# Patient Record
Sex: MALE | Race: OTHER | HISPANIC OR LATINO | Employment: OTHER | URBAN - METROPOLITAN AREA
[De-identification: names, ages, dates, MRNs, and addresses within clinical notes are randomized per-mention and may not be internally consistent; named-entity substitution may affect disease eponyms.]

---

## 2022-05-10 ENCOUNTER — TELEPHONE (OUTPATIENT)
Dept: PSYCHIATRY | Facility: CLINIC | Age: 67
End: 2022-05-10

## 2022-06-14 ENCOUNTER — OFFICE VISIT (OUTPATIENT)
Dept: FAMILY MEDICINE CLINIC | Facility: CLINIC | Age: 67
End: 2022-06-14
Payer: COMMERCIAL

## 2022-06-14 VITALS
OXYGEN SATURATION: 99 % | HEIGHT: 65 IN | TEMPERATURE: 97.2 F | DIASTOLIC BLOOD PRESSURE: 60 MMHG | WEIGHT: 153.8 LBS | HEART RATE: 67 BPM | SYSTOLIC BLOOD PRESSURE: 90 MMHG | BODY MASS INDEX: 25.62 KG/M2

## 2022-06-14 DIAGNOSIS — E11.65 UNCONTROLLED TYPE 2 DIABETES MELLITUS WITH HYPERGLYCEMIA (HCC): ICD-10-CM

## 2022-06-14 DIAGNOSIS — R53.81 PHYSICAL DECONDITIONING: ICD-10-CM

## 2022-06-14 DIAGNOSIS — Z76.89 ENCOUNTER TO ESTABLISH CARE: Primary | ICD-10-CM

## 2022-06-14 DIAGNOSIS — G47.00 INSOMNIA, UNSPECIFIED TYPE: ICD-10-CM

## 2022-06-14 DIAGNOSIS — L89.152 SACRAL DECUBITUS ULCER, STAGE II (HCC): ICD-10-CM

## 2022-06-14 DIAGNOSIS — F31.62 BIPOLAR AFFECTIVE DISORDER, MIXED, MODERATE (HCC): ICD-10-CM

## 2022-06-14 DIAGNOSIS — D64.9 ANEMIA, UNSPECIFIED TYPE: ICD-10-CM

## 2022-06-14 DIAGNOSIS — N18.32 STAGE 3B CHRONIC KIDNEY DISEASE (HCC): ICD-10-CM

## 2022-06-14 DIAGNOSIS — B17.10 ACUTE HEPATITIS C VIRUS INFECTION WITHOUT HEPATIC COMA: ICD-10-CM

## 2022-06-14 DIAGNOSIS — Z71.89 COORDINATION OF COMPLEX CARE: ICD-10-CM

## 2022-06-14 DIAGNOSIS — F41.9 ANXIETY: ICD-10-CM

## 2022-06-14 DIAGNOSIS — I50.9 CONGESTIVE HEART FAILURE, UNSPECIFIED HF CHRONICITY, UNSPECIFIED HEART FAILURE TYPE (HCC): ICD-10-CM

## 2022-06-14 DIAGNOSIS — E11.42 DIABETIC POLYNEUROPATHY ASSOCIATED WITH TYPE 2 DIABETES MELLITUS (HCC): ICD-10-CM

## 2022-06-14 DIAGNOSIS — E78.2 MIXED HYPERLIPIDEMIA: ICD-10-CM

## 2022-06-14 PROBLEM — J18.9 HCAP (HEALTHCARE-ASSOCIATED PNEUMONIA): Status: ACTIVE | Noted: 2022-04-23

## 2022-06-14 PROBLEM — N17.9 ACUTE KIDNEY INJURY (HCC): Status: ACTIVE | Noted: 2022-04-23

## 2022-06-14 PROBLEM — R94.31 ABNORMAL EKG: Status: ACTIVE | Noted: 2022-04-23

## 2022-06-14 PROCEDURE — 3008F BODY MASS INDEX DOCD: CPT | Performed by: PHYSICIAN ASSISTANT

## 2022-06-14 PROCEDURE — 3725F SCREEN DEPRESSION PERFORMED: CPT | Performed by: PHYSICIAN ASSISTANT

## 2022-06-14 PROCEDURE — 99205 OFFICE O/P NEW HI 60 MIN: CPT | Performed by: PHYSICIAN ASSISTANT

## 2022-06-14 RX ORDER — LISINOPRIL 2.5 MG/1
2.5 TABLET ORAL DAILY
COMMUNITY
Start: 2022-06-08 | End: 2022-06-21

## 2022-06-14 RX ORDER — TORSEMIDE 20 MG/1
10 TABLET ORAL DAILY
Status: ON HOLD
Start: 2022-06-14 | End: 2022-07-19 | Stop reason: SDUPTHER

## 2022-06-14 RX ORDER — ATORVASTATIN CALCIUM 40 MG/1
40 TABLET, FILM COATED ORAL
COMMUNITY
Start: 2022-04-29

## 2022-06-14 RX ORDER — CHOLECALCIFEROL (VITAMIN D3) 125 MCG
5 CAPSULE ORAL
COMMUNITY
Start: 2022-05-13

## 2022-06-14 RX ORDER — LORAZEPAM 0.5 MG/1
0.5 TABLET ORAL
COMMUNITY
Start: 2022-05-13 | End: 2022-06-14 | Stop reason: SDUPTHER

## 2022-06-14 RX ORDER — PSEUDOEPHED/ACETAMINOPH/DIPHEN 30MG-500MG
500 TABLET ORAL EVERY 6 HOURS PRN
COMMUNITY
Start: 2022-04-29

## 2022-06-14 RX ORDER — GABAPENTIN 300 MG/1
300 CAPSULE ORAL 3 TIMES DAILY
Qty: 30 CAPSULE | Refills: 0 | Status: SHIPPED | OUTPATIENT
Start: 2022-06-14 | End: 2022-06-30 | Stop reason: SDUPTHER

## 2022-06-14 RX ORDER — FLUOXETINE HYDROCHLORIDE 20 MG/1
20 CAPSULE ORAL DAILY
Qty: 90 CAPSULE | Refills: 1 | Status: SHIPPED | OUTPATIENT
Start: 2022-06-14

## 2022-06-14 RX ORDER — HYDRALAZINE HYDROCHLORIDE 10 MG/1
25 TABLET, FILM COATED ORAL 3 TIMES DAILY
COMMUNITY
Start: 2022-06-10 | End: 2022-07-20

## 2022-06-14 RX ORDER — DAPAGLIFLOZIN 5 MG/1
5 TABLET, FILM COATED ORAL DAILY
COMMUNITY
Start: 2022-06-10

## 2022-06-14 RX ORDER — ASPIRIN 81 MG/1
81 TABLET, COATED ORAL DAILY
COMMUNITY
Start: 2022-04-29

## 2022-06-14 RX ORDER — FLUOXETINE HYDROCHLORIDE 20 MG/1
20 CAPSULE ORAL DAILY
COMMUNITY
Start: 2022-04-29 | End: 2022-06-14 | Stop reason: SDUPTHER

## 2022-06-14 RX ORDER — LORAZEPAM 0.5 MG/1
0.5 TABLET ORAL
Qty: 30 TABLET | Refills: 0 | Status: SHIPPED | OUTPATIENT
Start: 2022-06-14 | End: 2022-07-14

## 2022-06-14 RX ORDER — TORSEMIDE 20 MG/1
20 TABLET ORAL DAILY
COMMUNITY
Start: 2022-06-10 | End: 2022-06-14 | Stop reason: SDUPTHER

## 2022-06-14 RX ORDER — METOPROLOL SUCCINATE 50 MG/1
50 TABLET, EXTENDED RELEASE ORAL DAILY
COMMUNITY
Start: 2022-04-29 | End: 2022-07-20

## 2022-06-14 RX ORDER — LANCING DEVICE/LANCETS
1 KIT MISCELLANEOUS DAILY
COMMUNITY
Start: 2022-04-30

## 2022-06-14 NOTE — PROGRESS NOTES
New Patient    Jax Ortiz 61 y o  male   Date:  6/14/2022      Assessment and Plan:    Galileo Cavazos was seen today for establish care  Diagnoses and all orders for this visit:    Encounter to establish care    Congestive heart failure, unspecified HF chronicity, unspecified heart failure type ([facility] [address] )  Comments:  meds reconciled per cardiology; on BB, ACEI, hydralazine, diuretic; EF 30%, given lifevest, needs LHC and cardiac rehab; continue f/u with cardiology  Orders:  -     torsemide (DEMADEX) 20 mg tablet; Take 0 5 tablets (10 mg total) by mouth in the morning Take 1/2 tablet daily  -     Ambulatory Referral to Complex Care Management Program; Future    Uncontrolled type 2 diabetes mellitus with hyperglycemia (HCC)  Comments:  following visit Dixon Harder was increased to 10 mg, taking two 5s; awaiting endocrinology appt; metformin was reduced due to kidney function  Orders:  -     metFORMIN (GLUCOPHAGE) 500 mg tablet; Take 1 tablet (500 mg total) by mouth 2 (two) times a day with meals  -     Ambulatory Referral to Ophthalmology; Future  -     Glucometer test strips  -     Ambulatory Referral to Complex Care Management Program; Future    Sacral decubitus ulcer, stage II (HCC)  Comments:  healed    Bipolar affective disorder, mixed, moderate (HCC)  Comments:  on chronic prozac x 4 years, ativan for sleep, melatonin was not effective   Orders:  -     FLUoxetine (PROzac) 20 mg capsule; Take 1 capsule (20 mg total) by mouth daily    Anxiety  Comments:  on chronic ativan at night; pt is aware this is a controlled substance, due to time constraint discussed signed agreement together at 1 mo f u, PDMP reviewed  Orders:  -     FLUoxetine (PROzac) 20 mg capsule; Take 1 capsule (20 mg total) by mouth daily  -     LORazepam (ATIVAN) 0 5 mg tablet; Take 1 tablet (0 5 mg total) by mouth daily at bedtime    Insomnia, unspecified type  Comments:  see above, melatonin not helpful  Orders:  -     LORazepam (ATIVAN) 0 5 mg tablet;  Take 1 tablet (0 5 mg total) by mouth daily at bedtime    Diabetic polyneuropathy associated with type 2 diabetes mellitus ([facility] [address] )  Comments:  will trial gabapentin, titrate prn  Orders:  -     gabapentin (Neurontin) 300 mg capsule; Take 1 capsule (300 mg total) by mouth 3 (three) times a day  -     Ambulatory Referral to Complex Care Management Program; Future    Anemia, unspecified type  -     CBC and differential; Future    Mixed hyperlipidemia  Comments:  on statin    Stage 3b chronic kidney disease (HCC)  Comments:  metformin reduced during hospitalization, appreciate nephrology follow up; has bmp to repeat now from cardiology  Orders:  -     Ambulatory Referral to Complex Care Management Program; Future    Acute hepatitis C virus infection without hepatic coma  Comments:  has [place] follow up scheduled  Orders:  -     Ambulatory Referral to Complex Care Management Program; Future    Physical deconditioning  Comments:  no home PT/OT services at this time, would benefit from cardiac rehab as order; family needs help arranging care at home  Orders:  -     Ambulatory Referral to Complex Care Management Program; Future    Coordination of complex care  -     Ambulatory Referral to Complex Care Management Program; Future      BMI Counseling: Body mass index is 25 59 kg/m²  The BMI is above normal  Nutrition recommendations include limiting drinks that contain sugar and moderation in carbohydrate intake  Rationale for BMI follow-up plan is due to patient being overweight or obese  HPI:  Chief Complaint   Patient presents with    Establish Care     HPI   Patient is a 62 yo male who presents to establish care with brother today  He has complex PMH including DM 2, HTN, HLD, new NICM EF 30%, combind CHF, anemia, depression, anxiety, bipolar disorder, h/o incarceration and polysubstance abuse (ETOH, tobaccu, cocaine, remote IVDA), hep C infection, LAZARO with CKD and multiple recent hospitalizations     Patient was living in [place] since 2015  He was hospitalized for 33 days prior to coming to [address] [facility] and treated for DKA, NSTEMI, AMS and LAZARO requiring hemodialysis  He was noted to have become deconditioned  His brother than brought patient to [address] [facility] to be treated and he was subsequently admitted to [facility]/23-4/28 after presenting with dizziness, weakness and SOB  He was traeted for LLL PNA and effusion, noted to have mild anemia, LAZARO  He underwent echo showing global hypokinesis and EF 30%  Cardiology was following and he completed lexiscan which was neg for ischemia  He was placed on BB, ARB, SGLT2 and discharged on lifevest  His metformin was reduced due to renal function  He was found to have hep C infection and recommended outpatient [place] follow up  Patient was hospitalized again 5/2-5/13 for acute CHF exacerbation  He required throacentesis for pleural effusion and aggressive diuresis but after worsening kidney function his diuretics and ARB were held and function returned to baseline  Per note family initially wanted patient discharge to a nursing home but PT recommended home with home care, hospital bed, wheelchair  He was started on hydralazine, torsemide and ARB d/c'd  It was recommended for patient to complete LHC with cardiology in outpatient setting and possible consideration of ICD if no EF improvement  He did complete an xray of R hip showing mod OA  Patient required ativan and melatonin for anxiety and sleep  Since hospitalization, patient has followed with cardiology through [facility]  His metoprolol was increased and cardiac rehab ordered  His torsemide was decreased to 10 mg and was restarted on ACEI  He was given referral for endocrinology for diabetes and given blood work to repeat  It does not appear that patient has had home therapy services  Today, patient presents with brother [facility]  They have many questions about his heart and its function  They explain that he has no energy, wants to sleep all day   He does not have strong appetite  He is experiencing pain and tingling in hands and feet  He is not sleeping well  Melatonin did not work  He is out of ativan, used to be on this for 4 years to help him sleep  They explain that there is currently no home health services  Patient is never home alone  Brother gives him all his medications  He explains that he has poor vision, needs an eye doctor  Patient lives in [facility] [place] but had difficulty findings providers with his insurance per family  He has been on prozac for 4 years  At times of visit, request more test strips but unsure of brand, will call bacl  He checks Bg at home and typically range in low 200s  He has repeat bmp to complete cardiology this week  Patient has all follow up scheduled for [place] for hep C, kidney specialist, continued cardiology visits and endocrinology  ROS: Review of Systems   Constitutional: Positive for appetite change and fatigue  Negative for unexpected weight change  Eyes: Positive for visual disturbance  Respiratory: Positive for shortness of breath  Cardiovascular: Negative for chest pain and leg swelling (none currently)  Gastrointestinal: Negative for vomiting  Genitourinary: Negative for difficulty urinating  Musculoskeletal: Positive for arthralgias and gait problem (in wheelchair)  Skin: Negative for wound (previous decubits wound healed)  Neurological: Positive for weakness and numbness (neuropathy)  Psychiatric/Behavioral: Positive for dysphoric mood and sleep disturbance         Past Medical History:   Diagnosis Date    Anxiety     Arthritis     Coronary artery disease     Dementia ([facility] [address] [facility]     Depression     Diabetes mellitus ([facility] [address] [facility]     Infectious viral hepatitis     Memory loss     Visual impairment      Patient Active Problem List   Diagnosis    Acute hepatitis C virus infection    Abnormal EKG    Bipolar affective disorder, mixed, moderate (HCC)    Congestive heart failure (CHF) ([facility] [address] [facility]    Essential hypertension    Mixed hyperlipidemia    Normocytic anemia    Stage 3b chronic kidney disease (HCC)    Type II or unspecified type diabetes mellitus without mention of complication, uncontrolled    Uncontrolled type 2 diabetes mellitus with hyperglycemia ([facility] [address] )       History reviewed  No pertinent surgical history  Social History     Socioeconomic History    Marital status: Single     Spouse name: None    Number of children: None    Years of education: None    Highest education level: None   Occupational History    None   Tobacco Use    Smoking status: Former Smoker    Smokeless tobacco: Never Used   Vaping Use    Vaping Use: Never used   Substance and Sexual Activity    Alcohol use: Yes     Comment: socially    Drug use: Never    Sexual activity: None   Other Topics Concern    None   Social History Narrative    None     Social Determinants of Health     Financial Resource Strain: Not on file   Food Insecurity: Not on file   Transportation Needs: Not on file   Physical Activity: Not on file   Stress: Not on file   Social Connections: Not on file   Intimate Partner Violence: Not on file   Housing Stability: Not on file       History reviewed  No pertinent family history      Not on File      Current Outpatient Medications:     Acetaminophen Extra Strength 500 MG tablet, Take 500 mg by mouth every 6 (six) hours as needed, Disp: , Rfl:     Aspirin Low Dose 81 MG EC tablet, Take 81 mg by mouth daily, Disp: , Rfl:     atorvastatin (LIPITOR) 40 mg tablet, Take 40 mg by mouth daily at bedtime, Disp: , Rfl:     Farxiga 5 MG TABS, Take 5 mg by mouth in the morning, Disp: , Rfl:     FLUoxetine (PROzac) 20 mg capsule, Take 1 capsule (20 mg total) by mouth daily, Disp: 90 capsule, Rfl: 1    gabapentin (Neurontin) 300 mg capsule, Take 1 capsule (300 mg total) by mouth 3 (three) times a day, Disp: 30 capsule, Rfl: 0    hydrALAZINE (APRESOLINE) 10 mg tablet, Take 10 mg by mouth every 8 (eight) hours 1 tablet every 8 hours, Disp: , Rfl:     Lancet Devices (OneTouch Delica Plus Lancing) MISC, Use 1 Dose in the morning Use as directed, Disp: , Rfl:     lisinopril (ZESTRIL) 2 5 mg tablet, Take 2 5 mg by mouth daily, Disp: , Rfl:     LORazepam (ATIVAN) 0 5 mg tablet, Take 1 tablet (0 5 mg total) by mouth daily at bedtime, Disp: 30 tablet, Rfl: 0    Melatonin 5 MG TABS, Take 5 mg by mouth daily at bedtime, Disp: , Rfl:     metFORMIN (GLUCOPHAGE) 500 mg tablet, Take 1 tablet (500 mg total) by mouth 2 (two) times a day with meals, Disp: 180 tablet, Rfl: 0    metoprolol succinate (TOPROL-XL) 50 mg 24 hr tablet, Take 50 mg by mouth in the morning, Disp: , Rfl:     torsemide (DEMADEX) 20 mg tablet, Take 0 5 tablets (10 mg total) by mouth in the morning Take 1/2 tablet daily, Disp: , Rfl:       Physical Exam:  BP 90/60 (BP Location: Left arm, Patient Position: Sitting, Cuff Size: Standard)   Pulse 67   Temp (!) 97 2 °F (36 2 °C) (Tympanic)   Ht 5' 5" (1 651 m)   Wt 69 8 kg (153 lb 12 8 oz)   SpO2 99%   BMI 25 59 kg/m²     Physical Exam  Constitutional:       General: He is not in acute distress  Appearance: He is not diaphoretic  HENT:      Head: Normocephalic and atraumatic  Right Ear: External ear normal       Left Ear: External ear normal    Cardiovascular:      Rate and Rhythm: Normal rate and regular rhythm  Heart sounds: No murmur heard  Pulmonary:      Effort: Pulmonary effort is normal  No respiratory distress  Breath sounds: No wheezing, rhonchi or rales  Musculoskeletal:      Right lower leg: No edema  Left lower leg: No edema  Skin:     General: Skin is warm and dry  Comments: Sacral wound healed   Neurological:      General: No focal deficit present  Mental Status: He is alert and oriented to person, place, and time  Gait: Abnormal gait: in wheelchair     Psychiatric:         Mood and Affect: Mood normal          Behavior: Behavior normal

## 2022-06-15 ENCOUNTER — TELEPHONE (OUTPATIENT)
Dept: FAMILY MEDICINE CLINIC | Facility: CLINIC | Age: 67
End: 2022-06-15

## 2022-06-15 PROBLEM — J18.9 HCAP (HEALTHCARE-ASSOCIATED PNEUMONIA): Status: RESOLVED | Noted: 2022-04-23 | Resolved: 2022-06-15

## 2022-06-15 PROBLEM — L89.152 SACRAL DECUBITUS ULCER, STAGE II (HCC): Status: RESOLVED | Noted: 2022-05-13 | Resolved: 2022-06-15

## 2022-06-15 PROBLEM — N17.9 ACUTE KIDNEY INJURY (HCC): Status: RESOLVED | Noted: 2022-04-23 | Resolved: 2022-06-15

## 2022-06-15 NOTE — TELEPHONE ENCOUNTER
This was the dose that he reports to have been taking for 4 years and what he was discharged on from the hospital from for the lorazepam but absolutely can cut in half and take half tablet instead  Please have patient take two farxiga 5 mg tablets daily = 10 mg  The test strips were sent yesterday  Continue to monitor weakness  However, patient is quite deconditions from recent hospitalization and medical conditions   Our goal is really to provide him with resources to help start PT

## 2022-06-15 NOTE — TELEPHONE ENCOUNTER
Patients brother, Namita Bonilla, contacted office back  Informed Juan of providers response  Namita Bonilla understood and had no further questions  States he will contact office back if needed

## 2022-06-15 NOTE — TELEPHONE ENCOUNTER
Alisa Larsen called for his brother Damon Jane  He had a couple of questions that he wanted to address  He stated that he gave his brother the Lorazepam 0 5mg tablet last night around 8-8:30 and he was woken up this morning at 7:30 and wants to know if the dosage is too much where he could cut it in half or should he give it earlier in the night, because when he woke up this morning he was still sleepy  He also expressed concern that his Blood sugar this was morning was in the 300's  Also, he relayed that he knows his brother has heart problems but his oxygen levels are typically good but had some weakness by the time he got home last night around 6PM  Wanted to confirm that his brother does use the one touch test strips as well  Please advise

## 2022-06-16 ENCOUNTER — HOSPITAL ENCOUNTER (INPATIENT)
Facility: HOSPITAL | Age: 67
LOS: 5 days | Discharge: HOME HEALTH | DRG: 683 | End: 2022-06-21
Attending: EMERGENCY MEDICINE | Admitting: INTERNAL MEDICINE
Payer: COMMERCIAL

## 2022-06-16 ENCOUNTER — APPOINTMENT (EMERGENCY)
Dept: RADIOLOGY | Facility: HOSPITAL | Age: 67
DRG: 683 | End: 2022-06-16
Payer: COMMERCIAL

## 2022-06-16 DIAGNOSIS — N17.9 ACUTE RENAL FAILURE (ARF) (HCC): ICD-10-CM

## 2022-06-16 DIAGNOSIS — N17.9 AKI (ACUTE KIDNEY INJURY) (HCC): ICD-10-CM

## 2022-06-16 DIAGNOSIS — R26.2 AMBULATORY DYSFUNCTION: ICD-10-CM

## 2022-06-16 DIAGNOSIS — F19.10 POLYSUBSTANCE ABUSE (HCC): ICD-10-CM

## 2022-06-16 DIAGNOSIS — E11.65 UNCONTROLLED TYPE 2 DIABETES MELLITUS WITH HYPERGLYCEMIA (HCC): ICD-10-CM

## 2022-06-16 DIAGNOSIS — R53.1 GENERALIZED WEAKNESS: Primary | ICD-10-CM

## 2022-06-16 DIAGNOSIS — R94.31 T WAVE INVERSION IN EKG: ICD-10-CM

## 2022-06-16 DIAGNOSIS — R77.8 ELEVATED TROPONIN: ICD-10-CM

## 2022-06-16 DIAGNOSIS — R53.1 WEAKNESS: ICD-10-CM

## 2022-06-16 DIAGNOSIS — E87.5 HYPERKALEMIA: ICD-10-CM

## 2022-06-16 DIAGNOSIS — N18.32 STAGE 3B CHRONIC KIDNEY DISEASE (HCC): ICD-10-CM

## 2022-06-16 PROBLEM — I42.9 CARDIOMYOPATHY (HCC): Status: ACTIVE | Noted: 2022-06-16

## 2022-06-16 LAB
2HR DELTA HS TROPONIN: -3 NG/L
4HR DELTA HS TROPONIN: -1 NG/L
ALBUMIN SERPL BCP-MCNC: 2.2 G/DL (ref 3.5–5)
ALBUMIN SERPL BCP-MCNC: 2.6 G/DL (ref 3.5–5)
ALP SERPL-CCNC: 132 U/L (ref 46–116)
ALP SERPL-CCNC: 148 U/L (ref 46–116)
ALT SERPL W P-5'-P-CCNC: 66 U/L (ref 12–78)
ALT SERPL W P-5'-P-CCNC: 78 U/L (ref 12–78)
AMMONIA PLAS-SCNC: 37 UMOL/L (ref 11–35)
ANION GAP SERPL CALCULATED.3IONS-SCNC: 3 MMOL/L (ref 4–13)
ANION GAP SERPL CALCULATED.3IONS-SCNC: 6 MMOL/L (ref 4–13)
APTT PPP: 26 SECONDS (ref 23–37)
APTT PPP: 26 SECONDS (ref 23–37)
AST SERPL W P-5'-P-CCNC: 43 U/L (ref 5–45)
AST SERPL W P-5'-P-CCNC: 47 U/L (ref 5–45)
ATRIAL RATE: 82 BPM
BACTERIA UR QL AUTO: ABNORMAL /HPF
BASOPHILS # BLD AUTO: 0.07 THOUSANDS/ΜL (ref 0–0.1)
BASOPHILS NFR BLD AUTO: 1 % (ref 0–1)
BETA-HYDROXYBUTYRATE: 0 MMOL/L
BILIRUB SERPL-MCNC: 0.17 MG/DL (ref 0.2–1)
BILIRUB SERPL-MCNC: 0.24 MG/DL (ref 0.2–1)
BILIRUB UR QL STRIP: NEGATIVE
BUN SERPL-MCNC: 50 MG/DL (ref 5–25)
BUN SERPL-MCNC: 52 MG/DL (ref 5–25)
CALCIUM ALBUM COR SERPL-MCNC: 10.4 MG/DL (ref 8.3–10.1)
CALCIUM ALBUM COR SERPL-MCNC: 9.7 MG/DL (ref 8.3–10.1)
CALCIUM SERPL-MCNC: 8.6 MG/DL (ref 8.3–10.1)
CALCIUM SERPL-MCNC: 9 MG/DL (ref 8.3–10.1)
CARDIAC TROPONIN I PNL SERPL HS: 13 NG/L
CARDIAC TROPONIN I PNL SERPL HS: 15 NG/L
CARDIAC TROPONIN I PNL SERPL HS: 16 NG/L
CHLORIDE SERPL-SCNC: 113 MMOL/L (ref 100–108)
CHLORIDE SERPL-SCNC: 113 MMOL/L (ref 100–108)
CLARITY UR: CLEAR
CO2 SERPL-SCNC: 20 MMOL/L (ref 21–32)
CO2 SERPL-SCNC: 23 MMOL/L (ref 21–32)
COLOR UR: ABNORMAL
CREAT SERPL-MCNC: 2.3 MG/DL (ref 0.6–1.3)
CREAT SERPL-MCNC: 2.31 MG/DL (ref 0.6–1.3)
EOSINOPHIL # BLD AUTO: 0.49 THOUSAND/ΜL (ref 0–0.61)
EOSINOPHIL NFR BLD AUTO: 6 % (ref 0–6)
ERYTHROCYTE [DISTWIDTH] IN BLOOD BY AUTOMATED COUNT: 14.9 % (ref 11.6–15.1)
EST. AVERAGE GLUCOSE BLD GHB EST-MCNC: 171 MG/DL
FIBRINOGEN PPP-MCNC: 354 MG/DL (ref 227–495)
FLUAV RNA RESP QL NAA+PROBE: NEGATIVE
FLUBV RNA RESP QL NAA+PROBE: NEGATIVE
GFR SERPL CREATININE-BSD FRML MDRD: 28 ML/MIN/1.73SQ M
GFR SERPL CREATININE-BSD FRML MDRD: 28 ML/MIN/1.73SQ M
GLUCOSE SERPL-MCNC: 202 MG/DL (ref 65–140)
GLUCOSE SERPL-MCNC: 205 MG/DL (ref 65–140)
GLUCOSE SERPL-MCNC: 209 MG/DL (ref 65–140)
GLUCOSE UR STRIP-MCNC: ABNORMAL MG/DL
HBA1C MFR BLD: 7.6 %
HCT VFR BLD AUTO: 34.8 % (ref 36.5–49.3)
HGB BLD-MCNC: 11.1 G/DL (ref 12–17)
HGB UR QL STRIP.AUTO: NEGATIVE
HYALINE CASTS #/AREA URNS LPF: ABNORMAL /LPF
IMM GRANULOCYTES # BLD AUTO: 0.05 THOUSAND/UL (ref 0–0.2)
IMM GRANULOCYTES NFR BLD AUTO: 1 % (ref 0–2)
INR PPP: 1.01 (ref 0.84–1.19)
KETONES UR STRIP-MCNC: NEGATIVE MG/DL
LEUKOCYTE ESTERASE UR QL STRIP: ABNORMAL
LYMPHOCYTES # BLD AUTO: 3.8 THOUSANDS/ΜL (ref 0.6–4.47)
LYMPHOCYTES NFR BLD AUTO: 47 % (ref 14–44)
MAGNESIUM SERPL-MCNC: 2.1 MG/DL (ref 1.6–2.6)
MCH RBC QN AUTO: 29.4 PG (ref 26.8–34.3)
MCHC RBC AUTO-ENTMCNC: 31.9 G/DL (ref 31.4–37.4)
MCV RBC AUTO: 92 FL (ref 82–98)
MONOCYTES # BLD AUTO: 0.65 THOUSAND/ΜL (ref 0.17–1.22)
MONOCYTES NFR BLD AUTO: 8 % (ref 4–12)
NEUTROPHILS # BLD AUTO: 2.99 THOUSANDS/ΜL (ref 1.85–7.62)
NEUTS SEG NFR BLD AUTO: 37 % (ref 43–75)
NITRITE UR QL STRIP: NEGATIVE
NON-SQ EPI CELLS URNS QL MICRO: ABNORMAL /HPF
NRBC BLD AUTO-RTO: 0 /100 WBCS
NT-PROBNP SERPL-MCNC: 7931 PG/ML
P AXIS: 49 DEGREES
PH UR STRIP.AUTO: 5 [PH]
PLATELET # BLD AUTO: 149 THOUSANDS/UL (ref 149–390)
PLATELET # BLD AUTO: 202 THOUSANDS/UL (ref 149–390)
PMV BLD AUTO: 10.6 FL (ref 8.9–12.7)
PMV BLD AUTO: 11.2 FL (ref 8.9–12.7)
POTASSIUM SERPL-SCNC: 5.4 MMOL/L (ref 3.5–5.3)
POTASSIUM SERPL-SCNC: 5.6 MMOL/L (ref 3.5–5.3)
PR INTERVAL: 158 MS
PROT SERPL-MCNC: 6.9 G/DL (ref 6.4–8.2)
PROT SERPL-MCNC: 7.9 G/DL (ref 6.4–8.2)
PROT UR STRIP-MCNC: ABNORMAL MG/DL
PROTHROMBIN TIME: 12.9 SECONDS (ref 11.6–14.5)
PROTHROMBIN TIME: 12.9 SECONDS (ref 11.6–14.5)
QRS AXIS: 46 DEGREES
QRSD INTERVAL: 84 MS
QT INTERVAL: 400 MS
QTC INTERVAL: 467 MS
RBC # BLD AUTO: 3.77 MILLION/UL (ref 3.88–5.62)
RBC #/AREA URNS AUTO: ABNORMAL /HPF
RSV RNA RESP QL NAA+PROBE: NEGATIVE
SARS-COV-2 RNA RESP QL NAA+PROBE: NEGATIVE
SODIUM SERPL-SCNC: 139 MMOL/L (ref 136–145)
SODIUM SERPL-SCNC: 139 MMOL/L (ref 136–145)
SP GR UR STRIP.AUTO: 1.01 (ref 1–1.03)
T WAVE AXIS: 252 DEGREES
THROMBIN TIME: 17.4 SECONDS (ref 14.7–18.4)
THROMBIN TIME: 17.4 SECONDS (ref 14.7–18.4)
TSH SERPL DL<=0.05 MIU/L-ACNC: 1.08 UIU/ML (ref 0.45–4.5)
UROBILINOGEN UR STRIP-ACNC: <2 MG/DL
VENTRICULAR RATE: 82 BPM
VIT B12 SERPL-MCNC: 2546 PG/ML (ref 100–900)
WBC # BLD AUTO: 8.05 THOUSAND/UL (ref 4.31–10.16)
WBC #/AREA URNS AUTO: ABNORMAL /HPF

## 2022-06-16 PROCEDURE — 3051F HG A1C>EQUAL 7.0%<8.0%: CPT | Performed by: PHYSICIAN ASSISTANT

## 2022-06-16 PROCEDURE — 85384 FIBRINOGEN ACTIVITY: CPT | Performed by: INTERNAL MEDICINE

## 2022-06-16 PROCEDURE — 0241U HB NFCT DS VIR RESP RNA 4 TRGT: CPT | Performed by: EMERGENCY MEDICINE

## 2022-06-16 PROCEDURE — 83735 ASSAY OF MAGNESIUM: CPT | Performed by: INTERNAL MEDICINE

## 2022-06-16 PROCEDURE — 85025 COMPLETE CBC W/AUTO DIFF WBC: CPT | Performed by: EMERGENCY MEDICINE

## 2022-06-16 PROCEDURE — 85049 AUTOMATED PLATELET COUNT: CPT | Performed by: INTERNAL MEDICINE

## 2022-06-16 PROCEDURE — 99285 EMERGENCY DEPT VISIT HI MDM: CPT | Performed by: EMERGENCY MEDICINE

## 2022-06-16 PROCEDURE — 83036 HEMOGLOBIN GLYCOSYLATED A1C: CPT | Performed by: INTERNAL MEDICINE

## 2022-06-16 PROCEDURE — 71045 X-RAY EXAM CHEST 1 VIEW: CPT

## 2022-06-16 PROCEDURE — 85610 PROTHROMBIN TIME: CPT | Performed by: INTERNAL MEDICINE

## 2022-06-16 PROCEDURE — 83918 ORGANIC ACIDS TOTAL QUANT: CPT | Performed by: INTERNAL MEDICINE

## 2022-06-16 PROCEDURE — 80053 COMPREHEN METABOLIC PANEL: CPT | Performed by: INTERNAL MEDICINE

## 2022-06-16 PROCEDURE — 81001 URINALYSIS AUTO W/SCOPE: CPT | Performed by: EMERGENCY MEDICINE

## 2022-06-16 PROCEDURE — 82140 ASSAY OF AMMONIA: CPT | Performed by: INTERNAL MEDICINE

## 2022-06-16 PROCEDURE — G1004 CDSM NDSC: HCPCS

## 2022-06-16 PROCEDURE — 84443 ASSAY THYROID STIM HORMONE: CPT | Performed by: INTERNAL MEDICINE

## 2022-06-16 PROCEDURE — 3066F NEPHROPATHY DOC TX: CPT | Performed by: PHYSICIAN ASSISTANT

## 2022-06-16 PROCEDURE — 82948 REAGENT STRIP/BLOOD GLUCOSE: CPT

## 2022-06-16 PROCEDURE — 84484 ASSAY OF TROPONIN QUANT: CPT | Performed by: EMERGENCY MEDICINE

## 2022-06-16 PROCEDURE — 85611 PROTHROMBIN TEST: CPT | Performed by: INTERNAL MEDICINE

## 2022-06-16 PROCEDURE — 36415 COLL VENOUS BLD VENIPUNCTURE: CPT | Performed by: EMERGENCY MEDICINE

## 2022-06-16 PROCEDURE — 93005 ELECTROCARDIOGRAM TRACING: CPT

## 2022-06-16 PROCEDURE — 83880 ASSAY OF NATRIURETIC PEPTIDE: CPT | Performed by: EMERGENCY MEDICINE

## 2022-06-16 PROCEDURE — 99285 EMERGENCY DEPT VISIT HI MDM: CPT

## 2022-06-16 PROCEDURE — 80053 COMPREHEN METABOLIC PANEL: CPT | Performed by: EMERGENCY MEDICINE

## 2022-06-16 PROCEDURE — 85670 THROMBIN TIME PLASMA: CPT | Performed by: INTERNAL MEDICINE

## 2022-06-16 PROCEDURE — 99223 1ST HOSP IP/OBS HIGH 75: CPT | Performed by: INTERNAL MEDICINE

## 2022-06-16 PROCEDURE — 70450 CT HEAD/BRAIN W/O DYE: CPT

## 2022-06-16 PROCEDURE — 93010 ELECTROCARDIOGRAM REPORT: CPT | Performed by: INTERNAL MEDICINE

## 2022-06-16 PROCEDURE — 97163 PT EVAL HIGH COMPLEX 45 MIN: CPT

## 2022-06-16 PROCEDURE — 85730 THROMBOPLASTIN TIME PARTIAL: CPT | Performed by: INTERNAL MEDICINE

## 2022-06-16 PROCEDURE — 85732 THROMBOPLASTIN TIME PARTIAL: CPT | Performed by: INTERNAL MEDICINE

## 2022-06-16 PROCEDURE — 82607 VITAMIN B-12: CPT | Performed by: INTERNAL MEDICINE

## 2022-06-16 PROCEDURE — 82010 KETONE BODYS QUAN: CPT | Performed by: INTERNAL MEDICINE

## 2022-06-16 RX ORDER — INSULIN LISPRO 100 [IU]/ML
1-5 INJECTION, SOLUTION INTRAVENOUS; SUBCUTANEOUS
Status: DISCONTINUED | OUTPATIENT
Start: 2022-06-16 | End: 2022-06-21 | Stop reason: HOSPADM

## 2022-06-16 RX ORDER — LANOLIN ALCOHOL/MO/W.PET/CERES
100 CREAM (GRAM) TOPICAL DAILY
Status: DISCONTINUED | OUTPATIENT
Start: 2022-06-17 | End: 2022-06-21 | Stop reason: HOSPADM

## 2022-06-16 RX ORDER — FLUOXETINE HYDROCHLORIDE 20 MG/1
20 CAPSULE ORAL DAILY
Status: DISCONTINUED | OUTPATIENT
Start: 2022-06-17 | End: 2022-06-21 | Stop reason: HOSPADM

## 2022-06-16 RX ORDER — ONDANSETRON 2 MG/ML
4 INJECTION INTRAMUSCULAR; INTRAVENOUS EVERY 6 HOURS PRN
Status: DISCONTINUED | OUTPATIENT
Start: 2022-06-16 | End: 2022-06-21 | Stop reason: HOSPADM

## 2022-06-16 RX ORDER — ATORVASTATIN CALCIUM 40 MG/1
40 TABLET, FILM COATED ORAL
Status: DISCONTINUED | OUTPATIENT
Start: 2022-06-16 | End: 2022-06-21 | Stop reason: HOSPADM

## 2022-06-16 RX ORDER — HEPARIN SODIUM 5000 [USP'U]/ML
5000 INJECTION, SOLUTION INTRAVENOUS; SUBCUTANEOUS EVERY 8 HOURS SCHEDULED
Status: DISCONTINUED | OUTPATIENT
Start: 2022-06-16 | End: 2022-06-21 | Stop reason: HOSPADM

## 2022-06-16 RX ORDER — HYDRALAZINE HYDROCHLORIDE 10 MG/1
10 TABLET, FILM COATED ORAL EVERY 8 HOURS
Status: DISCONTINUED | OUTPATIENT
Start: 2022-06-16 | End: 2022-06-17

## 2022-06-16 RX ORDER — FOLIC ACID 1 MG/1
1 TABLET ORAL DAILY
Status: DISCONTINUED | OUTPATIENT
Start: 2022-06-17 | End: 2022-06-21 | Stop reason: HOSPADM

## 2022-06-16 RX ORDER — LANOLIN ALCOHOL/MO/W.PET/CERES
4.5 CREAM (GRAM) TOPICAL
Status: DISCONTINUED | OUTPATIENT
Start: 2022-06-16 | End: 2022-06-21 | Stop reason: HOSPADM

## 2022-06-16 RX ORDER — INSULIN GLARGINE 100 [IU]/ML
10 INJECTION, SOLUTION SUBCUTANEOUS
Status: DISCONTINUED | OUTPATIENT
Start: 2022-06-16 | End: 2022-06-21 | Stop reason: HOSPADM

## 2022-06-16 RX ORDER — METOPROLOL SUCCINATE 50 MG/1
50 TABLET, EXTENDED RELEASE ORAL DAILY
Status: DISCONTINUED | OUTPATIENT
Start: 2022-06-16 | End: 2022-06-21 | Stop reason: HOSPADM

## 2022-06-16 RX ORDER — ASPIRIN 81 MG/1
81 TABLET ORAL DAILY
Status: DISCONTINUED | OUTPATIENT
Start: 2022-06-17 | End: 2022-06-21 | Stop reason: HOSPADM

## 2022-06-16 RX ORDER — SODIUM CHLORIDE 9 MG/ML
50 INJECTION, SOLUTION INTRAVENOUS CONTINUOUS
Status: DISCONTINUED | OUTPATIENT
Start: 2022-06-16 | End: 2022-06-18

## 2022-06-16 RX ORDER — LORAZEPAM 0.5 MG/1
0.5 TABLET ORAL
Status: DISCONTINUED | OUTPATIENT
Start: 2022-06-16 | End: 2022-06-21 | Stop reason: HOSPADM

## 2022-06-16 RX ORDER — ACETAMINOPHEN 325 MG/1
650 TABLET ORAL EVERY 6 HOURS PRN
Status: DISCONTINUED | OUTPATIENT
Start: 2022-06-16 | End: 2022-06-21 | Stop reason: HOSPADM

## 2022-06-16 RX ADMIN — INSULIN LISPRO 1 UNITS: 100 INJECTION, SOLUTION INTRAVENOUS; SUBCUTANEOUS at 20:52

## 2022-06-16 RX ADMIN — INSULIN GLARGINE 10 UNITS: 100 INJECTION, SOLUTION SUBCUTANEOUS at 21:20

## 2022-06-16 RX ADMIN — HEPARIN SODIUM 5000 UNITS: 5000 INJECTION INTRAVENOUS; SUBCUTANEOUS at 21:19

## 2022-06-16 RX ADMIN — METOPROLOL SUCCINATE 50 MG: 50 TABLET, EXTENDED RELEASE ORAL at 20:48

## 2022-06-16 RX ADMIN — SODIUM CHLORIDE 50 ML/HR: 0.9 INJECTION, SOLUTION INTRAVENOUS at 18:25

## 2022-06-16 RX ADMIN — ATORVASTATIN CALCIUM 40 MG: 40 TABLET, FILM COATED ORAL at 21:19

## 2022-06-16 RX ADMIN — Medication 4.5 MG: at 21:19

## 2022-06-16 RX ADMIN — LORAZEPAM 0.5 MG: 0.5 TABLET ORAL at 21:19

## 2022-06-16 RX ADMIN — HYDRALAZINE HYDROCHLORIDE 10 MG: 10 TABLET, FILM COATED ORAL at 20:48

## 2022-06-16 NOTE — H&P
[facility]  H&P- Lobito Beatty 1955, 61 y o  male MRN: [id number]  Unit/Bed#: ED 22 Encounter: [id number]  Primary Care Provider: No primary care provider on file  Date and time admitted to hospital: 6/16/2022  1:56 PM    * Weakness  Assessment & Plan  Appears multifactorial   Potential etiologies include acute renal failure/polypharmacy, rule out metabolic process rule out intrinsic liver disease in the setting of hepatitis-C/remote polysubstance abuse versus B12 deficiency in the setting of chronic metformin use (patient reports previously being on metformin for years which was restarted recently)  versus early DKA / euglycemic DKA in the setting of farxiga therapy  Patient with reported prior admission for DKA in [facility]   Fortunately patient is nontoxic , sitting up in bed in the ED eating dinner  Will hold Demadex and lisinopril for renal failure and start gentle hydration  Bladder scan    Patient on chronic metformin  Rule out B12 deficiency secondary to chronic metformin use  Patient presented with bilateral lower extremity weakness with unsteady gait  The patient has a positive Romberg sign on clinical exam/presentation  PT/OT recommending rehab  Monitor blood sugar  Follow up on orthostatics  Neurochecks  Ammonia  b-hydroxybuterate    Hyperkalemia  Assessment & Plan  Appears to be slightly hemolyzed on labs  Will repeat labs    Polysubstance abuse [facility])  Assessment & Plan  Patient has reported history of bipolar disorder with prior history of incarceration and polysubstance abuse to alcohol tobacco cocaine and a remote history of IV drug use  He also has a reported history of hepatitis-C for which he is scheduled to be followed  with Infectious Disease as an outpatient with [facility]    He has multiple recent hospitalizations as well      CIWA    Cardiomyopathy [facility])  Assessment & Plan  Patient clinically dry  Hold medications as above  Patient with reduced ejection fraction on last echo    Uncontrolled type 2 diabetes mellitus with hyperglycemia [facility])  Assessment & Plan  Lab Results   Component Value Date    HGBA1C 7 6 (H) 04/23/2022       No results for input(s): POCGLU in the last 72 hours  Blood Sugar Average: Last 72 hrs:   sliding scale insulin  Will hold oral hypoglycemics for now  Stage 3b chronic kidney disease [facility])  Assessment & Plan  Lab Results   Component Value Date    EGFR 28 06/16/2022    CREATININE 2 31 (H) 06/16/2022   Baseline creatinine between 1 71 9  Slightly above baseline  Mixed hyperlipidemia  Assessment & Plan  Lipitor    Congestive heart failure (CHF) (HCC)  Assessment & Plan  Wt Readings from Last 3 Encounters:   06/16/22 71 7 kg (158 lb)   06/14/22 69 8 kg (153 lb 12 8 oz)       Patient is followed as an outpatient  Patient recently was seen back in May  Currently on:  Demadex 10 mg daily, lisinopril 2 5 daily he is on a 2 g sodium restriction      Bipolar affective disorder, mixed, moderate (HCC)  Assessment & Plan  Continue with outpatient meds  Patient currently on Ativan and Prozac    Acute renal failure (ARF) ([facility] [address] )  Assessment & Plan  Acute on chronic renal failure  Baseline creatinine between 1 7-1 9  Will hold diuretic and ACE-inhibitor  Avoid renal toxic agents  Bladder scan  Monitor ins and outs  Continue with supportive care    VTE Prophylaxis: Heparin  / sequential compression device   Code Status:  Full code  POLST: There is no POLST form on file for this patient (pre-hospital)  D    Anticipated Length of Stay:  Patient will be admitted on an Inpatient basis with an anticipated length of stay of  greater than 2 midnights  Justification for Hospital Stay:  Needs further evaluation of weakness and hydration    Total Time for Visit, including Counseling / Coordination of Care: 45 minutes    Greater than 50% of this total time spent on direct patient counseling and coordination of care     Chief Complaint:   Weakness    History of Present Illness:    Renan Delgado is a 61 y o  male who presents with a past medical history congestive heart failure with reduced ejection fraction of 30%, hypertension hyperlipidemia history of bipolar disorder chronic diabetes and CKD who presents the hospital with symptoms of generalized weakness  Patient reportedly has not been feeling well for the past 5 days  He presents with feeling generalized weakness and reports feeling tired and sleepy  Patient also noted having reported increased frequency of falls  Patient also articulates concerns overall feeling off balance when he walks which resolves when he is resting  He does not have any sensation of dizziness or lightheadedness with these episodes  He reports that he does ambulate with a walker but does not always use it  Family reports that he has more forgetful and confused since he was last hospitalized at [facility] between May 2nd and May 13th  Patient previously was living in [facility] since 2015  He was hospitalized for 33 days prior to coming to the [place] and previously was treated for DKA, non-STEMI, mental status change and acute renal failure that previously required hemodialysis  Subsequently, his brother brought him to [address] [facility] [facility] to be treated and he was admitted to [facility] 4/23-4/28 after presenting with dizziness, weakness and SOB  He was traeted for LLL PNA and effusion, noted to have mild anemia, LAZARO  He underwent echo showing global hypokinesis and EF 30%  Cardiology was following and he completed lexiscan which was neg for ischemia  He was placed on BB, ARB, SGLT2 and discharged on lifevest  His metformin was reduced due to renal function  He was found to have hep C infection and recommended outpatient ID follow up  Unfortunately, he was rehospitalized between May 2nd and 13th  for acute exacerbation of congestive heart failure    At that time he required a thoracentesis for pleural effusion and was aggressively diuresed with resulting worsening renal function  He subsequently had his diuretics held with subsequent improvement of his creatinine  He ultimately was treated with hydralazine and torsemide and his Arb was discontinued  He was instructed to follow-up with Cardiology for eventual ICD and left-sided heart catheterization  In follow-up he was placed on torsemide 10 mg daily and restarted on ACE-inhibitor as an outpatient with Cardiology at [facility]   He was seen by his PCP with his brother Carolann Coffman with complaints of generalized weakness excessive daytime sleepiness and decreased appetite  He also notes pain and tingling in the hands and feet  Review of Systems:    Review of Systems   Constitutional: Positive for fatigue  Negative for chills, diaphoresis, fever and unexpected weight change  Respiratory: Negative for apnea, cough, chest tightness and shortness of breath  Gastrointestinal: Negative for abdominal distention  Musculoskeletal: Negative for back pain  Neurological: Positive for dizziness  Negative for facial asymmetry, numbness and headaches  Psychiatric/Behavioral: Negative for agitation  All other systems reviewed and are negative  Past Medical and Surgical History:     Past Medical History:   Diagnosis Date    Anxiety     Arthritis     Coronary artery disease     Dementia ([facility] [address] )     Depression     Diabetes mellitus ([facility] [address] )     Infectious viral hepatitis     Memory loss     Visual impairment        History reviewed  No pertinent surgical history  Meds/Allergies:    Prior to Admission medications    Medication Sig Start Date End Date Taking?  Authorizing Provider   Acetaminophen Extra Strength 500 MG tablet Take 500 mg by mouth every 6 (six) hours as needed 4/29/22   Historical Provider, MD   Aspirin Low Dose 81 MG EC tablet Take 81 mg by mouth daily 4/29/22   Historical Provider, MD   atorvastatin (LIPITOR) 40 mg tablet Take 40 mg by mouth daily at bedtime 4/29/22   Historical Provider, MD   Farxiga 5 MG TABS Take 5 mg by mouth in the morning 6/10/22   Historical Provider, MD   FLUoxetine (PROzac) 20 mg capsule Take 1 capsule (20 mg total) by mouth daily 6/14/22   Adina Figueredo PA-C   gabapentin (Neurontin) 300 mg capsule Take 1 capsule (300 mg total) by mouth 3 (three) times a day 6/14/22   Adina Figueredo PA-C   hydrALAZINE (APRESOLINE) 10 mg tablet Take 10 mg by mouth every 8 (eight) hours 1 tablet every 8 hours 6/10/22   Historical Provider, MD   Lancet Devices (OneTouch Delica Plus [place]) MISC Use 1 Dose in the morning Use as directed 4/30/22   Historical Provider, MD   lisinopril (ZESTRIL) 2 5 mg tablet Take 2 5 mg by mouth daily 6/8/22   Historical Provider, MD   LORazepam (ATIVAN) 0 5 mg tablet Take 1 tablet (0 5 mg total) by mouth daily at bedtime 6/14/22   Adina Figueredo PA-C   Melatonin 5 MG TABS Take 5 mg by mouth daily at bedtime 5/13/22   Historical Provider, MD   metFORMIN (GLUCOPHAGE) 500 mg tablet Take 1 tablet (500 mg total) by mouth 2 (two) times a day with meals 6/14/22   Adina Figueredo PA-C   metoprolol succinate (TOPROL-XL) 50 mg 24 hr tablet Take 50 mg by mouth in the morning 4/29/22   Historical Provider, MD   torsemide (DEMADEX) 20 mg tablet Take 0 5 tablets (10 mg total) by mouth in the morning Take 1/2 tablet daily 6/14/22   Adnia Figueredo PA-C     I have reviewed home medications with patient personally  Allergies: No Known Allergies    Social History:     Marital Status: Single     Patient Pre-hospital Living Situation:  Home  Patient Pre-hospital Level of Mobility:  Ambulatory at baseline    Patient Pre-hospital Diet Restrictions:  Denies  Substance Use History:   Social History     Substance and Sexual Activity   Alcohol Use Yes    Comment: socially     Social History     Tobacco Use   Smoking Status Former Smoker   Smokeless Tobacco Never Used     Social History     Substance and Sexual Activity   Drug Use Never       Family History:    History reviewed  No pertinent family history  Physical Exam:     Vitals:   Blood Pressure: 117/69 (06/16/22 1630)  Pulse: 72 (06/16/22 1630)  Temperature: 97 5 °F (36 4 °C) (06/16/22 1400)  Temp Source: Oral (06/16/22 1400)  Respirations: 19 (06/16/22 1630)  Weight - Scale: 71 7 kg (158 lb) (06/16/22 1400)  SpO2: 95 % (06/16/22 1630)    Physical Exam  Neurological:      Mental Status: He is oriented to person, place, and time  Mental status is at baseline  Cranial Nerves: No cranial nerve deficit  Sensory: Sensory deficit present  Motor: No weakness  Comments: Positive Romberg's  Cranial nerves intact  Patient able to stand from seated position  However when he closes his eyes he has significant unsteadiness  Decreased sensation bilaterally lower extremities     Psychiatric:         Mood and Affect: Mood normal          Behavior: Behavior normal              Additional Data:     Lab Results: I have personally reviewed pertinent reports  Results from last 7 days   Lab Units 06/16/22  1428   WBC Thousand/uL 8 05   HEMOGLOBIN g/dL 11 1*   HEMATOCRIT % 34 8*   PLATELETS Thousands/uL 202   NEUTROS PCT % 37*   LYMPHS PCT % 47*   MONOS PCT % 8   EOS PCT % 6     Results from last 7 days   Lab Units 06/16/22  1428   SODIUM mmol/L 139   POTASSIUM mmol/L 5 4*   CHLORIDE mmol/L 113*   CO2 mmol/L 20*   BUN mg/dL 50*   CREATININE mg/dL 2 31*   ANION GAP mmol/L 6   CALCIUM mg/dL 9 0   ALBUMIN g/dL 2 2*   TOTAL BILIRUBIN mg/dL 0 24   ALK PHOS U/L 148*   ALT U/L 78   AST U/L 47*   GLUCOSE RANDOM mg/dL 209*                       Imaging: I have personally reviewed pertinent reports  CT head without contrast   Final Result by Melissa Ruelas MD (06/16 9859)      No acute intracranial abnormality  Chronic microangiopathy                    Workstation performed: [id number]         XR chest 1 view portable   Final Result by Erin Burciaga MD (06/16 7821)      No acute cardiopulmonary disease  Workstation performed: [id number]                 ** Please Note: This note has been constructed using a voice recognition system   **

## 2022-06-16 NOTE — PLAN OF CARE
Problem: PHYSICAL THERAPY ADULT  Goal: Performs mobility at highest level of function for planned discharge setting  See evaluation for individualized goals  Description: Treatment/Interventions: Functional transfer training, LE strengthening/ROM, Elevations, Therapeutic exercise, Endurance training, Patient/family training, Equipment eval/education, Bed mobility, Gait training, Compensatory technique education, Spoke to nursing (balance training)  Equipment Recommended:  (pt reports that he already owns RW)       See flowsheet documentation for full assessment, interventions and recommendations  Note: Prognosis: Good  Problem List: Decreased strength, Decreased endurance, Impaired balance, Decreased mobility, Decreased coordination  Assessment: Pt is 61 y o  male seen for a PT evaluation s/p admit to [facility] on 6/16/2022  Pt presenting w/ primary complaint of weakness + feeling off balance  Pt reported this has been ongoing for the past several months but when he was unable to walk out to the car due to SOB he decided to come to the ED  Please see above for other active problem list / PMH  PT now consulted to assess functional mobility and needs for safe d/c planning  Prior to admission, pt was I w/ ADL's, started using RW + SPC for functional mobility due to increasing falls, lives w/ his sister in a house w/ ISABEL  Currently pt requires S for bed skills; S for functional transfers; CGA for ambulation w/ RW; MinAx1 for ambulation  Pt presents w/ overall mobility deficits 2* to: decreased LE strength (LLE > RLE); decreased endurance; impaired balance; gait deviations; fatigue  Pt currently at risk for falls  Pt will continue to benefit from skilled PT interventions to address stated impairments; to maximize functional potential; for ongoing pt/ family training; and DME needs  PT is currently recommending rehab    Barriers to Discharge: Inaccessible home environment        PT Discharge Recommendation: Post acute rehabilitation services          See flowsheet documentation for full assessment

## 2022-06-16 NOTE — ASSESSMENT & PLAN NOTE
Patient clinically dry  Hold medications as above    Patient with reduced ejection fraction on last echo

## 2022-06-16 NOTE — ASSESSMENT & PLAN NOTE
Wt Readings from Last 3 Encounters:   06/16/22 71 7 kg (158 lb)   06/14/22 69 8 kg (153 lb 12 8 oz)       Patient is followed as an outpatient  Patient recently was seen back in May    Currently on:  Demadex 10 mg daily, lisinopril 2 5 daily he is on a 2 g sodium restriction

## 2022-06-16 NOTE — PHYSICAL THERAPY NOTE
Physical Therapy Evaluation    Patient's Name: Jose Juan Wilson    Admitting Diagnosis  Weakness [R53 1]    Problem List  Patient Active Problem List   Diagnosis    Acute hepatitis C virus infection    Abnormal EKG    Bipolar affective disorder, mixed, moderate (HCC)    Congestive heart failure (CHF) (HCC)    Essential hypertension    Mixed hyperlipidemia    Normocytic anemia    Stage 3b chronic kidney disease ([facility] )    Type II or unspecified type diabetes mellitus without mention of complication, uncontrolled    Uncontrolled type 2 diabetes mellitus with hyperglycemia ([place] [address] )       Past Medical History  Past Medical History:   Diagnosis Date    Anxiety     Arthritis     Coronary artery disease     Dementia ([place] [address] )     Depression     Diabetes mellitus ([place] [address] )     Infectious viral hepatitis     Memory loss     Visual impairment        Past Surgical History  History reviewed  No pertinent surgical history  06/16/22 1601   PT Last Visit   PT Visit Date 06/16/22   Note Type   Note type Evaluation   Pain Assessment   Pain Assessment Tool 0-10   Pain Score No Pain   Restrictions/Precautions   Weight Bearing Precautions Per Order No   Other Precautions Telemetry; Fall Risk   Home Living   Type of [address] [place] [address] One level;Stairs to enter with rails  (12-13 ISABEL w/ HR)   Bathroom Shower/Tub Walk-in shower   Bathroom Toilet Standard   Bathroom Equipment Grab bars in shower; Shower chair   Home Equipment Walker;Cane   Prior Function   Level of Ernul Independent with ADLs and functional mobility   Lives With   (sister)   Receives Help From Family   ADL Assistance Independent   IADLs Needs assistance   Falls in the last 6 months   (4-5 falls)   Comments Pt reports he has started using RW + SPC in the past few months due to feeling off balance + having increased falls     General   Family/Caregiver Present No   Cognition   Arousal/Participation Alert   Orientation Level Oriented X4   Following Commands Follows one step commands without difficulty   Subjective   Subjective Pt agreeable to mobilize, states, "My legs just give out "   RLE Assessment   RLE Assessment   (hip flexors 4-/5, quads, hamstrings, + anterior tibialis 4/5)   LLE Assessment   LLE Assessment   (hip flexors 4-/5, L quad 3-/5, hamstrings 4/5, anterior tibialis 4/5)   Coordination   Movements are Fluid and Coordinated 0   Sensation WFL  (tested BLE light touch)   Heel to [place] Impaired  (impaired LLE, in tact RLE)   Bed Mobility   Supine to Sit 5  Supervision   Sit to Supine 5  Supervision   Transfers   Sit to Stand 5  Supervision   Stand to Sit 5  Supervision   Additional Comments RW   Ambulation/Elevation   Gait pattern Ataxia  (L lateral lean, decreased L heel strike)   Gait Assistance   (cga)   Additional items Assist x 1; Tactile cues; Verbal cues   Assistive Device Rolling walker   Distance 60'x2   Stair Management Assistance 4  Minimal assist   Additional items Assist x 1;Verbal cues; Tactile cues   Stair Management Technique Two rails; Nonreciprocal   Number of Stairs 5   Ambulation/Elevation Additional Comments Upon descending 2nd step pt's LLE buckled + pt assisted to sitting position on stairs  Pt sat for ~1 min to rest, assisted pt back up to standing w/ ModAx1 + pt finished descending stairs w/ MinAx1  Pt denied pain afterwards  RN + ED resident [facility] made aware  Balance   Static Sitting Fair +   Dynamic Sitting Fair   Static Standing Fair -   Dynamic Standing Poor +   Ambulatory Poor +  (RW)   Endurance Deficit   Endurance Deficit Yes   Endurance Deficit Description weakness, fatigue, denied SOB w/ ambulation, SpO2 96% RA upon return to room   Activity Tolerance   Activity Tolerance Patient limited by fatigue   Medical Staff Made Aware RN, ED resident [id number] yes - updated at end of session   Assessment   Prognosis Good   Problem List Decreased strength;Decreased endurance; Impaired balance;Decreased mobility; Decreased coordination   Assessment Pt is 61 y o  male seen for a PT evaluation s/p admit [facility] on 6/16/2022  Pt presenting w/ primary complaint of weakness + feeling off balance  Pt reported this has been ongoing for the past several months but when he was unable to walk out to the car due to SOB he decided to come to the ED  Please see above for other active problem list / PMH  PT now consulted to assess functional mobility and needs for safe d/c planning  Prior to admission, pt was I w/ ADL's, started using RW + SPC for functional mobility due to increasing falls, lives w/ his sister in a house w/ ISABEL  Currently pt requires S for bed skills; S for functional transfers; CGA for ambulation w/ RW; MinAx1 for ambulation  Pt presents w/ overall mobility deficits 2* to: decreased LE strength (LLE > RLE); decreased endurance; impaired balance; gait deviations; fatigue  Pt currently at risk for falls  Pt will continue to benefit from skilled PT interventions to address stated impairments; to maximize functional potential; for ongoing pt/ family training; and DME needs  PT is currently recommending rehab  Barriers to Discharge Inaccessible home environment   Goals   Patient Goals to have something to eat   STG Expiration Date 06/30/22   Short Term Goal #1 In 14 days pt will complete: 1) Bed mobility skills with I to facilitate safe return to previous living environment  2) Functional transfers with Judy to facilitate safe return to previous living environment  3) Ambulation with ' w/ Judy without LOB for safe ambulation in home/community environment  4) Improve balance scores by 1 grade to decrease fall risk  5) Improve LE strength grades by 1 to increase independence w/ all functional mobility, transfers and gait  6) PT for ongoing pt and family education; DME needs and D/C planning to promote highest level of function in least restrictive environment   7) Stair training up/ down 13 steps with 1 rail and Judy for safe access to previous living environment and to increase community access  PT Treatment Day 0   Plan   Treatment/Interventions Functional transfer training;LE strengthening/ROM; Elevations; Therapeutic exercise; Endurance training;Patient/family training;Equipment eval/education; Bed mobility;Gait training; Compensatory technique education;Spoke to nursing  (balance training)   PT Frequency 3-5x/wk   Recommendation   PT Discharge Recommendation Post acute rehabilitation services   Equipment Recommended   (pt reports that he already owns RW)   Michael 8 in Bed Without Bedrails 4   Lying on Back to Sitting on Edge of Flat Bed 3   Moving Bed to Chair 3   Standing Up From Chair 3   Walk in Room 3   Climb 3-5 Stairs 3   Basic Mobility Inpatient Raw Score 19   Basic Mobility Standardized Score 42 48   Highest Level Of Mobility   JH-HLM Goal 6: Walk 10 steps or more   JH-HLM Achieved 7: Walk 25 feet or more   End of Consult   Patient Position at End of Consult Supine; All needs within reach  (all lines in tact)     Madonna Swenson, PT, DPT

## 2022-06-16 NOTE — ED ATTENDING ATTESTATION
6/16/2022  I, Juan Lamb MD, saw and evaluated the patient  I have discussed the patient with the resident/non-physician practitioner and agree with the resident's/non-physician practitioner's findings, Plan of Care, and MDM as documented in the resident's/non-physician practitioner's note, except where noted  All available labs and Radiology studies were reviewed  I was present for key portions of any procedure(s) performed by the resident/non-physician practitioner and I was immediately available to provide assistance  At this point I agree with the current assessment done in the Emergency Department  I have conducted an independent evaluation of this patient a history and physical is as follows:    ED Course         Critical Care Time  Procedures    62 yo male with hx of chf, ef 30%, htn, hld, dm, ckd, here for generalized weakness for last five days, with frequent falls  No head trauma, no loc  Pt saw pcp but did not mention these symptoms  No dizziness, no lightheadedness  Feels off balance while walking  No cp, no sob, no abdominal pain, no n/v/d  No weight gain  Pt compliant with meds  Vss, afebrile, lungs cta, rrr, abdomen soft nontender, no focal neuro deficits, no pedal edema  Cardiac workup, ct head, urine, covid

## 2022-06-16 NOTE — ASSESSMENT & PLAN NOTE
Patient has reported history of bipolar disorder with prior history of incarceration and polysubstance abuse to alcohol tobacco cocaine and a remote history of IV drug use  He also has a reported history of hepatitis-C for which he is scheduled to be followed  with Infectious Disease as an outpatient with [facility]    He has multiple recent hospitalizations as well      CIWA

## 2022-06-16 NOTE — ASSESSMENT & PLAN NOTE
Lab Results   Component Value Date    HGBA1C 7 6 (H) 04/23/2022       No results for input(s): POCGLU in the last 72 hours  Blood Sugar Average: Last 72 hrs:   sliding scale insulin  Will hold oral hypoglycemics for now

## 2022-06-16 NOTE — ASSESSMENT & PLAN NOTE
Acute on chronic renal failure  Baseline creatinine between 1 7-1 9    Will hold diuretic and ACE-inhibitor  Avoid renal toxic agents  Bladder scan  Monitor ins and outs  Continue with supportive care

## 2022-06-16 NOTE — ASSESSMENT & PLAN NOTE
Lab Results   Component Value Date    EGFR 28 06/16/2022    CREATININE 2 31 (H) 06/16/2022   Baseline creatinine between 1 71 9  Slightly above baseline

## 2022-06-16 NOTE — ED PROVIDER NOTES
History  Chief Complaint   Patient presents with    Weakness - Generalized     Pt reports gen weakness, "twitching," and frequent falls for the past 5 days  Pt denies any hs or blood thinners     63-year-old male with a past medical history of CHF with an EF of 30%, hypertension, hyperlipidemia, bipolar disorder, diabetes, and CKD presents with generalized weakness  Patient and his brother reports that this has been for the past 5 days  Patient feels generally weak  He also feels tired and has been sleeping a lot  Patient states that he has episodes where his muscles will twitch for couple of seconds and then this resolves  Patient also reports frequent falls over the past 5 days  He states that every time he has fallen onto his butt  He never hit his head or lost consciousness  Patient reports no injuries from these falls  No back or pelvic pain  Patient states that he feels off balance when he walks, but he is not feel this way at rest   Does not feel dizzy or lightheaded  Patient normally ambulates with a walker, but he does not always use it  Patient saw the PA at his primary care physician's office 2 days ago  Son states that they did not mention any of these symptoms  Her son also notes that patient has been a little more forgetful and confused since he was last in the hospital           Prior to Admission Medications   Prescriptions Last Dose Informant Patient Reported? Taking?    Acetaminophen Extra Strength 500 MG tablet   Yes No   Sig: Take 500 mg by mouth every 6 (six) hours as needed   Aspirin Low Dose 81 MG EC tablet   Yes No   Sig: Take 81 mg by mouth daily   FLUoxetine (PROzac) 20 mg capsule   No No   Sig: Take 1 capsule (20 mg total) by mouth daily   Farxiga 5 MG TABS   Yes No   Sig: Take 5 mg by mouth in the morning   LORazepam (ATIVAN) 0 5 mg tablet   No No   Sig: Take 1 tablet (0 5 mg total) by mouth daily at bedtime   Lancet Devices (OneTouch Delica Plus Lancing) MISC   Yes No Sig: Use 1 Dose in the morning Use as directed   Melatonin 5 MG TABS   Yes No   Sig: Take 5 mg by mouth daily at bedtime   atorvastatin (LIPITOR) 40 mg tablet   Yes No   Sig: Take 40 mg by mouth daily at bedtime   gabapentin (Neurontin) 300 mg capsule   No No   Sig: Take 1 capsule (300 mg total) by mouth 3 (three) times a day   hydrALAZINE (APRESOLINE) 10 mg tablet   Yes No   Sig: Take 10 mg by mouth every 8 (eight) hours 1 tablet every 8 hours   lisinopril (ZESTRIL) 2 5 mg tablet   Yes No   Sig: Take 2 5 mg by mouth daily   metFORMIN (GLUCOPHAGE) 500 mg tablet   No No   Sig: Take 1 tablet (500 mg total) by mouth 2 (two) times a day with meals   metoprolol succinate (TOPROL-XL) 50 mg 24 hr tablet   Yes No   Sig: Take 50 mg by mouth in the morning   torsemide (DEMADEX) 20 mg tablet   No No   Sig: Take 0 5 tablets (10 mg total) by mouth in the morning Take 1/2 tablet daily      Facility-Administered Medications: None       Past Medical History:   Diagnosis Date    Anxiety     Arthritis     Coronary artery disease     [facility]     Depression     Diabetes mellitus ([facility] )     Infectious viral hepatitis     Memory loss     Visual impairment        History reviewed  No pertinent surgical history  History reviewed  No pertinent family history  I have reviewed and agree with the history as documented  E-Cigarette/Vaping    E-Cigarette Use Never User      E-Cigarette/Vaping Substances     Social History     Tobacco Use    Smoking status: Former Smoker    Smokeless tobacco: Never Used   Vaping Use    Vaping Use: Never used   Substance Use Topics    Alcohol use: Yes     Comment: socially    Drug use: Never        Review of Systems   Constitutional: Negative for appetite change, chills, fatigue and fever  HENT: Negative  Eyes: Negative  Respiratory: Negative for cough, chest tightness and shortness of breath  Cardiovascular: Negative for chest pain and palpitations     Gastrointestinal: Negative for abdominal pain, diarrhea, nausea and vomiting  Endocrine: Negative  Genitourinary: Negative for difficulty urinating and hematuria  Musculoskeletal: Positive for gait problem  Negative for arthralgias and myalgias  Skin: Negative for pallor and rash  Allergic/Immunologic: Negative  Neurological: Positive for weakness  Negative for dizziness, light-headedness and headaches  Hematological: Negative  Physical Exam  ED Triage Vitals [06/16/22 1400]   Temperature Pulse Respirations Blood Pressure SpO2   97 5 °F (36 4 °C) 75 20 99/59 99 %      Temp Source Heart Rate Source Patient Position - Orthostatic VS BP Location FiO2 (%)   Oral Monitor Lying Left arm --      Pain Score       No Pain             Orthostatic Vital Signs  Vitals:    06/16/22 1400 06/16/22 1430 06/16/22 1600 06/16/22 1630   BP: 99/59 138/67 124/73 117/69   Pulse: 75 83 73 72   Patient Position - Orthostatic VS: Lying Lying Sitting Sitting       Physical Exam  Vitals and nursing note reviewed  Constitutional:       Appearance: Normal appearance  HENT:      Head: Normocephalic and atraumatic  Eyes:      Conjunctiva/sclera: Conjunctivae normal    Cardiovascular:      Rate and Rhythm: Normal rate and regular rhythm  Heart sounds: No murmur heard  Pulmonary:      Effort: Pulmonary effort is normal  No respiratory distress  Breath sounds: Normal breath sounds  No wheezing, rhonchi or rales  Abdominal:      General: Abdomen is flat  There is no distension  Palpations: Abdomen is soft  Tenderness: There is no abdominal tenderness  There is no guarding or rebound  Musculoskeletal:         General: Normal range of motion  Cervical back: Normal range of motion and neck supple  Right lower leg: No edema  Left lower leg: No edema  Skin:     General: Skin is warm and dry  Neurological:      General: No focal deficit present        Mental Status: He is alert and oriented to person, place, and time  Cranial Nerves: No cranial nerve deficit  Motor: No weakness  Comments: Slow, unsteady gait         ED Medications  Medications - No data to display    Diagnostic Studies  Results Reviewed     Procedure Component Value Units Date/Time    HS Troponin I 2hr [[id number]] Collected: 06/16/22 1641    Lab Status: In process Specimen: Blood from Arm, Right Updated: 06/16/22 1644    HS Troponin I 4hr [[id number]]     Lab Status: No result Specimen: Blood     COVID/FLU/RSV - 2 hour TAT [[id number]]  (Normal) Collected: 06/16/22 1428    Lab Status: Final result Specimen: Nares from Nose Updated: 06/16/22 1538     SARS-CoV-2 Negative     INFLUENZA A PCR Negative     INFLUENZA B PCR Negative     RSV PCR Negative    Narrative:      FOR PEDIATRIC PATIENTS - copy/paste COVID Guidelines URL to browser: [email or web address]    SARS-CoV-2 assay is a Nucleic Acid Amplification assay intended for the  qualitative detection of nucleic acid from SARS-CoV-2 in nasopharyngeal  swabs  Results are for the presumptive identification of SARS-CoV-2 RNA  Positive results are indicative of infection with SARS-CoV-2, the virus  causing COVID-19, but do not rule out bacterial infection or co-infection  with other viruses  Laboratories within the [place] and its  territories are required to report all positive results to the appropriate  public health authorities  Negative results do not preclude SARS-CoV-2  infection and should not be used as the sole basis for treatment or other  patient management decisions  Negative results must be combined with  clinical observations, patient history, and epidemiological information  This test has not been FDA cleared or approved  This test has been authorized by FDA under an Emergency Use Authorization  (EUA)   This test is only authorized for the duration of time the  declaration that circumstances exist justifying the authorization of the  emergency use of an in vitro diagnostic tests for detection of SARS-CoV-2  virus and/or diagnosis of COVID-19 infection under section 564(b)(1) of  the Act, 21 U  S C  457BNO-5(N)(3), unless the authorization is terminated  or revoked sooner  The test has been validated but independent review by FDA  and CLIA is pending  Test performed using [employer] GeneXpert: This RT-PCR assay targets N2,  a region unique to SARS-CoV-2  A conserved region in the E-gene was chosen  for pan-Sarbecovirus detection which includes SARS-CoV-2      HS Troponin 0hr (reflex protocol) [[id number]]  (Normal) Collected: 06/16/22 1428    Lab Status: Final result Specimen: Blood from Arm, Right Updated: 06/16/22 1523     hs TnI 0hr 16 ng/L     NT-BNP PRO [[id number]]  (Abnormal) Collected: 06/16/22 1428    Lab Status: Final result Specimen: Blood from Arm, Right Updated: 06/16/22 1504     NT-proBNP 7,931 pg/mL     Comprehensive metabolic panel [[id number]]  (Abnormal) Collected: 06/16/22 1428    Lab Status: Final result Specimen: Blood from Arm, Right Updated: 06/16/22 1503     Sodium 139 mmol/L      Potassium 5 4 mmol/L      Chloride 113 mmol/L      CO2 20 mmol/L      ANION GAP 6 mmol/L      BUN 50 mg/dL      Creatinine 2 31 mg/dL      Glucose 209 mg/dL      Calcium 9 0 mg/dL      Corrected Calcium 10 4 mg/dL      AST 47 U/L      ALT 78 U/L      Alkaline Phosphatase 148 U/L      Total Protein 7 9 g/dL      Albumin 2 2 g/dL      Total Bilirubin 0 24 mg/dL      eGFR 28 ml/min/1 73sq m     Narrative:      [place] guidelines for Chronic Kidney Disease (CKD):     Stage 1 with normal or high GFR (GFR > 90 mL/min/1 73 square meters)    Stage 2 Mild CKD (GFR = 60-89 mL/min/1 73 square meters)    Stage 3A Moderate CKD (GFR = 45-59 mL/min/1 73 square meters)    Stage 3B Moderate CKD (GFR = 30-44 mL/min/1 73 square meters)    Stage 4 Severe CKD (GFR = 15-29 mL/min/1 73 square meters)    Stage 5 End Stage CKD (GFR <15 mL/min/1 73 square meters)  Note: GFR calculation is accurate only with a steady state creatinine    CBC and differential [[id number]]  (Abnormal) Collected: 06/16/22 1428    Lab Status: Final result Specimen: Blood from Arm, Right Updated: 06/16/22 1503     WBC 8 05 Thousand/uL      RBC 3 77 Million/uL      Hemoglobin 11 1 g/dL      Hematocrit 34 8 %      MCV 92 fL      MCH 29 4 pg      MCHC 31 9 g/dL      RDW 14 9 %      MPV 11 2 fL      Platelets 041 Thousands/uL      nRBC 0 /100 WBCs      Neutrophils Relative 37 %      Immat GRANS % 1 %      Lymphocytes Relative 47 %      Monocytes Relative 8 %      Eosinophils Relative 6 %      Basophils Relative 1 %      Neutrophils Absolute 2 99 Thousands/µL      Immature Grans Absolute 0 05 Thousand/uL      Lymphocytes Absolute 3 80 Thousands/µL      Monocytes Absolute 0 65 Thousand/µL      Eosinophils Absolute 0 49 Thousand/µL      Basophils Absolute 0 07 Thousands/µL     UA w Reflex to Microscopic w Reflex to Culture [[id number]]     Lab Status: No result Specimen: Urine                  CT head without contrast   Final Result by Sandro Rojas MD (06/16 1503)      No acute intracranial abnormality  Chronic microangiopathy  Workstation performed: [id number]         XR chest 1 view portable   Final Result by Nisha Arango MD (06/16 1507)      No acute cardiopulmonary disease  Workstation performed: [id number]               Procedures  Procedures      ED Course  ED Course as of 06/16/22 1710   Thu Jun 16, 2022   1434 ECG 12 lead  The heart rate is 82, which is normal  The rhythm is regular  The axis is normal  The P waves are normal and the RI interval is normal  The QRS height is normal and width is normal  The ST segments are not elevated or depressed  The T waves are inverted laterally  The QT segment is normal  This ecg shows sinus rhythm with T wave changes        1509 BUN(!): 50  Prior 30   1509 Creatinine(!): 2 31  Prior 1 58   1509 eGFR: 28  Prior 49   1605 PT recommends rehab   1611 Updated brother who is now home                                       MDM  Number of Diagnoses or Management Options  Ambulatory dysfunction: new and requires workup  Elevated troponin: new and requires workup  Generalized weakness: new and requires workup  T wave inversion in EKG: new and requires workup  Diagnosis management comments: 74-year-old male presents with generalized weakness and feeling off balance on his feet  Will do a cardiac workup  Will do a CT of the head because patient is feeling off balance  Will reach out to physical therapy to evaluate patient  Amount and/or Complexity of Data Reviewed  Clinical lab tests: ordered and reviewed  Tests in the radiology section of CPT®: reviewed and ordered  Review and summarize past medical records: yes  Discuss the patient with other providers: yes    Risk of Complications, Morbidity, and/or Mortality  Presenting problems: moderate  Diagnostic procedures: moderate  Management options: moderate    Patient Progress  Patient progress: stable    Patient has T-wave changes on EKG with elevated troponin  Physical therapy recommended rehab  Patient was admitted to [facility] for further workup and management      Disposition  Final diagnoses:   Generalized weakness   Ambulatory dysfunction   Elevated troponin   T wave inversion in EKG     Time reflects when diagnosis was documented in both MDM as applicable and the Disposition within this note     Time User Action Codes Description Comment    6/16/2022  4:26 PM Lori Dunbar Dvorište Add [R53 1] Generalized weakness     6/16/2022  4:26 PM Lori Dunbarte Add [R26 2] Ambulatory dysfunction     6/16/2022  4:26 PM HaGui raderrold Lick [R77 8] Elevated troponin     6/16/2022  4:26 PM HaGui raderrold Lick [R94 31] T wave inversion in EKG       ED Disposition     ED Disposition   Admit    Condition   Stable    Date/Time   Thu Jun 16, 2022  4:26 PM    Comment Case was discussed with Dr Petros Hernandez and the patient's admission status was agreed to be Admission Status: inpatient status to the service of Dr Petros Hernandez   Follow-up Information    None         Patient's Medications   Discharge Prescriptions    No medications on file     No discharge procedures on file  PDMP Review       Value Time User    PDMP Reviewed  Yes 6/15/2022  9:43 PM Vanessa Hatfield PA-C           ED Provider  Attending physically available and evaluated THE [facility]  I managed the patient along with the ED Attending      Electronically Signed by         Clive Childress DO  06/16/22 0485

## 2022-06-16 NOTE — ASSESSMENT & PLAN NOTE
Appears multifactorial   Potential etiologies include acute renal failure/polypharmacy, rule out metabolic process rule out intrinsic liver disease in the setting of hepatitis-C/remote polysubstance abuse versus B12 deficiency in the setting of chronic metformin use (patient reports previously being on metformin for years which was restarted recently)  versus early DKA / euglycemic DKA in the setting of farxiga therapy  Patient with reported prior admission for DKA in [facility]   Fortunately patient is nontoxic , sitting up in bed in the ED eating dinner  Will hold Demadex and lisinopril for renal failure and start gentle hydration  Bladder scan    Patient on chronic metformin  Rule out B12 deficiency secondary to chronic metformin use  Patient presented with bilateral lower extremity weakness with unsteady gait    The patient has a positive Romberg sign on clinical exam/presentation  PT/OT recommending rehab  Monitor blood sugar  Follow up on orthostatics  Neurochecks  Ammonia  b-hydroxybuterate Pt post thora pt tolerated well site and dressing look good x ray post pt states no SOB or pain pt resting comfortably . X ray clear              Pt post thora pt tolerated well site and dressing look good x ray post pt states no SOB or pain pt resting comfortably

## 2022-06-17 LAB
ALBUMIN SERPL BCP-MCNC: 2.5 G/DL (ref 3.5–5)
ALBUMIN SERPL BCP-MCNC: 2.5 G/DL (ref 3.5–5)
ALP SERPL-CCNC: 113 U/L (ref 46–116)
ALP SERPL-CCNC: 120 U/L (ref 46–116)
ALT SERPL W P-5'-P-CCNC: 55 U/L (ref 12–78)
ALT SERPL W P-5'-P-CCNC: 60 U/L (ref 12–78)
AMPHETAMINES SERPL QL SCN: NEGATIVE
ANION GAP SERPL CALCULATED.3IONS-SCNC: 2 MMOL/L (ref 4–13)
ANION GAP SERPL CALCULATED.3IONS-SCNC: 4 MMOL/L (ref 4–13)
AST SERPL W P-5'-P-CCNC: 28 U/L (ref 5–45)
AST SERPL W P-5'-P-CCNC: 32 U/L (ref 5–45)
BARBITURATES UR QL: NEGATIVE
BASOPHILS # BLD AUTO: 0.04 THOUSANDS/ΜL (ref 0–0.1)
BASOPHILS NFR BLD AUTO: 1 % (ref 0–1)
BENZODIAZ UR QL: NEGATIVE
BILIRUB SERPL-MCNC: 0.2 MG/DL (ref 0.2–1)
BILIRUB SERPL-MCNC: 0.2 MG/DL (ref 0.2–1)
BUN SERPL-MCNC: 46 MG/DL (ref 5–25)
BUN SERPL-MCNC: 49 MG/DL (ref 5–25)
CALCIUM ALBUM COR SERPL-MCNC: 9.7 MG/DL (ref 8.3–10.1)
CALCIUM ALBUM COR SERPL-MCNC: 9.9 MG/DL (ref 8.3–10.1)
CALCIUM SERPL-MCNC: 8.5 MG/DL (ref 8.3–10.1)
CALCIUM SERPL-MCNC: 8.7 MG/DL (ref 8.3–10.1)
CHLORIDE SERPL-SCNC: 114 MMOL/L (ref 100–108)
CHLORIDE SERPL-SCNC: 115 MMOL/L (ref 100–108)
CO2 SERPL-SCNC: 22 MMOL/L (ref 21–32)
CO2 SERPL-SCNC: 22 MMOL/L (ref 21–32)
COCAINE UR QL: NEGATIVE
CREAT SERPL-MCNC: 2.12 MG/DL (ref 0.6–1.3)
CREAT SERPL-MCNC: 2.16 MG/DL (ref 0.6–1.3)
EOSINOPHIL # BLD AUTO: 0.48 THOUSAND/ΜL (ref 0–0.61)
EOSINOPHIL NFR BLD AUTO: 7 % (ref 0–6)
ERYTHROCYTE [DISTWIDTH] IN BLOOD BY AUTOMATED COUNT: 14.6 % (ref 11.6–15.1)
FERRITIN SERPL-MCNC: 169 NG/ML (ref 8–388)
GFR SERPL CREATININE-BSD FRML MDRD: 31 ML/MIN/1.73SQ M
GFR SERPL CREATININE-BSD FRML MDRD: 31 ML/MIN/1.73SQ M
GLUCOSE SERPL-MCNC: 121 MG/DL (ref 65–140)
GLUCOSE SERPL-MCNC: 123 MG/DL (ref 65–140)
GLUCOSE SERPL-MCNC: 179 MG/DL (ref 65–140)
GLUCOSE SERPL-MCNC: 193 MG/DL (ref 65–140)
GLUCOSE SERPL-MCNC: 246 MG/DL (ref 65–140)
GLUCOSE SERPL-MCNC: 252 MG/DL (ref 65–140)
HCT VFR BLD AUTO: 30.5 % (ref 36.5–49.3)
HGB BLD-MCNC: 9.9 G/DL (ref 12–17)
IMM GRANULOCYTES # BLD AUTO: 0.05 THOUSAND/UL (ref 0–0.2)
IMM GRANULOCYTES NFR BLD AUTO: 1 % (ref 0–2)
IRON SATN MFR SERPL: 48 % (ref 20–50)
IRON SERPL-MCNC: 101 UG/DL (ref 65–175)
LYMPHOCYTES # BLD AUTO: 3.1 THOUSANDS/ΜL (ref 0.6–4.47)
LYMPHOCYTES NFR BLD AUTO: 47 % (ref 14–44)
MAGNESIUM SERPL-MCNC: 2 MG/DL (ref 1.6–2.6)
MCH RBC QN AUTO: 30 PG (ref 26.8–34.3)
MCHC RBC AUTO-ENTMCNC: 32.5 G/DL (ref 31.4–37.4)
MCV RBC AUTO: 92 FL (ref 82–98)
METHADONE UR QL: NEGATIVE
MONOCYTES # BLD AUTO: 0.56 THOUSAND/ΜL (ref 0.17–1.22)
MONOCYTES NFR BLD AUTO: 9 % (ref 4–12)
NEUTROPHILS # BLD AUTO: 2.29 THOUSANDS/ΜL (ref 1.85–7.62)
NEUTS SEG NFR BLD AUTO: 35 % (ref 43–75)
NRBC BLD AUTO-RTO: 0 /100 WBCS
OPIATES UR QL SCN: NEGATIVE
OXYCODONE+OXYMORPHONE UR QL SCN: NEGATIVE
PCP UR QL: NEGATIVE
PLATELET # BLD AUTO: 169 THOUSANDS/UL (ref 149–390)
PMV BLD AUTO: 11 FL (ref 8.9–12.7)
POTASSIUM SERPL-SCNC: 4.8 MMOL/L (ref 3.5–5.3)
POTASSIUM SERPL-SCNC: 5.3 MMOL/L (ref 3.5–5.3)
PROT SERPL-MCNC: 6.4 G/DL (ref 6.4–8.2)
PROT SERPL-MCNC: 6.6 G/DL (ref 6.4–8.2)
RBC # BLD AUTO: 3.3 MILLION/UL (ref 3.88–5.62)
SODIUM SERPL-SCNC: 138 MMOL/L (ref 136–145)
SODIUM SERPL-SCNC: 141 MMOL/L (ref 136–145)
THC UR QL: NEGATIVE
TIBC SERPL-MCNC: 211 UG/DL (ref 250–450)
WBC # BLD AUTO: 6.52 THOUSAND/UL (ref 4.31–10.16)

## 2022-06-17 PROCEDURE — 99223 1ST HOSP IP/OBS HIGH 75: CPT | Performed by: INTERNAL MEDICINE

## 2022-06-17 PROCEDURE — 99232 SBSQ HOSP IP/OBS MODERATE 35: CPT | Performed by: NURSE PRACTITIONER

## 2022-06-17 PROCEDURE — 82948 REAGENT STRIP/BLOOD GLUCOSE: CPT

## 2022-06-17 PROCEDURE — 83540 ASSAY OF IRON: CPT | Performed by: NURSE PRACTITIONER

## 2022-06-17 PROCEDURE — 83735 ASSAY OF MAGNESIUM: CPT | Performed by: INTERNAL MEDICINE

## 2022-06-17 PROCEDURE — 80053 COMPREHEN METABOLIC PANEL: CPT | Performed by: INTERNAL MEDICINE

## 2022-06-17 PROCEDURE — 82728 ASSAY OF FERRITIN: CPT | Performed by: NURSE PRACTITIONER

## 2022-06-17 PROCEDURE — 85025 COMPLETE CBC W/AUTO DIFF WBC: CPT | Performed by: INTERNAL MEDICINE

## 2022-06-17 PROCEDURE — 80307 DRUG TEST PRSMV CHEM ANLYZR: CPT | Performed by: NURSE PRACTITIONER

## 2022-06-17 PROCEDURE — 83550 IRON BINDING TEST: CPT | Performed by: NURSE PRACTITIONER

## 2022-06-17 RX ORDER — HYDRALAZINE HYDROCHLORIDE 10 MG/1
10 TABLET, FILM COATED ORAL EVERY 8 HOURS
Status: DISCONTINUED | OUTPATIENT
Start: 2022-06-17 | End: 2022-06-21 | Stop reason: HOSPADM

## 2022-06-17 RX ADMIN — FOLIC ACID 1 MG: 1 TABLET ORAL at 09:52

## 2022-06-17 RX ADMIN — LORAZEPAM 0.5 MG: 0.5 TABLET ORAL at 21:33

## 2022-06-17 RX ADMIN — SODIUM CHLORIDE 50 ML/HR: 0.9 INJECTION, SOLUTION INTRAVENOUS at 15:35

## 2022-06-17 RX ADMIN — ATORVASTATIN CALCIUM 40 MG: 40 TABLET, FILM COATED ORAL at 21:33

## 2022-06-17 RX ADMIN — HEPARIN SODIUM 5000 UNITS: 5000 INJECTION INTRAVENOUS; SUBCUTANEOUS at 21:33

## 2022-06-17 RX ADMIN — FLUOXETINE 20 MG: 20 CAPSULE ORAL at 09:54

## 2022-06-17 RX ADMIN — INSULIN LISPRO 1 UNITS: 100 INJECTION, SOLUTION INTRAVENOUS; SUBCUTANEOUS at 18:08

## 2022-06-17 RX ADMIN — HYDRALAZINE HYDROCHLORIDE 10 MG: 10 TABLET, FILM COATED ORAL at 18:06

## 2022-06-17 RX ADMIN — THIAMINE HCL TAB 100 MG 100 MG: 100 TAB at 09:52

## 2022-06-17 RX ADMIN — ASPIRIN 81 MG: 81 TABLET, COATED ORAL at 09:52

## 2022-06-17 RX ADMIN — Medication 1 TABLET: at 09:52

## 2022-06-17 RX ADMIN — Medication 4.5 MG: at 21:33

## 2022-06-17 RX ADMIN — HEPARIN SODIUM 5000 UNITS: 5000 INJECTION INTRAVENOUS; SUBCUTANEOUS at 05:05

## 2022-06-17 RX ADMIN — HYDRALAZINE HYDROCHLORIDE 10 MG: 10 TABLET, FILM COATED ORAL at 02:16

## 2022-06-17 RX ADMIN — INSULIN LISPRO 2 UNITS: 100 INJECTION, SOLUTION INTRAVENOUS; SUBCUTANEOUS at 13:33

## 2022-06-17 RX ADMIN — HEPARIN SODIUM 5000 UNITS: 5000 INJECTION INTRAVENOUS; SUBCUTANEOUS at 13:35

## 2022-06-17 RX ADMIN — METOPROLOL SUCCINATE 50 MG: 50 TABLET, EXTENDED RELEASE ORAL at 09:52

## 2022-06-17 RX ADMIN — HYDRALAZINE HYDROCHLORIDE 10 MG: 10 TABLET, FILM COATED ORAL at 09:54

## 2022-06-17 RX ADMIN — INSULIN GLARGINE 10 UNITS: 100 INJECTION, SOLUTION SUBCUTANEOUS at 21:33

## 2022-06-17 NOTE — ASSESSMENT & PLAN NOTE
Lab Results   Component Value Date    CREATININE 2 12 (H) 06/17/2022    CREATININE 2 16 (H) 06/17/2022    CREATININE 2 30 (H) 06/16/2022   ? Acute renal failure on chronic renal failure POA  Patient had episode of severe LAZARO during hospitalization couple months ago in [facility] when he required dialysis at that time  No steady baseline available from last year  · Most recent baseline creatinine between 1 7-1 9    · Avoid renal toxic agents: continue with holding lisinopril and holding diuretics   · C/w NSS at 50ml/hr   · Nephrology consult appreciated   · Bladder scan- urine retention protocol

## 2022-06-17 NOTE — ASSESSMENT & PLAN NOTE
Wt Readings from Last 3 Encounters:   06/17/22 (!) 160 kg (353 lb 9 9 oz)   06/14/22 69 8 kg (153 lb 12 8 oz)     · Patient is followed as an outpatient  Patient recently was seen back in May  · Currently on:  Demadex 10 mg daily, and lisinopril 2 5 daily which is on hold given LAZARO   · c/w 2 g sodium restriction  · ECHO 4/22 from [facility]- Severely reduced LV systolic function with estimated LVEF 30%   Grade II diastolic dysfunction

## 2022-06-17 NOTE — ASSESSMENT & PLAN NOTE
Lab Results   Component Value Date    CREATININE 2 12 (H) 06/17/2022    CREATININE 2 16 (H) 06/17/2022    CREATININE 2 30 (H) 06/16/2022   · LAZARO POA  · Patient had episode of severe LAZARO during hospitalization couple months ago in [facility] when he required dialysis at that time  No steady baseline available from last year  · Most recent baseline creatinine between 1 7-1 9    · Avoid renal toxic agents: continue with holding lisinopril and holding diuretics   · Monitor off IVF now per Nephrology  · Nephrology consult appreciated   · Bladder scan- urine retention protocol

## 2022-06-17 NOTE — ASSESSMENT & PLAN NOTE
· Patient clinically dry  · Hold medications as above  · ECHO 4/22- Severely reduced LV systolic function with estimated LVEF 30%   Grade II diastolic dysfunction  · Patient was discharged home from [facility] after hospitalization in April with a lifevest- recommend continued tele monitoring while hospitalized

## 2022-06-17 NOTE — ASSESSMENT & PLAN NOTE
Appears multifactorial   Potential etiologies include acute renal failure/polypharmacy/deconditioning  · CT negative for acute abnormality   · Covid/flu/rsv negative   · Check UDS   · C/w IVFs- nephro consult appreciated  · Hold lisinopril and demadex  · B12 2,546   · PT/OT recommending rehab  · Monitor blood sugar  · Monitor Neurochecks  · Ammonia level mildly elevated at 37

## 2022-06-17 NOTE — ASSESSMENT & PLAN NOTE
· Patient has reported history of bipolar disorder with prior history of incarceration and polysubstance abuse to alcohol tobacco, cocaine and a remote history of IV drug use  He also has a reported history of hepatitis-C for which he is scheduled to be followed  with Infectious Disease as an outpatient with [facility]    · He has multiple recent hospitalizations as well    · CIWA protocol ordered   · Check UDS

## 2022-06-17 NOTE — ASSESSMENT & PLAN NOTE
· Patient is followed as an outpatient  Patient recently was seen back in May  · Demadex 10 mg daily, and lisinopril 2 5 daily on hold given LAZARO   · ECHO 4/22 from [facility]- Severely reduced LV systolic function with estimated LVEF 30%   Grade II diastolic dysfunction

## 2022-06-17 NOTE — PLAN OF CARE
Problem: MOBILITY - ADULT  Goal: Maintain or return to baseline ADL function  Description: INTERVENTIONS:  -  Assess patient's ability to carry out ADLs; assess patient's baseline for ADL function and identify physical deficits which impact ability to perform ADLs (bathing, care of mouth/teeth, toileting, grooming, dressing, etc )  - Assess/evaluate cause of self-care deficits   - Assess range of motion  - Assess patient's mobility; develop plan if impaired  - Assess patient's need for assistive devices and provide as appropriate  - Encourage maximum independence but intervene and supervise when necessary  - Involve family in performance of ADLs  - Assess for home care needs following discharge   - Consider OT consult to assist with ADL evaluation and planning for discharge  - Provide patient education as appropriate  Outcome: Progressing  Goal: Maintains/Returns to pre admission functional level  Description: INTERVENTIONS:  - Perform BMAT or MOVE assessment daily    - Set and communicate daily mobility goal to care team and patient/family/caregiver  - Collaborate with rehabilitation services on mobility goals if consulted  - Perform Range of Motion  times a day  - Reposition patient every hours    - Dangle patient  times a day  - Stand patient  times a day  - Ambulate patient times a day  - Out of bed to chair  times a day   - Out of bed for meals times a day  - Out of bed for toileting  - Record patient progress and toleration of activity level   Outcome: Progressing

## 2022-06-17 NOTE — UTILIZATION REVIEW
Initial Clinical Review    Admission: Date/Time/Statement:   Admission Orders (From admission, onward)     Ordered        06/16/22 1626  Inpatient Admission  Once                      Orders Placed This Encounter   Procedures    Inpatient Admission     Standing Status:   Standing     Number of Occurrences:   1     Order Specific Question:   Level of Care     Answer:   Med Surg [16]     Order Specific Question:   Estimated length of stay     Answer:   More than 2 Midnights     Order Specific Question:   Certification     Answer:   I certify that inpatient services are medically necessary for this patient for a duration of greater than two midnights  See H&P and MD Progress Notes for additional information about the patient's course of treatment  ED Arrival Information     Expected   -    Arrival   6/16/2022 13:44    Acuity   Urgent            Means of arrival   Walk-In    Escorted by   Family Member    Service   Hospitalist    Admission type   Urgent            Arrival complaint   Weakness           Chief Complaint   Patient presents with    Weakness - Generalized     Pt reports gen weakness, "twitching," and frequent falls for the past 5 days  Pt denies any hs or blood thinners       Initial Presentation: 61 y o  male , presented to the ED @ [facility] from home via walk in  Admitted as Inpatient due to Weakness  Date: 06/16/2022  Reports generalized weakness for the past 5 days  Tired, sleeping a lot  Appears Multifactorial   Patient presented with bilateral lower extremity weakness with unsteady gait  The patient has a positive Romberg sign on clinical exam/presentation  PT/OT  Monitor blood sugar  Orthostatics  Neuro Checks  ARF  Baseline Cr 1 7-1 9  Hold diuretic and ACE-I  Avoid renal toxic agents  Monitor I&O  VTE Prophylaxis: Heparin  / sequential compression device     Day 2: 06/17/2022   Continue IV flds  Hold lisinopril and demadex  PT/OT  Monitor blood sugars    Monitor Neurochecks  Consult Nephro       06/17/2022  Consult Nephrology:  Acute kidney injury (POA):  Etiology: Suspect prerenal in the setting of diuretic use, loss of auto regulatory system with addition of lisinopril 6/3/2022, possible osmotic diuresis with elevated blood sugar, plus or minus ATN,  plus or minus urinary obstruction  Assessment:  After review medical records through [facility] and [facility] everywhere baseline creatinine appears to be 1 5-1 9 with recent admissions  · Has proteinuria with last U PCR 1 81 in April 2022 at [facility]  · Admission creatinine 2 31 on 06/16/2022  · Current creatinine 2 16 with IV fluids  · Acid base and lytes stable   · Clinically, patient is not uremic and there is no acute indication for renal replacement therapy  Workup:  Urinalysis with micro reports:  Plus one protein 3-5 hyaline cast  ·  Previous CT scan chest abdomen pelvis at [facility] did not reveal hydronephrosis or calcified stones     Plan:  Strict I/Os, daily weights  · Avoid nephrotoxins, NSAIDs, IV contrast if possible  · Avoid hypotension and perturbations of blood pressure to prevent decreased renal perfusion  · Continue IV fluid normal saline at this time 50 cc/hr  · Trend BMP daily  · Will check bladder scan  · Adjust meds to appropriate GFR  · Optimize hemodynamic status to avoid delay in renal recovery  Chronic kidney disease IIIA/B:   Etiology:  Suspect secondary to longstanding diabetes, hypertension nephrosclerosis, decreased EF  Assessment and Plan:  After review of medical records for [facility] everywhere most recent baseline creatinine 1 5-1 9  · At this time is not following Outpatient Nephrology due to insurance issues  · Recommend follow-up on discharge and obtain repeat U PCR in steady state   Blood pressure/Hypertension:  Assessment and Plan:  Current blood pressure acceptable  · Home medications:  Hydralazine 10 mg q 8 hours, lisinopril 2 5 mg daily, Toprol XL 50 mg daily, torsemide 10 mg b i d   · Current medications:  Hydralazine 10 mg t i d , Toprol XL 50 mg daily  · Torsemide and lisinopril on hold  · Will place hold parameters for SBP less than 130 on hydralazine  · Maximize hemodynamics to maintain MAP >65  · Avoid hypotension or fluctuations in blood pressure  · Will continue to trend   Anemia: Likely of CKD  Assessment and Plan:  Current hemoglobin 9 9 after hydration  · Will check iron stores-will add on to this a m  Lab  · Per primary team  · Transfuse for Hgb <7 0   Electrolytes/acid-base  Assessment and Plan:  Hyperkalemia:  With hemolysis-repeat potassium 4 8 this a m  Within normal limits  · Will continue monitor and treat as needed   Uncontrolled diabetes:  Assessment and plan: On metformin and Farxiga as outpatient      · With recurrent LAZARO consider not restarting metformin   Congestive heart failure/cardiomyopathy  Assessment and plan:  Last echocardiogram at [facility] and revealed EF of 30%  · Previously discharged with LifeVest  · Status post recent bilateral thoracentesis while admitted at [facility]  · Patient follows with Cardiology as outpatient recently started on lisinopril 5 mg daily on 06/03/2022-currently off the setting of acute kidney injury  · Patient examining fairly euvolemic   Hepatitis-C:  Assessment and plan:  Previous plan to follow-up with Infectious Disease at [facility]    ED Triage Vitals [06/16/22 1400]   Temperature Pulse Respirations Blood Pressure SpO2   97 5 °F (36 4 °C) 75 20 99/59 99 %      Temp Source Heart Rate Source Patient Position - Orthostatic VS BP Location FiO2 (%)   Oral Monitor Lying Left arm --      Pain Score       No Pain          Wt Readings from Last 1 Encounters:   06/17/22 (!) 160 kg (353 lb 9 9 oz)     Additional Vital Signs:   Date/Time Temp Pulse Resp BP MAP (mmHg) SpO2 O2 Device Patient Position - Orthostatic VS   06/17/22 09:52:42 -- 81 -- 121/70 87 77 % Abnormal  -- --   06/17/22 0952 -- 80 -- 121/70 -- -- -- --   06/17/22 07:11:05 98 2 °F (36 8 °C) 61 18 124/72 89 100 % None (Room air) Lying   22 05:01:42 98 4 °F (36 9 °C) 70 -- 118/70 86 97 % -- --   22 02:13:51 98 9 °F (37 2 °C) 71 -- 120/69 86 98 % -- --   22 20:01:29 97 4 °F (36 3 °C) Abnormal  74 -- 120/69 86 98 % -- --   22 1839 -- 73 -- 130/68 -- -- -- --   22 1826 -- 74 16 127/64 89 99 % None (Room air) --   22 1630 -- 72 19 117/69 89 95 % None (Room air) Sitting   22 1600 -- 73 20 124/73 94 100 % None (Room air) Sitting   22 1430 -- 83 20 138/67 93 99 % None (Room air) Lying     Date and Time Eye Opening Best Verbal Response Best Motor Response Eugene Coma Scale Score   22 0425 4 5 6 15   22 0025 4 5 6 15   22 2025 4 5 6 15   22 1534 4 5 6 15     2022 @ 1428  EC  NSR    Pertinent Labs/Diagnostic Test Results:   CT head without contrast   Final Result by Lincoln Gutierrez MD ( 1503)   No acute intracranial abnormality  Chronic microangiopathy  XR chest 1 view portable   Final Result by Erin Burciaga MD ( 1507)   No acute cardiopulmonary disease          Results from last 7 days   Lab Units 22  1428   SARS-COV-2  Negative     Results from last 7 days   Lab Units 22  0518 22  1832 22  1428   WBC Thousand/uL 6 52  --  8 05   HEMOGLOBIN g/dL 9 9*  --  11 1*   HEMATOCRIT % 30 5*  --  34 8*   PLATELETS Thousands/uL 169 149 202   NEUTROS ABS Thousands/µL 2 29  --  2 99     Results from last 7 days   Lab Units 22  1001 22  0518 22  1832 22  1428   SODIUM mmol/L 138 141 139 139   POTASSIUM mmol/L 5 3 4 8 5 6* 5 4*   CHLORIDE mmol/L 114* 115* 113* 113*   CO2 mmol/L 22 22 23 20*   ANION GAP mmol/L 2* 4 3* 6   BUN mg/dL 46* 49* 52* 50*   CREATININE mg/dL 2 12* 2 16* 2 30* 2 31*   EGFR ml/min/1 73sq m 31 31 28 28   CALCIUM mg/dL 8 5 8 7 8 6 9 0   MAGNESIUM mg/dL  --  2 0 2 1  --      Results from last 7 days   Lab Units 22  1001 22  0518 22  1832 22  1428 AST U/L 28 32 43 47*   ALT U/L 55 60 66 78   ALK PHOS U/L 113 120* 132* 148*   TOTAL PROTEIN g/dL 6 4 6 6 6 9 7 9   ALBUMIN g/dL 2 5* 2 5* 2 6* 2 2*   TOTAL BILIRUBIN mg/dL 0 20 0 20 0 17* 0 24   AMMONIA umol/L  --   --  37*  --      Results from last 7 days   Lab Units 06/17/22  1127 06/17/22  0517 06/16/22  2046   POC GLUCOSE mg/dl 252* 121 205*     Results from last 7 days   Lab Units 06/17/22  1001 06/17/22  0518 06/16/22  1832 06/16/22  1428   GLUCOSE RANDOM mg/dL 246* 123 202* 209*     Results from last 7 days   Lab Units 06/16/22  1832   HEMOGLOBIN A1C % 7 6*   EAG mg/dl 171     BETA-HYDROXYBUTYRATE   Date Value Ref Range Status   06/16/2022 0 0 <0 6 mmol/L Final      Results from last 7 days   Lab Units 06/16/22  1835 06/16/22  1641 06/16/22  1428   HS TNI 0HR ng/L  --   --  16   HS TNI 2HR ng/L  --  13  --    HSTNI D2 ng/L  --  -3  --    HS TNI 4HR ng/L 15  --   --    HSTNI D4 ng/L -1  --   --      Results from last 7 days   Lab Units 06/16/22  1832   PROTIME seconds 12 9  12 9   INR  1 01   PTT seconds 26  26     Results from last 7 days   Lab Units 06/16/22  1428   TSH 3RD GENERATON uIU/mL 1 080     Results from last 7 days   Lab Units 06/16/22  1428   NT-PRO BNP pg/mL 7,931*     Results from last 7 days   Lab Units 06/16/22  1825   CLARITY UA  Clear   COLOR UA  Light Yellow   SPEC GRAV UA  1 011   PH UA  5 0   GLUCOSE UA mg/dl >=1000 (1%)*   KETONES UA mg/dl Negative   BLOOD UA  Negative   PROTEIN UA mg/dl 30 (1+)*   NITRITE UA  Negative   BILIRUBIN UA  Negative   UROBILINOGEN UA (BE) mg/dl <2 0   LEUKOCYTES UA  Elevated glucose may cause decreased leukocyte values   See urine microscopic for [facility] result/*   WBC UA /hpf None Seen   RBC UA /hpf None Seen   BACTERIA UA /hpf None Seen   EPITHELIAL CELLS WET PREP /hpf None Seen     Results from last 7 days   Lab Units 06/16/22  1428   INFLUENZA A PCR  Negative   INFLUENZA B PCR  Negative   RSV PCR  Negative     ED Treatment:   Medication Administration from 06/16/2022 1344 to 06/16/2022 1942       Date/Time Order Dose Route Action     06/16/2022 1825 sodium chloride 0 9 % infusion 50 mL/hr Intravenous New Bag        Past Medical History:   Diagnosis Date    Anxiety     Arthritis     Coronary artery disease     Dementia ([facility] )     Depression     Diabetes mellitus ([place] [address] )     Infectious viral hepatitis     Memory loss     Visual impairment      Present on Admission:   Uncontrolled type 2 diabetes mellitus with hyperglycemia ([facility])   Bipolar affective disorder, mixed, moderate (HCC)   Congestive heart failure (CHF) ([facility])   Mixed hyperlipidemia   Normocytic anemia      Admitting Diagnosis: Hyperkalemia [E87 5]  Weakness [R53 1]  Elevated troponin [R77 8]  Acute renal failure (ARF) ([facility]) [N17 9]  Generalized weakness [R53 1]  T wave inversion in EKG [R94 31]  Ambulatory dysfunction [R26 2]  Stage 3b chronic kidney disease ([place] [address] ) [N18 32]  Age/Sex: 61 y o  male  Admission Orders:  Regular diet  Ambulate TID  CIWA  CONTINUOUS PULSE OX  DAILY WEIGHT  I&O  neuro check q4h  Orthostatic BP  Consult PT/OT  Kevin SCDs  LifeVest    Scheduled Medications:  aspirin, 81 mg, Oral, Daily  atorvastatin, 40 mg, Oral, HS  FLUoxetine, 20 mg, Oral, Daily  folic acid, 1 mg, Oral, Daily  heparin (porcine), 5,000 Units, Subcutaneous, Q8H MAAME  hydrALAZINE, 10 mg, Oral, Q8H  insulin glargine, 10 Units, Subcutaneous, HS  insulin lispro, 1-5 Units, Subcutaneous, TID AC  LORazepam, 0 5 mg, Oral, HS  melatonin, 4 5 mg, Oral, HS  metoprolol succinate, 50 mg, Oral, Daily  multivitamin-minerals, 1 tablet, Oral, Daily  thiamine, 100 mg, Oral, Daily      Continuous IV Infusions:  sodium chloride, 50 mL/hr, Intravenous, Continuous      PRN Meds:  acetaminophen, 650 mg, Oral, Q6H PRN  ondansetron, 4 mg, Intravenous, Q6H PRN        IP CONSULT TO NEPHROLOGY    Network Utilization Review Department  ATTENTION: Please call with any questions or concerns to [phone] and carefully listen to the prompts so that you are directed to the right person  All voicemails are confidential   Tio Burciaga all requests for admission clinical reviews, approved or denied determinations and any other requests to dedicated fax number below belonging to the campus where the patient is receiving treatment   List of dedicated fax numbers for the Facilities:  [address] DENIALS (Administrative/Medical Necessity) [phone]   1000  [facility] (Maternity/NICU/Pediatrics) [phone]   [address]  [address] [employer] [address] [id number] [address] [place] [facility] [phone]

## 2022-06-17 NOTE — ASSESSMENT & PLAN NOTE
Lab Results   Component Value Date    HGBA1C 7 6 (H) 06/16/2022       Recent Labs     06/16/22  2046 06/17/22  0517 06/17/22  1127   POCGLU 205* 121 252*       Blood Sugar Average: Last 72 hrs:  (P) 341 [id number]   · C/w lantus 10 units at HS and sliding scale insulin    · hold oral hypoglycemics- farxiga and metformin   · CCD   · Monitor accuchecks   · Hypoglycemia protocol

## 2022-06-17 NOTE — PROGRESS NOTES
[address] [facility]  Progress Note - THE [facility] 1955, 61 y o  male MRN: [id number]  Unit/Bed#: -Foster Encounter: [id number]  Primary Care Provider: No primary care provider on file  Date and time admitted to hospital: 6/16/2022  1:56 PM    * Weakness  Assessment & Plan  Appears multifactorial   Potential etiologies include acute renal failure/polypharmacy/deconditioning  · CT negative for acute abnormality   · + anemia- check iron panel   · Covid/flu/rsv negative   · Check UDS   · C/w IVFs- nephro consult appreciated  · Hold lisinopril and demadex  · B12 2,546   · PT/OT recommending rehab  · Monitor blood sugar  · Monitor Neurochecks  · Ammonia level mildly elevated at 37    Acute renal failure (ARF) [facility])  Assessment & Plan  Lab Results   Component Value Date    CREATININE 2 12 (H) 06/17/2022    CREATININE 2 16 (H) 06/17/2022    CREATININE 2 30 (H) 06/16/2022   ? Acute renal failure on chronic renal failure POA  Patient had episode of severe LAZARO during hospitalization couple months ago in [facility] when he required dialysis at that time  No steady baseline available from last year  · Most recent baseline creatinine between 1 7-1 9  · Avoid renal toxic agents: continue with holding lisinopril and holding diuretics   · C/w NSS at 50ml/hr   · Nephrology consult appreciated   · Bladder scan- urine retention protocol     Cardiomyopathy [facility])  Assessment & Plan  · Patient clinically dry  · Hold medications as above  · ECHO 4/22- Severely reduced LV systolic function with estimated LVEF 30%  Grade II diastolic dysfunction  · Pt was discharged home from [facility] after hospitalization in April with a lifevest- recommend continued tele monitoring while hospitalized     Congestive heart failure (CHF) ([facility] [address] )  Assessment & Plan  · Patient is followed as an outpatient  Patient recently was seen back in May    · Currently on:  Demadex 10 mg daily, and lisinopril 2 5 daily which is on hold given LAZARO   · c/w 2 g sodium restriction  · ECHO 4/22 from [facility]- Severely reduced LV systolic function with estimated LVEF 30%  Grade II diastolic dysfunction  · Requested recheck weight as initial weight was likely erroneous       Hyperkalemia  Assessment & Plan  · Mild in the setting of lisinopril and LAZARO   · Add low potassium diet   · C/w holding lisinopril     Polysubstance abuse ([address] )  Assessment & Plan  · Patient has reported history of bipolar disorder with prior history of incarceration and polysubstance abuse to alcohol tobacco, cocaine and a remote history of IV drug use  He also has a reported history of hepatitis-C for which he is scheduled to be followed  with Infectious Disease as an outpatient with [facility]    · He has multiple recent hospitalizations as well  · CIWA protocol ordered   · Check UDS   · C/w thiamine and MVI     Uncontrolled type 2 diabetes mellitus with hyperglycemia [facility])  Assessment & Plan  Lab Results   Component Value Date    HGBA1C 7 6 (H) 06/16/2022       Recent Labs     06/16/22  2046 06/17/22  0517 06/17/22  1127   POCGLU 205* 121 252*       Blood Sugar Average: Last 72 hrs:  (P) 906 [id number]   · a1c 7 6  · C/w lantus 10 units at HS and sliding scale insulin  · hold oral hypoglycemics- farxiga and metformin- consider d/c metformin and farxiga at discharge given underlying renal disease  Would  Better be served discharging with insulin if patient is agreeable   · CCD   · Monitor accuchecks   · Hypoglycemia protocol      Normocytic anemia  Assessment & Plan  Lab Results   Component Value Date    HGB 9 9 (L) 06/17/2022    HGB 11 1 (L) 06/16/2022   · check iron panel     Mixed hyperlipidemia  Assessment & Plan  C/w Lipitor    Bipolar affective disorder, mixed, moderate (HCC)  Assessment & Plan  · Continue with outpatient meds      · Patient currently on Ativan and Prozac        VTE Pharmacologic Prophylaxis: VTE Score: 4 Moderate Risk (Score 3-4) - Pharmacological DVT Prophylaxis Ordered: heparin  Patient Centered Rounds: I performed bedside rounds with nursing staff today  Discussions with Specialists or Other Care Team Provider: d/w RN     Education and Discussions with Family / Patient: Updated  (brother) via phone  Time Spent for Care: 30 minutes  More than 50% of total time spent on counseling and coordination of care as described above  Current Length of Stay: 1 day(s)  Current Patient Status: Inpatient   Certification Statement: The patient will continue to require additional inpatient hospital stay due to continue with ivfs   Discharge Plan: Anticipate discharge in 24-48 hrs to rehab facility  Code Status: Level 1 - Full Code    Subjective:   pt lying in bed  Reports he is feeling much better and stronger today  Denies any pain  No events per RN     Objective:     Vitals:   Temp (24hrs), Av 1 °F (36 7 °C), Min:97 4 °F (36 3 °C), Max:98 9 °F (37 2 °C)    Temp:  [97 4 °F (36 3 °C)-98 9 °F (37 2 °C)] 98 2 °F (36 8 °C)  HR:  [61-83] 81  Resp:  [16-20] 18  BP: ()/(59-73) 121/70  SpO2:  [77 %-100 %] 77 %  Body mass index is 58 85 kg/m²  Input and Output Summary (last 24 hours): Intake/Output Summary (Last 24 hours) at 2022 1239  Last data filed at 2022 0900  Gross per 24 hour   Intake 300 ml   Output 350 ml   Net -50 ml       Physical Exam:   Physical Exam  Constitutional:       General: He is not in acute distress  Appearance: He is not ill-appearing  HENT:      Head: Normocephalic and atraumatic  Eyes:      Conjunctiva/sclera: Conjunctivae normal       Pupils: Pupils are equal, round, and reactive to light  Cardiovascular:      Rate and Rhythm: Normal rate and regular rhythm  Pulses: Normal pulses  Heart sounds: Normal heart sounds  No murmur heard  Pulmonary:      Effort: No respiratory distress  Breath sounds: Normal breath sounds  No wheezing or rales     Abdominal: General: Bowel sounds are normal  There is no distension  Palpations: Abdomen is soft  Tenderness: There is no abdominal tenderness  Musculoskeletal:         General: No swelling or tenderness  Skin:     General: Skin is warm and dry  Findings: No erythema or rash  Neurological:      General: No focal deficit present  Mental Status: He is alert  Mental status is at baseline     Psychiatric:         Attention and Perception: Attention normal          Mood and Affect: Mood normal           Additional Data:     Labs:  Results from last 7 days   Lab Units 06/17/22  0518   WBC Thousand/uL 6 52   HEMOGLOBIN g/dL 9 9*   HEMATOCRIT % 30 5*   PLATELETS Thousands/uL 169   NEUTROS PCT % 35*   LYMPHS PCT % 47*   MONOS PCT % 9   EOS PCT % 7*     Results from last 7 days   Lab Units 06/17/22  1001   SODIUM mmol/L 138   POTASSIUM mmol/L 5 3   CHLORIDE mmol/L 114*   CO2 mmol/L 22   BUN mg/dL 46*   CREATININE mg/dL 2 12*   ANION GAP mmol/L 2*   CALCIUM mg/dL 8 5   ALBUMIN g/dL 2 5*   TOTAL BILIRUBIN mg/dL 0 20   ALK PHOS U/L 113   ALT U/L 55   AST U/L 28   GLUCOSE RANDOM mg/dL 246*     Results from last 7 days   Lab Units 06/16/22  1832   INR  1 01     Results from last 7 days   Lab Units 06/17/22  1127 06/17/22  0517 06/16/22  2046   POC GLUCOSE mg/dl 252* 121 205*     Results from last 7 days   Lab Units 06/16/22  1832   HEMOGLOBIN A1C % 7 6*           Lines/Drains:  Invasive Devices  Report    Peripheral Intravenous Line  Duration           Peripheral IV 06/16/22 Right Wrist <1 day                      Imaging: Reviewed radiology reports from this admission including: chest xray and CT head    Recent Cultures (last 7 days):         Last 24 Hours Medication List:   Current Facility-Administered Medications   Medication Dose Route Frequency Provider Last Rate    acetaminophen  650 mg Oral Q6H PRN Hetul Cannon, DO      aspirin  81 mg Oral Daily Hetul Cannon, DO      atorvastatin  40 mg Oral HS Camille Angulo DO      FLUoxetine  20 mg Oral Daily Hetul Cannon, DO      folic acid  1 mg Oral Daily Hetul Cannon, DO      heparin (porcine)  5,000 Units Subcutaneous Q8H [facility] Hetul Cannon, DO      hydrALAZINE  10 mg Oral Q8H EMMA Rosas      insulin glargine  10 Units Subcutaneous HS Hetul Cannon, DO      insulin lispro  1-5 Units Subcutaneous TID AC Hetul Cannon, DO      LORazepam  0 5 mg Oral HS Hetul Cannon, DO      melatonin  4 5 mg Oral HS Hetul Cannon, DO      metoprolol succinate  50 mg Oral Daily Hetul Cannon, DO      multivitamin-minerals  1 tablet Oral Daily Hetul Cannon, DO      ondansetron  4 mg Intravenous Q6H PRN Hetul Cannon, DO      sodium chloride  50 mL/hr Intravenous Continuous Hetul Cannon, DO 50 mL/hr (06/16/22 1825)    thiamine  100 mg Oral Daily Hetul Cannon, DO          Today, Patient Was Seen By: EMMA Chau    **Please Note: This note may have been constructed using a voice recognition system  **

## 2022-06-17 NOTE — ASSESSMENT & PLAN NOTE
· Patient clinically dry  · Hold medications as above  · ECHO 4/22- Severely reduced LV systolic function with estimated LVEF 30%   Grade II diastolic dysfunction  · Pt was discharged home from [facility] after hospitalization in April with a lifevest- recommend continued tele monitoring while hospitalized

## 2022-06-17 NOTE — CASE MANAGEMENT
Case Management Assessment & Discharge Planning Note    Patient name Katja Valle  Location /-01 MRN [id number]  : 1955 Date 2022       Current Admission Date: 2022  Current Admission Diagnosis:Weakness   Patient Active Problem List    Diagnosis Date Noted    Weakness 2022    Cardiomyopathy ([facility] ) 2022    Polysubstance abuse ([facility] ) 2022    Hyperkalemia 2022    Uncontrolled type 2 diabetes mellitus with hyperglycemia ([facility] ) 2022    Mixed hyperlipidemia 2022    Stage 3b chronic kidney disease ([facility] ) 2022    Congestive heart failure (CHF) ([facility] ) 2022    Acute hepatitis C virus infection 2022    Abnormal EKG 2022    Acute renal failure (ARF) ([facility] ) 2022    Normocytic anemia 2022    Essential hypertension 2015    Type II or unspecified type diabetes mellitus without mention of complication, uncontrolled 2012    Bipolar affective disorder, mixed, moderate ([facility] ) 2011      LOS (days): 1  Geometric Mean LOS (GMLOS) (days): 4 30  Days to GMLOS:3 4     OBJECTIVE:    Risk of Unplanned Readmission Score: 21 25         Current admission status: Inpatient       Preferred Pharmacy:   [facility] , [address] [address]  Phone: [phone] Fax: [phone]    Primary Care Provider: No primary care provider on file      Primary Insurance: [employer] [facility] REP  Secondary Insurance:     ASSESSMENT:  [address], [address] Representative - Brother   Primary Phone: [phone] (Mobile)               Advance Directives  Does patient have a [address]?: No  Was patient offered paperwork?: Yes  Does patient currently have a Health Care decision maker?: Yes, please see Health Care Proxy section  Does patient have Advance Directives?: No  Was patient offered paperwork?: Yes  Primary Contact: Flory Basurto Angie Pepe [phone]         Readmission Root Cause  30 Day Readmission: No    Patient Information  Admitted from[de-identified] Home  Mental Status: Alert  During Assessment patient was accompanied by: Not accompanied during assessment  Assessment information provided by[de-identified] Patient  Primary Caregiver: Self  Support Systems: Family members  Home entry access options   Select all that apply : Stairs  Number of steps to enter home : 3  Do the steps have railings?: Yes  Type of Current Residence: 2 story home  Upon entering residence, is there a bedroom on the main floor (no further steps)?: Yes  Upon entering residence, is there a bathroom on the main floor (no further steps)?: Yes  In the last 12 months, was there a time when you were not able to pay the mortgage or rent on time?: No  In the last 12 months, how many places have you lived?: 1  In the last 12 months, was there a time when you did not have a steady place to sleep or slept in a shelter (including now)?: No  Homeless/housing insecurity resource given?: N/A  Living Arrangements: Other (Comment) (Lives with brother)  Is patient a ?: No    Activities of Daily Living Prior to Admission  Functional Status: Assistance  Completes ADLs independently?: No  Level of ADL dependence: Assistance  Ambulates independently?: Yes  Does patient use assisted devices?: Yes  Assisted Devices (DME) used: Straight Alvia Medico  Does patient currently own DME?: Yes  What DME does the patient currently own?: Straight Cane, Walker, Shower Chair, Bedside Commode  Does patient have a history of Outpatient Therapy (PT/OT)?: No  Does the patient have a history of Short-Term Rehab?: No  Does patient have a history of HHC?: No  Does patient currently have [address]?: No         Patient Information Continued  Income Source: Pension/FPC  Does patient have prescription coverage?: Yes (Patient uses [facility] on [address])  Within the past 12 months, you worried that your food would run out before you got the money to buy more : Never true  Within the past 12 months, the food you bought just didn't last and you didn't have money to get more : Never true  Food insecurity resource given?: N/A  Does patient receive dialysis treatments?: No  Does patient have a history of substance abuse?: Yes  Historical substance use preference: Cocaine  Is patient currently in treatment for substance abuse?: N/A - sober (5-6 years ago)  Does patient have a history of Mental Health Diagnosis?: Yes (Bipolar)  Is patient receiving treatment for mental health?: Yes  Has patient received inpatient treatment related to mental health in the last 2 years?: No         Means of Transportation  Means of Transport to Appts[de-identified] Drives Self  In the past 12 months, has lack of transportation kept you from medical appointments or from getting medications?: No  In the past 12 months, has lack of transportation kept you from meetings, work, or from getting things needed for daily living?: No  Was application for public transport provided?: N/A        DISCHARGE DETAILS:    Discharge planning discussed with[de-identified] Patient  Freedom of Choice: Yes     CM contacted family/caregiver?: No- see comments (Patient declined, stated his brother was just at the hospital and spoke to the physicians already)  Were Treatment Team discharge recommendations reviewed with patient/caregiver?: Yes  Did patient/caregiver verbalize understanding of patient care needs?: Yes  Were patient/caregiver advised of the risks associated with not following Treatment Team discharge recommendations?: Yes    Contacts  Patient Contacts: Darcy Monae  Relationship to Patient[de-identified] Family  Contact Method: Phone  Phone Number: [phone]  Reason/Outcome: Emergency [address]         Is the patient interested in [facility] at discharge?: No    DME Referral Provided  Referral made for DME?: No         Would you like to participate in our [address] service program? : Yes                      Patient lives in a [facility] with his brother, [address]  Patient has bedroom and bathroom access on main floor  Patient's brother assists patient with ADLS  Patient ambulates with cane and walker, also owns BSC, shower chair  Patient has no history of STR, outpatient therapy, or C  Patient reports history of cocaine use about 5-6 years  Patient reports PMH of bipolar which he is being treated for, denies any hospitalizations or ICM  Patient is retired from [employer]  Patient is vaccinated for covid-19 with booster, does not remember what vaccine he received  Patient is open to blanket STR referral as he does not have any preferences  Patient stated he doesn't believe he can tolerate acute rehab  Bryant referrals placed in [facility] for STR  CM reviewed d/c planning process including the following: identifying help at home, patient preference for d/c planning needs, Discharge Lounge, [facility] Meds to Bed program, availability of treatment team to discuss questions or concerns patient and/or family may have regarding understanding medications and recognizing signs and symptoms once discharged  CM also encouraged patient to follow up with all recommended appointments after discharge  Patient advised of importance for patient and family to participate in managing patients medical well being

## 2022-06-17 NOTE — ASSESSMENT & PLAN NOTE
· Patient has reported history of bipolar disorder with prior history of incarceration and polysubstance abuse to alcohol tobacco, cocaine and a remote history of IV drug use  He also has a reported history of hepatitis-C for which he is scheduled to be followed  with Infectious Disease as an outpatient with [facility]    · He has multiple recent hospitalizations as well    · CIWA protocol ordered   · UDS negative  · C/w thiamine and MVI

## 2022-06-17 NOTE — CONSULTS
Consultation - Nephrology   Mikey Jones 61 y o  male MRN: [id number]  Unit/Bed#: -01 Encounter: [id number]    ASSESSMENT and PLAN:  Acute kidney injury (POA):  Etiology: Suspect prerenal in the setting of diuretic use, loss of auto regulatory system with addition of lisinopril 6/3/2022, possible osmotic diuresis with elevated blood sugar, plus or minus ATN,  plus or minus urinary obstruction  Assessment:   After review medical records through [facility] everywhere baseline creatinine appears to be 1 5-1 9 with recent admissions   Has proteinuria with last U PCR 1 81 in April 2022 at [facility] Admission creatinine 2 31 on 06/16/2022   Current creatinine 2 16 with IV fluids   Acid base and lytes stable    Clinically, patient is not uremic and there is no acute indication for renal replacement therapy  Workup:   Urinalysis with micro reports:  Plus one protein 3-5 hyaline cast    Previous CT scan chest abdomen pelvis at [facility] did not reveal hydronephrosis or calcified stones     Plan:   Strict I/Os, daily weights   Avoid nephrotoxins, NSAIDs, IV contrast if possible   Avoid hypotension and perturbations of blood pressure to prevent decreased renal perfusion   Continue IV fluid normal saline at this time 50 cc/hr   Trend BMP daily   Will check bladder scan   Adjust meds to appropriate GFR   Optimize hemodynamic status to avoid delay in renal recovery   Recommend renal follow-up    Chronic kidney disease IIIA/B:    Etiology:  Suspect secondary to longstanding diabetes, hypertension nephrosclerosis, decreased EF  Assessment and Plan:   After review of medical records for [facility] everywhere most recent baseline creatinine 1 5-1 9   At this time is not following Outpatient Nephrology due to insurance issues   Recommend follow-up on discharge and obtain repeat U PCR in steady state    Blood pressure/Hypertension:  Assessment and Plan:   Current blood pressure acceptable   Home medications: Hydralazine 10 mg q 8 hours, lisinopril 2 5 mg daily, Toprol XL 50 mg daily, torsemide 10 mg b i d    Current medications:  Hydralazine 10 mg t i d , Toprol XL 50 mg daily   Torsemide and lisinopril on hold   Will place hold parameters for SBP less than 130 on hydralazine   Maximize hemodynamics to maintain MAP >65   Avoid hypotension or fluctuations in blood pressure   Will continue to trend    Anemia: Likely of CKD  Assessment and Plan:   Current hemoglobin 9 9 after hydration   Will check iron stores-will add on to this a m  Lab   Per primary team   Transfuse for Hgb <7 0    Electrolytes/acid-base  Assessment and Plan:  Hyperkalemia:  With hemolysis-repeat potassium 4 8 this a m   Within normal limits   Will continue monitor and treat as needed    Uncontrolled diabetes:  Assessment and plan:  · On metformin and Farxiga as outpatient  · Per primary team  · With recurrent LAZARO consider not restarting metformin    Congestive heart failure/cardiomyopathy  Assessment and plan:  · Last echocardiogram at [facility] and revealed EF of 30%  · Previously discharged with LifeVest  · Status post recent bilateral thoracentesis while admitted at [facility]  · Patient follows with Cardiology as outpatient recently started on lisinopril 5 mg daily on 06/03/2022-currently off the setting of acute kidney injury  · Patient examining fairly euvolemic    Hepatitis-C:  Assessment and plan  · Previous plan to follow-up with Infectious Disease at [facility]  · Per primary team      HISTORY OF PRESENT ILLNESS:  Requesting Physician: Cj Howard DO  Reason for Consult:  Acute kidney injury    Irena Bill is a 61 y o  male with past medical history of diabetes, CAD, CHF with decreased EF 30% hyperlipidemia, hypertension, bipolar disorder, Chronic Kidney Disease, prior AK I, remote history of polysubstance abuse, hepatitis-C who presented to the hospital with generalized weakness    Workup revealed elevated creatinine and a renal consultation is requested today for assistance in the management of acute kidney injury  Of note the patient has had multiple recent hospitalizations at [facility] with last one 05/02/2022-5/13/2022 for congestive heart failure found to have EF of 30% was discharged with LifeVest   During his hospitalization he was seen by a nephrology however unable to follow-up as outpatient due to insurance issues  Patient did see Cardiology as outpatient on 06/03/2022 and lisinopril 5 mg was started  PAST MEDICAL HISTORY:  Past Medical History:   Diagnosis Date    Anxiety     Arthritis     Coronary artery disease     D[facility]     Depression     [facility]     Infectious viral hepatitis     Memory loss     Visual impairment        PAST SURGICAL HISTORY:  History reviewed  No pertinent surgical history  ALLERGIES:  No Known Allergies    SOCIAL HISTORY:  Social History     Substance and Sexual Activity   Alcohol Use Yes    Comment: socially     Social History     Substance and Sexual Activity   Drug Use Never     Social History     Tobacco Use   Smoking Status Former Smoker   Smokeless Tobacco Never Used       FAMILY HISTORY:  History reviewed  No pertinent family history      MEDICATIONS:    Current Facility-Administered Medications:     acetaminophen (TYLENOL) tablet 650 mg, 650 mg, Oral, Q6H PRN, Hetul Cannon, DO    aspirin (ECOTRIN LOW STRENGTH) EC tablet 81 mg, 81 mg, Oral, Daily, Hetul Cannon, DO, 81 mg at 06/17/22 0952    atorvastatin (LIPITOR) tablet 40 mg, 40 mg, Oral, HS, Hetul Cannon, DO, 40 mg at 06/16/22 2119    FLUoxetine (PROzac) capsule 20 mg, 20 mg, Oral, Daily, Hetul Cannon, DO, 20 mg at 51/41/00 6300    folic acid (FOLVITE) tablet 1 mg, 1 mg, Oral, Daily, Hetul Cannon, DO, 1 mg at 06/17/22 0952    heparin (porcine) subcutaneous injection 5,000 Units, 5,000 Units, Subcutaneous, Q8H [address], 5,000 Units at 06/17/22 0505 **AND** Platelet count, , , Once, Hetul Cannon, DO    hydrALAZINE (APRESOLINE) tablet 10 mg, 10 mg, Oral, Q8H, Hetul Cannon, DO, 10 mg at 06/17/22 0954    insulin glargine (LANTUS) subcutaneous injection 10 Units 0 1 mL, 10 Units, Subcutaneous, HS, Hetul Cannon, DO, 10 Units at 06/16/22 2120    insulin lispro (HumaLOG) 100 units/mL subcutaneous injection 1-5 Units, 1-5 Units, Subcutaneous, TID AC, 1 Units at 06/16/22 2052 **AND** Fingerstick Glucose (POCT), , , TID AC, Hetul Cannon, DO    LORazepam (ATIVAN) tablet 0 5 mg, 0 5 mg, Oral, HS, Hetul Cannon, DO, 0 5 mg at 06/16/22 2119    melatonin tablet 4 5 mg, 4 5 mg, Oral, HS, Hetul Cannon, DO, 4 5 mg at 06/16/22 2119    metoprolol succinate (TOPROL-XL) 24 hr tablet 50 mg, 50 mg, Oral, Daily, Hetul Cannon, DO, 50 mg at 06/17/22 0952    multivitamin-minerals (CENTRUM) tablet 1 tablet, 1 tablet, Oral, Daily, Hetul Cannon, DO, 1 tablet at 06/17/22 0952    ondansetron (ZOFRAN) injection 4 mg, 4 mg, Intravenous, Q6H PRN, Hetul Cannon, DO    sodium chloride 0 9 % infusion, 50 mL/hr, Intravenous, Continuous, Hetul Cannon, DO, Last Rate: 50 mL/hr at 06/16/22 1825, 50 mL/hr at 06/16/22 1825    thiamine tablet 100 mg, 100 mg, Oral, Daily, Hetul Cannon, DO, 100 mg at 06/17/22 6732    REVIEW OF SYSTEMS:  Patient seen and examined at bedside  Patient reports feeling much better since admission  Review of Systems   Constitutional: Negative  HENT: Negative  Eyes: Negative  Respiratory: Negative  Cardiovascular: Negative  Gastrointestinal: Negative  Endocrine: Negative  Genitourinary: Negative  Musculoskeletal: Negative  Skin: Negative  Allergic/Immunologic: Negative  Neurological: Negative  Hematological: Negative  Psychiatric/Behavioral: Negative              PHYSICAL EXAM:  Current Weight: Weight - Scale: (!) 160 kg (353 lb 9 9 oz)  First Weight: Weight - Scale: 71 7 kg (158 lb)  Vitals:    06/17/22 0600 06/17/22 0711 06/17/22 0952 06/17/22 0952   BP:  124/72 121/70 121/70   BP Location:  Left arm     Pulse:  61 80 81 Resp:  18     Temp:  98 2 °F (36 8 °C)     TempSrc:  Oral     SpO2:  100%  (!) 77%   Weight: (!) 160 kg (353 lb 9 9 oz)          Intake/Output Summary (Last 24 hours) at 6/17/2022 1013  Last data filed at 6/17/2022 0900  Gross per 24 hour   Intake 300 ml   Output 350 ml   Net -50 ml       Physical Exam  Vitals and nursing note reviewed  Constitutional:       Appearance: Normal appearance  Comments: No acute distress, resting in bed   HENT:      Head: Normocephalic and atraumatic  Nose: Nose normal       Mouth/Throat:      Mouth: Mucous membranes are moist    Eyes:      Extraocular Movements: Extraocular movements intact  Conjunctiva/sclera: Conjunctivae normal    Cardiovascular:      Rate and Rhythm: Normal rate and regular rhythm  Pulses: Normal pulses  Heart sounds: Normal heart sounds  Pulmonary:      Effort: Pulmonary effort is normal       Breath sounds: Normal breath sounds  Abdominal:      General: Bowel sounds are normal       Palpations: Abdomen is soft  Genitourinary:     Comments: Voiding clear yellow urine  Musculoskeletal:         General: Normal range of motion  Cervical back: Normal range of motion and neck supple  Skin:     General: Skin is warm and dry  Neurological:      General: No focal deficit present  Mental Status: He is alert and oriented to person, place, and time  Psychiatric:         Mood and Affect: Mood normal          Behavior: Behavior normal          Thought Content:  Thought content normal          Judgment: Judgment normal            Lab Results:   Results from last 7 days   Lab Units 06/17/22  0518 06/16/22  1832 06/16/22  1428   WBC Thousand/uL 6 52  --  8 05   HEMOGLOBIN g/dL 9 9*  --  11 1*   HEMATOCRIT % 30 5*  --  34 8*   PLATELETS Thousands/uL 169 149 202   SODIUM mmol/L 141 139 139   POTASSIUM mmol/L 4 8 5 6* 5 4*   CHLORIDE mmol/L 115* 113* 113*   CO2 mmol/L 22 23 20*   BUN mg/dL 49* 52* 50*   CREATININE mg/dL 2 16* 2 30* 2 31*   CALCIUM mg/dL 8 7 8 6 9 0   MAGNESIUM mg/dL 2 0 2 1  --    ALK PHOS U/L 120* 132* 148*   ALT U/L 60 66 78   AST U/L 32 43 47*

## 2022-06-17 NOTE — ASSESSMENT & PLAN NOTE
· Mild in the setting of lisinopril and LAZARO   · Add low potassium diet   · C/w holding lisinopril

## 2022-06-17 NOTE — ASSESSMENT & PLAN NOTE
· Appears multifactorial   Potential etiologies include acute renal failure/polypharmacy/deconditioning  · CT head negative for acute abnormality   · Covid/flu/rsv negative   · UDS negative  · Hold lisinopril and demadex  · PT/OT recommending rehab  · Monitor blood sugar  · Monitor Neurochecks  · Ammonia level mildly elevated at 37

## 2022-06-18 LAB
ANION GAP SERPL CALCULATED.3IONS-SCNC: 4 MMOL/L (ref 4–13)
BUN SERPL-MCNC: 43 MG/DL (ref 5–25)
CALCIUM SERPL-MCNC: 8.6 MG/DL (ref 8.3–10.1)
CHLORIDE SERPL-SCNC: 118 MMOL/L (ref 100–108)
CO2 SERPL-SCNC: 21 MMOL/L (ref 21–32)
CREAT SERPL-MCNC: 1.84 MG/DL (ref 0.6–1.3)
ERYTHROCYTE [DISTWIDTH] IN BLOOD BY AUTOMATED COUNT: 14.7 % (ref 11.6–15.1)
FOLATE SERPL-MCNC: 8.9 NG/ML (ref 3.1–17.5)
GFR SERPL CREATININE-BSD FRML MDRD: 37 ML/MIN/1.73SQ M
GLUCOSE SERPL-MCNC: 127 MG/DL (ref 65–140)
GLUCOSE SERPL-MCNC: 221 MG/DL (ref 65–140)
GLUCOSE SERPL-MCNC: 233 MG/DL (ref 65–140)
GLUCOSE SERPL-MCNC: 76 MG/DL (ref 65–140)
GLUCOSE SERPL-MCNC: 82 MG/DL (ref 65–140)
GLUCOSE SERPL-MCNC: 99 MG/DL (ref 65–140)
HCT VFR BLD AUTO: 32.7 % (ref 36.5–49.3)
HGB BLD-MCNC: 10.7 G/DL (ref 12–17)
MCH RBC QN AUTO: 30.1 PG (ref 26.8–34.3)
MCHC RBC AUTO-ENTMCNC: 32.7 G/DL (ref 31.4–37.4)
MCV RBC AUTO: 92 FL (ref 82–98)
PLATELET # BLD AUTO: 195 THOUSANDS/UL (ref 149–390)
PMV BLD AUTO: 10.8 FL (ref 8.9–12.7)
POTASSIUM SERPL-SCNC: 4.9 MMOL/L (ref 3.5–5.3)
RBC # BLD AUTO: 3.55 MILLION/UL (ref 3.88–5.62)
SODIUM SERPL-SCNC: 143 MMOL/L (ref 136–145)
WBC # BLD AUTO: 6.75 THOUSAND/UL (ref 4.31–10.16)

## 2022-06-18 PROCEDURE — 99232 SBSQ HOSP IP/OBS MODERATE 35: CPT | Performed by: INTERNAL MEDICINE

## 2022-06-18 PROCEDURE — 82746 ASSAY OF FOLIC ACID SERUM: CPT | Performed by: NURSE PRACTITIONER

## 2022-06-18 PROCEDURE — 99232 SBSQ HOSP IP/OBS MODERATE 35: CPT | Performed by: PHYSICIAN ASSISTANT

## 2022-06-18 PROCEDURE — 82948 REAGENT STRIP/BLOOD GLUCOSE: CPT

## 2022-06-18 PROCEDURE — 85027 COMPLETE CBC AUTOMATED: CPT | Performed by: INTERNAL MEDICINE

## 2022-06-18 PROCEDURE — 80048 BASIC METABOLIC PNL TOTAL CA: CPT | Performed by: INTERNAL MEDICINE

## 2022-06-18 RX ADMIN — INSULIN GLARGINE 10 UNITS: 100 INJECTION, SOLUTION SUBCUTANEOUS at 22:28

## 2022-06-18 RX ADMIN — LORAZEPAM 0.5 MG: 0.5 TABLET ORAL at 22:09

## 2022-06-18 RX ADMIN — HEPARIN SODIUM 5000 UNITS: 5000 INJECTION INTRAVENOUS; SUBCUTANEOUS at 13:37

## 2022-06-18 RX ADMIN — Medication 4.5 MG: at 22:17

## 2022-06-18 RX ADMIN — HYDRALAZINE HYDROCHLORIDE 10 MG: 10 TABLET, FILM COATED ORAL at 17:48

## 2022-06-18 RX ADMIN — ASPIRIN 81 MG: 81 TABLET, COATED ORAL at 08:29

## 2022-06-18 RX ADMIN — METOPROLOL SUCCINATE 50 MG: 50 TABLET, EXTENDED RELEASE ORAL at 08:30

## 2022-06-18 RX ADMIN — FOLIC ACID 1 MG: 1 TABLET ORAL at 08:30

## 2022-06-18 RX ADMIN — FLUOXETINE 20 MG: 20 CAPSULE ORAL at 08:30

## 2022-06-18 RX ADMIN — THIAMINE HCL TAB 100 MG 100 MG: 100 TAB at 08:30

## 2022-06-18 RX ADMIN — HEPARIN SODIUM 5000 UNITS: 5000 INJECTION INTRAVENOUS; SUBCUTANEOUS at 22:17

## 2022-06-18 RX ADMIN — Medication 1 TABLET: at 08:29

## 2022-06-18 RX ADMIN — HEPARIN SODIUM 5000 UNITS: 5000 INJECTION INTRAVENOUS; SUBCUTANEOUS at 05:12

## 2022-06-18 RX ADMIN — INSULIN LISPRO 2 UNITS: 100 INJECTION, SOLUTION INTRAVENOUS; SUBCUTANEOUS at 12:34

## 2022-06-18 RX ADMIN — ATORVASTATIN CALCIUM 40 MG: 40 TABLET, FILM COATED ORAL at 22:28

## 2022-06-18 NOTE — PROGRESS NOTES
[facility]  Progress Note - Levora Hand 1955, 61 y o  male MRN: [id number]  Unit/Bed#: -Foster Encounter: [id number]  Primary Care Provider: No primary care provider on file  Date and time admitted to hospital: 6/16/2022  1:56 PM    * Weakness  Assessment & Plan  · Appears multifactorial   Potential etiologies include acute renal failure/polypharmacy/deconditioning  · CT head negative for acute abnormality   · Covid/flu/rsv negative   · UDS negative  · Hold lisinopril and demadex  · PT/OT recommending rehab  · Monitor blood sugar  · Monitor Neurochecks  · Ammonia level mildly elevated at 37    LAZARO (acute kidney injury) [facility])  Assessment & Plan  Lab Results   Component Value Date    CREATININE 2 12 (H) 06/17/2022    CREATININE 2 16 (H) 06/17/2022    CREATININE 2 30 (H) 06/16/2022   · LAZARO POA  · Patient had episode of severe LAZARO during hospitalization couple months ago in [facility] when he required dialysis at that time  No steady baseline available from last year  · Most recent baseline creatinine between 1 7-1 9  · Avoid renal toxic agents: continue with holding lisinopril and holding diuretics   · Monitor off IVF now per Nephrology  · Nephrology consult appreciated   · Bladder scan- urine retention protocol     Uncontrolled type 2 diabetes mellitus with hyperglycemia [facility])  Assessment & Plan  Lab Results   Component Value Date    HGBA1C 7 6 (H) 06/16/2022       Recent Labs     06/16/22  2046 06/17/22  0517 06/17/22  1127   POCGLU 205* 121 252*       Blood Sugar Average: Last 72 hrs:  (P) 971 [id number]     · C/w lantus 10 units at HS and sliding scale insulin  · hold oral hypoglycemics- farxiga and metformin- consider d/c metformin and farxiga at discharge given underlying renal disease     · Monitor accuchecks   · Hypoglycemia protocol      Polysubstance abuse [facility])  Assessment & Plan  · Patient has reported history of bipolar disorder with prior history of incarceration and polysubstance abuse to alcohol tobacco, cocaine and a remote history of IV drug use  He also has a reported history of hepatitis-C for which he is scheduled to be followed  with Infectious Disease as an outpatient with [facility]    · He has multiple recent hospitalizations as well  · CIWA protocol ordered   · UDS negative  · C/w thiamine and MVI     Congestive heart failure (CHF) ([address] )  Assessment & Plan  · Patient is followed as an outpatient  Patient recently was seen back in May  · Demadex 10 mg daily, and lisinopril 2 5 daily on hold given LAZARO   · ECHO 4/22 from [facility]- Severely reduced LV systolic function with estimated LVEF 30%  Grade II diastolic dysfunction      Cardiomyopathy ([address] )  Assessment & Plan  · Patient clinically dry  · Hold medications as above  · ECHO 4/22- Severely reduced LV systolic function with estimated LVEF 30%  Grade II diastolic dysfunction  · Patient was discharged home from [facility] after hospitalization in April with a lifevest- recommend continued tele monitoring while hospitalized     Hyperkalemia  Assessment & Plan  · Now resolved  · C/w holding lisinopril     Mixed hyperlipidemia  Assessment & Plan  · C/w Lipitor    Normocytic anemia  Assessment & Plan  Lab Results   Component Value Date    HGB 9 9 (L) 06/17/2022    HGB 11 1 (L) 06/16/2022   ·     Bipolar affective disorder, mixed, moderate (HCC)  Assessment & Plan  · Continue with outpatient meds  · Patient currently on Ativan and Prozac        VTE Pharmacologic Prophylaxis: VTE Score: 4 Moderate Risk (Score 3-4) - Pharmacological DVT Prophylaxis Ordered: heparin  Patient Centered Rounds: I performed bedside rounds with nursing staff today  Heparin   Discussions with Specialists or Other Care Team Provider:     Education and Discussions with Family / Patient: Attempted to update  (brother) via phone  Unable to contact  Juan     Time Spent for Care: 20 minutes   More than 50% of total time spent on counseling and coordination of care as described above  Current Length of Stay: 2 day(s)  Current Patient Status: Inpatient   Certification Statement: The patient will continue to require additional inpatient hospital stay due to LAZARO, rehab  Discharge Plan: anticipate early next week to rehab pending nephro clearance    Code Status: Level 1 - Full Code    Subjective:   Mr Nura Rubi denies complaint  He denies CP, SOB, abdominal pain    Objective:     Vitals:   Temp (24hrs), Av 8 °F (36 6 °C), Min:97 3 °F (36 3 °C), Max:98 5 °F (36 9 °C)    Temp:  [97 3 °F (36 3 °C)-98 5 °F (36 9 °C)] 97 3 °F (36 3 °C)  HR:  [63-75] 64  Resp:  [18] 18  BP: (104-158)/(58-91) 131/73  SpO2:  [85 %-100 %] 100 %  Body mass index is 26 88 kg/m²  Input and Output Summary (last 24 hours): Intake/Output Summary (Last 24 hours) at 2022 0958  Last data filed at 2022 0900  Gross per 24 hour   Intake 1115 83 ml   Output 750 ml   Net 365 83 ml       Physical Exam:   Physical Exam  Vitals and nursing note reviewed  Constitutional:       Comments: Patient seen sitting in bed comfortably resting, NAD   Cardiovascular:      Rate and Rhythm: Normal rate and regular rhythm  Pulmonary:      Effort: Pulmonary effort is normal       Breath sounds: Normal breath sounds  Abdominal:      General: Bowel sounds are normal       Palpations: Abdomen is soft  Tenderness: There is no abdominal tenderness  Musculoskeletal:      Right lower leg: No edema  Left lower leg: No edema  Skin:     General: Skin is warm  Neurological:      Mental Status: He is alert and oriented to person, place, and time     Psychiatric:         Mood and Affect: Mood normal          Behavior: Behavior normal        Additional Data:     Labs:  Results from last 7 days   Lab Units 22  0318 22  0518   WBC Thousand/uL 6 75 6 52   HEMOGLOBIN g/dL 10 7* 9 9*   HEMATOCRIT % 32 7* 30 5*   PLATELETS Thousands/uL 195 169   NEUTROS PCT %  --  35*   LYMPHS PCT %  --  47*   MONOS PCT %  --  9   EOS PCT %  --  7*     Results from last 7 days   Lab Units 06/18/22  0318 06/17/22  1001   SODIUM mmol/L 143 138   POTASSIUM mmol/L 4 9 5 3   CHLORIDE mmol/L 118* 114*   CO2 mmol/L 21 22   BUN mg/dL 43* 46*   CREATININE mg/dL 1 84* 2 12*   ANION GAP mmol/L 4 2*   CALCIUM mg/dL 8 6 8 5   ALBUMIN g/dL  --  2 5*   TOTAL BILIRUBIN mg/dL  --  0 20   ALK PHOS U/L  --  113   ALT U/L  --  55   AST U/L  --  28   GLUCOSE RANDOM mg/dL 82 246*     Results from last 7 days   Lab Units 06/16/22  1832   INR  1 01     Results from last 7 days   Lab Units 06/18/22  0813 06/18/22  0655 06/17/22  2055 06/17/22  1613 06/17/22  1127 06/17/22  0517 06/16/22  2046   POC GLUCOSE mg/dl 99 76 179* 193* 252* 121 205*     Results from last 7 days   Lab Units 06/16/22  1832   HEMOGLOBIN A1C % 7 6*           Lines/Drains:  Invasive Devices  Report    Peripheral Intravenous Line  Duration           Peripheral IV 06/16/22 Right Wrist 1 day                  Telemetry:  Telemetry Orders (From admission, onward)             LifeVest Patient: Continuous Telemetry Monitoring during hospitalization (non-expiring)  Continuous LifeVest Telemetry Monitoring        References:    LifeVest Policy                 Telemetry Reviewed: cannot find tele for 573 on monitor  Indication for Continued Telemetry Use: Lifevest (remains on tele entire hospital stay) - confirmed with RN that patient was not on tele   Discussed with RN that patient has lifevest and tele order and requested patient be placed on tele             Imaging: Reviewed radiology reports from this admission including: CT head    Recent Cultures (last 7 days):         Last 24 Hours Medication List:   Current Facility-Administered Medications   Medication Dose Route Frequency Provider Last Rate    acetaminophen  650 mg Oral Q6H PRN Hetul Cannon, DO      aspirin  81 mg Oral Daily Hetul Cannon, DO      atorvastatin  40 mg Oral HS Hetul Cannon, DO      FLUoxetine  20 mg Oral Daily Hetul Cannon, DO      folic acid  1 mg Oral Daily Hetul Cannon, DO      heparin (porcine)  5,000 Units Subcutaneous Q8H [address] Hetul Cannon, DO      hydrALAZINE  10 mg Oral Q8H MuluEMMA Lujan      insulin glargine  10 Units Subcutaneous HS Hetul Cannon, DO      insulin lispro  1-5 Units Subcutaneous TID AC Hetul Cannon, DO      LORazepam  0 5 mg Oral HS Hetul Cannon, DO      melatonin  4 5 mg Oral HS Hetul Cannon, DO      metoprolol succinate  50 mg Oral Daily Hetul Cannon, DO      multivitamin-minerals  1 tablet Oral Daily Hetul Cannon, DO      ondansetron  4 mg Intravenous Q6H PRN Hetul Cannon, DO      thiamine  100 mg Oral Daily Hetul Cannon, DO          Today, Patient Was Seen By: Miguel Conway PA-C    **Please Note: This note may have been constructed using a voice recognition system  **

## 2022-06-18 NOTE — PROGRESS NOTES
NEPHROLOGY PROGRESS NOTE   Raul Love 61 y o  male MRN: [id number]  Unit/Bed#: -01 Encounter: [id number]  Reason for Consult: LAZARO    ASSESSMENT AND PLAN:  Patient is 32-year-old male with significant medical issues of bipolar disorder (prior use of lithium many years ago,? Duration), diabetes for many years, hypertension, hyperlipidemia, hepatitis-C, history of polysubstance abuse, systolic CHF, EF 13%, presented with generalized weakness and not feeling well  We are consulted for LAZARO/CKD management        LAZARO POA,?  CKD, patient had episode of severe LAZARO during hospitalization couple months ago in [facility] when he required briefly dialysis  No steady baseline available from last year  Patient was not following with any physicians on regular basis   -during recent hospitalization at [facility] in May 2022, creatinine plateaued around 1 7 to 1 8  He was recently started on lisinopril prior to this admission    -creatinine 2 3 on admission suspect secondary to use of diuretics, component of prerenal, lisinopril, low normal BP reading   -continue to hold lisinopril, hold diuretics for time being   -creatinine slowly improving to 1 8 with gentle IV fluid  Discontinue IV fluid   -monitor off IV fluid and off diuretics today    -renal ultrasound in April 2022 shows normal size kidneys, no hydronephrosis  -BMP in a m    -bladder scan nonsignificant  -UA showed 1+ protein, no RBCs, several hyaline cast      Proteinuria with UPC ratio 1 8 g in April 2022 in the setting of LAZARO   -May suspect in the setting of underlying hypertension, diabetes     -patient was recently started on lisinopril as outpatient, holding for now given LAZARO   -hemoglobin A1c 7 6       Mild hyperkalemia, suspect in the setting of use of lisinopril, LAZARO, dietary component   -overall improving, serum potassium 4 9  -recommended to follow strict low-potassium diet    -holding lisinopril   -continue to closely monitor       Hypertension, blood pressure well controlled, avoid hypotension, goal SBP 130s  Currently holding torsemide, lisinopril for time being    -currently on hydralazine, metoprolol       Systolic CHF   -clinically seems compensated, currently remains on room air    -seems fairly euvolemic currently  Hold torsemide for time being    -recent echo in April 2022 shows EF 30%  -daily weight, accurate intake and output       Hep C infection, will need further follow-up with GI/id       Discussed above plan in detail with primary team      SUBJECTIVE:  Patient seen and examined at bedside   No chest pain, shortness of breath, nausea, vomiting, abdominal pain    OBJECTIVE:  Current Weight: Weight - Scale: 73 3 kg (161 lb 8 oz)  Vitals:    06/18/22 0309   BP: 104/58   Pulse: 63   Resp:    Temp: (!) 97 3 °F (36 3 °C)   SpO2: 98%       Intake/Output Summary (Last 24 hours) at 6/18/2022 0717  Last data filed at 6/18/2022 0101  Gross per 24 hour   Intake 1295 83 ml   Output 750 ml   Net 545 83 ml     Wt Readings from Last 3 Encounters:   06/18/22 73 3 kg (161 lb 8 oz)   06/14/22 69 8 kg (153 lb 12 8 oz)     Temp Readings from Last 3 Encounters:   06/18/22 (!) 97 3 °F (36 3 °C)   06/14/22 (!) 97 2 °F (36 2 °C) (Tympanic)     BP Readings from Last 3 Encounters:   06/18/22 104/58   06/14/22 90/60     Pulse Readings from Last 3 Encounters:   06/18/22 63   06/14/22 67        Physical Examination:  General:  Lying in bed, no acute distress   Eyes:  Mild conjunctival pallor present  ENT:  External examination of ears and nose unremarkable  Neck:  No obvious lymphadenopathy appreciated  Respiratory:  Bilateral air entry present  CVS:  S1, S2 present  GI:  Soft, nondistended  CNS:  Active alert oriented x3  Skin:  No new rash  Musculoskeletal:  No obvious new gross deformity noted    Medications:    Current Facility-Administered Medications:     acetaminophen (TYLENOL) tablet 650 mg, 650 mg, Oral, Q6H PRN, Hetul Cannon, DO    aspirin (ECOTRIN LOW STRENGTH) EC tablet 81 mg, 81 mg, Oral, Daily, Hetul Cannon, DO, 81 mg at 06/17/22 0952    atorvastatin (LIPITOR) tablet 40 mg, 40 mg, Oral, HS, Hetul Cannon, DO, 40 mg at 06/17/22 2133    FLUoxetine (PROzac) capsule 20 mg, 20 mg, Oral, Daily, Hetul Cannon, DO, 20 mg at 51/44/21 4803    folic acid (FOLVITE) tablet 1 mg, 1 mg, Oral, Daily, Hetul Cannon, DO, 1 mg at 06/17/22 2215    heparin (porcine) subcutaneous injection 5,000 Units, 5,000 Units, Subcutaneous, Q8H [address], 5,000 Units at 06/18/22 0512 **AND** [CANCELED] Platelet count, , , Once, Hetul Cannon, DO    hydrALAZINE (APRESOLINE) tablet 10 mg, 10 mg, Oral, Q8H, Marcella Paz, KYLEIGHNP, 10 mg at 06/17/22 1806    insulin glargine (LANTUS) subcutaneous injection 10 Units 0 1 mL, 10 Units, Subcutaneous, HS, Hetul Cannon, DO, 10 Units at 06/17/22 2133    insulin lispro (HumaLOG) 100 units/mL subcutaneous injection 1-5 Units, 1-5 Units, Subcutaneous, TID AC, 1 Units at 06/17/22 1808 **AND** Fingerstick Glucose (POCT), , , TID AC, Hetul Cannon, DO    LORazepam (ATIVAN) tablet 0 5 mg, 0 5 mg, Oral, HS, Hetul Cannon, DO, 0 5 mg at 06/17/22 2133    melatonin tablet 4 5 mg, 4 5 mg, Oral, HS, Hetul Cannon, DO, 4 5 mg at 06/17/22 2133    metoprolol succinate (TOPROL-XL) 24 hr tablet 50 mg, 50 mg, Oral, Daily, Hetul Cannon, DO, 50 mg at 06/17/22 0952    multivitamin-minerals (CENTRUM) tablet 1 tablet, 1 tablet, Oral, Daily, Hetul Cannon, DO, 1 tablet at 06/17/22 0952    ondansetron (ZOFRAN) injection 4 mg, 4 mg, Intravenous, Q6H PRN, Hetul Cannon, DO    sodium chloride 0 9 % infusion, 50 mL/hr, Intravenous, Continuous, Hetul Cannon, DO, Last Rate: 50 mL/hr at 06/17/22 1535, 50 mL/hr at 06/17/22 1535    thiamine tablet 100 mg, 100 mg, Oral, Daily, Hetul Cannon, DO, 100 mg at 06/17/22 4093    Laboratory Results:  Results from last 7 days   Lab Units 06/18/22  0318 06/17/22  1001 06/17/22  0518 06/16/22  1832 06/16/22  1428   WBC Thousand/uL 6 75  --  6 52  --  8 05   HEMOGLOBIN g/dL 10 7* --  9 9*  --  11 1*   HEMATOCRIT % 32 7*  --  30 5*  --  34 8*   PLATELETS Thousands/uL 195  --  169 149 202   SODIUM mmol/L 143 138 141 139 139   POTASSIUM mmol/L 4 9 5 3 4 8 5 6* 5 4*   CHLORIDE mmol/L 118* 114* 115* 113* 113*   CO2 mmol/L 21 22 22 23 20*   BUN mg/dL 43* 46* 49* 52* 50*   CREATININE mg/dL 1 84* 2 12* 2 16* 2 30* 2 31*   CALCIUM mg/dL 8 6 8 5 8 7 8 6 9 0   MAGNESIUM mg/dL  --   --  2 0 2 1  --        CT head without contrast   Final Result by Juwan Munson MD (06/16 5673)      No acute intracranial abnormality  Chronic microangiopathy  Workstation performed: [id number]         XR chest 1 view portable   Final Result by Yen Hillman MD (06/16 9426)      No acute cardiopulmonary disease  Workstation performed: [id number]             Portions of the record may have been created with voice recognition software  Occasional wrong word or "sound a like" substitutions may have occurred due to the inherent limitations of voice recognition software  Read the chart carefully and recognize, using context, where substitutions have occurred

## 2022-06-19 PROBLEM — E87.5 HYPERKALEMIA: Status: RESOLVED | Noted: 2022-06-16 | Resolved: 2022-06-19

## 2022-06-19 LAB
ANION GAP SERPL CALCULATED.3IONS-SCNC: 4 MMOL/L (ref 4–13)
BUN SERPL-MCNC: 36 MG/DL (ref 5–25)
CALCIUM SERPL-MCNC: 8.2 MG/DL (ref 8.3–10.1)
CHLORIDE SERPL-SCNC: 115 MMOL/L (ref 100–108)
CO2 SERPL-SCNC: 23 MMOL/L (ref 21–32)
CREAT SERPL-MCNC: 1.73 MG/DL (ref 0.6–1.3)
GFR SERPL CREATININE-BSD FRML MDRD: 40 ML/MIN/1.73SQ M
GLUCOSE SERPL-MCNC: 124 MG/DL (ref 65–140)
GLUCOSE SERPL-MCNC: 139 MG/DL (ref 65–140)
GLUCOSE SERPL-MCNC: 189 MG/DL (ref 65–140)
GLUCOSE SERPL-MCNC: 252 MG/DL (ref 65–140)
GLUCOSE SERPL-MCNC: 278 MG/DL (ref 65–140)
METHYLMALONATE SERPL-SCNC: 333 NMOL/L (ref 0–378)
POTASSIUM SERPL-SCNC: 4.7 MMOL/L (ref 3.5–5.3)
SODIUM SERPL-SCNC: 142 MMOL/L (ref 136–145)

## 2022-06-19 PROCEDURE — 99232 SBSQ HOSP IP/OBS MODERATE 35: CPT | Performed by: INTERNAL MEDICINE

## 2022-06-19 PROCEDURE — 80048 BASIC METABOLIC PNL TOTAL CA: CPT | Performed by: INTERNAL MEDICINE

## 2022-06-19 PROCEDURE — 99232 SBSQ HOSP IP/OBS MODERATE 35: CPT | Performed by: PHYSICIAN ASSISTANT

## 2022-06-19 PROCEDURE — 82948 REAGENT STRIP/BLOOD GLUCOSE: CPT

## 2022-06-19 RX ADMIN — ASPIRIN 81 MG: 81 TABLET, COATED ORAL at 08:54

## 2022-06-19 RX ADMIN — HYDRALAZINE HYDROCHLORIDE 10 MG: 10 TABLET, FILM COATED ORAL at 12:04

## 2022-06-19 RX ADMIN — THIAMINE HCL TAB 100 MG 100 MG: 100 TAB at 08:54

## 2022-06-19 RX ADMIN — INSULIN LISPRO 2 UNITS: 100 INJECTION, SOLUTION INTRAVENOUS; SUBCUTANEOUS at 17:44

## 2022-06-19 RX ADMIN — Medication 4.5 MG: at 22:23

## 2022-06-19 RX ADMIN — Medication 1 TABLET: at 08:53

## 2022-06-19 RX ADMIN — HEPARIN SODIUM 5000 UNITS: 5000 INJECTION INTRAVENOUS; SUBCUTANEOUS at 05:24

## 2022-06-19 RX ADMIN — HEPARIN SODIUM 5000 UNITS: 5000 INJECTION INTRAVENOUS; SUBCUTANEOUS at 22:23

## 2022-06-19 RX ADMIN — HEPARIN SODIUM 5000 UNITS: 5000 INJECTION INTRAVENOUS; SUBCUTANEOUS at 14:30

## 2022-06-19 RX ADMIN — INSULIN LISPRO 1 UNITS: 100 INJECTION, SOLUTION INTRAVENOUS; SUBCUTANEOUS at 12:04

## 2022-06-19 RX ADMIN — ATORVASTATIN CALCIUM 40 MG: 40 TABLET, FILM COATED ORAL at 22:23

## 2022-06-19 RX ADMIN — INSULIN GLARGINE 10 UNITS: 100 INJECTION, SOLUTION SUBCUTANEOUS at 22:23

## 2022-06-19 RX ADMIN — FOLIC ACID 1 MG: 1 TABLET ORAL at 08:53

## 2022-06-19 RX ADMIN — LORAZEPAM 0.5 MG: 0.5 TABLET ORAL at 22:23

## 2022-06-19 RX ADMIN — HYDRALAZINE HYDROCHLORIDE 10 MG: 10 TABLET, FILM COATED ORAL at 17:45

## 2022-06-19 RX ADMIN — FLUOXETINE 20 MG: 20 CAPSULE ORAL at 08:54

## 2022-06-19 RX ADMIN — METOPROLOL SUCCINATE 50 MG: 50 TABLET, EXTENDED RELEASE ORAL at 08:53

## 2022-06-19 NOTE — PROGRESS NOTES
NEPHROLOGY PROGRESS NOTE   Geovanny Baum 61 y o  male MRN: [id number]  Unit/Bed#: -01 Encounter: [id number]  Reason for Consult: LAZARO    ASSESSMENT AND PLAN:  Patient is 53-year-old male with significant medical issues of bipolar disorder (prior use of lithium many years ago,?  Duration), diabetes for many years, hypertension, hyperlipidemia, hepatitis-C, history of polysubstance abuse, systolic CHF, EF 84%, presented with generalized weakness and not feeling well   We are consulted for LAZARO/CKD management        LAZARO POA,?  CKD, patient had episode of severe LAZARO during hospitalization couple months ago in [facility] when he required briefly dialysis   No steady baseline available from last year  Anabel Lopes was not following with any physicians on regular basis   -during recent hospitalization at [facility] in May 2022, creatinine plateaued around 1 7 to 1 8  Cherelle Jonas was recently started on lisinopril prior to this admission    -creatinine 2 3 on admission suspect secondary to use of diuretics, component of prerenal, lisinopril, low normal BP reading   -continue to hold lisinopril, hold diuretics for time being   -creatinine slowly improving to 1 7 after gentle IV fluid    Now remains off fluids    -renal ultrasound in April 2022 shows normal size kidneys, no hydronephrosis    -BMP in a m    -bladder scan nonsignificant  -UA showed 1+ protein, no RBCs, several hyaline cast      Proteinuria with UPC ratio 1 8 g in April 2022 in the setting of LAZARO   -May suspect in the setting of underlying hypertension, diabetes    -patient was recently started on lisinopril as outpatient, holding for now given LAZARO   -hemoglobin A1c 7 6       Mild hyperkalemia, resolved       Hypertension, blood pressure well controlled, avoid hypotension, goal SBP 130s   Currently holding torsemide, lisinopril for time being    -currently on hydralazine, metoprolol       Systolic CHF   -clinically seems compensated, currently remains on room air    -seems fairly euvolemic currently   Hold torsemide for time being    -recent echo in April 2022 shows EF 30%     -daily weight, accurate intake and output       Hep C infection, will need further follow-up with GI/id       Discussed above plan in detail with primary team      SUBJECTIVE:  Patient seen and examined at bedside    Denies chest pain, shortness of breath, nausea or vomiting    OBJECTIVE:  Current Weight: Weight - Scale: 73 3 kg (161 lb 11 2 oz)  Vitals:    06/19/22 0600   BP:    Pulse: 72   Resp:    Temp:    SpO2:        Intake/Output Summary (Last 24 hours) at 6/19/2022 0658  Last data filed at 6/19/2022 0001  Gross per 24 hour   Intake 1000 ml   Output 1050 ml   Net -50 ml     Wt Readings from Last 3 Encounters:   06/19/22 73 3 kg (161 lb 11 2 oz)   06/14/22 69 8 kg (153 lb 12 8 oz)     Temp Readings from Last 3 Encounters:   06/19/22 98 4 °F (36 9 °C)   06/14/22 (!) 97 2 °F (36 2 °C) (Tympanic)     BP Readings from Last 3 Encounters:   06/19/22 104/63   06/14/22 90/60     Pulse Readings from Last 3 Encounters:   06/19/22 72   06/14/22 67        Physical Examination:  General:  Lying in bed, no acute distress   Eyes:  Mild conjunctival pallor present  ENT:  External examination of ears and nose unremarkable  Neck:  No obvious lymphadenopathy appreciated  Respiratory:  Bilateral air entry present  CVS:  S1, S2 present  GI:  Soft, nondistended  CNS:  Active alert oriented x3  Skin:  No new rash  Musculoskeletal:  No obvious new gross deformity noted    Medications:    Current Facility-Administered Medications:     acetaminophen (TYLENOL) tablet 650 mg, 650 mg, Oral, Q6H PRN, Hetul Cannon, DO    aspirin (ECOTRIN LOW STRENGTH) EC tablet 81 mg, 81 mg, Oral, Daily, Hetul Cannon, DO, 81 mg at 06/18/22 0829    atorvastatin (LIPITOR) tablet 40 mg, 40 mg, Oral, HS, Hetul Cannon, DO, 40 mg at 06/18/22 2228    FLUoxetine (PROzac) capsule 20 mg, 20 mg, Oral, Daily, Hetul Cannon, DO, 20 mg at 12/94/63 2710    folic acid (FOLVITE) tablet 1 mg, 1 mg, Oral, Daily, Hetul Cannon, DO, 1 mg at 06/18/22 0830    heparin (porcine) subcutaneous injection 5,000 Units, 5,000 Units, Subcutaneous, Q8H [facility], 5,000 Units at 06/19/22 0524 **AND** [CANCELED] Platelet count, , , Once, Hetul Cannon, DO    hydrALAZINE (APRESOLINE) tablet 10 mg, 10 mg, Oral, Q8H, Jeevan Land, CRNP, 10 mg at 06/18/22 1748    insulin glargine (LANTUS) subcutaneous injection 10 Units 0 1 mL, 10 Units, Subcutaneous, HS, Hetul Cannon, DO, 10 Units at 06/18/22 2228    insulin lispro (HumaLOG) 100 units/mL subcutaneous injection 1-5 Units, 1-5 Units, Subcutaneous, TID AC, 2 Units at 06/18/22 1234 **AND** Fingerstick Glucose (POCT), , , TID AC, Hetul Cannon, DO    LORazepam (ATIVAN) tablet 0 5 mg, 0 5 mg, Oral, HS, Hetul Cannon, DO, 0 5 mg at 06/18/22 2209    melatonin tablet 4 5 mg, 4 5 mg, Oral, HS, Hetul Cannon, DO, 4 5 mg at 06/18/22 2217    metoprolol succinate (TOPROL-XL) 24 hr tablet 50 mg, 50 mg, Oral, Daily, Hetul Cannon, DO, 50 mg at 06/18/22 0830    multivitamin-minerals (CENTRUM) tablet 1 tablet, 1 tablet, Oral, Daily, Hetul Cannon, DO, 1 tablet at 06/18/22 0829    ondansetron (ZOFRAN) injection 4 mg, 4 mg, Intravenous, Q6H PRN, Hetul Cannon, DO    thiamine tablet 100 mg, 100 mg, Oral, Daily, Hetul Cannon, DO, 100 mg at 06/18/22 0830    Laboratory Results:  Results from last 7 days   Lab Units 06/19/22  0431 06/18/22  0318 06/17/22  1001 06/17/22  0518 06/16/22  1832 06/16/22  1428   WBC Thousand/uL  --  6 75  --  6 52  --  8 05   HEMOGLOBIN g/dL  --  10 7*  --  9 9*  --  11 1*   HEMATOCRIT %  --  32 7*  --  30 5*  --  34 8*   PLATELETS Thousands/uL  --  195  --  169 149 202   SODIUM mmol/L 142 143 138 141 139 139   POTASSIUM mmol/L 4 7 4 9 5 3 4 8 5 6* 5 4*   CHLORIDE mmol/L 115* 118* 114* 115* 113* 113*   CO2 mmol/L 23 21 22 22 23 20*   BUN mg/dL 36* 43* 46* 49* 52* 50*   CREATININE mg/dL 1 73* 1 84* 2 12* 2 16* 2 30* 2 31*   CALCIUM mg/dL 8 2* 8 6 8 5 8 7 8 6 9 0 MAGNESIUM mg/dL  --   --   --  2 0 2 1  --        CT head without contrast   Final Result by Elba Walls MD (06/16 7351)      No acute intracranial abnormality  Chronic microangiopathy  Workstation performed: [id number]         XR chest 1 view portable   Final Result by Sidney Myers MD (06/16 5881)      No acute cardiopulmonary disease  Workstation performed: [id number]             Portions of the record may have been created with voice recognition software  Occasional wrong word or "sound a like" substitutions may have occurred due to the inherent limitations of voice recognition software  Read the chart carefully and recognize, using context, where substitutions have occurred

## 2022-06-19 NOTE — ASSESSMENT & PLAN NOTE
Lab Results   Component Value Date    HGBA1C 7 6 (H) 06/16/2022       Recent Labs     06/18/22  1138 06/18/22  1721 06/18/22 2038 06/19/22  0555   POCGLU 233* 127 221* 124       Blood Sugar Average: Last 72 hrs:  (P) 161 [id number]     · C/w lantus 10 units at HS and sliding scale insulin  · Hold oral hypoglycemics- farxiga and metformin- consider d/c metformin and farxiga at discharge given underlying renal disease     · Monitor accuchecks   · Hypoglycemia protocol

## 2022-06-19 NOTE — ASSESSMENT & PLAN NOTE
· Patient clinically dry  · Hold medications as above  · ECHO 4/22- Severely reduced LV systolic function with estimated LVEF 30%   Grade II diastolic dysfunction  · Patient was discharged home from [facility] after hospitalization in April with a lifevest- recommend continued tele monitoring while hospitalized

## 2022-06-19 NOTE — ASSESSMENT & PLAN NOTE
Lab Results   Component Value Date    HGB 10 7 (L) 06/18/2022    HGB 9 9 (L) 06/17/2022    HGB 11 1 (L) 06/16/2022   · hgb stable  · monitor

## 2022-06-19 NOTE — ASSESSMENT & PLAN NOTE
· Patient has reported history of bipolar disorder with prior history of incarceration and polysubstance abuse to alcohol tobacco, cocaine and a remote history of IV drug use  He also has a reported history of hepatitis-C for which he is scheduled to be followed  with Infectious Disease as an outpatient with [facility]    · He has multiple recent hospitalizations as well    · CIWA protocol ordered   · UDS negative  · C/w thiamine and MVI

## 2022-06-19 NOTE — PROGRESS NOTES
[facility]  Progress Note - Maria G Ivy 1955, 61 y o  male MRN: [id number]  Unit/Bed#: -Foster Encounter: [id number]  Primary Care Provider: No primary care provider on file  Date and time admitted to hospital: 6/16/2022  1:56 PM    * Weakness  Assessment & Plan  · Appears multifactorial   Potential etiologies include acute renal failure/polypharmacy/deconditioning  · CT head negative for acute abnormality   · Covid/flu/rsv negative   · UDS negative  · Hold lisinopril and demadex  · PT/OT recommending rehab  · Monitor blood sugar  · Monitor Neurochecks  · Ammonia level mildly elevated at 37    LAZARO (acute kidney injury) [facility])  Assessment & Plan  Lab Results   Component Value Date    CREATININE 1 73 (H) 06/19/2022    CREATININE 1 84 (H) 06/18/2022    CREATININE 2 12 (H) 06/17/2022   · LAZARO POA  · Patient had episode of severe LAZARO during hospitalization couple months ago in [facility] when he required dialysis at that time  No steady baseline available from last year  · Most recent baseline creatinine between 1 7-1 9  · Avoid renal toxic agents: continue with holding lisinopril and holding diuretics   · Monitor off IVF now per Nephrology  · Nephrology consult appreciated   · Bladder scan- urine retention protocol     Uncontrolled type 2 diabetes mellitus with hyperglycemia [facility])  Assessment & Plan  Lab Results   Component Value Date    HGBA1C 7 6 (H) 06/16/2022       Recent Labs     06/18/22  1138 06/18/22  1721 06/18/22  2038 06/19/22  0555   POCGLU 233* 127 221* 124       Blood Sugar Average: Last 72 hrs:  (P) 996 [id number]     · C/w lantus 10 units at HS and sliding scale insulin  · Hold oral hypoglycemics- farxiga and metformin- consider d/c metformin and farxiga at discharge given underlying renal disease     · Monitor accuchecks   · Hypoglycemia protocol      Polysubstance abuse [facility])  Assessment & Plan  · Patient has reported history of bipolar disorder with prior history of incarceration and polysubstance abuse to alcohol tobacco, cocaine and a remote history of IV drug use  He also has a reported history of hepatitis-C for which he is scheduled to be followed  with Infectious Disease as an outpatient with [facility]    · He has multiple recent hospitalizations as well  · CIWA protocol ordered   · UDS negative  · C/w thiamine and MVI     Congestive heart failure (CHF) ([address] )  Assessment & Plan  · Patient is followed as an outpatient  Patient recently was seen back in May  · Demadex 10 mg daily, and lisinopril 2 5 daily on hold given LAZARO   · ECHO 4/22 from [facility]- Severely reduced LV systolic function with estimated LVEF 30%  Grade II diastolic dysfunction      Cardiomyopathy ([address] )  Assessment & Plan  · Patient clinically dry  · Hold medications as above  · ECHO 4/22- Severely reduced LV systolic function with estimated LVEF 30%  Grade II diastolic dysfunction  · Patient was discharged home from [facility] after hospitalization in April with a lifevest- recommend continued tele monitoring while hospitalized     Hyperkalemia-resolved as of 6/19/2022  Assessment & Plan  · Now resolved  · C/w holding lisinopril     Mixed hyperlipidemia  Assessment & Plan  · C/w Lipitor    Normocytic anemia  Assessment & Plan  Lab Results   Component Value Date    HGB 10 7 (L) 06/18/2022    HGB 9 9 (L) 06/17/2022    HGB 11 1 (L) 06/16/2022   · hgb stable  · monitor    Bipolar affective disorder, mixed, moderate (HCC)  Assessment & Plan  · Continue with outpatient meds  · Patient currently on Ativan and Prozac        VTE Pharmacologic Prophylaxis: VTE Score: 4 Moderate Risk (Score 3-4) - Pharmacological DVT Prophylaxis Ordered: heparin  Patient Centered Rounds: I performed bedside rounds with nursing staff today   d/w JANE Aguirre   Discussions with Specialists or Other Care Team Provider:     Education and Discussions with Family / Patient: Patient declined call to   Time Spent for Care: 20 minutes  More than 50% of total time spent on counseling and coordination of care as described above  Current Length of Stay: 3 day(s)  Current Patient Status: Inpatient   Certification Statement: The patient will continue to require additional inpatient hospital stay due to pending rehab  Discharge Plan: Anticipate discharge tomorrow to rehab facility  Code Status: Level 1 - Full Code    Subjective:   Mr Angie Pepe denies CP, SOB, abdominal pain  He denies complaint  He is agreeable to rehab    Objective:     Vitals:   Temp (24hrs), Av °F (36 7 °C), Min:97 7 °F (36 5 °C), Max:98 4 °F (36 9 °C)    Temp:  [97 7 °F (36 5 °C)-98 4 °F (36 9 °C)] 97 7 °F (36 5 °C)  HR:  [65-78] 65  Resp:  [15-18] 18  BP: (104-151)/(63-93) 127/77  SpO2:  [98 %-99 %] 99 %  Body mass index is 26 91 kg/m²  Input and Output Summary (last 24 hours): Intake/Output Summary (Last 24 hours) at 2022 1044  Last data filed at 2022 0001  Gross per 24 hour   Intake 880 ml   Output 1050 ml   Net -170 ml       Physical Exam:   Physical Exam  Vitals and nursing note reviewed  Constitutional:       General: He is not in acute distress  Comments: Patient seen sitting in bed comfortably resting   Cardiovascular:      Rate and Rhythm: Normal rate and regular rhythm  Pulmonary:      Effort: Pulmonary effort is normal  No respiratory distress  Breath sounds: Normal breath sounds  Abdominal:      General: Bowel sounds are normal       Palpations: Abdomen is soft  Tenderness: There is no abdominal tenderness  Musculoskeletal:      Right lower leg: No edema  Left lower leg: No edema  Skin:     General: Skin is warm  Neurological:      Mental Status: He is alert and oriented to person, place, and time     Psychiatric:         Mood and Affect: Mood normal          Behavior: Behavior normal          Additional Data:     Labs:  Results from last 7 days   Lab Units 06/18/22  0318 06/17/22  0518   WBC Thousand/uL 6 75 6 52   HEMOGLOBIN g/dL 10 7* 9 9*   HEMATOCRIT % 32 7* 30 5*   PLATELETS Thousands/uL 195 169   NEUTROS PCT %  --  35*   LYMPHS PCT %  --  47*   MONOS PCT %  --  9   EOS PCT %  --  7*     Results from last 7 days   Lab Units 06/19/22  0431 06/18/22  0318 06/17/22  1001   SODIUM mmol/L 142   < > 138   POTASSIUM mmol/L 4 7   < > 5 3   CHLORIDE mmol/L 115*   < > 114*   CO2 mmol/L 23   < > 22   BUN mg/dL 36*   < > 46*   CREATININE mg/dL 1 73*   < > 2 12*   ANION GAP mmol/L 4   < > 2*   CALCIUM mg/dL 8 2*   < > 8 5   ALBUMIN g/dL  --   --  2 5*   TOTAL BILIRUBIN mg/dL  --   --  0 20   ALK PHOS U/L  --   --  113   ALT U/L  --   --  55   AST U/L  --   --  28   GLUCOSE RANDOM mg/dL 139   < > 246*    < > = values in this interval not displayed  Results from last 7 days   Lab Units 06/16/22  1832   INR  1 01     Results from last 7 days   Lab Units 06/19/22  0555 06/18/22  2038 06/18/22  1721 06/18/22  1138 06/18/22  0813 06/18/22  0655 06/17/22  2055 06/17/22  1613 06/17/22  1127 06/17/22  0517 06/16/22  2046   POC GLUCOSE mg/dl 124 221* 127 233* 99 76 179* 193* 252* 121 205*     Results from last 7 days   Lab Units 06/16/22  1832   HEMOGLOBIN A1C % 7 6*           Lines/Drains:  Invasive Devices  Report    Peripheral Intravenous Line  Duration           Peripheral IV 06/16/22 Right Wrist 2 days                  Telemetry:  Telemetry Orders (From admission, onward)             LifeVest Patient: Continuous Telemetry Monitoring during hospitalization (non-expiring)  Continuous LifeVest Telemetry Monitoring        References:    LifeVest Policy                 Telemetry Reviewed: currently NSR  Indication for Continued Telemetry Use: Lifevest (remains on tele entire hospital stay)             Imaging: No pertinent imaging reviewed      Recent Cultures (last 7 days):         Last 24 Hours Medication List:   Current Facility-Administered Medications   Medication Dose Route Frequency Provider Last Rate    acetaminophen  650 mg Oral Q6H PRN Hetul Cannon, DO      aspirin  81 mg Oral Daily Hetul Cannon, DO      atorvastatin  40 mg Oral HS Hetul Cannon, DO      FLUoxetine  20 mg Oral Daily Hetul Cannon, DO      folic acid  1 mg Oral Daily Hetul Cannon, DO      heparin (porcine)  5,000 Units Subcutaneous Q8H [address] Hetul Cannon, DO      hydrALAZINE  10 mg Oral Q8H Roselee Goodell, CRNP      insulin glargine  10 Units Subcutaneous HS Hetul Cannon, DO      insulin lispro  1-5 Units Subcutaneous TID AC Hetul Cannon, DO      LORazepam  0 5 mg Oral HS Hetul Cannon, DO      melatonin  4 5 mg Oral HS Hetul Cannon, DO      metoprolol succinate  50 mg Oral Daily Hetul Cannon, DO      multivitamin-minerals  1 tablet Oral Daily Hetul Cannon, DO      ondansetron  4 mg Intravenous Q6H PRN Hetul Cannon, DO      thiamine  100 mg Oral Daily Hetul Cannon, DO          Today, Patient Was Seen By: Izabela Villareal PA-C    **Please Note: This note may have been constructed using a voice recognition system  **

## 2022-06-19 NOTE — ASSESSMENT & PLAN NOTE
Lab Results   Component Value Date    CREATININE 1 73 (H) 06/19/2022    CREATININE 1 84 (H) 06/18/2022    CREATININE 2 12 (H) 06/17/2022   · LAZARO POA  · Patient had episode of severe LAZARO during hospitalization couple months ago in [facility] when he required dialysis at that time  No steady baseline available from last year  · Most recent baseline creatinine between 1 7-1 9    · Avoid renal toxic agents: continue with holding lisinopril and holding diuretics   · Monitor off IVF now per Nephrology  · Nephrology consult appreciated   · Bladder scan- urine retention protocol

## 2022-06-20 ENCOUNTER — TELEPHONE (OUTPATIENT)
Dept: NEPHROLOGY | Facility: CLINIC | Age: 67
End: 2022-06-20

## 2022-06-20 DIAGNOSIS — N17.9 AKI (ACUTE KIDNEY INJURY) (HCC): ICD-10-CM

## 2022-06-20 DIAGNOSIS — I10 ESSENTIAL HYPERTENSION: Primary | ICD-10-CM

## 2022-06-20 LAB
ANION GAP SERPL CALCULATED.3IONS-SCNC: 3 MMOL/L (ref 4–13)
BUN SERPL-MCNC: 30 MG/DL (ref 5–25)
CALCIUM SERPL-MCNC: 8.3 MG/DL (ref 8.3–10.1)
CHLORIDE SERPL-SCNC: 113 MMOL/L (ref 100–108)
CO2 SERPL-SCNC: 21 MMOL/L (ref 21–32)
CREAT SERPL-MCNC: 1.8 MG/DL (ref 0.6–1.3)
GFR SERPL CREATININE-BSD FRML MDRD: 38 ML/MIN/1.73SQ M
GLUCOSE SERPL-MCNC: 135 MG/DL (ref 65–140)
GLUCOSE SERPL-MCNC: 163 MG/DL (ref 65–140)
GLUCOSE SERPL-MCNC: 220 MG/DL (ref 65–140)
GLUCOSE SERPL-MCNC: 221 MG/DL (ref 65–140)
GLUCOSE SERPL-MCNC: 265 MG/DL (ref 65–140)
POTASSIUM SERPL-SCNC: 4.7 MMOL/L (ref 3.5–5.3)
SODIUM SERPL-SCNC: 137 MMOL/L (ref 136–145)

## 2022-06-20 PROCEDURE — 82948 REAGENT STRIP/BLOOD GLUCOSE: CPT

## 2022-06-20 PROCEDURE — 99233 SBSQ HOSP IP/OBS HIGH 50: CPT | Performed by: INTERNAL MEDICINE

## 2022-06-20 PROCEDURE — 80048 BASIC METABOLIC PNL TOTAL CA: CPT | Performed by: INTERNAL MEDICINE

## 2022-06-20 PROCEDURE — 99232 SBSQ HOSP IP/OBS MODERATE 35: CPT | Performed by: PHYSICIAN ASSISTANT

## 2022-06-20 PROCEDURE — 97116 GAIT TRAINING THERAPY: CPT

## 2022-06-20 PROCEDURE — 97166 OT EVAL MOD COMPLEX 45 MIN: CPT

## 2022-06-20 PROCEDURE — 97112 NEUROMUSCULAR REEDUCATION: CPT

## 2022-06-20 RX ORDER — TORSEMIDE 20 MG/1
10 TABLET ORAL DAILY
Status: DISCONTINUED | OUTPATIENT
Start: 2022-06-20 | End: 2022-06-21 | Stop reason: HOSPADM

## 2022-06-20 RX ADMIN — METOPROLOL SUCCINATE 50 MG: 50 TABLET, EXTENDED RELEASE ORAL at 08:51

## 2022-06-20 RX ADMIN — Medication 4.5 MG: at 21:45

## 2022-06-20 RX ADMIN — INSULIN GLARGINE 10 UNITS: 100 INJECTION, SOLUTION SUBCUTANEOUS at 21:44

## 2022-06-20 RX ADMIN — HEPARIN SODIUM 5000 UNITS: 5000 INJECTION INTRAVENOUS; SUBCUTANEOUS at 21:46

## 2022-06-20 RX ADMIN — ASPIRIN 81 MG: 81 TABLET, COATED ORAL at 08:51

## 2022-06-20 RX ADMIN — INSULIN LISPRO 2 UNITS: 100 INJECTION, SOLUTION INTRAVENOUS; SUBCUTANEOUS at 12:18

## 2022-06-20 RX ADMIN — FLUOXETINE 20 MG: 20 CAPSULE ORAL at 08:51

## 2022-06-20 RX ADMIN — HEPARIN SODIUM 5000 UNITS: 5000 INJECTION INTRAVENOUS; SUBCUTANEOUS at 15:17

## 2022-06-20 RX ADMIN — INSULIN LISPRO 2 UNITS: 100 INJECTION, SOLUTION INTRAVENOUS; SUBCUTANEOUS at 17:39

## 2022-06-20 RX ADMIN — HEPARIN SODIUM 5000 UNITS: 5000 INJECTION INTRAVENOUS; SUBCUTANEOUS at 04:59

## 2022-06-20 RX ADMIN — Medication 1 TABLET: at 08:51

## 2022-06-20 RX ADMIN — THIAMINE HCL TAB 100 MG 100 MG: 100 TAB at 08:51

## 2022-06-20 RX ADMIN — HYDRALAZINE HYDROCHLORIDE 10 MG: 10 TABLET, FILM COATED ORAL at 12:17

## 2022-06-20 RX ADMIN — LORAZEPAM 0.5 MG: 0.5 TABLET ORAL at 21:46

## 2022-06-20 RX ADMIN — ATORVASTATIN CALCIUM 40 MG: 40 TABLET, FILM COATED ORAL at 21:46

## 2022-06-20 RX ADMIN — TORSEMIDE 10 MG: 20 TABLET ORAL at 12:17

## 2022-06-20 RX ADMIN — FOLIC ACID 1 MG: 1 TABLET ORAL at 08:51

## 2022-06-20 RX ADMIN — HYDRALAZINE HYDROCHLORIDE 10 MG: 10 TABLET, FILM COATED ORAL at 19:56

## 2022-06-20 RX ADMIN — HYDRALAZINE HYDROCHLORIDE 10 MG: 10 TABLET, FILM COATED ORAL at 04:58

## 2022-06-20 NOTE — CASE MANAGEMENT
Case Management Discharge Planning Note    Patient name Marie Kidney  Location /-01 MRN [id number]  : 1955 Date 2022       Current Admission Date: 2022  Current Admission Diagnosis:Weakness   Patient Active Problem List    Diagnosis Date Noted    Weakness 2022    Cardiomyopathy ([facility] ) 2022    Polysubstance abuse ([place] [address] ) 2022    Uncontrolled type 2 diabetes mellitus with hyperglycemia ([place] [address] ) 2022    Mixed hyperlipidemia 2022    Stage 3b chronic kidney disease ([place] [address] ) 2022    Congestive heart failure (CHF) ([place] [address] ) 2022    Acute hepatitis C virus infection 2022    Abnormal EKG 2022    LAZARO (acute kidney injury) ([place] [address] ) 2022    Normocytic anemia 2022    Essential hypertension 2015    Type II or unspecified type diabetes mellitus without mention of complication, uncontrolled 2012    Bipolar affective disorder, mixed, moderate ([place] [address] ) 2011      LOS (days): 4  Geometric Mean LOS (GMLOS) (days): 4 30  Days to GMLOS:0 5     OBJECTIVE:  Risk of Unplanned Readmission Score: 21 88         Current admission status: Inpatient   Preferred Pharmacy:   [facility] , [address] [id number]  Phone: [phone] Fax: [phone]    Primary Care Provider: No primary care provider on file  Primary Insurance: [employer] [facility] REP  Secondary Insurance:     DISCHARGE DETAILS:                                          Other Referral/Resources/Interventions Provided:  Referral Comments: CM spoke with patient's brother regarding discharge plan  Patient's brother stated he does not mind how far the rehab is  CM informed him that [address] [place] [address], [place], and [facility] are currently reviewing  CM spoke with Sherlyn Meza PA-C who stated patient may be stable for discharge today   Facilities updated via [facility], message sent requesting if they are able to accept

## 2022-06-20 NOTE — TELEPHONE ENCOUNTER
----- Message from Brennon Chaparro MD sent at 6/20/2022 10:40 AM EDT -----  Regarding: canelo  Please schedule the patient for hospital discharge follow-up for acute kidney injury to be seen in 2-3 weeks  Please send the patient orders for renal function panel in 2 weeks prior to the visit  Patient usually follows up with no nephrologist as an outpatient      Saqib colón

## 2022-06-20 NOTE — ASSESSMENT & PLAN NOTE
· Patient clinically dry  · Hold medications as above  · ECHO 4/22- Severely reduced LV systolic function with estimated LVEF 30%   Grade II diastolic dysfunction  · Patient was discharged home from [facility] after hospitalization in April with a lifevest- recommend continued tele monitoring while hospitalized   · Brother brought lifevest in

## 2022-06-20 NOTE — DISCHARGE INSTRUCTIONS
Acute Kidney Injury (LAZARO)  You have been diagnosed with Acute Kidney Injury (LAZARO)  The following information has been developed to provide you with information about LAZARO and treatment  What is Acute Kidney Injury (LAZARO)? LAZARO occurs when kidney function decreases over a short period of time  This condition causes a buildup of waste products in the blood and can cause fluid to build up causing swelling in the legs and shortness of breath  Sometimes called Acute Kidney Failure or Acute Renal Failure, LAZARO is often reversible if it is found and treated quickly  How do you know if you have LAZARO? LAZARO is diagnosed by assessing kidney function  This is done by obtaining a blood test to measure the blood level of creatinine  Decreased urine output can sometimes also indicate LAZARO  Who is at risk for LAZARO? LAZARO can happen to anyone but usually happens to people who are already sick and may be in the hospital  People are at higher risk for LAZARO if they have any of the following:  age 72 years or older  high or low blood pressure  underlying kidney disease (e g , Chronic Kidney Disease (CKD)  peripheral vascular disease (hardening of arteries)  chronic diseases such as liver disease, heart disease and diabetes  a single kidney    What are the symptoms of LAZARO? You may or may not have the symptoms to suggest you have kidney injury until the LAZARO has progressed  Some of the symptoms are listed below:  Not making enough urine  Increased swelling in legs   Feeling tired  Trouble breathing or shortness of breath  Nausea  New or worsening confusion    What causes LAZARO?   The causes are divided into three categories:  Not enough blood flowing to the kidneys (e g , low blood pressure, bleeding, diarrhea, dehydration)  Injury directly to the kidneys (e g , blood clots, severe infections such as sepsis, medicine toxicity, IV contrast dye used for cardiac catheterization or CT scans)  Blockage to the tubes (ureters) that drain the urine from the kidneys (e g , enlarged prostate, kidney stones, blood clots)    What is the treatment for LAZARO? The treatment for LAZARO depends on correcting what caused it  Treatment usually involves removing the cause and measures to prevent further injury to the kidneys  This may require the use of intravenous fluids or medications and/or temporary dialysis  Dialysis is a process using a machine that does the job of the kidneys to remove waste and help correct the electrolyte and fluid balance while the kidneys are recovering  If dialysis is needed to treat LAZARO, the doctor will assess daily to see if the kidneys are showing signs of recovery  The daily assessments determine how long dialysis needs to continue  Depending on the cause and the extent of damage, an episode of LAZARO may resolve in a few days to several weeks to several months  What are the long term effects of having an episode of LAZARO? People who have one episode of LAZARO are at an increased risk of having another episode of LAZARO as well as other health problems such as kidney disease, stroke, and heart disease  In a small number of people who had unrecognized kidney disease, an LAZARO episode may result in Chronic Kidney Disease (CKD) which requires lifelong monitoring and treatment  How do you prevent future episodes of LAZARO? Make sure to follow up with the kidney doctor after hospital discharge and obtain blood work to reassess and monitor kidney function  If you have diabetes, keep your blood sugar in goal range and keep appointments with your diabetes specialist    If you have high blood pressure, have your blood pressure checked regularly to make sure it is in target range  If you take blood pressure medicine called ACEIs or ARBs (e g , Lisinopril, Enalapril, Diovan, Losartan), your doctor may tell you to skip a dose or two if you have severe dehydration and your blood pressure is running low    Avoid using medicines such as NSAIDs (Nonsteroidal Anti-inflammatory Drugs) and Lassiter-2 Inhibitors (a type of NSAID) that may be harmful to kidney function  These may include the medicines listed in the table that follows  Examples of NSAIDS and Lassiter-2 Inhibitors   Talk to your doctor or healthcare provider before stopping any medicine ordered for you  Celecoxib (CELEBREX) Ketoprofen (ORUDIS, ORUVAIL)   Diclofenac (VOLTAREN, CATAFLAM) Ketorolac (TORADOL)   Diflunisal (DOLOBID) Meloxicam (MOBIC)   Etodolac (LODINE) Nabumetone (RELAFEN)   Fenoprofen (NALFON) Naproxen (ALEVE, NAPROSYN,    NAPRELAN, ANAPROX)   Flurbiprofen (ANSAID) Oxaprozin (DAYPRO)   Ibuprofen (MOTRIN, ADVIL) Piroxicam (FELDENE)   Indomethacin (INDOCIN) Sulindac (CLINORIL)    Tolmetin (Kennth Jessika)     Is there a special diet for people with LAZARO? People with LAZARO and/or other kidney disease often have high potassium and phosphorus levels in their blood  To protect the kidneys from further injury and to avoid complications, most doctors recommend following a healthy diet choosing foods low potassium and low phosphorus  Limiting dietary potassium to 2 5 grams/day and phosphorus to 800 milligrams/day is recommended  A dietitian can help you with learning more about this type of diet  The tables on the following page may help you to choose lower potassium and phosphorus foods  The following websites are also good sources of information:  Iggy at  org/nutrition/Kidney-Disease-Stages-1-4   [employer]   https://www niddk nih gov/health-information/kidney-disease/chronic-kidney-disease-ckd/eating-nutrition  https://[facility] org/health/articles/15641-renal-diet-basics  [employer] com cy    If you have any questions or concerns about your condition, please contact your doctor or healthcare provider    These tables may help you to choose lower potassium and phosphorus foods    AVOID these higher phosphorus* foods: CHOOSE these lower phosphorus* foods:   Milk, pudding , yogurt made from animals and from many soy varieties Rice milk (unfortified), nondairy creamer   Hard cheeses, ricotta, cottage cheese, fat-free cream cheese Regular and low-fat cream cheese   Ice cream, frozen yogurt Sherbet, frozen fruit pops, sorbet   Soups made with milk, dried peas, beans, lentils or other high phosphorus ingredients Soups made with broth, are water-based, or other lower phosphorus ingredients   Whole grains, including whole-grain breads, crackers, cereal, rice and pasta, corn tortillas Refined grains, including white bread, crackers, cereals, rice and pasta   Quick breads, biscuits, cornbread, muffins, pancakes, waffles, granola, wheat germ Homemade refined (white) dinner rolls, bagels, English muffins, sugar cookies, shortbread cookies, rajwinder food cake   Dried peas (split, black-eyed), beans (black, garbanzo, lima, kidney, navy, soares), lentils Green peas (canned, frozen), green beans, wax beans   Organ meats, walleye, [place], sardines Lean beef, pork, lamb, poultry, other fish   Nuts and seeds Popcorn   Peanut butter, other nut butters; tofu, veggie or soy burgers Jam, jelly, honey   Chocolate, including chocolate drinks Carob (chocolate-flavored) candy, hard candy,  gumdrops   Leigh, pepper-type sodas, flavored swan, bottled teas, beer Lemon-lime soda, ginger ale or root beer, plain water, cream soda, grape soda   *Always read labels and avoid foods with ingredients containing "phos"  AVOID these higher potassium foods: CHOOSE these lower potassium foods:      Milk (fat free, low fat, whole, buttermilk, Soy), yogurt    Regular and low-fat cream cheese      Beans (white, [place]), [place] sprouts, spinach Swiss       chard, broccoli, avocado, artichoke, potatoes, sweet      potatoes, tomatoes/tomato sauce, beet greens      Green beans, alfalfa sprouts, bamboo shoots (canned), cabbage, carrots, cauliflower, corn, cucumber, eggplant,      endive, lettuce, mushrooms, onions, radishes,  watercress,       water chestnuts (canned), rice, peas      Halibut, tuna, cod, snapper, tuna fish, turkey    Egg, lean beef, pork, lamb, shellfish, chicken       Banana, papaya, orange, cantaloupe, dates, raisins and      other dried fruit, pomegranate, avocado      Apple, applesauce, blackberries, raspberries, pears,     watermelon, cucumbers, blueberries, cranberries,       peaches      Almonds, peanuts, hazelnuts, [place], cashew, mixed,      seeds (sunflower, pumpkin)     Homemade refined (white) dinner rolls, bagels,      English muffins, flour tortilla, crackers, ree      crackers, popcorn, pretzels, spaghetti or macaroni,       hummus      Tomato or vegetable juice, prune juice    Papaya, abby, or pear nectar, cranberry juice cocktail      Molasses

## 2022-06-20 NOTE — ASSESSMENT & PLAN NOTE
Lab Results   Component Value Date    CREATININE 1 80 (H) 06/20/2022    CREATININE 1 73 (H) 06/19/2022    CREATININE 1 84 (H) 06/18/2022   · LAZARO POA  · Patient had episode of severe LAZARO during hospitalization couple months ago in [facility] when he required dialysis at that time  No steady baseline available from last year  · Most recent baseline creatinine between 1 7-1 9    · Avoid renal toxic agents: continue with holding lisinopril and holding diuretics   · Monitor off IVF now per Nephrology  · Nephrology consult appreciated   · Bladder scan- urine retention protocol

## 2022-06-20 NOTE — ASSESSMENT & PLAN NOTE
Lab Results   Component Value Date    HGBA1C 7 6 (H) 06/16/2022       Recent Labs     06/19/22  1137 06/19/22  1728 06/19/22 2054 06/20/22  0545   POCGLU 189* 252* 278* 135       Blood Sugar Average: Last 72 hrs:  (P) 177 [id number]     · C/w lantus 10 units at HS and sliding scale insulin  · Hold oral hypoglycemics- farxiga and metformin- consider d/c metformin and farxiga at discharge given underlying renal disease     · Monitor accuchecks   · Hypoglycemia protocol

## 2022-06-20 NOTE — PROGRESS NOTES
Follow up Consultation    Nephrology   Mily Alarcon 61 y o  male MRN: [id number]  Unit/Bed#: -01 Encounter: [id number]      Physician Requesting Consult: Suzanne Chavez MD        ASSESSMENT/PLAN:  82-year-old male with past medical history of bipolar disorder, diabetes, hypertension, hyperlipidemia, hep C, polysubstance abuse history, CHF with EF of 30% admitted with weakness  Nephrology consulted for evaluation management of abnormal renal parameters  Acute kidney injury (POA) on likely underlying CKD: LAZARO multifactorial most likely secondary to recent episode of acute kidney injury needing dialysis briefly plus cardiorenal syndrome plus failure to auto regulate presence of lisinopril plus some component of prerenal azotemia  After review of records In [facility] as well as Care everywhere patient had a baseline creatinine of 0 9 mg/dL in 2015 subsequently no other labs until hospitalization in April 2022 at [facility] , and needed hospitalization in [facility] at which time patient needed dialysis  Peak creatinine during that hospitalization was around 2 14 mg/dL on 05/08/2022  Patient was discharged with a creatinine of 1 74 mg/dL  patient was admitted with a creatinine of 2 31 mg/dL on 06/16/2022  patient's creatinine today is at 1 80 mg/dL, remains stable  Patient is status post recent hospitalization at [facility] in May 2022 at which time he required brief dialysis for acute kidney injury  At this time appears to have a new baseline creatinine around 1 7-1 9 mg/dL  CT chest abdomen pelvis from 05/02/2022 no hydronephrosis  2 8 cm cortical cyst on the right  Maintain stable without diuretics at this time, okay to start on torsemide 10 mg p o  Q day upon discharge  check BMP in a m  Await renal recovery  Optimize hemodynamic status to avoid delay in renal recovery  Place on a renal diet when allowed diet order     Avoid nephrotoxins, adjust meds to appropriate GFR   Strict I/O  Daily weights  Urinary retention protocol if patient does not have a Caceres  Most likely has underlying CKD secondary to cardiorenal syndrome plus hypertensive nephrosclerosis plus age-related nephron loss  will need to set up patient for follow up with Nephrology as an outpatient post hospitalization  for nephrology as an outpatient patient follows up with no nephrologist  Message sent to the office to arrange for outpatient follow-up with blood work prior to the visit  Stable for discharge from renal standpoint medically cleared  Recommend blood work on 06/28/2022    Blood pressure/hypertension:  current medications:  Hydralazine 10 mg p o  Q 8, metoprolol 50 mg p o  Q day  recommendations:  Start torsemide 10 mg p o  Q day  Optimize hemodynamics  Maintain MAP > 65mmHg  Avoid BP fluctuations  H/H/anemia:  most recent hemoglobin at 10 7 grams/deciliter  maintain hemoglobin greater than 8 grams/deciliter    Acid-base electrolytes:    Electrolytes:    Stable  Acid-base:    Most recent bicarb stable at 21 continue to monitor    Other medical problems:  Proteinuria: Most recent UA with +1 protein, urine protein creatinine ratio 1 8 g in April 2022 in the setting of Chintan I will need recheck when stable  Diabetes:  Management per primary team   On insulin  Most recent A1c 7 6%  CHF:  Management per primary team   Follow-up with cardiology most recent echo with EF of 30%  Thanks for the consult  Will continue to follow  Please call with questions/ concerns  Above-mentioned orders and Plan in terms of acute kidney injury was discussed with the team in depth via [address] [place] [address] Rd text    Silverio Valenzuela MD, FASN, 6/20/2022, 10:23 AM              Objective :   Patient seen and examined in his room no overnight events hemodynamically stable remains afebrile overall reports he is feeling better  Brother at bedside urine output not adequately documented  Not on oxygen        PHYSICAL EXAM  /79 (BP Location: Left arm)   Pulse 67   Temp 98 3 °F (36 8 °C) (Oral)   Resp 19   Wt 75 2 kg (165 lb 12 6 oz)   SpO2 99%   BMI 27 59 kg/m²   Temp (24hrs), Av 9 °F (36 6 °C), Min:97 4 °F (36 3 °C), Max:98 4 °F (36 9 °C)        Intake/Output Summary (Last 24 hours) at 2022 1023  Last data filed at 2022 0801  Gross per 24 hour   Intake 600 ml   Output --   Net 600 ml       I/O last 24 hours: In: 600 [P O :600]  Out: -       Current Weight: Weight - Scale: 75 2 kg (165 lb 12 6 oz)  First Weight: Weight - Scale: 71 7 kg (158 lb)  Physical Exam  Vitals and nursing note reviewed  Constitutional:       General: He is not in acute distress  Appearance: Normal appearance  He is normal weight  He is not ill-appearing, toxic-appearing or diaphoretic  HENT:      Head: Normocephalic and atraumatic  Mouth/Throat:      Mouth: Mucous membranes are moist       Pharynx: Oropharynx is clear  No oropharyngeal exudate  Eyes:      General: No scleral icterus  Conjunctiva/sclera: Conjunctivae normal    Cardiovascular:      Rate and Rhythm: Normal rate  Heart sounds: Normal heart sounds  No murmur heard  No friction rub  Pulmonary:      Effort: Pulmonary effort is normal  No respiratory distress  Breath sounds: Normal breath sounds  No stridor  No wheezing  Abdominal:      General: There is no distension  Palpations: Abdomen is soft  There is no mass  Tenderness: There is no abdominal tenderness  Musculoskeletal:         General: No swelling  Cervical back: Normal range of motion and neck supple  No rigidity  Skin:     General: Skin is warm  Coloration: Skin is not jaundiced  Neurological:      General: No focal deficit present  Mental Status: He is alert and oriented to person, place, and time  Psychiatric:         Mood and Affect: Mood normal          Behavior: Behavior normal              Review of Systems   Constitutional: Negative for chills and fever  HENT: Negative for congestion  Respiratory: Negative for cough, shortness of breath and wheezing  Cardiovascular: Negative for leg swelling  Gastrointestinal: Negative for abdominal pain, constipation, diarrhea, nausea and vomiting  Genitourinary: Negative for dysuria  Musculoskeletal: Negative for back pain  Neurological: Negative for headaches  Psychiatric/Behavioral: Negative for agitation and confusion  All other systems reviewed and are negative  Scheduled Meds:  Current Facility-Administered Medications   Medication Dose Route Frequency Provider Last Rate    acetaminophen  650 mg Oral Q6H PRN Hetul Cannon, DO      aspirin  81 mg Oral Daily Hetul Cannon, DO      atorvastatin  40 mg Oral HS Hetul Cannon, DO      FLUoxetine  20 mg Oral Daily Hetul Cannon, DO      folic acid  1 mg Oral Daily Hetul Cannon, DO      heparin (porcine)  5,000 Units Subcutaneous Q8H [address] Hetul Cannon, DO      hydrALAZINE  10 mg Oral Q8H EMMA Sarmiento      insulin glargine  10 Units Subcutaneous HS Hetul Cannon, DO      insulin lispro  1-5 Units Subcutaneous TID AC Hetul Cannon, DO      LORazepam  0 5 mg Oral HS Hetul Cannon, DO      melatonin  4 5 mg Oral HS Hetul Cannon, DO      metoprolol succinate  50 mg Oral Daily Hetul Cannon, DO      multivitamin-minerals  1 tablet Oral Daily Hetul Cannon, DO      ondansetron  4 mg Intravenous Q6H PRN Hetul Cannon, DO      thiamine  100 mg Oral Daily Hetul Cannon, DO         PRN Meds:   acetaminophen    ondansetron    Continuous Infusions:       Invasive Devices:      Invasive Devices  Report    Peripheral Intravenous Line  Duration           Peripheral IV Left;Proximal;Ventral (anterior) Forearm -- days                  LABORATORY:    Results from last 7 days   Lab Units 06/20/22  0430 06/19/22  0431 06/18/22  0318 06/17/22  1001 06/17/22  0518 06/16/22  1832 06/16/22  1428   WBC Thousand/uL  --   --  6 75  --  6 52  --  8 05   HEMOGLOBIN g/dL  --   --  10 7*  -- 9 9*  --  11 1*   HEMATOCRIT %  --   --  32 7*  --  30 5*  --  34 8*   PLATELETS Thousands/uL  --   --  195  --  169 149 202   POTASSIUM mmol/L 4 7 4 7 4 9 5 3 4 8 5 6* 5 4*   CHLORIDE mmol/L 113* 115* 118* 114* 115* 113* 113*   CO2 mmol/L 21 23 21 22 22 23 20*   BUN mg/dL 30* 36* 43* 46* 49* 52* 50*   CREATININE mg/dL 1 80* 1 73* 1 84* 2 12* 2 16* 2 30* 2 31*   CALCIUM mg/dL 8 3 8 2* 8 6 8 5 8 7 8 6 9 0   MAGNESIUM mg/dL  --   --   --   --  2 0 2 1  --       rest all reviewed    RADIOLOGY:  CT head without contrast   Final Result by Mario Knight MD (06/16 1104)      No acute intracranial abnormality  Chronic microangiopathy  Workstation performed: [id number]         XR chest 1 view portable   Final Result by Wilson Thompson MD (06/16 3995)      No acute cardiopulmonary disease  Workstation performed: [id number]           Rest all reviewed    Portions of the record may have been created with voice recognition software  Occasional wrong word or "sound a like" substitutions may have occurred due to the inherent limitations of voice recognition software  Read the chart carefully and recognize, using context, where substitutions have occurred  If you have any questions, please contact the dictating provider

## 2022-06-20 NOTE — ASSESSMENT & PLAN NOTE
· Patient has reported history of bipolar disorder with prior history of incarceration and polysubstance abuse to alcohol tobacco, cocaine and a remote history of IV drug use  He also has a reported history of hepatitis-C for which he is scheduled to be followed  with Infectious Disease as an outpatient with [facility]    · He has multiple recent hospitalizations as well    · CIWA protocol ordered   · UDS negative  · C/w thiamine and MVI

## 2022-06-20 NOTE — OCCUPATIONAL THERAPY NOTE
Occupational Therapy Evaluation     Patient Name: Yoel Benton  Today's Date: 6/20/2022  Problem List  Principal Problem:    Weakness  Active Problems:    LAZARO (acute kidney injury) ([facility] [address] )    Bipolar affective disorder, mixed, moderate (HCC)    Congestive heart failure (CHF) (HCC)    Mixed hyperlipidemia    Normocytic anemia    Uncontrolled type 2 diabetes mellitus with hyperglycemia ([facility] [address] )    Cardiomyopathy ([facility] [address] )    Polysubstance abuse ([email or web address] )    Past Medical History  Past Medical History:   Diagnosis Date    Anxiety     Arthritis     Coronary artery disease     Dementia ([facility] [address] )     Depression     Diabetes mellitus ([facility] [address] )     Infectious viral hepatitis     Memory loss     Visual impairment      Past Surgical History  History reviewed  No pertinent surgical history  06/20/22 1122   OT Last Visit   OT Visit Date 06/20/22   Note Type   Note type Evaluation   Restrictions/Precautions   Weight Bearing Precautions Per Order No   Other Precautions Telemetry   Pain Assessment   Pain Assessment Tool 0-10   Pain Score No Pain   Home Living   Type of Home House   Home Layout One level;Stairs to enter with rails; Able to live on main level with bedroom/bathroom; Performs ADLs on one level  (12 donny)   Bathroom Shower/Tub Walk-in shower   Bathroom Toilet Standard   Bathroom Equipment Grab bars in shower; Shower chair   [address] Walker;Cane  (using cane prn)   Prior Function   Level of [place] Independent with ADLs and functional mobility   Lives With [employer] Help From Family   ADL Assistance Independent   IADLs Independent   Falls in the last 6 months   (4-5 falls)   Vocational Retired   Lifestyle   Autonomy pta, pt was I w ADL/IADL ((shared IADL tasks w family)) and completed fxnl mobility with SPC prn  Reciprocal Relationships supportive sister who he lives with, supportive brother who lives closeby     Service to Others retired   Intrinsic Gratification spending time w his family, going for long walks  Psychosocial   Psychosocial (WDL) WDL   Subjective   Subjective "I am doing ok"   ADL   Where Assessed Edge of bed   Eating Assistance 6  Modified independent   Grooming Assistance 6  Modified Independent   UB Bathing Assistance 6  Modified Independent   LB Bathing Assistance 5  Supervision/Setup   UB Dressing Assistance 6  Modified independent   LB Dressing Assistance 5  Supervision/Setup   Toileting Assistance  5  Supervision/Setup   Functional Assistance 5  Supervision/Setup   Bed Mobility   Supine to Sit 5  Supervision   Sit to Supine 5  Supervision   Transfers   Sit to Stand 5  Supervision   Stand to Sit 5  Supervision   Toilet transfer 5  Supervision   Additional Comments pt used grab bars for toilet transfers, STS with S without the use of AD  G safety awareness and insight to condition  Functional Mobility   Functional Mobility 5  Supervision   Additional Comments household distance c rw  G safety awareness t/o  Additional items Rolling walker   Balance   Static Sitting Fair +   Dynamic Sitting Fair   Static Standing Fair -   Dynamic Standing Fair -   Ambulatory Fair -   Activity Tolerance   Activity Tolerance Patient tolerated treatment well   Medical Staff Made Aware spoke w RN   Nurse Made Aware ok per RN   RUE Assessment   RUE Assessment WFL   LUE Assessment   LUE Assessment WFL   Hand Function   Gross Motor Coordination Functional   Fine Motor Coordination Functional   Cognition   Overall Cognitive Status WFL   Arousal/Participation Alert; Cooperative;Responsive   Attention Within functional limits   Orientation Level Oriented X4   Memory Within functional limits   Following Commands Follows one step commands without difficulty   Comments pleasant and cooperative, G safety awareness and insight to condition t/o session  Assessment   Prognosis Good   Assessment Pt is a 61 y o  male admitted 6/16/22 with generalized weakness   CT head negative for acute abnormality  Pt w active OT eval and treat orders  PMH includes  has a past medical history of Anxiety, Arthritis, Coronary artery disease, Dementia ([address] ), Depression, Diabetes mellitus ([facility] [address] ), Infectious viral hepatitis, Memory loss, and Visual impairment  Pt lives w sister in a 1 SH with 12 ISABEL, reports niux0ag shower with shower chair and grab bars, standard toilet  Pta, pt was independent w/ ADL/IADL and used SPC prn for fxnl mobility- also has RW if needed  Currently, pt is mod I for UB ADL, S for LB ADL and completed transfers/FM with S with the use of RW  Completed toilet transfers with S as well this date  Pt reports if he needed assistance, his sister or brother would be able to provide assistance  From OT standpoint, pt is functioning at baseline level and does not appear to require additional acute OT at this time  Discussed pt's comfort with d/c from OT and any concerns with returning to previous environment; pt does not report any at this time  The patient's raw score on the AM-PAC Daily Activity inpatient short form is 24, standardized score is 57 54, greater than 39 4  Patients at this level are likely to benefit from discharge to home  Please refer to the recommendation of the Occupational Therapist for safe discharge planning  From OT perspective, pt should D/C HOME when medically stable  Acute OT no longer rq at this time, D/C OT  Goals   Patient Goals to get home     Recommendation   OT Discharge Recommendation No rehabilitation needs   OT - OK to Discharge Yes   AM-PAC Daily Activity Inpatient   Lower Body Dressing 4   Bathing 4   Toileting 4   Upper Body Dressing 4   Grooming 4   Eating 4   Daily Activity Raw Score 24   Daily Activity Standardized Score (Calc for Raw Score >=11) 57 54   AM-PAC Applied Cognition Inpatient   Following a Speech/Presentation 4   Understanding Ordinary Conversation 4   Taking Medications 4   Remembering Where Things Are Placed or Put Away 4   Remembering List of 4-5 Errands 4   Taking Care of Complicated Tasks 4   Applied Cognition Raw Score 24   Applied Cognition Standardized Score 62 21         Pink Score, MOT, OTR/L

## 2022-06-20 NOTE — CASE MANAGEMENT
Case Management Discharge Planning Note    Patient name Jose Alberto Albarran  Location /-01 MRN [id number]  : 1955 Date 2022       Current Admission Date: 2022  Current Admission Diagnosis:Weakness   Patient Active Problem List    Diagnosis Date Noted    Weakness 2022    Cardiomyopathy ([place] [address] ) 2022    Polysubstance abuse ([place] [address] ) 2022    Uncontrolled type 2 diabetes mellitus with hyperglycemia ([place] [address] ) 2022    Mixed hyperlipidemia 2022    Stage 3b chronic kidney disease ([place] [address] ) 2022    Congestive heart failure (CHF) ([place] [address] ) 2022    Acute hepatitis C virus infection 2022    Abnormal EKG 2022    LAZARO (acute kidney injury) ([place] [address] ) 2022    Normocytic anemia 2022    Essential hypertension 2015    Type II or unspecified type diabetes mellitus without mention of complication, uncontrolled 2012    Bipolar affective disorder, mixed, moderate ([place] [address] ) 2011      LOS (days): 4  Geometric Mean LOS (GMLOS) (days): 4 30  Days to GMLOS:0 3     OBJECTIVE:  Risk of Unplanned Readmission Score: 22 19         Current admission status: Inpatient   Preferred Pharmacy:   [facility]  [address] [place] [id number]  Phone: [phone] Fax: [phone]    Primary Care Provider: No primary care provider on file  Primary Insurance: [facility] REP  Secondary Insurance:     DISCHARGE DETAILS:                                          Other Referral/Resources/Interventions Provided:  Referral Comments: CM sent numerous referrals for SNFs with no one able to accept  CM called [employer] @ [phone], Reference # [id number], was told patient has a [place] plan and transferred to [facility], Reference # [id number]  Katharina stated there are no SNFs in [place]  Katharina stated CM can call [phone] for network defiency   CM spoke with patietn at bedside who stated he does not want to wait for network deficiency or optioning, would like to discharge home with [address], agreeable to [facility]  Referral placed to [facility] via [facility]  CM spoke with PT to see if they can reeval, [address] with PT stated patient did better then before but needs help with stairs  CM called patient's brother, Bradly Wright, who is okay with patient dicharging to home with Robert Wright says there is 1 ISABEL and then the house is a ranch with everything on one floor   CM updated Omer Bean PA-C

## 2022-06-20 NOTE — PHYSICAL THERAPY NOTE
Physical Therapy Treatment Note    Patient's Name: Mily Alarcon  : 22 1315   PT Last Visit   PT Visit Date 22   Note Type   Note Type Treatment   Pain Assessment   Pain Assessment Tool 0-10   Pain Score No Pain   Restrictions/Precautions   Weight Bearing Precautions Per Order No   Other Precautions Fall Risk;Telemetry   General   Chart Reviewed Yes   Response to Previous Treatment Patient with no complaints from previous session  Family/Caregiver Present No   Subjective   Subjective Pt agreeable to mobilize, reports feeling stronger than on admission  Bed Mobility   Supine to Sit 5  Supervision   Sit to Supine 5  Supervision   Transfers   Sit to Stand 5  Supervision   Stand to Sit 5  Supervision   Additional Comments RW   Ambulation/Elevation   Gait pattern Short stride  (decreased L heel strike, occasional path deviation)   Gait Assistance   (cga)   Additional items Assist x 1;Verbal cues; Tactile cues   Assistive Device Rolling walker   Distance 90'x2   Stair Management Assistance 4  Minimal assist  (cga ascending/MinAx1 descending)   Additional items Assist x 1;Verbal cues; Tactile cues   Stair Management Technique Nonreciprocal;One rail R;Other (Comment)  (+ 2nd hand on therapist's shoulder for support)   Number of Stairs 9   Ambulation/Elevation Additional Comments Cues for safety + sequencing to use nonreciprocal gait pattern  Balance   Static Sitting Fair +   Dynamic Sitting Fair   Static Standing Fair -   Dynamic Standing Poor +   Ambulatory Poor +  (RW)   Endurance Deficit   Endurance Deficit Yes   Endurance Deficit Description LE weakness, fatigue   Activity Tolerance   Activity Tolerance Patient tolerated treatment well   Medical Staff Made Aware LITA [place]   Exercises   Neuro re-ed Dynamic standing balance, retrieving item from the floor, turning 360 deg w/ RW, resisting perturbations, standing w/ eyes closed, able to perform all listed above     Assessment Prognosis Good   Problem List Decreased strength;Decreased endurance; Impaired balance;Decreased mobility; Decreased coordination   Assessment Pt seen for PT session w/ focus on bed mobility, t/f, gait training, + stair training  Pt w/ good progress this session w/ increased distance w/ gait training  No knee buckling noted this session  Pt able to negotiate 9 stairs w/ 1 rail + CGA ascending / MinAx1 descending  Pt able to participate in higher level balance activities: retrieving item from the ground, turning 360 deg w/ RW, resisting perturbations, + standing w/ eyes closed w/ close supervision  Postural sway noted w/ eyes closed but no overt LOB  From a PT standpoint recommend rehab vs HHPT w/ 24/7 support  Pt would need assist on stairs  Attempted to call pt's brother at end of session + left a VM w/ call back #  Barriers to Discharge Inaccessible home environment   Goals   Patient Goals go home   PT Treatment Day 1   Plan   Treatment/Interventions Functional transfer training;LE strengthening/ROM; Elevations; Therapeutic exercise; Endurance training;Patient/family training;Equipment eval/education; Bed mobility;Gait training; Compensatory technique education;Spoke to nursing;Spoke to case management   Progress Progressing toward goals   PT Frequency 3-5x/wk   Recommendation   PT Discharge Recommendation Post acute rehabilitation services  (vs HHPT w/ 24/7 assist)   AM-PAC Basic Mobility Inpatient   Turning in Bed Without Bedrails 4   Lying on Back to Sitting on Edge of Flat Bed 3   Moving Bed to Chair 3   Standing Up From Chair 3   Walk in Room 3   Climb 3-5 Stairs 3   Basic Mobility Inpatient Raw Score 19   Basic Mobility Standardized Score 42 48   Highest Level Of Mobility   JH-HLM Goal 6: Walk 10 steps or more   JH-HLM Achieved 7: Walk 25 feet or more   Education   Education Provided Mobility training   Patient Demonstrates acceptance/verbal understanding;Reinforcement needed   End of Consult   Patient Position at End of Consult Supine; All needs within reach     Reginald Mulligan, PT, DPT

## 2022-06-20 NOTE — PROGRESS NOTES
[address]  Progress Note - Haim De Jesus 1955, 61 y o  male MRN: [id number]  Unit/Bed#: -Foster Encounter: [id number]  Primary Care Provider: No primary care provider on file  Date and time admitted to hospital: 6/16/2022  1:56 PM    * Weakness  Assessment & Plan  · Appears multifactorial   Potential etiologies include acute renal failure/polypharmacy/deconditioning  · CT head negative for acute abnormality   · Covid/flu/rsv negative   · UDS negative  · Hold lisinopril and demadex  · PT/OT recommending rehab  · Monitor blood sugar  · Monitor Neurochecks  · Ammonia level mildly elevated at 37    LAZARO (acute kidney injury) [facility])  Assessment & Plan  Lab Results   Component Value Date    CREATININE 1 80 (H) 06/20/2022    CREATININE 1 73 (H) 06/19/2022    CREATININE 1 84 (H) 06/18/2022   · LAZARO POA  · Patient had episode of severe LAZARO during hospitalization couple months ago in [facility] when he required dialysis at that time  No steady baseline available from last year  · Most recent baseline creatinine between 1 7-1 9  · Avoid renal toxic agents: continue with holding lisinopril and holding diuretics   · Monitor off IVF now per Nephrology  · Nephrology consult appreciated   · Bladder scan- urine retention protocol     Uncontrolled type 2 diabetes mellitus with hyperglycemia [facility])  Assessment & Plan  Lab Results   Component Value Date    HGBA1C 7 6 (H) 06/16/2022       Recent Labs     06/19/22  1137 06/19/22  1728 06/19/22  2054 06/20/22  0545   POCGLU 189* 252* 278* 135       Blood Sugar Average: Last 72 hrs:  (P) 177 [id number]     · C/w lantus 10 units at HS and sliding scale insulin  · Hold oral hypoglycemics- farxiga and metformin- consider d/c metformin and farxiga at discharge given underlying renal disease     · Monitor accuchecks   · Hypoglycemia protocol      Polysubstance abuse [facility])  Assessment & Plan  · Patient has reported history of bipolar disorder with prior history of incarceration and polysubstance abuse to alcohol tobacco, cocaine and a remote history of IV drug use  He also has a reported history of hepatitis-C for which he is scheduled to be followed  with Infectious Disease as an outpatient with [facility]    · He has multiple recent hospitalizations as well  · CIWA protocol ordered   · UDS negative  · C/w thiamine and MVI     Congestive heart failure (CHF) ([address] )  Assessment & Plan  · Patient is followed as an outpatient  Patient recently was seen back in May  · Demadex 10 mg daily, and lisinopril 2 5 daily on hold given LAZARO   · [facility]22 [facility]- Severely reduced LV systolic function with estimated LVEF 30%  Grade II diastolic dysfunction      Cardiomyopathy ([address] )  Assessment & Plan  · Patient clinically dry  · Hold medications as above  · ECHO 4/22- Severely reduced LV systolic function with estimated LVEF 30%  Grade II diastolic dysfunction  · Patient was discharged home from [facility] after hospitalization in April with a lifevest- recommend continued tele monitoring while hospitalized   · Brother brought lifevest in    Mixed hyperlipidemia  Assessment & Plan  · C/w Lipitor    Normocytic anemia  Assessment & Plan  Lab Results   Component Value Date    HGB 10 7 (L) 06/18/2022    HGB 9 9 (L) 06/17/2022    HGB 11 1 (L) 06/16/2022   · hgb stable  · monitor    Bipolar affective disorder, mixed, moderate (HCC)  Assessment & Plan  · Continue with outpatient meds  · Patient currently on Ativan and Prozac        VTE Pharmacologic Prophylaxis: VTE Score: 4 Moderate Risk (Score 3-4) - Pharmacological DVT Prophylaxis Ordered: heparin  Patient Centered Rounds: I performed bedside rounds with nursing staff today  d/w RN  Discussions with Specialists or Other Care Team Provider:      Education and Discussions with Family / Patient: Updated  (brother) at bedside  Time Spent for Care: 20 minutes   More than 50% of total time spent on counseling and coordination of care as described above  Current Length of Stay: 4 day(s)  Current Patient Status: Inpatient   Certification Statement: The patient will continue to require additional inpatient hospital stay due to pending rehab  Discharge Plan: Anticipate discharge in 24-48 hrs to rehab facility  Code Status: Level 1 - Full Code    Subjective:   Mr Lefty Tucker denies CP, SOB, abdominal pain, leg swelling  He reports a good appetite and says he is going to the bathroom well  He denies complaint and is agreeable to rehab    Objective:     Vitals:   Temp (24hrs), Av 9 °F (36 6 °C), Min:97 4 °F (36 3 °C), Max:98 4 °F (36 9 °C)    Temp:  [97 4 °F (36 3 °C)-98 4 °F (36 9 °C)] 98 3 °F (36 8 °C)  HR:  [67-89] 67  Resp:  [19-20] 19  BP: (125-145)/(69-91) 136/79  SpO2:  [97 %-100 %] 99 %  Body mass index is 27 59 kg/m²  Input and Output Summary (last 24 hours): Intake/Output Summary (Last 24 hours) at 2022 1038  Last data filed at 2022 0801  Gross per 24 hour   Intake 600 ml   Output --   Net 600 ml       Physical Exam:   Physical Exam  Vitals and nursing note reviewed  Constitutional:       Comments: Patient seen sitting in bed comfortably resting with his brother present at bedside   Cardiovascular:      Rate and Rhythm: Normal rate and regular rhythm  Pulmonary:      Effort: Pulmonary effort is normal       Breath sounds: Normal breath sounds  Abdominal:      General: Bowel sounds are normal       Palpations: Abdomen is soft  Tenderness: There is no abdominal tenderness  Musculoskeletal:      Right lower leg: No edema  Left lower leg: No edema  Skin:     General: Skin is warm  Neurological:      Mental Status: He is alert and oriented to person, place, and time     Psychiatric:         Mood and Affect: Mood normal          Behavior: Behavior normal           Additional Data:     Labs:  Results from last 7 days   Lab Units 22  6485 06/17/22  0518   WBC Thousand/uL 6 75 6 52   HEMOGLOBIN g/dL 10 7* 9 9*   HEMATOCRIT % 32 7* 30 5*   PLATELETS Thousands/uL 195 169   NEUTROS PCT %  --  35*   LYMPHS PCT %  --  47*   MONOS PCT %  --  9   EOS PCT %  --  7*     Results from last 7 days   Lab Units 06/20/22  0430 06/18/22  0318 06/17/22  1001   SODIUM mmol/L 137   < > 138   POTASSIUM mmol/L 4 7   < > 5 3   CHLORIDE mmol/L 113*   < > 114*   CO2 mmol/L 21   < > 22   BUN mg/dL 30*   < > 46*   CREATININE mg/dL 1 80*   < > 2 12*   ANION GAP mmol/L 3*   < > 2*   CALCIUM mg/dL 8 3   < > 8 5   ALBUMIN g/dL  --   --  2 5*   TOTAL BILIRUBIN mg/dL  --   --  0 20   ALK PHOS U/L  --   --  113   ALT U/L  --   --  55   AST U/L  --   --  28   GLUCOSE RANDOM mg/dL 163*   < > 246*    < > = values in this interval not displayed  Results from last 7 days   Lab Units 06/16/22  1832   INR  1 01     Results from last 7 days   Lab Units 06/20/22  0545 06/19/22  2054 06/19/22  1728 06/19/22  1137 06/19/22  0555 06/18/22  2038 06/18/22  1721 06/18/22  1138 06/18/22  0813 06/18/22  0655 06/17/22  2055 06/17/22  1613   POC GLUCOSE mg/dl 135 278* 252* 189* 124 221* 127 233* 99 76 179* 193*     Results from last 7 days   Lab Units 06/16/22  1832   HEMOGLOBIN A1C % 7 6*           Lines/Drains:  Invasive Devices  Report    Peripheral Intravenous Line  Duration           Peripheral IV Left;Proximal;Ventral (anterior) Forearm -- days                  Telemetry:  Telemetry Orders (From admission, onward)             LifeVest Patient: Continuous Telemetry Monitoring during hospitalization (non-expiring)  Continuous LifeVest Telemetry Monitoring        References:    LifeVest Policy                 Telemetry Reviewed: currently sinus rhythm  Indication for Continued Telemetry Use: Lifevest (remains on tele entire hospital stay)             Imaging: No pertinent imaging reviewed      Recent Cultures (last 7 days):         Last 24 Hours Medication List:   Current Facility-Administered Medications   Medication Dose Route Frequency Provider Last Rate    acetaminophen  650 mg Oral Q6H PRN Hetul Cannon, DO      aspirin  81 mg Oral Daily Hetul Cannon, DO      atorvastatin  40 mg Oral HS Hetul Cannon, DO      FLUoxetine  20 mg Oral Daily Hetul Cannon, DO      folic acid  1 mg Oral Daily Hetul Cannon, DO      heparin (porcine)  5,000 Units Subcutaneous Q8H [facility] Hetul Cannon, DO      hydrALAZINE  10 mg Oral Q8H Porfirio Setting, CRNP      insulin glargine  10 Units Subcutaneous HS Hetul Cannon, DO      insulin lispro  1-5 Units Subcutaneous TID AC Hetul Cannon, DO      LORazepam  0 5 mg Oral HS Hetul Cannon, DO      melatonin  4 5 mg Oral HS Hetul Cannon, DO      metoprolol succinate  50 mg Oral Daily Hetul Cannon, DO      multivitamin-minerals  1 tablet Oral Daily Hetul Cannon, DO      ondansetron  4 mg Intravenous Q6H PRN Hetul Cannon, DO      thiamine  100 mg Oral Daily Hetul Cannon, DO      torsemide  10 mg Oral Daily Wilian Lyon MD          Today, Patient Was Seen By: Amanda Ivy PA-C    **Please Note: This note may have been constructed using a voice recognition system  **

## 2022-06-20 NOTE — PLAN OF CARE
Problem: PHYSICAL THERAPY ADULT  Goal: Performs mobility at highest level of function for planned discharge setting  See evaluation for individualized goals  Description: Treatment/Interventions: Functional transfer training, LE strengthening/ROM, Elevations, Therapeutic exercise, Endurance training, Patient/family training, Equipment eval/education, Bed mobility, Gait training, Compensatory technique education, Spoke to nursing (balance training)  Equipment Recommended:  (pt reports that he already owns RW)       See flowsheet documentation for full assessment, interventions and recommendations  Outcome: Progressing  Note: Prognosis: Good  Problem List: Decreased strength, Decreased endurance, Impaired balance, Decreased mobility, Decreased coordination  Assessment: Pt seen for PT session w/ focus on bed mobility, t/f, gait training, + stair training  Pt w/ good progress this session w/ increased distance w/ gait training  No knee buckling noted this session  Pt able to negotiate 9 stairs w/ 1 rail + CGA ascending / MinAx1 descending  Pt able to participate in higher level balance activities: retrieving item from the ground, turning 360 deg w/ RW, resisting perturbations, + standing w/ eyes closed w/ close supervision  Postural sway noted w/ eyes closed but no overt LOB  From a PT standpoint recommend rehab vs HHPT w/ 24/7 support  Pt would need assist on stairs  Attempted to call pt's brother at end of session + left a  w/ call back #  Barriers to Discharge: Inaccessible home environment        PT Discharge Recommendation: Post acute rehabilitation services (vs HHPT w/ 24/7 assist)          See flowsheet documentation for full assessment

## 2022-06-21 ENCOUNTER — HOME HEALTH ADMISSION (OUTPATIENT)
Dept: HOME HEALTH SERVICES | Facility: HOME HEALTHCARE | Age: 67
End: 2022-06-21
Payer: COMMERCIAL

## 2022-06-21 VITALS
SYSTOLIC BLOOD PRESSURE: 161 MMHG | WEIGHT: 165.79 LBS | HEART RATE: 66 BPM | OXYGEN SATURATION: 96 % | TEMPERATURE: 97.6 F | BODY MASS INDEX: 27.59 KG/M2 | DIASTOLIC BLOOD PRESSURE: 92 MMHG | RESPIRATION RATE: 16 BRPM

## 2022-06-21 LAB
ANION GAP SERPL CALCULATED.3IONS-SCNC: 3 MMOL/L (ref 4–13)
BUN SERPL-MCNC: 32 MG/DL (ref 5–25)
CALCIUM SERPL-MCNC: 8.5 MG/DL (ref 8.3–10.1)
CHLORIDE SERPL-SCNC: 114 MMOL/L (ref 100–108)
CO2 SERPL-SCNC: 20 MMOL/L (ref 21–32)
CREAT SERPL-MCNC: 1.69 MG/DL (ref 0.6–1.3)
GFR SERPL CREATININE-BSD FRML MDRD: 41 ML/MIN/1.73SQ M
GLUCOSE SERPL-MCNC: 103 MG/DL (ref 65–140)
GLUCOSE SERPL-MCNC: 130 MG/DL (ref 65–140)
GLUCOSE SERPL-MCNC: 203 MG/DL (ref 65–140)
POTASSIUM SERPL-SCNC: 4.8 MMOL/L (ref 3.5–5.3)
SODIUM SERPL-SCNC: 137 MMOL/L (ref 136–145)

## 2022-06-21 PROCEDURE — 82948 REAGENT STRIP/BLOOD GLUCOSE: CPT

## 2022-06-21 PROCEDURE — 99239 HOSP IP/OBS DSCHRG MGMT >30: CPT | Performed by: HOSPITALIST

## 2022-06-21 PROCEDURE — 80048 BASIC METABOLIC PNL TOTAL CA: CPT | Performed by: FAMILY MEDICINE

## 2022-06-21 PROCEDURE — 99232 SBSQ HOSP IP/OBS MODERATE 35: CPT | Performed by: INTERNAL MEDICINE

## 2022-06-21 RX ORDER — FOLIC ACID 1 MG/1
1 TABLET ORAL DAILY
Refills: 0
Start: 2022-06-22

## 2022-06-21 RX ORDER — THIAMINE MONONITRATE (VIT B1) 100 MG
100 TABLET ORAL DAILY
Refills: 0
Start: 2022-06-22

## 2022-06-21 RX ADMIN — METOPROLOL SUCCINATE 50 MG: 50 TABLET, EXTENDED RELEASE ORAL at 08:58

## 2022-06-21 RX ADMIN — HYDRALAZINE HYDROCHLORIDE 10 MG: 10 TABLET, FILM COATED ORAL at 03:14

## 2022-06-21 RX ADMIN — ASPIRIN 81 MG: 81 TABLET, COATED ORAL at 08:58

## 2022-06-21 RX ADMIN — HEPARIN SODIUM 5000 UNITS: 5000 INJECTION INTRAVENOUS; SUBCUTANEOUS at 06:46

## 2022-06-21 RX ADMIN — Medication 1 TABLET: at 08:58

## 2022-06-21 RX ADMIN — FLUOXETINE 20 MG: 20 CAPSULE ORAL at 09:00

## 2022-06-21 RX ADMIN — TORSEMIDE 10 MG: 20 TABLET ORAL at 08:58

## 2022-06-21 RX ADMIN — THIAMINE HCL TAB 100 MG 100 MG: 100 TAB at 08:59

## 2022-06-21 RX ADMIN — FOLIC ACID 1 MG: 1 TABLET ORAL at 08:58

## 2022-06-21 NOTE — CASE MANAGEMENT
Case Management Discharge Planning Note    Patient name Jonathon Fisher  Location /-01 MRN [id number]  : 1955 Date 2022       Current Admission Date: 2022  Current Admission Diagnosis:Weakness   Patient Active Problem List    Diagnosis Date Noted    Weakness 2022    Cardiomyopathy ([facility] ) 2022    [facility]    Uncontrolled type 2 diabetes mellitus with hyperglycemia ([facility] ) 2022    Mixed hyperlipidemia 2022    Stage 3b chronic kidney disease ([facility] ) 2022    Congestive heart failure (CHF) ([facility] ) 2022    Acute hepatitis C virus infection 2022    Abnormal EKG 2022    LAZARO (acute kidney injury) ([facility] ) 2022    Normocytic anemia 2022    Essential hypertension 2015    Type II or unspecified type diabetes mellitus without mention of complication, uncontrolled 2012    Bipolar affective disorder, mixed, moderate ([facility] ) 2011      LOS (days): 5  Geometric Mean LOS (GMLOS) (days): 4 30  Days to GMLOS:-0 4     OBJECTIVE:  Risk of Unplanned Readmission Score: 20 56         Current admission status: Inpatient   Preferred Pharmacy:   [place] [address] [place], [place] [address] [address] , [address]  [address] [place] [id number]  Phone: [phone] Fax: [phone]    Primary Care Provider: No primary care provider on file      Primary Insurance: [employer] [facility] REP  Secondary Insurance:     DISCHARGE DETAILS:                                [address]         Is the patient interested in [facility] at discharge?: Yes  [address] requested[de-identified] Nursing, Physical [address] Name[de-identified] [facility] Provider[de-identified] Referring Provider  Home Health Services Needed[de-identified] Gait/ADL Training, Evaluate Functional Status and Safety, Heart Failure Management, Strengthening/Theraputic Exercises to Improve Function  Homebound Criteria Met[de-identified] Uses an Assist Device (i e  cane, walker, etc)  Supporting Clincal Findings[de-identified] Limited Endurance, Fatigues Easliy in [employer]                         CM spoke with patient at bedside, informed that [facility] able to accept  Patient confirmed he has a walker at home, CM doesn't need to order any DME  CM called patient's brother, Lisa Bowens, and informed that [facility] able to accept  Lisa Bowens stated patient has a PCP at [facility] in [facility], but does not remember PCP name  Lisa Bowens stated patient needs a script for a new glucometer  Lisa Bowens requesting all new scripts to be sent to [facility] on [address] in [facility]  Dr Pina Valdivia made aware  Per Dr Pina Valdivia, anticipating discharge today, Lisa Bowens made aware  Lisa Bowens requesting BSC to be ordered as patient gets up in the middle of the night which is when his falls occur  BSC ordered through [facility] via [place]

## 2022-06-21 NOTE — ASSESSMENT & PLAN NOTE
· Patient is followed as an outpatient  Patient recently was seen back in May  · Demadex 10 mg daily, and lisinopril 2 5 daily on hold given LAZARO   · ECHO 4/22 from [facility]- Severely reduced LV systolic function with estimated LVEF 30%   Grade II diastolic dysfunction  · Torsemide 10 mg daily was restarted and creatinine today has been stable hence continue at discharge, same as his previous regimen  · Lisinopril continues to be on hold upon discharge

## 2022-06-21 NOTE — DISCHARGE SUMMARY
[facility]  Discharge- Jose Juan Fleet 1955, 61 y o  male MRN: [id number]  Unit/Bed#: MS [address]-01 Encounter: [id number]  Primary Care Provider: No primary care provider on file  Date and time admitted to hospital: 6/16/2022  1:56 PM    Polysubstance abuse ([facility] [address] )  Assessment & Plan  · Patient has reported history of bipolar disorder with prior history of incarceration and polysubstance abuse to alcohol tobacco, cocaine and a remote history of IV drug use  He also has a reported history of hepatitis-C for which he is scheduled to be followed  with Infectious Disease as an outpatient with [facility]    · He has multiple recent hospitalizations as well  · CIWA protocol ordered   · UDS negative  · C/w thiamine and MVI     Cardiomyopathy ([facility] )  Assessment & Plan  · Patient clinically dry  · Hold medications as above  · ECHO 4/22- Severely reduced LV systolic function with estimated LVEF 30%  Grade II diastolic dysfunction  · Patient was discharged home from [facility] after hospitalization in April with a lifevest- recommend continued tele monitoring while hospitalized   · Brother brought lifevest in    Uncontrolled type 2 diabetes mellitus with hyperglycemia [facility])  Assessment & Plan  Lab Results   Component Value Date    HGBA1C 7 6 (H) 06/16/2022       Recent Labs     06/20/22  1611 06/20/22  2140 06/21/22  0643 06/21/22  1201   POCGLU 220* 265* 103 203*       Blood Sugar Average: Last 72 hrs:  (P) 371 [id number]     · C/w lantus 10 units at HS and sliding scale insulin    · Hold oral hypoglycemics- farxiga and metformin- restart upon discharge   · Monitor accuchecks   · Hypoglycemia protocol      Normocytic anemia  Assessment & Plan  Lab Results   Component Value Date    HGB 10 7 (L) 06/18/2022    HGB 9 9 (L) 06/17/2022    HGB 11 1 (L) 06/16/2022   · hgb stable  · monitor    Mixed hyperlipidemia  Assessment & Plan  · C/w Lipitor    Congestive heart failure (CHF) [facility])  Assessment & Plan  · Patient is followed as an outpatient  Patient recently was seen back in May  · Demadex 10 mg daily, and lisinopril 2 5 daily on hold given LAZARO   · ECHO 4/22 from [facility]- Severely reduced LV systolic function with estimated LVEF 30%  Grade II diastolic dysfunction  · Torsemide 10 mg daily was restarted and creatinine today has been stable hence continue at discharge, same as his previous regimen  · Lisinopril continues to be on hold upon discharge      Bipolar affective disorder, mixed, moderate ([facility] [address] )  Assessment & Plan  · Continue with outpatient meds  · Patient currently on Ativan and Prozac    LAZARO (acute kidney injury) [facility])  Assessment & Plan  Lab Results   Component Value Date    CREATININE 1 69 (H) 06/21/2022    CREATININE 1 80 (H) 06/20/2022    CREATININE 1 73 (H) 06/19/2022   · LAZARO POA  · Patient had episode of severe LAZARO during hospitalization couple months ago in [facility] when he required dialysis at that time  No steady baseline available from last year  · Most recent baseline creatinine between 1 7-1 9  · Avoid renal toxic agents:  Home lisinopril and torsemide were held initially  · Monitor off IVF now per Nephrology  · Nephrology consult appreciated   · Bladder scan- urine retention protocol  · Subsequently he was restarted on torsemide 10 mg daily at his home regimen, his creatinine improved and back to his baseline hence cleared by Nephrology for discharge home with recommendations for outpatient follow-up  · Will continue torsemide 10 mg daily, lisinopril is being held upon discharge    Outpatient BMP before nephrology follow-up    * Weakness  Assessment & Plan  · Appears multifactorial   Potential etiologies include acute renal failure/polypharmacy/deconditioning  · CT head negative for acute abnormality   · Covid/flu/rsv negative   · UDS negative  · Hold lisinopril and demadex  · PT/OT recommending rehab  · Monitor blood sugar  · Monitor Neurochecks  · Ammonia level mildly elevated at 37  · Ultimately he was doing stable and being discharged home with home health care        Medical Problems             Resolved Problems  Date Reviewed: 6/21/2022          Resolved    Hyperkalemia 6/19/2022     Resolved by  Brielle Murillo PA-C              Discharging Physician / Practitioner: Bill Pepper MD  PCP: No primary care provider on file  Admission Date:   Admission Orders (From admission, onward)     Ordered        06/16/22 1626  Inpatient Admission  Once                      Discharge Date: 06/21/22    Consultations During Hospital Stay:  · Nephrology        Significant Findings / Test Results:   CT head without contrast   Final Result by Ethan Medel MD (06/16 8667)      No acute intracranial abnormality  Chronic microangiopathy  Workstation performed: [id number]         XR chest 1 view portable   Final Result by Gladys Gomez MD (06/16 1509)      No acute cardiopulmonary disease  Workstation performed: [id number]               Outpatient Tests Requested:  · BMP in 2 weeks      Reason for Admission:  Weakness    Hospital Course:   Lynsey Calles is a 61 y o  male patient who originally presented to the hospital on 6/16/2022 with a past medical history congestive heart failure with reduced ejection fraction of 30%, hypertension hyperlipidemia history of bipolar disorder chronic diabetes and CKD who presents the hospital with symptoms of generalized weakness  Patient reportedly has not been feeling well for the past 5 days  He presents with feeling generalized weakness and reports feeling tired and sleepy  Patient also noted having reported increased frequency of falls    Patient also articulates concerns overall feeling off balance when he walks which resolves when he is resting  Family reports that he has more forgetful and confused since he was last hospitalized at [facility] between May 2nd and May 13th   Patient previously was living in [place] since 2015  He was hospitalized for 33 days prior to coming to the [place] and previously was treated for DKA, non-STEMI, mental status change and acute renal failure that previously required hemodialysis  Subsequently, his brother brought him to [address] [facility] [address] to be treated and he was admitted to [facility] 4/23-4/28 after presenting with dizziness, weakness and SOB  He was traeted for LLL PNA and effusion, noted to have mild anemia, LAZARO  He underwent echo showing global hypokinesis and EF 30%  Cardiology was following and he completed lexiscan which was neg for ischemia  He was placed on BB, ARB, SGLT2 and discharged on lifevest  His metformin was reduced due to renal function  He was found to have hep C infection and recommended outpatient ID follow up  Unfortunately, he was rehospitalized between May 2nd and 13th  for acute exacerbation of congestive heart failure  At that time he required a thoracentesis for pleural effusion and was aggressively diuresed with resulting worsening renal function  He subsequently had his diuretics held with subsequent improvement of his creatinine  He ultimately was treated with hydralazine and torsemide and his Arb was discontinued  He was instructed to follow-up with Cardiology for eventual ICD and left-sided heart catheterization  In follow-up he was placed on torsemide 10 mg daily and restarted on ACE-inhibitor as an outpatient with Cardiology at [facility]   He was seen by his PCP with his brother Be Byrnes with complaints of generalized weakness excessive daytime sleepiness and decreased appetite    Please see above list of diagnoses and related plan for additional information       Condition at Discharge: stable    Discharge Day Visit / Exam:   Subjective:  Feels well, has no complains    Vitals: Blood Pressure: 161/92 (06/21/22 1126)  Pulse: 66 (06/21/22 1126)  Temperature: 97 6 °F (36 4 °C) (06/21/22 1126)  Temp Source: Oral (06/21/22 0229)  Respirations: 16 (06/21/22 1126)  Weight - Scale: 75 2 kg (165 lb 12 6 oz) (06/21/22 0500)  SpO2: 96 % (06/21/22 1126)  Exam:   Physical Exam  Vitals reviewed  HENT:      Head: Normocephalic and atraumatic  Mouth/Throat:      Mouth: Mucous membranes are moist    Cardiovascular:      Rate and Rhythm: Normal rate and regular rhythm  Heart sounds: Normal heart sounds  Pulmonary:      Effort: Pulmonary effort is normal  No respiratory distress  Breath sounds: Normal breath sounds  No wheezing  Abdominal:      General: There is no distension  Palpations: Abdomen is soft  Tenderness: There is no abdominal tenderness  Musculoskeletal:      Right lower leg: No edema  Left lower leg: No edema  Neurological:      Mental Status: He is alert and oriented to person, place, and time  Discharge instructions/Information to patient and family:   See after visit summary for information provided to patient and family  Provisions for Follow-Up Care:  See after visit summary for information related to follow-up care and any pertinent home health orders  Disposition:   Home with VNA Services (Reminder: Complete face to face encounter)    Planned Readmission: no     Discharge Statement:  I spent 40 minutes discharging the patient  This time was spent on the day of discharge  I had direct contact with the patient on the day of discharge  Greater than 50% of the total time was spent examining patient, answering all patient questions, arranging and discussing plan of care with patient as well as directly providing post-discharge instructions  Additional time then spent on discharge activities  Discharge Medications:  See after visit summary for reconciled discharge medications provided to patient and/or family        **Please Note: This note may have been constructed using a voice recognition system**

## 2022-06-21 NOTE — ASSESSMENT & PLAN NOTE
Lab Results   Component Value Date    CREATININE 1 69 (H) 06/21/2022    CREATININE 1 80 (H) 06/20/2022    CREATININE 1 73 (H) 06/19/2022   · LAZARO POA  · Patient had episode of severe LAZARO during hospitalization couple months ago in [facility] when he required dialysis at that time  No steady baseline available from last year  · Most recent baseline creatinine between 1 7-1 9  · Avoid renal toxic agents:  Home lisinopril and torsemide were held initially  · Monitor off IVF now per Nephrology  · Nephrology consult appreciated   · Bladder scan- urine retention protocol  · Subsequently he was restarted on torsemide 10 mg daily at his home regimen, his creatinine improved and back to his baseline hence cleared by Nephrology for discharge home with recommendations for outpatient follow-up  · Will continue torsemide 10 mg daily, lisinopril is being held upon discharge    Outpatient BMP before nephrology follow-up

## 2022-06-21 NOTE — ASSESSMENT & PLAN NOTE
· Patient clinically dry  · Hold medications as above  · ECHO 4/22- Severely reduced LV systolic function with estimated LVEF 30%   Grade II diastolic dysfunction  · Patient was discharged home from [facility] after hospitalization in April with a lifevest- recommend continued tele monitoring while hospitalized   · Brother brought lifevest in

## 2022-06-21 NOTE — ASSESSMENT & PLAN NOTE
Lab Results   Component Value Date    HGBA1C 7 6 (H) 06/16/2022       Recent Labs     06/20/22  1611 06/20/22  2140 06/21/22  0643 06/21/22  1201   POCGLU 220* 265* 103 203*       Blood Sugar Average: Last 72 hrs:  (P) 217 [id number]     · C/w lantus 10 units at HS and sliding scale insulin    · Hold oral hypoglycemics- farxiga and metformin- restart upon discharge   · Monitor accuchecks   · Hypoglycemia protocol

## 2022-06-21 NOTE — NURSING NOTE
All discharge paperwork, follow-up appointments and medications reviewed with patient  Patient verbalized understanding  CM working with MD with DM2 supplies  Pending discharge

## 2022-06-21 NOTE — ASSESSMENT & PLAN NOTE
· Patient has reported history of bipolar disorder with prior history of incarceration and polysubstance abuse to alcohol tobacco, cocaine and a remote history of IV drug use  He also has a reported history of hepatitis-C for which he is scheduled to be followed  with Infectious Disease as an outpatient with [facility]    · He has multiple recent hospitalizations as well    · CIWA protocol ordered   · UDS negative  · C/w thiamine and MVI

## 2022-06-21 NOTE — ASSESSMENT & PLAN NOTE
· Appears multifactorial   Potential etiologies include acute renal failure/polypharmacy/deconditioning  · CT head negative for acute abnormality   · Covid/flu/rsv negative   · UDS negative  · Hold lisinopril and demadex  · PT/OT recommending rehab  · Monitor blood sugar  · Monitor Neurochecks  · Ammonia level mildly elevated at 37  · Ultimately he was doing stable and being discharged home with home health care

## 2022-06-21 NOTE — PROGRESS NOTES
Follow up Consultation    Nephrology   Will Warren 61 y o  male MRN: [id number]  Unit/Bed#: -01 Encounter: [id number]      Physician Requesting Consult: Brock Gudino MD        ASSESSMENT/PLAN:  71-year-old male with past medical history of bipolar disorder, diabetes, hypertension, hyperlipidemia, hep C, polysubstance abuse history, CHF with EF of 30% admitted with weakness  Nephrology consulted for evaluation management of abnormal renal parameters  Acute kidney injury (POA) on likely underlying CKD: LAZARO multifactorial most likely secondary to recent episode of acute kidney injury needing dialysis briefly plus cardiorenal syndrome plus failure to auto regulate presence of lisinopril plus some component of prerenal azotemia  After review of records [facility] as well as Care everywhere patient had a baseline creatinine of 0 9 mg/dL in 2015 subsequently no other labs until hospitalization in April 2022 at [facility] , and needed hospitalization in [facility] at which time patient needed dialysis  Peak creatinine during that hospitalization was around 2 14 mg/dL on 05/08/2022  Patient was discharged with a creatinine of 1 74 mg/dL  patient was admitted with a creatinine of 2 31 mg/dL on 06/16/2022  patient's creatinine today is at 1 69 mg/dL, remains stable, continues to improve  Patient is status post recent hospitalization at [facility] in May 2022 at which time he required brief dialysis for acute kidney injury  At this time appears to have a new baseline creatinine around 1 7-1 9 mg/dL  CT chest abdomen pelvis from 05/02/2022 no hydronephrosis  2 8 cm cortical cyst on the right  Maintain stable without diuretics at this time, started on torsemide 10 mg p o  Q day from 06/20  check BMP in a m if still in hospital   Await renal recovery  Optimize hemodynamic status to avoid delay in renal recovery  Place on a renal diet when allowed diet order     Avoid nephrotoxins, adjust meds to appropriate GFR  Strict I/O  Daily weights  Urinary retention protocol if patient does not have a Caceres  Most likely has underlying CKD secondary to cardiorenal syndrome plus hypertensive nephrosclerosis plus age-related nephron loss  will need to set up patient for follow up with Nephrology as an outpatient post hospitalization  for nephrology as an outpatient patient follows up with no nephrologist  Message sent to the office to arrange for outpatient follow-up with blood work prior to the visit  Stable for discharge from renal standpoint medically cleared  Recommend blood work on 06/28/2022    Blood pressure/hypertension:  current medications:  Hydralazine 10 mg p o  Q 8, metoprolol 50 mg p o  Q day, torsemide 10 mg p o  Q day  recommendations:  No changes for now  Optimize hemodynamics  Maintain MAP > 65mmHg  Avoid BP fluctuations  H/H/anemia:  most recent hemoglobin at 10 7 grams/deciliter  maintain hemoglobin greater than 8 grams/deciliter    Acid-base electrolytes:    Electrolytes:    Stable  Acid-base:    Most recent bicarb stable at 20 continue to monitor    Other medical problems:  Proteinuria: Most recent UA with +1 protein, urine protein creatinine ratio 1 8 g in April 2022 in the setting of [place] I will need recheck when stable  Diabetes:  Management per primary team   On insulin  Most recent A1c 7 6%  CHF:  Management per primary team   Follow-up with cardiology most recent echo with EF of 30%  Thanks for the consult  Will continue to follow  Please call with questions/ concerns  Above-mentioned orders and Plan in terms of acute kidney injury was discussed with the team in depth via [employer] text    Stephanie Heredia MD, FASN, 6/21/2022, 11:09 AM              Objective :   Patient seen and examined in his room no overnight events hemodynamically stable remains afebrile urine output not adequately documented 400 cc plus reports he is urinating a lot    Anticipating discharge later today  Thankful for the care information that he has gotten  PHYSICAL EXAM  /76 (BP Location: Left arm)   Pulse 68   Temp 97 8 °F (36 6 °C) (Oral)   Resp 16   Wt 75 2 kg (165 lb 12 6 oz)   SpO2 99%   BMI 27 59 kg/m²   Temp (24hrs), Av 9 °F (36 6 °C), Min:97 6 °F (36 4 °C), Max:98 3 °F (36 8 °C)        Intake/Output Summary (Last 24 hours) at 2022 1109  Last data filed at 2022 0918  Gross per 24 hour   Intake 420 ml   Output 400 ml   Net 20 ml       I/O last 24 hours: In: 780 [P O :780]  Out: 400 [Urine:400]      Current Weight: Weight - Scale: 75 2 kg (165 lb 12 6 oz)  First Weight: Weight - Scale: 71 7 kg (158 lb)  Physical Exam  Vitals and nursing note reviewed  Constitutional:       General: He is not in acute distress  Appearance: Normal appearance  He is normal weight  He is not ill-appearing, toxic-appearing or diaphoretic  HENT:      Head: Normocephalic and atraumatic  Mouth/Throat:      Mouth: Mucous membranes are moist       Pharynx: Oropharynx is clear  No oropharyngeal exudate  Eyes:      General: No scleral icterus  Conjunctiva/sclera: Conjunctivae normal    Cardiovascular:      Rate and Rhythm: Normal rate  Heart sounds: Normal heart sounds  No murmur heard  No friction rub  Pulmonary:      Effort: Pulmonary effort is normal  No respiratory distress  Breath sounds: Normal breath sounds  No stridor  No wheezing  Abdominal:      General: There is no distension  Palpations: Abdomen is soft  There is no mass  Tenderness: There is no abdominal tenderness  Musculoskeletal:         General: No swelling  Cervical back: Normal range of motion and neck supple  No rigidity  Skin:     General: Skin is warm  Coloration: Skin is not jaundiced  Neurological:      General: No focal deficit present  Mental Status: He is alert and oriented to person, place, and time     Psychiatric:         Mood and Affect: Mood normal          Behavior: Behavior normal              Review of Systems   Constitutional: Negative for chills, fatigue and fever  HENT: Negative for congestion  Respiratory: Negative for cough, shortness of breath and wheezing  Cardiovascular: Negative for leg swelling  Gastrointestinal: Negative for abdominal pain, constipation, diarrhea, nausea and vomiting  Genitourinary: Negative for dysuria  Musculoskeletal: Negative for back pain  Neurological: Negative for headaches  Psychiatric/Behavioral: Negative for agitation and confusion  All other systems reviewed and are negative  Scheduled Meds:  Current Facility-Administered Medications   Medication Dose Route Frequency Provider Last Rate    acetaminophen  650 mg Oral Q6H PRN Hetul Cannon, DO      aspirin  81 mg Oral Daily Hetul Cannon, DO      atorvastatin  40 mg Oral HS Hetul Cannon, DO      FLUoxetine  20 mg Oral Daily Hetul Cannon, DO      folic acid  1 mg Oral Daily Hetul Cannon, DO      heparin (porcine)  5,000 Units Subcutaneous Q8H [address] Hetul Cannon, DO      hydrALAZINE  10 mg Oral Q8H EMMA Sarmiento      insulin glargine  10 Units Subcutaneous HS Hetul Cannon, DO      insulin lispro  1-5 Units Subcutaneous TID AC Hetul Cannon, DO      LORazepam  0 5 mg Oral HS Hetul Cannon, DO      melatonin  4 5 mg Oral HS Hetul Cannon, DO      metoprolol succinate  50 mg Oral Daily Hetul Cannon, DO      multivitamin-minerals  1 tablet Oral Daily Hetul Cannon, DO      ondansetron  4 mg Intravenous Q6H PRN Hetul Cannon, DO      thiamine  100 mg Oral Daily Hetul Cannon, DO      torsemide  10 mg Oral Daily Melissa Spann MD         PRN Meds:   acetaminophen    ondansetron    Continuous Infusions:       Invasive Devices:      Invasive Devices  Report    Peripheral Intravenous Line  Duration           Peripheral IV Left;Proximal;Ventral (anterior) Forearm -- days                  LABORATORY:    Results from last 7 days   Lab Units 06/21/22  0325 06/20/22  0430 06/19/22  0431 06/18/22  0318 06/17/22  1001 06/17/22  0518 06/16/22  1832 06/16/22  1428   WBC Thousand/uL  --   --   --  6 75  --  6 52  --  8 05   HEMOGLOBIN g/dL  --   --   --  10 7*  --  9 9*  --  11 1*   HEMATOCRIT %  --   --   --  32 7*  --  30 5*  --  34 8*   PLATELETS Thousands/uL  --   --   --  195  --  169 149 202   POTASSIUM mmol/L 4 8 4 7 4 7 4 9 5 3 4 8 5 6* 5 4*   CHLORIDE mmol/L 114* 113* 115* 118* 114* 115* 113* 113*   CO2 mmol/L 20* 21 23 21 22 22 23 20*   BUN mg/dL 32* 30* 36* 43* 46* 49* 52* 50*   CREATININE mg/dL 1 69* 1 80* 1 73* 1 84* 2 12* 2 16* 2 30* 2 31*   CALCIUM mg/dL 8 5 8 3 8 2* 8 6 8 5 8 7 8 6 9 0   MAGNESIUM mg/dL  --   --   --   --   --  2 0 2 1  --       rest all reviewed    RADIOLOGY:  CT head without contrast   Final Result by Hardy West MD (06/16 1500)      No acute intracranial abnormality  Chronic microangiopathy  Workstation performed: [id number]         XR chest 1 view portable   Final Result by Jeb Rapp MD (06/16 1502)      No acute cardiopulmonary disease  Workstation performed: [id number]           Rest all reviewed    Portions of the record may have been created with voice recognition software  Occasional wrong word or "sound a like" substitutions may have occurred due to the inherent limitations of voice recognition software  Read the chart carefully and recognize, using context, where substitutions have occurred  If you have any questions, please contact the dictating provider

## 2022-06-21 NOTE — PLAN OF CARE
Problem: MOBILITY - ADULT  Goal: Maintain or return to baseline ADL function  Description: INTERVENTIONS:  -  Assess patient's ability to carry out ADLs; assess patient's baseline for ADL function and identify physical deficits which impact ability to perform ADLs (bathing, care of mouth/teeth, toileting, grooming, dressing, etc )  - Assess/evaluate cause of self-care deficits   - Assess range of motion  - Assess patient's mobility; develop plan if impaired  - Assess patient's need for assistive devices and provide as appropriate  - Encourage maximum independence but intervene and supervise when necessary  - Involve family in performance of ADLs  - Assess for home care needs following discharge   - Consider OT consult to assist with ADL evaluation and planning for discharge  - Provide patient education as appropriate  Outcome: Progressing  Goal: Maintains/Returns to pre admission functional level  Description: INTERVENTIONS:  - Perform BMAT or MOVE assessment daily    - Set and communicate daily mobility goal to care team and patient/family/caregiver  - Collaborate with rehabilitation services on mobility goals if consulted  - Perform Range of Motion  times a day  - Reposition patient every  hours    - Dangle patient  times a day  - Stand patient  times a day  - Ambulate patient  times a day  - Out of bed to chair times a day   - Out of bed for meals  times a day  - Out of bed for toileting  - Record patient progress and toleration of activity level   Outcome: Progressing     Problem: Potential for Falls  Goal: Patient will remain free of falls  Description: INTERVENTIONS:  - Educate patient/family on patient safety including physical limitations  - Instruct patient to call for assistance with activity   - Consult OT/PT to assist with strengthening/mobility   - Keep Call bell within reach  - Keep bed low and locked with side rails adjusted as appropriate  - Keep care items and personal belongings within reach  - Initiate and maintain comfort rounds  - Make Fall Risk Sign visible to staff  - Offer Toileting every  Hours, in advance of need  - Initiate/Maintain alarm  - Obtain necessary fall risk management equipment:   - Apply yellow socks and bracelet for high fall risk patients  - Consider moving patient to room near nurses station  Outcome: Progressing     Problem: Prexisting or High Potential for Compromised Skin Integrity  Goal: Skin integrity is maintained or improved  Description: INTERVENTIONS:  - Identify patients at risk for skin breakdown  - Assess and monitor skin integrity  - Assess and monitor nutrition and hydration status  - Monitor labs   - Assess for incontinence   - Turn and reposition patient  - Assist with mobility/ambulation  - Relieve pressure over bony prominences  - Avoid friction and shearing  - Provide appropriate hygiene as needed including keeping skin clean and dry  - Evaluate need for skin moisturizer/barrier cream  - Collaborate with interdisciplinary team   - Patient/family teaching  - Consider wound care consult   Outcome: Progressing

## 2022-06-21 NOTE — DISCHARGE INSTR - AVS FIRST PAGE
Follow up with your PCP in 1 week  And you will receive a call from nephrologist's office to follow up with them too, you need to get blood work done to check kidneys in 2 weeks before seeing the kidney doctor

## 2022-06-22 ENCOUNTER — HOME CARE VISIT (OUTPATIENT)
Dept: HOME HEALTH SERVICES | Facility: HOME HEALTHCARE | Age: 67
End: 2022-06-22

## 2022-06-22 ENCOUNTER — TELEPHONE (OUTPATIENT)
Dept: NEPHROLOGY | Facility: CLINIC | Age: 67
End: 2022-06-22

## 2022-06-22 ENCOUNTER — PATIENT OUTREACH (OUTPATIENT)
Dept: FAMILY MEDICINE CLINIC | Facility: CLINIC | Age: 67
End: 2022-06-22

## 2022-06-22 ENCOUNTER — HOME CARE VISIT (OUTPATIENT)
Dept: HOME HEALTH SERVICES | Facility: HOME HEALTHCARE | Age: 67
End: 2022-06-22
Payer: COMMERCIAL

## 2022-06-22 VITALS
SYSTOLIC BLOOD PRESSURE: 122 MMHG | OXYGEN SATURATION: 98 % | HEART RATE: 69 BPM | DIASTOLIC BLOOD PRESSURE: 70 MMHG | TEMPERATURE: 96.2 F

## 2022-06-22 PROCEDURE — G0299 HHS/HOSPICE OF RN EA 15 MIN: HCPCS

## 2022-06-22 PROCEDURE — 10330081 VN NO-PAY CLAIM PROCEDURE

## 2022-06-22 PROCEDURE — 400013 VN SOC

## 2022-06-22 NOTE — TELEPHONE ENCOUNTER
I spoke to Analy aguilar's visiting nurse and advised that patient need to contact Primary nephrologist for medication refill and schedule a hospital f/u with them as well

## 2022-06-22 NOTE — UTILIZATION REVIEW
Notification of Discharge   This is a Notification of Discharge from our facility [address]  Please be advised that this patient has been discharge from our facility  Below you will find the admission and discharge date and time including the patients disposition  UTILIZATION REVIEW CONTACT:  Boone Canavan  Utilization   Network Utilization Review Department  Phone: [phone] x carefully listen to the prompts  All voicemails are confidential   Email: [email or web address]     PHYSICIAN ADVISORY SERVICES:  FOR VQZE-BG-AZLI REVIEW - MEDICAL NECESSITY DENIAL  Phone: [phone]  Fax: [phone]  Email: [email or web address]     PRESENTATION DATE: 6/16/2022  1:56 PM  OBERVATION ADMISSION DATE: -  INPATIENT ADMISSION DATE: 6/16/22  4:26 PM   DISCHARGE DATE: 6/21/2022  1:19 PM  DISPOSITION: Home with [place] with [address] [place] [address] Road INFORMATION:  Send all requests for admission clinical reviews, approved or denied determinations and any other requests to dedicated fax number below belonging to the campus where the patient is receiving treatment   List of dedicated fax numbers:  [address] [place] [address] DENIALS (Administrative/Medical Necessity) [phone]   [address] (Maternity/NICU/Pediatrics) [phone]   Alexandre Boots [phone]   [address] [phone]   [address] [place] [address] [phone]   [address] [facility] [address] [place] [address],[address] [place] [address] [phone]   [facility]  [phone]   [address] [place] [address], [address]  [address] [facility] [address] [facility] [phone]

## 2022-06-22 NOTE — TELEPHONE ENCOUNTER
----- Message from Lukasz Avila MD sent at 6/21/2022  3:39 PM EDT -----  This has to go to the primary team is not for nephrology  Agnes    ----- Message -----  From: Vijaya Hinkle  Sent: 6/21/2022   3:32 PM EDT  To: Lukasz Aivla MD    Folic acid and Vitamin B1 where prescribed for the patient at discharge but does not look like refills got sent in properly  Can you please refill them for him  to [facility] in [place]?

## 2022-06-22 NOTE — CASE MANAGEMENT
Case Management Progress Note    Patient name Denece Merlin  Location [address] [id number] MRN [id number]  : 1955 Date 2022       LOS (days): 5  Geometric Mean LOS (GMLOS) (days): 4 30  Days to GMLOS:-0 6        OBJECTIVE:        Current admission status: Inpatient  Preferred Pharmacy:   [facility] , [address]  [address] [place] [address]  Phone: [phone] Fax: [phone]    Primary Care Provider: No primary care provider on file  Primary Insurance: [employer] [facility]  Secondary Insurance:     PROGRESS NOTE:    LITA received VM from Tariq stating that [facility] informed him that the glucometer and testing supplies are not covered but Accu Check glucometer and supplies are  LITA called [facility] in [facility] to confirm that [address] [facility] will be covered  LITA TT'd Dr Rose Dumont who sent new rx over for Accucheck glucoemeter, Accucheck lancets, and Accucheck testing strips  CM returned phone call to Marciano to inform him that new rx was sent

## 2022-06-22 NOTE — CASE COMMUNICATION
[facility] has admitted your patient to [address] [place] [address] service with the following disciplines:      SN and PT  Response needed, please respond via In basket with verbal order for OT  Primary focus of home health care:   Patient stated goals of care: Get stronger  Anticipated visit pattern: 2w1 3w1 2w1 1w1  and next visit date: 6/24/22 [facility]  See medication list - meds in home differ from AVS: Folic acid, One Touch glucometer not ac cepted by insurance  Significant clinical findings: Patient unable to stay awake during visit  Patients brother reports level of alertness is the same as in the hospital  Please provide verbal order for occupational therapy to assist with safety bathing to [facility] RN via in basket  Patients brother reports SBP normally below 120  Please report parameter PCP would like to be notified of per [facility] pathway  Potential barriers to goal  achievement: Immobility inalertness  Other pertinent information: Fasting BMP 7/5/22 ordered by Elsa Merino MD [facility] (acute kidney injury) [facility]) [N17 9] results to PCP and patients primary nehprologist Kenny Whitney  CBCD to be done 7/5/22 ordered by Sebastian Scott PA-C Anemia D64 9 script in home  Thank you for allowing us to participate in the care of your patient        [facility] RN

## 2022-06-22 NOTE — PROGRESS NOTES
Outpatient Care Management Note: Pt referred by PCP office for care management  Call placed and message left to determine interest  Pt has [facility] coming for visits  Also noted that pt has [facility] from [facility] providing calls for symptom management  Will continue to follow

## 2022-06-23 ENCOUNTER — HOME CARE VISIT (OUTPATIENT)
Dept: HOME HEALTH SERVICES | Facility: HOME HEALTHCARE | Age: 67
End: 2022-06-23
Payer: COMMERCIAL

## 2022-06-23 NOTE — TELEPHONE ENCOUNTER
Spoke with Patient and schedule appointment for 8/29 with Mirlande Samples in the [facility] location

## 2022-06-24 ENCOUNTER — HOME CARE VISIT (OUTPATIENT)
Dept: HOME HEALTH SERVICES | Facility: HOME HEALTHCARE | Age: 67
End: 2022-06-24
Payer: COMMERCIAL

## 2022-06-27 ENCOUNTER — HOME CARE VISIT (OUTPATIENT)
Dept: HOME HEALTH SERVICES | Facility: HOME HEALTHCARE | Age: 67
End: 2022-06-27
Payer: COMMERCIAL

## 2022-06-27 VITALS
SYSTOLIC BLOOD PRESSURE: 118 MMHG | DIASTOLIC BLOOD PRESSURE: 68 MMHG | HEART RATE: 76 BPM | OXYGEN SATURATION: 97 % | RESPIRATION RATE: 18 BRPM | TEMPERATURE: 97.6 F

## 2022-06-27 VITALS — SYSTOLIC BLOOD PRESSURE: 129 MMHG | HEART RATE: 71 BPM | OXYGEN SATURATION: 94 % | DIASTOLIC BLOOD PRESSURE: 72 MMHG

## 2022-06-27 PROCEDURE — G0299 HHS/HOSPICE OF RN EA 15 MIN: HCPCS

## 2022-06-27 PROCEDURE — G0151 HHCP-SERV OF PT,EA 15 MIN: HCPCS

## 2022-06-28 ENCOUNTER — PATIENT OUTREACH (OUTPATIENT)
Dept: FAMILY MEDICINE CLINIC | Facility: CLINIC | Age: 67
End: 2022-06-28

## 2022-06-28 NOTE — CASE COMMUNICATION
Pateint  evaluated  and  to  continue  with  home  P T   2 X  per  week  X  4  weeks  with  treatemnt  consisting  of  transfer  training  G  T   with  LRAd  on  indoor  and  outdoor  surfaces  and  stairs    Also  to  continue  with  strengthening  ex  to  BLEs  and  instruction  in  HEP  home  safety  and  energy  conservation

## 2022-06-28 NOTE — LETTER
Date: 06/28/22    Dear Madonna Forde,   My name is Mary Kay Kelly; I am a registered nurse care manager working with [address] [place] [address]     I have not been able to reach you and would like to determine your interest in discussing any health concerns that you might have or need help in managing  My work is to help patients that have complex medical conditions get the care they need  This includes patients who may have been in the hospital or emergency room  My contact information is listed below  Please call me with any questions you may have  I look forward to talking with you    Sincerely,  Laurent Georges RN  [phone]  Outpatient Care Manager

## 2022-06-28 NOTE — PROGRESS NOTES
Outpatient Care Management Note:Message left for pt regarding care management  Pt referred by PCP  Second attempt  Will send UTR letter

## 2022-06-29 ENCOUNTER — HOME CARE VISIT (OUTPATIENT)
Dept: HOME HEALTH SERVICES | Facility: HOME HEALTHCARE | Age: 67
End: 2022-06-29
Payer: COMMERCIAL

## 2022-06-29 PROCEDURE — G0299 HHS/HOSPICE OF RN EA 15 MIN: HCPCS

## 2022-06-30 ENCOUNTER — TELEPHONE (OUTPATIENT)
Dept: FAMILY MEDICINE CLINIC | Facility: CLINIC | Age: 67
End: 2022-06-30

## 2022-06-30 DIAGNOSIS — E11.42 DIABETIC POLYNEUROPATHY ASSOCIATED WITH TYPE 2 DIABETES MELLITUS (HCC): ICD-10-CM

## 2022-06-30 RX ORDER — GABAPENTIN 300 MG/1
300 CAPSULE ORAL 3 TIMES DAILY
Qty: 30 CAPSULE | Refills: 0 | Status: SHIPPED | OUTPATIENT
Start: 2022-06-30 | End: 2022-07-28 | Stop reason: SDUPTHER

## 2022-06-30 NOTE — TELEPHONE ENCOUNTER
[facility] called to say that the pt had a 3 lb weight gain over night  3+ Edema, no SOB, Very fine crackles in both lower lungs  Not sure if it is water weight or pt is just eating to much

## 2022-06-30 NOTE — TELEPHONE ENCOUNTER
Would recommend to continue to monitor his weight  If  he experiences  any respiratory or cardiovascular symptoms such as chest pain or shortness of breath he should go directly to emergency room

## 2022-06-30 NOTE — TELEPHONE ENCOUNTER
Called [facility] and spoke with Shala Koenig whom will forward the message to Panda  Also called and spoke with patients brother to inform of providers response   Patients brother understood and will also contact Cardiology

## 2022-06-30 NOTE — TELEPHONE ENCOUNTER
Requested medication(s) are due for refill today: Yes  Patient has already received a courtesy refill: No  Other reason request has been forwarded to provider: PCP out of office

## 2022-07-01 ENCOUNTER — HOME CARE VISIT (OUTPATIENT)
Dept: HOME HEALTH SERVICES | Facility: HOME HEALTHCARE | Age: 67
End: 2022-07-01
Payer: COMMERCIAL

## 2022-07-01 VITALS — OXYGEN SATURATION: 96 % | SYSTOLIC BLOOD PRESSURE: 128 MMHG | DIASTOLIC BLOOD PRESSURE: 72 MMHG | HEART RATE: 75 BPM

## 2022-07-01 LAB
DME PARACHUTE DELIVERY DATE ACTUAL: NORMAL
DME PARACHUTE DELIVERY DATE EXPECTED: NORMAL
DME PARACHUTE DELIVERY DATE REQUESTED: NORMAL
DME PARACHUTE DELIVERY NOTE: NORMAL
DME PARACHUTE ITEM DESCRIPTION: NORMAL
DME PARACHUTE ORDER STATUS: NORMAL
DME PARACHUTE SUPPLIER NAME: NORMAL
DME PARACHUTE SUPPLIER PHONE: NORMAL

## 2022-07-01 PROCEDURE — G0299 HHS/HOSPICE OF RN EA 15 MIN: HCPCS

## 2022-07-01 PROCEDURE — G0151 HHCP-SERV OF PT,EA 15 MIN: HCPCS

## 2022-07-02 ENCOUNTER — HOME CARE VISIT (OUTPATIENT)
Dept: HOME HEALTH SERVICES | Facility: HOME HEALTHCARE | Age: 67
End: 2022-07-02
Payer: COMMERCIAL

## 2022-07-02 VITALS
HEART RATE: 71 BPM | TEMPERATURE: 97.2 F | RESPIRATION RATE: 18 BRPM | SYSTOLIC BLOOD PRESSURE: 132 MMHG | DIASTOLIC BLOOD PRESSURE: 72 MMHG

## 2022-07-03 NOTE — CASE COMMUNICATION
SN reported pt having fine crackles on bilateral LL and a weight gain of 3 lbs overnight  SN observed pt walked for about 50 ft and no  SOB  RLE has plus 1 edema and LLE is trace  Pt have no complaints of chest pain     SN got a TT response to [facility] practice to keep monitoring pts weight and instruct to go to ED if pt experienced shortness of breath or chestpain

## 2022-07-04 VITALS
RESPIRATION RATE: 18 BRPM | TEMPERATURE: 97.8 F | OXYGEN SATURATION: 96 % | HEART RATE: 83 BPM | DIASTOLIC BLOOD PRESSURE: 64 MMHG | SYSTOLIC BLOOD PRESSURE: 130 MMHG

## 2022-07-05 ENCOUNTER — HOME CARE VISIT (OUTPATIENT)
Dept: HOME HEALTH SERVICES | Facility: HOME HEALTHCARE | Age: 67
End: 2022-07-05
Payer: COMMERCIAL

## 2022-07-05 NOTE — CASE COMMUNICATION
Arrived to pts home this morning to find out by caregiver that he was taken [address] this morning for fluid retention and chest pain

## 2022-07-06 ENCOUNTER — PATIENT OUTREACH (OUTPATIENT)
Dept: FAMILY MEDICINE CLINIC | Facility: CLINIC | Age: 67
End: 2022-07-06

## 2022-07-11 ENCOUNTER — TRANSCRIBE ORDERS (OUTPATIENT)
Dept: HOME HEALTH SERVICES | Facility: HOME HEALTHCARE | Age: 67
End: 2022-07-11

## 2022-07-11 DIAGNOSIS — I50.41 ACUTE COMBINED SYSTOLIC AND DIASTOLIC HEART FAILURE (HCC): Primary | ICD-10-CM

## 2022-07-12 ENCOUNTER — HOME CARE VISIT (OUTPATIENT)
Dept: HOME HEALTH SERVICES | Facility: HOME HEALTHCARE | Age: 67
End: 2022-07-12
Payer: COMMERCIAL

## 2022-07-12 PROCEDURE — G0299 HHS/HOSPICE OF RN EA 15 MIN: HCPCS

## 2022-07-13 ENCOUNTER — HOME CARE VISIT (OUTPATIENT)
Dept: HOME HEALTH SERVICES | Facility: HOME HEALTHCARE | Age: 67
End: 2022-07-13
Payer: COMMERCIAL

## 2022-07-13 VITALS
OXYGEN SATURATION: 98 % | RESPIRATION RATE: 18 BRPM | HEART RATE: 62 BPM | HEIGHT: 65 IN | TEMPERATURE: 97.1 F | DIASTOLIC BLOOD PRESSURE: 82 MMHG | WEIGHT: 144 LBS | SYSTOLIC BLOOD PRESSURE: 122 MMHG | BODY MASS INDEX: 23.99 KG/M2

## 2022-07-13 VITALS — HEART RATE: 74 BPM | SYSTOLIC BLOOD PRESSURE: 128 MMHG | DIASTOLIC BLOOD PRESSURE: 68 MMHG | OXYGEN SATURATION: 98 %

## 2022-07-13 PROCEDURE — G0151 HHCP-SERV OF PT,EA 15 MIN: HCPCS

## 2022-07-13 NOTE — CASE COMMUNICATION
[facility] has admitted your patient to [address] [place] [address] service with the following disciplines:      SN and PT  This report is informational only, no responses is needed  Primary focus of home health care CARDIAC FUNCTION  Patient stated goals of care    I just want to remain at home and not to go back to the hospital  Anticipated visit pattern and next visit date    2w6  next visit friday 7 15  Significant clinical findings  fine crackl es on RLL      Thank you for allowing us to participate in the care of your patient

## 2022-07-14 ENCOUNTER — OFFICE VISIT (OUTPATIENT)
Dept: FAMILY MEDICINE CLINIC | Facility: CLINIC | Age: 67
End: 2022-07-14
Payer: COMMERCIAL

## 2022-07-14 VITALS
SYSTOLIC BLOOD PRESSURE: 120 MMHG | DIASTOLIC BLOOD PRESSURE: 72 MMHG | HEART RATE: 71 BPM | HEIGHT: 65 IN | BODY MASS INDEX: 31.12 KG/M2 | WEIGHT: 186.8 LBS | OXYGEN SATURATION: 99 % | TEMPERATURE: 96.2 F

## 2022-07-14 DIAGNOSIS — N18.32 STAGE 3B CHRONIC KIDNEY DISEASE (HCC): ICD-10-CM

## 2022-07-14 DIAGNOSIS — G47.00 INSOMNIA, UNSPECIFIED TYPE: ICD-10-CM

## 2022-07-14 DIAGNOSIS — Z87.2 HISTORY OF DECUBITUS ULCER: ICD-10-CM

## 2022-07-14 DIAGNOSIS — I50.9 CONGESTIVE HEART FAILURE, UNSPECIFIED HF CHRONICITY, UNSPECIFIED HEART FAILURE TYPE (HCC): ICD-10-CM

## 2022-07-14 DIAGNOSIS — R53.1 WEAKNESS: ICD-10-CM

## 2022-07-14 DIAGNOSIS — R53.81 PHYSICAL DECONDITIONING: ICD-10-CM

## 2022-07-14 DIAGNOSIS — Z09 HOSPITAL DISCHARGE FOLLOW-UP: Primary | ICD-10-CM

## 2022-07-14 DIAGNOSIS — Z99.3 WHEELCHAIR BOUND: ICD-10-CM

## 2022-07-14 DIAGNOSIS — E11.65 UNCONTROLLED TYPE 2 DIABETES MELLITUS WITH HYPERGLYCEMIA (HCC): ICD-10-CM

## 2022-07-14 DIAGNOSIS — B17.10 ACUTE HEPATITIS C VIRUS INFECTION WITHOUT HEPATIC COMA: ICD-10-CM

## 2022-07-14 PROCEDURE — 1111F DSCHRG MED/CURRENT MED MERGE: CPT | Performed by: PHYSICIAN ASSISTANT

## 2022-07-14 PROCEDURE — 99215 OFFICE O/P EST HI 40 MIN: CPT | Performed by: PHYSICIAN ASSISTANT

## 2022-07-14 RX ORDER — DIAPER,BRIEF,ADULT, DISPOSABLE
EACH MISCELLANEOUS DAILY
Qty: 72 EACH | Refills: 2 | Status: ON HOLD | OUTPATIENT
Start: 2022-07-14 | End: 2022-07-27 | Stop reason: SDUPTHER

## 2022-07-14 NOTE — PROGRESS NOTES
Patient's shoes and socks removed  Right Foot/Ankle   Right Foot Inspection  Skin Exam: skin normal, skin intact and dry skin  No warmth, no callus, no erythema, no maceration, no abnormal color, no pre-ulcer, no ulcer and no callus  Sensory   Monofilament testing: intact    Vascular  The right PT pulse is 2+  Left Foot/Ankle  Left Foot Inspection  Skin Exam: skin normal, skin intact and dry skin  No warmth, no erythema, no maceration, normal color, no pre-ulcer, no ulcer and no callus  Sensory   Monofilament testing: intact    Vascular  The left PT pulse is 2+  Assign Risk Category  No deformity present  No loss of protective sensation  No weak pulses  Risk: 0    Hospital Discharge Follow up     THE [facility] 61 y o  male   Date:  7/14/2022      Assessment and Plan:    Gianna Jimenez was seen today for follow-up  Diagnoses and all orders for this visit:    Hospital discharge follow-up  -     Comprehensive metabolic panel; Future  -     CBC and differential; Future  -     Incontinence Supply Disposable (RA Adult Wipes) MISC; Use in the morning  -     Discontinue: Disposable Gloves MISC; Use daily Large  -     Incontinence Supply Disposable (Disposable Brief Large) MISC; Use daily  -     Incontinence Supply Disposable MISC; Use daily XL Disposable Bed Incontinence Pads  -     Skin Protectants, Misc  (dimethicone-zinc oxide) cream; Apply topically 2 (two) times a day as needed for irritation  -     Disposable Gloves MISC; Use daily Large    Acute hepatitis C virus infection without hepatic coma  Comments:  patient still requires follow up with ID, encouraged to reschedule appt     Congestive heart failure, unspecified HF chronicity, unspecified heart failure type (HCC)  Comments:  appreciate cardiology follow up, torsemide was increased to 40 mg bid  Orders:  -     Comprehensive metabolic panel;  Future  -     CBC and differential; Future    Stage 3b chronic kidney disease ([facility] [address] )  Comments:  continue to monitor labs andreina in setting of diuretic use   Orders:  -     Comprehensive metabolic panel;  Future    Insomnia, unspecified type  Comments:  may increase melatonin dose; will no longer use ativan after reports of being to groggy, previous did not tolerate trazodone; await psych f/u    History of decubitus ulcer  Comments:  healed  Orders:  -     Incontinence Supply Disposable (RA Adult Wipes) MISC; Use in the morning  -     Discontinue: Disposable Gloves MISC; Use daily Large  -     Incontinence Supply Disposable (Disposable Brief Large) MISC; Use daily  -     Incontinence Supply Disposable MISC; Use daily XL Disposable Bed Incontinence Pads  -     Skin Protectants, Misc  (dimethicone-zinc oxide) cream; Apply topically 2 (two) times a day as needed for irritation  -     Disposable Gloves MISC; Use daily Large    Physical deconditioning  -     Incontinence Supply Disposable (RA Adult Wipes) MISC; Use in the morning  -     Discontinue: Disposable Gloves MISC; Use daily Large  -     Incontinence Supply Disposable (Disposable Brief Large) MISC; Use daily  -     Incontinence Supply Disposable MISC; Use daily XL Disposable Bed Incontinence Pads  -     Skin Protectants, Misc  (dimethicone-zinc oxide) cream; Apply topically 2 (two) times a day as needed for irritation  -     Disposable Gloves MISC; Use daily Large    Weakness  -     Incontinence Supply Disposable (RA Adult Wipes) MISC; Use in the morning  -     Discontinue: Disposable Gloves MISC; Use daily Large  -     Incontinence Supply Disposable (Disposable Brief Large) MISC; Use daily  -     Incontinence Supply Disposable MISC; Use daily XL Disposable Bed Incontinence Pads  -     Skin Protectants, Misc  (dimethicone-zinc oxide) cream; Apply topically 2 (two) times a day as needed for irritation  -     Disposable Gloves MISC; Use daily Large    Wheelchair bound  -     Incontinence Supply Disposable (RA Adult Wipes) MISC; Use in the morning  -     Discontinue: Disposable Gloves MISC; Use daily Large  -     Incontinence Supply Disposable (Disposable Brief Large) MISC; Use daily  -     Incontinence Supply Disposable MISC; Use daily XL Disposable Bed Incontinence Pads  -     Skin Protectants, Misc  (dimethicone-zinc oxide) cream; Apply topically 2 (two) times a day as needed for irritation  -     Disposable Gloves MISC; Use daily Large    Uncontrolled type 2 diabetes mellitus with hyperglycemia (HCC)  Comments:  continue metformin and farxiga, diabetic diet, has upcoming endocrinology appt          HPI:  Chief Complaint   Patient presents with    Follow-up     1 month, needs script for home care     HPI   Patient is a 60 yo male with complex PMH who presents for 1 month follow up however, this is hospitalization follow up  Patient establish care with our office 1 month ago  We did initiate nurse care manager to reach out but family stated patient was transferring care to PCP closer to home  However, patient was hospitalized at [facility] 6/16-6/21 after presenting for weakness  Upon discharge his lisinopril was stopped  Patient did follow with nephrology on 6/23, no changes made and given labs to continue to monitor  Patient was then read[facility] to [facility] after presenting with sob and chest pain and 10 lb wt gain and was treated for CHF exacerbation  He was started on isosorbide 10 mg tid and his hydralazine was changed to 25 mg bid and torsemide 40 mg bid  His farxiga was discontinued  During hospitalization LHC was not obtained due to concern for LAZARO  Since discharge, patient has home health  He has upcoming follow up with cardiology, endocrinology and nephrology  He missed cardiac rehab visit and ID appt  He is stable since being home  Brother requests DME orders - including wipes, pampers L, bed pads, gloves L; skin barrier creams  He is in wheelchair  He has help transferring  He has a shower chair but was not sitting in it, fell in shower yesterday  Denies any injury  He does not need refills and there are no question on his new medications  He has home mobile lab scheduled to arrive tomorrow  He is having trouble sleeping  At last visit, his previous dose of ativan was prescribed but brother called in subsequent days stating this made him really groggy and fatigued next day  He is taking melatonin 5 mg  He has an appt with psychiatry, Dr Mani Huddleston, on Aug 2nd  He reports he used to be on trazodone but didn't tolerate it well - "knocks ya for a loop"  ROS: Review of Systems   Constitutional: Positive for fatigue  Negative for fever and unexpected weight change  Respiratory: Negative for shortness of breath  Cardiovascular: Negative for chest pain, palpitations and leg swelling  Gastrointestinal: Negative for abdominal pain, nausea and vomiting  Musculoskeletal: Positive for gait problem  Neurological: Positive for weakness  Negative for dizziness, seizures and syncope  Psychiatric/Behavioral: Positive for sleep disturbance  Past Medical History:   Diagnosis Date    Anxiety     Arthritis     Coronary artery disease     Dementia ([facility] )     Depression     Diabetes mellitus ([facility] )     Infectious viral hepatitis     Memory loss     Visual impairment      Patient Active Problem List   Diagnosis    Acute hepatitis C virus infection    Abnormal EKG    [facility]    Bipolar affective disorder, mixed, moderate (HCC)    Congestive heart failure (CHF) (HCC)    Essential hypertension    Mixed hyperlipidemia    Normocytic anemia    Stage 3b chronic kidney disease (HCC)    Type II or unspecified type diabetes mellitus without mention of complication, uncontrolled    Weakness    Cardiomyopathy [facility]    Polysubstance abuse ([facility]    Diarrhea    Uncontrolled type 2 diabetes mellitus with hyperglycemia (HCC)    High serum thyroid stimulating hormone (TSH)    Metabolic acidosis       History reviewed   No pertinent surgical history  Social History     Socioeconomic History    Marital status: Single     Spouse name: None    Number of children: None    Years of education: None    Highest education level: None   Occupational History    None   Tobacco Use    Smoking status: Former Smoker    Smokeless tobacco: Never Used   Vaping Use    Vaping Use: Never used   Substance and Sexual Activity    Alcohol use: Yes     Comment: socially    Drug use: Never    Sexual activity: None   Other Topics Concern    None   Social History Narrative    None     Social Determinants of Health     Financial Resource Strain: Not on file   Food Insecurity: No Food Insecurity    Worried About Running Out of Food in the Last Year: Never true    Son of Food in the Last Year: Never true   Transportation Needs: No Transportation Needs    Lack of Transportation (Medical): No    Lack of Transportation (Non-Medical): No   Physical Activity: Not on file   Stress: Not on file   Social Connections: Not on file   Intimate Partner Violence: Not on file   Housing Stability: Low Risk     Unable to Pay for Housing in the Last Year: No    Number of Places Lived in the Last Year: 1    Unstable Housing in the Last Year: No       History reviewed  No pertinent family history  No Known Allergies    No current facility-administered medications for this visit      Current Outpatient Medications:     carvedilol (COREG) 25 mg tablet, Take 1 tablet (25 mg total) by mouth 2 (two) times a day with meals, Disp: 60 tablet, Rfl: 0    torsemide (DEMADEX) 20 mg tablet, Take 1 tablet (20 mg total) by mouth daily Take 1/2 tablet daily, Disp: 30 tablet, Rfl: 0    Facility-Administered Medications Ordered in Other Visits:     acetaminophen (TYLENOL) tablet 650 mg, 650 mg, Oral, Q6H PRN, Emma Dubois MD    aluminum-magnesium hydroxide-simethicone (MYLANTA) oral suspension 15 mL, 15 mL, Oral, Q6H PRN, Emma Dubois MD    aspirin (ECOTRIN LOW STRENGTH) EC tablet 81 mg, 81 mg, Oral, Daily, Suzan Aguilar MD, 81 mg at 07/19/22 0825    atorvastatin (LIPITOR) tablet 40 mg, 40 mg, Oral, HS, Suzan Aguilar MD, 40 mg at 07/18/22 2120    carvedilol (COREG) tablet 25 mg, 25 mg, Oral, BID With Meals, Jay Pizarro MD, 25 mg at 07/19/22 1652    FLUoxetine (PROzac) capsule 20 mg, 20 mg, Oral, Daily, Suzan Aguilar MD, 20 mg at 66/90/17 0851    folic acid (FOLVITE) tablet 400 mcg, 400 mcg, Oral, Daily, Suzan Agiular MD, 400 mcg at 07/19/22 0825    gabapentin (NEURONTIN) capsule 300 mg, 300 mg, Oral, TID, Suzan Aguilar MD, 300 mg at 07/19/22 1652    heparin (porcine) subcutaneous injection 5,000 Units, 5,000 Units, Subcutaneous, Q8H [address], 5,000 Units at 07/19/22 1424 **AND** [COMPLETED] Platelet count, , , Once, Suzan Aguilar MD    insulin glargine (LANTUS) subcutaneous injection 3 Units 0 03 mL, 3 Units, Subcutaneous, HS, Yossi Pace DO, 3 Units at 07/18/22 2122    insulin lispro (HumaLOG) 100 units/mL subcutaneous injection 1-6 Units, 1-6 Units, Subcutaneous, TID AC, 1 Units at 07/19/22 1538 **AND** Fingerstick Glucose (POCT), , , TID AC, Wilfredo Milford Curling, MD    LORazepam (ATIVAN) tablet 0 5 mg, 0 5 mg, Oral, HS PRN, EMMA Shoemaker, 0 5 mg at 07/18/22 2120    melatonin tablet 6 mg, 6 mg, Oral, HS, Suzan Aguilar MD, 6 mg at 07/18/22 2120    ondansetron (ZOFRAN) injection 4 mg, 4 mg, Intravenous, Q6H PRN, Suzan Aguilar MD    thiamine tablet 100 mg, 100 mg, Oral, Daily, Suzan Aguilar MD, 100 mg at 07/19/22 0825    torsemide (DEMADEX) tablet 20 mg, 20 mg, Oral, Daily, Leilani Ferrell MD, 20 mg at 07/19/22 1000      Physical Exam:  /72 (BP Location: Left arm, Patient Position: Sitting, Cuff Size: Standard)   Pulse 71   Temp (!) 96 2 °F (35 7 °C) (Tympanic)   Ht 5' 5" (1 651 m)   Wt 84 7 kg (186 lb 12 8 oz)   SpO2 99%   BMI 31 09 kg/m²     Physical Exam  Constitutional:       General: He is not in acute distress  Comments: Sleepy, dozing off in chair at times   HENT:      Head: Normocephalic and atraumatic  Right Ear: External ear normal       Left Ear: External ear normal    Eyes:      Conjunctiva/sclera: Conjunctivae normal    Cardiovascular:      Rate and Rhythm: Normal rate and regular rhythm  Pulses: no weak pulses          Posterior tibial pulses are 2+ on the right side and 2+ on the left side  Pulmonary:      Effort: Pulmonary effort is normal  No respiratory distress  Breath sounds: No wheezing  Musculoskeletal:         General: No deformity  Right lower leg: No edema  Left lower leg: No edema  Feet:      Right foot:      Skin integrity: Dry skin present  No ulcer, skin breakdown, erythema, warmth or callus  Left foot:      Skin integrity: Dry skin present  No ulcer, skin breakdown, erythema, warmth or callus  Skin:     General: Skin is warm  Neurological:      Mental Status: He is alert  Gait: Gait abnormal (wheelchair bound)     Psychiatric:         Mood and Affect: Mood normal          Behavior: Behavior normal            Labs:  Lab Results   Component Value Date    WBC 5 60 07/17/2022    HGB 9 5 (L) 07/17/2022    HCT 30 0 (L) 07/17/2022    MCV 95 07/17/2022     07/17/2022     Lab Results   Component Value Date    K 4 7 07/19/2022     (H) 07/19/2022    CO2 21 07/19/2022    BUN 52 (H) 07/19/2022    CREATININE 1 84 (H) 07/19/2022    CALCIUM 9 2 07/19/2022    CORRECTEDCA 9 8 07/16/2022    AST 32 07/16/2022    ALT 52 07/16/2022    ALKPHOS 117 (H) 07/16/2022    EGFR 37 07/19/2022

## 2022-07-15 ENCOUNTER — APPOINTMENT (OUTPATIENT)
Dept: LAB | Facility: CLINIC | Age: 67
DRG: 683 | End: 2022-07-15
Payer: COMMERCIAL

## 2022-07-15 ENCOUNTER — TELEPHONE (OUTPATIENT)
Dept: FAMILY MEDICINE CLINIC | Facility: CLINIC | Age: 67
End: 2022-07-15

## 2022-07-15 ENCOUNTER — HOSPITAL ENCOUNTER (INPATIENT)
Facility: HOSPITAL | Age: 67
LOS: 5 days | Discharge: HOME HEALTH | DRG: 683 | End: 2022-07-20
Attending: EMERGENCY MEDICINE | Admitting: INTERNAL MEDICINE
Payer: COMMERCIAL

## 2022-07-15 ENCOUNTER — HOME CARE VISIT (OUTPATIENT)
Dept: HOME HEALTH SERVICES | Facility: HOME HEALTHCARE | Age: 67
End: 2022-07-15
Payer: COMMERCIAL

## 2022-07-15 VITALS
DIASTOLIC BLOOD PRESSURE: 68 MMHG | HEART RATE: 72 BPM | RESPIRATION RATE: 16 BRPM | TEMPERATURE: 97.6 F | SYSTOLIC BLOOD PRESSURE: 116 MMHG

## 2022-07-15 DIAGNOSIS — E11.65 UNCONTROLLED TYPE 2 DIABETES MELLITUS WITH HYPERGLYCEMIA (HCC): Primary | ICD-10-CM

## 2022-07-15 DIAGNOSIS — N17.9 AKI (ACUTE KIDNEY INJURY) (HCC): Primary | ICD-10-CM

## 2022-07-15 DIAGNOSIS — R73.9 HYPERGLYCEMIA, UNSPECIFIED: ICD-10-CM

## 2022-07-15 DIAGNOSIS — Z09 HOSPITAL DISCHARGE FOLLOW-UP: ICD-10-CM

## 2022-07-15 DIAGNOSIS — N18.32 STAGE 3B CHRONIC KIDNEY DISEASE (HCC): ICD-10-CM

## 2022-07-15 DIAGNOSIS — F19.10 POLYSUBSTANCE ABUSE (HCC): ICD-10-CM

## 2022-07-15 DIAGNOSIS — I50.9 CONGESTIVE HEART FAILURE, UNSPECIFIED HF CHRONICITY, UNSPECIFIED HEART FAILURE TYPE (HCC): ICD-10-CM

## 2022-07-15 LAB
ALBUMIN SERPL BCP-MCNC: 2.8 G/DL (ref 3.5–5)
ALBUMIN SERPL BCP-MCNC: 3 G/DL (ref 3.5–5)
ALP SERPL-CCNC: 128 U/L (ref 46–116)
ALP SERPL-CCNC: 133 U/L (ref 46–116)
ALT SERPL W P-5'-P-CCNC: 58 U/L (ref 12–78)
ALT SERPL W P-5'-P-CCNC: 59 U/L (ref 12–78)
ANION GAP SERPL CALCULATED.3IONS-SCNC: 4 MMOL/L (ref 4–13)
ANION GAP SERPL CALCULATED.3IONS-SCNC: 7 MMOL/L (ref 4–13)
AST SERPL W P-5'-P-CCNC: 35 U/L (ref 5–45)
AST SERPL W P-5'-P-CCNC: 39 U/L (ref 5–45)
BACTERIA UR QL AUTO: NORMAL /HPF
BASOPHILS # BLD AUTO: 0.04 THOUSANDS/ΜL (ref 0–0.1)
BASOPHILS # BLD AUTO: 0.07 THOUSANDS/ΜL (ref 0–0.1)
BASOPHILS NFR BLD AUTO: 1 % (ref 0–1)
BASOPHILS NFR BLD AUTO: 1 % (ref 0–1)
BILIRUB SERPL-MCNC: 0.22 MG/DL (ref 0.2–1)
BILIRUB SERPL-MCNC: 0.35 MG/DL (ref 0.2–1)
BILIRUB UR QL STRIP: NEGATIVE
BUN SERPL-MCNC: 78 MG/DL (ref 5–25)
BUN SERPL-MCNC: 82 MG/DL (ref 5–25)
CALCIUM ALBUM COR SERPL-MCNC: 10.1 MG/DL (ref 8.3–10.1)
CALCIUM ALBUM COR SERPL-MCNC: 9.9 MG/DL (ref 8.3–10.1)
CALCIUM SERPL-MCNC: 9.1 MG/DL (ref 8.3–10.1)
CALCIUM SERPL-MCNC: 9.1 MG/DL (ref 8.3–10.1)
CHLORIDE SERPL-SCNC: 107 MMOL/L (ref 100–108)
CHLORIDE SERPL-SCNC: 111 MMOL/L (ref 100–108)
CLARITY UR: CLEAR
CO2 SERPL-SCNC: 23 MMOL/L (ref 21–32)
CO2 SERPL-SCNC: 24 MMOL/L (ref 21–32)
COLOR UR: ABNORMAL
CREAT SERPL-MCNC: 2.88 MG/DL (ref 0.6–1.3)
CREAT SERPL-MCNC: 2.9 MG/DL (ref 0.6–1.3)
EOSINOPHIL # BLD AUTO: 0.31 THOUSAND/ΜL (ref 0–0.61)
EOSINOPHIL # BLD AUTO: 0.33 THOUSAND/ΜL (ref 0–0.61)
EOSINOPHIL NFR BLD AUTO: 6 % (ref 0–6)
EOSINOPHIL NFR BLD AUTO: 7 % (ref 0–6)
ERYTHROCYTE [DISTWIDTH] IN BLOOD BY AUTOMATED COUNT: 14.6 % (ref 11.6–15.1)
ERYTHROCYTE [DISTWIDTH] IN BLOOD BY AUTOMATED COUNT: 14.8 % (ref 11.6–15.1)
GFR SERPL CREATININE-BSD FRML MDRD: 21 ML/MIN/1.73SQ M
GFR SERPL CREATININE-BSD FRML MDRD: 22 ML/MIN/1.73SQ M
GLUCOSE SERPL-MCNC: 326 MG/DL (ref 65–140)
GLUCOSE SERPL-MCNC: 370 MG/DL (ref 65–140)
GLUCOSE UR STRIP-MCNC: ABNORMAL MG/DL
HCT VFR BLD AUTO: 31 % (ref 36.5–49.3)
HCT VFR BLD AUTO: 31 % (ref 36.5–49.3)
HGB BLD-MCNC: 10 G/DL (ref 12–17)
HGB BLD-MCNC: 9.8 G/DL (ref 12–17)
HGB UR QL STRIP.AUTO: NEGATIVE
IMM GRANULOCYTES # BLD AUTO: 0.02 THOUSAND/UL (ref 0–0.2)
IMM GRANULOCYTES # BLD AUTO: 0.02 THOUSAND/UL (ref 0–0.2)
IMM GRANULOCYTES NFR BLD AUTO: 0 % (ref 0–2)
IMM GRANULOCYTES NFR BLD AUTO: 0 % (ref 0–2)
KETONES UR STRIP-MCNC: NEGATIVE MG/DL
LEUKOCYTE ESTERASE UR QL STRIP: NEGATIVE
LYMPHOCYTES # BLD AUTO: 1.32 THOUSANDS/ΜL (ref 0.6–4.47)
LYMPHOCYTES # BLD AUTO: 1.51 THOUSANDS/ΜL (ref 0.6–4.47)
LYMPHOCYTES NFR BLD AUTO: 24 % (ref 14–44)
LYMPHOCYTES NFR BLD AUTO: 31 % (ref 14–44)
MCH RBC QN AUTO: 29.8 PG (ref 26.8–34.3)
MCH RBC QN AUTO: 30.4 PG (ref 26.8–34.3)
MCHC RBC AUTO-ENTMCNC: 31.6 G/DL (ref 31.4–37.4)
MCHC RBC AUTO-ENTMCNC: 32.3 G/DL (ref 31.4–37.4)
MCV RBC AUTO: 92 FL (ref 82–98)
MCV RBC AUTO: 96 FL (ref 82–98)
MONOCYTES # BLD AUTO: 0.42 THOUSAND/ΜL (ref 0.17–1.22)
MONOCYTES # BLD AUTO: 0.55 THOUSAND/ΜL (ref 0.17–1.22)
MONOCYTES NFR BLD AUTO: 10 % (ref 4–12)
MONOCYTES NFR BLD AUTO: 9 % (ref 4–12)
NEUTROPHILS # BLD AUTO: 2.56 THOUSANDS/ΜL (ref 1.85–7.62)
NEUTROPHILS # BLD AUTO: 3.34 THOUSANDS/ΜL (ref 1.85–7.62)
NEUTS SEG NFR BLD AUTO: 52 % (ref 43–75)
NEUTS SEG NFR BLD AUTO: 59 % (ref 43–75)
NITRITE UR QL STRIP: NEGATIVE
NON-SQ EPI CELLS URNS QL MICRO: NORMAL /HPF
NRBC BLD AUTO-RTO: 0 /100 WBCS
NRBC BLD AUTO-RTO: 0 /100 WBCS
PH UR STRIP.AUTO: 5.5 [PH]
PLATELET # BLD AUTO: 155 THOUSANDS/UL (ref 149–390)
PLATELET # BLD AUTO: 158 THOUSANDS/UL (ref 149–390)
PLATELET # BLD AUTO: 166 THOUSANDS/UL (ref 149–390)
PMV BLD AUTO: 10.9 FL (ref 8.9–12.7)
PMV BLD AUTO: 11.4 FL (ref 8.9–12.7)
PMV BLD AUTO: 11.7 FL (ref 8.9–12.7)
POTASSIUM SERPL-SCNC: 5.2 MMOL/L (ref 3.5–5.3)
POTASSIUM SERPL-SCNC: 5.4 MMOL/L (ref 3.5–5.3)
PROT SERPL-MCNC: 7.4 G/DL (ref 6.4–8.2)
PROT SERPL-MCNC: 7.5 G/DL (ref 6.4–8.2)
PROT UR STRIP-MCNC: ABNORMAL MG/DL
RBC # BLD AUTO: 3.22 MILLION/UL (ref 3.88–5.62)
RBC # BLD AUTO: 3.36 MILLION/UL (ref 3.88–5.62)
RBC #/AREA URNS AUTO: NORMAL /HPF
SODIUM SERPL-SCNC: 138 MMOL/L (ref 136–145)
SODIUM SERPL-SCNC: 138 MMOL/L (ref 136–145)
SP GR UR STRIP.AUTO: 1.01 (ref 1–1.03)
UROBILINOGEN UR STRIP-ACNC: <2 MG/DL
WBC # BLD AUTO: 4.88 THOUSAND/UL (ref 4.31–10.16)
WBC # BLD AUTO: 5.61 THOUSAND/UL (ref 4.31–10.16)
WBC #/AREA URNS AUTO: NORMAL /HPF

## 2022-07-15 PROCEDURE — 80053 COMPREHEN METABOLIC PANEL: CPT | Performed by: EMERGENCY MEDICINE

## 2022-07-15 PROCEDURE — 85025 COMPLETE CBC W/AUTO DIFF WBC: CPT

## 2022-07-15 PROCEDURE — 80053 COMPREHEN METABOLIC PANEL: CPT

## 2022-07-15 PROCEDURE — 96360 HYDRATION IV INFUSION INIT: CPT

## 2022-07-15 PROCEDURE — 83036 HEMOGLOBIN GLYCOSYLATED A1C: CPT | Performed by: INTERNAL MEDICINE

## 2022-07-15 PROCEDURE — 85049 AUTOMATED PLATELET COUNT: CPT | Performed by: INTERNAL MEDICINE

## 2022-07-15 PROCEDURE — G0299 HHS/HOSPICE OF RN EA 15 MIN: HCPCS

## 2022-07-15 PROCEDURE — 99285 EMERGENCY DEPT VISIT HI MDM: CPT | Performed by: EMERGENCY MEDICINE

## 2022-07-15 PROCEDURE — 99284 EMERGENCY DEPT VISIT MOD MDM: CPT

## 2022-07-15 PROCEDURE — 3066F NEPHROPATHY DOC TX: CPT | Performed by: PHYSICIAN ASSISTANT

## 2022-07-15 PROCEDURE — 36415 COLL VENOUS BLD VENIPUNCTURE: CPT

## 2022-07-15 PROCEDURE — 81001 URINALYSIS AUTO W/SCOPE: CPT | Performed by: INTERNAL MEDICINE

## 2022-07-15 PROCEDURE — 99223 1ST HOSP IP/OBS HIGH 75: CPT | Performed by: INTERNAL MEDICINE

## 2022-07-15 PROCEDURE — 85025 COMPLETE CBC W/AUTO DIFF WBC: CPT | Performed by: EMERGENCY MEDICINE

## 2022-07-15 RX ORDER — LORAZEPAM 0.5 MG/1
0.5 TABLET ORAL
Status: DISCONTINUED | OUTPATIENT
Start: 2022-07-15 | End: 2022-07-20 | Stop reason: HOSPADM

## 2022-07-15 RX ORDER — HEPARIN SODIUM 5000 [USP'U]/ML
5000 INJECTION, SOLUTION INTRAVENOUS; SUBCUTANEOUS EVERY 8 HOURS SCHEDULED
Status: DISCONTINUED | OUTPATIENT
Start: 2022-07-15 | End: 2022-07-20 | Stop reason: HOSPADM

## 2022-07-15 RX ORDER — LANOLIN ALCOHOL/MO/W.PET/CERES
100 CREAM (GRAM) TOPICAL DAILY
Status: DISCONTINUED | OUTPATIENT
Start: 2022-07-16 | End: 2022-07-20 | Stop reason: HOSPADM

## 2022-07-15 RX ORDER — GABAPENTIN 300 MG/1
300 CAPSULE ORAL 3 TIMES DAILY
Status: DISCONTINUED | OUTPATIENT
Start: 2022-07-15 | End: 2022-07-20 | Stop reason: HOSPADM

## 2022-07-15 RX ORDER — ONDANSETRON 2 MG/ML
4 INJECTION INTRAMUSCULAR; INTRAVENOUS EVERY 6 HOURS PRN
Status: DISCONTINUED | OUTPATIENT
Start: 2022-07-15 | End: 2022-07-20 | Stop reason: HOSPADM

## 2022-07-15 RX ORDER — MAGNESIUM HYDROXIDE/ALUMINUM HYDROXICE/SIMETHICONE 120; 1200; 1200 MG/30ML; MG/30ML; MG/30ML
15 SUSPENSION ORAL EVERY 6 HOURS PRN
Status: DISCONTINUED | OUTPATIENT
Start: 2022-07-15 | End: 2022-07-20 | Stop reason: HOSPADM

## 2022-07-15 RX ORDER — ACETAMINOPHEN 325 MG/1
650 TABLET ORAL EVERY 6 HOURS PRN
Status: DISCONTINUED | OUTPATIENT
Start: 2022-07-15 | End: 2022-07-20 | Stop reason: HOSPADM

## 2022-07-15 RX ORDER — INSULIN LISPRO 100 [IU]/ML
1-6 INJECTION, SOLUTION INTRAVENOUS; SUBCUTANEOUS
Status: DISCONTINUED | OUTPATIENT
Start: 2022-07-16 | End: 2022-07-20 | Stop reason: HOSPADM

## 2022-07-15 RX ORDER — BLOOD SUGAR DIAGNOSTIC
STRIP MISCELLANEOUS
Qty: 400 EACH | Refills: 1 | Status: SHIPPED | OUTPATIENT
Start: 2022-07-15

## 2022-07-15 RX ORDER — LANOLIN ALCOHOL/MO/W.PET/CERES
6 CREAM (GRAM) TOPICAL
Status: DISCONTINUED | OUTPATIENT
Start: 2022-07-15 | End: 2022-07-20 | Stop reason: HOSPADM

## 2022-07-15 RX ORDER — ASPIRIN 81 MG/1
81 TABLET ORAL DAILY
Status: DISCONTINUED | OUTPATIENT
Start: 2022-07-16 | End: 2022-07-20 | Stop reason: HOSPADM

## 2022-07-15 RX ORDER — LANOLIN ALCOHOL/MO/W.PET/CERES
400 CREAM (GRAM) TOPICAL DAILY
Status: DISCONTINUED | OUTPATIENT
Start: 2022-07-16 | End: 2022-07-20 | Stop reason: HOSPADM

## 2022-07-15 RX ORDER — ISOSORBIDE DINITRATE 10 MG/1
10 TABLET ORAL 3 TIMES DAILY
Status: DISCONTINUED | OUTPATIENT
Start: 2022-07-15 | End: 2022-07-16

## 2022-07-15 RX ORDER — SODIUM CHLORIDE 9 MG/ML
75 INJECTION, SOLUTION INTRAVENOUS CONTINUOUS
Status: DISCONTINUED | OUTPATIENT
Start: 2022-07-15 | End: 2022-07-17

## 2022-07-15 RX ORDER — FLUOXETINE HYDROCHLORIDE 20 MG/1
20 CAPSULE ORAL DAILY
Status: DISCONTINUED | OUTPATIENT
Start: 2022-07-16 | End: 2022-07-20 | Stop reason: HOSPADM

## 2022-07-15 RX ORDER — HYDRALAZINE HYDROCHLORIDE 25 MG/1
25 TABLET, FILM COATED ORAL 3 TIMES DAILY
Status: DISCONTINUED | OUTPATIENT
Start: 2022-07-15 | End: 2022-07-16

## 2022-07-15 RX ORDER — METOPROLOL SUCCINATE 50 MG/1
50 TABLET, EXTENDED RELEASE ORAL DAILY
Status: DISCONTINUED | OUTPATIENT
Start: 2022-07-15 | End: 2022-07-16

## 2022-07-15 RX ORDER — ATORVASTATIN CALCIUM 40 MG/1
40 TABLET, FILM COATED ORAL
Status: DISCONTINUED | OUTPATIENT
Start: 2022-07-15 | End: 2022-07-20 | Stop reason: HOSPADM

## 2022-07-15 RX ORDER — LORAZEPAM 0.5 MG/1
0.5 TABLET ORAL
COMMUNITY
End: 2022-09-07

## 2022-07-15 RX ADMIN — LORAZEPAM 0.5 MG: 0.5 TABLET ORAL at 23:24

## 2022-07-15 RX ADMIN — GABAPENTIN 300 MG: 300 CAPSULE ORAL at 22:17

## 2022-07-15 RX ADMIN — ISOSORBIDE DINITRATE 10 MG: 10 TABLET ORAL at 23:24

## 2022-07-15 RX ADMIN — HYDRALAZINE HYDROCHLORIDE 25 MG: 25 TABLET, FILM COATED ORAL at 22:17

## 2022-07-15 RX ADMIN — SODIUM CHLORIDE 500 ML: 0.9 INJECTION, SOLUTION INTRAVENOUS at 18:16

## 2022-07-15 RX ADMIN — HEPARIN SODIUM 5000 UNITS: 5000 INJECTION INTRAVENOUS; SUBCUTANEOUS at 22:18

## 2022-07-15 RX ADMIN — MELATONIN 6 MG: at 22:17

## 2022-07-15 RX ADMIN — SODIUM CHLORIDE 75 ML/HR: 0.9 INJECTION, SOLUTION INTRAVENOUS at 22:18

## 2022-07-15 RX ADMIN — ATORVASTATIN CALCIUM 40 MG: 40 TABLET, FILM COATED ORAL at 22:17

## 2022-07-15 RX ADMIN — METOPROLOL SUCCINATE 50 MG: 50 TABLET, EXTENDED RELEASE ORAL at 22:16

## 2022-07-15 NOTE — CASE COMMUNICATION
Home  P T   resumed  following  recent  hospitalization  and  to  continue  2  X  per  week  Treatment  to  consist  of  transfer  training  G T   on  indoor  and  outdoor  surfaces  and  strengthening  ex  to  BLEs    Also  instruct  in  HEP  and  home  safety  and  energy  conservation  techniques

## 2022-07-15 NOTE — TELEPHONE ENCOUNTER
Requested medication(s) are due for refill today: Yes  Patient has already received a courtesy refill: No  Other reason request has been forwarded to provider:  No diagnosis code, originally prescribed by hospitalist

## 2022-07-15 NOTE — TELEPHONE ENCOUNTER
Rosas Young the care giver called, informed him pt to go to the ER  Family requesting  [facility] in [facility] is asking if Rohan Mariee he has few questions please call him back    [phone]

## 2022-07-15 NOTE — ED PROVIDER NOTES
History  Chief Complaint   Patient presents with    Abnormal Lab     Pt was sent in after an abnormal lab result today showing an LAZARO - pt recently increased diuretic dose for CHF  Pt denies pain or symptoms  Was told to come in for IVF and evaluation  Pt is a 58yo M who presents for elevated creatinine  Pt reports that he had outpt blood work done and received a call today that his creatinine was increasing and he should present to the hospital  Pt reports that he was recently inpt at  and they increased his dose of his diuretic  Pt states he has been taking it at the increased dose  Pt denies any SOB or swelling in his lower extremities  Brother reports that pt's weight has been stable or slightly decreased  They deny any significant increase in weight  Pt reports frequent urination but largely unchanged  Pt largely without complaints  Pt states he takes his medications regularly  Prior to Admission Medications   Prescriptions Last Dose Informant Patient Reported? Taking?    Acetaminophen Extra Strength 500 MG tablet   Yes No   Sig: Take 500 mg by mouth every 6 (six) hours as needed   Aspirin Low Dose 81 MG EC tablet 7/15/2022 at Unknown time  Yes Yes   Sig: Take 81 mg by mouth daily   Disposable Gloves MISC   No No   Sig: Use daily Large   FLUoxetine (PROzac) 20 mg capsule 7/15/2022 at Unknown time  No Yes   Sig: Take 1 capsule (20 mg total) by mouth daily   Farxiga 5 MG TABS   Yes No   Sig: Take 5 mg by mouth in the morning   Incontinence Supply Disposable (Disposable Brief Large) MISC   No No   Sig: Use daily   Incontinence Supply Disposable (RA Adult Wipes) MISC   No No   Sig: Use in the morning   Incontinence Supply Disposable MISC   No No   Sig: Use daily XL Disposable Bed Incontinence Pads   LORazepam (ATIVAN) 0 5 mg tablet 7/14/2022 at Unknown time  Yes Yes   Sig: Take 0 5 mg by mouth daily at bedtime   Lancet Devices (OneTouch Delica Plus [place]) MISC   Yes No   Sig: Use 1 Dose in the morning Use as directed    Melatonin 5 MG TABS 7/14/2022 at Unknown time  Yes Yes   Sig: Take 5 mg by mouth daily at bedtime   Skin Protectants, Misc  (dimethicone-zinc oxide) cream   No No   Sig: Apply topically 2 (two) times a day as needed for irritation   atorvastatin (LIPITOR) 40 mg tablet 7/14/2022 at Unknown time  Yes Yes   Sig: Take 40 mg by mouth daily at bedtime   folic acid (FOLVITE) 1 mg tablet   No No   Sig: Take 1 tablet (1 mg total) by mouth daily   Patient taking differently: Take 400 mcg by mouth daily   gabapentin (Neurontin) 300 mg capsule 7/15/2022 at Unknown time  No Yes   Sig: Take 1 capsule (300 mg total) by mouth 3 (three) times a day   glucose blood (Accu-Chek Guide) test strip   No No   Sig: Test sugars four times daily   hydrALAZINE (APRESOLINE) 10 mg tablet 7/15/2022 at Unknown time  Yes Yes   Sig: Take 25 mg by mouth 3 (three) times a day 1 tablet every 8 hours    isosorbide dinitrate (ISORDIL) 10 mg tablet 7/15/2022 at Unknown time  Yes Yes   Sig: Take 10 mg by mouth 3 (three) times a day   Indications:     metFORMIN (GLUCOPHAGE) 500 mg tablet 7/15/2022 at Unknown time  No Yes   Sig: Take 1 tablet (500 mg total) by mouth 2 (two) times a day with meals   metoprolol succinate (TOPROL-XL) 50 mg 24 hr tablet   Yes No   Sig: Take 50 mg by mouth in the morning   thiamine (VITAMIN B1) 100 mg tablet   No No   Sig: Take 1 tablet (100 mg total) by mouth daily   torsemide (DEMADEX) 20 mg tablet 7/15/2022 at Unknown time  No Yes   Sig: Take 0 5 tablets (10 mg total) by mouth in the morning Take 1/2 tablet daily   Patient taking differently: Take 40 mg by mouth 2 (two) times a day Take 1/2 tablet daily      Facility-Administered Medications: None       Past Medical History:   Diagnosis Date    Anxiety     Arthritis     Coronary artery disease     [facility] )     Depression     Diabetes mellitus ([facility] )     Infectious viral hepatitis     Memory loss     Visual impairment        History reviewed  No pertinent surgical history  History reviewed  No pertinent family history  I have reviewed and agree with the history as documented  E-Cigarette/Vaping    E-Cigarette Use Never User      E-Cigarette/Vaping Substances     Social History     Tobacco Use    Smoking status: Former Smoker    Smokeless tobacco: Never Used   Vaping Use    Vaping Use: Never used   Substance Use Topics    Alcohol use: Yes     Comment: socially    Drug use: Never        Review of Systems   Constitutional: Positive for fatigue (mild)  Genitourinary: Positive for frequency (unchanged from baseline)  All other systems reviewed and are negative  Physical Exam  ED Triage Vitals [07/15/22 1705]   Temperature Pulse Respirations Blood Pressure SpO2   97 6 °F (36 4 °C) 66 20 138/84 94 %      Temp Source Heart Rate Source Patient Position - Orthostatic VS BP Location FiO2 (%)   Oral Monitor Sitting Right arm --      Pain Score       No Pain             Orthostatic Vital Signs  Vitals:    07/19/22 1526 07/19/22 2225 07/20/22 0710 07/20/22 0839   BP: 157/89 157/90 154/90 152/89   Pulse: 72 67 71 77   Patient Position - Orthostatic VS:           Physical Exam  Vitals reviewed  Constitutional:       General: He is not in acute distress  Appearance: He is well-developed  He is not toxic-appearing or diaphoretic  HENT:      Head: Normocephalic and atraumatic  Right Ear: External ear normal       Left Ear: External ear normal       Nose: Nose normal       Mouth/Throat:      Pharynx: Oropharynx is clear  Eyes:      Extraocular Movements: Extraocular movements intact  Cardiovascular:      Rate and Rhythm: Normal rate and regular rhythm  Heart sounds: Normal heart sounds  No murmur heard  Pulmonary:      Effort: Pulmonary effort is normal  No respiratory distress  Breath sounds: Normal breath sounds  No stridor  No wheezing or rales  Abdominal:      General: There is no distension        Palpations: Abdomen is soft  Tenderness: There is no abdominal tenderness  Musculoskeletal:         General: Normal range of motion  Cervical back: Normal range of motion  Right lower leg: Edema (trace) present  Left lower leg: Edema (trace) present  Skin:     General: Skin is warm and dry  Capillary Refill: Capillary refill takes less than 2 seconds  Neurological:      Mental Status: He is alert and oriented to person, place, and time  Psychiatric:         Speech: Speech normal          Behavior: Behavior is cooperative  ED Medications  Medications   sodium chloride 0 9 % bolus 500 mL (0 mL Intravenous Stopped 7/15/22 2100)   magnesium sulfate IVPB (premix) SOLN 1 g (1 g Intravenous New Bag 7/16/22 1501)   furosemide (LASIX) injection 40 mg (40 mg Intravenous Given 7/19/22 1652)       Diagnostic Studies  Results Reviewed     Procedure Component Value Units Date/Time    Reducing substances, stool [[id number]] Collected: 07/16/22 0939    Lab Status: Final result Specimen: Stool from Rectum Updated: 07/20/22 1530     Scan Result SEE WRITTEN REPORT    Fecal leukocytes [[id number]] Collected: 07/16/22 0939    Lab Status: Final result Specimen: Stool from Per Rectum Updated: 07/18/22 1406     White Blood Cells, Stool No white blood cells seen  Narrative:      Performed at:  [address] [place] [address]  [id number]  : Ann Marie Ta MD, Phone:  [phone]    TSH, 3rd generation [[id number]]  (Abnormal) Collected: 07/16/22 6081    Lab Status: Final result Specimen: Blood from Arm, Right Updated: 07/16/22 0647     TSH 3RD Basia Irene 5 020 uIU/mL     Narrative:      Patients undergoing fluorescein dye angiography may retain small amounts of fluorescein in the body for 48-72 hours post procedure  Samples containing fluorescein can produce falsely depressed TSH values  If the patient had this procedure,a specimen should be resubmitted post fluorescein clearance  Comprehensive metabolic panel [[id number]]  (Abnormal) Collected: 07/16/22 0439    Lab Status: Final result Specimen: Blood from Arm, Right Updated: 07/16/22 0647     Sodium 142 mmol/L      Potassium 4 8 mmol/L      Chloride 113 mmol/L      CO2 23 mmol/L      ANION GAP 6 mmol/L      BUN 76 mg/dL      Creatinine 2 52 mg/dL      Glucose 167 mg/dL      Calcium 8 8 mg/dL      Corrected Calcium 9 8 mg/dL      AST 32 U/L      ALT 52 U/L      Alkaline Phosphatase 117 U/L      Total Protein 6 9 g/dL      Albumin 2 8 g/dL      Total Bilirubin 0 38 mg/dL      eGFR 25 ml/min/1 73sq m     Narrative:      National Kid[employer] guidelines for Chronic Kidney Disease (CKD):     Stage 1 with normal or high GFR (GFR > 90 mL/min/1 73 square meters)    Stage 2 Mild CKD (GFR = 60-89 mL/min/1 73 square meters)    Stage 3A Moderate CKD (GFR = 45-59 mL/min/1 73 square meters)    Stage 3B Moderate CKD (GFR = 30-44 mL/min/1 73 square meters)    Stage 4 Severe CKD (GFR = 15-29 mL/min/1 73 square meters)    Stage 5 End Stage CKD (GFR <15 mL/min/1 73 square meters)  Note: GFR calculation is accurate only with a steady state creatinine    Magnesium [[id number]]  (Normal) Collected: 07/16/22 0439    Lab Status: Final result Specimen: Blood from Arm, Right Updated: 07/16/22 0620     Magnesium 1 9 mg/dL     CBC and differential [[id number]]  (Abnormal) Collected: 07/16/22 0439    Lab Status: Final result Specimen: Blood from Arm, Right Updated: 07/16/22 0557     WBC 5 55 Thousand/uL      RBC 3 10 Million/uL      Hemoglobin 9 3 g/dL      Hematocrit 28 8 %      MCV 93 fL      MCH 30 0 pg      MCHC 32 3 g/dL      RDW 14 9 %      MPV 11 3 fL      Platelets 633 Thousands/uL      nRBC 1 /100 WBCs      Neutrophils Relative 53 %      Immat GRANS % 1 %      Lymphocytes Relative 29 %      Monocytes Relative 9 %      Eosinophils Relative 7 %      Basophils Relative 1 %      Neutrophils Absolute 2 96 Thousands/µL      Immature Grans Absolute 0 03 Thousand/uL      Lymphocytes Absolute 1 58 Thousands/µL      Monocytes Absolute 0 52 Thousand/µL      Eosinophils Absolute 0 39 Thousand/µL      Basophils Absolute 0 07 Thousands/µL     Hemoglobin A1c w/EAG Estimation (Orders if not completed within the last 90 days) [[id number]]  (Abnormal) Collected: 07/15/22 2207    Lab Status: Final result Specimen: Blood from Hand, Right Updated: 07/16/22 0034     Hemoglobin A1C 7 7 %       mg/dl     Platelet count [[id number]]  (Normal) Collected: 07/15/22 2207    Lab Status: Final result Specimen: Blood from Hand, Right Updated: 07/15/22 2219     Platelets 953 Thousands/uL      MPV 10 9 fL     Urine Microscopic [[id number]]  (Normal) Collected: 07/15/22 2028    Lab Status: Final result Specimen: Urine, Clean Catch Updated: 07/15/22 2113     RBC, UA 1-2 /hpf      WBC, UA None Seen /hpf      Epithelial Cells Occasional /hpf      Bacteria, UA None Seen /hpf     UA (URINE) with reflex to Scope [[id number]]  (Abnormal) Collected: 07/15/22 2028    Lab Status: Final result Specimen: Urine, Clean Catch Updated: 07/15/22 2056     Color, UA Light Yellow     Clarity, UA Clear     Specific Gravity, UA 1 012     pH, UA 5 5     Leukocytes, UA Negative     Nitrite, UA Negative     Protein, UA 50 (1+) mg/dl      Glucose,  (1/5%) mg/dl      Ketones, UA Negative mg/dl      Urobilinogen, UA <2 0 mg/dl      Bilirubin, UA Negative     Occult Blood, UA Negative    Comprehensive metabolic panel [[id number]]  (Abnormal) Collected: 07/15/22 1816    Lab Status: Final result Specimen: Blood from Hand, Right Updated: 07/15/22 1852     Sodium 138 mmol/L      Potassium 5 4 mmol/L      Chloride 107 mmol/L      CO2 24 mmol/L      ANION GAP 7 mmol/L      BUN 82 mg/dL      Creatinine 2 88 mg/dL      Glucose 370 mg/dL      Calcium 9 1 mg/dL      Corrected Calcium 9 9 mg/dL      AST 35 U/L      ALT 58 U/L      Alkaline Phosphatase 128 U/L      Total Protein 7 5 g/dL      Albumin 3 0 g/dL      Total Bilirubin 0 22 mg/dL      eGFR 22 ml/min/1 73sq m     Narrative:      [place] guidelines for Chronic Kidney Disease (CKD):     Stage 1 with normal or high GFR (GFR > 90 mL/min/1 73 square meters)    Stage 2 Mild CKD (GFR = 60-89 mL/min/1 73 square meters)    Stage 3A Moderate CKD (GFR = 45-59 mL/min/1 73 square meters)    Stage 3B Moderate CKD (GFR = 30-44 mL/min/1 73 square meters)    Stage 4 Severe CKD (GFR = 15-29 mL/min/1 73 square meters)    Stage 5 End Stage CKD (GFR <15 mL/min/1 73 square meters)  Note: GFR calculation is accurate only with a steady state creatinine    CBC and differential [[id number]]  (Abnormal) Collected: 07/15/22 1816    Lab Status: Final result Specimen: Blood from Hand, Right Updated: 07/15/22 1838     WBC 4 88 Thousand/uL      RBC 3 36 Million/uL      Hemoglobin 10 0 g/dL      Hematocrit 31 0 %      MCV 92 fL      MCH 29 8 pg      MCHC 32 3 g/dL      RDW 14 8 %      MPV 11 4 fL      Platelets 912 Thousands/uL      nRBC 0 /100 WBCs      Neutrophils Relative 52 %      Immat GRANS % 0 %      Lymphocytes Relative 31 %      Monocytes Relative 9 %      Eosinophils Relative 7 %      Basophils Relative 1 %      Neutrophils Absolute 2 56 Thousands/µL      Immature Grans Absolute 0 02 Thousand/uL      Lymphocytes Absolute 1 51 Thousands/µL      Monocytes Absolute 0 42 Thousand/µL      Eosinophils Absolute 0 33 Thousand/µL      Basophils Absolute 0 04 Thousands/µL                  XR chest pa & lateral   Final Result by Madeline Menon MD (07/19 1107)      Patchy bibasilar infiltrates left greater than right with small bilateral pleural effusions left greater than right  The findings may reflect a combination of atelectasis and/or pneumonia  Clinical correlation and follow-up advised  Workstation performed: [id number]          kidney and bladder   Final Result by Raul Johnson MD (07/17 2807)      Simple renal cysts  Hepatic steatosis  Workstation performed: [id number]               Procedures  Procedures      ED Course  ED Course as of 07/26/22 1241   Fri Jul 15, 2022   1844 Hemoglobin(!): 10 0  Anemia stable from prior  1844 CBC and differential(!)  Reviewed and without actionable derangement  1920 Creatinine(!): 2 88  Stable from earlier today  Increased from prior of 2 27 4 days ago  1920 Glucose, Random(!): 370  Elevated  1925 Discussed results and plan for admission with pt who is agreeable  Requesting that we call his brother Sukh Gorman to update him as well  1928 Sukh Gorman updated and in agreement with plan  46 SLIM contacted for admission  MDM  Number of Diagnoses or Management Options  LAZARO (acute kidney injury) ([place] [address] )  Hyperglycemia, unspecified  Diagnosis management comments: Pt is a 58yo M who presents with elevated creatinine  Exam pertinent for no evidence of fluid overload  Will plan for basic labs as well as admission for medication optimization  Pt and brother agreeable to plan  See ED course for details  Plan to admit pt to [facility]  Pt discussed with admitting team and admission orders placed  Pt admitted without incident              Amount and/or Complexity of Data Reviewed  Clinical lab tests: ordered and reviewed        Disposition  Final diagnoses:   LAZARO (acute kidney injury) ([place] [address] )   Hyperglycemia, unspecified     Time reflects when diagnosis was documented in both MDM as applicable and the Disposition within this note     Time User Action Codes Description Comment    7/15/2022  7:35 PM Azeb Robertsu Add [N17 9] LAZARO (acute kidney injury) ([place] [address] )     7/15/2022  7:35 PM Azeb Beal Add [R73 9] Hyperglycemia, unspecified     7/15/2022  8:07 PM Ova People Add [F19 10] Polysubstance abuse ([place] [address] )     7/19/2022 12:05 PM Brandy Cowden Add [I50 9] Congestive heart failure, unspecified HF chronicity, unspecified heart failure type ([place] [address] ) meds reconciled per cardiology; on BB, ACEI, hydralazine, diuretic; EF 30%, given lifevest, needs LHC and cardiac rehab; continue f/u with cardiology      ED Disposition     ED Disposition   Admit    Condition   Stable    Date/Time   Fri Jul 15, 2022  7:42 PM    Comment   Case was discussed with JENY and the patient's admission status was agreed to be Admission Status: inpatient status to the service of Dr Martha Jimenez             Follow-up Information     Follow up With Specialties Details Why [address] [facility]  Follow up  [address] [facility] [address] [facility]  [address] [facility], PA-C Family Medicine, Physician Assistant Follow up in 1 week(s)  [address] [place] [facility]      Ruthie Brennan MD Nephrology Follow up  [id number] [facility] [address] [facility] Cardiology  Follow up in 1 week(s)            Discharge Medication List as of 7/20/2022 11:18 AM      START taking these medications    Details   carvedilol (COREG) 25 mg tablet Take 1 tablet (25 mg total) by mouth 2 (two) times a day with meals, Starting Tue 7/19/2022, Normal         CONTINUE these medications which have CHANGED    Details   torsemide (DEMADEX) 20 mg tablet Take 1 tablet (20 mg total) by mouth daily Take 1/2 tablet daily, Starting Tue 7/19/2022, Normal         CONTINUE these medications which have NOT CHANGED    Details   Aspirin Low Dose 81 MG EC tablet Take 81 mg by mouth daily, Starting Fri 4/29/2022, Historical Med      atorvastatin (LIPITOR) 40 mg tablet Take 40 mg by mouth daily at bedtime, Starting Fri 4/29/2022, Historical Med      FLUoxetine (PROzac) 20 mg capsule Take 1 capsule (20 mg total) by mouth daily, Starting Tue 6/14/2022, Normal      gabapentin (Neurontin) 300 mg capsule Take 1 capsule (300 mg total) by mouth 3 (three) times a day, Starting Thu 6/30/2022, Normal      LORazepam (ATIVAN) 0 5 mg tablet Take 0 5 mg by mouth daily at bedtime, Historical Med      Melatonin 5 MG TABS Take 5 mg by mouth daily at bedtime, Starting Fri 5/13/2022, Historical Med      metFORMIN (GLUCOPHAGE) 500 mg tablet Take 1 tablet (500 mg total) by mouth 2 (two) times a day with meals, Starting Tue 6/14/2022, Normal      Acetaminophen Extra Strength 500 MG tablet Take 500 mg by mouth every 6 (six) hours as needed, Starting Fri 4/29/2022, Historical Med      Disposable Gloves MISC Use daily Large, Starting Thu 7/14/2022, Print      Farxiga 5 MG TABS Take 5 mg by mouth in the morning, Starting Fri 6/10/2022, Historical Med      folic acid (FOLVITE) 1 mg tablet Take 1 tablet (1 mg total) by mouth daily, Starting Wed 6/22/2022, No Print      glucose blood (Accu-Chek Guide) test strip Test sugars four times daily, Normal      !! Incontinence Supply Disposable (Disposable Brief Large) MISC Use daily, Starting Thu 7/14/2022, Print      !! Incontinence Supply Disposable (RA Adult Wipes) MISC Use in the morning, Starting Thu 7/14/2022, Print      !! Incontinence Supply Disposable MISC Use daily XL Disposable Bed Incontinence Pads, Starting Thu 7/14/2022, Print      Lancet Devices (OneTouch Delica Plus [place]) MISC Use 1 Dose in the morning Use as directed , Starting Sat 4/30/2022, Historical Med      Skin Protectants, Misc  (dimethicone-zinc oxide) cream Apply topically 2 (two) times a day as needed for irritation, Starting Thu 7/14/2022, Print      thiamine (VITAMIN B1) 100 mg tablet Take 1 tablet (100 mg total) by mouth daily, Starting Wed 6/22/2022, No Print       !! - Potential duplicate medications found  Please discuss with provider        STOP taking these medications       hydrALAZINE (APRESOLINE) 10 mg tablet Comments:   Reason for Stopping:         isosorbide dinitrate (ISORDIL) 10 mg tablet Comments:   Reason for Stopping:         metoprolol succinate (TOPROL-XL) 50 mg 24 hr tablet Comments:   Reason for Stopping: Outpatient Discharge Orders   EXTERNAL: Ambulatory Referral to Home Health   Standing Status: Future Standing Exp  Date: 07/20/23      Discharge Diet       PDMP Review       Value Time User    PDMP Reviewed  Yes 7/15/2022 10:57 PM Mary Smith, [address]           ED Provider  Attending physically available and evaluated Yashira sIabella  I managed the patient along with the ED Attending      Electronically Signed by         Luanna Leventhal, MD  07/26/22 3234

## 2022-07-15 NOTE — TELEPHONE ENCOUNTER
Transfer order placed  Ambrosio Frank just wanted me to reiterate the reason to patient as to why he was sent to ER  Explained LAZARO on labs  Explained that the diuretics he needs to avoid heart failure can strain kidneys and it is a fine line to balance the 2  He appreciated the return call

## 2022-07-16 ENCOUNTER — HOME CARE VISIT (OUTPATIENT)
Dept: HOME HEALTH SERVICES | Facility: HOME HEALTHCARE | Age: 67
End: 2022-07-16
Payer: COMMERCIAL

## 2022-07-16 PROBLEM — R79.89 HIGH SERUM THYROID STIMULATING HORMONE (TSH): Status: ACTIVE | Noted: 2022-07-16

## 2022-07-16 PROBLEM — E11.65 UNCONTROLLED TYPE 2 DIABETES MELLITUS WITH HYPERGLYCEMIA (HCC): Status: ACTIVE | Noted: 2022-07-16

## 2022-07-16 PROBLEM — R19.7 DIARRHEA OF PRESUMED INFECTIOUS ORIGIN: Status: ACTIVE | Noted: 2022-07-16

## 2022-07-16 LAB
ALBUMIN SERPL BCP-MCNC: 2.8 G/DL (ref 3.5–5)
ALP SERPL-CCNC: 117 U/L (ref 46–116)
ALT SERPL W P-5'-P-CCNC: 52 U/L (ref 12–78)
ANION GAP SERPL CALCULATED.3IONS-SCNC: 6 MMOL/L (ref 4–13)
AST SERPL W P-5'-P-CCNC: 32 U/L (ref 5–45)
BASOPHILS # BLD AUTO: 0.07 THOUSANDS/ΜL (ref 0–0.1)
BASOPHILS NFR BLD AUTO: 1 % (ref 0–1)
BILIRUB SERPL-MCNC: 0.38 MG/DL (ref 0.2–1)
BUN SERPL-MCNC: 76 MG/DL (ref 5–25)
CALCIUM ALBUM COR SERPL-MCNC: 9.8 MG/DL (ref 8.3–10.1)
CALCIUM SERPL-MCNC: 8.8 MG/DL (ref 8.3–10.1)
CHLORIDE SERPL-SCNC: 113 MMOL/L (ref 100–108)
CO2 SERPL-SCNC: 23 MMOL/L (ref 21–32)
CREAT SERPL-MCNC: 2.52 MG/DL (ref 0.6–1.3)
CREAT UR-MCNC: 65.2 MG/DL
EOSINOPHIL # BLD AUTO: 0.39 THOUSAND/ΜL (ref 0–0.61)
EOSINOPHIL NFR BLD AUTO: 7 % (ref 0–6)
ERYTHROCYTE [DISTWIDTH] IN BLOOD BY AUTOMATED COUNT: 14.9 % (ref 11.6–15.1)
EST. AVERAGE GLUCOSE BLD GHB EST-MCNC: 174 MG/DL
GFR SERPL CREATININE-BSD FRML MDRD: 25 ML/MIN/1.73SQ M
GLUCOSE SERPL-MCNC: 165 MG/DL (ref 65–140)
GLUCOSE SERPL-MCNC: 167 MG/DL (ref 65–140)
GLUCOSE SERPL-MCNC: 178 MG/DL (ref 65–140)
GLUCOSE SERPL-MCNC: 254 MG/DL (ref 65–140)
GLUCOSE SERPL-MCNC: 269 MG/DL (ref 65–140)
HBA1C MFR BLD: 7.7 %
HCT VFR BLD AUTO: 28.8 % (ref 36.5–49.3)
HGB BLD-MCNC: 9.3 G/DL (ref 12–17)
IMM GRANULOCYTES # BLD AUTO: 0.03 THOUSAND/UL (ref 0–0.2)
IMM GRANULOCYTES NFR BLD AUTO: 1 % (ref 0–2)
LYMPHOCYTES # BLD AUTO: 1.58 THOUSANDS/ΜL (ref 0.6–4.47)
LYMPHOCYTES NFR BLD AUTO: 29 % (ref 14–44)
MAGNESIUM SERPL-MCNC: 1.9 MG/DL (ref 1.6–2.6)
MCH RBC QN AUTO: 30 PG (ref 26.8–34.3)
MCHC RBC AUTO-ENTMCNC: 32.3 G/DL (ref 31.4–37.4)
MCV RBC AUTO: 93 FL (ref 82–98)
MONOCYTES # BLD AUTO: 0.52 THOUSAND/ΜL (ref 0.17–1.22)
MONOCYTES NFR BLD AUTO: 9 % (ref 4–12)
NEUTROPHILS # BLD AUTO: 2.96 THOUSANDS/ΜL (ref 1.85–7.62)
NEUTS SEG NFR BLD AUTO: 53 % (ref 43–75)
NRBC BLD AUTO-RTO: 1 /100 WBCS
PLATELET # BLD AUTO: 155 THOUSANDS/UL (ref 149–390)
PMV BLD AUTO: 11.3 FL (ref 8.9–12.7)
POTASSIUM SERPL-SCNC: 4.8 MMOL/L (ref 3.5–5.3)
PROT SERPL-MCNC: 6.9 G/DL (ref 6.4–8.2)
PROT UR-MCNC: 116 MG/DL
PROT/CREAT UR: 1.78 MG/G{CREAT} (ref 0–0.1)
RBC # BLD AUTO: 3.1 MILLION/UL (ref 3.88–5.62)
SODIUM SERPL-SCNC: 142 MMOL/L (ref 136–145)
T4 FREE SERPL-MCNC: 0.83 NG/DL (ref 0.76–1.46)
TSH SERPL DL<=0.05 MIU/L-ACNC: 5.02 UIU/ML (ref 0.45–4.5)
WBC # BLD AUTO: 5.55 THOUSAND/UL (ref 4.31–10.16)

## 2022-07-16 PROCEDURE — 84439 ASSAY OF FREE THYROXINE: CPT | Performed by: GENERAL PRACTICE

## 2022-07-16 PROCEDURE — 82570 ASSAY OF URINE CREATININE: CPT | Performed by: INTERNAL MEDICINE

## 2022-07-16 PROCEDURE — 82948 REAGENT STRIP/BLOOD GLUCOSE: CPT

## 2022-07-16 PROCEDURE — 83735 ASSAY OF MAGNESIUM: CPT | Performed by: INTERNAL MEDICINE

## 2022-07-16 PROCEDURE — 99223 1ST HOSP IP/OBS HIGH 75: CPT | Performed by: INTERNAL MEDICINE

## 2022-07-16 PROCEDURE — 84156 ASSAY OF PROTEIN URINE: CPT | Performed by: INTERNAL MEDICINE

## 2022-07-16 PROCEDURE — 84443 ASSAY THYROID STIM HORMONE: CPT | Performed by: INTERNAL MEDICINE

## 2022-07-16 PROCEDURE — 3051F HG A1C>EQUAL 7.0%<8.0%: CPT | Performed by: PHYSICIAN ASSISTANT

## 2022-07-16 PROCEDURE — 89055 LEUKOCYTE ASSESSMENT FECAL: CPT | Performed by: INTERNAL MEDICINE

## 2022-07-16 PROCEDURE — 85025 COMPLETE CBC W/AUTO DIFF WBC: CPT | Performed by: INTERNAL MEDICINE

## 2022-07-16 PROCEDURE — 3060F POS MICROALBUMINURIA REV: CPT | Performed by: PHYSICIAN ASSISTANT

## 2022-07-16 PROCEDURE — 84377 SUGARS MULTIPLE QUAL: CPT | Performed by: INTERNAL MEDICINE

## 2022-07-16 PROCEDURE — 99233 SBSQ HOSP IP/OBS HIGH 50: CPT | Performed by: GENERAL PRACTICE

## 2022-07-16 PROCEDURE — 80053 COMPREHEN METABOLIC PANEL: CPT | Performed by: INTERNAL MEDICINE

## 2022-07-16 RX ORDER — MAGNESIUM SULFATE 1 G/100ML
1 INJECTION INTRAVENOUS ONCE
Status: COMPLETED | OUTPATIENT
Start: 2022-07-16 | End: 2022-07-16

## 2022-07-16 RX ORDER — CARVEDILOL 12.5 MG/1
12.5 TABLET ORAL 2 TIMES DAILY WITH MEALS
Status: DISCONTINUED | OUTPATIENT
Start: 2022-07-16 | End: 2022-07-17

## 2022-07-16 RX ORDER — CARVEDILOL 12.5 MG/1
12.5 TABLET ORAL 2 TIMES DAILY WITH MEALS
Status: DISCONTINUED | OUTPATIENT
Start: 2022-07-16 | End: 2022-07-16

## 2022-07-16 RX ADMIN — INSULIN LISPRO 3 UNITS: 100 INJECTION, SOLUTION INTRAVENOUS; SUBCUTANEOUS at 17:17

## 2022-07-16 RX ADMIN — INSULIN LISPRO 3 UNITS: 100 INJECTION, SOLUTION INTRAVENOUS; SUBCUTANEOUS at 12:58

## 2022-07-16 RX ADMIN — ATORVASTATIN CALCIUM 40 MG: 40 TABLET, FILM COATED ORAL at 21:50

## 2022-07-16 RX ADMIN — FOLIC ACID TAB 400 MCG 400 MCG: 400 TAB at 10:24

## 2022-07-16 RX ADMIN — MELATONIN 6 MG: at 21:50

## 2022-07-16 RX ADMIN — SODIUM CHLORIDE 75 ML/HR: 0.9 INJECTION, SOLUTION INTRAVENOUS at 12:31

## 2022-07-16 RX ADMIN — GABAPENTIN 300 MG: 300 CAPSULE ORAL at 17:17

## 2022-07-16 RX ADMIN — FLUOXETINE 20 MG: 20 CAPSULE ORAL at 10:24

## 2022-07-16 RX ADMIN — GABAPENTIN 300 MG: 300 CAPSULE ORAL at 21:50

## 2022-07-16 RX ADMIN — HEPARIN SODIUM 5000 UNITS: 5000 INJECTION INTRAVENOUS; SUBCUTANEOUS at 06:20

## 2022-07-16 RX ADMIN — THIAMINE HCL TAB 100 MG 100 MG: 100 TAB at 10:24

## 2022-07-16 RX ADMIN — CARVEDILOL 12.5 MG: 12.5 TABLET, FILM COATED ORAL at 17:17

## 2022-07-16 RX ADMIN — HEPARIN SODIUM 5000 UNITS: 5000 INJECTION INTRAVENOUS; SUBCUTANEOUS at 13:07

## 2022-07-16 RX ADMIN — MAGNESIUM SULFATE HEPTAHYDRATE 1 G: 1 INJECTION, SOLUTION INTRAVENOUS at 15:01

## 2022-07-16 RX ADMIN — INSULIN LISPRO 1 UNITS: 100 INJECTION, SOLUTION INTRAVENOUS; SUBCUTANEOUS at 06:23

## 2022-07-16 RX ADMIN — HEPARIN SODIUM 5000 UNITS: 5000 INJECTION INTRAVENOUS; SUBCUTANEOUS at 21:50

## 2022-07-16 RX ADMIN — CARVEDILOL 12.5 MG: 12.5 TABLET, FILM COATED ORAL at 10:23

## 2022-07-16 RX ADMIN — GABAPENTIN 300 MG: 300 CAPSULE ORAL at 10:24

## 2022-07-16 RX ADMIN — ASPIRIN 81 MG: 81 TABLET, COATED ORAL at 10:23

## 2022-07-16 NOTE — CONSULTS
Consultation - Cardiology Team One  Yoel Benton 61 y o  male MRN: [id number]  Unit/Bed#: [facility] 426-01 Encounter: [id number]    Inpatient consult to Cardiology  Consult performed by: EMMA Dietz  Consult ordered by: Carolyn Brewster MD          Physician Requesting Consult: Trupti Milligan DO  Reason for Consult / Principal Problem: CHF/Non ICM       Assessment/ Plan    1  LAZARO in the setting of overuse of diuretics   Creatinine 2 9 on admission  Creatinine trending down: 2 5 today  Baseline creatinine 1 6-1 8  Nephrology consulted   Diuretics on hold     2  Chronic combined systolic and diastolic heart failure  Patient was prescribed torsemide 40 mg PO BID at home  Currently holding due to LAZARO   Receiving IVF  Monitor I/Os   Daily weights  Discharge weight 7/11/2022 was 172 lbs     3  Non ischemic cardiomyopathy    LVEF 40-50% on last echocardiogram 7/6/2022  On hydralazine 25 mg PO TID, metoprolol XL 50 mg PO daily, isordil 10 mg PO TID   Not on ACE I or ARB due to LAZARO  Will discontinue metoprolol XL, isordil and hydralazine and start Coreg  4  Hypertension   BP stable: 145/87      History of Present Illness   HPI: Yoel Benton is a 61y o  year old male who has chronic combined systolic and diastolic HF, non ischemic cardiomyopathy, hypertension, hyperlipidemia, diabetes, peripheral neuropathy, CVA, CKD stage III, depression, bipolar disorder and polysubstance abuse  He follows with cardiologist                [facility] 7/15/2022 from home due to abnormal lab value  He has labs drawn at home from recent hospital follow up showing creatinine 2 9 from baseline 1 6-1 8  Cardiology and nephrology consulted this admission  Patient was recently admitted 7/5/2022 at [address] [place] [address] [address] for CHF  HE was diuresed and discharged home on torsemide 40 mg PO BID from prior dose torsemide 10 mg PO daily  He currently has no complaints  No chest pain, SOB, orthopnea, dizziness or lightheaded  No fever or chills  He is receiving IVF and received IVF bolus on admission     Echocardiogram 4/24/2022 showed EF 30%  Nuclear stress test 4/26/2022 showed no evidence of ischemia, apical and inferior wall infarct and global hypokinesis  Follow up echocardiogram 7/6/2022 showed EF 40-50%  He lives with his brother  He quit smoking and illicit drug use about 4 years ago  He drink 1-2 beers a day  Review of Systems   Constitutional: Negative for chills and fever  HENT: Negative for congestion  Cardiovascular: Negative for chest pain, dyspnea on exertion, leg swelling, orthopnea and palpitations  Musculoskeletal: Negative for falls  Gastrointestinal: Negative for bloating, nausea and vomiting  Neurological: Negative for dizziness and light-headedness  Psychiatric/Behavioral: Negative for altered mental status  All other systems reviewed and are negative  Historical Information   Past Medical History:   Diagnosis Date    Anxiety     Arthritis     Coronary artery disease     D[facility]     Depression     Diabetes mellitus ([facility]     Infectious viral hepatitis     Memory loss     Visual impairment      History reviewed  No pertinent surgical history  Social History     Substance and Sexual Activity   Alcohol Use Yes    Comment: socially     Social History     Substance and Sexual Activity   Drug Use Never     Social History     Tobacco Use   Smoking Status Former Smoker   Smokeless Tobacco Never Used     Family History: History reviewed  No pertinent family history      Meds/Allergies   all current active meds have been reviewed and current meds:   Current Facility-Administered Medications   Medication Dose Route Frequency    acetaminophen (TYLENOL) tablet 650 mg  650 mg Oral Q6H PRN    aluminum-magnesium hydroxide-simethicone (MYLANTA) oral suspension 15 mL  15 mL Oral Q6H PRN    aspirin (ECOTRIN LOW STRENGTH) EC tablet 81 mg  81 mg Oral Daily    atorvastatin (LIPITOR) tablet 40 mg  40 mg Oral HS  FLUoxetine (PROzac) capsule 20 mg  20 mg Oral Daily    folic acid (FOLVITE) tablet 400 mcg  400 mcg Oral Daily    gabapentin (NEURONTIN) capsule 300 mg  300 mg Oral TID    heparin (porcine) subcutaneous injection 5,000 Units  5,000 Units Subcutaneous Q8H [address]    hydrALAZINE (APRESOLINE) tablet 25 mg  25 mg Oral TID    insulin lispro (HumaLOG) 100 units/mL subcutaneous injection 1-6 Units  1-6 Units Subcutaneous TID AC    isosorbide dinitrate (ISORDIL) tablet 10 mg  10 mg Oral TID    LORazepam (ATIVAN) tablet 0 5 mg  0 5 mg Oral HS PRN    melatonin tablet 6 mg  6 mg Oral HS    metoprolol succinate (TOPROL-XL) 24 hr tablet 50 mg  50 mg Oral Daily    ondansetron (ZOFRAN) injection 4 mg  4 mg Intravenous Q6H PRN    sodium chloride 0 9 % infusion  75 mL/hr Intravenous Continuous    thiamine tablet 100 mg  100 mg Oral Daily     sodium chloride, 75 mL/hr, Last Rate: 75 mL/hr (07/15/22 2218)        No Known Allergies    Objective   Vitals: Blood pressure 145/87, pulse 70, temperature (!) 97 3 °F (36 3 °C), resp  rate 19, height 5' 4" (1 626 m), weight 82 5 kg (181 lb 14 1 oz), SpO2 96 %  ,     Body mass index is 31 22 kg/m²  ,     Systolic (65XTI), DELORIS:954 , Min:116 , GLM:205     Diastolic (56ZLB), XKH:26, Min:68, Max:90            Intake/Output Summary (Last 24 hours) at 7/16/2022 0926  Last data filed at 7/16/2022 0170  Gross per 24 hour   Intake 610 ml   Output 400 ml   Net 210 ml     Weight (last 2 days)     Date/Time Weight    07/16/22 0600 82 5 (181 88)    07/15/22 2147 82 6 (182 1)    07/15/22 1705 84 4 (186)        Invasive Devices  Report    Peripheral Intravenous Line  Duration           Peripheral IV 07/15/22 Right Hand <1 day                  Physical Exam  Constitutional:       General: He is not in acute distress  HENT:      Head: Normocephalic  Mouth/Throat:      Mouth: Mucous membranes are moist    Cardiovascular:      Rate and Rhythm: Normal rate and regular rhythm        Pulses: Normal pulses  Pulmonary:      Effort: Pulmonary effort is normal  No respiratory distress  Breath sounds: Normal breath sounds  Abdominal:      General: Bowel sounds are normal       Palpations: Abdomen is soft  Musculoskeletal:         General: No swelling  Normal range of motion  Cervical back: Neck supple  Skin:     General: Skin is warm and dry  Capillary Refill: Capillary refill takes less than 2 seconds  Neurological:      General: No focal deficit present  Mental Status: He is alert and oriented to person, place, and time     Psychiatric:         Mood and Affect: Mood normal            LABORATORY RESULTS:      CBC with diff:   Results from last 7 days   Lab Units 07/16/22  0439 07/15/22  2207 07/15/22  1816 07/15/22  1043   WBC Thousand/uL 5 55  --  4 88 5 61   HEMOGLOBIN g/dL 9 3*  --  10 0* 9 8*   HEMATOCRIT % 28 8*  --  31 0* 31 0*   MCV fL 93  --  92 96   PLATELETS Thousands/uL 155 158 155 166   MCH pg 30 0  --  29 8 30 4   MCHC g/dL 32 3  --  32 3 31 6   RDW % 14 9  --  14 8 14 6   MPV fL 11 3 10 9 11 4 11 7   NRBC AUTO /100 WBCs 1  --  0 0       CMP:  Results from last 7 days   Lab Units 07/16/22  0439 07/15/22  1816 07/15/22  1043   POTASSIUM mmol/L 4 8 5 4* 5 2   CHLORIDE mmol/L 113* 107 111*   CO2 mmol/L 23 24 23   BUN mg/dL 76* 82* 78*   CREATININE mg/dL 2 52* 2 88* 2 90*   CALCIUM mg/dL 8 8 9 1 9 1   AST U/L 32 35 39   ALT U/L 52 58 59   ALK PHOS U/L 117* 128* 133*   EGFR ml/min/1 73sq m 25 22 21       BMP:  Results from last 7 days   Lab Units 07/16/22  0439 07/15/22  1816 07/15/22  1043   POTASSIUM mmol/L 4 8 5 4* 5 2   CHLORIDE mmol/L 113* 107 111*   CO2 mmol/L 23 24 23   BUN mg/dL 76* 82* 78*   CREATININE mg/dL 2 52* 2 88* 2 90*   CALCIUM mg/dL 8 8 9 1 9 1          Lab Results   Component Value Date    NTBNP 7,931 (H) 06/16/2022            Results from last 7 days   Lab Units 07/16/22  0439   MAGNESIUM mg/dL 1 9          Results from last 7 days   Lab Units 07/15/22  0769 HEMOGLOBIN A1C % 7 7*          Results from last 7 days   Lab Units 07/16/22  0439   TSH 3RD GENERATON uIU/mL 5 020*           Lipid Profile:   No results found for: CHOL  No results found for: HDL  No results found for: LDLCALC  No results found for: TRIG      Cardiac testing:   No results found for this or any previous visit  No results found for this or any previous visit  No valid procedures specified  No results found for this or any previous visit  Imaging:  XR chest 1 view portable    Result Date: 6/16/2022  Narrative: CHEST INDICATION:   weak  COMPARISON:  2/2/2015 EXAM PERFORMED/VIEWS:  XR CHEST PORTABLE FINDINGS: Linear scarring is seen within both bases  Otherwise there is no acute infiltrate with no pneumothorax and no pleural effusion  Atherosclerotic aorta with normal cardiac silhouette is visualized  Degenerative changes is seen within the spine  Impression: No acute cardiopulmonary disease  Workstation performed: [id number]     CT head without contrast    Result Date: 6/16/2022  Narrative: CT BRAIN - WITHOUT CONTRAST INDICATION:   off balance  COMPARISON:  None  TECHNIQUE:  CT examination of the brain was performed  In addition to axial images, sagittal and coronal 2D reformatted images were created and submitted for interpretation  Radiation dose length product (DLP) for this visit:  863 47 mGy-cm   This examination, like all CT scans performed in the [facility], was performed utilizing techniques to minimize radiation dose exposure, including the use of iterative  reconstruction and automated exposure control  IMAGE QUALITY:  Diagnostic  FINDINGS: PARENCHYMA: Decreased attenuation is noted in periventricular and subcortical white matter demonstrating an appearance that is statistically most likely to represent mild microangiopathic change  Chronic bilateral gangliocapsular lacunar infarcts  No CT signs of acute infarction    No intracranial mass, mass effect or midline shift  No acute parenchymal hemorrhage  VENTRICLES AND EXTRA-AXIAL SPACES:  Normal for the patient's age  VISUALIZED ORBITS AND PARANASAL SINUSES:  Orbits are unremarkable  Minimal opacification of the inferior mastoid air cells  CALVARIUM AND EXTRACRANIAL SOFT TISSUES:  Normal      Impression: No acute intracranial abnormality  Chronic microangiopathy  Workstation performed: [id number]     Thank you for allowing us to participate in this patient's care  Counseling / Coordination of Care  Total floor / unit time spent today 45 minutes  Greater than 50% of total time was spent with the patient and / or family counseling and / or coordination of care  A description of the counseling / coordination of care: Review of history, current assessment, development of a plan  Code Status: Level 1 - Full Code    ** Please Note: Dragon 360 Dictation voice to text software may have been used in the creation of this document   **

## 2022-07-16 NOTE — ED ATTENDING ATTESTATION
7/15/2022  IParvin DO, saw and evaluated the patient  I have discussed the patient with the resident/non-physician practitioner and agree with the resident's/non-physician practitioner's findings, Plan of Care, and MDM as documented in the resident's/non-physician practitioner's note, except where noted  All available labs and Radiology studies were reviewed  I was present for key portions of any procedure(s) performed by the resident/non-physician practitioner and I was immediately available to provide assistance  At this point I agree with the current assessment done in the Emergency Department  I have conducted an independent evaluation of this patient a history and physical is as follows:    70-year-old male presents for abnormal outpatient labs  The patient was recently sent for labs that showed an LAZARO I  He did recently have an increase in his diuretic dosing  He was told to come in for evaluation  He has no complaints other than feeling slightly tired  He does not appear to be volume overloaded  Maybe even appears somewhat dry    Otherwise normal exam   Plan is to recheck labs likely admit for LAZARO    ED Course         Critical Care Time  Procedures

## 2022-07-16 NOTE — ASSESSMENT & PLAN NOTE
Lab Results   Component Value Date    HGBA1C 7 6 (H) 06/16/2022       Continue insulin sliding scale; put on hold Farxiga and Glucophage      Blood Sugar Average: Last 72 hrs:

## 2022-07-16 NOTE — UTILIZATION REVIEW
Initial Clinical Review    Admission: Date/Time/Statement:   Admission Orders (From admission, onward)     Ordered        07/15/22 1942  INPATIENT ADMISSION  Once                      Orders Placed This Encounter   Procedures    INPATIENT ADMISSION     Standing Status:   Standing     Number of Occurrences:   1     Order Specific Question:   Level of Care     Answer:   Med Surg [16]     Order Specific Question:   Estimated length of stay     Answer:   More than 2 Midnights     Order Specific Question:   Certification     Answer:   I certify that inpatient services are medically necessary for this patient for a duration of greater than two midnights  See H&P and MD Progress Notes for additional information about the patient's course of treatment  ED Arrival Information     Expected   7/15/2022     Arrival   7/15/2022 16:41    Acuity   Urgent            Means of arrival   Wheelchair    Escorted by   Family Member    Service   Hospitalist    Admission type   Urgent            Arrival complaint   LAZARO (acute kidney injury)            Chief Complaint   Patient presents with    Abnormal Lab     Pt was sent in after an abnormal lab result today showing an LAZARO - pt recently increased diuretic dose for CHF  Pt denies pain or symptoms  Was told to come in for IVF and evaluation  Initial Presentation: 61 y o  male W/PMHX: Cardiomyopathy, uncontrolled T2DM with hyperglycemia,  Stage 3b ckd, current cr 2 88, hold torsemide, cardiomyopathy, known EF of 30%  [facility]CHO at  [facility] showed improvement of EF C/w hydration to 40-50%,  Nephrology consult,  HLD, HTN, bipolar disorder,  mixed disorder With hypertension, HLD, polysubstance  Abuse, to ED from home, admitted as inpatient, due to LAZARO,  From pcp d/t elevated cr   Presented with acute kidney insuffiencey 2/2 to double dosing of torsemide along with new onset diarrhea,     CR baseline 1 6-1 7,  CR 2 99 on admission, Exam : unremarkable at this time    ED work up reveals: Low h/h, hyperkalemia, elevated bun/cr,   C/W mild hydration, NS @ 75ml/H, trend serial labs with repletion,  Nephrology consult, c/w BB, consult cardiology, ACCU ck with ssi coverage,  Trend serial labs with repletion as needed, c/w IVF hydration, DVT PPX, ,   Plan: ck stool enteric pathogens,     Date: 7/16/22   Day 2: LAZARO: estimated CR clearance 28 7 f/u renal U/S,  CR down trending, TSH elevated f/u TFT's 4-6 wks, Cardiomyopathy EF 40-5-% metoprolol changed to Coreg, imdur/hydral d/c, Torsemide held, The patient will continue to require additional inpatient hospital stay due to LAZARO, C/O persistent diarrhea  C/W trending serial labs with repletion as needed, dvt ppx  Last data filed at 7/16/2022 0626      Gross per 24 hour   Intake 610 ml   Output 400 ml   Net 210 ml     Nephrology Consult: LAZARO present on admission, on top of CKD stage 4 vs progression CKD stage 5  CKD 4 likely d/t cardiorenal syndrome plus /minus prerenal azotemia, causing recurrent LAZARO in the past - baseline creatinine 1 7-1 9, should have follow-up with Nephrology upon discharge  Anemia: recent iron panel showed low TIBC consistent with anemia of chronic disease, likely related to CKD  Could consider ADRIANO  Plan: NS IVF, trend daily BMP, UA +glucose and 1+ protein, avoid IV dye, nephrotoxins, hypotension, hold torsemide  B/L lower extremity edema,  Awake alert  Cardiology Consult:  CHF/non ICM: LAZARO being followed by nephrology, Chronic combined systolic and diastolic heart failure: torsemide 40 mg PO BID at home held d/t LAZARO, IVF , I/O daily wts, d/c wt 7/11 ws 172lbs  , Non Ischemic cardiomyopathy, LVEF 40-50% on last echocardiogram 7/6/2022, On hydralazine 25 mg PO TID, metoprolol XL 50 mg PO daily, isordil 10 mg PO TID ,Not on ACE I or ARB due to LAZARO, Will discontinue metoprolol XL, isordil and hydralazine and start Coreg HTN stable, Cardiology will follow       ED Triage Vitals [07/15/22 1705]   Temperature Pulse Respirations Blood Pressure SpO2   97 6 °F (36 4 °C) 66 20 138/84 94 %      Temp Source Heart Rate Source Patient Position - Orthostatic VS BP Location FiO2 (%)   Oral Monitor Sitting Right arm --      Pain Score       No Pain            Additional Vital Signs:  /Time Temp Pulse Resp BP MAP (mmHg) SpO2 O2 Device Patient Position - Orthostatic VS   07/16/22 15:59:26 97 2 °F (36 2 °C) Abnormal  70 -- 143/80 101 96 % -- --   07/16/22 1023 -- 69 -- -- -- -- -- --   07/16/22 07:34:20 97 3 °F (36 3 °C) Abnormal  70 19 145/87 106 96 % -- --   07/15/22 2240 -- 79 -- 145/86 106 -- -- Standing   07/15/22 21:52:56 97 6 °F (36 4 °C) 71 -- 153/90 111 97 % -- --   07/15/22 2029 -- 66 14 153/89 -- 98 % None (Room air) Lying   07/15/22 2022 -- 73 15 144/83 -- -- -- Standing - Orthostatic VS   07/15/22 2021 -- 66 14 140/81 -- -- -- Sitting - Orthostatic VS   07/15/22 2019 -- 71 14 153/89 -- -- -- Lying - Orthostatic VS   07/15/22 1913 -- 71 17 147/87 -- 98 % -- Lying           Pertinent Labs/Diagnostic Test Results:       Results from last 7 days   Lab Units 07/16/22  0439 07/15/22  2207 07/15/22  1816 07/15/22  1043   WBC Thousand/uL 5 55  --  4 88 5 61   HEMOGLOBIN g/dL 9 3*  --  10 0* 9 8*   HEMATOCRIT % 28 8*  --  31 0* 31 0*   PLATELETS Thousands/uL 155 158 155 166   NEUTROS ABS Thousands/µL 2 96  --  2 56 3 34         Results from last 7 days   Lab Units 07/16/22  0439 07/15/22  1816 07/15/22  1043   SODIUM mmol/L 142 138 138   POTASSIUM mmol/L 4 8 5 4* 5 2   CHLORIDE mmol/L 113* 107 111*   CO2 mmol/L 23 24 23   ANION GAP mmol/L 6 7 4   BUN mg/dL 76* 82* 78*   CREATININE mg/dL 2 52* 2 88* 2 90*   EGFR ml/min/1 73sq m 25 22 21   CALCIUM mg/dL 8 8 9 1 9 1   MAGNESIUM mg/dL 1 9  --   --      Results from last 7 days   Lab Units 07/16/22  0439 07/15/22  1816 07/15/22  1043   AST U/L 32 35 39   ALT U/L 52 58 59   ALK PHOS U/L 117* 128* 133*   TOTAL PROTEIN g/dL 6 9 7 5 7 4   ALBUMIN g/dL 2 8* 3 0* 2 8*   TOTAL BILIRUBIN mg/dL 0 38 0 22 0 35 Results from last 7 days   Lab Units 07/16/22  1558 07/16/22  1043 07/16/22  0612   POC GLUCOSE mg/dl 254* 269* 165*     Results from last 7 days   Lab Units 07/16/22  0439 07/15/22  1816 07/15/22  1043   GLUCOSE RANDOM mg/dL 167* 370* 326*         Results from last 7 days   Lab Units 07/15/22  2207   HEMOGLOBIN A1C % 7 7*   EAG mg/dl 174     BETA-HYDROXYBUTYRATE   Date Value Ref Range Status   06/16/2022 0 0 <0 6 mmol/L Final      Results from last 7 days   Lab Units 07/16/22  0439   TSH 3RD GENERATON uIU/mL 5 020*               Results from last 7 days   Lab Units 07/15/22  2028   CLARITY UA  Clear   COLOR UA  Light Yellow   SPEC GRAV UA  1 012   PH UA  5 5   GLUCOSE UA mg/dl 200 (1/5%)*   KETONES UA mg/dl Negative   BLOOD UA  Negative   PROTEIN UA mg/dl 50 (1+)*   NITRITE UA  Negative   BILIRUBIN UA  Negative   UROBILINOGEN UA (BE) mg/dl <2 0   LEUKOCYTES UA  Negative   WBC UA /hpf None Seen   RBC UA /hpf 1-2   BACTERIA UA /hpf None Seen   EPITHELIAL CELLS WET PREP /hpf Occasional           ED Treatment:   Medication Administration from 07/15/2022 1634 to 07/15/2022 2141       Date/Time Order Dose Route Action     07/15/2022 1816 sodium chloride 0 9 % bolus 500 mL 500 mL Intravenous New Bag        Past Medical History:   Diagnosis Date    Anxiety     Arthritis     Coronary artery disease     [facility]     Depression     [facility]     Infectious viral hepatitis     Memory loss     Visual impairment      Present on Admission:   LAZARO (acute kidney injury) ([facility]   Bipolar affective disorder, mixed, moderate ([facility] )   Mixed hyperlipidemia   Cardiomyopathy ([facility]   Stage 3b chronic kidney disease [facility]      Admitting Diagnosis: Polysubstance abuse ([facility] ) [F19 10]  LAZARO (acute kidney injury) ([facility] ) [N17 9]  Hyperglycemia, unspecified [R73 9]  Age/Sex: 61 y o  male  Admission Orders:daily wts, I/o bladder scan x 1, urinary retention, scds,  Up with assistance,   Scheduled Medications:  aspirin, 81 mg, Oral, Daily  atorvastatin, 40 mg, Oral, HS  carvedilol, 12 5 mg, Oral, BID With Meals  FLUoxetine, 20 mg, Oral, Daily  folic acid, 173 mcg, Oral, Daily  gabapentin, 300 mg, Oral, TID  heparin (porcine), 5,000 Units, Subcutaneous, Q8H MAAME  insulin lispro, 1-6 Units, Subcutaneous, TID AC  melatonin, 6 mg, Oral, HS  thiamine, 100 mg, Oral, Daily      Continuous IV Infusions:  sodium chloride, 75 mL/hr, Intravenous, Continuous      PRN Meds:  acetaminophen, 650 mg, Oral, Q6H PRN  aluminum-magnesium hydroxide-simethicone, 15 mL, Oral, Q6H PRN  LORazepam, 0 5 mg, Oral, HS PRN 7/15 x1 po   ondansetron, 4 mg, Intravenous, Q6H PRN        IP CONSULT TO NEPHROLOGY  IP CONSULT TO CASE MANAGEMENT  IP CONSULT TO CARDIOLOGY    Network Utilization Review Department  ATTENTION: Please call with any questions or concerns to [phone] and carefully listen to the prompts so that you are directed to the right person  All voicemails are confidential   Ro Johnson all requests for admission clinical reviews, approved or denied determinations and any other requests to dedicated fax number below belonging to the campus where the patient is receiving treatment   List of dedicated fax numbers for the Facilities:  [address] DENIALS (Administrative/Medical Necessity) [phone]   [address] [place] [address] (Maternity/NICU/Pediatrics) [phone]   [address] [phone]   [address]  [address] [facility] [place] [address] [address] [place] [address] [address] [phone]   [facility] [address] [facility] 951 [phone]

## 2022-07-16 NOTE — ASSESSMENT & PLAN NOTE
Lab Results   Component Value Date    HGBA1C 7 7 (H) 07/15/2022       Continue insulin sliding scale; put on hold Farxiga and Glucophage      Blood Sugar Average: Last 72 hrs:  (P) 217

## 2022-07-16 NOTE — ASSESSMENT & PLAN NOTE
Improvement of the ejection fraction is noted in echocardiogram as noted above  Continue metoprolol succinate 50 mg daily  Isosorbide dinitrate 10 mg 3 times daily  Cardiology consult for further opinion

## 2022-07-16 NOTE — ASSESSMENT & PLAN NOTE
· Will check stool enteric pathogen; wbc's stool and reducing substances   (patient reports that this was brought about by drinking cold water yesterday which came from a can )    · Will check C diff

## 2022-07-16 NOTE — ASSESSMENT & PLAN NOTE
Estimated Creatinine Clearance: 28 7 mL/min (A) (by C-G formula based on SCr of 2 52 mg/dL (H))  Acute kidney insufficiency secondary to double dosing of torsemide along with new onset diarrhea; will put on hold torsemide  Will continue with mild hydration; sodium chloride 75 mL/hr  Nephrology appreciated  Noted patient's April echocardiogram showed ejection fraction of 30%; on the high values echocardiogram done July 6, 2022 showed improvement at 40 to 50%    We will continue with mild careful hydration; will get Cardiology consult for opinion regarding torsemide dosing - appreciated  F/u renal US

## 2022-07-16 NOTE — PROGRESS NOTES
[address] [facility]  Progress Note - Alysha Grimaldo 1955, 61 y o  male MRN: [id number]  Unit/Bed#: [facility] 426-01 Encounter: [id number]  Primary Care Provider: Marleny Razo PA-C   Date and time admitted to hospital: 7/15/2022  5:41 PM    * LAZARO (acute kidney injury) ([facility] 75 )  Assessment & Plan  Estimated Creatinine Clearance: 28 7 mL/min (A) (by C-G formula based on SCr of 2 52 mg/dL (H))  Acute kidney insufficiency secondary to double dosing of torsemide along with new onset diarrhea; will put on hold torsemide  Will continue with mild hydration; sodium chloride 75 mL/hr  Nephrology appreciated  Noted patient's April echocardiogram showed ejection fraction of 30%; on the high values echocardiogram done July 6, 2022 showed improvement at 40 to 50%    We will continue with mild careful hydration; will get Cardiology consult for opinion regarding torsemide dosing - appreciated  F/u renal US    High serum thyroid stimulating hormone (TSH)  Assessment & Plan  · Pt denies fatigue and fT4 WNL  · PCP should recheck TFTs in 4-6 weeks    Uncontrolled type 2 diabetes mellitus with hyperglycemia [facility])  Assessment & Plan  Lab Results   Component Value Date    HGBA1C 7 7 (H) 07/15/2022       Recent Labs     07/16/22  0612 07/16/22  1043   POCGLU 165* 269*       Blood Sugar Average: Last 72 hrs:  (P) 217     · ISS for now  · Home meds on hold    Diarrhea of presumed infectious origin  Assessment & Plan  · Will check stool enteric pathogen; wbc's stool and reducing substances   (patient reports that this was brought about by drinking cold water yesterday which came from a can )    · Will check C diff    Cardiomyopathy ([facility] 75 )  Assessment & Plan  EF 40-50%  · Cardio appreciated  · Meotoprolol -> Coreg  · Imdur/hydral d/c  · Torsemide held    Stage 3b chronic kidney disease [facility])  Assessment & Plan  Lab Results   Component Value Date    EGFR 25 07/16/2022    EGFR 22 07/15/2022    EGFR 21 07/15/2022    CREATININE 2 52 (H) 2022    CREATININE 2 88 (H) 07/15/2022    CREATININE 2 90 (H) 07/15/2022   Estimated Creatinine Clearance: 28 7 mL/min (A) (by C-G formula based on SCr of 2 52 mg/dL (H))  B/l creatinine is 1 69; elevated to 2 9 on admission  We will continue to monitor with careful hydration  Torsemide on hold  Nephrology consult  Mixed hyperlipidemia  Assessment & Plan  Currently on Lipitor 40 mg at bedtime  Bipolar affective disorder, mixed, moderate (HCC)  Assessment & Plan  Continue with Prozac 20 mg daily  Ativan prn      VTE Pharmacologic Prophylaxis: VTE Score: 4 Moderate Risk (Score 3-4) - Pharmacological DVT Prophylaxis Ordered: heparin  Patient Centered Rounds: I performed bedside rounds with nursing staff today  Discussions with Specialists or Other Care Team Provider: not yet    Education and Discussions with Family / Patient: Patient declined call to   Time Spent for Care: 30 minutes  More than 50% of total time spent on counseling and coordination of care as described above  Current Length of Stay: 1 day(s)  Current Patient Status: Inpatient   Certification Statement: The patient will continue to require additional inpatient hospital stay due to LAZARO  Discharge Plan: Anticipate discharge in 48-72 hrs to home  Code Status: Level 1 - Full Code    Subjective:   Having diarrhea    Objective:     Vitals:   Temp (24hrs), Av 5 °F (36 4 °C), Min:97 3 °F (36 3 °C), Max:97 6 °F (36 4 °C)    Temp:  [97 3 °F (36 3 °C)-97 6 °F (36 4 °C)] 97 3 °F (36 3 °C)  HR:  [66-79] 69  Resp:  [14-20] 19  BP: (138-153)/(81-90) 145/87  SpO2:  [94 %-98 %] 96 %  Body mass index is 31 22 kg/m²  Input and Output Summary (last 24 hours):      Intake/Output Summary (Last 24 hours) at 2022 1549  Last data filed at 2022 0626  Gross per 24 hour   Intake 610 ml   Output 400 ml   Net 210 ml       Physical Exam:   Physical Exam  HENT:      Head: Normocephalic and atraumatic  Nose: Nose normal       Mouth/Throat:      Mouth: Mucous membranes are moist    Eyes:      Extraocular Movements: Extraocular movements intact  Conjunctiva/sclera: Conjunctivae normal    Cardiovascular:      Rate and Rhythm: Normal rate and regular rhythm  Pulmonary:      Effort: Pulmonary effort is normal       Breath sounds: Normal breath sounds  No wheezing or rales  Abdominal:      General: Bowel sounds are normal  There is no distension  Palpations: Abdomen is soft  Tenderness: There is no abdominal tenderness  Musculoskeletal:         General: Normal range of motion  Cervical back: Normal range of motion and neck supple  Right lower leg: No edema  Left lower leg: No edema  Skin:     General: Skin is warm and dry  Neurological:      Mental Status: He is alert and oriented to person, place, and time  Additional Data:     Labs:  Results from last 7 days   Lab Units 07/16/22  0439   WBC Thousand/uL 5 55   HEMOGLOBIN g/dL 9 3*   HEMATOCRIT % 28 8*   PLATELETS Thousands/uL 155   NEUTROS PCT % 53   LYMPHS PCT % 29   MONOS PCT % 9   EOS PCT % 7*     Results from last 7 days   Lab Units 07/16/22  0439   SODIUM mmol/L 142   POTASSIUM mmol/L 4 8   CHLORIDE mmol/L 113*   CO2 mmol/L 23   BUN mg/dL 76*   CREATININE mg/dL 2 52*   ANION GAP mmol/L 6   CALCIUM mg/dL 8 8   ALBUMIN g/dL 2 8*   TOTAL BILIRUBIN mg/dL 0 38   ALK PHOS U/L 117*   ALT U/L 52   AST U/L 32   GLUCOSE RANDOM mg/dL 167*         Results from last 7 days   Lab Units 07/16/22  1043 07/16/22  0612   POC GLUCOSE mg/dl 269* 165*     Results from last 7 days   Lab Units 07/15/22  2207   HEMOGLOBIN A1C % 7 7*           Lines/Drains:  Invasive Devices  Report    Peripheral Intravenous Line  Duration           Peripheral IV 07/15/22 Right Hand <1 day                      Imaging: No pertinent imaging reviewed      Recent Cultures (last 7 days):         Last 24 Hours Medication List:   Current Facility-Administered Medications   Medication Dose Route Frequency Provider Last Rate    acetaminophen  650 mg Oral Q6H PRN Dora Quijano MD      aluminum-magnesium hydroxide-simethicone  15 mL Oral Q6H PRN Dora Quijano MD      aspirin  81 mg Oral Daily Dora Quijano MD      atorvastatin  40 mg Oral HS Dora Quijano MD      carvedilol  12 5 mg Oral BID With Meals Genie Lawler DO      FLUoxetine  20 mg Oral Daily Dora Quijano MD      folic acid  220 mcg Oral Daily Dora Quijano MD      gabapentin  300 mg Oral TID Dora Quijano MD      heparin (porcine)  5,000 Units Subcutaneous [facility] Dora Quijano MD      insulin lispro  1-6 Units Subcutaneous TID AC Griffin Hicks MD      LORazepam  0 5 mg Oral HS PRN EMMA Carmona      magnesium sulfate  1 g Intravenous Once Davion Carlin DO      melatonin  6 mg Oral HS Dora Quijano MD      ondansetron  4 mg Intravenous Q6H PRN Dora Quijano MD      sodium chloride  75 mL/hr Intravenous Continuous Dora Quijano MD 75 mL/hr (07/16/22 1231)    thiamine  100 mg Oral Daily Dora Quijano MD          Today, Patient Was Seen By: Davion Carlin DO    **Please Note: This note may have been constructed using a voice recognition system  **

## 2022-07-16 NOTE — ASSESSMENT & PLAN NOTE
Lab Results   Component Value Date    HGBA1C 7 7 (H) 07/15/2022       Recent Labs     07/16/22  0612 07/16/22  1043   POCGLU 165* 269*       Blood Sugar Average: Last 72 hrs:  (P) 217     · ISS for now  · Home meds on hold

## 2022-07-16 NOTE — ASSESSMENT & PLAN NOTE
Acute kidney insufficiency secondary to double dosing of torsemide along with new onset diarrhea; will put on hold torsemide  Will also check stool enteric pathogen; wbc's stool and reducing substances   (patient reports that this was brought about by drinking cold water yesterday which came from a can )  Will continue with mild hydration; sodium chloride 75 mL/hr  Recheck metabolic profile in the morning with CBC  Nephrology consult  Noted patient's April echocardiogram showed ejection fraction of 30%; on the high values echocardiogram done July 6, 2022 showed improvement at 40 to 50%  We will continue with mild careful hydration; will get Cardiology consult for opinion regarding torsemide dosing

## 2022-07-16 NOTE — ASSESSMENT & PLAN NOTE
Lab Results   Component Value Date    EGFR 25 07/16/2022    EGFR 22 07/15/2022    EGFR 21 07/15/2022    CREATININE 2 52 (H) 07/16/2022    CREATININE 2 88 (H) 07/15/2022    CREATININE 2 90 (H) 07/15/2022   Estimated Creatinine Clearance: 28 7 mL/min (A) (by C-G formula based on SCr of 2 52 mg/dL (H))  B/l creatinine is 1 69; elevated to 2 9 on admission  We will continue to monitor with careful hydration  Torsemide on hold  Nephrology consult

## 2022-07-16 NOTE — H&P
[address] [facility]  H&P- Marleny Lose 1955, 61 y o  male MRN: [id number]  Unit/Bed#: ED 09 Encounter: [id number]  Primary Care Provider: Adina Figueredo PA-C   Date and time admitted to hospital: 7/15/2022  5:41 PM    * LAZARO (acute kidney injury) [facility])  Assessment & Plan  Acute kidney insufficiency secondary to double dosing of torsemide along with new onset diarrhea; will put on hold torsemide  Will also check stool enteric pathogen; wbc's stool and reducing substances   (patient reports that this was brought about by drinking cold water yesterday which came from a can )  Will continue with mild hydration; sodium chloride 75 mL/hr  Recheck metabolic profile in the morning with CBC  Nephrology consult  Noted patient's April echocardiogram showed ejection fraction of 30%; on the high values echocardiogram done July 6, 2022 showed improvement at 40 to 50%  We will continue with mild careful hydration; will get Cardiology consult for opinion regarding torsemide dosing  Cardiomyopathy [facility])  Assessment & Plan  Improvement of the ejection fraction is noted in echocardiogram as noted above  Continue metoprolol succinate 50 mg daily  Isosorbide dinitrate 10 mg 3 times daily  Cardiology consult for further opinion  Uncontrolled type 2 diabetes mellitus with hyperglycemia [facility])  Assessment & Plan  Lab Results   Component Value Date    HGBA1C 7 6 (H) 06/16/2022       Continue insulin sliding scale; put on hold Farxiga and Glucophage  Blood Sugar Average: Last 72 hrs:      Stage 3b chronic kidney disease [facility])  Assessment & Plan  Lab Results   Component Value Date    EGFR 22 07/15/2022    EGFR 21 07/15/2022    EGFR 41 06/21/2022    CREATININE 2 88 (H) 07/15/2022    CREATININE 2 90 (H) 07/15/2022    CREATININE 1 69 (H) 06/21/2022   Usual creatinine is 1 69; currently elevated 2 9  We will continue to monitor with careful hydration  Torsemide on hold    Nephrology consult  Mixed hyperlipidemia  Assessment & Plan  Currently on Lipitor 40 mg at bedtime  Essential hypertension  Assessment & Plan  On hydralazine 25 mg 3 times daily  Metoprolol succinate 50 mg daily  Bipolar affective disorder, mixed, moderate (HCC)  Assessment & Plan  Continue with Prozac 20 mg daily  VTE Prophylaxis: Heparin  / sequential compression device   Code Status: Level 1 - Full Code as discussed  POLST: There is no POLST form on file for this patient (pre-hospital)    Anticipated Length of Stay:  Patient will be admitted on an Inpatient basis with an anticipated length of stay of  greater than 2 midnights  Justification for Hospital Stay: Please see detailed plans noted above  Chief Complaint:     Told by primary care physician to come to emergency room due to elevated creatinine  History of Present Illness:  Osbaldo Hilliard is a 61 y o  male who has past medical history significant for chronic kidney disease with baseline creatinine of 1 6 to 1 7; with hypertension, hyperlipidemia, diabetes mellitus not on insulin with hyperglycemia, polysubstance abuse, cardiomyopathy with known ejection fraction of 30% in April of this year however with recent echocardiogram from the [facility] shows improvement 40 to 50%  Patient comes in as advised by primary care physician due to elevated creatinine  Patient's torsemide dose has been doubled and today patient had a metabolic profile done which showed significant elevation of creatinine and hence was advised to go to the emergency room  Other than that, the patient did not have any complaints of fever or nausea however has diarrhea for 1 day which she said started when he drank a can of coconut water  Her patient does not complain of any abdominal pain  No chest pain, no dyspnea  No dyspnea on exertion  No pedal edema      Review of Systems:    Constitutional:  Denies fever or chills ; denies lightheaded  Eyes:  Denies change in visual acuity   HENT:  Denies nasal congestion or sore throat   Respiratory:  Denies cough or shortness of breath   Cardiovascular:  Denies chest pain or edema   GI:  Denies abdominal pain, nausea, vomiting, bloody stools however has diarrhea, x1 day  :  Denies dysuria   Musculoskeletal:  Denies back pain or joint pain   Integument:  Denies rash   Neurologic:  Denies headache, focal weakness or sensory changes   Endocrine:  Denies polyuria or polydipsia   Lymphatic:  Denies swollen glands   Psychiatric:  Denies depression or anxiety     Past Medical and Surgical History:   Past Medical History:   Diagnosis Date    Anxiety     Arthritis     Coronary artery disease     Dementia ([facility] )     Depression     Diabetes mellitus ([facility] )     Infectious viral hepatitis     Memory loss     Visual impairment      History reviewed  No pertinent surgical history  Meds/Allergies:  (Not in a hospital admission)      Allergies: No Known Allergies  History:  Marital Status: Single   Occupation:   Patient Pre-hospital Living Situation:  Home  Patient Pre-hospital Level of Mobility:  Ambulatory  Patient Pre-hospital Diet Restrictions:  Diabetic  Substance Use History:   Social History     Substance and Sexual Activity   Alcohol Use Yes    Comment: socially     Social History     Tobacco Use   Smoking Status Former Smoker   Smokeless Tobacco Never Used     Social History     Substance and Sexual Activity   Drug Use Never       Family History:  History reviewed  No pertinent family history      Physical Exam:     Vitals:   Blood Pressure: 147/87 (07/15/22 1913)  Pulse: 71 (07/15/22 1913)  Temperature: 97 6 °F (36 4 °C) (07/15/22 1705)  Temp Source: Oral (07/15/22 1705)  Respirations: 17 (07/15/22 1913)  Height: 5' 5" (165 1 cm) (07/15/22 1705)  Weight - Scale: 84 4 kg (186 lb) (07/15/22 1705)  SpO2: 98 % (07/15/22 1913)    Constitutional:  Well developed, well nourished, no acute distress, non-toxic appearance   Eyes: PERRL, conjunctiva normal   HENT:  Atraumatic, external ears normal, nose normal, oropharynx dry, no pharyngeal exudates  Neck- normal range of motion, no tenderness, supple   Respiratory:  No respiratory distress, normal breath sounds, no rales, no wheezing   Cardiovascular:  Normal rate, normal rhythm, no murmurs, no gallops, no rubs   GI:  Soft, nondistended, normal bowel sounds, nontender, no organomegaly, no mass, no rebound, no guarding   :  No costovertebral angle tenderness   Musculoskeletal:  No edema, no tenderness, no deformities  Back- no tenderness  Integument:  Well hydrated, no rash   Lymphatic:  No lymphadenopathy noted   Neurologic:  Alert &awake, communicative, CN 2-12 normal, normal motor function, normal sensory function, no focal deficits noted   Psychiatric:  Speech and behavior appropriate       Lab Results: I have personally reviewed pertinent reports  Results from last 7 days   Lab Units 07/15/22  1816   WBC Thousand/uL 4 88   HEMOGLOBIN g/dL 10 0*   HEMATOCRIT % 31 0*   PLATELETS Thousands/uL 155   NEUTROS PCT % 52   LYMPHS PCT % 31   MONOS PCT % 9   EOS PCT % 7*     Results from last 7 days   Lab Units 07/15/22  1816   POTASSIUM mmol/L 5 4*   CHLORIDE mmol/L 107   CO2 mmol/L 24   BUN mg/dL 82*   CREATININE mg/dL 2 88*   CALCIUM mg/dL 9 1   ALK PHOS U/L 128*   ALT U/L 58   AST U/L 35               Imaging: I have personally reviewed pertinent reports  XR chest 1 view portable    Result Date: 6/16/2022  Narrative: CHEST INDICATION:   weak  COMPARISON:  2/2/2015 EXAM PERFORMED/VIEWS:  XR CHEST PORTABLE FINDINGS: Linear scarring is seen within both bases  Otherwise there is no acute infiltrate with no pneumothorax and no pleural effusion  Atherosclerotic aorta with normal cardiac silhouette is visualized  Degenerative changes is seen within the spine  Impression: No acute cardiopulmonary disease   Workstation performed: [id number]     CT head without contrast    Result Date: 6/16/2022  Narrative: CT BRAIN - WITHOUT CONTRAST INDICATION:   off balance  COMPARISON:  None  TECHNIQUE:  CT examination of the brain was performed  In addition to axial images, sagittal and coronal 2D reformatted images were created and submitted for interpretation  Radiation dose length product (DLP) for this visit:  863 47 mGy-cm   This examination, like all CT scans performed in the [facility], was performed utilizing techniques to minimize radiation dose exposure, including the use of iterative  reconstruction and automated exposure control  IMAGE QUALITY:  Diagnostic  FINDINGS: PARENCHYMA: Decreased attenuation is noted in periventricular and subcortical white matter demonstrating an appearance that is statistically most likely to represent mild microangiopathic change  Chronic bilateral gangliocapsular lacunar infarcts  No CT signs of acute infarction  No intracranial mass, mass effect or midline shift  No acute parenchymal hemorrhage  VENTRICLES AND EXTRA-AXIAL SPACES:  Normal for the patient's age  VISUALIZED ORBITS AND PARANASAL SINUSES:  Orbits are unremarkable  Minimal opacification of the inferior mastoid air cells  CALVARIUM AND EXTRACRANIAL SOFT TISSUES:  Normal      Impression: No acute intracranial abnormality  Chronic microangiopathy  Workstation performed: [id number]         ** Please Note: Dragon 360 Dictation voice to text software was used in the creation of this document   **

## 2022-07-16 NOTE — CONSULTS
Consultation - Nephrology   THE [facility] 61 y o  male MRN: [id number]  Unit/Bed#: [facility] 426-01 Encounter: [id number]    ASSESSMENT and PLAN:  1  LAZARO, present on admission, likely on top of CKD stage 4 vs progression of CKD stage 5  -needs outpatient renal follow-up upon discharge  -currently on normal saline  -follow-up a m  BMP  -peak serum creatinine 2 9 on admission, improving slowly on normal saline  -urinalysis shows glucose along with 1+ protein  -suspect progression of CKD in the setting of diabetes + HTN + CRS  -will obtain renal ultrasound to assess for medical renal disease/echogenicity  -avoid IV dye/nephrotoxins/relative hypotension   -home torsemide dose held  2  Anemia-hemoglobin 9 3, monitor CBC, recent iron panel showed low TIBC consistent with anemia of chronic disease, likely related to CKD  Could consider ADRIANO  3  CKD stage 4 likely d/t cardiorenal syndrome plus or minus prerenal azotemia causing recurrent LAZARO in the past - baseline creatinine 1 7-1 9, should have follow-up with Nephrology upon discharge    4  Proteinuria - will check UpCr as 1+ protein noted on UA  Not currently on ACEi/ARB    5  HTN - BP acceptable on Coreg 12 5 mg p o  B i d  Hold parameters adjusted  Avoid relative hypotension in setting of LAZARO/late stage CKD    6  DM2 - management per primary team, last A1C in low  To mid 7s    7  CHF - EF 30%, torsemide on hold for now  Watch volume status closely on NS IVF  HISTORY OF PRESENT ILLNESS:  Requesting Physician: Portia Lewis DO  Reason for Consult: LAZARO    THE [facility] is a 61y o  year old male who was admitted to [facility] after presenting with abnormal labs  A renal consultation is requested today for assistance in the management of LAZARO  The patient was recently admitted and discharged and did not get to see Nephrology at outpatient  He currently is asymptomatic    Denies fevers, chills, chest pain, shortness of breath, dizziness, difficulty with urination but did have diarrhea a few times today but has been eating  He admits to uncontrolled blood sugars in the setting of diabetes mellitus type 2  He denies NSAID use but does use Tylenol as well as aspirin  He has a history of hypertension and thinks that his blood pressures also been elevated  No other complaints  PAST MEDICAL HISTORY:  Past Medical History:   Diagnosis Date    Anxiety     Arthritis     Coronary artery disease     Dementia ([facility] )     Depression     [facility] mellitus ([facility] )     Infectious viral hepatitis     Memory loss     Visual impairment        PAST SURGICAL HISTORY:  History reviewed  No pertinent surgical history  ALLERGIES:  No Known Allergies    SOCIAL HISTORY:  Social History     Substance and Sexual Activity   Alcohol Use Yes    Comment: socially     Social History     Substance and Sexual Activity   Drug Use Never     Social History     Tobacco Use   Smoking Status Former Smoker   Smokeless Tobacco Never Used       FAMILY HISTORY:  History reviewed  No pertinent family history      MEDICATIONS:    Current Facility-Administered Medications:     acetaminophen (TYLENOL) tablet 650 mg, 650 mg, Oral, Q6H PRN, Loyda Mo MD    aluminum-magnesium hydroxide-simethicone (MYLANTA) oral suspension 15 mL, 15 mL, Oral, Q6H PRN, Loyda Mo MD    aspirin (ECOTRIN LOW STRENGTH) EC tablet 81 mg, 81 mg, Oral, Daily, Loyda Mo MD, 81 mg at 07/16/22 1023    atorvastatin (LIPITOR) tablet 40 mg, 40 mg, Oral, HS, Loyda Mo MD, 40 mg at 07/15/22 2217    carvedilol (COREG) tablet 12 5 mg, 12 5 mg, Oral, BID With Meals, EMMA Bailey, 12 5 mg at 07/16/22 1023    FLUoxetine (PROzac) capsule 20 mg, 20 mg, Oral, Daily, Loyda Mo MD, 20 mg at 31/36/46 5757    folic acid (FOLVITE) tablet 400 mcg, 400 mcg, Oral, Daily, Loyda Mo MD, 400 mcg at 07/16/22 1024    gabapentin (NEURONTIN) capsule 300 mg, 300 mg, Oral, TID, Amy Philip MD, 300 mg at 07/16/22 1024    heparin (porcine) subcutaneous injection 5,000 Units, 5,000 Units, Subcutaneous, Q8H [address], 5,000 Units at 07/16/22 0620 **AND** [COMPLETED] Platelet count, , , Once, Amy Philip MD    insulin lispro (HumaLOG) 100 units/mL subcutaneous injection 1-6 Units, 1-6 Units, Subcutaneous, TID AC, 1 Units at 07/16/22 0623 **AND** Fingerstick Glucose (POCT), , , TID AC, Griffin Mcneal MD    LORazepam (ATIVAN) tablet 0 5 mg, 0 5 mg, Oral, HS PRN, EMMA Javier, 0 5 mg at 07/15/22 2324    melatonin tablet 6 mg, 6 mg, Oral, HS, Amy Philip MD, 6 mg at 07/15/22 2217    ondansetron (ZOFRAN) injection 4 mg, 4 mg, Intravenous, Q6H PRN, Amy Philip MD    sodium chloride 0 9 % infusion, 75 mL/hr, Intravenous, Continuous, Griffin Mcneal MD, Last Rate: 75 mL/hr at 07/15/22 2218, 75 mL/hr at 07/15/22 2218    thiamine tablet 100 mg, 100 mg, Oral, Daily, Amy Philip MD, 100 mg at 07/16/22 1024    REVIEW OF SYSTEMS:  More than 10 systems were reviewed  No other pertinent positive findings other than those mentioned in HPI  PHYSICAL EXAM:  Current Weight: Weight - Scale: 82 5 kg (181 lb 14 1 oz)  First Weight: Weight - Scale: 84 4 kg (186 lb)  Vitals:    07/15/22 2240 07/16/22 0600 07/16/22 0734 07/16/22 1023   BP: 145/86  145/87    BP Location: Right arm      Pulse: 79  70 69   Resp:   19    Temp:   (!) 97 3 °F (36 3 °C)    TempSrc:       SpO2:   96%    Weight:  82 5 kg (181 lb 14 1 oz)     Height:           Intake/Output Summary (Last 24 hours) at 7/16/2022 1101  Last data filed at 7/16/2022 0626  Gross per 24 hour   Intake 610 ml   Output 400 ml   Net 210 ml     Physical Exam  Vitals reviewed  Constitutional:       General: He is not in acute distress  Appearance: Normal appearance  He is well-developed  He is not diaphoretic        Comments: sitting comfortably in chair   HENT:      Head: Normocephalic and atraumatic  Nose: Nose normal       Mouth/Throat:      Mouth: Mucous membranes are moist       Pharynx: No oropharyngeal exudate  Eyes:      General: No scleral icterus  Right eye: No discharge  Left eye: No discharge  Neck:      Thyroid: No thyromegaly  Cardiovascular:      Rate and Rhythm: Normal rate and regular rhythm  Heart sounds: Normal heart sounds  Comments: Trace b/l LE edema  Pulmonary:      Effort: Pulmonary effort is normal       Breath sounds: Normal breath sounds  No wheezing or rales  Abdominal:      General: Bowel sounds are normal  There is no distension  Palpations: Abdomen is soft  Tenderness: There is no abdominal tenderness  Musculoskeletal:         General: Swelling (trace b/l LE) present  Normal range of motion  Cervical back: Neck supple  Lymphadenopathy:      Cervical: No cervical adenopathy  Skin:     General: Skin is warm and dry  Findings: No rash  Neurological:      General: No focal deficit present  Mental Status: He is alert        Comments: awake   Psychiatric:         Mood and Affect: Mood normal          Behavior: Behavior normal          Invasive Devices:      Lab Results:   Results from last 7 days   Lab Units 07/16/22  0439 07/15/22  2207 07/15/22  1816 07/15/22  1043   WBC Thousand/uL 5 55  --  4 88 5 61   HEMOGLOBIN g/dL 9 3*  --  10 0* 9 8*   HEMATOCRIT % 28 8*  --  31 0* 31 0*   PLATELETS Thousands/uL 155 158 155 166   POTASSIUM mmol/L 4 8  --  5 4* 5 2   CHLORIDE mmol/L 113*  --  107 111*   CO2 mmol/L 23  --  24 23   BUN mg/dL 76*  --  82* 78*   CREATININE mg/dL 2 52*  --  2 88* 2 90*   CALCIUM mg/dL 8 8  --  9 1 9 1   MAGNESIUM mg/dL 1 9  --   --   --    ALK PHOS U/L 117*  --  128* 133*   ALT U/L 52  --  58 59   AST U/L 32  --  35 39

## 2022-07-16 NOTE — ASSESSMENT & PLAN NOTE
Lab Results   Component Value Date    EGFR 22 07/15/2022    EGFR 21 07/15/2022    EGFR 41 06/21/2022    CREATININE 2 88 (H) 07/15/2022    CREATININE 2 90 (H) 07/15/2022    CREATININE 1 69 (H) 06/21/2022   Usual creatinine is 1 69; currently elevated 2 9  We will continue to monitor with careful hydration  Torsemide on hold  Nephrology consult

## 2022-07-17 ENCOUNTER — APPOINTMENT (INPATIENT)
Dept: RADIOLOGY | Facility: HOSPITAL | Age: 67
DRG: 683 | End: 2022-07-17
Payer: COMMERCIAL

## 2022-07-17 PROBLEM — E87.20 METABOLIC ACIDOSIS: Status: ACTIVE | Noted: 2022-07-17

## 2022-07-17 PROBLEM — E87.2 METABOLIC ACIDOSIS: Status: ACTIVE | Noted: 2022-07-17

## 2022-07-17 LAB
ANION GAP SERPL CALCULATED.3IONS-SCNC: 5 MMOL/L (ref 4–13)
BASOPHILS # BLD AUTO: 0.06 THOUSANDS/ΜL (ref 0–0.1)
BASOPHILS NFR BLD AUTO: 1 % (ref 0–1)
BUN SERPL-MCNC: 66 MG/DL (ref 5–25)
CALCIUM SERPL-MCNC: 8.8 MG/DL (ref 8.3–10.1)
CHLORIDE SERPL-SCNC: 118 MMOL/L (ref 100–108)
CO2 SERPL-SCNC: 20 MMOL/L (ref 21–32)
CREAT SERPL-MCNC: 2.26 MG/DL (ref 0.6–1.3)
EOSINOPHIL # BLD AUTO: 0.33 THOUSAND/ΜL (ref 0–0.61)
EOSINOPHIL NFR BLD AUTO: 6 % (ref 0–6)
ERYTHROCYTE [DISTWIDTH] IN BLOOD BY AUTOMATED COUNT: 14.7 % (ref 11.6–15.1)
GFR SERPL CREATININE-BSD FRML MDRD: 29 ML/MIN/1.73SQ M
GLUCOSE SERPL-MCNC: 139 MG/DL (ref 65–140)
GLUCOSE SERPL-MCNC: 151 MG/DL (ref 65–140)
GLUCOSE SERPL-MCNC: 192 MG/DL (ref 65–140)
GLUCOSE SERPL-MCNC: 211 MG/DL (ref 65–140)
GLUCOSE SERPL-MCNC: 257 MG/DL (ref 65–140)
HCT VFR BLD AUTO: 30 % (ref 36.5–49.3)
HGB BLD-MCNC: 9.5 G/DL (ref 12–17)
IMM GRANULOCYTES # BLD AUTO: 0.01 THOUSAND/UL (ref 0–0.2)
IMM GRANULOCYTES NFR BLD AUTO: 0 % (ref 0–2)
LYMPHOCYTES # BLD AUTO: 1.71 THOUSANDS/ΜL (ref 0.6–4.47)
LYMPHOCYTES NFR BLD AUTO: 31 % (ref 14–44)
MAGNESIUM SERPL-MCNC: 2.3 MG/DL (ref 1.6–2.6)
MCH RBC QN AUTO: 30.1 PG (ref 26.8–34.3)
MCHC RBC AUTO-ENTMCNC: 31.7 G/DL (ref 31.4–37.4)
MCV RBC AUTO: 95 FL (ref 82–98)
MONOCYTES # BLD AUTO: 0.55 THOUSAND/ΜL (ref 0.17–1.22)
MONOCYTES NFR BLD AUTO: 10 % (ref 4–12)
NEUTROPHILS # BLD AUTO: 2.94 THOUSANDS/ΜL (ref 1.85–7.62)
NEUTS SEG NFR BLD AUTO: 52 % (ref 43–75)
NRBC BLD AUTO-RTO: 0 /100 WBCS
PLATELET # BLD AUTO: 161 THOUSANDS/UL (ref 149–390)
PMV BLD AUTO: 10.4 FL (ref 8.9–12.7)
POTASSIUM SERPL-SCNC: 4.6 MMOL/L (ref 3.5–5.3)
RBC # BLD AUTO: 3.16 MILLION/UL (ref 3.88–5.62)
SODIUM SERPL-SCNC: 143 MMOL/L (ref 136–145)
WBC # BLD AUTO: 5.6 THOUSAND/UL (ref 4.31–10.16)

## 2022-07-17 PROCEDURE — 83735 ASSAY OF MAGNESIUM: CPT | Performed by: GENERAL PRACTICE

## 2022-07-17 PROCEDURE — 85025 COMPLETE CBC W/AUTO DIFF WBC: CPT | Performed by: INTERNAL MEDICINE

## 2022-07-17 PROCEDURE — 80048 BASIC METABOLIC PNL TOTAL CA: CPT | Performed by: INTERNAL MEDICINE

## 2022-07-17 PROCEDURE — 76770 US EXAM ABDO BACK WALL COMP: CPT

## 2022-07-17 PROCEDURE — 99232 SBSQ HOSP IP/OBS MODERATE 35: CPT | Performed by: INTERNAL MEDICINE

## 2022-07-17 PROCEDURE — 99232 SBSQ HOSP IP/OBS MODERATE 35: CPT | Performed by: GENERAL PRACTICE

## 2022-07-17 PROCEDURE — 82948 REAGENT STRIP/BLOOD GLUCOSE: CPT

## 2022-07-17 RX ORDER — SODIUM CHLORIDE, SODIUM GLUCONATE, SODIUM ACETATE, POTASSIUM CHLORIDE, MAGNESIUM CHLORIDE, SODIUM PHOSPHATE, DIBASIC, AND POTASSIUM PHOSPHATE .53; .5; .37; .037; .03; .012; .00082 G/100ML; G/100ML; G/100ML; G/100ML; G/100ML; G/100ML; G/100ML
75 INJECTION, SOLUTION INTRAVENOUS CONTINUOUS
Status: DISCONTINUED | OUTPATIENT
Start: 2022-07-17 | End: 2022-07-18

## 2022-07-17 RX ORDER — CARVEDILOL 25 MG/1
25 TABLET ORAL 2 TIMES DAILY WITH MEALS
Status: DISCONTINUED | OUTPATIENT
Start: 2022-07-17 | End: 2022-07-20 | Stop reason: HOSPADM

## 2022-07-17 RX ADMIN — FOLIC ACID TAB 400 MCG 400 MCG: 400 TAB at 09:06

## 2022-07-17 RX ADMIN — GABAPENTIN 300 MG: 300 CAPSULE ORAL at 17:26

## 2022-07-17 RX ADMIN — ATORVASTATIN CALCIUM 40 MG: 40 TABLET, FILM COATED ORAL at 22:12

## 2022-07-17 RX ADMIN — INSULIN LISPRO 3 UNITS: 100 INJECTION, SOLUTION INTRAVENOUS; SUBCUTANEOUS at 11:12

## 2022-07-17 RX ADMIN — HEPARIN SODIUM 5000 UNITS: 5000 INJECTION INTRAVENOUS; SUBCUTANEOUS at 13:10

## 2022-07-17 RX ADMIN — HEPARIN SODIUM 5000 UNITS: 5000 INJECTION INTRAVENOUS; SUBCUTANEOUS at 22:12

## 2022-07-17 RX ADMIN — HEPARIN SODIUM 5000 UNITS: 5000 INJECTION INTRAVENOUS; SUBCUTANEOUS at 06:26

## 2022-07-17 RX ADMIN — FLUOXETINE 20 MG: 20 CAPSULE ORAL at 09:03

## 2022-07-17 RX ADMIN — ASPIRIN 81 MG: 81 TABLET, COATED ORAL at 09:03

## 2022-07-17 RX ADMIN — INSULIN LISPRO 2 UNITS: 100 INJECTION, SOLUTION INTRAVENOUS; SUBCUTANEOUS at 17:26

## 2022-07-17 RX ADMIN — LORAZEPAM 0.5 MG: 0.5 TABLET ORAL at 22:14

## 2022-07-17 RX ADMIN — CARVEDILOL 25 MG: 25 TABLET, FILM COATED ORAL at 17:26

## 2022-07-17 RX ADMIN — GABAPENTIN 300 MG: 300 CAPSULE ORAL at 09:03

## 2022-07-17 RX ADMIN — SODIUM CHLORIDE, SODIUM GLUCONATE, SODIUM ACETATE, POTASSIUM CHLORIDE, MAGNESIUM CHLORIDE, SODIUM PHOSPHATE, DIBASIC, AND POTASSIUM PHOSPHATE 75 ML/HR: .53; .5; .37; .037; .03; .012; .00082 INJECTION, SOLUTION INTRAVENOUS at 14:05

## 2022-07-17 RX ADMIN — THIAMINE HCL TAB 100 MG 100 MG: 100 TAB at 09:03

## 2022-07-17 RX ADMIN — MELATONIN 6 MG: at 22:12

## 2022-07-17 RX ADMIN — GABAPENTIN 300 MG: 300 CAPSULE ORAL at 22:19

## 2022-07-17 RX ADMIN — CARVEDILOL 12.5 MG: 12.5 TABLET, FILM COATED ORAL at 09:06

## 2022-07-17 RX ADMIN — SODIUM CHLORIDE 75 ML/HR: 0.9 INJECTION, SOLUTION INTRAVENOUS at 01:04

## 2022-07-17 NOTE — PROGRESS NOTES
Progress Note - Nephrology   Missael Jules 61 y o  male MRN: [id number]  Unit/Bed#: [facility] 426-01 Encounter: [id number]      Assessment / Plan:  1  LAZARO, present on admission, likely on top of CKD stage 4 vs progression of CKD stage 5  -needs outpatient renal follow-up upon discharge  -currently on normal saline with drop in serum bicarb noted, change to isolyte  -follow-up a m  BMP  -peak serum creatinine 2 9 on admission, improving slowly   -urinalysis shows glucose along with 1+ protein  -suspect progression of CKD in the setting of diabetes + HTN + CRS  -f/u renal ultrasound to assess for medical renal disease/echogenicity  -avoid IV dye/nephrotoxins/relative hypotension   -home torsemide dose held  Cardio recs appreciated      2  Anemia-hemoglobin 9 5, monitor CBC, recent iron panel showed low TIBC consistent with anemia of chronic disease, likely related to CKD  Could consider ADRIANO      3  CKD stage 4 likely d/t cardiorenal syndrome plus or minus prerenal azotemia causing recurrent LAZARO in the past - baseline creatinine 1 7-1 9, should have follow-up with Nephrology upon discharge     4  Proteinuria - will check UpCr as 1+ protein noted on UA  Not currently on ACEi/ARB     5  HTN - BP acceptable on Coreg 25 mg p o  B i d  Hold parameters adjusted  Avoid relative hypotension in setting of LAZARO/late stage CKD     6  DM2 - management per primary team, last A1C in low to mid 7s     7  CHF - EF 30%, torsemide on hold for now  Watch volume status closely on IVF  Subjective:   He denies chest pain, shortness of breath, nausea, vomiting or leg edema  He does have diarrhea  No other complaints  Objective:     Vitals: Blood pressure 120/67, pulse 72, temperature 97 5 °F (36 4 °C), resp  rate 18, height 5' 4" (1 626 m), weight 83 7 kg (184 lb 8 4 oz), SpO2 93 %  ,Body mass index is 31 67 kg/m²  Temp (24hrs), Av 5 °F (36 4 °C), Min:97 2 °F (36 2 °C), Max:97 9 °F (36 6 °C)      Weight (last 2 days) Date/Time Weight    07/17/22 0630 83 7 (184 53)    07/16/22 0600 82 5 (181 88)    07/15/22 2147 82 6 (182 1)    07/15/22 1705 84 4 (186)            Intake/Output Summary (Last 24 hours) at 7/17/2022 1352  Last data filed at 7/17/2022 1311  Gross per 24 hour   Intake 1093 75 ml   Output 1020 ml   Net 73 75 ml     I/O last 24 hours: In: 1093 8 [I V :1093 8]  Out: 1020 [Urine:1020]        Physical Exam:   Physical Exam  Vitals reviewed  Constitutional:       General: He is not in acute distress  Appearance: Normal appearance  He is well-developed  He is not diaphoretic  HENT:      Head: Normocephalic and atraumatic  Nose: Nose normal       Mouth/Throat:      Mouth: Mucous membranes are moist       Pharynx: No oropharyngeal exudate  Eyes:      General: No scleral icterus  Right eye: No discharge  Left eye: No discharge  Neck:      Thyroid: No thyromegaly  Cardiovascular:      Rate and Rhythm: Normal rate and regular rhythm  Heart sounds: Normal heart sounds  Pulmonary:      Effort: Pulmonary effort is normal       Breath sounds: Normal breath sounds  No wheezing or rales  Abdominal:      General: Bowel sounds are normal  There is no distension  Palpations: Abdomen is soft  Tenderness: There is no abdominal tenderness  Musculoskeletal:         General: No swelling  Normal range of motion  Cervical back: Neck supple  Lymphadenopathy:      Cervical: No cervical adenopathy  Skin:     General: Skin is warm and dry  Findings: No rash  Neurological:      General: No focal deficit present  Mental Status: He is alert        Comments: awake   Psychiatric:         Mood and Affect: Mood normal          Behavior: Behavior normal          Invasive Devices  Report    Peripheral Intravenous Line  Duration           Peripheral IV 07/15/22 Right Hand 1 day                Medications:    Scheduled Meds:  Current Facility-Administered Medications   Medication Dose Route Frequency Provider Last Rate    acetaminophen  650 mg Oral Q6H PRN Sagrario Sulilvan MD      aluminum-magnesium hydroxide-simethicone  15 mL Oral Q6H PRN Sagrario Sullivan MD      aspirin  81 mg Oral Daily Sagrario Sullivan MD      atorvastatin  40 mg Oral HS Sagrario Sullivan MD      carvedilol  25 mg Oral BID With Meals Carolyn Reynaga MD      FLUoxetine  20 mg Oral Daily Sagrario Sullivan MD      folic acid  548 mcg Oral Daily Sagrario Sullivan MD      gabapentin  300 mg Oral TID Sagrario Sullivan MD      heparin (porcine)  5,000 Units Subcutaneous [facility] [address] Sagrario Sullivan MD      insulin lispro  1-6 Units Subcutaneous TID AC Griffin Duffy MD      LORazepam  0 5 mg Oral HS PRN EMMA Garcia      melatonin  6 mg Oral HS Sagrario Sullivan MD      ondansetron  4 mg Intravenous Q6H PRN Sagrario Sullivan MD      sodium chloride  75 mL/hr Intravenous Continuous Sagrario Sullivan MD 75 mL/hr (07/17/22 0104)    thiamine  100 mg Oral Daily Sagrario Sullivan MD         PRN Meds:   acetaminophen    aluminum-magnesium hydroxide-simethicone    LORazepam    ondansetron    Continuous Infusions:sodium chloride, 75 mL/hr, Last Rate: 75 mL/hr (07/17/22 0104)            LAB RESULTS:      Results from last 7 days   Lab Units 07/17/22  0553 07/16/22  0439 07/15/22  2207 07/15/22  1816 07/15/22  1043   WBC Thousand/uL 5 60 5 55  --  4 88 5 61   HEMOGLOBIN g/dL 9 5* 9 3*  --  10 0* 9 8*   HEMATOCRIT % 30 0* 28 8*  --  31 0* 31 0*   PLATELETS Thousands/uL 161 155 158 155 166   NEUTROS PCT % 52 53  --  52 59   LYMPHS PCT % 31 29  --  31 24   MONOS PCT % 10 9  --  9 10   EOS PCT % 6 7*  --  7* 6   POTASSIUM mmol/L 4 6 4 8  --  5 4* 5 2   CHLORIDE mmol/L 118* 113*  --  107 111*   CO2 mmol/L 20* 23  --  24 23   BUN mg/dL 66* 76*  --  82* 78*   CREATININE mg/dL 2 26* 2 52*  --  2 88* 2 90*   CALCIUM mg/dL 8 8 8 8  --  9 1 9 1   ALK PHOS U/L  --  117*  --  128* 133*   ALT U/L  --  52  --  58 59   AST U/L  --  32  --  35 39   MAGNESIUM mg/dL 2 3 1 9  --   --   --        CUTURES:  No results found for: Darren Quintero              Portions of the record may have been created with voice recognition software  Occasional wrong word or "sound a like" substitutions may have occurred due to the inherent limitations of voice recognition software  Read the chart carefully and recognize, using context, where substitutions have occurred  If you have any questions, please contact the dictating provider

## 2022-07-17 NOTE — ASSESSMENT & PLAN NOTE
Estimated Creatinine Clearance: 32 2 mL/min (A) (by C-G formula based on SCr of 2 26 mg/dL (H))  Acute kidney insufficiency secondary to double dosing of torsemide along with new onset diarrhea; will put on hold torsemide  Will continue with mild hydration; sodium chloride 75 mL/hr -> isolyte  Nephrology appreciated  Noted patient's April echocardiogram showed ejection fraction of 30%; on the high values echocardiogram done July 6, 2022 showed improvement at 40 to 50%    We will continue with mild careful hydration; will get Cardiology consult for opinion regarding torsemide dosing - appreciated  Renal US shows renal cysts

## 2022-07-17 NOTE — PROGRESS NOTES
[address] [facility]  Progress Note - Maria G Ivy 1955, 61 y o  male MRN: [id number]  [facility] Encounter: [id number]  Primary Care Provider: Nora Castro PA-C   Date and time admitted to hospital: 7/15/2022  5:41 PM    * [facility]) [facility]  Assessment & Plan  Estimated Creatinine Clearance: 32 2 mL/min (A) (by C-G formula based on SCr of 2 26 mg/dL (H))  Acute kidney insufficiency secondary to double dosing of torsemide along with new onset diarrhea; will put on hold torsemide  Will continue with mild hydration; sodium chloride 75 mL/hr -> isolyte  Nephrology appreciated  Noted patient's April echocardiogram showed ejection fraction of 30%; on the high values echocardiogram done July 6, 2022 showed improvement at 40 to 50%    We will continue with mild careful hydration; will get Cardiology consult for opinion regarding torsemide dosing - appreciated  Renal US shows renal cysts    Metabolic acidosis  Assessment & Plan  · IV NS-> isolyte    High serum thyroid stimulating hormone (TSH)  Assessment & Plan  · Pt denies fatigue and fT4 WNL  · PCP should recheck TFTs in 4-6 weeks    Uncontrolled type 2 diabetes mellitus with hyperglycemia [facility])  Assessment & Plan  Lab Results   Component Value Date    HGBA1C 7 7 (H) 07/15/2022       Recent Labs     07/16/22  2125 07/17/22  0620 07/17/22  1048 07/17/22  1553   POCGLU 178* 139 257* 211*       Blood Sugar Average: Last 72 hrs:  (P) 210 [id number]     · ISS for now  · Home meds on hold    Diarrhea  Assessment & Plan  · f/u wbc's stool and reducing substances   (patient reports that this was brought about by drinking cold water yesterday which came from a can )   Although this is now resolved    Cardiomyopathy ([facility] )  Assessment & Plan  EF 40-50%  · Cardio appreciated  · Meotoprolol -> Coreg  · Imdur/hydral d/c  · Torsemide held    Stage 3b chronic kidney disease [facility])  Assessment & Plan  Lab Results   Component Value Date    EGFR 29 2022    EGFR 25 2022    EGFR 22 07/15/2022    CREATININE 2 26 (H) 2022    CREATININE 2 52 (H) 2022    CREATININE 2 88 (H) 07/15/2022   Estimated Creatinine Clearance: 32 2 mL/min (A) (by C-G formula based on SCr of 2 26 mg/dL (H))  B/l creatinine is 1 69; elevated to 2 9 on admission  We will continue to monitor with careful hydration  Torsemide on hold  Nephrology consult  Mixed hyperlipidemia  Assessment & Plan  Currently on Lipitor 40 mg at bedtime  Bipolar affective disorder, mixed, moderate (HCC)  Assessment & Plan  Continue with Prozac 20 mg daily  Ativan prn      VTE Pharmacologic Prophylaxis: VTE Score: 4 Moderate Risk (Score 3-4) - Pharmacological DVT Prophylaxis Ordered: heparin  Patient Centered Rounds: I performed bedside rounds with nursing staff today  Discussions with Specialists or Other Care Team Provider: no    Education and Discussions with Family / Patient: Patient declined call to   Time Spent for Care: 30 minutes  More than 50% of total time spent on counseling and coordination of care as described above  Current Length of Stay: 2 day(s)  Current Patient Status: Inpatient   Certification Statement: The patient will continue to require additional inpatient hospital stay due to need for IVF  Discharge Plan: Anticipate discharge in 48-72 hrs to home  Code Status: Level 1 - Full Code    Subjective:   No acute complaints    Objective:     Vitals:   Temp (24hrs), Av 8 °F (36 6 °C), Min:97 5 °F (36 4 °C), Max:98 °F (36 7 °C)    Temp:  [97 5 °F (36 4 °C)-98 °F (36 7 °C)] 98 °F (36 7 °C)  HR:  [72-77] 74  Resp:  [16-18] 16  BP: (120-147)/(67-88) 147/88  SpO2:  [91 %-93 %] 93 %  Body mass index is 31 67 kg/m²  Input and Output Summary (last 24 hours):      Intake/Output Summary (Last 24 hours) at 2022 1712  Last data filed at 2022 1405  Gross per 24 hour   Intake 2373 75 ml   Output 1020 ml   Net 1353 75 ml       Physical Exam:   Physical Exam  HENT:      Head: Normocephalic and atraumatic  Nose: Nose normal       Mouth/Throat:      Mouth: Mucous membranes are moist    Eyes:      Extraocular Movements: Extraocular movements intact  Conjunctiva/sclera: Conjunctivae normal    Cardiovascular:      Rate and Rhythm: Normal rate and regular rhythm  Pulmonary:      Effort: Pulmonary effort is normal       Breath sounds: Normal breath sounds  No wheezing or rales  Abdominal:      General: Bowel sounds are normal  There is no distension  Palpations: Abdomen is soft  Tenderness: There is no abdominal tenderness  Musculoskeletal:         General: Normal range of motion  Cervical back: Normal range of motion and neck supple  Right lower leg: No edema  Left lower leg: No edema  Skin:     General: Skin is warm and dry  Neurological:      Mental Status: He is alert and oriented to person, place, and time           Additional Data:     Labs:  Results from last 7 days   Lab Units 07/17/22  0553   WBC Thousand/uL 5 60   HEMOGLOBIN g/dL 9 5*   HEMATOCRIT % 30 0*   PLATELETS Thousands/uL 161   NEUTROS PCT % 52   LYMPHS PCT % 31   MONOS PCT % 10   EOS PCT % 6     Results from last 7 days   Lab Units 07/17/22  0553 07/16/22  0439   SODIUM mmol/L 143 142   POTASSIUM mmol/L 4 6 4 8   CHLORIDE mmol/L 118* 113*   CO2 mmol/L 20* 23   BUN mg/dL 66* 76*   CREATININE mg/dL 2 26* 2 52*   ANION GAP mmol/L 5 6   CALCIUM mg/dL 8 8 8 8   ALBUMIN g/dL  --  2 8*   TOTAL BILIRUBIN mg/dL  --  0 38   ALK PHOS U/L  --  117*   ALT U/L  --  52   AST U/L  --  32   GLUCOSE RANDOM mg/dL 151* 167*         Results from last 7 days   Lab Units 07/17/22  1553 07/17/22  1048 07/17/22  0620 07/16/22  2125 07/16/22  1558 07/16/22  1043 07/16/22  0612   POC GLUCOSE mg/dl 211* 257* 139 178* 254* 269* 165*     Results from last 7 days   Lab Units 07/15/22  2207   HEMOGLOBIN A1C % 7 7* Lines/Drains:  Invasive Devices  Report    Peripheral Intravenous Line  Duration           Peripheral IV 07/15/22 Right Hand 1 day                      Imaging: Reviewed radiology reports from this admission including: ultrasound(s)    Recent Cultures (last 7 days):         Last 24 Hours Medication List:   Current Facility-Administered Medications   Medication Dose Route Frequency Provider Last Rate    acetaminophen  650 mg Oral Q6H PRN Suzan Aguilar MD      aluminum-magnesium hydroxide-simethicone  15 mL Oral Q6H PRN Suzan Aguilar MD      aspirin  81 mg Oral Daily Suzan Aguilar MD      atorvastatin  40 mg Oral HS Suzan Aguilar MD      carvedilol  25 mg Oral BID With Meals Jay Pizarro MD      FLUoxetine  20 mg Oral Daily uSzan Aguilar MD      folic acid  001 mcg Oral Daily Suzan Aguilar MD      gabapentin  300 mg Oral TID Suzan Aguilar MD      heparin (porcine)  5,000 Units Subcutaneous [facility] [address] Suzan Aguilar MD      insulin lispro  1-6 Units Subcutaneous TID AC Wilfredo Milford Curling, MD      LORazepam  0 5 mg Oral HS PRN EMMA Shoemaker      melatonin  6 mg Oral HS Suzan Aguilar MD      multi-electrolyte  75 mL/hr Intravenous Continuous Tuan Larsen DO 75 mL/hr (07/17/22 1405)    ondansetron  4 mg Intravenous Q6H PRN Suzan Aguilar MD      thiamine  100 mg Oral Daily Suzan Aguilar MD          Today, Patient Was Seen By: Yossi Pace DO    **Please Note: This note may have been constructed using a voice recognition system  **

## 2022-07-17 NOTE — ASSESSMENT & PLAN NOTE
· f/u wbc's stool and reducing substances   (patient reports that this was brought about by drinking cold water yesterday which came from a can )   Although this is now resolved

## 2022-07-17 NOTE — ASSESSMENT & PLAN NOTE
Lab Results   Component Value Date    EGFR 29 07/17/2022    EGFR 25 07/16/2022    EGFR 22 07/15/2022    CREATININE 2 26 (H) 07/17/2022    CREATININE 2 52 (H) 07/16/2022    CREATININE 2 88 (H) 07/15/2022   Estimated Creatinine Clearance: 32 2 mL/min (A) (by C-G formula based on SCr of 2 26 mg/dL (H))  B/l creatinine is 1 69; elevated to 2 9 on admission  We will continue to monitor with careful hydration  Torsemide on hold  Nephrology consult

## 2022-07-17 NOTE — ASSESSMENT & PLAN NOTE
Lab Results   Component Value Date    HGBA1C 7 7 (H) 07/15/2022       Recent Labs     07/16/22  2125 07/17/22  0620 07/17/22  1048 07/17/22  1553   POCGLU 178* 139 257* 211*       Blood Sugar Average: Last 72 hrs:  (P) 210 [id number]     · ISS for now  · Home meds on hold

## 2022-07-18 ENCOUNTER — HOME CARE VISIT (OUTPATIENT)
Dept: HOME HEALTH SERVICES | Facility: HOME HEALTHCARE | Age: 67
End: 2022-07-18
Payer: COMMERCIAL

## 2022-07-18 LAB
ANION GAP SERPL CALCULATED.3IONS-SCNC: 3 MMOL/L (ref 4–13)
BUN SERPL-MCNC: 55 MG/DL (ref 5–25)
CALCIUM SERPL-MCNC: 8.6 MG/DL (ref 8.3–10.1)
CHLORIDE SERPL-SCNC: 118 MMOL/L (ref 100–108)
CO2 SERPL-SCNC: 23 MMOL/L (ref 21–32)
CREAT SERPL-MCNC: 2 MG/DL (ref 0.6–1.3)
GFR SERPL CREATININE-BSD FRML MDRD: 34 ML/MIN/1.73SQ M
GLUCOSE SERPL-MCNC: 172 MG/DL (ref 65–140)
GLUCOSE SERPL-MCNC: 172 MG/DL (ref 65–140)
GLUCOSE SERPL-MCNC: 202 MG/DL (ref 65–140)
GLUCOSE SERPL-MCNC: 203 MG/DL (ref 65–140)
GLUCOSE SERPL-MCNC: 209 MG/DL (ref 65–140)
POTASSIUM SERPL-SCNC: 4.7 MMOL/L (ref 3.5–5.3)
SODIUM SERPL-SCNC: 144 MMOL/L (ref 136–145)
WBC SPEC QL GRAM STN: NORMAL

## 2022-07-18 PROCEDURE — 82948 REAGENT STRIP/BLOOD GLUCOSE: CPT

## 2022-07-18 PROCEDURE — 80048 BASIC METABOLIC PNL TOTAL CA: CPT | Performed by: GENERAL PRACTICE

## 2022-07-18 PROCEDURE — 99233 SBSQ HOSP IP/OBS HIGH 50: CPT | Performed by: INTERNAL MEDICINE

## 2022-07-18 PROCEDURE — 99232 SBSQ HOSP IP/OBS MODERATE 35: CPT | Performed by: INTERNAL MEDICINE

## 2022-07-18 PROCEDURE — 99232 SBSQ HOSP IP/OBS MODERATE 35: CPT | Performed by: GENERAL PRACTICE

## 2022-07-18 RX ORDER — INSULIN GLARGINE 100 [IU]/ML
3 INJECTION, SOLUTION SUBCUTANEOUS
Status: DISCONTINUED | OUTPATIENT
Start: 2022-07-18 | End: 2022-07-20 | Stop reason: HOSPADM

## 2022-07-18 RX ADMIN — ATORVASTATIN CALCIUM 40 MG: 40 TABLET, FILM COATED ORAL at 21:20

## 2022-07-18 RX ADMIN — LORAZEPAM 0.5 MG: 0.5 TABLET ORAL at 21:20

## 2022-07-18 RX ADMIN — FLUOXETINE 20 MG: 20 CAPSULE ORAL at 08:13

## 2022-07-18 RX ADMIN — ASPIRIN 81 MG: 81 TABLET, COATED ORAL at 08:13

## 2022-07-18 RX ADMIN — GABAPENTIN 300 MG: 300 CAPSULE ORAL at 21:20

## 2022-07-18 RX ADMIN — INSULIN LISPRO 2 UNITS: 100 INJECTION, SOLUTION INTRAVENOUS; SUBCUTANEOUS at 16:55

## 2022-07-18 RX ADMIN — INSULIN GLARGINE 3 UNITS: 100 INJECTION, SOLUTION SUBCUTANEOUS at 21:22

## 2022-07-18 RX ADMIN — CARVEDILOL 25 MG: 25 TABLET, FILM COATED ORAL at 16:54

## 2022-07-18 RX ADMIN — INSULIN LISPRO 2 UNITS: 100 INJECTION, SOLUTION INTRAVENOUS; SUBCUTANEOUS at 12:32

## 2022-07-18 RX ADMIN — FOLIC ACID TAB 400 MCG 400 MCG: 400 TAB at 08:13

## 2022-07-18 RX ADMIN — MELATONIN 6 MG: at 21:20

## 2022-07-18 RX ADMIN — HEPARIN SODIUM 5000 UNITS: 5000 INJECTION INTRAVENOUS; SUBCUTANEOUS at 21:22

## 2022-07-18 RX ADMIN — GABAPENTIN 300 MG: 300 CAPSULE ORAL at 08:17

## 2022-07-18 RX ADMIN — INSULIN LISPRO 1 UNITS: 100 INJECTION, SOLUTION INTRAVENOUS; SUBCUTANEOUS at 07:34

## 2022-07-18 RX ADMIN — CARVEDILOL 25 MG: 25 TABLET, FILM COATED ORAL at 08:13

## 2022-07-18 RX ADMIN — HEPARIN SODIUM 5000 UNITS: 5000 INJECTION INTRAVENOUS; SUBCUTANEOUS at 05:17

## 2022-07-18 RX ADMIN — GABAPENTIN 300 MG: 300 CAPSULE ORAL at 16:54

## 2022-07-18 RX ADMIN — THIAMINE HCL TAB 100 MG 100 MG: 100 TAB at 08:13

## 2022-07-18 RX ADMIN — HEPARIN SODIUM 5000 UNITS: 5000 INJECTION INTRAVENOUS; SUBCUTANEOUS at 14:48

## 2022-07-18 NOTE — ASSESSMENT & PLAN NOTE
Estimated Creatinine Clearance: 36 4 mL/min (A) (by C-G formula based on SCr of 2 mg/dL (H))  Acute kidney insufficiency secondary to double dosing of torsemide along with new onset diarrhea; will put on hold torsemide  Nephrology appreciated  Noted patient's April echocardiogram showed ejection fraction of 30%; on the high values echocardiogram done July 6, 2022 showed improvement at 40 to 50%  We will continue with mild careful hydration; will get Cardiology consult for opinion regarding torsemide dosing - appreciated  Renal US shows renal cysts    Cr improved w/ IVF    Will now watch off IVF

## 2022-07-18 NOTE — CASE MANAGEMENT
Case Management Assessment    Patient name Konstantin Feliz  Location [facility] 426/[facility] 517-41 MRN [id number]  : 1955 Date 2022       Current Admission Date: 7/15/2022  Current Admission Diagnosis:LAZARO (acute kidney injury) [facility])   Patient Active Problem List    Diagnosis Date Noted    Metabolic acidosis     Diarrhea 2022    Uncontrolled type 2 diabetes mellitus with hyperglycemia ([facility] ) 2022    High serum thyroid stimulating hormone (TSH) 2022    Weakness 2022    Cardiomyopathy ([facility] ) 2022    Polysubstance abuse ([facility] ) 2022    Mixed hyperlipidemia 2022    Stage 3b chronic kidney disease ([facility] ) 2022    Congestive heart failure (CHF) ([facility] ) 2022    Acute hepatitis C virus infection 2022    Abnormal EKG 2022    LAZARO (acute kidney injury) ([facility] ) 2022    Normocytic anemia 2022    Essential hypertension 2015    Type II or unspecified type diabetes mellitus without mention of complication, uncontrolled 2012    Bipolar affective disorder, mixed, moderate ([facility] [address] [facility] ) 2011      LOS (days): 3  Geometric Mean LOS (GMLOS) (days):   Days to GMLOS:     OBJECTIVE:  PATIENT READMITTED TO HOSPITAL  Pt is <30-day readmit  Pt's last admit was 22 - 22 at [facility]      Risk of Unplanned Readmission Score: 26 94     Current admission status: Inpatient    Preferred Pharmacy:   [address], [address]  [address] [place] [id number]  Phone: [phone] Fax: [phone]    Primary Care Provider: Autumn Allred PA-C    Primary Insurance: [facility]    ASSESSMENT:  [address], [address] [facility] Representative - Brother   Primary Phone: [phone] (Mobile)               Advance Directives  Does patient have a [address] [place] [address] Avenue?: No  Does patient have Advance Directives?: No  Primary Contact: Pt's brother Clearence Slack / phone: [phone]    Readmission Root Cause  30 Day Readmission: Yes  Who directed you to return to the hospital?: Self  Did you understand whom to contact if you had questions or problems?: Yes  Did you get your prescriptions before you left the hospital?: Yes  Were you able to get your prescriptions filled when you left the hospital?: Yes  Did you take your medications as prescribed?: Yes  Were you able to get to your follow-up appointments?: Yes  During previous admission, was a post-acute recommendation made?: No  Patient was readmitted due to: Acute Kidney Injury (LAZARO)  Action Plan: TBD    Patient Information  Admitted from[de-identified] Home  Mental Status: Alert  Assessment information provided by[de-identified] Patient  Primary Caregiver: Self  Support Systems: Family members  What city do you live in?: [place], [address] [place] [address] Street entry access options   Select all that apply : Stairs  Number of steps to enter home : 3  Do the steps have railings?: Yes  Type of Current Residence: 2 story home  Upon entering residence, is there a bedroom on the main floor (no further steps)?: Yes  Upon entering residence, is there a bathroom on the main floor (no further steps)?: Yes  Number of steps to 2nd floor from main floor: One Flight  In the last 12 months, was there a time when you were not able to pay the mortgage or rent on time?: No  In the last 12 months, how many places have you lived?: 1  In the last 12 months, was there a time when you did not have a steady place to sleep or slept in a shelter (including now)?: No  Homeless/housing insecurity resource given?: N/A  Living Arrangements: Lives w/ Extended Family  Is patient a ?: No    Activities of Daily Living Prior to Admission  Functional Status: Independent  Completes ADLs independently?: Yes  Ambulates independently?: Yes  Does patient use assisted devices?: Yes  Assisted Devices (DME) used: Straight Abby Senia  Does patient currently own DME?: Yes  What DME does the patient currently own?: Straight Vernal Richmond, Walker, Shower Chair, Bedside Commode  Does patient have a history of Outpatient Therapy (PT/OT)?: No  Does the patient have a history of Short-Term Rehab?: Yes  Does patient have a history of HHC?: Yes (Pt is currently open to [facility] )  Does patient currently have Robert Reed?: Yes    Current Home Health Care  Type of Current Home Care Services: Nurse visit, [facility][de-identified] [address] [place] [address] Provider[de-identified] PCP    Patient Information Continued  Income Source: Pension/skilled nursing  Does patient have prescription coverage?: Yes  Within the past 12 months, you worried that your food would run out before you got the money to buy more : Never true  Within the past 12 months, the food you bought just didn't last and you didn't have money to get more : Never true  Food insecurity resource given?: N/A  Does patient receive dialysis treatments?: No  Does patient have a history of substance abuse?: Yes  Historical substance use preference: Cocaine  History of Withdrawal Symptoms: Denies past symptoms  Is patient currently in treatment for substance abuse?: No  Patient declined treatment information  Does patient have a history of Mental Health Diagnosis?: Yes (Bipolar Disorder)  Is patient receiving treatment for mental health?: Yes (Pt's mental health is managed by his PCP )  Has patient received inpatient treatment related to mental health in the last 2 years?: No    Means of Transportation  Means of Transport to \[facility] [place] [facility]\""[de-identified] Drives Self  In the past 12 months, has lack of transportation kept you from medical appointments or from getting medications?: No  In the past 12 months, has lack of transportation kept you from meetings, work, or from getting things needed for daily living?: No  Was application for public transport provided?: N/A     Additional Comments: Pt is open to [facility] prior to admission   CM made AIDIN referral to [facility] for them to follow pt's case while hospitalized  The pt is aware and agreeable to this referral  CM reviewed d/c planning process including the following: identifying help at home, patient preference for d/c planning needs, Discharge Lounge, [facility] Meds to Bed program, availability of treatment team to discuss questions or concerns patient and/or family may have regarding understanding medications and recognizing signs and symptoms once discharged  CM also encouraged patient to follow up with all recommended appointments after discharge  Patient advised of importance for patient and family to participate in managing patients medical well being

## 2022-07-18 NOTE — ASSESSMENT & PLAN NOTE
Lab Results   Component Value Date    HGBA1C 7 7 (H) 07/15/2022       Recent Labs     07/17/22  1553 07/17/22 2037 07/18/22  0623 07/18/22  1109   POCGLU 211* 192* 172* 202*       Blood Sugar Average: Last 72 hrs:  (P) 203 9     · ISS  · Home meds on hold  · Today add low dose Lantus qhs as BSs above goal

## 2022-07-18 NOTE — PROGRESS NOTES
[address] [facility]  Progress Note - Leslie Tidwell 1955, 61 y o  male MRN: [id number]  Unit/Bed#: [facility] 426-01 Encounter: [id number]  Primary Care Provider: Harjit Chan PA-C   Date and time admitted to hospital: 7/15/2022  5:41 PM    * LAZARO (acute kidney injury) [facility])  Assessment & Plan  Estimated Creatinine Clearance: 36 4 mL/min (A) (by C-G formula based on SCr of 2 mg/dL (H))  Acute kidney insufficiency secondary to double dosing of torsemide along with new onset diarrhea; will put on hold torsemide  Nephrology appreciated  Noted patient's April echocardiogram showed ejection fraction of 30%; on the high values echocardiogram done July 6, 2022 showed improvement at 40 to 50%  We will continue with mild careful hydration; will get Cardiology consult for opinion regarding torsemide dosing - appreciated  Renal US shows renal cysts    Cr improved w/ IVF    Will now watch off IVF    Metabolic acidosis  Assessment & Plan  · Resolved w/ isolyte    High serum thyroid stimulating hormone (TSH)  Assessment & Plan  · Pt denies fatigue and fT4 WNL  · PCP should recheck TFTs in 4-6 weeks    Uncontrolled type 2 diabetes mellitus with hyperglycemia [facility])  Assessment & Plan  Lab Results   Component Value Date    HGBA1C 7 7 (H) 07/15/2022       Recent Labs     07/17/22  1553 07/17/22  2037 07/18/22  0623 07/18/22  1109   POCGLU 211* 192* 172* 202*       Blood Sugar Average: Last 72 hrs:  (P) 203 9     · ISS  · Home meds on hold  · Today add low dose Lantus qhs as BSs above goal    Diarrhea  Assessment & Plan  · Resolved  · f/u stool reducing substances   (patient reports that this was brought about by drinking cold water yesterday which came from a can )     · Stool WBC neg    Cardiomyopathy ([facility] [address] )  Assessment & Plan  EF 40-50%  · Cardio appreciated  · Meotoprolol -> Coreg  · Imdur/hydral d/c  · Torsemide held    Stage 3b chronic kidney disease [facility])  Assessment & Plan  Lab Results Component Value Date    EGFR 34 2022    EGFR 29 2022    EGFR 25 2022    CREATININE 2 00 (H) 2022    CREATININE 2 26 (H) 2022    CREATININE 2 52 (H) 2022   Estimated Creatinine Clearance: 36 4 mL/min (A) (by C-G formula based on SCr of 2 mg/dL (H))  B/l creatinine is 1 69; elevated to 2 9 on admission  We will continue to monitor with careful hydration  Torsemide on hold  Nephrology appreciated  Mixed hyperlipidemia  Assessment & Plan  Currently on Lipitor 40 mg at bedtime  Bipolar affective disorder, mixed, moderate (HCC)  Assessment & Plan  Continue with Prozac 20 mg daily  Ativan prn       VTE Pharmacologic Prophylaxis: VTE Score: 4 Moderate Risk (Score 3-4) - Pharmacological DVT Prophylaxis Ordered: heparin  Patient Centered Rounds: I performed bedside rounds with nursing staff today  Discussions with Specialists or Other Care Team Provider: renal    Education and Discussions with Family / Patient: Patient declined call to   Time Spent for Care: 30 minutes  More than 50% of total time spent on counseling and coordination of care as described above  Current Length of Stay: 3 day(s)  Current Patient Status: Inpatient   Certification Statement: The patient will continue to require additional inpatient hospital stay due to need to monitor Cr off IVF  Discharge Plan: Anticipate discharge in 24-48 hrs to discharge location to be determined pending rehab evaluations  Code Status: Level 1 - Full Code    Subjective:   No acute complaints    Objective:     Vitals:   Temp (24hrs), Av 7 °F (36 5 °C), Min:97 5 °F (36 4 °C), Max:98 °F (36 7 °C)    Temp:  [97 5 °F (36 4 °C)-98 °F (36 7 °C)] 97 5 °F (36 4 °C)  HR:  [71-81] 71  Resp:  [16-19] 19  BP: (124-147)/(67-91) 133/87  SpO2:  [91 %-97 %] 91 %  Body mass index is 31 6 kg/m²  Input and Output Summary (last 24 hours):      Intake/Output Summary (Last 24 hours) at 2022 1514  Last data filed at 7/18/2022 1445  Gross per 24 hour   Intake 300 ml   Output 2075 ml   Net -1775 ml       Physical Exam:   Physical Exam  HENT:      Head: Normocephalic and atraumatic  Nose: Nose normal       Mouth/Throat:      Mouth: Mucous membranes are moist    Eyes:      Extraocular Movements: Extraocular movements intact  Conjunctiva/sclera: Conjunctivae normal    Cardiovascular:      Rate and Rhythm: Normal rate and regular rhythm  Pulmonary:      Effort: Pulmonary effort is normal       Breath sounds: Normal breath sounds  No wheezing or rales  Abdominal:      General: Bowel sounds are normal  There is no distension  Palpations: Abdomen is soft  Tenderness: There is no abdominal tenderness  Musculoskeletal:         General: Normal range of motion  Cervical back: Normal range of motion and neck supple  Right lower leg: No edema  Left lower leg: No edema  Skin:     General: Skin is warm and dry  Neurological:      Mental Status: He is alert and oriented to person, place, and time  Additional Data:     Labs:  Results from last 7 days   Lab Units 07/17/22  0553   WBC Thousand/uL 5 60   HEMOGLOBIN g/dL 9 5*   HEMATOCRIT % 30 0*   PLATELETS Thousands/uL 161   NEUTROS PCT % 52   LYMPHS PCT % 31   MONOS PCT % 10   EOS PCT % 6     Results from last 7 days   Lab Units 07/18/22  0447 07/17/22  0553 07/16/22  0439   SODIUM mmol/L 144   < > 142   POTASSIUM mmol/L 4 7   < > 4 8   CHLORIDE mmol/L 118*   < > 113*   CO2 mmol/L 23   < > 23   BUN mg/dL 55*   < > 76*   CREATININE mg/dL 2 00*   < > 2 52*   ANION GAP mmol/L 3*   < > 6   CALCIUM mg/dL 8 6   < > 8 8   ALBUMIN g/dL  --   --  2 8*   TOTAL BILIRUBIN mg/dL  --   --  0 38   ALK PHOS U/L  --   --  117*   ALT U/L  --   --  52   AST U/L  --   --  32   GLUCOSE RANDOM mg/dL 203*   < > 167*    < > = values in this interval not displayed           Results from last 7 days   Lab Units 07/18/22  1109 07/18/22  5103 07/17/22 2037 07/17/22  1553 07/17/22  1048 07/17/22  0620 07/16/22  2125 07/16/22  1558 07/16/22  1043 07/16/22  0612   POC GLUCOSE mg/dl 202* 172* 192* 211* 257* 139 178* 254* 269* 165*     Results from last 7 days   Lab Units 07/15/22  2207   HEMOGLOBIN A1C % 7 7*           Lines/Drains:  Invasive Devices  Report    Peripheral Intravenous Line  Duration           Peripheral IV 07/15/22 Right Hand 2 days                      Imaging: No pertinent imaging reviewed  Recent Cultures (last 7 days):         Last 24 Hours Medication List:   Current Facility-Administered Medications   Medication Dose Route Frequency Provider Last Rate    acetaminophen  650 mg Oral Q6H PRN Haylee Singer MD      aluminum-magnesium hydroxide-simethicone  15 mL Oral Q6H PRN Haylee Singer MD      aspirin  81 mg Oral Daily Haylee Singer MD      atorvastatin  40 mg Oral HS Haylee Singer MD      carvedilol  25 mg Oral BID With Meals Marybeth Ha MD      FLUoxetine  20 mg Oral Daily Haylee Singer MD      folic acid  315 mcg Oral Daily Haylee Singer MD      gabapentin  300 mg Oral TID Haylee Singer MD      heparin (porcine)  5,000 Units Subcutaneous [facility] [address] Haylee Singer MD      insulin glargine  3 Units Subcutaneous HS Gregory Barrios DO      insulin lispro  1-6 Units Subcutaneous TID AC Griffin uQintana MD      LORazepam  0 5 mg Oral HS PRN EMMA Rollins      melatonin  6 mg Oral HS Haylee Singer MD      ondansetron  4 mg Intravenous Q6H PRN Haylee Singer MD      thiamine  100 mg Oral Daily Haylee Singer MD          Today, Patient Was Seen By: Gregory Barrios DO    **Please Note: This note may have been constructed using a voice recognition system  **

## 2022-07-18 NOTE — PROGRESS NOTES
NEPHROLOGY PROGRESS NOTE   German Clark 61 y o  male MRN: [id number]  Unit/Bed#: [facility] 426-01 Encounter: [id number]      ASSESSMENT & PLAN:  1  Acute kidney injury on top of CKD stage 4  Suspected secondary to prerenal component in the setting of recent diarrhea, prior diuretics use  Admitted with a creatinine of 2 9 on 07/15, creatinine down to 2 0 today  Previous creatinine in the high 1s to low 2s, follows with Dr David Timmons at [facility]  Okay to stop intravenously fluids  Keep holding diuretics for today  Kidney function continues to improve tomorrow okay from renal standpoint of view to be discharged and continue follow-up with his outpatient nephrologist    2  Chronic combined systolic and diastolic heart failure, diuretics currently on hold in the setting of acute kidney  Cardiology on board    3  Hypertension blood pressure is elevated, continue with Coreg that was increased yesterday, Isordil/hydralazine combo was discontinued yesterday  Stop intravenously fluids as above  4  Anemia hemoglobin low but stable at 9 5 yesterday    My plan and recommendations were discussed with Internal Medicine attending      SUBJECTIVE:  Patient seen and examined, feeling better, denies diarrhea stopped yesterday, denies any chest pain or shortness of breath, no abdominal pain, nausea, no vomiting        OBJECTIVE:  Current Weight: Weight - Scale: 83 5 kg (184 lb 1 4 oz)  Vitals:    07/18/22 0816   BP: 143/91   Pulse: 80   Resp:    Temp: 97 5 °F (36 4 °C)   SpO2: 96%       Intake/Output Summary (Last 24 hours) at 7/18/2022 0951  Last data filed at 7/17/2022 2300  Gross per 24 hour   Intake 1280 ml   Output 625 ml   Net 655 ml     General: conscious, cooperative, in not acute distress  Eyes: conjunctivae pink, anicteric sclerae  ENT: lips and mucous membranes moist, oxygen nasal cannula  Neck: supple, no JVD  Chest: clear breath sounds bilateral, no crackles, ronchus or wheezings  CVS: distinct S1 & S2, normal rate, regular rhythm  Abdomen: soft, non-tender, non-distended, normoactive bowel sounds  Extremities: no edema of both legs  Skin: no rash  Neuro: awake, alert, oriented        Medications:    Current Facility-Administered Medications:     acetaminophen (TYLENOL) tablet 650 mg, 650 mg, Oral, Q6H PRN, Ryan Julio MD    aluminum-magnesium hydroxide-simethicone (MYLANTA) oral suspension 15 mL, 15 mL, Oral, Q6H PRN, Ryan Julio MD    aspirin (ECOTRIN LOW STRENGTH) EC tablet 81 mg, 81 mg, Oral, Daily, Ryan Julio MD, 81 mg at 07/18/22 0813    atorvastatin (LIPITOR) tablet 40 mg, 40 mg, Oral, HS, Ryan Julio MD, 40 mg at 07/17/22 2212    carvedilol (COREG) tablet 25 mg, 25 mg, Oral, BID With Meals, Lane Orr MD, 25 mg at 07/18/22 0813    FLUoxetine (PROzac) capsule 20 mg, 20 mg, Oral, Daily, Ryan Julio MD, 20 mg at 85/40/44 6231    folic acid (FOLVITE) tablet 400 mcg, 400 mcg, Oral, Daily, Ryan Julio MD, 400 mcg at 07/18/22 0813    gabapentin (NEURONTIN) capsule 300 mg, 300 mg, Oral, TID, Ryan Julio MD, 300 mg at 07/18/22 0817    heparin (porcine) subcutaneous injection 5,000 Units, 5,000 Units, Subcutaneous, Q8H [facility] & [facility]f, 5,000 Units at 07/18/22 0517 **AND** [COMPLETED] Platelet count, , , Once, Ryan Julio MD    insulin lispro (HumaLOG) 100 units/mL subcutaneous injection 1-6 Units, 1-6 Units, Subcutaneous, TID AC, 1 Units at 07/18/22 0734 **AND** Fingerstick Glucose (POCT), , , TID AC, Griffin Babcock MD    LORazepam (ATIVAN) tablet 0 5 mg, 0 5 mg, Oral, HS PRN, EMMA Bolden, 0 5 mg at 07/17/22 2214    melatonin tablet 6 mg, 6 mg, Oral, HS, Ryan Julio MD, 6 mg at 07/17/22 2212    ondansetron (ZOFRAN) injection 4 mg, 4 mg, Intravenous, Q6H PRN, Ryan Julio MD    thiamine tablet 100 mg, 100 mg, Oral, Daily, Ryan Julio MD, 100 mg at 07/18/22 0813    Invasive Devices:        Lab Results:   Results from last 7 days   Lab Units 07/18/22  0447 07/17/22  0553 07/16/22  0439 07/15/22  2207 07/15/22  1816 07/15/22  1043   WBC Thousand/uL  --  5 60 5 55  --  4 88 5 61   HEMOGLOBIN g/dL  --  9 5* 9 3*  --  10 0* 9 8*   HEMATOCRIT %  --  30 0* 28 8*  --  31 0* 31 0*   PLATELETS Thousands/uL  --  161 155 158 155 166   SODIUM mmol/L 144 143 142  --  138 138   POTASSIUM mmol/L 4 7 4 6 4 8  --  5 4* 5 2   CHLORIDE mmol/L 118* 118* 113*  --  107 111*   CO2 mmol/L 23 20* 23  --  24 23   BUN mg/dL 55* 66* 76*  --  82* 78*   CREATININE mg/dL 2 00* 2 26* 2 52*  --  2 88* 2 90*   CALCIUM mg/dL 8 6 8 8 8 8  --  9 1 9 1   MAGNESIUM mg/dL  --  2 3 1 9  --   --   --    ALK PHOS U/L  --   --  117*  --  128* 133*   ALT U/L  --   --  52  --  58 59   AST U/L  --   --  32  --  35 39       Previous work up:  See previous notes      Portions of the record may have been created with voice recognition software  Occasional wrong word or "sound a like" substitutions may have occurred due to the inherent limitations of voice recognition software  Read the chart carefully and recognize, using context, where substitutions have occurred  If you have any questions, please contact the dictating provider

## 2022-07-18 NOTE — ASSESSMENT & PLAN NOTE
· Resolved  · f/u stool reducing substances   (patient reports that this was brought about by drinking cold water yesterday which came from a can )     · Stool WBC neg

## 2022-07-18 NOTE — ASSESSMENT & PLAN NOTE
Lab Results   Component Value Date    EGFR 34 07/18/2022    EGFR 29 07/17/2022    EGFR 25 07/16/2022    CREATININE 2 00 (H) 07/18/2022    CREATININE 2 26 (H) 07/17/2022    CREATININE 2 52 (H) 07/16/2022   Estimated Creatinine Clearance: 36 4 mL/min (A) (by C-G formula based on SCr of 2 mg/dL (H))  B/l creatinine is 1 69; elevated to 2 9 on admission  We will continue to monitor with careful hydration  Torsemide on hold  Nephrology appreciated

## 2022-07-18 NOTE — PROGRESS NOTES
Progress Note - Cardiology   [facility] 61 y o  male MRN: [id number]  [facility] Encounter: [id number]    Assessment:  #  LAZARO on CKD  #  Chronic HF mildly reduced LVEF 40-50% (TTE 7/6/22 at [facility])  #  Hypertension  #  DM    Complaining of some orthopnea   Isordil/hydral was discontinued  Blood pressure improving with carvedilol    Plan:  Recommend discontinuation of IVF   Consider resuming home diuretics this afternoon or tomorrow AM      Subjective/Objective   Subjective: No acute events overnight  Complaining of some orthopnea  Otherwise denies lightheadedness, chest pain, palpitations  Objective:   Vitals: /91   Pulse 80   Temp 97 5 °F (36 4 °C)   Resp 16   Ht 5' 4" (1 626 m)   Wt 83 5 kg (184 lb 1 4 oz)   SpO2 96%   BMI 31 60 kg/m²   Vitals:    07/17/22 0630 07/18/22 5661   Weight: 83 7 kg (184 lb 8 4 oz) 83 5 kg (184 lb 1 4 oz)     Orthostatic Blood Pressures    Flowsheet Row Most Recent Value   Blood Pressure 143/91 filed at 07/18/2022 1486   Patient Position - Orthostatic VS Lying filed at 07/17/2022 0000            Intake/Output Summary (Last 24 hours) at 7/18/2022 1033  Last data filed at 7/17/2022 2300  Gross per 24 hour   Intake 1280 ml   Output 625 ml   Net 655 ml       Invasive Devices  Report    Peripheral Intravenous Line  Duration           Peripheral IV 07/15/22 Right Hand 2 days                Review of Systems:  All other systems reviewed and negative except for that noted above    Physical Exam: General appearance: alert and oriented, in no acute distress  Neck: no JVD and supple, symmetrical, trachea midline  Lungs: clear to auscultation bilaterally  Heart: regular rate and rhythm, S1, S2 normal, no murmur, click, rub or gallop  Extremities: extremities normal, warm and well-perfused; no cyanosis, clubbing, trace bilateral leg edema  Skin: Skin color, texture, turgor normal  No rashes or lesions    Lab Results: I have personally reviewed pertinent lab results  Imaging: I have personally reviewed pertinent reports  EKG: Personally reviewed  VTE Pharmacologic Prophylaxis: Heparin    Counseling / Coordination of Care  Total time spent today 20 minutes  Greater than 50% of total time was spent with the patient and / or family counseling and / or coordination of care

## 2022-07-19 ENCOUNTER — APPOINTMENT (INPATIENT)
Dept: RADIOLOGY | Facility: HOSPITAL | Age: 67
DRG: 683 | End: 2022-07-19
Attending: INTERNAL MEDICINE
Payer: COMMERCIAL

## 2022-07-19 LAB
ANION GAP SERPL CALCULATED.3IONS-SCNC: 8 MMOL/L (ref 4–13)
BUN SERPL-MCNC: 52 MG/DL (ref 5–25)
CALCIUM SERPL-MCNC: 9.2 MG/DL (ref 8.3–10.1)
CHLORIDE SERPL-SCNC: 118 MMOL/L (ref 96–108)
CO2 SERPL-SCNC: 21 MMOL/L (ref 21–32)
CREAT SERPL-MCNC: 1.84 MG/DL (ref 0.6–1.3)
GFR SERPL CREATININE-BSD FRML MDRD: 37 ML/MIN/1.73SQ M
GLUCOSE SERPL-MCNC: 105 MG/DL (ref 65–140)
GLUCOSE SERPL-MCNC: 121 MG/DL (ref 65–140)
GLUCOSE SERPL-MCNC: 123 MG/DL (ref 65–140)
GLUCOSE SERPL-MCNC: 186 MG/DL (ref 65–140)
GLUCOSE SERPL-MCNC: 201 MG/DL (ref 65–140)
POTASSIUM SERPL-SCNC: 4.7 MMOL/L (ref 3.5–5.3)
SODIUM SERPL-SCNC: 147 MMOL/L (ref 135–147)

## 2022-07-19 PROCEDURE — 97162 PT EVAL MOD COMPLEX 30 MIN: CPT

## 2022-07-19 PROCEDURE — 80048 BASIC METABOLIC PNL TOTAL CA: CPT | Performed by: INTERNAL MEDICINE

## 2022-07-19 PROCEDURE — 82948 REAGENT STRIP/BLOOD GLUCOSE: CPT

## 2022-07-19 PROCEDURE — 99233 SBSQ HOSP IP/OBS HIGH 50: CPT | Performed by: INTERNAL MEDICINE

## 2022-07-19 PROCEDURE — 97166 OT EVAL MOD COMPLEX 45 MIN: CPT

## 2022-07-19 PROCEDURE — 71046 X-RAY EXAM CHEST 2 VIEWS: CPT

## 2022-07-19 PROCEDURE — 99232 SBSQ HOSP IP/OBS MODERATE 35: CPT | Performed by: INTERNAL MEDICINE

## 2022-07-19 RX ORDER — TORSEMIDE 20 MG/1
20 TABLET ORAL DAILY
Status: DISCONTINUED | OUTPATIENT
Start: 2022-07-19 | End: 2022-07-20 | Stop reason: HOSPADM

## 2022-07-19 RX ORDER — CARVEDILOL 25 MG/1
25 TABLET ORAL 2 TIMES DAILY WITH MEALS
Qty: 60 TABLET | Refills: 0 | Status: SHIPPED | OUTPATIENT
Start: 2022-07-19 | End: 2022-09-07

## 2022-07-19 RX ORDER — TORSEMIDE 20 MG/1
20 TABLET ORAL DAILY
Qty: 30 TABLET | Refills: 0 | Status: SHIPPED | OUTPATIENT
Start: 2022-07-19 | End: 2022-07-21 | Stop reason: SDUPTHER

## 2022-07-19 RX ORDER — FUROSEMIDE 10 MG/ML
40 INJECTION INTRAMUSCULAR; INTRAVENOUS ONCE
Status: COMPLETED | OUTPATIENT
Start: 2022-07-19 | End: 2022-07-19

## 2022-07-19 RX ADMIN — GABAPENTIN 300 MG: 300 CAPSULE ORAL at 22:20

## 2022-07-19 RX ADMIN — CARVEDILOL 25 MG: 25 TABLET, FILM COATED ORAL at 08:26

## 2022-07-19 RX ADMIN — HEPARIN SODIUM 5000 UNITS: 5000 INJECTION INTRAVENOUS; SUBCUTANEOUS at 22:22

## 2022-07-19 RX ADMIN — ASPIRIN 81 MG: 81 TABLET, COATED ORAL at 08:25

## 2022-07-19 RX ADMIN — MELATONIN 6 MG: at 22:16

## 2022-07-19 RX ADMIN — HEPARIN SODIUM 5000 UNITS: 5000 INJECTION INTRAVENOUS; SUBCUTANEOUS at 06:33

## 2022-07-19 RX ADMIN — INSULIN LISPRO 1 UNITS: 100 INJECTION, SOLUTION INTRAVENOUS; SUBCUTANEOUS at 15:38

## 2022-07-19 RX ADMIN — INSULIN GLARGINE 3 UNITS: 100 INJECTION, SOLUTION SUBCUTANEOUS at 22:22

## 2022-07-19 RX ADMIN — FOLIC ACID TAB 400 MCG 400 MCG: 400 TAB at 08:25

## 2022-07-19 RX ADMIN — INSULIN LISPRO 2 UNITS: 100 INJECTION, SOLUTION INTRAVENOUS; SUBCUTANEOUS at 11:40

## 2022-07-19 RX ADMIN — ATORVASTATIN CALCIUM 40 MG: 40 TABLET, FILM COATED ORAL at 22:16

## 2022-07-19 RX ADMIN — FUROSEMIDE 40 MG: 10 INJECTION, SOLUTION INTRAMUSCULAR; INTRAVENOUS at 16:52

## 2022-07-19 RX ADMIN — GABAPENTIN 300 MG: 300 CAPSULE ORAL at 08:25

## 2022-07-19 RX ADMIN — TORSEMIDE 20 MG: 20 TABLET ORAL at 10:00

## 2022-07-19 RX ADMIN — GABAPENTIN 300 MG: 300 CAPSULE ORAL at 16:52

## 2022-07-19 RX ADMIN — FLUOXETINE 20 MG: 20 CAPSULE ORAL at 08:25

## 2022-07-19 RX ADMIN — HEPARIN SODIUM 5000 UNITS: 5000 INJECTION INTRAVENOUS; SUBCUTANEOUS at 14:24

## 2022-07-19 RX ADMIN — CARVEDILOL 25 MG: 25 TABLET, FILM COATED ORAL at 16:52

## 2022-07-19 RX ADMIN — THIAMINE HCL TAB 100 MG 100 MG: 100 TAB at 08:25

## 2022-07-19 RX ADMIN — LORAZEPAM 0.5 MG: 0.5 TABLET ORAL at 22:16

## 2022-07-19 NOTE — PROGRESS NOTES
Progress Note - Cardiology   THE [facility] - [facility] 61 y o  male MRN: [id number]  [facility] Encounter: [id number]    Assessment:  #  LAZARO on CKD  #  Chronic HF mildly reduced LVEF 40-50% (TTE 7/6/22 at [facility])  #  Hypertension  #  DM    Isordil/hydral discontinued and metoprolol changed to carvedilol  Blood pressure initially improved but is now trending upwards with the to mild volume overload  Blood pressure starting to trend back up   +Orthopnea and supplemental O2 for comfort    Plan:  Previously on torsemide 10 mg daily prior to recent hospitalization for decompensated CHF  Torsemide was increased to 40 mg BID  He was also taking dapagliflozin  Recommend resuming torsemide at 20 mg daily and his home SGLT2i    Subjective/Objective   Interval history: No acute events overnight  No chest pain, palpitations  Complains of orthopnea  BP elevated      Objective:   Vitals: /99   Pulse 90   Temp 98 1 °F (36 7 °C)   Resp 16   Ht 5' 4" (1 626 m)   Wt 85 4 kg (188 lb 4 4 oz)   SpO2 93%   BMI 32 32 kg/m²   Vitals:    07/18/22 0620 07/19/22 0600   Weight: 83 5 kg (184 lb 1 4 oz) 85 4 kg (188 lb 4 4 oz)     Orthostatic Blood Pressures    Flowsheet Row Most Recent Value   Blood Pressure 161/99 filed at 07/19/2022 0700   Patient Position - Orthostatic VS Lying filed at 07/18/2022 2232            Intake/Output Summary (Last 24 hours) at 7/19/2022 0856  Last data filed at 7/19/2022 0700  Gross per 24 hour   Intake 820 ml   Output 2075 ml   Net -1255 ml       Invasive Devices  Report    Peripheral Intravenous Line  Duration           Peripheral IV 07/15/22 Right Hand 3 days                Review of Systems: All other systems reviewed and negative except for that noted above    Physical Exam: General appearance: alert and oriented, in no acute distress  Neck: +JVD  Lungs: R basilar rales, no wheezing, no rhonchi, normal effort  Heart: regular rate and rhythm, S1, S2 normal, no murmur, click, rub or gallop  Extremities: Warm & well-perfused, trace bilateral lower extremity edema  Skin: Skin color, texture, turgor normal  No rashes or lesions    Lab Results: I have personally reviewed pertinent lab results  Imaging: I have personally reviewed pertinent reports  EKG: Personally reviewed  VTE Pharmacologic Prophylaxis: Heparin    Counseling / Coordination of Care  Total time spent today 20 minutes  Greater than 50% of total time was spent with the patient and / or family counseling and / or coordination of care

## 2022-07-19 NOTE — OCCUPATIONAL THERAPY NOTE
Occupational Therapy Evaluation     Patient Name: Rabia Sosa  Today's Date: 7/19/2022  Problem List  Principal Problem:    LAZARO (acute kidney injury) ([facility] )  Active Problems:    Bipolar affective disorder, mixed, moderate ([facility] )    Mixed hyperlipidemia    Stage 3b chronic kidney disease ([facility] )    Cardiomyopathy ([facility] )    Diarrhea    Uncontrolled type 2 diabetes mellitus with hyperglycemia (HCC)    High serum thyroid stimulating hormone (TSH)    Metabolic acidosis    Past Medical History  Past Medical History:   Diagnosis Date    Anxiety     Arthritis     Coronary artery disease     Dementia ([facility] )     Depression     Diabetes mellitus ([facility] )     Infectious viral hepatitis     Memory loss     Visual impairment      Past Surgical History  History reviewed  No pertinent surgical history  07/19/22 1055   OT Last Visit   OT Visit Date 07/19/22   Note Type   Note type Evaluation   Restrictions/Precautions   Other Precautions O2  (on 2L NC, desaturated to 88% during ambulation and stair trial w/ PT)   Pain Assessment   Pain Assessment Tool 0-10   Pain Score No Pain   Home Living   Type of Home House   Home Layout Two level; Able to live on main level with bedroom/bathroom;Stairs to enter with rails   Bathroom Shower/Tub Tub/shower unit   [employer] Raised   Bathroom Equipment Grab bars in shower; Shower chair  (non slip mats)   Home Equipment Walker;Cane   Additional Comments use RW or SPC depending on where he is going   Prior Function   Level of [place] Independent with ADLs and functional mobility   Lives With Family  (brother and niece)   Receives Help From Family   ADL Assistance Independent  (assist w/ socks/shoes only)   IADLs Needs assistance   Falls in the last 6 months 1 to 4  (at least 4)   Vocational Retired   Lifestyle   Autonomy pta pt reports I in most ADLS (assist w/ socks/shoes only), assist for IADLs, use of RW vs SPC   Reciprocal Relationships supportive family   Service to Others retired Intrinsic Gratification enjoys playing w/ his black lab   Psychosocial   Psychosocial (WDL) WDL   Subjective   Subjective "I feel okay going home"   ADL   Where Assessed Chair   Eating Assistance 5  Supervision/Setup   Grooming Assistance 5  Supervision/Setup   UB Bathing Assistance 5  Supervision/Setup   LB Bathing Assistance 5  Supervision/Setup   UB Dressing Assistance 5  Supervision/Setup   LB Dressing Assistance 5  Supervision/Setup   Toileting Assistance  5  Supervision/Setup   Functional Assistance 5  Supervision/Setup   Transfers   Sit to Stand 5  Supervision   Stand to Sit 5  Supervision   Functional Mobility   Functional Mobility 5  Supervision   Additional Comments minor LOB when first standing, then supervision   Additional items Rolling walker   Balance   Static Sitting Fair +   Dynamic Sitting Fair   Static Standing Fair   Dynamic Standing [address],Flo[address] 3,4, & 5 -   Activity Tolerance   Activity Tolerance Patient tolerated treatment well   Medical Staff Made Aware PT Radha 2* medical complexity/comorbidities   Nurse Made Aware okay to see per RN   RUE Assessment   RUE Assessment WFL   LUE Assessment   LUE Assessment WFL   Hand Function   Gross Motor Coordination Functional   Fine Motor Coordination Functional   Cognition   Overall Cognitive Status WFL   Arousal/Participation Cooperative   Attention Within functional limits   Orientation Level Oriented X4   Memory Within functional limits   Following Commands Follows all commands and directions without difficulty   Comments pt pleasant and cooperative   Assessment   Prognosis Good   Assessment Pt is a 61 y o  YO  male admitted to \[facility]"" on 7/15/2022 w/ LAZARO and CKD stage 4  Pt  has a past medical history of Anxiety, Arthritis, Coronary artery disease, Dementia ([address] ), Depression, Diabetes mellitus ([address] ), Infectious viral hepatitis, Memory loss, and Visual impairment  Pt with active OT orders  Pt resides in a house with brother and niece   pta pt reports I in most ADLS (assist w/ socks/shoes only), assist for IADLs, use of RW vs SPC  Currently pt is supervision for ADLs/functional mobility  Pt is limited at this time 2*: endurance, activity tolerance and functional mobility  The following Occupational Performance Areas to address include: functional mobility, household maintenance and care of pets  Based on the aforementioned OT evaluation, functional performance deficits, and assessments, pt has been identified as a moderate complexity evaluation  From OT standpoint, anticipate d/c home with family support  The patient's raw score on the AM-PAC Daily Activity inpatient short form is 23, standardized score is 51 12, greater than 39 4  Patients at this level are likely to benefit from discharge to home  Please refer to the recommendation of the Occupational Therapist for safe discharge planning  Recommend continued participation in ADLs and functional mobility w/ staff  No further acute OT needs, d/c OT  Please re-consult if necessary     Goals   Patient Goals go home   Recommendation   OT Discharge Recommendation No rehabilitation needs   AM-PAC Daily Activity Inpatient   Lower Body Dressing 3   Bathing 4   Toileting 4   Upper Body Dressing 4   Grooming 4   Eating 4   Daily Activity Raw Score 23   Daily Activity Standardized Score (Calc for Raw Score >=11) 51 12   AM-PAC Applied Cognition Inpatient   Following a Speech/Presentation 4   Understanding Ordinary Conversation 4   Taking Medications 4   Remembering Where Things Are Placed or Put Away 4   Remembering List of 4-5 Errands 4   Taking Care of Complicated Tasks 4   Applied Cognition Raw Score 24   Applied Cognition Standardized Score 62 21   Modified Ray Scale   Modified [place] Scale 3       Julia Cotton MS, OTR/L

## 2022-07-19 NOTE — CASE COMMUNICATION
T C   to  confirm  scheduled  visit  No  answer  Went  to  residence  knocked  on  door  with  no  answer  T C   EC  phone  and  left  message  requesting  return  call  to  schedule  visit    Awaiting  return  call

## 2022-07-19 NOTE — CASE MANAGEMENT
Case Management Discharge Note    Patient name Jony Covington  Location [facility] 021-42 MRN [id number]  : 1955 Date 2022       Current Admission Date: 7/15/2022  Current Admission Diagnosis:LAZARO (acute kidney injury) ([facility] )      LOS (days): 4  Geometric Mean LOS (GMLOS) (days): 3 10  Days to GMLOS:-0 6     OBJECTIVE:  Risk of Unplanned Readmission Score: 27 48     Current admission status: Inpatient     Primary Insurance: [employer] [facility]:    Discharge planning discussed with[de-identified] Patient  CM contacted family/caregiver?: Yes  Were Treatment Team discharge recommendations reviewed with patient/caregiver?: Yes  Did patient/caregiver verbalize understanding of patient care needs?: Yes  Were patient/caregiver advised of the risks associated with not following Treatment Team discharge recommendations?: Yes    Contacts  Patient Contacts: Farhadamarilys Solorio (pt's brother)  Relationship to Patient[de-identified] Family  Contact Method: Phone  Phone Number: [phone]  Reason/Outcome: Emergency [address]         Is the patient interested in [facility] at discharge?: Yes  [address] requested[de-identified] Nursing, Physical [address] Name[de-identified] [facility] Provider[de-identified] PCP  Home Health Services Needed[de-identified] Evaluate Functional Status and Safety, Gait/ADL Training, Strengthening/Theraputic Exercises to Improve Function, Other (comment) (LAZARO)  Homebound Criteria Met[de-identified] Requires the Assistance of Another Person for Safe Ambulation or to Leave the Home  Supporting Clincal Findings[de-identified] Limited Endurance    Treatment Team Recommendation: Home with  [address]  Discharge Destination Plan[de-identified] Home with [place] at Discharge : Family    Additional Comments: Pt is cleared for d/c by JENY Pham  RN Ryan Porter was notified of pt's d/c order  Pt is accepted for resumption of services by Natasha Romero for his aftercare plan   The pt and his brother Farhad Solorio were both informed of d/c  Brother will transport pt home later this day, pickup time TBD  IMM signed by pt  No chart copy required  CM to follow

## 2022-07-19 NOTE — DISCHARGE SUMMARY
ADDENDUM: Discharge on 7/19 was cancelled due to shortness of breath and hypoxia  The patient was given an additional dose of lasix 40mg at 4pm and monitored overnight for stability  Please refer to the discharge summary prepared on 7/20    Discharge Summary - [facility] Internal Medicine    Patient Information: Irena Bill 61 y o  male MRN: [id number]  Unit/Bed#: [facility] 426-01 Encounter: [id number]    Discharging Physician / Practitioner: Yonathan Bruno MD  PCP: Miles Ferro PA-C  Admission Date: 7/15/2022    Disposition:     Home with VNA Services (Reminder: Complete face to face encounter)    Reason for Admission: dehydration, LAZARO    Discharge Diagnoses:     Principal Problem:    LAZARO (acute kidney injury) ([facility] [address] )  Active Problems:    Bipolar affective disorder, mixed, moderate ([facility] )    Mixed hyperlipidemia    Stage 3b chronic kidney disease ([facility] )    Cardiomyopathy ([facility] )    Diarrhea    Uncontrolled type 2 diabetes mellitus with hyperglycemia ([facility] )    High serum thyroid stimulating hormone (TSH)    Metabolic acidosis  Resolved Problems:    * No resolved hospital problems  *      Consultations During Hospital Stay:  · Cardiology  · nephrology    Procedures Performed:     · none    Significant Findings / Test Results:     · Creatinine 2 26, improved to 1 8 on discharge  · CXR: small bialteral effusions    Incidental Findings:   · None     Test Results Pending at Discharge (will require follow up):   · none     Outpatient Tests Requested:  · Noen    Complications:  None    Hospital Course:     Irena Bill is a 61 y o  male patient who originally presented to the hospital on 7/15/2022 due to elevated creatinine on outpatient testing  He was administered IVF and renal function returned to baseline  During his hospital course he was seen by cardiology and medications were adjusted  Metoprolol, hydralazine and isosorbide discontinued  Coreg added  Torsemide changed to 20mg daily on discharge      Condition at Discharge: stable     Discharge Day Visit / Exam:     Subjective:  Denies any new complaints  Has mild chronic SOB  Vitals: Blood Pressure: 161/99 (07/19/22 0700)  Pulse: 90 (07/19/22 0700)  Temperature: 98 1 °F (36 7 °C) (07/19/22 0700)  Temp Source: Oral (07/18/22 1909)  Respirations: 16 (07/19/22 0700)  Height: 5' 4" (162 6 cm) (07/15/22 2147)  Weight - Scale: 85 4 kg (188 lb 4 4 oz) (07/19/22 0600)  SpO2: 93 % (07/19/22 0700)  Exam:   Physical Exam  Constitutional:       Appearance: Normal appearance  HENT:      Head: Normocephalic and atraumatic  Nose: Nose normal       Mouth/Throat:      Mouth: Mucous membranes are moist       Pharynx: Oropharynx is clear  Eyes:      Extraocular Movements: Extraocular movements intact  Cardiovascular:      Rate and Rhythm: Normal rate and regular rhythm  Pulmonary:      Breath sounds: No wheezing or rales  Abdominal:      Palpations: Abdomen is soft  Skin:     General: Skin is warm and dry  Neurological:      General: No focal deficit present  Mental Status: He is alert and oriented to person, place, and time  Psychiatric:         Mood and Affect: Mood normal          Behavior: Behavior normal          Discharge instructions/Information to patient and family:   See after visit summary for information provided to patient and family  Provisions for Follow-Up Care:  See after visit summary for information related to follow-up care and any pertinent home health orders  Planned Readmission:      Discharge Statement:  I spent 45 minutes discharging the patient  This time was spent on the day of discharge  I had direct contact with the patient on the day of discharge  Greater than 50% of the total time was spent examining patient, answering all patient questions, arranging and discussing plan of care with patient as well as directly providing post-discharge instructions  Additional time then spent on discharge activities      Discharge Medications:  See after visit summary for reconciled discharge medications provided to patient and family        ** Please Note: This note has been constructed using a voice recognition system **

## 2022-07-19 NOTE — TREATMENT PLAN
Received a page from nursing that the patient is short of breath and desaturating on room air when ambulating  CXR reviewed, small effusions, too small to tap  Will cancel discharge and give 1 dose of IV lasix as he is likely mildly volume overloaded,reassess tomorrow  Pt reports small amount of UO today, likely 500ml so far  Plan discussed with nephrology and the patient's sister via telephone

## 2022-07-19 NOTE — PLAN OF CARE
Problem: PAIN - ADULT  Goal: Verbalizes/displays adequate comfort level or baseline comfort level  Description: Interventions:  - Encourage patient to monitor pain and request assistance  - Assess pain using appropriate pain scale  - Administer analgesics based on type and severity of pain and evaluate response  - Implement non-pharmacological measures as appropriate and evaluate response  - Consider cultural and social influences on pain and pain management  - Notify physician/advanced practitioner if interventions unsuccessful or patient reports new pain  Outcome: Adequate for Discharge     Problem: INFECTION - ADULT  Goal: Absence or prevention of progression during hospitalization  Description: INTERVENTIONS:  - Assess and monitor for signs and symptoms of infection  - Monitor lab/diagnostic results  - Monitor all insertion sites, i e  indwelling lines, tubes, and drains  - Monitor endotracheal if appropriate and nasal secretions for changes in amount and color  - [place] appropriate cooling/warming therapies per order  - Administer medications as ordered  - Instruct and encourage patient and family to use good hand hygiene technique  - Identify and instruct in appropriate isolation precautions for identified infection/condition  Outcome: Adequate for Discharge  Goal: Absence of fever/infection during neutropenic period  Description: INTERVENTIONS:  - Monitor WBC    Outcome: Adequate for Discharge     Problem: SAFETY ADULT  Goal: Patient will remain free of falls  Description: INTERVENTIONS:  - Educate patient/family on patient safety including physical limitations  - Instruct patient to call for assistance with activity   - Consult OT/PT to assist with strengthening/mobility   - Keep Call bell within reach  - Keep bed low and locked with side rails adjusted as appropriate  - Keep care items and personal belongings within reach  - Initiate and maintain comfort rounds  - Make Fall Risk Sign visible to staff  - Offer Toileting every 2 Hours, in advance of need  - Initiate/Maintain alarm  - Obtain necessary fall risk management equipment:   - Apply yellow socks and bracelet for high fall risk patients  - Consider moving patient to room near nurses station  Outcome: Adequate for Discharge  Goal: Maintain or return to baseline ADL function  Description: INTERVENTIONS:  -  Assess patient's ability to carry out ADLs; assess patient's baseline for ADL function and identify physical deficits which impact ability to perform ADLs (bathing, care of mouth/teeth, toileting, grooming, dressing, etc )  - Assess/evaluate cause of self-care deficits   - Assess range of motion  - Assess patient's mobility; develop plan if impaired  - Assess patient's need for assistive devices and provide as appropriate  - Encourage maximum independence but intervene and supervise when necessary  - Involve family in performance of ADLs  - Assess for home care needs following discharge   - Consider OT consult to assist with ADL evaluation and planning for discharge  - Provide patient education as appropriate  Outcome: Adequate for Discharge  Goal: Maintains/Returns to pre admission functional level  Description: INTERVENTIONS:  - Perform BMAT or MOVE assessment daily    - Set and communicate daily mobility goal to care team and patient/family/caregiver  - Collaborate with rehabilitation services on mobility goals if consulted  - Perform Range of Motion  times a day  - Reposition patient every  hours    - Dangle patient  times a day  - Stand patient  times a day  - Ambulate patient  times a day  - Out of bed to chair 3 times a day   - Out of bed for meals 3 times a day  - Out of bed for toileting  - Record patient progress and toleration of activity level   Outcome: Adequate for Discharge     Problem: DISCHARGE PLANNING  Goal: Discharge to home or other facility with appropriate resources  Description: INTERVENTIONS:  - Identify barriers to discharge w/patient and caregiver  - Arrange for needed discharge resources and transportation as appropriate  - Identify discharge learning needs (meds, wound care, etc )  - Arrange for interpretive services to assist at discharge as needed  - Refer to Case Management Department for coordinating discharge planning if the patient needs post-hospital services based on physician/advanced practitioner order or complex needs related to functional status, cognitive ability, or social support system  Outcome: Adequate for Discharge     Problem: Knowledge Deficit  Goal: Patient/family/caregiver demonstrates understanding of disease process, treatment plan, medications, and discharge instructions  Description: Complete learning assessment and assess knowledge base    Interventions:  - Provide teaching at level of understanding  - Provide teaching via preferred learning methods  Outcome: Adequate for Discharge     Problem: METABOLIC, FLUID AND ELECTROLYTES - ADULT  Goal: Electrolytes maintained within normal limits  Description: INTERVENTIONS:  - Monitor labs and assess patient for signs and symptoms of electrolyte imbalances  - Administer electrolyte replacement as ordered  - Monitor response to electrolyte replacements, including repeat lab results as appropriate  - Instruct patient on fluid and nutrition as appropriate  Outcome: Adequate for Discharge  Goal: Fluid balance maintained  Description: INTERVENTIONS:  - Monitor labs   - Monitor I/O and WT  - Instruct patient on fluid and nutrition as appropriate  - Assess for signs & symptoms of volume excess or deficit  Outcome: Adequate for Discharge  Goal: Glucose maintained within target range  Description: INTERVENTIONS:  - Monitor Blood Glucose as ordered  - Assess for signs and symptoms of hyperglycemia and hypoglycemia  - Administer ordered medications to maintain glucose within target range  - Assess nutritional intake and initiate nutrition service referral as needed  Outcome: Adequate for Discharge     Problem: MOBILITY - ADULT  Goal: Maintain or return to baseline ADL function  Description: INTERVENTIONS:  -  Assess patient's ability to carry out ADLs; assess patient's baseline for ADL function and identify physical deficits which impact ability to perform ADLs (bathing, care of mouth/teeth, toileting, grooming, dressing, etc )  - Assess/evaluate cause of self-care deficits   - Assess range of motion  - Assess patient's mobility; develop plan if impaired  - Assess patient's need for assistive devices and provide as appropriate  - Encourage maximum independence but intervene and supervise when necessary  - Involve family in performance of ADLs  - Assess for home care needs following discharge   - Consider OT consult to assist with ADL evaluation and planning for discharge  - Provide patient education as appropriate  Outcome: Adequate for Discharge  Goal: Maintains/Returns to pre admission functional level  Description: INTERVENTIONS:  - Perform BMAT or MOVE assessment daily    - Set and communicate daily mobility goal to care team and patient/family/caregiver  - Collaborate with rehabilitation services on mobility goals if consulted  - Perform Range of Motion 4 times a day  - Reposition patient every 2 hours    - Dangle patient 3 times a day  - Stand patient 3 times a day  - Ambulate patient 3 times a day  - Out of bed to chair 3 times a day   - Out of bed for meals 3 times a day  - Out of bed for toileting  - Record patient progress and toleration of activity level   Outcome: Adequate for Discharge

## 2022-07-19 NOTE — UTILIZATION REVIEW
[address] [facility]  [address] [facility], [address]   [phone]     Tax ID: [id number]         NPI: [id number]  Sarthak SPENECR#[id number] (VUK:[id number]) (BQQ: 61 y o  M) (Adm: 07/15/22 1741) Inpatient    BE PPHP4 MS-PPHP 426-PPHP 426-           Demographics  Comment               Address:   [place]  [address]    Home Phone:   [phone]    Work Phone:       Mobile Phone:                   SSN:       Insurance:   [id number] REP    Marital Status:   Single    Voodoo:   None                  [address] Account [de-identified]     CVG-F/O Precert Number 1300 Siri Phone Authorization Number     1   [address] [facility] REP    [id number]         Primary Visit Coverage(Effective 2022)        Payer Plan Group Number Group Name     [id number] REP [id number]                 Primary Visit Coverage Subscriber        Subscriber Name Subscriber ID Subscriber  Subscriber SSKEE   Modesto Myers [id number]                 Admission Information      Current Information      Attending at Discharge Admitting Provider Admission Type Admission Status     MD Sagrario Bermudez MD Urgent Confirmed Discharge              Admission Date/Time Discharge Date/Time Hospital Service Auth/Cert Status     91  05:41 PM 22  02:29 PM Hospitalist Incomplete     [facility] Unit Room/Bed Referring Provider     [facility] BE PPHP 4 MED SURG/SD PPHP 426/PPHP 426- Kendall Hill PA-C              Point of Origin        Home/Work/Non-Health Care Facilty         Diagnosis      Polysubstance abuse [facility])       Discharge Disposition Discharge [facility] with [facility]              Attending NPI#    Katlyn Jaquez [[id number]]          Patient Diagnoses      Reasons for Admission Coded Admission Diagnoses      *Polysubstance abuse ([facility] ) [F19 10]      LAZARO (acute kidney injury) ([facility] ) [N17 9]      Hyperglycemia, unspecified [R73 9]     LAZARO (acute kidney injury) [facility])              Provider Notes  Notes for yesterday and today   No notes of this type exist for this encounter  Inpatient Admission Authorization Request   NOTIFICATION OF INPATIENT ADMISSION/INPATIENT AUTHORIZATION REQUEST   SERVICING FACILITY:   [facility]  Address: [address] [place] [address]  Tax ID: [id number]  NPI: [id number]  Place of Service: Inpatient [facility] Code: 24     ATTENDING PROVIDER:  Attending Name and NPI#: Dia Lopes Md [[id number]]  Address: [address] [place] [address]  Phone: [phone]     UTILIZATION REVIEW CONTACT:  Boone Canavan, Utilization   Network Utilization Review Department  Phone: [phone]  Fax: [phone]  Email: [email or web address]     PHYSICIAN ADVISORY SERVICES:  FOR EVTJ-XA-TEYE REVIEW - MEDICAL NECESSITY DENIAL  Phone: [phone]  Fax: [phone]  Email: [email or web address]     TYPE OF REQUEST:  Inpatient Status     ADMISSION INFORMATION:  ADMISSION DATE/TIME: 7/15/22  7:42 PM  PATIENT DIAGNOSIS CODE/DESCRIPTION:  Polysubstance abuse ([facility] ) [F19 10]  LAZARO (acute kidney injury) ([facility] ) [N17 9]  Hyperglycemia, unspecified [R73 9]  DISCHARGE DATE/TIME: No discharge date for patient encounter  IMPORTANT INFORMATION:  Please contact Boone Canavan directly with any questions or concerns regarding this request  Department voicemails are confidential     Send requests for admission clinical reviews, concurrent reviews, approvals, and administrative denials due to lack of clinical to fax [phone]

## 2022-07-19 NOTE — PHYSICAL THERAPY NOTE
Physical Therapy Evaluation    Patient's Name: Jojo Cottrell    Admitting Diagnosis  Polysubstance abuse ([facility] ) [F19 10]  LAZARO (acute kidney injury) ([facility] ) [N17 9]  Hyperglycemia, unspecified [R73 9]    Problem List  Patient Active Problem List   Diagnosis    Acute hepatitis C virus infection    Abnormal EKG    LAZARO (acute kidney injury) ([facility] )    Bipolar affective disorder, mixed, moderate (HCC)    Congestive heart failure (CHF) ([facility] )    Essential hypertension    Mixed hyperlipidemia    Normocytic anemia    Stage 3b chronic kidney disease ([facility] )    Type II or unspecified type diabetes mellitus without mention of complication, uncontrolled    Weakness    Cardiomyopathy ([facility] )    Polysubstance abuse ([facility] )    Diarrhea    Uncontrolled type 2 diabetes mellitus with hyperglycemia ([facility] )    High serum thyroid stimulating hormone (TSH)    Metabolic acidosis       Past Medical History  Past Medical History:   Diagnosis Date    Anxiety     Arthritis     Coronary artery disease     Dementia ([facility] )     Depression     Diabetes mellitus ([facility] )     Infectious viral hepatitis     Memory loss     Visual impairment        Past Surgical History  History reviewed  No pertinent surgical history  07/19/22 1054   PT Last Visit   PT Visit Date 07/19/22   Note Type   Note type Evaluation   Pain Assessment   Pain Assessment Tool 0-10   Pain Score No Pain   Restrictions/Precautions   Other Precautions Multiple lines;Telemetry;O2;Fall Risk  (2L O2)   Home Living   Type of [address] [place] [address] Two level;Stairs to enter with rails; Performs ADLs on one level  (3 ISABEL)   Home Equipment Walker;Cane   Additional Comments Pt lives in a [facility] with 3 ISABEL, has FFSU   Pt reports mainly using RW, but will use SPC in places where RW doesn't fit   Prior Function   Level of [place] Independent with ADLs and functional mobility  (A for lower body dressing)   Lives With Family  (brother)   Efrain Poon Help From Family   ADL Assistance Independent   IADLs Independent   Falls in the last 6 months 1 to 4  ("3-4" per pt)   Comments Pt requires A for putting on socks/shoes, otherwise reports being I with ADLs   Cognition   Overall Cognitive Status WFL   Orientation Level Oriented X4   Memory Within functional limits   Following Commands Follows all commands and directions without difficulty   RLE Assessment   RLE Assessment WFL   LLE Assessment   LLE Assessment WFL   Bed Mobility   Additional Comments pt seated OOB upon arrival   Transfers   Sit to Stand 5  Supervision   Stand to Sit 5  Supervision   Additional Comments transfers with RW   Ambulation/Elevation   Gait pattern Excessively slow; Step through pattern   Gait Assistance 5  Supervision   Additional items Assist x 1   Assistive Device Rolling walker   Distance 160ft with 1 episode of LOB initially upon standing requiring Juan to correct- no further episodes of LOB while walking  Pt desatting to 88% on 2L after ambulation requiring seated rest to recover  Ended session at 95%   Stair Management Assistance   (CGA)   Additional items Assist x 1;Verbal cues   Stair Management Technique One rail L;Step to pattern; Foreward   Number of Stairs 4  (limited by lines)   Balance   Static Sitting Good   Dynamic Sitting Fair +   Static Standing Fair   Dynamic Standing Fair -   Ambulatory Fair -   Endurance Deficit   Endurance Deficit Yes   Endurance Deficit Description ALVAREZ, desats with mobility   Activity Tolerance   Activity Tolerance Patient tolerated treatment well   Medical Staff Made Aware Seen with OT 2* pt's medical complexity and multiple comorbidities  PT focus on functional transfers, gait, stairs, and endurance   Nurse Made Aware ok to see per RN   Assessment   Prognosis Good   Problem List Decreased endurance   Assessment Pt is a 61 y o  male seen for PT evaluation s/p admit to [facility] on 7/15/2022  Pt was admitted with a primary dx of: LAZARO  PT now consulted for assessment of mobility and d/c needs  Pt with Up with assistance, PT evaluation orders  Pts current comorbidities effecting treatment include: anxiety, arthritis, CAD, dementia, depression, memory loss, visual impairment  Pts current clinical presentation is Evolving (medium complexity) due to Ongoing medical management for primary dx, Decreased activity tolerance compared to baseline, Fall risk, Increased O2 via NC from pts baseline, Trending lab values  Prior to admission, pt was living with his brother in a [facility] with 3 ISABEL, has [address]  Pt requires A for LB dressing, but otherwise I with ADLs  Pt ambulates with SPC vs RW  Upon evaluation, pt currently is requiring supervision for transfers; supervision for ambulation 160 ft w/ RW; and CGA for navigating 4 steps up/down with LHR  Pt desatting with mobility requiring seated rest to return to WNL  Pt presents at PT eval functioning near baseline mobility level  No further acute PT needs identified at this time  PT signing off  Pt would benefit from continued ambulation with nursing and restorative staff as able  At conclusion of PT session pt returned back in chair with phone and call bell within reach  Pt denies any further questions at this time  The patient's AM-PAC Basic Mobility Inpatient Short Form Raw Score is 20  A Raw score of greater than 16 suggests the patient may benefit from discharge to home  Please also refer to the recommendation of the Physical Therapist for safe discharge planning  Recommend home with no PT needs upon hospital D/C     Goals   Patient Goals to go home today   Plan   PT Frequency Other (Comment)  (eval only, D/C PT)   Recommendation   PT Discharge Recommendation No rehabilitation needs   Equipment Recommended Walker  (pt already owns)   [address] in Bed Without Bedrails 4   Lying on Back to Sitting on Edge of Flat Bed 4   Moving Bed to Chair 3   Standing Up From Chair 3   Walk in Room 3   Climb 3-5 Stairs 3   Basic Mobility Inpatient Raw Score 20   Basic Mobility Standardized Score 43 99   Highest Level Of Mobility   -[facility] Goal 6: Walk 10 steps or more   -HLM Achieved 7: Walk 25 feet or more   Modified Finney Scale   Modified Finney Scale 2   End of Consult   Patient Position at End of Consult Bedside chair; All needs within reach       Amairani Wilson, PT, DPT

## 2022-07-19 NOTE — PROGRESS NOTES
NEPHROLOGY PROGRESS NOTE   Geovanny Baum 61 y o  male MRN: [id number]  Unit/Bed#: [facility] 426-01 Encounter: [id number]      ASSESSMENT & PLAN:  1  Acute kidney injury on top of CKD stage 4  Suspected secondary to prerenal component in the setting of recent diarrhea, prior diuretics use  Admitted with a creatinine of 2 9 on 07/15, kidney function continues to improve with creatinine down to 1 4 today  Previous creatinine in the high 1s to low 2s, follows with Dr Kim Sheridan at [facility] group  Agreed with resuming diuretics and lower dose as per Cardiology  Stable from kidney standpoint to be discharged and he should continue follow-up with his outpatient nephrologist    2  Chronic combined systolic and diastolic heart failure, diuretics currently on hold in the setting of acute kidney  Cardiology on board, diuretics were resuming on a lower dose    3  Hypertension blood pressure is elevated, continue with Coreg that was increased yesterday, Isordil/hydralazine combo was discontinued on 07/17  Agreed with resume diuretics at a lower dose as recommended by Cardiology    4   Anemia hemoglobin low but stable at 9 5 on 07/17    My plan and recommendations were discussed with Internal Medicine attending via [facility] text      SUBJECTIVE:  Patient seen and examined, denies significant complaints other than shortness of breath when lying flat, still requiring oxygen, denies any chest pain, no significant shortness of breath, no abdominal pain, no nausea,      OBJECTIVE:  Current Weight: Weight - Scale: 85 4 kg (188 lb 4 4 oz)  Vitals:    07/19/22 0700   BP: 161/99   Pulse: 90   Resp: 16   Temp: 98 1 °F (36 7 °C)   SpO2: 93%       Intake/Output Summary (Last 24 hours) at 7/19/2022 1016  Last data filed at 7/19/2022 0700  Gross per 24 hour   Intake 820 ml   Output 1775 ml   Net -955 ml     General: conscious, cooperative, in not acute distress  Eyes: conjunctivae pink, anicteric sclerae  ENT: lips and mucous membranes moist, oxygen via nasal cannula  Neck: supple, elevated JVD  Chest:  Few rales at bases, no crackles, ronchus or wheezings  CVS: distinct S1 & S2, normal rate, regular rhythm  Abdomen: soft, non-tender, non-distended, normoactive bowel sounds  Extremities:  Minimal edema of both legs  Skin: no rash  Neuro: awake, alert, oriented        Medications:    Current Facility-Administered Medications:     acetaminophen (TYLENOL) tablet 650 mg, 650 mg, Oral, Q6H PRN, Cynthia Sweeney MD    aluminum-magnesium hydroxide-simethicone (MYLANTA) oral suspension 15 mL, 15 mL, Oral, Q6H PRN, Cynthia Sweeney MD    aspirin (ECOTRIN LOW STRENGTH) EC tablet 81 mg, 81 mg, Oral, Daily, Cynthia Sweeney MD, 81 mg at 07/19/22 0825    atorvastatin (LIPITOR) tablet 40 mg, 40 mg, Oral, HS, Cynthia Sweeney MD, 40 mg at 07/18/22 2120    carvedilol (COREG) tablet 25 mg, 25 mg, Oral, BID With Meals, Anahy Cueva MD, 25 mg at 07/19/22 0826    FLUoxetine (PROzac) capsule 20 mg, 20 mg, Oral, Daily, Cynthia Sweeney MD, 20 mg at 88/65/31 6314    folic acid (FOLVITE) tablet 400 mcg, 400 mcg, Oral, Daily, Cynthia Sweeney MD, 400 mcg at 07/19/22 0825    gabapentin (NEURONTIN) capsule 300 mg, 300 mg, Oral, TID, Cynthia Sweeney MD, 300 mg at 07/19/22 0825    heparin (porcine) subcutaneous injection 5,000 Units, 5,000 Units, Subcutaneous, Q8H [address], 5,000 Units at 07/19/22 0633 **AND** [COMPLETED] Platelet count, , , Once, Cynthia Sweeney MD    insulin glargine (LANTUS) subcutaneous injection 3 Units 0 03 mL, 3 Units, Subcutaneous, HS, Portia Lewis DO, 3 Units at 07/18/22 2122    insulin lispro (HumaLOG) 100 units/mL subcutaneous injection 1-6 Units, 1-6 Units, Subcutaneous, TID AC, 2 Units at 07/18/22 1655 **AND** Fingerstick Glucose (POCT), , , TID AC, Griffin Peters MD    LORazepam (ATIVAN) tablet 0 5 mg, 0 5 mg, Oral, HS PRN, EMMA Torres, 0 5 mg at 07/18/22 2120    melatonin tablet 6 mg, 6 mg, Oral, HS, Paty Alcantara MD, 6 mg at 07/18/22 2120    ondansetron (ZOFRAN) injection 4 mg, 4 mg, Intravenous, Q6H PRN, Paty Alcantara MD    thiamine tablet 100 mg, 100 mg, Oral, Daily, Paty Alcantara MD, 100 mg at 07/19/22 0825    torsemide (DEMADEX) tablet 20 mg, 20 mg, Oral, Daily, Dasha Eric MD, 20 mg at 07/19/22 1000    Invasive Devices:        Lab Results:   Results from last 7 days   Lab Units 07/19/22  0447 07/18/22  0447 07/17/22  0553 07/16/22  0439 07/15/22  2207 07/15/22  1816 07/15/22  1043   WBC Thousand/uL  --   --  5 60 5 55  --  4 88 5 61   HEMOGLOBIN g/dL  --   --  9 5* 9 3*  --  10 0* 9 8*   HEMATOCRIT %  --   --  30 0* 28 8*  --  31 0* 31 0*   PLATELETS Thousands/uL  --   --  161 155 158 155 166   SODIUM mmol/L 147 144 143 142  --  138 138   POTASSIUM mmol/L 4 7 4 7 4 6 4 8  --  5 4* 5 2   CHLORIDE mmol/L 118* 118* 118* 113*  --  107 111*   CO2 mmol/L 21 23 20* 23  --  24 23   BUN mg/dL 52* 55* 66* 76*  --  82* 78*   CREATININE mg/dL 1 84* 2 00* 2 26* 2 52*  --  2 88* 2 90*   CALCIUM mg/dL 9 2 8 6 8 8 8 8  --  9 1 9 1   MAGNESIUM mg/dL  --   --  2 3 1 9  --   --   --    ALK PHOS U/L  --   --   --  117*  --  128* 133*   ALT U/L  --   --   --  52  --  58 59   AST U/L  --   --   --  32  --  35 39       Previous work up:  See previous notes      Portions of the record may have been created with voice recognition software  Occasional wrong word or "sound a like" substitutions may have occurred due to the inherent limitations of voice recognition software  Read the chart carefully and recognize, using context, where substitutions have occurred  If you have any questions, please contact the dictating provider

## 2022-07-20 VITALS
RESPIRATION RATE: 18 BRPM | DIASTOLIC BLOOD PRESSURE: 89 MMHG | HEART RATE: 77 BPM | TEMPERATURE: 97.5 F | OXYGEN SATURATION: 98 % | WEIGHT: 186.51 LBS | SYSTOLIC BLOOD PRESSURE: 152 MMHG | BODY MASS INDEX: 31.84 KG/M2 | HEIGHT: 64 IN

## 2022-07-20 LAB
ANION GAP SERPL CALCULATED.3IONS-SCNC: 7 MMOL/L (ref 4–13)
BUN SERPL-MCNC: 53 MG/DL (ref 5–25)
CALCIUM SERPL-MCNC: 8.7 MG/DL (ref 8.3–10.1)
CHLORIDE SERPL-SCNC: 116 MMOL/L (ref 96–108)
CO2 SERPL-SCNC: 22 MMOL/L (ref 21–32)
CREAT SERPL-MCNC: 1.96 MG/DL (ref 0.6–1.3)
GFR SERPL CREATININE-BSD FRML MDRD: 35 ML/MIN/1.73SQ M
GLUCOSE SERPL-MCNC: 232 MG/DL (ref 65–140)
GLUCOSE SERPL-MCNC: 73 MG/DL (ref 65–140)
GLUCOSE SERPL-MCNC: 82 MG/DL (ref 65–140)
POTASSIUM SERPL-SCNC: 4.4 MMOL/L (ref 3.5–5.3)
SCAN RESULT: NORMAL
SODIUM SERPL-SCNC: 145 MMOL/L (ref 135–147)

## 2022-07-20 PROCEDURE — 99239 HOSP IP/OBS DSCHRG MGMT >30: CPT | Performed by: INTERNAL MEDICINE

## 2022-07-20 PROCEDURE — 99232 SBSQ HOSP IP/OBS MODERATE 35: CPT | Performed by: INTERNAL MEDICINE

## 2022-07-20 PROCEDURE — 80048 BASIC METABOLIC PNL TOTAL CA: CPT | Performed by: INTERNAL MEDICINE

## 2022-07-20 PROCEDURE — 94761 N-INVAS EAR/PLS OXIMETRY MLT: CPT

## 2022-07-20 PROCEDURE — 82948 REAGENT STRIP/BLOOD GLUCOSE: CPT

## 2022-07-20 PROCEDURE — NC001 PR NO CHARGE: Performed by: INTERNAL MEDICINE

## 2022-07-20 RX ADMIN — CARVEDILOL 25 MG: 25 TABLET, FILM COATED ORAL at 08:42

## 2022-07-20 RX ADMIN — ASPIRIN 81 MG: 81 TABLET, COATED ORAL at 08:42

## 2022-07-20 RX ADMIN — INSULIN LISPRO 3 UNITS: 100 INJECTION, SOLUTION INTRAVENOUS; SUBCUTANEOUS at 12:58

## 2022-07-20 RX ADMIN — THIAMINE HCL TAB 100 MG 100 MG: 100 TAB at 08:42

## 2022-07-20 RX ADMIN — TORSEMIDE 20 MG: 20 TABLET ORAL at 08:42

## 2022-07-20 RX ADMIN — GABAPENTIN 300 MG: 300 CAPSULE ORAL at 08:42

## 2022-07-20 RX ADMIN — FLUOXETINE 20 MG: 20 CAPSULE ORAL at 08:42

## 2022-07-20 RX ADMIN — FOLIC ACID TAB 400 MCG 400 MCG: 400 TAB at 08:42

## 2022-07-20 NOTE — RESPIRATORY THERAPY NOTE
Home Oxygen Qualifying Test     Patient name: Jose Juan Wilson        : 1955   Date of Test:  2022  Diagnosis:    Home Oxygen Test:    **Medicare Guidelines require item(s) 1-5 on all ambulatory patients or 1 and 2 on non-ambulatory patients  1  Baseline SPO2 on Room Air at rest 90 %   a  If <= 88% on Room Air add O2 via NC to obtain SpO2 >=88%  If LPM needed, document LPM n/a needed to reach =>88%    2  SPO2 during exertion on Room Air 89  %  a  During exertion monitor SPO2  If SPO2 increases >=89%, do not add supplemental oxygen    3  SPO2 on Oxygen at Rest 90 % at 0 LPM    4  SPO2 during exertion on Oxygen n/a % at n/a LPM    5  Test performed during exertion activity  []  Supplemental Home Oxygen is indicated  [x]  Client does not qualify for home oxygen  Respiratory Additional Notes- Patient maintained Spo2 89-90 while at rest and during ambulation       Claudetta Bones, RT

## 2022-07-20 NOTE — CASE MANAGEMENT
Case Management Discharge Note    Patient name THE [facility]  Location [facility] 426/[facility] 129-68 MRN [id number]  : 1955 Date 2022       Current Admission Date: 7/15/2022  Current Admission Diagnosis:LAZARO (acute kidney injury) ([facility] )      LOS (days): 5  Geometric Mean LOS (GMLOS) (days): 3 10  Days to GMLOS:-1 6     OBJECTIVE:  Risk of Unplanned Readmission Score: 25 88     Current admission status: Inpatient     Primary Insurance: [employer] [facility]    DISCHARGE DETAILS:    Discharge planning discussed with[de-identified] Patient  Freedom of Choice: Yes  CM contacted family/caregiver?: Yes  Were Treatment Team discharge recommendations reviewed with patient/caregiver?: Yes  Did patient/caregiver verbalize understanding of patient care needs?: Yes  Were patient/caregiver advised of the risks associated with not following Treatment Team discharge recommendations?: Yes    Contacts  Patient Contacts: Jignesh Hadley (pt's brother)  Relationship to Patient[de-identified] Family  Contact Method: Phone  Phone Number: [phone]  Reason/Outcome: Emergency [address]         Is the patient interested in [facility] at discharge?: Yes  [address] requested[de-identified] Nursing, Physical [address] Name[de-identified] [facility] Provider[de-identified] PCP  Home Health Services Needed[de-identified] Evaluate Functional Status and Safety, Gait/ADL Training, Strengthening/Theraputic Exercises to Improve Function, Other (comment) (LAZARO)  Homebound Criteria Met[de-identified] Requires the Assistance of Another Person for Safe Ambulation or to Leave the Home  Supporting Clincal Findings[de-identified] Limited Endurance    Treatment Team Recommendation: Home with  [address]  Discharge Destination Plan[de-identified] Home with [place] at Discharge : Family    Additional Comments: Pt is cleared for d/c by JENY Paulino was notified of pt's d/c order   Pt is accepted for resumption of services by [facility] for his aftercare plan  The pt and his brother Cari Peters were both informed of d/c  Brother will transport pt home later this day, pickup time TBD  IMM signed by pt on 7/19/22  No chart copy required  CM to follow

## 2022-07-20 NOTE — DISCHARGE SUMMARY
Discharge Summary - [address] Internal Medicine    Patient Information: Alysha Grimaldo 61 y o  male MRN: [id number]  Unit/Bed#: [facility] 426-01 Encounter: [id number]    Discharging Physician / Practitioner: Jaimie Mora MD  PCP: Marleny Razo PA-C  Admission Date: 7/15/2022  Discharge Date: 07/20/22    Disposition:     Home with VNA Services (Reminder: Complete face to face encounter)    Reason for Admission: dehydration, LAZARO    Discharge Diagnoses:     Principal Problem:    LAZARO (acute kidney injury) ([facility] )  Active Problems:    Bipolar affective disorder, mixed, moderate ([place] [address] )    Mixed hyperlipidemia    Stage 3b chronic kidney disease ([place] [address] )    Cardiomyopathy ([place] [address] )    Diarrhea    Uncontrolled type 2 diabetes mellitus with hyperglycemia ([place] [address] )    High serum thyroid stimulating hormone (TSH)    Metabolic acidosis    Hyperglycemia, unspecified  Resolved Problems:    * No resolved hospital problems  *      Consultations During Hospital Stay:  · Cardiology  · nephrology    Procedures Performed:     · none    Significant Findings / Test Results:     · Creatinine 2 26, improved to 1 8 on discharge  · CXR: small bialteral effusions    Incidental Findings:   · None     Test Results Pending at Discharge (will require follow up):   · none     Outpatient Tests Requested:  · Noen    Complications:  None    Hospital Course:     Alysha Grimaldo is a 61 y o  male patient who originally presented to the hospital on 7/15/2022 due to elevated creatinine on outpatient testing  He was administered IVF and renal function returned to baseline  During his hospital course he was seen by cardiology and medications were adjusted  Metoprolol, hydralazine and isosorbide discontinued  Coreg added  Torsemide changed to 20mg daily on discharge  ambulating pulse ox was checked and he did not qualify for home oxygen on discharge  Condition at Discharge: stable     Discharge Day Visit / Exam:     Subjective:  Denies any new complaints   Has mild chronic SOB  Vitals: Blood Pressure: 152/89 (07/20/22 0839)  Pulse: 77 (07/20/22 0839)  Temperature: 97 5 °F (36 4 °C) (07/20/22 0710)  Temp Source: Oral (07/19/22 1526)  Respirations: 18 (07/20/22 0710)  Height: 5' 4" (162 6 cm) (07/15/22 2147)  Weight - Scale: 84 6 kg (186 lb 8 2 oz) (07/20/22 0600)  SpO2: 98 % (07/20/22 0839)  Exam:   Physical Exam  Constitutional:       Appearance: Normal appearance  HENT:      Head: Normocephalic and atraumatic  Nose: Nose normal       Mouth/Throat:      Mouth: Mucous membranes are moist       Pharynx: Oropharynx is clear  Eyes:      Extraocular Movements: Extraocular movements intact  Cardiovascular:      Rate and Rhythm: Normal rate and regular rhythm  Pulmonary:      Breath sounds: No wheezing or rales  Abdominal:      Palpations: Abdomen is soft  Skin:     General: Skin is warm and dry  Neurological:      General: No focal deficit present  Mental Status: He is alert and oriented to person, place, and time  Psychiatric:         Mood and Affect: Mood normal          Behavior: Behavior normal          Discharge instructions/Information to patient and family:   See after visit summary for information provided to patient and family  Provisions for Follow-Up Care:  See after visit summary for information related to follow-up care and any pertinent home health orders  Planned Readmission:      Discharge Statement:  I spent 45 minutes discharging the patient  This time was spent on the day of discharge  I had direct contact with the patient on the day of discharge  Greater than 50% of the total time was spent examining patient, answering all patient questions, arranging and discussing plan of care with patient as well as directly providing post-discharge instructions  Additional time then spent on discharge activities  Discharge Medications:  See after visit summary for reconciled discharge medications provided to patient and family        ** Please Note: This note has been constructed using a voice recognition system **

## 2022-07-20 NOTE — PLAN OF CARE
Problem: SAFETY ADULT  Goal: Patient will remain free of falls  Description: INTERVENTIONS:  - Educate patient/family on patient safety including physical limitations  - Instruct patient to call for assistance with activity   - Consult OT/PT to assist with strengthening/mobility   - Keep Call bell within reach  - Keep bed low and locked with side rails adjusted as appropriate  - Keep care items and personal belongings within reach  - Initiate and maintain comfort rounds  - Make Fall Risk Sign visible to staff  - Offer Toileting every  Hours, in advance of need  - Initiate/Maintain alarm  - Obtain necessary fall risk management equipment:   - Apply yellow socks and bracelet for high fall risk patients  - Consider moving patient to room near nurses station  Outcome: Progressing  Goal: Maintain or return to baseline ADL function  Description: INTERVENTIONS:  -  Assess patient's ability to carry out ADLs; assess patient's baseline for ADL function and identify physical deficits which impact ability to perform ADLs (bathing, care of mouth/teeth, toileting, grooming, dressing, etc )  - Assess/evaluate cause of self-care deficits   - Assess range of motion  - Assess patient's mobility; develop plan if impaired  - Assess patient's need for assistive devices and provide as appropriate  - Encourage maximum independence but intervene and supervise when necessary  - Involve family in performance of ADLs  - Assess for home care needs following discharge   - Consider OT consult to assist with ADL evaluation and planning for discharge  - Provide patient education as appropriate  Outcome: Progressing  Goal: Maintains/Returns to pre admission functional level  Description: INTERVENTIONS:  - Perform BMAT or MOVE assessment daily    - Set and communicate daily mobility goal to care team and patient/family/caregiver     - Collaborate with rehabilitation services on mobility goals if consulted  - Perform Range of Motion  times a day   - Reposition patient every  hours    - Dangle patient  times a day  - Stand patient  times a day  - Ambulate patient  times a day  - Out of bed to chair  times a day   - Out of bed for meals  times a day  - Out of bed for toileting  - Record patient progress and toleration of activity level   Outcome: Progressing

## 2022-07-20 NOTE — PROGRESS NOTES
Progress Note - Cardiology   THE [facility] 61 y o  male MRN: [id number]  Unit/Bed#: [facility] 426-01 Encounter: [id number]    Assessment/Plan:  #  LAZARO on CKD  #  Chronic HF mildly reduced LVEF 40-50% (TTE 7/6/22 at [facility])  #  Hypertension  #  DM    -Isordil/hydral discontinued and metoprolol changed to carvedilol with improvement in blood pressure  -Feels well today and appears euvolemic  -Previously on torsemide 10 mg daily prior to recent hospitalization for decompensated CHF  Torsemide was increased to 40 mg BID  He was also taking dapagliflozin   -Continue torsemide 20 mg daily and resume dapagliflozin, extra torsemide 20 mg PRN for weight gain 3 lb or more   -Okay for discharge and follow up with his cardiologist at [facility] in 1 week      Subjective/Objective   Interval history:  He was initially planned for discharge yesterday afternoon but had complaints of shortness of breath and oxygen desaturation on room air  Discharge was canceled and he was given Lasix 40 mg IV x1  He feels much better today  No further complaints of orthopnea      Objective:   Vitals: /89   Pulse 77   Temp 97 5 °F (36 4 °C)   Resp 18   Ht 5' 4" (1 626 m)   Wt 84 6 kg (186 lb 8 2 oz)   SpO2 98%   BMI 32 01 kg/m²   Vitals:    07/19/22 0600 07/20/22 0600   Weight: 85 4 kg (188 lb 4 4 oz) 84 6 kg (186 lb 8 2 oz)     Orthostatic Blood Pressures    Flowsheet Row Most Recent Value   Blood Pressure 152/89 filed at 07/20/2022 9424   Patient Position - Orthostatic VS Lying filed at 07/18/2022 2232            Intake/Output Summary (Last 24 hours) at 7/20/2022 0946  Last data filed at 7/20/2022 3311  Gross per 24 hour   Intake 150 ml   Output 1400 ml   Net -1250 ml       Invasive Devices  Report    Peripheral Intravenous Line  Duration           Peripheral IV 07/19/22 Left Antecubital <1 day                Review of Systems:  All other systems reviewed and negative except for that noted above  Physical Exam: General appearance: alert and oriented, in no acute distress  Neck: No JVD  Lungs: clear to auscultation bilaterally and no wheezing, no rhonchi, normal effort  Heart: regular rate and rhythm, S1, S2 normal, no murmur, click, rub or gallop  Extremities: Warm & well-perfused, trace lower extremity edema  Skin: Skin color, texture, turgor normal  No rashes or lesions    Lab Results: I have personally reviewed pertinent lab results  Imaging: I have personally reviewed pertinent reports  EKG: Personally reviewed  VTE Pharmacologic Prophylaxis: Heparin    Counseling / Coordination of Care  Total time spent today 20 minutes  Greater than 50% of total time was spent with the patient and / or family counseling and / or coordination of care

## 2022-07-20 NOTE — PROGRESS NOTES
NEPHROLOGY PROGRESS NOTE   Evangelist Pascual 61 y o  male MRN: [id number]  Unit/Bed#: [facility] 426-01 Encounter: [id number]      ASSESSMENT & PLAN:  1  Acute kidney injury on top of CKD stage 4  Suspected secondary to prerenal component in the setting of recent diarrhea, prior diuretics use  Admitted with a creatinine of 2 9 on 07/15  Kidney function improved back to baseline with a creatinine 1 9 today  Previous creatinine in the high 1s to low 2s, follows with Dr Nick Martin at [address] [facility]  Continue diuretics as per Cardiology  Stable from kidney standpoint of view to be discharged and continue follow-up with his outpatient nephrologist     2  Chronic combined systolic and diastolic heart failure  Cardiology on board, diuretics were resuming on a lower dose yesterday  Received 1 dose intravenously Lasix yesterday due to shortness of breath and increased oxygen requirement    3  Hypertension blood pressure is slightly elevated, continue with Coreg that was increased on 07/18, Isordil/hydralazine combo was discontinued on 07/17 by Cardiology  Diuretics were resumed on a lower dose, follow low-salt diet    4   Anemia hemoglobin low but stable at 9 5 on 07/17    My plan and recommendations were discussed with Internal Medicine attending      SUBJECTIVE:  Patient seen and examined, feeling better, denies significant chest pain or shortness of breath, abdominal pain, no nausea, no vomiting, states that he is "peeing as race horse"      OBJECTIVE:  Current Weight: Weight - Scale: 84 6 kg (186 lb 8 2 oz)  Vitals:    07/20/22 0839   BP: 152/89   Pulse: 77   Resp:    Temp:    SpO2: 98%       Intake/Output Summary (Last 24 hours) at 7/20/2022 0940  Last data filed at 7/20/2022 1192  Gross per 24 hour   Intake 150 ml   Output 1400 ml   Net -1250 ml     General: conscious, cooperative, in not acute distress  Eyes: conjunctivae pink, anicteric sclerae  ENT: lips and mucous membranes moist, on oxygen via nasal cannula  Neck: supple, no JVD  Chest: clear breath sounds bilateral, no crackles, ronchus or wheezings  CVS: distinct S1 & S2, normal rate, regular rhythm  Abdomen: soft, non-tender, non-distended, normoactive bowel sounds  Extremities: no significant edema of both legs  Skin: no rash  Neuro: awake, alert, oriented      Medications:    Current Facility-Administered Medications:     acetaminophen (TYLENOL) tablet 650 mg, 650 mg, Oral, Q6H PRN, Kenney Fernandez MD    aluminum-magnesium hydroxide-simethicone (MYLANTA) oral suspension 15 mL, 15 mL, Oral, Q6H PRN, Kenney Fernandez MD    aspirin (ECOTRIN LOW STRENGTH) EC tablet 81 mg, 81 mg, Oral, Daily, Kenney Fernandez MD, 81 mg at 07/20/22 0842    atorvastatin (LIPITOR) tablet 40 mg, 40 mg, Oral, HS, Griffin Valero MD, 40 mg at 07/19/22 2216    carvedilol (COREG) tablet 25 mg, 25 mg, Oral, BID With Meals, Lexie Hobbs MD, 25 mg at 07/20/22 0842    FLUoxetine (PROzac) capsule 20 mg, 20 mg, Oral, Daily, Kenney Fernandez MD, 20 mg at 85/83/78 5413    folic acid (FOLVITE) tablet 400 mcg, 400 mcg, Oral, Daily, Kenney Fernandez MD, 400 mcg at 07/20/22 9750    gabapentin (NEURONTIN) capsule 300 mg, 300 mg, Oral, TID, Kenney Fernandez MD, 300 mg at 07/20/22 0842    heparin (porcine) subcutaneous injection 5,000 Units, 5,000 Units, Subcutaneous, Q8H [address], 5,000 Units at 07/19/22 2222 **AND** [COMPLETED] Platelet count, , , Once, Kenney Fernandez MD    insulin glargine (LANTUS) subcutaneous injection 3 Units 0 03 mL, 3 Units, Subcutaneous, HS, Shannan Hassan, DO, 3 Units at 07/19/22 2222    insulin lispro (HumaLOG) 100 units/mL subcutaneous injection 1-6 Units, 1-6 Units, Subcutaneous, TID AC, 1 Units at 07/19/22 1538 **AND** Fingerstick Glucose (POCT), , , TID AC, Griffin Valero MD    LORazepam (ATIVAN) tablet 0 5 mg, 0 5 mg, Oral, HS PRN, EMMA Lomeli, 0 5 mg at 07/19/22 2216    melatonin tablet 6 mg, 6 mg, Oral, HS, Loyda Mo MD, 6 mg at 07/19/22 2216    ondansetron (ZOFRAN) injection 4 mg, 4 mg, Intravenous, Q6H PRN, Loyda Mo MD    thiamine tablet 100 mg, 100 mg, Oral, Daily, Loyda Mo MD, 100 mg at 07/20/22 0842    torsemide [facility]) tablet 20 mg, 20 mg, Oral, Daily, Garry Doan MD, 20 mg at 07/20/22 0842    Invasive Devices:        Lab Results:   Results from last 7 days   Lab Units 07/20/22  0458 07/19/22  0447 07/18/22  0447 07/17/22  0553 07/16/22  0439 07/15/22  2207 07/15/22  1816 07/15/22  1043   WBC Thousand/uL  --   --   --  5 60 5 55  --  4 88 5 61   HEMOGLOBIN g/dL  --   --   --  9 5* 9 3*  --  10 0* 9 8*   HEMATOCRIT %  --   --   --  30 0* 28 8*  --  31 0* 31 0*   PLATELETS Thousands/uL  --   --   --  161 155 158 155 166   SODIUM mmol/L 145 147 144 143 142  --  138 138   POTASSIUM mmol/L 4 4 4 7 4 7 4 6 4 8  --  5 4* 5 2   CHLORIDE mmol/L 116* 118* 118* 118* 113*  --  107 111*   CO2 mmol/L 22 21 23 20* 23  --  24 23   BUN mg/dL 53* 52* 55* 66* 76*  --  82* 78*   CREATININE mg/dL 1 96* 1 84* 2 00* 2 26* 2 52*  --  2 88* 2 90*   CALCIUM mg/dL 8 7 9 2 8 6 8 8 8 8  --  9 1 9 1   MAGNESIUM mg/dL  --   --   --  2 3 1 9  --   --   --    ALK PHOS U/L  --   --   --   --  117*  --  128* 133*   ALT U/L  --   --   --   --  52  --  58 59   AST U/L  --   --   --   --  32  --  35 39       Previous work up:  See previous notes      Portions of the record may have been created with voice recognition software  Occasional wrong word or "sound a like" substitutions may have occurred due to the inherent limitations of voice recognition software  Read the chart carefully and recognize, using context, where substitutions have occurred  If you have any questions, please contact the dictating provider

## 2022-07-21 ENCOUNTER — HOSPITAL ENCOUNTER (INPATIENT)
Facility: HOSPITAL | Age: 67
LOS: 6 days | Discharge: HOME HEALTH | DRG: 291 | End: 2022-07-27
Attending: EMERGENCY MEDICINE | Admitting: INTERNAL MEDICINE
Payer: COMMERCIAL

## 2022-07-21 ENCOUNTER — TELEPHONE (OUTPATIENT)
Dept: FAMILY MEDICINE CLINIC | Facility: CLINIC | Age: 67
End: 2022-07-21

## 2022-07-21 ENCOUNTER — NURSE TRIAGE (OUTPATIENT)
Dept: OTHER | Facility: OTHER | Age: 67
End: 2022-07-21

## 2022-07-21 ENCOUNTER — APPOINTMENT (EMERGENCY)
Dept: RADIOLOGY | Facility: HOSPITAL | Age: 67
DRG: 291 | End: 2022-07-21
Payer: COMMERCIAL

## 2022-07-21 ENCOUNTER — HOME CARE VISIT (OUTPATIENT)
Dept: HOME HEALTH SERVICES | Facility: HOME HEALTHCARE | Age: 67
End: 2022-07-21
Payer: COMMERCIAL

## 2022-07-21 ENCOUNTER — TRANSITIONAL CARE MANAGEMENT (OUTPATIENT)
Dept: FAMILY MEDICINE CLINIC | Facility: CLINIC | Age: 67
End: 2022-07-21

## 2022-07-21 DIAGNOSIS — Z09 HOSPITAL DISCHARGE FOLLOW-UP: ICD-10-CM

## 2022-07-21 DIAGNOSIS — R09.02 HYPOXIA: ICD-10-CM

## 2022-07-21 DIAGNOSIS — I10 ESSENTIAL HYPERTENSION: ICD-10-CM

## 2022-07-21 DIAGNOSIS — Z87.2 HISTORY OF DECUBITUS ULCER: ICD-10-CM

## 2022-07-21 DIAGNOSIS — I50.9 CONGESTIVE HEART FAILURE, UNSPECIFIED HF CHRONICITY, UNSPECIFIED HEART FAILURE TYPE (HCC): ICD-10-CM

## 2022-07-21 DIAGNOSIS — I50.9 CHF (CONGESTIVE HEART FAILURE) (HCC): Primary | ICD-10-CM

## 2022-07-21 DIAGNOSIS — R53.1 WEAKNESS: ICD-10-CM

## 2022-07-21 DIAGNOSIS — R53.81 PHYSICAL DECONDITIONING: ICD-10-CM

## 2022-07-21 DIAGNOSIS — Z99.3 WHEELCHAIR BOUND: ICD-10-CM

## 2022-07-21 DIAGNOSIS — I50.9 ACUTE EXACERBATION OF CHF (CONGESTIVE HEART FAILURE) (HCC): ICD-10-CM

## 2022-07-21 LAB
ALBUMIN SERPL BCP-MCNC: 3 G/DL (ref 3.5–5)
ALP SERPL-CCNC: 207 U/L (ref 46–116)
ALT SERPL W P-5'-P-CCNC: 95 U/L (ref 12–78)
ANION GAP SERPL CALCULATED.3IONS-SCNC: 5 MMOL/L (ref 4–13)
AST SERPL W P-5'-P-CCNC: 61 U/L (ref 5–45)
BASOPHILS # BLD AUTO: 0.05 THOUSANDS/ΜL (ref 0–0.1)
BASOPHILS NFR BLD AUTO: 1 % (ref 0–1)
BILIRUB SERPL-MCNC: 0.34 MG/DL (ref 0.2–1)
BUN SERPL-MCNC: 53 MG/DL (ref 5–25)
CALCIUM ALBUM COR SERPL-MCNC: 9.9 MG/DL (ref 8.3–10.1)
CALCIUM SERPL-MCNC: 9.1 MG/DL (ref 8.3–10.1)
CARDIAC TROPONIN I PNL SERPL HS: 21 NG/L
CHLORIDE SERPL-SCNC: 115 MMOL/L (ref 96–108)
CO2 SERPL-SCNC: 23 MMOL/L (ref 21–32)
CREAT SERPL-MCNC: 2.1 MG/DL (ref 0.6–1.3)
EOSINOPHIL # BLD AUTO: 0.22 THOUSAND/ΜL (ref 0–0.61)
EOSINOPHIL NFR BLD AUTO: 4 % (ref 0–6)
ERYTHROCYTE [DISTWIDTH] IN BLOOD BY AUTOMATED COUNT: 14.9 % (ref 11.6–15.1)
FLUAV RNA RESP QL NAA+PROBE: NEGATIVE
FLUBV RNA RESP QL NAA+PROBE: NEGATIVE
GFR SERPL CREATININE-BSD FRML MDRD: 32 ML/MIN/1.73SQ M
GLUCOSE SERPL-MCNC: 200 MG/DL (ref 65–140)
HCT VFR BLD AUTO: 31 % (ref 36.5–49.3)
HGB BLD-MCNC: 9.5 G/DL (ref 12–17)
IMM GRANULOCYTES # BLD AUTO: 0.01 THOUSAND/UL (ref 0–0.2)
IMM GRANULOCYTES NFR BLD AUTO: 0 % (ref 0–2)
LYMPHOCYTES # BLD AUTO: 1.54 THOUSANDS/ΜL (ref 0.6–4.47)
LYMPHOCYTES NFR BLD AUTO: 30 % (ref 14–44)
MCH RBC QN AUTO: 29 PG (ref 26.8–34.3)
MCHC RBC AUTO-ENTMCNC: 30.6 G/DL (ref 31.4–37.4)
MCV RBC AUTO: 95 FL (ref 82–98)
MONOCYTES # BLD AUTO: 0.48 THOUSAND/ΜL (ref 0.17–1.22)
MONOCYTES NFR BLD AUTO: 9 % (ref 4–12)
NEUTROPHILS # BLD AUTO: 2.86 THOUSANDS/ΜL (ref 1.85–7.62)
NEUTS SEG NFR BLD AUTO: 56 % (ref 43–75)
NRBC BLD AUTO-RTO: 0 /100 WBCS
NT-PROBNP SERPL-MCNC: ABNORMAL PG/ML
PLATELET # BLD AUTO: 157 THOUSANDS/UL (ref 149–390)
PMV BLD AUTO: 11 FL (ref 8.9–12.7)
POTASSIUM SERPL-SCNC: 4.7 MMOL/L (ref 3.5–5.3)
PROT SERPL-MCNC: 7.5 G/DL (ref 6.4–8.4)
RBC # BLD AUTO: 3.28 MILLION/UL (ref 3.88–5.62)
RSV RNA RESP QL NAA+PROBE: NEGATIVE
SARS-COV-2 RNA RESP QL NAA+PROBE: NEGATIVE
SODIUM SERPL-SCNC: 143 MMOL/L (ref 135–147)
WBC # BLD AUTO: 5.16 THOUSAND/UL (ref 4.31–10.16)

## 2022-07-21 PROCEDURE — 85025 COMPLETE CBC W/AUTO DIFF WBC: CPT

## 2022-07-21 PROCEDURE — 71045 X-RAY EXAM CHEST 1 VIEW: CPT

## 2022-07-21 PROCEDURE — 93005 ELECTROCARDIOGRAM TRACING: CPT

## 2022-07-21 PROCEDURE — 83880 ASSAY OF NATRIURETIC PEPTIDE: CPT

## 2022-07-21 PROCEDURE — 99285 EMERGENCY DEPT VISIT HI MDM: CPT

## 2022-07-21 PROCEDURE — 84484 ASSAY OF TROPONIN QUANT: CPT

## 2022-07-21 PROCEDURE — 0241U HB NFCT DS VIR RESP RNA 4 TRGT: CPT

## 2022-07-21 PROCEDURE — 36415 COLL VENOUS BLD VENIPUNCTURE: CPT

## 2022-07-21 PROCEDURE — 80053 COMPREHEN METABOLIC PANEL: CPT

## 2022-07-21 RX ORDER — LIDOCAINE HYDROCHLORIDE 10 MG/ML
20 INJECTION, SOLUTION EPIDURAL; INFILTRATION; INTRACAUDAL; PERINEURAL ONCE
Status: COMPLETED | OUTPATIENT
Start: 2022-07-21 | End: 2022-07-22

## 2022-07-21 RX ORDER — TORSEMIDE 20 MG/1
20 TABLET ORAL DAILY
Qty: 30 TABLET | Refills: 0 | Status: ON HOLD | OUTPATIENT
Start: 2022-07-21 | End: 2022-07-27 | Stop reason: SDUPTHER

## 2022-07-21 RX ORDER — FUROSEMIDE 10 MG/ML
40 INJECTION INTRAMUSCULAR; INTRAVENOUS ONCE
Status: COMPLETED | OUTPATIENT
Start: 2022-07-21 | End: 2022-07-22

## 2022-07-21 NOTE — CASE COMMUNICATION
tc to number listed and spoke to Damon Jane who had me then speak to [facility]  Informed [facility] that we received the [facility] referral and SN would be calling in next few days to schedule appt for ANGELITA  Pt and Juan prefer Friday but agreeable to weekend if needed 2nd scheduling

## 2022-07-21 NOTE — TELEPHONE ENCOUNTER
Pt's brother called states he has concerns about the pt's BP the readings one from this morning at 163/96 and this afternoon 170/95  What should he do? Please let know   Thank you

## 2022-07-22 ENCOUNTER — APPOINTMENT (INPATIENT)
Dept: RADIOLOGY | Facility: HOSPITAL | Age: 67
DRG: 291 | End: 2022-07-22
Payer: COMMERCIAL

## 2022-07-22 ENCOUNTER — RA CDI HCC (OUTPATIENT)
Dept: OTHER | Facility: HOSPITAL | Age: 67
End: 2022-07-22

## 2022-07-22 ENCOUNTER — HOME CARE VISIT (OUTPATIENT)
Dept: HOME HEALTH SERVICES | Facility: HOME HEALTHCARE | Age: 67
End: 2022-07-22
Payer: COMMERCIAL

## 2022-07-22 PROBLEM — J90 PLEURAL EFFUSION, BACTERIAL: Status: ACTIVE | Noted: 2022-07-22

## 2022-07-22 PROBLEM — I25.5 ISCHEMIC CARDIOMYOPATHY: Status: ACTIVE | Noted: 2022-06-16

## 2022-07-22 PROBLEM — J96.01 ACUTE RESPIRATORY FAILURE WITH HYPOXIA (HCC): Status: ACTIVE | Noted: 2022-07-22

## 2022-07-22 PROBLEM — E11.9 TYPE 2 DIABETES MELLITUS, WITHOUT LONG-TERM CURRENT USE OF INSULIN (HCC): Status: ACTIVE | Noted: 2022-07-16

## 2022-07-22 PROBLEM — I50.23 ACUTE ON CHRONIC SYSTOLIC CONGESTIVE HEART FAILURE (HCC): Status: ACTIVE | Noted: 2022-05-02

## 2022-07-22 PROBLEM — N18.4 CKD (CHRONIC KIDNEY DISEASE) STAGE 4, GFR 15-29 ML/MIN (HCC): Status: ACTIVE | Noted: 2022-04-23

## 2022-07-22 LAB
2HR DELTA HS TROPONIN: 0 NG/L
APPEARANCE FLD: ABNORMAL
ATRIAL RATE: 71 BPM
CARDIAC TROPONIN I PNL SERPL HS: 21 NG/L
COLOR FLD: YELLOW
EOSINOPHIL NFR FLD MANUAL: 1 %
GLUCOSE FLD-MCNC: 231 MG/DL
GLUCOSE SERPL-MCNC: 138 MG/DL (ref 65–140)
GLUCOSE SERPL-MCNC: 149 MG/DL (ref 65–140)
GLUCOSE SERPL-MCNC: 154 MG/DL (ref 65–140)
GLUCOSE SERPL-MCNC: 213 MG/DL (ref 65–140)
GLUCOSE SERPL-MCNC: 224 MG/DL (ref 65–140)
LDH FLD L TO P-CCNC: 95 U/L
LYMPHOCYTES NFR BLD AUTO: 47 %
MONOCYTES NFR BLD AUTO: 50 %
NEUTS SEG NFR BLD AUTO: 2 %
P AXIS: 169 DEGREES
PH BODY FLUID: 7.8
PR INTERVAL: 154 MS
PROT FLD-MCNC: 3 G/DL
QRS AXIS: 109 DEGREES
QRSD INTERVAL: 94 MS
QT INTERVAL: 416 MS
QTC INTERVAL: 452 MS
SITE: ABNORMAL
T WAVE AXIS: 108 DEGREES
TOTAL CELLS COUNTED SPEC: 100
VENTRICULAR RATE: 71 BPM
WBC # FLD MANUAL: 315 /UL

## 2022-07-22 PROCEDURE — 88112 CYTOPATH CELL ENHANCE TECH: CPT | Performed by: PATHOLOGY

## 2022-07-22 PROCEDURE — 87070 CULTURE OTHR SPECIMN AEROBIC: CPT

## 2022-07-22 PROCEDURE — 87205 SMEAR GRAM STAIN: CPT

## 2022-07-22 PROCEDURE — 88305 TISSUE EXAM BY PATHOLOGIST: CPT | Performed by: PATHOLOGY

## 2022-07-22 PROCEDURE — 89051 BODY FLUID CELL COUNT: CPT

## 2022-07-22 PROCEDURE — 99223 1ST HOSP IP/OBS HIGH 75: CPT | Performed by: INTERNAL MEDICINE

## 2022-07-22 PROCEDURE — 32554 ASPIRATE PLEURA W/O IMAGING: CPT | Performed by: EMERGENCY MEDICINE

## 2022-07-22 PROCEDURE — 83986 ASSAY PH BODY FLUID NOS: CPT

## 2022-07-22 PROCEDURE — 82948 REAGENT STRIP/BLOOD GLUCOSE: CPT

## 2022-07-22 PROCEDURE — 93010 ELECTROCARDIOGRAM REPORT: CPT | Performed by: INTERNAL MEDICINE

## 2022-07-22 PROCEDURE — 93308 TTE F-UP OR LMTD: CPT | Performed by: EMERGENCY MEDICINE

## 2022-07-22 PROCEDURE — 84484 ASSAY OF TROPONIN QUANT: CPT

## 2022-07-22 PROCEDURE — 99291 CRITICAL CARE FIRST HOUR: CPT | Performed by: EMERGENCY MEDICINE

## 2022-07-22 PROCEDURE — 99222 1ST HOSP IP/OBS MODERATE 55: CPT | Performed by: INTERNAL MEDICINE

## 2022-07-22 PROCEDURE — 84157 ASSAY OF PROTEIN OTHER: CPT

## 2022-07-22 PROCEDURE — 99024 POSTOP FOLLOW-UP VISIT: CPT | Performed by: PHYSICIAN ASSISTANT

## 2022-07-22 PROCEDURE — 71045 X-RAY EXAM CHEST 1 VIEW: CPT

## 2022-07-22 PROCEDURE — 82945 GLUCOSE OTHER FLUID: CPT

## 2022-07-22 PROCEDURE — 83615 LACTATE (LD) (LDH) ENZYME: CPT

## 2022-07-22 RX ORDER — LORAZEPAM 0.5 MG/1
0.5 TABLET ORAL
Status: DISCONTINUED | OUTPATIENT
Start: 2022-07-22 | End: 2022-07-27 | Stop reason: HOSPADM

## 2022-07-22 RX ORDER — INSULIN LISPRO 100 [IU]/ML
1-5 INJECTION, SOLUTION INTRAVENOUS; SUBCUTANEOUS
Status: DISCONTINUED | OUTPATIENT
Start: 2022-07-22 | End: 2022-07-27 | Stop reason: HOSPADM

## 2022-07-22 RX ORDER — ATORVASTATIN CALCIUM 40 MG/1
40 TABLET, FILM COATED ORAL
Status: DISCONTINUED | OUTPATIENT
Start: 2022-07-22 | End: 2022-07-27 | Stop reason: HOSPADM

## 2022-07-22 RX ORDER — LANOLIN ALCOHOL/MO/W.PET/CERES
400 CREAM (GRAM) TOPICAL DAILY
Status: DISCONTINUED | OUTPATIENT
Start: 2022-07-22 | End: 2022-07-27 | Stop reason: HOSPADM

## 2022-07-22 RX ORDER — INSULIN LISPRO 100 [IU]/ML
1-6 INJECTION, SOLUTION INTRAVENOUS; SUBCUTANEOUS
Status: DISCONTINUED | OUTPATIENT
Start: 2022-07-22 | End: 2022-07-27 | Stop reason: HOSPADM

## 2022-07-22 RX ORDER — LANOLIN ALCOHOL/MO/W.PET/CERES
5 CREAM (GRAM) TOPICAL
Status: DISCONTINUED | OUTPATIENT
Start: 2022-07-22 | End: 2022-07-27 | Stop reason: HOSPADM

## 2022-07-22 RX ORDER — ONDANSETRON 2 MG/ML
4 INJECTION INTRAMUSCULAR; INTRAVENOUS EVERY 6 HOURS PRN
Status: DISCONTINUED | OUTPATIENT
Start: 2022-07-22 | End: 2022-07-27 | Stop reason: HOSPADM

## 2022-07-22 RX ORDER — ASPIRIN 81 MG/1
81 TABLET ORAL DAILY
Status: DISCONTINUED | OUTPATIENT
Start: 2022-07-22 | End: 2022-07-27 | Stop reason: HOSPADM

## 2022-07-22 RX ORDER — FUROSEMIDE 10 MG/ML
40 INJECTION INTRAMUSCULAR; INTRAVENOUS 2 TIMES DAILY
Status: DISCONTINUED | OUTPATIENT
Start: 2022-07-22 | End: 2022-07-23

## 2022-07-22 RX ORDER — GABAPENTIN 300 MG/1
300 CAPSULE ORAL 3 TIMES DAILY
Status: DISCONTINUED | OUTPATIENT
Start: 2022-07-22 | End: 2022-07-27 | Stop reason: HOSPADM

## 2022-07-22 RX ORDER — CARVEDILOL 25 MG/1
25 TABLET ORAL 2 TIMES DAILY WITH MEALS
Status: DISCONTINUED | OUTPATIENT
Start: 2022-07-22 | End: 2022-07-27 | Stop reason: HOSPADM

## 2022-07-22 RX ORDER — MAGNESIUM HYDROXIDE/ALUMINUM HYDROXICE/SIMETHICONE 120; 1200; 1200 MG/30ML; MG/30ML; MG/30ML
30 SUSPENSION ORAL EVERY 6 HOURS PRN
Status: DISCONTINUED | OUTPATIENT
Start: 2022-07-22 | End: 2022-07-27 | Stop reason: HOSPADM

## 2022-07-22 RX ORDER — DOCUSATE SODIUM 100 MG/1
100 CAPSULE, LIQUID FILLED ORAL 2 TIMES DAILY PRN
Status: DISCONTINUED | OUTPATIENT
Start: 2022-07-22 | End: 2022-07-27 | Stop reason: HOSPADM

## 2022-07-22 RX ORDER — FLUOXETINE HYDROCHLORIDE 20 MG/1
20 CAPSULE ORAL DAILY
Status: DISCONTINUED | OUTPATIENT
Start: 2022-07-22 | End: 2022-07-27 | Stop reason: HOSPADM

## 2022-07-22 RX ORDER — LANOLIN ALCOHOL/MO/W.PET/CERES
100 CREAM (GRAM) TOPICAL DAILY
Status: DISCONTINUED | OUTPATIENT
Start: 2022-07-22 | End: 2022-07-27 | Stop reason: HOSPADM

## 2022-07-22 RX ORDER — ACETAMINOPHEN 325 MG/1
650 TABLET ORAL EVERY 6 HOURS PRN
Status: DISCONTINUED | OUTPATIENT
Start: 2022-07-22 | End: 2022-07-27 | Stop reason: HOSPADM

## 2022-07-22 RX ORDER — HEPARIN SODIUM 5000 [USP'U]/ML
5000 INJECTION, SOLUTION INTRAVENOUS; SUBCUTANEOUS EVERY 8 HOURS SCHEDULED
Status: DISCONTINUED | OUTPATIENT
Start: 2022-07-22 | End: 2022-07-27 | Stop reason: HOSPADM

## 2022-07-22 RX ORDER — HEPARIN SODIUM 5000 [USP'U]/ML
5000 INJECTION, SOLUTION INTRAVENOUS; SUBCUTANEOUS EVERY 8 HOURS SCHEDULED
Status: DISCONTINUED | OUTPATIENT
Start: 2022-07-22 | End: 2022-07-22

## 2022-07-22 RX ADMIN — LORAZEPAM 0.5 MG: 0.5 TABLET ORAL at 02:57

## 2022-07-22 RX ADMIN — HEPARIN SODIUM 5000 UNITS: 5000 INJECTION INTRAVENOUS; SUBCUTANEOUS at 21:59

## 2022-07-22 RX ADMIN — FUROSEMIDE 40 MG: 10 INJECTION, SOLUTION INTRAMUSCULAR; INTRAVENOUS at 00:29

## 2022-07-22 RX ADMIN — HEPARIN SODIUM 5000 UNITS: 5000 INJECTION INTRAVENOUS; SUBCUTANEOUS at 17:59

## 2022-07-22 RX ADMIN — ATORVASTATIN CALCIUM 40 MG: 40 TABLET, FILM COATED ORAL at 22:00

## 2022-07-22 RX ADMIN — GABAPENTIN 300 MG: 300 CAPSULE ORAL at 17:59

## 2022-07-22 RX ADMIN — FUROSEMIDE 40 MG: 10 INJECTION, SOLUTION INTRAMUSCULAR; INTRAVENOUS at 09:14

## 2022-07-22 RX ADMIN — CARVEDILOL 25 MG: 25 TABLET, FILM COATED ORAL at 17:59

## 2022-07-22 RX ADMIN — HEPARIN SODIUM 5000 UNITS: 5000 INJECTION INTRAVENOUS; SUBCUTANEOUS at 06:15

## 2022-07-22 RX ADMIN — FUROSEMIDE 40 MG: 10 INJECTION, SOLUTION INTRAMUSCULAR; INTRAVENOUS at 17:59

## 2022-07-22 RX ADMIN — Medication 4.5 MG: at 02:57

## 2022-07-22 RX ADMIN — CARVEDILOL 25 MG: 25 TABLET, FILM COATED ORAL at 09:13

## 2022-07-22 RX ADMIN — ATORVASTATIN CALCIUM 40 MG: 40 TABLET, FILM COATED ORAL at 02:57

## 2022-07-22 RX ADMIN — FOLIC ACID TAB 400 MCG 400 MCG: 400 TAB at 09:14

## 2022-07-22 RX ADMIN — THIAMINE HCL TAB 100 MG 100 MG: 100 TAB at 09:14

## 2022-07-22 RX ADMIN — FLUOXETINE 20 MG: 20 CAPSULE ORAL at 09:13

## 2022-07-22 RX ADMIN — GABAPENTIN 300 MG: 300 CAPSULE ORAL at 09:13

## 2022-07-22 RX ADMIN — GABAPENTIN 300 MG: 300 CAPSULE ORAL at 22:00

## 2022-07-22 RX ADMIN — INSULIN LISPRO 2 UNITS: 100 INJECTION, SOLUTION INTRAVENOUS; SUBCUTANEOUS at 12:48

## 2022-07-22 RX ADMIN — INSULIN LISPRO 2 UNITS: 100 INJECTION, SOLUTION INTRAVENOUS; SUBCUTANEOUS at 18:06

## 2022-07-22 RX ADMIN — ASPIRIN 81 MG: 81 TABLET, COATED ORAL at 09:13

## 2022-07-22 RX ADMIN — LORAZEPAM 0.5 MG: 0.5 TABLET ORAL at 22:00

## 2022-07-22 RX ADMIN — LIDOCAINE HYDROCHLORIDE 20 ML: 10 INJECTION, SOLUTION EPIDURAL; INFILTRATION; INTRACAUDAL; PERINEURAL at 00:32

## 2022-07-22 RX ADMIN — Medication 4.5 MG: at 22:00

## 2022-07-22 RX ADMIN — INSULIN LISPRO 1 UNITS: 100 INJECTION, SOLUTION INTRAVENOUS; SUBCUTANEOUS at 22:07

## 2022-07-22 NOTE — ASSESSMENT & PLAN NOTE
Lab Results   Component Value Date    HGBA1C 7 7 (H) 07/15/2022       Recent Labs     07/20/22  0607 07/20/22  1052 07/22/22  0257 07/22/22  0625   POCGLU 73 232* 149* 138       Blood Sugar Average: Last 72 hrs:  (P) 143 5     · Hold Farxiga, metformin while inpatient   · QID accuchecks with SSI coverage for now   · Monitor and adjust regimen as needed   · Carb controlled diet   · Hypoglycemia protocol

## 2022-07-22 NOTE — ED PROVIDER NOTES
History  Chief Complaint   Patient presents with    Shortness of Breath     Pt c/o SOB for 3 days  Pt 88% on room air  Patient is a 79-year-old male with past medical history significant for cardiomyopathy with reduced ejection fracture, CHF, CKD, diabetes presenting to the emergency department with chief complaint of shortness of breath  Patient states that he was recently in the hospital and diagnosed with an LAZARO  Of note patient states that over the past couple of days he is developed increased shortness of breath, weight gain and bilateral lower extremity edema  Patient states that his diuretic was recently changed  He denies any fevers or chills chest pain but notes shortness of breath  Patient was 88 on room air  He is not on nasal cannula at home  Patient states he has been taking his medications as prescribed  Patient denies any diarrhea, constipation, headache, dizziness, numbness or tingling in any of his extremities  Patient is COVID vaccinated x3  Prior to Admission Medications   Prescriptions Last Dose Informant Patient Reported? Taking?    Acetaminophen Extra Strength 500 MG tablet   Yes No   Sig: Take 500 mg by mouth every 6 (six) hours as needed   Aspirin Low Dose 81 MG EC tablet   Yes No   Sig: Take 81 mg by mouth daily   Disposable Gloves MISC   No No   Sig: Use daily Large   FLUoxetine (PROzac) 20 mg capsule   No No   Sig: Take 1 capsule (20 mg total) by mouth daily   Farxiga 5 MG TABS   Yes No   Sig: Take 5 mg by mouth in the morning   Incontinence Supply Disposable (Disposable Brief Large) MISC   No No   Sig: Use daily   Incontinence Supply Disposable (RA Adult Wipes) MISC   No No   Sig: Use in the morning   Incontinence Supply Disposable MISC   No No   Sig: Use daily XL Disposable Bed Incontinence Pads   LORazepam (ATIVAN) 0 5 mg tablet   Yes No   Sig: Take 0 5 mg by mouth daily at bedtime   Lancet Devices (OneTouch Delica Plus Lancing) MISC   Yes No   Sig: Use 1 Dose in the morning Use as directed    Melatonin 5 MG TABS   Yes No   Sig: Take 5 mg by mouth daily at bedtime   Skin Protectants, Misc  (dimethicone-zinc oxide) cream   No No   Sig: Apply topically 2 (two) times a day as needed for irritation   atorvastatin (LIPITOR) 40 mg tablet   Yes No   Sig: Take 40 mg by mouth daily at bedtime   carvedilol (COREG) 25 mg tablet   No No   Sig: Take 1 tablet (25 mg total) by mouth 2 (two) times a day with meals   folic acid (FOLVITE) 1 mg tablet   No No   Sig: Take 1 tablet (1 mg total) by mouth daily   Patient taking differently: Take 400 mcg by mouth daily   gabapentin (Neurontin) 300 mg capsule   No No   Sig: Take 1 capsule (300 mg total) by mouth 3 (three) times a day   glucose blood (Accu-Chek Guide) test strip   No No   Sig: Test sugars four times daily   metFORMIN (GLUCOPHAGE) 500 mg tablet   No No   Sig: Take 1 tablet (500 mg total) by mouth 2 (two) times a day with meals   thiamine (VITAMIN B1) 100 mg tablet   No No   Sig: Take 1 tablet (100 mg total) by mouth daily   torsemide (DEMADEX) 20 mg tablet   No No   Sig: Take 1 tablet (20 mg total) by mouth daily      Facility-Administered Medications: None       Past Medical History:   Diagnosis Date    Anxiety     Arthritis     Coronary artery disease     [facility]     Depression     [facility]     Infectious viral hepatitis     Memory loss     Visual impairment        History reviewed  No pertinent surgical history  History reviewed  No pertinent family history  I have reviewed and agree with the history as documented      E-Cigarette/Vaping    E-Cigarette Use Never User      E-Cigarette/Vaping Substances     Social History     Tobacco Use    Smoking status: Former Smoker    Smokeless tobacco: Never Used   Vaping Use    Vaping Use: Never used   Substance Use Topics    Alcohol use: Yes     Comment: socially    Drug use: Never        Review of Systems   Constitutional: Positive for activity change and fatigue  Negative for chills and fever  HENT: Negative for congestion, ear pain and sore throat  Eyes: Negative for pain and visual disturbance  Respiratory: Positive for shortness of breath  Negative for cough, chest tightness and wheezing  Cardiovascular: Negative for chest pain and palpitations  Gastrointestinal: Negative for abdominal pain, constipation, diarrhea, nausea and vomiting  Genitourinary: Negative for dysuria and hematuria  Musculoskeletal: Negative for arthralgias and back pain  Skin: Negative for color change and rash  Neurological: Negative for dizziness, seizures, syncope, weakness, light-headedness, numbness and headaches  Psychiatric/Behavioral: Negative for agitation and behavioral problems  All other systems reviewed and are negative  Physical Exam  ED Triage Vitals   Temperature Pulse Respirations Blood Pressure SpO2   07/21/22 2221 07/21/22 2148 07/21/22 2148 07/21/22 2148 07/21/22 2148   98 3 °F (36 8 °C) 78 22 121/72 (!) 88 %      Temp Source Heart Rate Source Patient Position - Orthostatic VS BP Location FiO2 (%)   07/21/22 2221 07/21/22 2215 07/21/22 2215 07/21/22 2215 --   Oral Monitor Lying Right arm       Pain Score       --                    Orthostatic Vital Signs  Vitals:    07/21/22 2148 07/21/22 2215   BP: 121/72    Pulse: 78 70   Patient Position - Orthostatic VS:  Lying       Physical Exam  Vitals and nursing note reviewed  Constitutional:       General: He is in acute distress  Comments: Conversational Dyspnea   HENT:      Head: Normocephalic and atraumatic  Mouth/Throat:      Mouth: Mucous membranes are moist    Eyes:      Extraocular Movements: Extraocular movements intact  Conjunctiva/sclera: Conjunctivae normal    Cardiovascular:      Rate and Rhythm: Normal rate and regular rhythm  Heart sounds: No murmur heard  Pulmonary:      Effort: Pulmonary effort is normal  Tachypnea present  No respiratory distress  Breath sounds: Examination of the right-lower field reveals rales  Examination of the left-lower field reveals rales  Rales present  No decreased breath sounds, wheezing or rhonchi  Abdominal:      Palpations: Abdomen is soft  Tenderness: There is no abdominal tenderness  Musculoskeletal:      Cervical back: Neck supple  Right lower leg: No tenderness  Edema present  Left lower leg: No tenderness  Edema present  Skin:     General: Skin is warm and dry  Capillary Refill: Capillary refill takes less than 2 seconds  Neurological:      General: No focal deficit present  Mental Status: He is alert and oriented to person, place, and time     Psychiatric:         Mood and Affect: Mood normal          Behavior: Behavior normal          ED Medications  Medications   acetaminophen (TYLENOL) tablet 650 mg (has no administration in time range)   aspirin (ECOTRIN LOW STRENGTH) EC tablet 81 mg (has no administration in time range)   atorvastatin (LIPITOR) tablet 40 mg (has no administration in time range)   carvedilol (COREG) tablet 25 mg (has no administration in time range)   FLUoxetine (PROzac) capsule 20 mg (has no administration in time range)   folic acid (FOLVITE) tablet 400 mcg (has no administration in time range)   gabapentin (NEURONTIN) capsule 300 mg (has no administration in time range)   LORazepam (ATIVAN) tablet 0 5 mg (has no administration in time range)   melatonin tablet 4 5 mg (has no administration in time range)   thiamine tablet 100 mg (has no administration in time range)   furosemide (LASIX) injection 40 mg (has no administration in time range)   docusate sodium (COLACE) capsule 100 mg (has no administration in time range)   ondansetron (ZOFRAN) injection 4 mg (has no administration in time range)   aluminum-magnesium hydroxide-simethicone (MYLANTA) oral suspension 30 mL (has no administration in time range)   heparin (porcine) subcutaneous injection 5,000 Units (has no administration in time range)   insulin lispro (HumaLOG) 100 units/mL subcutaneous injection 1-6 Units (has no administration in time range)   insulin lispro (HumaLOG) 100 units/mL subcutaneous injection 1-5 Units (has no administration in time range)   furosemide (LASIX) injection 40 mg (40 mg Intravenous Given 7/22/22 0029)   lidocaine (PF) (XYLOCAINE-MPF) 1 % injection 20 mL (20 mL Infiltration Given by Other 7/22/22 0032)       Diagnostic Studies  Results Reviewed     Procedure Component Value Units Date/Time    LD (LDH), Body Fluid [[id number]] Collected: 07/22/22 0045    Lab Status: Final result Specimen: Body Fluid from Other Updated: 07/22/22 0145     LD, Fluid 95 U/L     Total Protein, body fluid [[id number]] Collected: 07/22/22 0045    Lab Status: Final result Specimen: Body Fluid from Other Updated: 07/22/22 0145     Protein, Fluid 3 0 g/dL     HS Troponin I 2hr [[id number]]  (Normal) Collected: 07/22/22 0027    Lab Status: Final result Specimen: Blood from Arm, Left Updated: 07/22/22 0127     hs TnI 2hr 21 ng/L      Delta 2hr hsTnI 0 ng/L     Hemoglobin A1c w/EAG Estimation (Orders if not completed within the last 90 days) [[id number]]     Lab Status: No result Specimen: Blood     Platelet count [[id number]]     Lab Status: No result Specimen: Blood     pH, body fluid [[id number]] Collected: 07/22/22 0045    Lab Status: Final result Specimen: Body Fluid from Other Updated: 07/22/22 0108     PH BODY FLUID 7 8    Body fluid white cell count with differential [[id number]] Collected: 07/22/22 0045    Lab Status: In process Specimen: Body Fluid from Pleural, Left Updated: 07/22/22 0058    Glucose, body fluid [[id number]] Collected: 07/22/22 0045    Lab Status: In process Specimen: Body Fluid from Other Updated: 07/22/22 0058    Body fluid culture (Pleural Fluid Culture) and Gram stain [[id number]] Collected: 07/22/22 0045    Lab Status: In process Specimen:  Body Fluid from Pleural, Left Updated: 07/22/22 0058    HS Troponin I 4hr [[id number]]     Lab Status: No result Specimen: Blood     FLU/RSV/COVID - if FLU/RSV clinically relevant [[id number]]  (Normal) Collected: 07/21/22 2207    Lab Status: Final result Specimen: Nares from Nose Updated: 07/21/22 2304     SARS-CoV-2 Negative     INFLUENZA A PCR Negative     INFLUENZA B PCR Negative     RSV PCR Negative    Narrative:      FOR PEDIATRIC PATIENTS - copy/paste COVID Guidelines URL to browser: [email or web address]  [email or web address]    SARS-CoV-2 assay is a Nucleic Acid Amplification assay intended for the  qualitative detection of nucleic acid from SARS-CoV-2 in nasopharyngeal  swabs  Results are for the presumptive identification of SARS-CoV-2 RNA  Positive results are indicative of infection with SARS-CoV-2, the virus  causing COVID-19, but do not rule out bacterial infection or co-infection  with other viruses  Laboratories within the [place] and its  territories are required to report all positive results to the appropriate  public health authorities  Negative results do not preclude SARS-CoV-2  infection and should not be used as the sole basis for treatment or other  patient management decisions  Negative results must be combined with  clinical observations, patient history, and epidemiological information  This test has not been FDA cleared or approved  This test has been authorized by FDA under an Emergency Use Authorization  (EUA)  This test is only authorized for the duration of time the  declaration that circumstances exist justifying the authorization of the  emergency use of an in vitro diagnostic tests for detection of SARS-CoV-2  virus and/or diagnosis of COVID-19 infection under section 564(b)(1) of  the Act, 21 U  S C  984HND-6(P)(8), unless the authorization is terminated  or revoked sooner  The test has been validated but independent review by FDA  and CLIA is pending      Test performed using [employer] GeneXpert: This RT-PCR assay targets N2,  a region unique to SARS-CoV-2  A conserved region in the E-gene was chosen  for pan-Sarbecovirus detection which includes SARS-CoV-2      HS Troponin 0hr (reflex protocol) [[id number]]  (Normal) Collected: 07/21/22 2159    Lab Status: Final result Specimen: Blood from Arm, Left Updated: 07/21/22 2241     hs TnI 0hr 21 ng/L     NT-BNP PRO [[id number]]  (Abnormal) Collected: 07/21/22 2159    Lab Status: Final result Specimen: Blood from Arm, Left Updated: 07/21/22 2234     NT-proBNP 26,238 pg/mL     Comprehensive metabolic panel [[id number]]  (Abnormal) Collected: 07/21/22 2159    Lab Status: Final result Specimen: Blood from Arm, Left Updated: 07/21/22 2233     Sodium 143 mmol/L      Potassium 4 7 mmol/L      Chloride 115 mmol/L      CO2 23 mmol/L      ANION GAP 5 mmol/L      BUN 53 mg/dL      Creatinine 2 10 mg/dL      Glucose 200 mg/dL      Calcium 9 1 mg/dL      Corrected Calcium 9 9 mg/dL      AST 61 U/L      ALT 95 U/L      Alkaline Phosphatase 207 U/L      Total Protein 7 5 g/dL      Albumin 3 0 g/dL      Total Bilirubin 0 34 mg/dL      eGFR 32 ml/min/1 73sq m     Narrative:      [place] guidelines for Chronic Kidney Disease (CKD):     Stage 1 with normal or high GFR (GFR > 90 mL/min/1 73 square meters)    Stage 2 Mild CKD (GFR = 60-89 mL/min/1 73 square meters)    Stage 3A Moderate CKD (GFR = 45-59 mL/min/1 73 square meters)    Stage 3B Moderate CKD (GFR = 30-44 mL/min/1 73 square meters)    Stage 4 Severe CKD (GFR = 15-29 mL/min/1 73 square meters)    Stage 5 End Stage CKD (GFR <15 mL/min/1 73 square meters)  Note: GFR calculation is accurate only with a steady state creatinine    CBC and differential [[id number]]  (Abnormal) Collected: 07/21/22 2159    Lab Status: Final result Specimen: Blood from Arm, Left Updated: 07/21/22 2208     WBC 5 16 Thousand/uL      RBC 3 28 Million/uL      Hemoglobin 9 5 g/dL      Hematocrit 31 0 %      MCV 95 fL      MCH 29 0 pg      MCHC 30 6 g/dL      RDW 14 9 %      MPV 11 0 fL      Platelets 050 Thousands/uL      nRBC 0 /100 WBCs      Neutrophils Relative 56 %      Immat GRANS % 0 %      Lymphocytes Relative 30 %      Monocytes Relative 9 %      Eosinophils Relative 4 %      Basophils Relative 1 %      Neutrophils Absolute 2 86 Thousands/µL      Immature Grans Absolute 0 01 Thousand/uL      Lymphocytes Absolute 1 54 Thousands/µL      Monocytes Absolute 0 48 Thousand/µL      Eosinophils Absolute 0 22 Thousand/µL      Basophils Absolute 0 05 Thousands/µL                  XR chest 1 view portable   ED Interpretation by Melquiades Becerra MD (07/21 2228)   Abnormal   Moderate bilateral pleural effusions  XR chest 1 view portable    (Results Pending)         Procedures  Thoracentesis    Date/Time: 7/22/2022 12:12 AM  Performed by: Kay Diane DO  Authorized by: Kay Diane DO     Patient location:  ED  Consent:     Consent obtained:  Verbal    Consent given by:  Patient    Risks discussed:  Bleeding, infection, pain, pneumothorax, nerve damage and incomplete drainage    Alternatives discussed:  No treatment  Universal protocol:     Procedure explained and questions answered to patient or proxy's satisfaction: yes      Relevant documents present and verified: yes      Patient identity confirmed:  Verbally with patient  Indications:     Procedure Purpose: diagnostic      Indications: pleural effusion    Anesthesia (see MAR for exact dosages):      Anesthesia method:  Local infiltration    Local anesthetic:  Lidocaine 1% w/o epi  Procedure details:     Preparation: Patient was prepped and draped in usual sterile fashion      Standard thoracentesis cath kit used: Yes      Patient position:  Sitting    Laterality:  Left    Location:  Lateral    Intercostal space:  4th    Puncture method:  Over-the-needle catheter    Ultrasound guidance: no      Indwelling catheter placed: no      Number of attempts:  2 Needle gauge:  18    Catheter size:  8 Fr    Drainage color:  Megan    Drainage characteristics:  Bloody    Fluid removed amount:  1350  Post-procedure details:     Chest x-ray performed: yes      Chest x-ray findings:  Pleural effusion improved    Patient tolerance of procedure: Tolerated well, no immediate complications    ECG 12 Lead Documentation Only    Date/Time: 7/21/2022 9:13 PM  Performed by: Anjana Cox DO  Authorized by: Anjana Cox DO     Indications / Diagnosis:  Sob  ECG reviewed by me, the ED Provider: yes    Patient location:  ED  Previous ECG:     Previous ECG:  Compared to current    Similarity:  No change  Interpretation:     Interpretation: abnormal    Rate:     ECG rate:  71    ECG rate assessment: normal    Rhythm:     Rhythm: sinus rhythm    Ectopy:     Ectopy: none    QRS:     QRS axis:  Normal    QRS intervals:  Normal  Conduction:     Conduction: normal    ST segments:     ST segments:  Normal  T waves:     T waves: non-specific            ED Course  ED Course as of 07/22/22 0150   Thu Jul 21, 2022   2205 Blood Pressure: 121/72   2205 Pulse: 78   2205 Respirations: 22   2205 SpO2(!): 88 %  Placed on 2L nc and went up to 2L NC   2231 Moderate bilateral pleural effusions  2248 NT-proBNP(!): 26,238   2249 Will give 40 of lasix patient with kidney injury cant give 80 at this time  36 Will reach out for admission                                         MDM  Number of Diagnoses or Management Options  Acute exacerbation of CHF (congestive heart failure) ([facility])  CHF (congestive heart failure) ([facility] )  Diagnosis management comments: 79-year-old male presenting to the emergency department chief complaint of shortness of breath  Patient was initially found to be hypoxic 88% he was placed on 2 L nasal cannula came back up to the 90s  Patient with pitting edema and rales on exam likely consistent of CHF  Will evaluate patient with cardiac and pulmonary labs    Bedside ultrasound consistent with pleural effusions as well as x-ray bilateral pleural effusions  BNP was over 20,000 I gave the patient 40 of Lasix given his new LAZARO I did not give him more  A thoracentesis was done for symptomatic relief and cultures were sent from the fluid  Patient drained 1350  Patient felt significantly better after the drainage fluid Patient was admitted to [facility] for CHF exacerbation  Patient was aware of all exam and laboratory findings  The patient was admitted to [facility]  Disposition  Final diagnoses:   CHF (congestive heart failure) ([facility])   Acute exacerbation of CHF (congestive heart failure) ([facility] )     Time reflects when diagnosis was documented in both MDM as applicable and the Disposition within this note     Time User Action Codes Description Comment    7/21/2022 11:17 PM Palladino, Dovie Pike Add [I50 9] [facility]     7/21/2022 11:17 PM Palladino, Dovie Pike Add [I50 9] Acute exacerbation of CHF (congestive heart failure) [facility])       ED Disposition     None      Follow-up Information    None         Patient's Medications   Discharge Prescriptions    No medications on file     No discharge procedures on file  PDMP Review       Value Time User    PDMP Reviewed  Yes 7/15/2022 10:57 PM Joann Sandoval           ED Provider  Attending physically available and evaluated THE [facility]  I managed the patient along with the ED Attending      Electronically Signed by         Mickie Luna DO  07/22/22 0150

## 2022-07-22 NOTE — ASSESSMENT & PLAN NOTE
According to last note, the patient's creatinine was as high as 2 26  Despite that, current creatinine at 2 10, diuresis might improved creatinine  Will continue to watch

## 2022-07-22 NOTE — PROGRESS NOTES
e11 [id number]  [facility]  coding opportunities          Chart Reviewed number of suggestions sent to Provider: 1     Patients Insurance     Medicare Insurance: Anton Serrano

## 2022-07-22 NOTE — PROGRESS NOTES
Evelyne Hardwick's Internal Medicine Post Admit Check:     Date of visit: 07/22/22    The patient was admitted earlier to the [facility] Internal Medicine Service  Please see initial intake history and physical for detailed admission history  This is a supplemental update following a post admit checkup  Subjective: Patient reports feeling much better, states he is no longer SOB  Otherwise denies complaints  Will call patient's brother with update per his request      Exam:   General: awake, alert, NAD   Heart: RRR, without murmur  Lungs: faint crackles, on room air   Extremities: trace, non-pitting edema     Assessment and Plan:     * Acute on chronic systolic congestive heart failure (HCC)  Assessment & Plan  Wt Readings from Last 3 Encounters:   07/20/22 84 6 kg (186 lb 8 2 oz)   07/14/22 84 7 kg (186 lb 12 8 oz)   07/12/22 65 3 kg (144 lb)     Recent admission for the same  As per discharge summary, plans regarding heart failure as follows:  Metoprolol, hydralazine and isosorbide discontinued  Coreg added  Torsemide changed to 20 mg daily on discharge  Patient reports compliance with these medications however may have increased salt intake lately  · Patient presented with progressive SOB and respiratory distress requiring BiPAP on admission   · ProBNP 26,238 on admission  CXR with vascular congestion and small bilateral pleural effusions  · IV Lasix 40 mg BID started on admission   · Continued on remainder of home cardiac regimen   · Most recent limited echo July 2022 [address] with LVEF 40-50%  Global hypokinesis  Valves not assessed  · Appreciate cardiology consult and recommendations   · Defer need for repeat echocardiogram   · Monitor daily weights, intake and output, BMP   · Cardiac diet     Acute respiratory failure with hypoxia (HCC)  Assessment & Plan  · In the setting of acute on chronic CHF, requiring BiPAP on admission   · CXR: Development of mild vascular congestion   Small bilateral pleural effusions · Improved s/p IV diuresis, currently stable on room air   · Monitor and titrate supplemental O2 as able to maintain SpO2 > 90%    CKD (chronic kidney disease) stage 4, GFR 15-29 ml/min ([facility])  Assessment & Plan  Seen by nephrology on recent admission, felt to have baseline creatine in high 1s to low 2s  Creatinine 1 9 at time of discharge  · Creatinine on admission 2 10   · Continue to monitor with diuresis   · Avoid nephrotoxins and hypotension   · Monitor BMP daily     Essential hypertension  Assessment & Plan  · BP acceptable, monitor routinely   · Continue home dose Coreg  · Diuresis per cardiology      Type 2 diabetes mellitus, without long-term current use of insulin [facility])  Assessment & Plan  Lab Results   Component Value Date    HGBA1C 7 7 (H) 07/15/2022       Recent Labs     07/20/22  0607 07/20/22  1052 07/22/22  0257 07/22/22  0625   POCGLU 73 232* 149* 138       Blood Sugar Average: Last 72 hrs:  (P) 143 5     · Hold Farxiga, metformin while inpatient   · QID accuchecks with SSI coverage for now   · Monitor and adjust regimen as needed   · Carb controlled diet   · Hypoglycemia protocol     Mixed hyperlipidemia  Assessment & Plan  · On Lipitor 40 mg daily    Bipolar affective disorder, mixed, moderate (HCC)  Assessment & Plan  · Mood stable   · Continue home medical management: lorazepam 0 5 mg at bedtime, Prozac 20 mg daily        151 René Bhatia PA-C

## 2022-07-22 NOTE — H&P
[address]  H&P- Jose Alberto Albarran 1955, 61 y o  male MRN: [id number]  Unit/Bed#: ED 12 Encounter: [id number]  Primary Care Provider: Oriana Hickey PA-C   Date and time admitted to hospital: 7/21/2022  9:43 PM    LAZARO (acute kidney injury) [facility])  Assessment & Plan  According to last note, the patient's creatinine was as high as 2 26  Despite that, current creatinine at 2 10, diuresis might improved creatinine  Will continue to watch  * Acute on chronic systolic congestive heart failure (HCC)  Assessment & Plan  Wt Readings from Last 3 Encounters:   07/20/22 84 6 kg (186 lb 8 2 oz)   07/14/22 84 7 kg (186 lb 12 8 oz)   07/12/22 65 3 kg (144 lb)     As per last admission, discharge summary plans regarding heart failure as follows:  Metoprolol, hydralazine and isosorbide discontinued  Coreg added  Torsemide changed to 20mg daily on discharge  Patient claims not to deviate from medications however may have increased salt intake lately although tries not to take any salt  Cardiology consult  Elevated AST ALT no doubt from passive hepatic congestion  Continue Coreg 25 mg twice daily  On torsemide 20 mg daily; will place patient on Lasix 40 mg twice daily  Continue to watch creatinine  Ischemic cardiomyopathy  Assessment & Plan  On Coreg  Cardiology consult  Uncontrolled type 2 diabetes mellitus with hyperglycemia [facility])  Assessment & Plan  Lab Results   Component Value Date    HGBA1C 7 7 (H) 07/15/2022     On Farxiga, metformin at home  Will place patient on sliding scale  Recent Labs     07/19/22  1536 07/19/22  2046 07/20/22  0607 07/20/22  1052   POCGLU 186* 121 73 232*       Blood Sugar Average: Last 72 hrs:      Mixed hyperlipidemia  Assessment & Plan  On Lipitor 40 mg at bedtime  Essential hypertension  Assessment & Plan  Continue Coreg and Demadex      Bipolar affective disorder, mixed, moderate (HCC)  Assessment & Plan  Continue lorazepam 0 5 mg at bedtime  Prozac 20 mg daily  VTE Prophylaxis: Enoxaparin (Lovenox)  / sequential compression device   Code Status: Level 1 - Full Code as discussed patient  POLST: There is no POLST form on file for this patient (pre-hospital)    Anticipated Length of Stay:  Patient will be admitted on an Inpatient basis with an anticipated length of stay of  greater than 2 midnights  Justification for Hospital Stay: Please see detailed plans noted above  Chief Complaint:     Respiratory distress  History of Present Illness:  Mikey Jones is a 61 y o  male who has past medical history significant for history of ischemic cardiomyopathy with an ejection fraction of 40 to 50% with global hypokinesis as well as right ventricular reduced systolic function as per echocardiogram of July 11, 2022 by [facility]   At the same time, the patient has a history of chronic kidney disease with episodes of acute kidney insufficiency, bipolar disorder, diabetes mellitus on medications as above with former substance abuse  The patient comes in for increasing shortness of breath with note of severe respiratory distress needing BiPAP  The patient was able to wean down to currently at nasal cannula  Noted to is that he did have a thoracentesis by ER physician staff which was able to drain 1350 mL of clear fluid  Patient is currently feeling much better  With review of history, the patient claims that he did not deviate from his medications however might have eaten a little bit more salt than usual with a past 2 to 3 days  Patient claims not to have any cough or cold or fever  No nausea or vomiting  Currently, patient is sitting upright but feeling much more comfortable  He is able to speak in longer sentences without much distress      Review of Systems:    Constitutional:  Denies fever or chills   Eyes:  Denies change in visual acuity   HENT:  Denies nasal congestion or sore throat   Respiratory:  Cough with shortness of breath as noted above  Cardiovascular:  Denies chest pain or edema   GI:  Denies abdominal pain, nausea, vomiting, bloody stools or diarrhea   :  Denies dysuria   Musculoskeletal:  Denies back pain or joint pain   Integument:  Denies rash   Neurologic:  Denies headache, focal weakness or sensory changes   Endocrine:  Denies polyuria or polydipsia   Lymphatic:  Denies swollen glands   Psychiatric:  Denies depression or anxiety     Past Medical and Surgical History:   Past Medical History:   Diagnosis Date    Anxiety     Arthritis     Coronary artery disease     Dementia ([place] [address] )     Depression     Diabetes mellitus [place] [address] )     Infectious viral hepatitis     Memory loss     Visual impairment      History reviewed  No pertinent surgical history  Meds/Allergies:  (Not in a hospital admission)      Allergies: No Known Allergies  History:  Marital Status: Single   Occupation:  He is disabled  Patient Pre-hospital Living Situation:  Lives at home  Patient Pre-hospital Level of Mobility:  Ambulatory  Patient Pre-hospital Diet Restrictions:  Regulardiabetic with low-salt  Substance Use History:   Social History     Substance and Sexual Activity   Alcohol Use Yes    Comment: socially     Social History     Tobacco Use   Smoking Status Former Smoker   Smokeless Tobacco Never Used     Social History     Substance and Sexual Activity   Drug Use Never       Family History:  History reviewed  No pertinent family history  Physical Exam:     Vitals:   Blood Pressure: 121/72 (07/21/22 2148)  Pulse: 70 (07/21/22 2215)  Temperature: 98 3 °F (36 8 °C) (07/21/22 2221)  Temp Source: Oral (07/21/22 2221)  Respirations: 22 (07/21/22 2215)  SpO2: 97 % (07/21/22 2215)    Constitutional:  Well developed, well nourished, no acute distress, non-toxic appearance   Eyes:  PERRL, conjunctiva normal   HENT:  Atraumatic, external ears normal, nose normal, oropharynx moist, no pharyngeal exudates   Neck- normal range of motion, no tenderness, supple   Respiratory:  Mild respiratory distress, bronchial breath sounds with note of decreased breath sounds on the left no rales, no wheezing   Cardiovascular:  Normal rate, normal rhythm, no murmurs, no gallops, no rubs   GI:  Soft, nondistended, normal bowel sounds, nontender, no organomegaly, no mass, no rebound, no guarding   :  No costovertebral angle tenderness   Musculoskeletal:  No edema, no tenderness, no deformities  Back- no tenderness  Integument:  Well hydrated, no rash   Lymphatic:  No lymphadenopathy noted   Neurologic:  Alert &awake, communicative, CN 2-12 normal, normal motor function, normal sensory function, no focal deficits noted   Psychiatric:  Speech and behavior appropriate       Lab Results: I have personally reviewed pertinent reports  Results from last 7 days   Lab Units 07/21/22  2159   WBC Thousand/uL 5 16   HEMOGLOBIN g/dL 9 5*   HEMATOCRIT % 31 0*   PLATELETS Thousands/uL 157   NEUTROS PCT % 56   LYMPHS PCT % 30   MONOS PCT % 9   EOS PCT % 4     Results from last 7 days   Lab Units 07/21/22  2159   POTASSIUM mmol/L 4 7   CHLORIDE mmol/L 115*   CO2 mmol/L 23   BUN mg/dL 53*   CREATININE mg/dL 2 10*   CALCIUM mg/dL 9 1   ALK PHOS U/L 207*   ALT U/L 95*   AST U/L 61*               Imaging: I have personally reviewed pertinent reports  XR chest pa & lateral    Result Date: 7/19/2022  Narrative: CHEST INDICATION:   SHORTNESS OF BREATH, CHRONIC chf, PLEURAL EFFUSIONS?   COMPARISON:  6/16/2022  EXAM PERFORMED/VIEWS:  XR CHEST PA & LATERAL  The frontal view was performed utilizing dual energy radiographic technique  Images: 2 FINDINGS: Cardiac and mediastinal contours are within normal limits  Slightly shallow lung volumes noted  The trachea is midline  There are patchy bibasilar infiltrates left greater than right and small bilateral pleural effusions left greater than right  No acute osseous abnormality       Impression: Patchy bibasilar infiltrates left greater than right with small bilateral pleural effusions left greater than right  The findings may reflect a combination of atelectasis and/or pneumonia  Clinical correlation and follow-up advised  Workstation performed: [id number]     US kidney and bladder    Result Date: 7/17/2022  Narrative: RENAL ULTRASOUND INDICATION:   Acute renal insufficiency  COMPARISON: Abdominal ultrasound 2006 TECHNIQUE:   Ultrasound of the retroperitoneum was performed with a curvilinear transducer utilizing volumetric sweeps and still imaging techniques  FINDINGS: KIDNEYS: Symmetric and normal size  Right kidney:  10 9 x 5 9 x 5 9 cm  Volume 196 9 mL Left kidney:  11 0 x 7 0 x 6 3 cm  Volume 253 3 mL Right kidney Normal echogenicity and contour  No mass is identified  Simple cyst in the interpolar region measures 25 x 20 x 17 mm No hydronephrosis  No shadowing calculi  No perinephric fluid collections  Left kidney Normal echogenicity and contour  Simple cysts are demonstrated in the upper pole measuring 16 x 11 x 11 mm and in the lower pole measuring 8 x 7 x 7 mm  No hydronephrosis  No shadowing calculi  No perinephric fluid collections  URETERS: Nonvisualized  BLADDER: Normally distended  No focal thickening or mass lesions  Bilateral ureteral jets are not detected  Hepatic steatosis is incidentally noted  Impression: Simple renal cysts  Hepatic steatosis  Workstation performed: [id number]     [id number] [facility] bedside procedure    Result Date: 7/21/2022  Narrative: 1 [phone] [id number]  2 [phone] [id number] 125 1        ** Please Note: Dragon 360 Dictation voice to text software was used in the creation of this document   **

## 2022-07-22 NOTE — ASSESSMENT & PLAN NOTE
· Mood stable   · Continue home medical management: lorazepam 0 5 mg at bedtime, Prozac 20 mg daily

## 2022-07-22 NOTE — CONSULTS
Consultation - Cardiology Team One  Lobito Beatty 61 y o  male MRN: [id number]  Unit/Bed#: CW2 217-04 Encounter: [id number]    Inpatient consult to Cardiology  Consult performed by: EMMA Manuel  Consult ordered by: Marques Guzmán MD           Physician Requesting Consult: Tabitha Aguilar MD  Reason for Consult / Principal Problem:  Acute on chronic systolic heart failure    Assessment/Plan  Acute on chronic systolic heart failure  1  Discharge on 07/20/2022 for congestive heart failure  Patient was discharged on torsemide 20 mg daily and Coreg     2   Patient admits to being noncompliant with 2 g sodium diet  3   Patient presented with progressive shortness of breath and respiratory distress requiring BiPAP on admission  4   ProBNP 26,238  5  Chest x-ray reviewed compatible with vascular congestion, small bilateral pleural effusions  6   Treated with Lasix 40 mg IV X1 and now on  40 mg IV b i d   7   Last documented weight 84 6 kg  8  Patient hypertensive on admission with average blood pressure 154/85  9   Echo performed July 2022 at [place], reveals EF 40-50%  Global hypokinesis  Valves not assessed  10   Daily weights, intake and output, monitor BMP   11   2 g sodium diet  12   Consider ARNI    Elevated troponins in the setting of acute on chronic systolic heart failure  1   High sensitivity troponins reported as 21 and 21  2  Continue to trend final troponin  3  NM stress test performed 04/26/2022 revealed:  Global hypokinesis  Fixed and severely decreased perfusion is seen in most of the apical and inferior wall most likely secondary to infarcts  No reversible perfusion defect suggestive of ischemia  3   Will not pursue ischemic evaluation unless chest pain or evidence of ischemia present  CKD stage 4, GFR 15-20  1  During recent admission nephrology, baseline creatinine 1 7-2 0   2   Creatinine at discharge 1 9   3   Check creatinine today is 2 10  4  Continue diuresis at this time  5   BMP daily    Hypertension, essential  1  Average BP on admission 154/85  2   Continue diuresing with Lasix 40 mg IV b i d  3  Continue Coreg 25 mg b i d  Diabetes type 2, hemoglobin A1c 7  7(on 07/15/2022)  1  Manage with primary team    Hyperlipidemia  1  Resume Lipitor 40 mg daily  2  Last lipid profile performed 04/24/2022: Total cholesterol 141, triglycerides 139 LDL 77 HDL 36     ______________________________________________________________________________________    CC:  Patient states I have a lot of trouble breathing and little chest pain      History of Present Illness   HPI: Irena Bill is a 61y o  year old male who has history of chronic combined systolic and diastolic heart failure, nonischemic cardiomyopathy, hypertension, hyperlipidemia diabetes type 2, peripheral neuropathy, CVA, CKD stage 4, depression bipolar disorder and polysubstance abuse who follows with cardiologist through the [facility]   He [place] [facility] [place] [address] [place] 7/22/22 with complaints of worsening shortness of breath, orthopnea, sternal chest pain  The increased shortness of breath and associated chest discomfort were associated with walking, not relieved with rest   He denies fever, chills, nausea, vomiting, lightheadedness or dizziness  Patient admits to being compliant with medical regimen with Torosemide 20 mg bid  And Coreg 25 mg daily  However he admits to increased sodium intake over the last 2 days  He denies any recent history of illicit drug use  He quit smoking about 4 years ago  Admits to drinking beer 1-2 beers per week       He has had several admissions to both [facility] (7/15/2022)and [facility] (7/5/2022) over the last month  As part of cardiology workup he underwent an echocardiogram 04/24/2022 which showed an EF of 30%    Nuclear med stress test performed 04/26/2022 showed global hypokinesis  Fixed and severely decreased perfusion is seen in most of the apical inferior wall most likely secondary to infarcts  There is no definite evidence of reversible perfusion defect to suggest ischemia  A follow-up echocardiogram performed on 07/06/2022 showed an EF of 40-50%  EKG reviewed personally:  Rate 71, right axis deviation  Telemetry reviewed personally:  Normal sinus rhythm with T wave abnormality  Review of Systems   Constitutional: Positive for weight gain  Negative for chills and fever  HENT: Negative  Cardiovascular: Positive for chest pain, dyspnea on exertion, leg swelling and orthopnea  Negative for syncope  Respiratory: Positive for cough and shortness of breath  Skin: Negative  Musculoskeletal: Negative  Gastrointestinal: Negative for abdominal pain, anorexia, nausea and vomiting  Genitourinary: Negative  Neurological: Negative  Negative for light-headedness and loss of balance  Psychiatric/Behavioral: Negative  Historical Information   Past Medical History:   Diagnosis Date    Anxiety     Arthritis     Coronary artery disease     Dementia [facility] )     Depression     Diabetes mellitus ([facility] )     Infectious viral hepatitis     Memory loss     Visual impairment      History reviewed  No pertinent surgical history  Social History     Substance and Sexual Activity   Alcohol Use Yes    Comment: socially     Social History     Substance and Sexual Activity   Drug Use Never     Social History     Tobacco Use   Smoking Status Former Smoker   Smokeless Tobacco Never Used     Family History: non-contributory    Meds/Allergies   all current active meds have been reviewed       No Known Allergies    Objective   Vitals: Blood pressure 138/76, pulse 72, temperature 97 5 °F (36 4 °C), temperature source Oral, resp  rate 15, SpO2 95 %  ,     There is no height or weight on file to calculate BMI ,     Systolic (13DMR), GMX:128 , Min:121 , Max:163 Diastolic (60JQT), MLF:78, Min:72, Max:97    Wt Readings from Last 3 Encounters:   07/20/22 84 6 kg (186 lb 8 2 oz)   07/14/22 84 7 kg (186 lb 12 8 oz)   07/12/22 65 3 kg (144 lb)      Lab Results   Component Value Date    CREATININE 2 10 (H) 07/21/2022    CREATININE 1 96 (H) 07/20/2022    CREATININE 1 84 (H) 07/19/2022             Intake/Output Summary (Last 24 hours) at 7/22/2022 1035  Last data filed at 7/22/2022 0915  Gross per 24 hour   Intake --   Output 325 ml   Net -325 ml     Weight (last 2 days)     None        Invasive Devices  Report    Peripheral Intravenous Line  Duration           Peripheral IV 07/21/22 Left Forearm <1 day                  Physical Exam  Constitutional:       Appearance: Normal appearance  He is not ill-appearing  HENT:      Mouth/Throat:      Mouth: Mucous membranes are moist    Cardiovascular:      Rate and Rhythm: Normal rate and regular rhythm  Pulses: Normal pulses  Heart sounds: Normal heart sounds  No murmur heard  No friction rub  Pulmonary:      Effort: Pulmonary effort is normal       Breath sounds: Rales present  Abdominal:      General: Bowel sounds are normal  There is no distension  Palpations: Abdomen is soft  Musculoskeletal:         General: Normal range of motion  Cervical back: Neck supple  Skin:     General: Skin is warm and dry  Capillary Refill: Capillary refill takes less than 2 seconds  Neurological:      Mental Status: He is alert and oriented to person, place, and time  Mental status is at baseline  Psychiatric:         Mood and Affect: Mood normal          Behavior: Behavior normal          Thought Content:  Thought content normal            LABORATORY RESULTS:      CBC with diff:   Results from last 7 days   Lab Units 07/21/22  2159 07/17/22  0553 07/16/22  0439 07/15/22  2207 07/15/22  1816 07/15/22  1043   WBC Thousand/uL 5 16 5 60 5 55  --  4 88 5 61   HEMOGLOBIN g/dL 9 5* 9 5* 9 3*  --  10 0* 9 8* HEMATOCRIT % 31 0* 30 0* 28 8*  --  31 0* 31 0*   MCV fL 95 95 93  --  92 96   PLATELETS Thousands/uL 157 161 155 158 155 166   MCH pg 29 0 30 1 30 0  --  29 8 30 4   MCHC g/dL 30 6* 31 7 32 3  --  32 3 31 6   RDW % 14 9 14 7 14 9  --  14 8 14 6   MPV fL 11 0 10 4 11 3 10 9 11 4 11 7   NRBC AUTO /100 WBCs 0 0 1  --  0 0       CMP:  Results from last 7 days   Lab Units 07/21/22 2159 07/20/22 0458 07/19/22 0447 07/18/22 0447 07/17/22  0553 07/16/22  0439 07/15/22  1816 07/15/22  1043   POTASSIUM mmol/L 4 7 4 4 4 7 4 7 4 6 4 8 5 4* 5 2   CHLORIDE mmol/L 115* 116* 118* 118* 118* 113* 107 111*   CO2 mmol/L 23 22 21 23 20* 23 24 23   BUN mg/dL 53* 53* 52* 55* 66* 76* 82* 78*   CREATININE mg/dL 2 10* 1 96* 1 84* 2 00* 2 26* 2 52* 2 88* 2 90*   CALCIUM mg/dL 9 1 8 7 9 2 8 6 8 8 8 8 9 1 9 1   AST U/L 61*  --   --   --   --  32 35 39   ALT U/L 95*  --   --   --   --  52 58 59   ALK PHOS U/L 207*  --   --   --   --  117* 128* 133*   EGFR ml/min/1 73sq m 32 35 37 34 29 25 22 21       BMP:  Results from last 7 days   Lab Units 07/21/22 2159 07/20/22 0458 07/19/22 0447 07/18/22 0447 07/17/22  0553 07/16/22  0439 07/15/22  1816   POTASSIUM mmol/L 4 7 4 4 4 7 4 7 4 6 4 8 5 4*   CHLORIDE mmol/L 115* 116* 118* 118* 118* 113* 107   CO2 mmol/L 23 22 21 23 20* 23 24   BUN mg/dL 53* 53* 52* 55* 66* 76* 82*   CREATININE mg/dL 2 10* 1 96* 1 84* 2 00* 2 26* 2 52* 2 88*   CALCIUM mg/dL 9 1 8 7 9 2 8 6 8 8 8 8 9 1          Lab Results   Component Value Date    NTBNP 26,238 (H) 07/21/2022    NTBNP 7,931 (H) 06/16/2022            Results from last 7 days   Lab Units 07/17/22  0553 07/16/22  0439   MAGNESIUM mg/dL 2 3 1 9          Results from last 7 days   Lab Units 07/15/22  2207   HEMOGLOBIN A1C % 7 7*          Results from last 7 days   Lab Units 07/16/22  0439   TSH 3RD GENERATON uIU/mL 5 020*   FREE T4 ng/dL 0 83           Lipid Profile:   No results found for: CHOL  No results found for: HDL  No results found for: LDLCALC  No results found for: TRIG      Cardiac testing:   No results found for this or any previous visit  No results found for this or any previous visit  No valid procedures specified  No results found for this or any previous visit  Imaging: I have personally reviewed pertinent reports  Thoracentesis    Result Date: 7/22/2022  Narrative: Rosaura Edmonds DO     7/22/2022  1:50 AM Thoracentesis Date/Time: 7/22/2022 12:12 AM Performed by: Rosaura Edmonds DO Authorized by: Rosaura Edmonds DO Patient location:  ED Consent:   Consent obtained:  Verbal   Consent given by:  Patient   Risks discussed:  Bleeding, infection, pain, pneumothorax, nerve damage and incomplete drainage   Alternatives discussed:  No treatment Universal protocol:   Procedure explained and questions answered to patient or proxy's satisfaction: yes    Relevant documents present and verified: yes    Patient identity confirmed:  Verbally with patient Indications:   Procedure Purpose: diagnostic    Indications: pleural effusion  Anesthesia (see MAR for exact dosages): Anesthesia method:  Local infiltration   Local anesthetic:  Lidocaine 1% w/o epi Procedure details:   Preparation: Patient was prepped and draped in usual sterile fashion    Standard thoracentesis cath kit used: Yes    Patient position:  Sitting   Laterality:  Left   Location:  Lateral   Intercostal space:  4th   Puncture method:  Over-the-needle catheter   Ultrasound guidance: no    Indwelling catheter placed: no    Number of attempts:  2   Needle gauge:  18   Catheter size:  8 Fr   Drainage color:  Megan   Drainage characteristics:  Bloody   Fluid removed amount:  1350 Post-procedure details:   Chest x-ray performed: yes    Chest x-ray findings:  Pleural effusion improved   Patient tolerance of procedure:   Tolerated well, no immediate complications    XR chest 1 view portable    Result Date: 7/22/2022  Narrative: CHEST INDICATION:   post thorocentesis  COMPARISON:  7/21/2022 EXAM PERFORMED/VIEWS:  XR CHEST PORTABLE Single view FINDINGS: Development of mild vascular congestion  Persistent small bilateral pleural effusions  No evidence of pneumothorax status post thoracentesis Cardiomediastinal silhouette appears unremarkable  Osseous structures appear within normal limits for patient age  Impression: No evidence of pneumothorax status post thoracentesis Development of mild vascular congestion Small bilateral pleural effusions No acute cardiopulmonary disease  Workstation performed: [id number]     XR chest 1 view portable    Result Date: 7/22/2022  Narrative: CHEST INDICATION:   sob  COMPARISON:  Chest radiographs 7/19/2022 EXAM PERFORMED/VIEWS:  XR CHEST PORTABLE FINDINGS: Cardiomediastinal silhouette appears unremarkable  Small bilateral pleural effusions again noted with bibasilar atelectasis or consolidation, appear slightly increased on the right  No pneumothorax  Osseous structures appear within normal limits for patient age  Impression: Small bilateral pleural effusions again noted with bibasilar atelectasis or consolidation, appear slightly increased on the right  Workstation performed: [id number]     XR chest pa & lateral    Result Date: 7/19/2022  Narrative: CHEST INDICATION:   SHORTNESS OF BREATH, CHRONIC chf, PLEURAL EFFUSIONS?   COMPARISON:  6/16/2022  EXAM PERFORMED/VIEWS:  XR CHEST PA & LATERAL  The frontal view was performed utilizing dual energy radiographic technique  Images: 2 FINDINGS: Cardiac and mediastinal contours are within normal limits  Slightly shallow lung volumes noted  The trachea is midline  There are patchy bibasilar infiltrates left greater than right and small bilateral pleural effusions left greater than right  No acute osseous abnormality  Impression: Patchy bibasilar infiltrates left greater than right with small bilateral pleural effusions left greater than right    The findings may reflect a combination of atelectasis and/or pneumonia  Clinical correlation and follow-up advised  Workstation performed: [id number]     US kidney and bladder    Result Date: 7/17/2022  Narrative: RENAL ULTRASOUND INDICATION:   Acute renal insufficiency  COMPARISON: Abdominal ultrasound 2006 TECHNIQUE:   Ultrasound of the retroperitoneum was performed with a curvilinear transducer utilizing volumetric sweeps and still imaging techniques  FINDINGS: KIDNEYS: Symmetric and normal size  Right kidney:  10 9 x 5 9 x 5 9 cm  Volume 196 9 mL Left kidney:  11 0 x 7 0 x 6 3 cm  Volume 253 3 mL Right kidney Normal echogenicity and contour  No mass is identified  Simple cyst in the interpolar region measures 25 x 20 x 17 mm No hydronephrosis  No shadowing calculi  No perinephric fluid collections  Left kidney Normal echogenicity and contour  Simple cysts are demonstrated in the upper pole measuring 16 x 11 x 11 mm and in the lower pole measuring 8 x 7 x 7 mm  No hydronephrosis  No shadowing calculi  No perinephric fluid collections  URETERS: Nonvisualized  BLADDER: Normally distended  No focal thickening or mass lesions  Bilateral ureteral jets are not detected  Hepatic steatosis is incidentally noted  Impression: Simple renal cysts  Hepatic steatosis  Workstation performed: [id number]     [address] [facility] bedside procedure    Result Date: 7/21/2022  Narrative: 1 2 [id number]  2 446 571 [id number] 125 1          Counseling / Coordination of Care  Total floor / unit time spent today 45 minutes  Greater than 50% of total time was spent with the patient and / or family counseling and / or coordination of care  A description of the counseling / coordination of care: Review of history, current assessment, development of a plan  Code Status: Level 1 - Full Code    ** Please Note: Dragon 360 Dictation voice to text software may have been used in the creation of this document   **

## 2022-07-22 NOTE — ASSESSMENT & PLAN NOTE
Wt Readings from Last 3 Encounters:   07/20/22 84 6 kg (186 lb 8 2 oz)   07/14/22 84 7 kg (186 lb 12 8 oz)   07/12/22 65 3 kg (144 lb)     Recent admission for the same  As per discharge summary, plans regarding heart failure as follows:  Metoprolol, hydralazine and isosorbide discontinued  Coreg added  Torsemide changed to 20 mg daily on discharge  Patient reports compliance with these medications however may have increased salt intake lately  · Patient presented with progressive SOB and respiratory distress requiring BiPAP on admission   · ProBNP 26,238 on admission  CXR with vascular congestion and small bilateral pleural effusions  · IV Lasix 40 mg BID started on admission   · Continued on remainder of home cardiac regimen   · Most recent [facility] [address] [facility] [address] with LVEF 40-50%  Global hypokinesis  Valves not assessed    · Appreciate cardiology consult and recommendations   · Defer need for repeat echocardiogram   · Monitor daily weights, intake and output, BMP   · Cardiac diet

## 2022-07-22 NOTE — ASSESSMENT & PLAN NOTE
· In the setting of acute on chronic CHF, requiring BiPAP on admission   · CXR: Development of mild vascular congestion   Small bilateral pleural effusions   · Improved s/p IV diuresis, currently stable on room air   · Monitor and titrate supplemental O2 as able to maintain SpO2 > 90%

## 2022-07-22 NOTE — ASSESSMENT & PLAN NOTE
Lab Results   Component Value Date    HGBA1C 7 7 (H) 07/15/2022     On Farxiga, metformin at home  Will place patient on sliding scale    Recent Labs     07/19/22  1536 07/19/22  2046 07/20/22  0607 07/20/22  1052   POCGLU 186* 121 73 232*       Blood Sugar Average: Last 72 hrs:

## 2022-07-22 NOTE — ASSESSMENT & PLAN NOTE
Wt Readings from Last 3 Encounters:   07/20/22 84 6 kg (186 lb 8 2 oz)   07/14/22 84 7 kg (186 lb 12 8 oz)   07/12/22 65 3 kg (144 lb)     Recent admission for the same  As per discharge summary, plans regarding heart failure as follows:  Metoprolol, hydralazine and isosorbide discontinued  Coreg added  Torsemide changed to 20 mg daily on discharge  Patient reports compliance with these medications however may have increased salt intake lately  · Patient presented with progressive SOB and respiratory distress requiring BiPAP on admission   · ProBNP 26,238 on admission  CXR with vascular congestion and small bilateral pleural effusions  · Most recent limited echo July 2022 at [facility] with LVEF 40-50%  Global hypokinesis  Valves not assessed    · Appreciate cardiology consult and recommendations   · Currently on IV Lasix 40 mg BID   · Continued on remainder of home cardiac regimen   · Defer need for repeat echocardiogram   · Monitor daily weights, intake and output, BMP   · Cardiac diet

## 2022-07-22 NOTE — ED ATTENDING ATTESTATION
Final Diagnosis:  1  CHF (congestive heart failure) ([facility] [address] )    2  Acute exacerbation of CHF (congestive heart failure) ([facility] )    3  Hypoxia      ED Course as of 07/24/22 0642   Thu Jul 21, 2022   2228 POCUS Cardiac / Lung / IVC  - transthoracic echocardiogram was performed at bedside by myself   - Images collected were adequate  - Apical, parasternal, subcostal views were obtained  - Findings: There was no obvious pericardial effusion   EF was reduced    IVC was plump but did have respiratory variability  B-lines were present bilateral / anterior  Obvious moderate bilateral pleural effusion (R>L)    Suggestive of acute pulmonary edema, CHF, and likely compressive atelectasis due to marked pleural effusions  2229 I offered thoracentesis to the patient  PAtient + caretaker in agreement  I went over the risks / benefits including inducing an infection (rare), pneumothorax (ex vacuo vs other; 6% rate)  PAtient + caretaker in agreement  Has had the procedure previously  Fri Jul 22, 2022   0004 Did a bedside thoracentesis  Oral consent before procedure with the brother who is aware  One puncture, I was present for entire procedure  No air in circuit  Awaiting post-CXR    1350mL, straw colored  Radha Heard MD, saw and evaluated the patient  All available labs and X-rays were ordered by me or the resident and have been reviewed by myself  I discussed the patient with the resident / non-physician and agree with the resident's / non-physician practitioner's findings and plan as documented in the resident's / non-physician practicitioner's note, except where noted  At this point, I agree with the current assessment done in the ED  I was present during key portions of all procedures performed unless otherwise stated  Chief Complaint   Patient presents with    Shortness of Breath     Pt c/o SOB for 3 days  Pt 88% on room air         This is a 61 y o  male presenting for evaluation of dyspnea  The patient for the last 3 days has been increasingly dyspneic, similar to previous CHF exacerbations  He was saturating 88% on RA which is why I evaluated the patient immediately, not normally on oxygen supplementation  Denies f/ch/n/v/cp  +SOB  No coughing, congestion, rhinorrhea  ALVAREZ  Orthopnea present  PMH:   has a past medical history of Anxiety, Arthritis, Coronary artery disease, Dementia ([facility] [address] ), Depression, Diabetes mellitus ([facility] [address] ), Infectious viral hepatitis, Memory loss, and Visual impairment  PSH:   has no past surgical history on file  Social:  Social History     Substance and Sexual Activity   Alcohol Use Yes    Comment: socially     Social History     Tobacco Use   Smoking Status Former Smoker   Smokeless Tobacco Never Used     Social History     Substance and Sexual Activity   Drug Use Never     PE:  Vitals:    07/23/22 0653 07/23/22 1500 07/23/22 2310 07/24/22 0042   BP: 153/85 (!) 175/91  127/79   BP Location: Right arm Right arm     Pulse:  78  83   Resp: 16 20  17   Temp: 98 5 °F (36 9 °C) 98 4 °F (36 9 °C)  98 8 °F (37 1 °C)   TempSrc: Oral Oral     SpO2:  94% 91% 91%   Weight:       General: VSS, NAD, awake, alert  Well-nourished, well-developed  Appears stated age  Head: Normocephalic, atraumatic, nontender  Eyes: PERRL, EOM-I  No diplopia  No hyphema  No subconjunctival hemorrhages  Symmetrical lids  ENTAtraumatic external nose and ears  MMM  No stridor  Normal phonation  No drooling  Base of mouth is soft  No mastoid tenderness  Neck: Symmetric, trachea midline  No JVD  CV: Peripheral pulses +2 throughout  No chest wall tenderness  Lungs:   Mildly labored   Saturating 95% on 2L  No retractions  No crepitus  Mild tachypnea  No paradoxical motion  Abd: +BS, soft, NT/ND    MSK:   FROM   2+ pitting lower extremity edema  Back:   No CVAT  Skin: Dry, intact  Neuro: AAOx3, GCS 15, CN II-XII grossly intact     Motor grossly intact  Psychiatric/Behavioral: Appropriate mood and affect   Exam: deferred  A:  - Dyspnea  - Hpyoxia  P:  - CHF workup    - 13 point ROS was performed and all are normal unless stated in the history above  - Nursing note reviewed  Vitals reviewed  - Orders placed by myself and/or advanced practitioner / resident     - Previous chart was reviewed  - No language barrier    - History obtained from patient caretaker  - There are no limitations to the history obtained  - Critical care time: 34 minutes  - Critical care time was exclusive of seperately bilable procedures and treating other patients as well as teaching time     - Critical care was necessary to treat or prevent imminent or life-threatening deterioration of the following condition: CHF exacerbation / hypoxia  - Critical care time was spent personally by me on the following activities as well as the above as per the ED course and rest of chart: blood draw for specimens, obtaining history from patient / surrogate, developement of a treatment plan, discussions with consultants, evaluation of patient's response to the treatment, examination of the patient, ordering/performing treatements and interventions, re-evaluation of the patient's condition, review of old charts, ordering/reviewing laboratory studies, ordering/reviewing of radiographic studies    Code Status: Level 1 - Full Code  Advance Directive and Living Will:      Power of :    POLST:      Medications   acetaminophen (TYLENOL) tablet 650 mg (has no administration in time range)   aspirin (ECOTRIN LOW STRENGTH) EC tablet 81 mg (81 mg Oral Given 7/23/22 0833)   atorvastatin (LIPITOR) tablet 40 mg (40 mg Oral Given 7/23/22 2316)   carvedilol (COREG) tablet 25 mg (25 mg Oral Given 7/23/22 1849)   FLUoxetine (PROzac) capsule 20 mg (20 mg Oral Given 7/40/00 9314)   folic acid (FOLVITE) tablet 400 mcg (400 mcg Oral Given 7/23/22 0833)   gabapentin (NEURONTIN) capsule 300 mg (300 mg Oral Given 7/23/22 2316)   LORazepam (ATIVAN) tablet 0 5 mg (0 5 mg Oral Given 7/23/22 2317)   melatonin tablet 4 5 mg (4 5 mg Oral Given 7/23/22 2315)   thiamine tablet 100 mg (100 mg Oral Given 7/23/22 0833)   docusate sodium (COLACE) capsule 100 mg (100 mg Oral Not Given 7/22/22 1851)   ondansetron (ZOFRAN) injection 4 mg (has no administration in time range)   aluminum-magnesium hydroxide-simethicone (MYLANTA) oral suspension 30 mL (has no administration in time range)   insulin lispro (HumaLOG) 100 units/mL subcutaneous injection 1-6 Units (3 Units Subcutaneous Given 7/24/22 0633)   insulin lispro (HumaLOG) 100 units/mL subcutaneous injection 1-5 Units (1 Units Subcutaneous Given 7/23/22 2317)   heparin (porcine) subcutaneous injection 5,000 Units (5,000 Units Subcutaneous Given 7/24/22 0634)   furosemide (LASIX) injection 40 mg (40 mg Intravenous Given 7/24/22 0636)   amLODIPine (NORVASC) tablet 5 mg (has no administration in time range)   furosemide (LASIX) injection 40 mg (40 mg Intravenous Given 7/22/22 0029)   lidocaine (PF) (XYLOCAINE-MPF) 1 % injection 20 mL (20 mL Infiltration Given by Other 7/22/22 0032)     XR chest 1 view portable   Final Result   No evidence of pneumothorax status post thoracentesis   Development of mild vascular congestion   Small bilateral pleural effusions   No acute cardiopulmonary disease  Workstation performed: [id number]         XR chest 1 view portable   ED Interpretation   Abnormal   Moderate bilateral pleural effusions  Final Result      Small bilateral pleural effusions again noted with bibasilar atelectasis or consolidation, appear slightly increased on the right             Workstation performed: [id number]           Orders Placed This Encounter   Procedures    ED ECG Documentation Only    FLU/RSV/COVID - if FLU/RSV clinically relevant    Body fluid culture (Pleural Fluid Culture) and Gram stain    LD (LDH), Body Fluid    Total Protein, body fluid  XR chest 1 view portable    XR chest 1 view portable    CBC and differential    Comprehensive metabolic panel    HS Troponin 0hr (reflex protocol)    NT-BNP PRO    HS Troponin I 2hr    Body fluid white cell count with differential    Glucose, body fluid    pH, body fluid    Platelet count    Body Fluid Diff    Comprehensive metabolic panel    Basic metabolic panel    Diet [place]/CHO Controlled; Consistent Carbohydrate Diet Level 2 (5 carb servings/75 grams CHO/meal);  Sodium 2 GM    Insulin Subcutaneous Notify Physician    Insulin Subcutaneous Instruction    Hypoglycemia Protocol    Up and OOB as tolerated    Fingerstick Glucose (POCT) Before meals and at bedtime    Apply SCD only    Fingerstick Glucose (POCT)    Intake and Output    Daily weights    Level 1-Full Code: all life saving measures are indicated    Inpatient consult to Cardiology    Inpatient consult to Case Management    ECG 12 lead    ECG 12 lead    Thoracentesis    Inpatient Admission     Labs Reviewed   CBC AND DIFFERENTIAL - Abnormal       Result Value Ref Range Status    WBC 5 16  4 31 - 10 16 Thousand/uL Final    RBC 3 28 (*) 3 88 - 5 62 Million/uL Final    Hemoglobin 9 5 (*) 12 0 - 17 0 g/dL Final    Hematocrit 31 0 (*) 36 5 - 49 3 % Final    MCV 95  82 - 98 fL Final    MCH 29 0  26 8 - 34 3 pg Final    MCHC 30 6 (*) 31 4 - 37 4 g/dL Final    RDW 14 9  11 6 - 15 1 % Final    MPV 11 0  8 9 - 12 7 fL Final    Platelets 790  791 - 390 Thousands/uL Final    nRBC 0  /100 WBCs Final    Neutrophils Relative 56  43 - 75 % Final    Immat GRANS % 0  0 - 2 % Final    Lymphocytes Relative 30  14 - 44 % Final    Monocytes Relative 9  4 - 12 % Final    Eosinophils Relative 4  0 - 6 % Final    Basophils Relative 1  0 - 1 % Final    Neutrophils Absolute 2 86  1 85 - 7 62 Thousands/µL Final    Immature Grans Absolute 0 01  0 00 - 0 20 Thousand/uL Final    Lymphocytes Absolute 1 54  0 60 - 4 47 Thousands/µL Final    Monocytes Absolute 0  48  0 17 - 1 22 Thousand/µL Final    Eosinophils Absolute 0 22  0 00 - 0 61 Thousand/µL Final    Basophils Absolute 0 05  0 00 - 0 10 Thousands/µL Final   COMPREHENSIVE METABOLIC PANEL - Abnormal    Sodium 143  135 - 147 mmol/L Final    Potassium 4 7  3 5 - 5 3 mmol/L Final    Chloride 115 (*) 96 - 108 mmol/L Final    CO2 23  21 - 32 mmol/L Final    ANION GAP 5  4 - 13 mmol/L Final    BUN 53 (*) 5 - 25 mg/dL Final    Creatinine 2 10 (*) 0 60 - 1 30 mg/dL Final    Comment: Standardized to IDMS reference method    Glucose 200 (*) 65 - 140 mg/dL Final    Comment: If the patient is fasting, the ADA then defines impaired fasting glucose as > 100 mg/dL and diabetes as > or equal to 123 mg/dL  Specimen collection should occur prior to Sulfasalazine administration due to the potential for falsely depressed results  Specimen collection should occur prior to Sulfapyridine administration due to the potential for falsely elevated results  Calcium 9 1  8 3 - 10 1 mg/dL Final    Corrected Calcium 9 9  8 3 - 10 1 mg/dL Final    AST 61 (*) 5 - 45 U/L Final    Comment: Specimen collection should occur prior to Sulfasalazine administration due to the potential for falsely depressed results  ALT 95 (*) 12 - 78 U/L Final    Comment: Specimen collection should occur prior to Sulfasalazine and/or Sulfapyridine administration due to the potential for falsely depressed results  Alkaline Phosphatase 207 (*) 46 - 116 U/L Final    Total Protein 7 5  6 4 - 8 4 g/dL Final    Albumin 3 0 (*) 3 5 - 5 0 g/dL Final    Total Bilirubin 0 34  0 20 - 1 00 mg/dL Final    Comment: Use of this assay is not recommended for patients undergoing treatment with eltrombopag due to the potential for falsely elevated results      eGFR 32  ml/min/1 73sq m Final    Narrative:     [place] guidelines for Chronic Kidney Disease (CKD):     Stage 1 with normal or high GFR (GFR > 90 mL/min/1 73 square meters)    Stage 2 Mild CKD (GFR = 60-89 mL/min/1 73 square meters)    Stage 3A Moderate CKD (GFR = 45-59 mL/min/1 73 square meters)    Stage 3B Moderate CKD (GFR = 30-44 mL/min/1 73 square meters)    Stage 4 Severe CKD (GFR = 15-29 mL/min/1 73 square meters)    Stage 5 End Stage CKD (GFR <15 mL/min/1 73 square meters)  Note: GFR calculation is accurate only with a steady state creatinine   NT-BNP PRO (BRAIN NATRIURETIC PEPTIDE) - Abnormal    NT-proBNP 26,238 (*) <125 pg/mL Final   BODY FLUID WHITE CELL COUNT WITH DIFF - Abnormal    Site Left Pleural   Final    Color, Fluid Yellow  Clear, Colorless,Yellow Final    Clarity, Fluid Hazy (*) Clear Final    WBC, Fluid 315  /ul Final    Comment: The reference range and other method performance specifications have not been established for this body fluid  The test result must be integrated into the clinical context for interpretation  POCT GLUCOSE - Abnormal    POC Glucose 149 (*) 65 - 140 mg/dl Final   COVID19, INFLUENZA A/B, RSV PCR, [facility] - Normal    SARS-CoV-2 Negative  Negative Final    Comment:      INFLUENZA A PCR Negative  Negative Final    Comment:      INFLUENZA B PCR Negative  Negative Final    Comment:      RSV PCR Negative  Negative Final    Comment:      Narrative:     FOR PEDIATRIC PATIENTS - copy/paste COVID Guidelines URL to browser: [email or web address]  [employer]    SARS-CoV-2 assay is a Nucleic Acid Amplification assay intended for the  qualitative detection of nucleic acid from SARS-CoV-2 in nasopharyngeal  swabs  Results are for the presumptive identification of SARS-CoV-2 RNA  Positive results are indicative of infection with SARS-CoV-2, the virus  causing COVID-19, but do not rule out bacterial infection or co-infection  with other viruses  Laboratories within the [place] and its  territories are required to report all positive results to the appropriate  public health authorities   Negative results do not preclude SARS-CoV-2  infection and should not be used as the sole basis for treatment or other  patient management decisions  Negative results must be combined with  clinical observations, patient history, and epidemiological information  This test has not been FDA cleared or approved  This test has been authorized by FDA under an Emergency Use Authorization  (EUA)  This test is only authorized for the duration of time the  declaration that circumstances exist justifying the authorization of the  emergency use of an in vitro diagnostic tests for detection of SARS-CoV-2  virus and/or diagnosis of COVID-19 infection under section 564(b)(1) of  the Act, 21 U  S C  721DND-2(M)(6), unless the authorization is terminated  or revoked sooner  The test has been validated but independent review by FDA  and CLIA is pending  Test performed using [employer] Gene[employer]pert: This RT-PCR assay targets N2,  a region unique to SARS-CoV-2  A conserved region in the E-gene was chosen  for pan-Sarbecovirus detection which includes SARS-CoV-2  HS TROPONIN I 0HR - Normal    hs TnI 0hr 21  "Refer to ACS Flowchart"- see link ng/L Final    Comment:                                              Initial (time 0) result  If >=50 ng/L, Myocardial injury suggested ;  Type of myocardial injury and treatment strategy  to be determined  If 5-49 ng/L, a delta result at 2 hours and or 4 hours will be needed to further evaluate  If <4 ng/L, and chest pain has been >3 hours since onset, patient may qualify for discharge based on the HEART score in the ED  If <5 ng/L and <3hours since onset of chest pain, a delta result at 2 hours will be needed to further evaluate  HS Troponin 99th Percentile URL of a Health Population=12 ng/L with a 95% Confidence Interval of 8-18 ng/L  Second Troponin (time 2 hours)  If calculated delta >= 20 ng/L,  Myocardial injury suggested ; Type of myocardial injury and treatment strategy to be determined    If 5-49 ng/L and the calculated delta is 5-19 ng/L, consult medical service for evaluation  Continue evaluation for ischemia on ecg and other possible etiology and repeat hs troponin at 4 hours  If delta is <5 ng/L at 2 hours, consider discharge based on risk stratification via the HEART score (if in ED), or LOGAN risk score in IP/Observation  HS Troponin 99th Percentile URL of a Health Population=12 ng/L with a 95% Confidence Interval of 8-18 ng/L    HS TROPONIN I 2HR - Normal    hs TnI 2hr 21  "Refer to ACS Flowchart"- see link ng/L Final    Comment:                                              Initial (time 0) result  If >=50 ng/L, Myocardial injury suggested ;  Type of myocardial injury and treatment strategy  to be determined  If 5-49 ng/L, a delta result at 2 hours and or 4 hours will be needed to further evaluate  If <4 ng/L, and chest pain has been >3 hours since onset, patient may qualify for discharge based on the HEART score in the ED  If <5 ng/L and <3hours since onset of chest pain, a delta result at 2 hours will be needed to further evaluate  HS Troponin 99th Percentile URL of a Health Population=12 ng/L with a 95% Confidence Interval of 8-18 ng/L  Second Troponin (time 2 hours)  If calculated delta >= 20 ng/L,  Myocardial injury suggested ; Type of myocardial injury and treatment strategy to be determined  If 5-49 ng/L and the calculated delta is 5-19 ng/L, consult medical service for evaluation  Continue evaluation for ischemia on ecg and other possible etiology and repeat hs troponin at 4 hours  If delta is <5 ng/L at 2 hours, consider discharge based on risk stratification via the HEART score (if in ED), or LOGAN risk score in IP/Observation  HS Troponin 99th Percentile URL of a Health Population=12 ng/L with a 95% Confidence Interval of 8-18 ng/L      Delta 2hr hsTnI 0  <20 ng/L Final   POCT GLUCOSE - Normal    POC Glucose 138  65 - 140 mg/dl Final   LD(LDH), BODY FLUID    LD, Fluid 95  U/L Final Comment: The reference range and other method performance specifications have not been established for this body fluid  The test result must be integrated into the clinical context for interpretation  TOTAL PROTEIN,  FLUID    Protein, Fluid 3 0  g/dL Final    Comment: The reference range and other method performance specifications have not been established for this body fluid  The test result must be integrated into the clinical context for interpretation  GLUCOSE, BODY FLUID    Glucose, Fluid 231  mg/dL Final    Comment: The reference range and other method performance specifications have not been established for this body fluid  The test result must be integrated into the clinical context for interpretation  PH, BODY FLUID    PH BODY FLUID 7 8   Final    Comment: The reference range and other method performance specifications have not been established for this body fluid  The test result must be integrated into the clinical context for interpretation     BODY FLUID DIFF    Total Counted 100   Final    Neutrophils % (Fluid) 2  % Final    Lymphs % (Fluid) 47  % Final    Eosinophils % (Fluid) 1  % Final    Monocytes % (Fluid) 50  % Final   PLATELET COUNT   NON-GYNECOLOGIC CYTOLOGY     Time reflects when diagnosis was documented in both MDM as applicable and the Disposition within this note     Time User Action Codes Description Comment    7/21/2022 11:17 PM Cordelia Paz Add [I50 9] CHF (congestive heart failure) ([facility] )     7/21/2022 11:17 PM Palladino, Rolfe Perch Add [I50 9] Acute exacerbation of CHF (congestive heart failure) ([facility] )     7/22/2022  5:27 PM Deshawn Cox Add [R09 02] Hypoxia       ED Disposition     None      Follow-up Information    None       Current Discharge Medication List      CONTINUE these medications which have NOT CHANGED    Details   Acetaminophen Extra Strength 500 MG tablet Take 500 mg by mouth every 6 (six) hours as needed      Aspirin Low Dose 81 MG EC tablet Take 81 mg by mouth daily      atorvastatin (LIPITOR) 40 mg tablet Take 40 mg by mouth daily at bedtime      carvedilol (COREG) 25 mg tablet Take 1 tablet (25 mg total) by mouth 2 (two) times a day with meals  Qty: 60 tablet, Refills: 0    Associated Diagnoses: Congestive heart failure, unspecified HF chronicity, unspecified heart failure type ([facility])      Disposable Gloves MISC Use daily Large  Qty: 100 each, Refills: 1    Associated Diagnoses: Hospital discharge follow-up; History of decubitus ulcer; Physical deconditioning; Weakness; Wheelchair bound      Farxiga 5 MG TABS Take 5 mg by mouth in the morning      FLUoxetine (PROzac) 20 mg capsule Take 1 capsule (20 mg total) by mouth daily  Qty: 90 capsule, Refills: 1    Associated Diagnoses: Bipolar affective disorder, mixed, moderate ([facility] [address] ); Anxiety      folic acid (FOLVITE) 1 mg tablet Take 1 tablet (1 mg total) by mouth daily  Refills: 0    Associated Diagnoses: Polysubstance abuse ([facility])      gabapentin (Neurontin) 300 mg capsule Take 1 capsule (300 mg total) by mouth 3 (three) times a day  Qty: 30 capsule, Refills: 0    Associated Diagnoses: Diabetic polyneuropathy associated with type 2 diabetes mellitus ([facility])      glucose blood (Accu-Chek Guide) test strip Test sugars four times daily  Qty: 400 each, Refills: 1    Associated Diagnoses: Uncontrolled type 2 diabetes mellitus with hyperglycemia ([facility] [address] )      !! Incontinence Supply Disposable (Disposable Brief Large) MISC Use daily  Qty: 72 each, Refills: 2    Associated Diagnoses: Hospital discharge follow-up; History of decubitus ulcer; Physical deconditioning; Weakness; Wheelchair bound      !! Incontinence Supply Disposable (RA Adult Wipes) MISC Use in the morning  Qty: 128 each, Refills: 2    Associated Diagnoses: Hospital discharge follow-up; History of decubitus ulcer; Physical deconditioning; Weakness; Wheelchair bound      !!  Incontinence Supply Disposable MISC Use daily XL Disposable Bed Incontinence Pads  Qty: 100 each, Refills: 2    Associated Diagnoses: Hospital discharge follow-up; History of decubitus ulcer; Physical deconditioning; Weakness; Wheelchair bound      Lancet Devices (OneTouch Delica Plus Lancing) MISC Use 1 Dose in the morning Use as directed     Comments: Not accepted by patients insurance      LORazepam (ATIVAN) 0 5 mg tablet Take 0 5 mg by mouth daily at bedtime      Melatonin 5 MG TABS Take 5 mg by mouth daily at bedtime      metFORMIN (GLUCOPHAGE) 500 mg tablet Take 1 tablet (500 mg total) by mouth 2 (two) times a day with meals  Qty: 180 tablet, Refills: 0    Associated Diagnoses: Uncontrolled type 2 diabetes mellitus with hyperglycemia ([facility])      Skin Protectants, Misc  (dimethicone-zinc oxide) cream Apply topically 2 (two) times a day as needed for irritation  Qty: 142 g, Refills: 2    Associated Diagnoses: Hospital discharge follow-up; History of decubitus ulcer; Physical deconditioning; Weakness; Wheelchair bound      thiamine (VITAMIN B1) 100 mg tablet Take 1 tablet (100 mg total) by mouth daily  Refills: 0    Associated Diagnoses: Polysubstance abuse ([facility])      torsemide (DEMADEX) 20 mg tablet Take 1 tablet (20 mg total) by mouth daily  Qty: 30 tablet, Refills: 0    Associated Diagnoses: Congestive heart failure, unspecified HF chronicity, unspecified heart failure type ([facility] )       ! ! - Potential duplicate medications found  Please discuss with provider  No discharge procedures on file  Prior to Admission Medications   Prescriptions Last Dose Informant Patient Reported? Taking?    Acetaminophen Extra Strength 500 MG tablet   Yes No   Sig: Take 500 mg by mouth every 6 (six) hours as needed   Aspirin Low Dose 81 MG EC tablet   Yes No   Sig: Take 81 mg by mouth daily   Disposable Gloves MISC   No No   Sig: Use daily Large   FLUoxetine (PROzac) 20 mg capsule   No No   Sig: Take 1 capsule (20 mg total) by mouth daily   Farxiga 5 MG TABS   Yes No   Sig: Take 5 mg by mouth in the morning   Incontinence Supply Disposable (Disposable Brief Large) MISC   No No   Sig: Use daily   Incontinence Supply Disposable (RA Adult Wipes) MISC   No No   Sig: Use in the morning   Incontinence Supply Disposable MISC   No No   Sig: Use daily XL Disposable Bed Incontinence Pads   LORazepam (ATIVAN) 0 5 mg tablet   Yes No   Sig: Take 0 5 mg by mouth daily at bedtime   Lancet Devices (OneTouch Delica Plus Lancing) MISC   Yes No   Sig: Use 1 Dose in the morning Use as directed    Melatonin 5 MG TABS   Yes No   Sig: Take 5 mg by mouth daily at bedtime   Skin Protectants, Misc  (dimethicone-zinc oxide) cream   No No   Sig: Apply topically 2 (two) times a day as needed for irritation   atorvastatin (LIPITOR) 40 mg tablet   Yes No   Sig: Take 40 mg by mouth daily at bedtime   carvedilol (COREG) 25 mg tablet   No No   Sig: Take 1 tablet (25 mg total) by mouth 2 (two) times a day with meals   folic acid (FOLVITE) 1 mg tablet   No No   Sig: Take 1 tablet (1 mg total) by mouth daily   Patient taking differently: Take 400 mcg by mouth daily   gabapentin (Neurontin) 300 mg capsule   No No   Sig: Take 1 capsule (300 mg total) by mouth 3 (three) times a day   glucose blood (Accu-Chek Guide) test strip   No No   Sig: Test sugars four times daily   metFORMIN (GLUCOPHAGE) 500 mg tablet   No No   Sig: Take 1 tablet (500 mg total) by mouth 2 (two) times a day with meals   thiamine (VITAMIN B1) 100 mg tablet   No No   Sig: Take 1 tablet (100 mg total) by mouth daily   torsemide (DEMADEX) 20 mg tablet   No No   Sig: Take 1 tablet (20 mg total) by mouth daily      Facility-Administered Medications: None       Portions of the record may have been created with voice recognition software  Occasional wrong word or "sound a like" substitutions may have occurred due to the inherent limitations of voice recognition software  Read the chart carefully and recognize, using context, where substitutions have occurred      Electronically signed by:  Priti Spencer Rachna Rucker

## 2022-07-22 NOTE — ASSESSMENT & PLAN NOTE
Seen by nephrology on recent admission, felt to have baseline creatine in high 1s to low 2s  Creatinine 1 9 at time of discharge    · Creatinine on admission 2 10   · Continue to monitor with diuresis   · Avoid nephrotoxins and hypotension   · Monitor BMP daily

## 2022-07-22 NOTE — ASSESSMENT & PLAN NOTE
Wt Readings from Last 3 Encounters:   07/20/22 84 6 kg (186 lb 8 2 oz)   07/14/22 84 7 kg (186 lb 12 8 oz)   07/12/22 65 3 kg (144 lb)     As per last admission, discharge summary plans regarding heart failure as follows:  Metoprolol, hydralazine and isosorbide discontinued  Coreg added  Torsemide changed to 20mg daily on discharge  Patient claims not to deviate from medications however may have increased salt intake lately although tries not to take any salt  Cardiology consult  Elevated AST ALT no doubt from passive hepatic congestion  Continue Coreg 25 mg twice daily  On torsemide 20 mg daily; will place patient on Lasix 40 mg twice daily  Continue to watch creatinine

## 2022-07-22 NOTE — CASE MANAGEMENT
Case Management Assessment & Discharge Planning Note    Patient name Veronica Posey  Location 2 217/2 217-04 MRN [id number]  : 1955 Date 2022       Current Admission Date: 2022  Current Admission Diagnosis:Acute on chronic systolic congestive heart failure [facility])   Patient Active Problem List    Diagnosis Date Noted    Acute respiratory failure with hypoxia ([facility] ) 2022    Pleural effusion, bacterial 2022    Hyperglycemia, unspecified     Metabolic acidosis     Diarrhea 2022    Type 2 diabetes mellitus, without long-term current use of insulin ([facility] ) 2022    High serum thyroid stimulating hormone (TSH) 2022    Weakness 2022    Ischemic cardiomyopathy 2022    Polysubstance abuse ([facility] ) 2022    Mixed hyperlipidemia 2022    Stage 3b chronic kidney disease ([facility] ) 2022    Acute on chronic systolic congestive heart failure ([facility] ) 2022    Acute hepatitis C virus infection 2022    Abnormal EKG 2022    CKD (chronic kidney disease) stage 4, GFR 15-29 ml/min ([facility] ) 2022    Normocytic anemia 2022    Essential hypertension 2015    Type II or unspecified type diabetes mellitus without mention of complication, uncontrolled 2012    Bipolar affective disorder, mixed, moderate ([facility] ) 2011      LOS (days): 1  Geometric Mean LOS (GMLOS) (days): 3 00  Days to GMLOS:2 3     OBJECTIVE:  [facility]  of Unplanned Readmission Score: 31 43         Current admission status: Inpatient       Preferred Pharmacy:   [facility] , [address]  [address] [place] [id number]  Phone: [phone] Fax: [phone]    Primary Care Provider: Sarah Mccurdy PA-C    Primary Insurance: [facility]  Secondary Insurance:     ASSESSMENT:  [address], [address] [facility] Representative - Brother   Primary Phone: [phone] (Mobile)                         Readmission Root Cause  30 Day Readmission: Yes  Who directed you to return to the hospital?: Self  Did you understand whom to contact if you had questions or problems?: Yes  Did you get your prescriptions before you left the hospital?: Yes  Were you able to get your prescriptions filled when you left the hospital?: Yes  Did you take your medications as prescribed?: Yes  Patient was readmitted due to: Acute on chronic systolic congestive heart failure  Action Plan: cardiology consult    Patient Information  Admitted from[de-identified] Home  Mental Status: Alert                                 DISCHARGE DETAILS:                 Treatment Team [facility]ation[facility]  Discharge Destination Plan[de-identified] Home with Dorothy at Discharge : Family                     Additional Comments: Patient was recently discharged 7/20/22 with [facility], please see full CM assessment completed 7/19 by Katie Khan  Patient independent at baseline, current with mental health treatment, brother provides discharge transportation  Return referral placed to [facility]

## 2022-07-22 NOTE — ASSESSMENT & PLAN NOTE
Lab Results   Component Value Date    HGBA1C 7 7 (H) 07/15/2022       Recent Labs     07/22/22  1059 07/22/22  1627 07/22/22  2207 07/23/22  0648   POCGLU 224* 213* 154* 113       Blood Sugar Average: Last 72 hrs:  (P) 299 [id number]     · Hold Farxiga, metformin while inpatient   · QID accuchecks with SSI coverage for now   · Monitor and adjust regimen as needed   · Carb controlled diet   · Hypoglycemia protocol

## 2022-07-22 NOTE — UTILIZATION REVIEW
Initial Clinical Review    Unable to submit via [address] [place] [address] Melly, No matching records were found; Please search again or select Skip Record to continue  Provider OON per Navinet  Admission: Date/Time/Statement:   Admission Orders (From admission, onward)     Ordered        07/21/22 2344  Inpatient Admission  Once                      Orders Placed This Encounter   Procedures    Inpatient Admission     Standing Status:   Standing     Number of Occurrences:   1     Order Specific Question:   Level of Care     Answer:   Med Surg [16]     Order Specific Question:   Estimated length of stay     Answer:   More than 2 Midnights     Order Specific Question:   Certification     Answer:   I certify that inpatient services are medically necessary for this patient for a duration of greater than two midnights  See H&P and MD Progress Notes for additional information about the patient's course of treatment  ED Arrival Information     Expected   7/21/2022 21:15    Arrival   7/21/2022 21:29    Acuity   Emergent            Means of arrival   Walk-In    Escorted by   Family Member    Service   Hospitalist    Admission type   Emergency            Arrival complaint   Shortness of Breath- Pulse Ox: 86%           Chief Complaint   Patient presents with    Shortness of Breath     Pt c/o SOB for 3 days  Pt 88% on room air  Initial Presentation: 61 y o  male to ED presents with increasing shortness of breath with note of severe respiratory distress needing BiPAP  Had thoracentesis in ED, drained 1350 mL of clear fluid  PMH for Ischemic cardiomyopathy with an ejection fraction of 40 to 50% with global hypokinesis as well as right ventricular reduced systolic function as per echocardiogram of July 11, 2022 by [facility]  CKD with episodes of acute kidney insufficiency, bipolar disorder, diabetes mellitus on medications as above with former substance abuse     Admit Inpatient level of care Acute on chronic systolic CHF, LAZARO and Uncontrolled Diabetes with Hyperglycemia  Cardiology consult  Elevated AST  Continue Coreg daily  On torsemide 20 mg daily; will place patient on Iv Lasix 40 mg twice daily  Continue to watch creat  Cardiology consult  Continue statin  On exam; Mild respiratory distress, bronchial breath sounds with note of decreased breath sounds on the left no rales  Date: 7/22  Day 2:   Progress notes; Acute on chronic systolic CHF  Progressive shortness of breath and respiratory distress  Iv Lasix 40 mg bid  Cardiology consult  Defer need for repeat Echo  Monitor and titrate supplemental O2 as able to maintain SpO2 > 90%  Monitor BMP daily  Hold Farxiga, metformin while inpatient  Cardiology cons; D/c 07/20/2022 for congestive heart failure  BNP 26,238  CXR compatible with vascular congestion, small bilateral pleural effusions  Iv Lasix  X1 and now on  40 mg IV bid  High sensitivity troponins reported as 21 and 21  Trend final troponin  Will not pursue ischemic evaluation unless chest pain or evidence of ischemia present  BMP daily  + Rales  7/22 S/p Thoracentesis - Fluid removed amount:  1350  Bloody       ED Triage Vitals   Temperature Pulse Respirations Blood Pressure SpO2   07/21/22 2221 07/21/22 2148 07/21/22 2148 07/21/22 2148 07/21/22 2148   98 3 °F (36 8 °C) 78 22 121/72 (!) 88 %      Temp Source Heart Rate Source Patient Position - Orthostatic VS BP Location FiO2 (%)   07/21/22 2221 07/21/22 2215 07/21/22 2215 07/21/22 2215 --   Oral Monitor Lying Right arm       Pain Score       --                 Wt Readings from Last 1 Encounters:   07/20/22 84 6 kg (186 lb 8 2 oz)     Additional Vital Signs:   07/22/22 0950 97 5 °F (36 4 °C) 72 -- 138/76 -- 95 % -- -- None (Room air) Lying   07/22/22 0700 -- 70 15 153/97 120 94 % -- -- None (Room air) Lying   07/22/22 0630 -- 72 13 163/84 114 93 % 28 2 L/min None (Room air) Lying   07/22/22 0626 -- 71 13 163/84 -- 93 % -- -- None (Room air) Lying 07/22/22 0145 -- 68 13 -- -- 99 % 28 2 L/min Nasal cannula --   07/21/22 2221 98 3 °F (36 8 °C) -- -- -- -- -- -- -- -- --   07/21/22 2220 -- -- -- -- -- -- -- -- Nasal cannula      Pertinent Labs/Diagnostic Test Results:   XR chest 1 view portable   Final Result by Nadir Palma MD (07/22 1107)   No evidence of pneumothorax status post thoracentesis   Development of mild vascular congestion   Small bilateral pleural effusions   No acute cardiopulmonary disease  XR chest 1 view portable   ED Interpretation by Javier Becerra MD (07/21 2228)   Abnormal   Moderate bilateral pleural effusions  Final Result by Nadir Swain MD (07/22 4443)      Small bilateral pleural effusions again noted with bibasilar atelectasis or consolidation, appear slightly increased on the right               Results from last 7 days   Lab Units 07/21/22 2207   SARS-COV-2  Negative     Lab Units 07/21/22 2159   WBC Thousand/uL 5 16   HEMOGLOBIN g/dL 9 5*   HEMATOCRIT % 31 0*   PLATELETS Thousands/uL 157   NEUTROS ABS Thousands/µL 2 86         Lab Units 07/21/22 2159   SODIUM mmol/L 143   POTASSIUM mmol/L 4 7   CHLORIDE mmol/L 115*   CO2 mmol/L 23   ANION GAP mmol/L 5   BUN mg/dL 53*   CREATININE mg/dL 2 10*   EGFR ml/min/1 73sq m 32   CALCIUM mg/dL 9 1   MAGNESIUM mg/dL  --      Lab Units 07/21/22 2159   AST U/L 61*   ALT U/L 95*   ALK PHOS U/L 207*   TOTAL PROTEIN g/dL 7 5   ALBUMIN g/dL 3 0*   TOTAL BILIRUBIN mg/dL 0 34     Lab Units 07/22/22  1059 07/22/22  0625 07/22/22  0257   POC GLUCOSE mg/dl 224* 138 149*     Lab Units 07/21/22 2159   GLUCOSE RANDOM mg/dL 200*       BETA-HYDROXYBUTYRATE   Date Value Ref Range Status   06/16/2022 0 0 <0 6 mmol/L Final          Results from last 7 days   Lab Units 07/22/22  0027 07/21/22 2159   HS TNI 0HR ng/L  --  21   HS TNI 2HR ng/L 21  --    HSTNI D2 ng/L 0  --        Results from last 7 days   Lab Units 07/21/22  5619   NT-PRO BNP pg/mL 26,238* Results from last 7 days   Lab Units 07/21/22  2207   INFLUENZA A PCR  Negative   INFLUENZA B PCR  Negative   RSV PCR  Negative         Results from last 7 days   Lab Units 07/22/22  0045   GRAM STAIN RESULT  No Polys or Bacteria seen     Results from last 7 days   Lab Units 07/22/22  0045   TOTAL COUNTED  100   WBC FLUID /ul 315       ED Treatment:   Medication Administration from 07/21/2022 2047 to 07/22/2022 5156       Date/Time Order Dose Route Action     07/22/2022 0029 furosemide (LASIX) injection 40 mg 40 mg Intravenous Given     07/22/2022 0032 lidocaine (PF) (XYLOCAINE-MPF) 1 % injection 20 mL 20 mL Infiltration Given by Other     07/22/2022 0913 aspirin (ECOTRIN LOW STRENGTH) EC tablet 81 mg 81 mg Oral Given     07/22/2022 0257 atorvastatin (LIPITOR) tablet 40 mg 40 mg Oral Given     07/22/2022 0913 carvedilol (COREG) tablet 25 mg 25 mg Oral Given     07/22/2022 0913 FLUoxetine (PROzac) capsule 20 mg 20 mg Oral Given     92/22/9565 9672 folic acid (FOLVITE) tablet 400 mcg 400 mcg Oral Given     07/22/2022 0913 gabapentin (NEURONTIN) capsule 300 mg 300 mg Oral Given     07/22/2022 0257 LORazepam (ATIVAN) tablet 0 5 mg 0 5 mg Oral Given     07/22/2022 0257 melatonin tablet 4 5 mg 4 5 mg Oral Given     07/22/2022 0914 thiamine tablet 100 mg 100 mg Oral Given     07/22/2022 0914 furosemide (LASIX) injection 40 mg 40 mg Intravenous Given     07/22/2022 0615 heparin (porcine) subcutaneous injection 5,000 Units 5,000 Units Subcutaneous Given        Past Medical History:   Diagnosis Date    Anxiety     Arthritis     Coronary artery disease     [facility]     Depression     Diabetes mellitus [facility]     Infectious viral hepatitis     Memory loss     Visual impairment      Present on Admission:   Acute on chronic systolic congestive heart failure (HCC)   Bipolar affective disorder, mixed, moderate (HCC)   CKD (chronic kidney disease) stage 4, GFR 15-29 ml/min (HCC)   Essential hypertension   Mixed hyperlipidemia   Type 2 diabetes mellitus, without long-term current use of insulin ([facility])      Admitting Diagnosis: Shortness of breath [R06 02]  CHF (congestive heart failure) ([facility]) [I50 9]  Acute exacerbation of CHF (congestive heart failure) ([facility]) [I50 9]  Age/Sex: 61 y o  male     Admission Orders:  Scheduled Medications:  aspirin, 81 mg, Oral, Daily  atorvastatin, 40 mg, Oral, HS  carvedilol, 25 mg, Oral, BID With Meals  FLUoxetine, 20 mg, Oral, Daily  folic acid, 462 mcg, Oral, Daily  furosemide, 40 mg, Intravenous, BID  gabapentin, 300 mg, Oral, TID  heparin (porcine), 5,000 Units, Subcutaneous, Q8H MAAME  insulin lispro, 1-5 Units, Subcutaneous, HS  insulin lispro, 1-6 Units, Subcutaneous, TID AC  LORazepam, 0 5 mg, Oral, HS  melatonin, 4 5 mg, Oral, HS  thiamine, 100 mg, Oral, Daily      Continuous IV Infusions: None     PRN Meds:  acetaminophen, 650 mg, Oral, Q6H PRN  aluminum-magnesium hydroxide-simethicone, 30 mL, Oral, Q6H PRN  docusate sodium, 100 mg, Oral, BID PRN  ondansetron, 4 mg, Intravenous, Q6H PRN        IP CONSULT TO CARDIOLOGY  IP CONSULT TO CASE MANAGEMENT    Network Utilization Review Department  ATTENTION: Please call with any questions or concerns to [phone] and carefully listen to the prompts so that you are directed to the right person  All voicemails are confidential   Leah Baugh all requests for admission clinical reviews, approved or denied determinations and any other requests to dedicated fax number below belonging to the campus where the patient is receiving treatment   List of dedicated fax numbers for the Facilities:  [address] [place] [address] DENIALS (Administrative/Medical Necessity) [phone]   [address] [place] [address] (Maternity/NICU/Pediatrics) [address] [place] [address],[address] [place] [address] [address] [address] [address] [place] [address] Chris Douglass [address] [place] [address] [phone]

## 2022-07-22 NOTE — ASSESSMENT & PLAN NOTE
Seen by nephrology on recent admission, felt to have baseline creatine in high 1s to low 2s  Creatinine 1 9 at time of discharge    · Creatinine currently stable within basline  · Continue to monitor with diuresis   · Avoid nephrotoxins and hypotension   · Monitor BMP daily

## 2022-07-22 NOTE — CASE COMMUNICATION
Spoke with pt's brother and caregiver over the phone  He reports that the pt was readmitted to B last night for dyspnea  Resumption of care will not occur at this time

## 2022-07-22 NOTE — ASSESSMENT & PLAN NOTE
· Status post left sided thoracentesis in the ED with 1350 cc drained   · Protein and LDH within normal limits   · Cultures negative   · CXR following thoracentesis noting small bilateral effusions   · Currently, oxygen saturations stable on room air   · See plan above

## 2022-07-22 NOTE — TELEPHONE ENCOUNTER
Regarding: Shortness of Breath   ----- Message from Vikas Shearer sent at 7/21/2022  8:35 PM EDT -----  "I am calling bc my brother is c/o of SOB   He just got out of the hospital and he also says he is having trouble breathing when eating"

## 2022-07-23 PROBLEM — R74.01 TRANSAMINITIS: Status: ACTIVE | Noted: 2022-07-23

## 2022-07-23 LAB
ALBUMIN SERPL BCP-MCNC: 2.8 G/DL (ref 3.5–5)
ALP SERPL-CCNC: 183 U/L (ref 46–116)
ALT SERPL W P-5'-P-CCNC: 90 U/L (ref 12–78)
ANION GAP SERPL CALCULATED.3IONS-SCNC: 6 MMOL/L (ref 4–13)
AST SERPL W P-5'-P-CCNC: 61 U/L (ref 5–45)
BILIRUB SERPL-MCNC: 0.36 MG/DL (ref 0.2–1)
BUN SERPL-MCNC: 50 MG/DL (ref 5–25)
CALCIUM ALBUM COR SERPL-MCNC: 10 MG/DL (ref 8.3–10.1)
CALCIUM SERPL-MCNC: 9 MG/DL (ref 8.3–10.1)
CHLORIDE SERPL-SCNC: 113 MMOL/L (ref 96–108)
CO2 SERPL-SCNC: 24 MMOL/L (ref 21–32)
CREAT SERPL-MCNC: 2.07 MG/DL (ref 0.6–1.3)
GFR SERPL CREATININE-BSD FRML MDRD: 32 ML/MIN/1.73SQ M
GLUCOSE SERPL-MCNC: 113 MG/DL (ref 65–140)
GLUCOSE SERPL-MCNC: 117 MG/DL (ref 65–140)
GLUCOSE SERPL-MCNC: 184 MG/DL (ref 65–140)
GLUCOSE SERPL-MCNC: 198 MG/DL (ref 65–140)
GLUCOSE SERPL-MCNC: 269 MG/DL (ref 65–140)
POTASSIUM SERPL-SCNC: 4.2 MMOL/L (ref 3.5–5.3)
PROT SERPL-MCNC: 7.1 G/DL (ref 6.4–8.4)
SODIUM SERPL-SCNC: 143 MMOL/L (ref 135–147)

## 2022-07-23 PROCEDURE — 99232 SBSQ HOSP IP/OBS MODERATE 35: CPT | Performed by: PHYSICIAN ASSISTANT

## 2022-07-23 PROCEDURE — 82948 REAGENT STRIP/BLOOD GLUCOSE: CPT

## 2022-07-23 PROCEDURE — 99232 SBSQ HOSP IP/OBS MODERATE 35: CPT | Performed by: INTERNAL MEDICINE

## 2022-07-23 PROCEDURE — 80053 COMPREHEN METABOLIC PANEL: CPT | Performed by: PHYSICIAN ASSISTANT

## 2022-07-23 RX ORDER — AMLODIPINE BESYLATE 5 MG/1
5 TABLET ORAL DAILY
Status: DISCONTINUED | OUTPATIENT
Start: 2022-07-24 | End: 2022-07-27 | Stop reason: HOSPADM

## 2022-07-23 RX ORDER — FUROSEMIDE 10 MG/ML
40 INJECTION INTRAMUSCULAR; INTRAVENOUS
Status: DISCONTINUED | OUTPATIENT
Start: 2022-07-23 | End: 2022-07-24

## 2022-07-23 RX ADMIN — GABAPENTIN 300 MG: 300 CAPSULE ORAL at 18:48

## 2022-07-23 RX ADMIN — INSULIN LISPRO 1 UNITS: 100 INJECTION, SOLUTION INTRAVENOUS; SUBCUTANEOUS at 23:17

## 2022-07-23 RX ADMIN — FUROSEMIDE 40 MG: 10 INJECTION, SOLUTION INTRAMUSCULAR; INTRAVENOUS at 18:49

## 2022-07-23 RX ADMIN — INSULIN LISPRO 2 UNITS: 100 INJECTION, SOLUTION INTRAVENOUS; SUBCUTANEOUS at 18:50

## 2022-07-23 RX ADMIN — INSULIN LISPRO 3 UNITS: 100 INJECTION, SOLUTION INTRAVENOUS; SUBCUTANEOUS at 11:59

## 2022-07-23 RX ADMIN — FOLIC ACID TAB 400 MCG 400 MCG: 400 TAB at 08:33

## 2022-07-23 RX ADMIN — HEPARIN SODIUM 5000 UNITS: 5000 INJECTION INTRAVENOUS; SUBCUTANEOUS at 23:17

## 2022-07-23 RX ADMIN — CARVEDILOL 25 MG: 25 TABLET, FILM COATED ORAL at 06:50

## 2022-07-23 RX ADMIN — FUROSEMIDE 40 MG: 10 INJECTION, SOLUTION INTRAMUSCULAR; INTRAVENOUS at 11:57

## 2022-07-23 RX ADMIN — GABAPENTIN 300 MG: 300 CAPSULE ORAL at 23:16

## 2022-07-23 RX ADMIN — HEPARIN SODIUM 5000 UNITS: 5000 INJECTION INTRAVENOUS; SUBCUTANEOUS at 06:49

## 2022-07-23 RX ADMIN — ASPIRIN 81 MG: 81 TABLET, COATED ORAL at 08:33

## 2022-07-23 RX ADMIN — THIAMINE HCL TAB 100 MG 100 MG: 100 TAB at 08:33

## 2022-07-23 RX ADMIN — FLUOXETINE 20 MG: 20 CAPSULE ORAL at 08:32

## 2022-07-23 RX ADMIN — GABAPENTIN 300 MG: 300 CAPSULE ORAL at 08:32

## 2022-07-23 RX ADMIN — Medication 4.5 MG: at 23:15

## 2022-07-23 RX ADMIN — LORAZEPAM 0.5 MG: 0.5 TABLET ORAL at 23:17

## 2022-07-23 RX ADMIN — CARVEDILOL 25 MG: 25 TABLET, FILM COATED ORAL at 18:49

## 2022-07-23 RX ADMIN — ATORVASTATIN CALCIUM 40 MG: 40 TABLET, FILM COATED ORAL at 23:16

## 2022-07-23 RX ADMIN — FUROSEMIDE 40 MG: 10 INJECTION, SOLUTION INTRAMUSCULAR; INTRAVENOUS at 08:33

## 2022-07-23 RX ADMIN — HEPARIN SODIUM 5000 UNITS: 5000 INJECTION INTRAVENOUS; SUBCUTANEOUS at 13:18

## 2022-07-23 NOTE — PROGRESS NOTES
[address] [facility]  Progress Note - Konstantin Feliz 1955, 61 y o  male MRN: [id number]  Unit/Bed#: CW2 217-04 Encounter: [id number]  Primary Care Provider: Autumn Allred PA-C   Date and time admitted to hospital: 7/21/2022  9:43 PM    * Acute on chronic systolic congestive heart failure [facility])  Assessment & Plan  Wt Readings from Last 3 Encounters:   07/20/22 84 6 kg (186 lb 8 2 oz)   07/14/22 84 7 kg (186 lb 12 8 oz)   07/12/22 65 3 kg (144 lb)     Recent admission for the same  As per discharge summary, plans regarding heart failure as follows:  Metoprolol, hydralazine and isosorbide discontinued  Coreg added  Torsemide changed to 20 mg daily on discharge  Patient reports compliance with these medications however may have increased salt intake lately  · Patient presented with progressive SOB and respiratory distress requiring BiPAP on admission   · ProBNP 26,238 on admission  CXR with vascular congestion and small bilateral pleural effusions  · Most recent limited echo July 2022 at [facility] with LVEF 40-50%  Global hypokinesis  Valves not assessed  · Appreciate cardiology consult and recommendations   · Currently on IV Lasix 40 mg BID   · Continued on remainder of home cardiac regimen   · Defer need for repeat echocardiogram   · Monitor daily weights, intake and output, BMP   · Cardiac diet     Acute respiratory failure with hypoxia ([facility] [place] [address] )  Assessment & Plan  · In the setting of acute on chronic CHF, requiring BiPAP on admission   · CXR on admission: Small bilateral pleural effusions again noted with bibasilar atelectasis or consolidation, appear slightly increased on the right      · Status post left sided thoracentesis in ED with 1350 cc drained   · Repeat CXR following thoracentesis: Development of mild vascular congestion   Small bilateral pleural effusions   · Monitor and titrate supplemental O2 as able to maintain SpO2 > 90%  · Resolved and remains stable on room air Pleural effusion, bacterial  Assessment & Plan  · Status post left sided thoracentesis in the ED with 1350 cc drained   · Protein and LDH within normal limits   · Cultures negative   · CXR following thoracentesis noting small bilateral effusions   · Currently, oxygen saturations stable on room air   · See plan above     CKD (chronic kidney disease) stage 4, GFR 15-29 ml/min (HCC)  Assessment & Plan  Seen by nephrology on recent admission, felt to have baseline creatine in high 1s to low 2s  Creatinine 1 9 at time of discharge  · Creatinine currently stable within basline  · Continue to monitor with diuresis   · Avoid nephrotoxins and hypotension   · Monitor BMP daily     Essential hypertension  Assessment & Plan  · BP acceptable, monitor routinely   · Continue home dose Coreg  · Diuresis per cardiology      Type 2 diabetes mellitus, without long-term current use of insulin [facility])  Assessment & Plan  Lab Results   Component Value Date    HGBA1C 7 7 (H) 07/15/2022       Recent Labs     07/22/22  1059 07/22/22  1627 07/22/22  2207 07/23/22  0648   POCGLU 224* 213* 154* 113       Blood Sugar Average: Last 72 hrs:  (P) 924 [id number]     · Hold Farxiga, metformin while inpatient   · QID accuchecks with SSI coverage for now   · Monitor and adjust regimen as needed   · Carb controlled diet   · Hypoglycemia protocol     Transaminitis  Assessment & Plan  · Mild, consider due to hepatic congestion in the setting of volume overload   · Hepatic steatosis noted on renal US   · Monitor CMP    Mixed hyperlipidemia  Assessment & Plan  · On Lipitor 40 mg daily    Bipolar affective disorder, mixed, moderate (HCC)  Assessment & Plan  · Mood stable   · Continue home medical management: lorazepam 0 5 mg at bedtime, Prozac 20 mg daily  VTE Pharmacologic Prophylaxis:   Moderate Risk (Score 3-4) - Pharmacological DVT Prophylaxis Ordered: heparin      Patient Centered Rounds: I performed bedside rounds with nursing staff today   Discussions with Specialists or Other Care Team Provider: primary RN    Education and Discussions with Family / Patient: Updated  (brother) via phone  Time Spent for Care: 15 minutes  More than 50% of total time spent on counseling and coordination of care as described above  Current Length of Stay: 2 day(s)  Current Patient Status: Inpatient   Certification Statement: The patient will continue to require additional inpatient hospital stay due to IV diuresis pending cardiology clearance  Discharge Plan: Anticipate discharge in 24-48 hrs to home  Code Status: Level 1 - Full Code    Subjective:   Patient reports feeling well this morning  States he feels better than he did when he left the hospital last time  Also notes "my strength is coming back"  Denies chest pain or SOB  Objective:     Vitals:   Temp (24hrs), Av °F (36 7 °C), Min:97 2 °F (36 2 °C), Max:98 8 °F (37 1 °C)    Temp:  [97 2 °F (36 2 °C)-98 8 °F (37 1 °C)] 98 5 °F (36 9 °C)  HR:  [65-78] 78  Resp:  [16-18] 16  BP: (131-159)/(69-93) 153/85  SpO2:  [95 %-97 %] 97 %  Body mass index is 30 88 kg/m²  Input and Output Summary (last 24 hours): Intake/Output Summary (Last 24 hours) at 2022 0840  Last data filed at 2022 0817  Gross per 24 hour   Intake 860 ml   Output 2225 ml   Net -1365 ml       Physical Exam:   Physical Exam  Vitals and nursing note reviewed  Exam conducted with a chaperone present  Constitutional:       General: He is not in acute distress  Cardiovascular:      Rate and Rhythm: Normal rate and regular rhythm  Pulmonary:      Effort: Pulmonary effort is normal  No respiratory distress  Breath sounds: Decreased breath sounds and rales (faint crackles) present  No wheezing or rhonchi  Musculoskeletal:      Right lower leg: No edema  Left lower leg: No edema  Neurological:      General: No focal deficit present        Mental Status: He is alert and oriented to person, place, and time  Mental status is at baseline  Additional Data:     Labs:  Results from last 7 days   Lab Units 07/21/22  2159   WBC Thousand/uL 5 16   HEMOGLOBIN g/dL 9 5*   HEMATOCRIT % 31 0*   PLATELETS Thousands/uL 157   NEUTROS PCT % 56   LYMPHS PCT % 30   MONOS PCT % 9   EOS PCT % 4     Results from last 7 days   Lab Units 07/23/22  0558   SODIUM mmol/L 143   POTASSIUM mmol/L 4 2   CHLORIDE mmol/L 113*   CO2 mmol/L 24   BUN mg/dL 50*   CREATININE mg/dL 2 07*   ANION GAP mmol/L 6   CALCIUM mg/dL 9 0   ALBUMIN g/dL 2 8*   TOTAL BILIRUBIN mg/dL 0 36   ALK PHOS U/L 183*   ALT U/L 90*   AST U/L 61*   GLUCOSE RANDOM mg/dL 117         Results from last 7 days   Lab Units 07/23/22  0648 07/22/22  2207 07/22/22  1627 07/22/22  1059 07/22/22  0625 07/22/22  0257 07/20/22  1052 07/20/22  0607 07/19/22  2046 07/19/22  1536 07/19/22  1107 07/19/22  0553   POC GLUCOSE mg/dl 113 154* 213* 224* 138 149* 232* 73 121 186* 201* 123               Lines/Drains:  Invasive Devices  Report    Peripheral Intravenous Line  Duration           Peripheral IV 07/21/22 Left Forearm 1 day                      Imaging: No pertinent imaging reviewed      Recent Cultures (last 7 days):   Results from last 7 days   Lab Units 07/22/22  0045   GRAM STAIN RESULT  No Polys or Bacteria seen       Last 24 Hours Medication List:   Current Facility-Administered Medications   Medication Dose Route Frequency Provider Last Rate    acetaminophen  650 mg Oral Q6H PRN Haylee Singer MD      aluminum-magnesium hydroxide-simethicone  30 mL Oral Q6H PRN Haylee Singer MD      aspirin  81 mg Oral Daily Haylee Singer MD      atorvastatin  40 mg Oral HS Haylee Singer MD      carvedilol  25 mg Oral BID With Meals Haylee Singer MD      docusate sodium  100 mg Oral BID PRN Haylee Singer MD      FLUoxetine  20 mg Oral Daily Haylee Singer MD      folic acid  432 mcg Oral Daily MD Bea Logan furosemide  40 mg Intravenous BID Paty Alcantara MD      gabapentin  300 mg Oral TID Paty Alcantara MD      heparin (porcine)  5,000 Units Subcutaneous [facility] Paty Alcantara MD      insulin lispro  1-5 Units Subcutaneous HS Paty Alcantara MD      insulin lispro  1-6 Units Subcutaneous TID [facility] Paty Alcantara MD      LORazepam  0 5 mg Oral HS Paty Alcantara MD      melatonin  4 5 mg Oral HS Paty Alcantara MD      ondansetron  4 mg Intravenous Q6H PRN Paty Alcantara MD      thiamine  100 mg Oral Daily Paty Alcantara MD          Today, Patient Was Seen By: Jerad Thrasher PA-C    **Please Note: This note may have been constructed using a voice recognition system  **

## 2022-07-23 NOTE — ASSESSMENT & PLAN NOTE
Seen by nephrology on recent admission, felt to have baseline creatine in high 1s to low 2s  Creatinine 1 9 at time of discharge    · Creatinine relatively stable within baseline however noted to be up-trending today   · Continue to monitor with diuresis, which has been increased today per cardiology   · Avoid nephrotoxins and hypotension   · Monitor BMP daily

## 2022-07-23 NOTE — ASSESSMENT & PLAN NOTE
Wt Readings from Last 3 Encounters:   07/23/22 81 6 kg (179 lb 14 3 oz)   07/20/22 84 6 kg (186 lb 8 2 oz)   07/14/22 84 7 kg (186 lb 12 8 oz)     Recent admission for the same  As per discharge summary, plans regarding heart failure as follows:  Metoprolol, hydralazine and isosorbide discontinued  Coreg added  Torsemide changed to 20 mg daily on discharge  Patient reports compliance with these medications however may have increased salt intake lately  · Patient presented with progressive SOB and respiratory distress requiring BiPAP on admission   · ProBNP 26,238 on admission  CXR with vascular congestion and small bilateral pleural effusions  · Most recent limited echo July 2022 at [address] [facility] [address] with LVEF 40-50%  Global hypokinesis  Valves not assessed    · Appreciate cardiology consult and recommendations   · Increased IV Lasix to 80 mg BID today   · Continued on remainder of home cardiac regimen   · Defer need for repeat echocardiogram   · Monitor daily weights, intake and output, BMP   · Cardiac diet

## 2022-07-23 NOTE — ASSESSMENT & PLAN NOTE
Lab Results   Component Value Date    HGBA1C 7 7 (H) 07/15/2022       Recent Labs     07/22/22  1627 07/22/22  2207 07/23/22  0648 07/23/22  1147   POCGLU 213* 154* 113 269*       Blood Sugar Average: Last 72 hrs:  (P) 180     · Hold Farxiga, metformin while inpatient   · QID accuchecks with SSI coverage for now   · Monitor and adjust regimen as needed   · Carb controlled diet   · Hypoglycemia protocol

## 2022-07-23 NOTE — ASSESSMENT & PLAN NOTE
· Mild, consider due to hepatic congestion in the setting of volume overload   · Hepatic steatosis noted on renal US   · Monitor CMP

## 2022-07-23 NOTE — ASSESSMENT & PLAN NOTE
· In the setting of acute on chronic CHF, requiring BiPAP on admission   · CXR on admission: Small bilateral pleural effusions again noted with bibasilar atelectasis or consolidation, appear slightly increased on the right      · Status post left sided thoracentesis in ED with 1350 cc drained   · Repeat CXR following thoracentesis: Development of mild vascular congestion   Small bilateral pleural effusions   · Monitor and titrate supplemental O2 as able to maintain SpO2 > 90%  · Resolved and remains stable on room air

## 2022-07-23 NOTE — PROGRESS NOTES
Progress Note - Cardiology   THE [facility] 61 y o  male MRN: [id number]  Unit/Bed#: CW2 217-04 Encounter: [id number]    Assessment/Plan:  #  Acute on chronic HF mildly reduced LVEF    #  Hypertension: metoprolol, hydralazine and imdur discontinued on prior admission  He was placed on carvedilol  #  Dietary noncompliance   #  Left pleural effusion: status post thoracentesis in the emergency department-1350 cc clear fluid drained    1 8 L net negative since/shift  Renal function remains stable  Weight this morning is 179 lb  Feels his symptoms have improved  Recommendations:  He remains volume overloaded but BP improving with diuresis  Increase furosemide 40 mg IV t i d   Strict intake/output  Daily standing weights  Heart failure Education  Monitor renal function and electrolytes    Subjective/Objective   Interval history:  No acute events overnight  Denies any lightheadedness, chest pain, palpitations  Does complain of orthopnea, shortness of breath and lower extremity swelling      Objective:   Vitals: /85 (BP Location: Right arm)   Pulse 78   Temp 98 5 °F (36 9 °C) (Oral)   Resp 16   Wt 81 6 kg (179 lb 14 3 oz)   SpO2 97%   BMI 30 88 kg/m²   Vitals:    07/23/22 0500 07/23/22 0600   Weight: 81 6 kg (179 lb 14 3 oz) 81 6 kg (179 lb 14 3 oz)     Orthostatic Blood Pressures    Flowsheet Row Most Recent Value   Blood Pressure 153/85 filed at 07/23/2022 0658   Patient Position - Orthostatic VS Lying filed at 07/23/2022 6079            Intake/Output Summary (Last 24 hours) at 7/23/2022 0955  Last data filed at 7/23/2022 1947  Gross per 24 hour   Intake 700 ml   Output 1900 ml   Net -1200 ml       Invasive Devices  Report    Peripheral Intravenous Line  Duration           Peripheral IV 07/21/22 Left Forearm 1 day                Review of Systems:  All other systems reviewed and negative except for that noted above    Physical Exam: General appearance: alert and oriented, in no acute distress  Neck: +JVD  Lungs: Bilateral crackles, no wheezing, no rhonchi, normal effort  Heart: regular rate and rhythm, S1, S2 normal, no murmur, click, rub or gallop  Abdomen: soft, non-tender; bowel sounds normal; no masses,  no organomegaly  Extremities: edema 2+ bilateral leg edema  Skin: Skin color, texture, turgor normal  No rashes or lesions    Lab Results: I have personally reviewed pertinent lab results  Imaging: I have personally reviewed pertinent reports  EKG: Personally reviewed  VTE Pharmacologic Prophylaxis: Heparin     Counseling / Coordination of Care  Total time spent today 20 minutes  Greater than 50% of total time was spent with the patient and / or family counseling and / or coordination of care

## 2022-07-24 LAB
ANION GAP SERPL CALCULATED.3IONS-SCNC: 4 MMOL/L (ref 4–13)
BUN SERPL-MCNC: 50 MG/DL (ref 5–25)
CALCIUM SERPL-MCNC: 8.7 MG/DL (ref 8.3–10.1)
CHLORIDE SERPL-SCNC: 110 MMOL/L (ref 96–108)
CO2 SERPL-SCNC: 27 MMOL/L (ref 21–32)
CREAT SERPL-MCNC: 2.27 MG/DL (ref 0.6–1.3)
GFR SERPL CREATININE-BSD FRML MDRD: 29 ML/MIN/1.73SQ M
GLUCOSE SERPL-MCNC: 219 MG/DL (ref 65–140)
GLUCOSE SERPL-MCNC: 222 MG/DL (ref 65–140)
GLUCOSE SERPL-MCNC: 236 MG/DL (ref 65–140)
GLUCOSE SERPL-MCNC: 238 MG/DL (ref 65–140)
POTASSIUM SERPL-SCNC: 4.1 MMOL/L (ref 3.5–5.3)
SODIUM SERPL-SCNC: 141 MMOL/L (ref 135–147)

## 2022-07-24 PROCEDURE — 99232 SBSQ HOSP IP/OBS MODERATE 35: CPT | Performed by: INTERNAL MEDICINE

## 2022-07-24 PROCEDURE — 82948 REAGENT STRIP/BLOOD GLUCOSE: CPT

## 2022-07-24 PROCEDURE — 99232 SBSQ HOSP IP/OBS MODERATE 35: CPT | Performed by: PHYSICIAN ASSISTANT

## 2022-07-24 PROCEDURE — 80048 BASIC METABOLIC PNL TOTAL CA: CPT | Performed by: PHYSICIAN ASSISTANT

## 2022-07-24 RX ORDER — FUROSEMIDE 10 MG/ML
40 INJECTION INTRAMUSCULAR; INTRAVENOUS ONCE
Status: COMPLETED | OUTPATIENT
Start: 2022-07-24 | End: 2022-07-24

## 2022-07-24 RX ORDER — FUROSEMIDE 10 MG/ML
80 INJECTION INTRAMUSCULAR; INTRAVENOUS
Status: DISCONTINUED | OUTPATIENT
Start: 2022-07-24 | End: 2022-07-25

## 2022-07-24 RX ADMIN — CARVEDILOL 25 MG: 25 TABLET, FILM COATED ORAL at 17:47

## 2022-07-24 RX ADMIN — Medication 4.5 MG: at 21:42

## 2022-07-24 RX ADMIN — GABAPENTIN 300 MG: 300 CAPSULE ORAL at 09:52

## 2022-07-24 RX ADMIN — FUROSEMIDE 40 MG: 10 INJECTION, SOLUTION INTRAMUSCULAR; INTRAVENOUS at 09:51

## 2022-07-24 RX ADMIN — HEPARIN SODIUM 5000 UNITS: 5000 INJECTION INTRAVENOUS; SUBCUTANEOUS at 06:34

## 2022-07-24 RX ADMIN — THIAMINE HCL TAB 100 MG 100 MG: 100 TAB at 09:52

## 2022-07-24 RX ADMIN — ATORVASTATIN CALCIUM 40 MG: 40 TABLET, FILM COATED ORAL at 21:42

## 2022-07-24 RX ADMIN — INSULIN LISPRO 2 UNITS: 100 INJECTION, SOLUTION INTRAVENOUS; SUBCUTANEOUS at 18:07

## 2022-07-24 RX ADMIN — INSULIN LISPRO 2 UNITS: 100 INJECTION, SOLUTION INTRAVENOUS; SUBCUTANEOUS at 21:58

## 2022-07-24 RX ADMIN — HEPARIN SODIUM 5000 UNITS: 5000 INJECTION INTRAVENOUS; SUBCUTANEOUS at 13:07

## 2022-07-24 RX ADMIN — FOLIC ACID TAB 400 MCG 400 MCG: 400 TAB at 09:52

## 2022-07-24 RX ADMIN — FLUOXETINE 20 MG: 20 CAPSULE ORAL at 09:53

## 2022-07-24 RX ADMIN — CARVEDILOL 25 MG: 25 TABLET, FILM COATED ORAL at 09:55

## 2022-07-24 RX ADMIN — INSULIN LISPRO 2 UNITS: 100 INJECTION, SOLUTION INTRAVENOUS; SUBCUTANEOUS at 13:07

## 2022-07-24 RX ADMIN — GABAPENTIN 300 MG: 300 CAPSULE ORAL at 21:42

## 2022-07-24 RX ADMIN — AMLODIPINE BESYLATE 5 MG: 5 TABLET ORAL at 09:53

## 2022-07-24 RX ADMIN — GABAPENTIN 300 MG: 300 CAPSULE ORAL at 17:47

## 2022-07-24 RX ADMIN — INSULIN LISPRO 3 UNITS: 100 INJECTION, SOLUTION INTRAVENOUS; SUBCUTANEOUS at 06:33

## 2022-07-24 RX ADMIN — LORAZEPAM 0.5 MG: 0.5 TABLET ORAL at 21:42

## 2022-07-24 RX ADMIN — HEPARIN SODIUM 5000 UNITS: 5000 INJECTION INTRAVENOUS; SUBCUTANEOUS at 21:43

## 2022-07-24 RX ADMIN — ASPIRIN 81 MG: 81 TABLET, COATED ORAL at 09:53

## 2022-07-24 RX ADMIN — FUROSEMIDE 40 MG: 10 INJECTION, SOLUTION INTRAMUSCULAR; INTRAVENOUS at 06:36

## 2022-07-24 RX ADMIN — FUROSEMIDE 80 MG: 10 INJECTION, SOLUTION INTRAMUSCULAR; INTRAVENOUS at 17:50

## 2022-07-24 NOTE — PROGRESS NOTES
Progress Note - Cardiology   THE [facility] 61 y o  male MRN: [id number]  Unit/Bed#: CW2 216-02 Encounter: [id number]    Assessment/Plan:  #  Acute on chronic HF mildly reduced LVEF    #  Hypertension: metoprolol, hydralazine and imdur discontinued on prior admission  He was placed on carvedilol  #  Dietary noncompliance   #  Left pleural effusion: status post thoracentesis in the emergency department-1350 cc clear fluid drained    Weight this  lb on standing scale  Still volume overloaded with bilateral rales, JVD and edema  Received AM lasix 40 mg IV  Give additional lasix 40 mg IV x 1 now and switch to lasix 80 mg IV BID  Strict I/Os  1500 mL restriction  Subjective/Objective   Interval history:  No acute events overnight  Feels better but still feels breathing is intermittent labored  He has no lightheadedness, visual changes, chest pain      Objective:   Vitals: /78   Pulse 75   Temp 98 6 °F (37 °C)   Resp 17   Wt 81 6 kg (179 lb 14 3 oz)   SpO2 91%   BMI 30 88 kg/m²   Vitals:    07/23/22 0500 07/23/22 0600   Weight: 81 6 kg (179 lb 14 3 oz) 81 6 kg (179 lb 14 3 oz)     Orthostatic Blood Pressures    Flowsheet Row Most Recent Value   Blood Pressure 152/78 filed at 07/24/2022 0745   Patient Position - Orthostatic VS Lying filed at 07/23/2022 4530            Intake/Output Summary (Last 24 hours) at 7/24/2022 0980  Last data filed at 7/24/2022 0900  Gross per 24 hour   Intake 460 ml   Output 1850 ml   Net -1390 ml       Invasive Devices  Report    Peripheral Intravenous Line  Duration           Peripheral IV 07/21/22 Left Forearm 2 days                Review of Systems:  All other systems reviewed and negative except for that noted above    Physical Exam: General appearance: alert and oriented, in no acute distress  Neck: +JVD  Lungs: Bilateral crackles, no wheezing, no rhonchi, normal effort  Heart: regular rate and rhythm, S1, S2 normal, no murmur, click, rub or gallop  Abdomen: soft, non-tender; bowel sounds normal; no masses,  no organomegaly  Extremities: edema 2+ bilateral leg edema  Skin: Skin color, texture, turgor normal  No rashes or lesions    Lab Results: I have personally reviewed pertinent lab results  Imaging: I have personally reviewed pertinent reports  EKG: Personally reviewed  VTE Pharmacologic Prophylaxis: Heparin     Counseling / Coordination of Care  Total time spent today 20 minutes  Greater than 50% of total time was spent with the patient and / or family counseling and / or coordination of care

## 2022-07-24 NOTE — PLAN OF CARE
Problem: CARDIOVASCULAR - ADULT  Goal: Maintains optimal cardiac output and hemodynamic stability  Description: INTERVENTIONS:  - Monitor I/O, vital signs and rhythm  - Monitor for S/S and trends of decreased cardiac output  - Administer and titrate ordered vasoactive medications to optimize hemodynamic stability  - Assess quality of pulses, skin color and temperature  - Assess for signs of decreased coronary artery perfusion  - Instruct patient to report change in severity of symptoms  Outcome: Progressing  Goal: Absence of cardiac dysrhythmias or at baseline rhythm  Description: INTERVENTIONS:  - Continuous cardiac monitoring, vital signs, obtain 12 lead EKG if ordered  - Administer antiarrhythmic and heart rate control medications as ordered  - Monitor electrolytes and administer replacement therapy as ordered  Outcome: Progressing     Problem: RESPIRATORY - ADULT  Goal: Achieves optimal ventilation and oxygenation  Description: INTERVENTIONS:  - Assess for changes in respiratory status  - Assess for changes in mentation and behavior  - Position to facilitate oxygenation and minimize respiratory effort  - Oxygen administered by appropriate delivery if ordered  - Initiate smoking cessation education as indicated  - Encourage broncho-pulmonary hygiene including cough, deep breathe, Incentive Spirometry  - Assess the need for suctioning and aspirate as needed  - Assess and instruct to report SOB or any respiratory difficulty  - Respiratory Therapy support as indicated  Outcome: Progressing     Problem: MOBILITY - ADULT  Goal: Maintain or return to baseline ADL function  Description: INTERVENTIONS:  -  Assess patient's ability to carry out ADLs; assess patient's baseline for ADL function and identify physical deficits which impact ability to perform ADLs (bathing, care of mouth/teeth, toileting, grooming, dressing, etc )  - Assess/evaluate cause of self-care deficits   - Assess range of motion  - Assess patient's mobility; develop plan if impaired  - Assess patient's need for assistive devices and provide as appropriate  - Encourage maximum independence but intervene and supervise when necessary  - Involve family in performance of ADLs  - Assess for home care needs following discharge   - Consider OT consult to assist with ADL evaluation and planning for discharge  - Provide patient education as appropriate  Outcome: Progressing

## 2022-07-24 NOTE — PROGRESS NOTES
[address] [facility]  Progress Note - Yoel Benton 1955, 61 y o  male MRN: [id number]  Unit/Bed#: CW2 216-02 Encounter: [id number]  Primary Care Provider: Ron Sequeira PA-C   Date and time admitted to hospital: 7/21/2022  9:43 PM    * Acute on chronic systolic congestive heart failure [facility])  Assessment & Plan  Wt Readings from Last 3 Encounters:   07/23/22 81 6 kg (179 lb 14 3 oz)   07/20/22 84 6 kg (186 lb 8 2 oz)   07/14/22 84 7 kg (186 lb 12 8 oz)     Recent admission for the same  As per discharge summary, plans regarding heart failure as follows:  Metoprolol, hydralazine and isosorbide discontinued  Coreg added  Torsemide changed to 20 mg daily on discharge  Patient reports compliance with these medications however may have increased salt intake lately  · Patient presented with progressive SOB and respiratory distress requiring BiPAP on admission   · ProBNP 26,238 on admission  CXR with vascular congestion and small bilateral pleural effusions  · Most recent limited echo July 2022 at [address] [facility] [address] with LVEF 40-50%  Global hypokinesis  Valves not assessed  · Appreciate cardiology consult and recommendations   · Increased IV Lasix to 80 mg BID today   · Continued on remainder of home cardiac regimen   · Defer need for repeat echocardiogram   · Monitor daily weights, intake and output, BMP   · Cardiac diet     Acute respiratory failure with hypoxia (HCC)  Assessment & Plan  · In the setting of acute on chronic CHF, requiring BiPAP on admission   · CXR on admission: Small bilateral pleural effusions again noted with bibasilar atelectasis or consolidation, appear slightly increased on the right      · Status post left sided thoracentesis in ED with 1350 cc drained   · Repeat CXR following thoracentesis: Development of mild vascular congestion   Small bilateral pleural effusions   · Monitor and titrate supplemental O2 as able to maintain SpO2 > 90%  · Resolved and remains stable on room air     Pleural effusion, bacterial  Assessment & Plan  · Status post left sided thoracentesis in the ED with 1350 cc drained   · Protein and LDH within normal limits   · Cultures negative   · CXR following thoracentesis noting small bilateral effusions   · Currently, oxygen saturations stable on room air   · See plan above     CKD (chronic kidney disease) stage 4, GFR 15-29 ml/min (HCC)  Assessment & Plan  Seen by nephrology on recent admission, felt to have baseline creatine in high 1s to low 2s  Creatinine 1 9 at time of discharge  · Creatinine relatively stable within baseline however noted to be up-trending today   · Continue to monitor with diuresis, which has been increased today per cardiology   · Avoid nephrotoxins and hypotension   · Monitor BMP daily     Essential hypertension  Assessment & Plan  · BP acceptable, monitor routinely   · Continue home dose Coreg  · Diuresis per cardiology      Type 2 diabetes mellitus, without long-term current use of insulin [facility])  Assessment & Plan  Lab Results   Component Value Date    HGBA1C 7 7 (H) 07/15/2022       Recent Labs     07/22/22  1627 07/22/22  2207 07/23/22  0648 07/23/22  1147   POCGLU 213* 154* 113 269*       Blood Sugar Average: Last 72 hrs:  (P) 180     · Hold Farxiga, metformin while inpatient   · QID accuchecks with SSI coverage for now   · Monitor and adjust regimen as needed   · Carb controlled diet   · Hypoglycemia protocol     Transaminitis  Assessment & Plan  · Mild, consider due to hepatic congestion in the setting of volume overload   · Hepatic steatosis noted on renal US   · Monitor CMP    Mixed hyperlipidemia  Assessment & Plan  · On Lipitor 40 mg daily    Bipolar affective disorder, mixed, moderate (HCC)  Assessment & Plan  · Mood stable   · Continue home medical management: lorazepam 0 5 mg at bedtime, Prozac 20 mg daily      VTE Pharmacologic Prophylaxis: VTE Score: 4 Moderate Risk (Score 3-4) - Pharmacological DVT Prophylaxis Ordered: heparin  Education and Discussions with Family / Patient: Updated  (brother) via phone  Time Spent for Care: 15 minutes  More than 50% of total time spent on counseling and coordination of care as described above  Current Length of Stay: 3 day(s)  Current Patient Status: Inpatient   Certification Statement: The patient will continue to require additional inpatient hospital stay due to IV diuresis pending cardiology clearance  Discharge Plan: Anticipate discharge in 48-72 hrs to home  Code Status: Level 1 - Full Code    Subjective:   Patient reports feeling well today, offers no specific complaints  Objective:     Vitals:   Temp (24hrs), Av 6 °F (37 °C), Min:98 4 °F (36 9 °C), Max:98 8 °F (37 1 °C)    Temp:  [98 4 °F (36 9 °C)-98 8 °F (37 1 °C)] 98 6 °F (37 °C)  HR:  [75-83] 75  Resp:  [17-20] 17  BP: (127-175)/(78-91) 152/78  SpO2:  [91 %-94 %] 91 %  Body mass index is 30 88 kg/m²  Input and Output Summary (last 24 hours): Intake/Output Summary (Last 24 hours) at 2022 1416  Last data filed at 2022 1300  Gross per 24 hour   Intake 550 ml   Output 1500 ml   Net -950 ml       Physical Exam:   Physical Exam  Vitals and nursing note reviewed  Constitutional:       General: He is not in acute distress  Cardiovascular:      Rate and Rhythm: Normal rate and regular rhythm  Heart sounds: No murmur heard  Pulmonary:      Effort: Pulmonary effort is normal  No respiratory distress  Abdominal:      General: Abdomen is flat  There is no distension  Palpations: Abdomen is soft  Tenderness: There is no abdominal tenderness  Musculoskeletal:      Right lower leg: No edema  Left lower leg: No edema  Skin:     General: Skin is warm and dry  Coloration: Skin is not pale  Findings: No erythema  Neurological:      Mental Status: He is alert and oriented to person, place, and time          Additional Data:     Labs:  Results from last 7 days Lab Units 07/21/22  2159   WBC Thousand/uL 5 16   HEMOGLOBIN g/dL 9 5*   HEMATOCRIT % 31 0*   PLATELETS Thousands/uL 157   NEUTROS PCT % 56   LYMPHS PCT % 30   MONOS PCT % 9   EOS PCT % 4     Results from last 7 days   Lab Units 07/24/22  0432 07/23/22  0558   SODIUM mmol/L 141 143   POTASSIUM mmol/L 4 1 4 2   CHLORIDE mmol/L 110* 113*   CO2 mmol/L 27 24   BUN mg/dL 50* 50*   CREATININE mg/dL 2 27* 2 07*   ANION GAP mmol/L 4 6   CALCIUM mg/dL 8 7 9 0   ALBUMIN g/dL  --  2 8*   TOTAL BILIRUBIN mg/dL  --  0 36   ALK PHOS U/L  --  183*   ALT U/L  --  90*   AST U/L  --  61*   GLUCOSE RANDOM mg/dL 222* 117         Results from last 7 days   Lab Units 07/24/22  1146 07/24/22  0631 07/23/22  2122 07/23/22  1637 07/23/22  1147 07/23/22  0648 07/22/22  2207 07/22/22  1627 07/22/22  1059 07/22/22  0625 07/22/22  0257 07/20/22  1052   POC GLUCOSE mg/dl 219* 238* 184* 198* 269* 113 154* 213* 224* 138 149* 232*               Lines/Drains:  Invasive Devices  Report    Peripheral Intravenous Line  Duration           Peripheral IV 07/21/22 Left Forearm 2 days                      Imaging: No pertinent imaging reviewed      Recent Cultures (last 7 days):   Results from last 7 days   Lab Units 07/22/22  0045   GRAM STAIN RESULT  No Polys or Bacteria seen   BODY FLUID CULTURE, STERILE  No growth       Last 24 Hours Medication List:   Current Facility-Administered Medications   Medication Dose Route Frequency Provider Last Rate    acetaminophen  650 mg Oral Q6H PRN Ryan Julio MD      aluminum-magnesium hydroxide-simethicone  30 mL Oral Q6H PRN Ryan Julio MD      amLODIPine  5 mg Oral Daily Veronica Barclay MD      aspirin  81 mg Oral Daily Ryan Julio MD      atorvastatin  40 mg Oral HS Ryan Julio MD      carvedilol  25 mg Oral BID With Meals Ryan Julio MD      docusate sodium  100 mg Oral BID PRN Ryan Julio MD      FLUoxetine  20 mg Oral Daily Ryan Julio MD  folic acid  773 mcg Oral Daily Jm Patel MD      furosemide  80 mg Intravenous BID (diuretic) Apple Montes MD      gabapentin  300 mg Oral TID Jm Patel MD      heparin (porcine)  5,000 Units Subcutaneous [facility] [address] Jm Patel MD      insulin lispro  1-5 Units Subcutaneous HS Jm Patel MD      insulin lispro  1-6 Units Subcutaneous TID [facility] [place] Jm Patel MD      LORazepam  0 5 mg Oral HS Jm Patel MD      melatonin  4 5 mg Oral HS Jm Patel MD      ondansetron  4 mg Intravenous Q6H PRN Jm Patel MD      thiamine  100 mg Oral Daily Jm Patel MD          Today, Patient Was Seen By: Alfredo Mitchell PA-C    **Please Note: This note may have been constructed using a voice recognition system  **

## 2022-07-25 LAB
ANION GAP SERPL CALCULATED.3IONS-SCNC: 5 MMOL/L (ref 4–13)
BACTERIA SPEC BFLD CULT: NO GROWTH
BUN SERPL-MCNC: 52 MG/DL (ref 5–25)
CALCIUM SERPL-MCNC: 8.9 MG/DL (ref 8.3–10.1)
CHLORIDE SERPL-SCNC: 110 MMOL/L (ref 96–108)
CO2 SERPL-SCNC: 27 MMOL/L (ref 21–32)
CREAT SERPL-MCNC: 2.39 MG/DL (ref 0.6–1.3)
GFR SERPL CREATININE-BSD FRML MDRD: 27 ML/MIN/1.73SQ M
GLUCOSE SERPL-MCNC: 177 MG/DL (ref 65–140)
GLUCOSE SERPL-MCNC: 197 MG/DL (ref 65–140)
GLUCOSE SERPL-MCNC: 243 MG/DL (ref 65–140)
GLUCOSE SERPL-MCNC: 311 MG/DL (ref 65–140)
GLUCOSE SERPL-MCNC: 332 MG/DL (ref 65–140)
GRAM STN SPEC: NORMAL
POTASSIUM SERPL-SCNC: 4.2 MMOL/L (ref 3.5–5.3)
SODIUM SERPL-SCNC: 142 MMOL/L (ref 135–147)

## 2022-07-25 PROCEDURE — 82948 REAGENT STRIP/BLOOD GLUCOSE: CPT

## 2022-07-25 PROCEDURE — 99232 SBSQ HOSP IP/OBS MODERATE 35: CPT | Performed by: INTERNAL MEDICINE

## 2022-07-25 PROCEDURE — 80048 BASIC METABOLIC PNL TOTAL CA: CPT | Performed by: PHYSICIAN ASSISTANT

## 2022-07-25 PROCEDURE — 99232 SBSQ HOSP IP/OBS MODERATE 35: CPT | Performed by: PHYSICIAN ASSISTANT

## 2022-07-25 RX ORDER — TORSEMIDE 20 MG/1
20 TABLET ORAL 2 TIMES DAILY
Status: DISCONTINUED | OUTPATIENT
Start: 2022-07-25 | End: 2022-07-26

## 2022-07-25 RX ADMIN — FLUOXETINE 20 MG: 20 CAPSULE ORAL at 08:37

## 2022-07-25 RX ADMIN — INSULIN LISPRO 2 UNITS: 100 INJECTION, SOLUTION INTRAVENOUS; SUBCUTANEOUS at 08:40

## 2022-07-25 RX ADMIN — HEPARIN SODIUM 5000 UNITS: 5000 INJECTION INTRAVENOUS; SUBCUTANEOUS at 13:58

## 2022-07-25 RX ADMIN — THIAMINE HCL TAB 100 MG 100 MG: 100 TAB at 08:38

## 2022-07-25 RX ADMIN — CARVEDILOL 25 MG: 25 TABLET, FILM COATED ORAL at 06:32

## 2022-07-25 RX ADMIN — INSULIN LISPRO 5 UNITS: 100 INJECTION, SOLUTION INTRAVENOUS; SUBCUTANEOUS at 11:32

## 2022-07-25 RX ADMIN — INSULIN LISPRO 1 UNITS: 100 INJECTION, SOLUTION INTRAVENOUS; SUBCUTANEOUS at 17:04

## 2022-07-25 RX ADMIN — INSULIN LISPRO 3 UNITS: 100 INJECTION, SOLUTION INTRAVENOUS; SUBCUTANEOUS at 22:59

## 2022-07-25 RX ADMIN — FOLIC ACID TAB 400 MCG 400 MCG: 400 TAB at 08:38

## 2022-07-25 RX ADMIN — GABAPENTIN 300 MG: 300 CAPSULE ORAL at 23:01

## 2022-07-25 RX ADMIN — AMLODIPINE BESYLATE 5 MG: 5 TABLET ORAL at 08:37

## 2022-07-25 RX ADMIN — HEPARIN SODIUM 5000 UNITS: 5000 INJECTION INTRAVENOUS; SUBCUTANEOUS at 06:32

## 2022-07-25 RX ADMIN — HEPARIN SODIUM 5000 UNITS: 5000 INJECTION INTRAVENOUS; SUBCUTANEOUS at 22:57

## 2022-07-25 RX ADMIN — GABAPENTIN 300 MG: 300 CAPSULE ORAL at 16:57

## 2022-07-25 RX ADMIN — LORAZEPAM 0.5 MG: 0.5 TABLET ORAL at 22:59

## 2022-07-25 RX ADMIN — ATORVASTATIN CALCIUM 40 MG: 40 TABLET, FILM COATED ORAL at 22:59

## 2022-07-25 RX ADMIN — GABAPENTIN 300 MG: 300 CAPSULE ORAL at 08:38

## 2022-07-25 RX ADMIN — TORSEMIDE 20 MG: 20 TABLET ORAL at 16:57

## 2022-07-25 RX ADMIN — FUROSEMIDE 80 MG: 10 INJECTION, SOLUTION INTRAMUSCULAR; INTRAVENOUS at 08:37

## 2022-07-25 RX ADMIN — Medication 4.5 MG: at 22:57

## 2022-07-25 RX ADMIN — CARVEDILOL 25 MG: 25 TABLET, FILM COATED ORAL at 16:57

## 2022-07-25 RX ADMIN — ASPIRIN 81 MG: 81 TABLET, COATED ORAL at 08:38

## 2022-07-25 NOTE — PROGRESS NOTES
Progress Note - Cardiology Team 1  Garrett Jolly [age] y o  male MRN: [id number]  Unit/Bed#: CW2 216-02 Encounter: [id number]        Principal Problem:    Acute on chronic systolic congestive heart failure (HCC)  Active Problems:    CKD (chronic kidney disease) stage 4, GFR 15-29 ml/min (HCC)    Bipolar affective disorder, mixed, moderate (HCC)    Essential hypertension    Mixed hyperlipidemia    Type 2 diabetes mellitus, without long-term current use of insulin (HCC)    Acute respiratory failure with hypoxia (HCC)    Pleural effusion, bacterial    Transaminitis      Assessment/Plan    1  Acute on chronic combined systolic and diastolic HF  LVEF 70-34%  + dietary indiscretions   BB- coreg  No ACE/ARB d/t CKD/LAZARO  Etiology unclear- pharmacological myoview stress test apical / inferior infarct  Previous LV notes- consider coronary angio however concern for risk of GIANNI and lack of anginal symptoms  PTA- torsemide 20mg ( recent admission for LAZARO- decreased from 40mg BID) , dapagliflozin  IV lasix 40mg TID to 80mg IV BID yesterday  Net negative 1200- appear incomplete   Weight 175 ( down 2 lbs 48 hours)  Appears fairly euvolemic  Creat rising  Transition to oral diuretics this PM- torsemide 20mg BID  Follows at [facility]- needs close f/u    2  Lt pleural effusion  S/P thoracentesis 1350cc clear fluid  Bilateral thoracentesis 5/2022    3  HTN- -150's  Norvasc 5mg, coreg 25mg    4  HLD- atorvastatin 40mg     5   CKD 4- creat 2 3 ( 2 0 -48 hrs ago)  Baseline 1 8-2      Subjective/Objective   Chief Complaint/Subjective  Patient without cp, sob  oob ambulating hallway without sob        Vitals: /87   Pulse 72   Temp 98 °F (36 7 °C)   Resp 20   Wt 79 8 kg (175 lb 14 4 oz)   SpO2 95%   BMI 30 19 kg/m²     Vitals:    07/23/22 0600 07/25/22 0531   Weight: 81 6 kg (179 lb 14 3 oz) 79 8 kg (175 lb 14 4 oz)     Orthostatic Blood Pressures    Flowsheet Row Most Recent Value   Blood Pressure 140/87 filed at 07/25/2022 0744   Patient Position - Orthostatic VS Lying filed at 07/23/2022 2596            Intake/Output Summary (Last 24 hours) at 7/25/2022 1106  Last data filed at 7/24/2022 2203  Gross per 24 hour   Intake 220 ml   Output 1450 ml   Net -1230 ml       Invasive Devices  Report    Peripheral Intravenous Line  Duration           Peripheral IV 07/25/22 Left;Proximal;Ventral (anterior) Forearm <1 day                Current Facility-Administered Medications   Medication Dose Route Frequency    acetaminophen (TYLENOL) tablet 650 mg  650 mg Oral Q6H PRN    aluminum-magnesium hydroxide-simethicone (MYLANTA) oral suspension 30 mL  30 mL Oral Q6H PRN    amLODIPine (NORVASC) tablet 5 mg  5 mg Oral Daily    aspirin (ECOTRIN LOW STRENGTH) EC tablet 81 mg  81 mg Oral Daily    atorvastatin (LIPITOR) tablet 40 mg  40 mg Oral HS    carvedilol (COREG) tablet 25 mg  25 mg Oral BID With Meals    docusate sodium (COLACE) capsule 100 mg  100 mg Oral BID PRN    FLUoxetine (PROzac) capsule 20 mg  20 mg Oral Daily    folic acid (FOLVITE) tablet 400 mcg  400 mcg Oral Daily    furosemide (LASIX) injection 80 mg  80 mg Intravenous BID (diuretic)    gabapentin (NEURONTIN) capsule 300 mg  300 mg Oral TID    heparin (porcine) subcutaneous injection 5,000 Units  5,000 Units Subcutaneous Q8H [facility]    insulin lispro (HumaLOG) 100 units/mL subcutaneous injection 1-5 Units  1-5 Units Subcutaneous HS    insulin lispro (HumaLOG) 100 units/mL subcutaneous injection 1-6 Units  1-6 Units Subcutaneous TID AC    LORazepam (ATIVAN) tablet 0 5 mg  0 5 mg Oral HS    melatonin tablet 4 5 mg  4 5 mg Oral HS    ondansetron (ZOFRAN) injection 4 mg  4 mg Intravenous Q6H PRN    thiamine tablet 100 mg  100 mg Oral Daily         Physical Exam: /87   Pulse 72   Temp 98 °F (36 7 °C)   Resp 20   Wt 79 8 kg (175 lb 14 4 oz)   SpO2 95%   BMI 30 19 kg/m²     General Appearance:    Alert, cooperative, no distress, appears stated age   Head: Normocephalic, no scleral icterus   Eyes:    PERRL   Nose:   Nares normal, septum midline, no drainage    Throat:   Lips, mucosa, and tongue normal   Neck:   Supple, symmetrical, trachea midline,              Lungs:     Decreased bases  to auscultation bilaterally, respirations unlabored        Heart:    Regular rate and rhythm, S1 and S2 normal, no murmur, rub   or gallop   Abdomen:     Soft, non-tender, bowel sounds active all four quadrants,     no masses, no organomegaly   Extremities:   Extremities normal, atraumatic, no cyanosis or edema   Pulses:   2+ and symmetric all extremities   Skin:   Skin color, texture, turgor normal, no rashes or lesions   Neurologic:   Alert and oriented to person place and time, no focal deficits                 Lab Results:   Recent Results (from the past 72 hour(s))   Fingerstick Glucose (POCT)    Collection Time: 07/22/22  4:27 PM   Result Value Ref Range    POC Glucose 213 (H) 65 - 140 mg/dl   Fingerstick Glucose (POCT)    Collection Time: 07/22/22 10:07 PM   Result Value Ref Range    POC Glucose 154 (H) 65 - 140 mg/dl   Comprehensive metabolic panel    Collection Time: 07/23/22  5:58 AM   Result Value Ref Range    Sodium 143 135 - 147 mmol/L    Potassium 4 2 3 5 - 5 3 mmol/L    Chloride 113 (H) 96 - 108 mmol/L    CO2 24 21 - 32 mmol/L    ANION GAP 6 4 - 13 mmol/L    BUN 50 (H) 5 - 25 mg/dL    Creatinine 2 07 (H) 0 60 - 1 30 mg/dL    Glucose 117 65 - 140 mg/dL    Calcium 9 0 8 3 - 10 1 mg/dL    Corrected Calcium 10 0 8 3 - 10 1 mg/dL    AST 61 (H) 5 - 45 U/L    ALT 90 (H) 12 - 78 U/L    Alkaline Phosphatase 183 (H) 46 - 116 U/L    Total Protein 7 1 6 4 - 8 4 g/dL    Albumin 2 8 (L) 3 5 - 5 0 g/dL    Total Bilirubin 0 36 0 20 - 1 00 mg/dL    eGFR 32 ml/min/1 73sq m   Fingerstick Glucose (POCT)    Collection Time: 07/23/22  6:48 AM   Result Value Ref Range    POC Glucose 113 65 - 140 mg/dl   Fingerstick Glucose (POCT)    Collection Time: 07/23/22 11:47 AM   Result Value Ref Range POC Glucose 269 (H) 65 - 140 mg/dl   Fingerstick Glucose (POCT)    Collection Time: 07/23/22  4:37 PM   Result Value Ref Range    POC Glucose 198 (H) 65 - 140 mg/dl   Fingerstick Glucose (POCT)    Collection Time: 07/23/22  9:22 PM   Result Value Ref Range    POC Glucose 184 (H) 65 - 140 mg/dl   Basic metabolic panel    Collection Time: 07/24/22  4:32 AM   Result Value Ref Range    Sodium 141 135 - 147 mmol/L    Potassium 4 1 3 5 - 5 3 mmol/L    Chloride 110 (H) 96 - 108 mmol/L    CO2 27 21 - 32 mmol/L    ANION GAP 4 4 - 13 mmol/L    BUN 50 (H) 5 - 25 mg/dL    Creatinine 2 27 (H) 0 60 - 1 30 mg/dL    Glucose 222 (H) 65 - 140 mg/dL    Calcium 8 7 8 3 - 10 1 mg/dL    eGFR 29 ml/min/1 73sq m   Fingerstick Glucose (POCT)    Collection Time: 07/24/22  6:31 AM   Result Value Ref Range    POC Glucose 238 (H) 65 - 140 mg/dl   Fingerstick Glucose (POCT)    Collection Time: 07/24/22 11:46 AM   Result Value Ref Range    POC Glucose 219 (H) 65 - 140 mg/dl   Fingerstick Glucose (POCT)    Collection Time: 07/24/22  9:04 PM   Result Value Ref Range    POC Glucose 236 (H) 65 - 140 mg/dl   Fingerstick Glucose (POCT)    Collection Time: 07/25/22  5:34 AM   Result Value Ref Range    POC Glucose 197 (H) 65 - 140 mg/dl   Basic metabolic panel    Collection Time: 07/25/22  9:03 AM   Result Value Ref Range    Sodium 142 135 - 147 mmol/L    Potassium 4 2 3 5 - 5 3 mmol/L    Chloride 110 (H) 96 - 108 mmol/L    CO2 27 21 - 32 mmol/L    ANION GAP 5 4 - 13 mmol/L    BUN 52 (H) 5 - 25 mg/dL    Creatinine 2 39 (H) 0 60 - 1 30 mg/dL    Glucose 243 (H) 65 - 140 mg/dL    Calcium 8 9 8 3 - 10 1 mg/dL    eGFR 27 ml/min/1 73sq m     Imaging: I have personally reviewed pertinent reports  Tele- nsr      Counseling / Coordination of Care  Total time spent today 20 minutes  Greater than 50% of total time was spent with the patient and / or family counseling and / or coordination of care

## 2022-07-25 NOTE — PROGRESS NOTES
[address] [facility]  Progress Note - Denece Merlin 1955, 61 y o  male MRN: [id number]  Unit/Bed#: CW2 216-02 Encounter: [id number]  Primary Care Provider: Roderick Burris PA-C   Date and time admitted to hospital: 7/21/2022  9:43 PM    * Acute on chronic systolic congestive heart failure [facility])  Assessment & Plan  Wt Readings from Last 3 Encounters:   07/25/22 79 8 kg (175 lb 14 4 oz)   07/20/22 84 6 kg (186 lb 8 2 oz)   07/14/22 84 7 kg (186 lb 12 8 oz)     Recent admission for the same  As per discharge summary, plans regarding heart failure as follows:  Metoprolol, hydralazine and isosorbide discontinued  Coreg added  Torsemide changed to 20 mg daily on discharge  Patient reports compliance with these medications however may have increased salt intake lately  · Patient presented with progressive SOB and respiratory distress requiring BiPAP on admission   · ProBNP 26,238 on admission  CXR with vascular congestion and small bilateral pleural effusions  · [facility] [address] [address] [facility] [address] with LVEF 40-50%  Global hypokinesis  Valves not assessed  · Appreciate cardiology consult and recommendations   · Increased IV Lasix to 80 mg BID on 7/24  · Transitioned to PO torsemide, started this evening   · Continued on remainder of home cardiac regimen   · Defer need for repeat echocardiogram   · Monitor daily weights, intake and output, BMP   · Cardiac diet     Acute respiratory failure with hypoxia (HCC)  Assessment & Plan  · In the setting of acute on chronic CHF, requiring BiPAP on admission   · CXR on admission: Small bilateral pleural effusions again noted with bibasilar atelectasis or consolidation, appear slightly increased on the right      · Status post left sided thoracentesis in ED with 1350 cc drained   · Repeat CXR following thoracentesis: Development of mild vascular congestion   Small bilateral pleural effusions   · Monitor and titrate supplemental O2 as able to maintain SpO2 > 90%  · Resolved and remains stable on room air     Pleural effusion, bacterial  Assessment & Plan  · Status post left sided thoracentesis in the ED with 1350 cc drained   · Protein and LDH within normal limits   · Cultures negative   · CXR following thoracentesis noting small bilateral effusions   · Currently, oxygen saturations stable on room air   · See plan above     CKD (chronic kidney disease) stage 4, GFR 15-29 ml/min (HCC)  Assessment & Plan  Seen by nephrology on recent admission, felt to have baseline creatine in high 1s to low 2s  Creatinine 1 9 at time of discharge  · Creatinine noted to be up-trending again today   · Continue to monitor with diuresis  · Avoid nephrotoxins and hypotension   · Monitor BMP daily   · Consider nephrology consult     Essential hypertension  Assessment & Plan  · BP acceptable, monitor routinely   · Continue home dose Coreg  · Diuresis per cardiology      Type 2 diabetes mellitus, without long-term current use of insulin [facility])  Assessment & Plan  Lab Results   Component Value Date    HGBA1C 7 7 (H) 07/15/2022       Recent Labs     07/24/22  0631 07/24/22  1146 07/24/22  2104 07/25/22  0534   POCGLU 238* 219* 236* 197*       Blood Sugar Average: Last 72 hrs:  (P) 194 [id number]     · Hold Farxiga, metformin while inpatient   · QID accuchecks with SSI coverage for now   · Monitor and adjust regimen as needed   · Carb controlled diet   · Hypoglycemia protocol     Transaminitis  Assessment & Plan  · Mild, consider due to hepatic congestion in the setting of volume overload   · Hepatic steatosis noted on renal US   · Monitor CMP    Mixed hyperlipidemia  Assessment & Plan  · On Lipitor 40 mg daily    Bipolar affective disorder, mixed, moderate (HCC)  Assessment & Plan  · Mood stable   · Continue home medical management: lorazepam 0 5 mg at bedtime, Prozac 20 mg daily        VTE Pharmacologic Prophylaxis: VTE Score: 4 Moderate Risk (Score 3-4) - Pharmacological DVT Prophylaxis Ordered: heparin  Patient Centered Rounds: I performed bedside rounds with nursing staff today  Discussions with Specialists or Other Care Team Provider: primary RN, case management     Education and Discussions with Family / Patient: Patient declined call to   Time Spent for Care: 20 minutes  More than 50% of total time spent on counseling and coordination of care as described above  Current Length of Stay: 4 day(s)  Current Patient Status: Inpatient   Certification Statement: The patient will continue to require additional inpatient hospital stay due to pending cardiology clearance  Discharge Plan: Anticipate discharge in 24-48 hrs to home  Code Status: Level 1 - Full Code    Subjective:   Patient offers no complaints today, specifically denies chest pain or SOB  Objective:     Vitals:   Temp (24hrs), Av 2 °F (36 8 °C), Min:97 5 °F (36 4 °C), Max:98 7 °F (37 1 °C)    Temp:  [97 5 °F (36 4 °C)-98 7 °F (37 1 °C)] 98 7 °F (37 1 °C)  HR:  [70-79] 77  Resp:  [20] 20  BP: (138-150)/(85-87) 138/85  SpO2:  [94 %-96 %] 96 %  Body mass index is 30 19 kg/m²  Input and Output Summary (last 24 hours): Intake/Output Summary (Last 24 hours) at 2022 1605  Last data filed at 2022 1101  Gross per 24 hour   Intake 800 ml   Output 2200 ml   Net -1400 ml       Physical Exam:   Physical Exam  Vitals and nursing note reviewed  Constitutional:       General: He is not in acute distress  Cardiovascular:      Rate and Rhythm: Normal rate and regular rhythm  Pulmonary:      Effort: Pulmonary effort is normal  No respiratory distress  Comments: On room air  Musculoskeletal:      Right lower leg: No edema  Left lower leg: No edema  Skin:     General: Skin is warm  Coloration: Skin is not pale  Findings: No erythema  Neurological:      Mental Status: He is alert and oriented to person, place, and time  Mental status is at baseline  Additional Data:     Labs:  Results from last 7 days   Lab Units 07/21/22  2159   WBC Thousand/uL 5 16   HEMOGLOBIN g/dL 9 5*   HEMATOCRIT % 31 0*   PLATELETS Thousands/uL 157   NEUTROS PCT % 56   LYMPHS PCT % 30   MONOS PCT % 9   EOS PCT % 4     Results from last 7 days   Lab Units 07/25/22  0903 07/24/22  0432 07/23/22  0558   SODIUM mmol/L 142   < > 143   POTASSIUM mmol/L 4 2   < > 4 2   CHLORIDE mmol/L 110*   < > 113*   CO2 mmol/L 27   < > 24   BUN mg/dL 52*   < > 50*   CREATININE mg/dL 2 39*   < > 2 07*   ANION GAP mmol/L 5   < > 6   CALCIUM mg/dL 8 9   < > 9 0   ALBUMIN g/dL  --   --  2 8*   TOTAL BILIRUBIN mg/dL  --   --  0 36   ALK PHOS U/L  --   --  183*   ALT U/L  --   --  90*   AST U/L  --   --  61*   GLUCOSE RANDOM mg/dL 243*   < > 117    < > = values in this interval not displayed  Results from last 7 days   Lab Units 07/25/22  1122 07/25/22  0534 07/24/22  2104 07/24/22  1146 07/24/22  0631 07/23/22  2122 07/23/22  1637 07/23/22  1147 07/23/22  0648 07/22/22  2207 07/22/22  1627 07/22/22  1059   POC GLUCOSE mg/dl 332* 197* 236* 219* 238* 184* 198* 269* 113 154* 213* 224*               Lines/Drains:  Invasive Devices  Report    Peripheral Intravenous Line  Duration           Peripheral IV 07/25/22 Left;Proximal;Ventral (anterior) Forearm <1 day                      Imaging: No pertinent imaging reviewed      Recent Cultures (last 7 days):   Results from last 7 days   Lab Units 07/22/22  0045   GRAM STAIN RESULT  No Polys or Bacteria seen   BODY FLUID CULTURE, STERILE  No growth       Last 24 Hours Medication List:   Current Facility-Administered Medications   Medication Dose Route Frequency Provider Last Rate    acetaminophen  650 mg Oral Q6H PRN Marques Guzmán MD      aluminum-magnesium hydroxide-simethicone  30 mL Oral Q6H PRN Marques Guzmán MD      amLODIPine  5 mg Oral Daily Domitila Conte MD      aspirin  81 mg Oral Daily Marques Guzmán MD      atorvastatin  40 mg Oral HS Haylee Singer MD      carvedilol  25 mg Oral BID With Meals Haylee Singer MD      docusate sodium  100 mg Oral BID PRN Haylee Singer MD      FLUoxetine  20 mg Oral Daily Haylee Singer MD      folic acid  439 mcg Oral Daily Haylee Singer MD      gabapentin  300 mg Oral TID Haylee Singer MD      heparin (porcine)  5,000 Units Subcutaneous [facility] [address] Haylee Singer MD      insulin lispro  1-5 Units Subcutaneous HS Haylee Singer MD      insulin lispro  1-6 Units Subcutaneous TID ARELY Quintana MD      LORazepam  0 5 mg Oral HS Haylee Singer MD      melatonin  4 5 mg Oral HS Haylee Singer MD      ondansetron  4 mg Intravenous Q6H PRN Haylee Singer MD      thiamine  100 mg Oral Daily Haylee Singer MD      torsemide  20 mg Oral BID EMMA Thornton          Today, Patient Was Seen By: Aracelis Eli PA-C    **Please Note: This note may have been constructed using a voice recognition system  **

## 2022-07-25 NOTE — ASSESSMENT & PLAN NOTE
Wt Readings from Last 3 Encounters:   07/25/22 79 8 kg (175 lb 14 4 oz)   07/20/22 84 6 kg (186 lb 8 2 oz)   07/14/22 84 7 kg (186 lb 12 8 oz)     Recent admission for the same  As per discharge summary, plans regarding heart failure as follows:  Metoprolol, hydralazine and isosorbide discontinued  Coreg added  Torsemide changed to 20 mg daily on discharge  Patient reports compliance with these medications however may have increased salt intake lately  · Patient presented with progressive SOB and respiratory distress requiring BiPAP on admission   · ProBNP 26,238 on admission  CXR with vascular congestion and small bilateral pleural effusions  · Most recent limited echo July 2022 at [address] with LVEF 40-50%  Global hypokinesis  Valves not assessed    · Appreciate cardiology consult and recommendations   · Increased IV Lasix to 80 mg BID on 7/24  · Transitioned to PO torsemide, started this evening   · Continued on remainder of home cardiac regimen   · Defer need for repeat echocardiogram   · Monitor daily weights, intake and output, BMP   · Cardiac diet

## 2022-07-25 NOTE — ASSESSMENT & PLAN NOTE
Seen by nephrology on recent admission, felt to have baseline creatine in high 1s to low 2s  Creatinine 1 9 at time of discharge    · Creatinine noted to be up-trending again today   · Continue to monitor with diuresis  · Avoid nephrotoxins and hypotension   · Monitor BMP daily   · Consider nephrology consult

## 2022-07-25 NOTE — ASSESSMENT & PLAN NOTE
Lab Results   Component Value Date    HGBA1C 7 7 (H) 07/15/2022       Recent Labs     07/24/22  0631 07/24/22  1146 07/24/22  2104 07/25/22  0534   POCGLU 238* 219* 236* 197*       Blood Sugar Average: Last 72 hrs:  (P) 760 [id number]     · Hold Farxiga, metformin while inpatient   · QID accuchecks with SSI coverage for now   · Monitor and adjust regimen as needed   · Carb controlled diet   · Hypoglycemia protocol

## 2022-07-25 NOTE — PLAN OF CARE
Problem: CARDIOVASCULAR - ADULT  Goal: Maintains optimal cardiac output and hemodynamic stability  Description: INTERVENTIONS:  - Monitor I/O, vital signs and rhythm  - Monitor for S/S and trends of decreased cardiac output  - Administer and titrate ordered vasoactive medications to optimize hemodynamic stability  - Assess quality of pulses, skin color and temperature  - Assess for signs of decreased coronary artery perfusion  - Instruct patient to report change in severity of symptoms  Outcome: Progressing  Goal: Absence of cardiac dysrhythmias or at baseline rhythm  Description: INTERVENTIONS:  - Continuous cardiac monitoring, vital signs, obtain 12 lead EKG if ordered  - Administer antiarrhythmic and heart rate control medications as ordered  - Monitor electrolytes and administer replacement therapy as ordered  Outcome: Progressing     Problem: RESPIRATORY - ADULT  Goal: Achieves optimal ventilation and oxygenation  Description: INTERVENTIONS:  - Assess for changes in respiratory status  - Assess for changes in mentation and behavior  - Position to facilitate oxygenation and minimize respiratory effort  - Oxygen administered by appropriate delivery if ordered  - Initiate smoking cessation education as indicated  - Encourage broncho-pulmonary hygiene including cough, deep breathe, Incentive Spirometry  - Assess the need for suctioning and aspirate as needed  - Assess and instruct to report SOB or any respiratory difficulty  - Respiratory Therapy support as indicated  Outcome: Progressing     Problem: MOBILITY - ADULT  Goal: Maintain or return to baseline ADL function  Description: INTERVENTIONS:  -  Assess patient's ability to carry out ADLs; assess patient's baseline for ADL function and identify physical deficits which impact ability to perform ADLs (bathing, care of mouth/teeth, toileting, grooming, dressing, etc )  - Assess/evaluate cause of self-care deficits   - Assess range of motion  - Assess patient's mobility; develop plan if impaired  - Assess patient's need for assistive devices and provide as appropriate  - Encourage maximum independence but intervene and supervise when necessary  - Involve family in performance of ADLs  - Assess for home care needs following discharge   - Consider OT consult to assist with ADL evaluation and planning for discharge  - Provide patient education as appropriate  Outcome: Progressing     Problem: Potential for Falls  Goal: Patient will remain free of falls  Description: INTERVENTIONS:  - Educate patient/family on patient safety including physical limitations  - Instruct patient to call for assistance with activity   - Consult OT/PT to assist with strengthening/mobility   - Keep Call bell within reach  - Keep bed low and locked with side rails adjusted as appropriate  - Keep care items and personal belongings within reach  - Initiate and maintain comfort rounds  - Make Fall Risk Sign visible to staff  - Offer Toileting every 2 Hours, in advance of need  - Initiate/Maintain bed alarm  - Obtain necessary fall risk management equipment: cane  - Apply yellow socks and bracelet for high fall risk patients  - Consider moving patient to room near nurses station  Outcome: Progressing

## 2022-07-25 NOTE — PLAN OF CARE
Problem: CARDIOVASCULAR - ADULT  Goal: Maintains optimal cardiac output and hemodynamic stability  Description: INTERVENTIONS:  - Monitor I/O, vital signs and rhythm  - Monitor for S/S and trends of decreased cardiac output  - Administer and titrate ordered vasoactive medications to optimize hemodynamic stability  - Assess quality of pulses, skin color and temperature  - Assess for signs of decreased coronary artery perfusion  - Instruct patient to report change in severity of symptoms  Outcome: Progressing  Goal: Absence of cardiac dysrhythmias or at baseline rhythm  Description: INTERVENTIONS:  - Continuous cardiac monitoring, vital signs, obtain 12 lead EKG if ordered  - Administer antiarrhythmic and heart rate control medications as ordered  - Monitor electrolytes and administer replacement therapy as ordered  Outcome: Progressing     Problem: RESPIRATORY - ADULT  Goal: Achieves optimal ventilation and oxygenation  Description: INTERVENTIONS:  - Assess for changes in respiratory status  - Assess for changes in mentation and behavior  - Position to facilitate oxygenation and minimize respiratory effort  - Oxygen administered by appropriate delivery if ordered  - Initiate smoking cessation education as indicated  - Encourage broncho-pulmonary hygiene including cough, deep breathe, Incentive Spirometry  - Assess the need for suctioning and aspirate as needed  - Assess and instruct to report SOB or any respiratory difficulty  - Respiratory Therapy support as indicated  Outcome: Progressing     Problem: MOBILITY - ADULT  Goal: Maintain or return to baseline ADL function  Description: INTERVENTIONS:  -  Assess patient's ability to carry out ADLs; assess patient's baseline for ADL function and identify physical deficits which impact ability to perform ADLs (bathing, care of mouth/teeth, toileting, grooming, dressing, etc )  - Assess/evaluate cause of self-care deficits   - Assess range of motion  - Assess patient's mobility; develop plan if impaired  - Assess patient's need for assistive devices and provide as appropriate  - Encourage maximum independence but intervene and supervise when necessary  - Involve family in performance of ADLs  - Assess for home care needs following discharge   - Consider OT consult to assist with ADL evaluation and planning for discharge  - Provide patient education as appropriate  Outcome: Progressing  Goal: Maintains/Returns to pre admission functional level  Description: INTERVENTIONS:  - Perform BMAT or MOVE assessment daily    - Set and communicate daily mobility goal to care team and patient/family/caregiver  - Collaborate with rehabilitation services on mobility goals if consulted  - Perform Range of Motion   times a day  - Reposition patient every   hours    - Dangle patient   times a day  - Stand patient   times a day  - Ambulate patient   times a day  - Out of bed to chair   times a day   - Out of bed for meals   times a day  - Out of bed for toileting  - Record patient progress and toleration of activity level   Outcome: Progressing     Problem: Potential for Falls  Goal: Patient will remain free of falls  Description: INTERVENTIONS:  - Educate patient/family on patient safety including physical limitations  - Instruct patient to call for assistance with activity   - Consult OT/PT to assist with strengthening/mobility   - Keep Call bell within reach  - Keep bed low and locked with side rails adjusted as appropriate  - Keep care items and personal belongings within reach  - Initiate and maintain comfort rounds  - Make Fall Risk Sign visible to staff  - Offer Toileting every   Hours, in advance of need  - Initiate/Maintain  alarm  - Obtain necessary fall risk management equipment:      - Apply yellow socks and bracelet for high fall risk patients  - Consider moving patient to room near nurses station  Outcome: Progressing     Problem: Prexisting or High Potential for Compromised Skin Integrity  Goal: Skin integrity is maintained or improved  Description: INTERVENTIONS:  - Identify patients at risk for skin breakdown  - Assess and monitor skin integrity  - Assess and monitor nutrition and hydration status  - Monitor labs   - Assess for incontinence   - Turn and reposition patient  - Assist with mobility/ambulation  - Relieve pressure over bony prominences  - Avoid friction and shearing  - Provide appropriate hygiene as needed including keeping skin clean and dry  - Evaluate need for skin moisturizer/barrier cream  - Collaborate with interdisciplinary team   - Patient/family teaching  - Consider wound care consult   Outcome: Progressing

## 2022-07-26 LAB
ALBUMIN SERPL BCP-MCNC: 2.8 G/DL (ref 3.5–5)
ALP SERPL-CCNC: 164 U/L (ref 46–116)
ALT SERPL W P-5'-P-CCNC: 76 U/L (ref 12–78)
ANION GAP SERPL CALCULATED.3IONS-SCNC: 7 MMOL/L (ref 4–13)
AST SERPL W P-5'-P-CCNC: 49 U/L (ref 5–45)
BILIRUB SERPL-MCNC: 0.26 MG/DL (ref 0.2–1)
BUN SERPL-MCNC: 53 MG/DL (ref 5–25)
CALCIUM ALBUM COR SERPL-MCNC: 10.2 MG/DL (ref 8.3–10.1)
CALCIUM SERPL-MCNC: 9.2 MG/DL (ref 8.3–10.1)
CHLORIDE SERPL-SCNC: 106 MMOL/L (ref 96–108)
CO2 SERPL-SCNC: 27 MMOL/L (ref 21–32)
CREAT SERPL-MCNC: 2.39 MG/DL (ref 0.6–1.3)
GFR SERPL CREATININE-BSD FRML MDRD: 27 ML/MIN/1.73SQ M
GLUCOSE SERPL-MCNC: 204 MG/DL (ref 65–140)
GLUCOSE SERPL-MCNC: 205 MG/DL (ref 65–140)
GLUCOSE SERPL-MCNC: 210 MG/DL (ref 65–140)
GLUCOSE SERPL-MCNC: 260 MG/DL (ref 65–140)
GLUCOSE SERPL-MCNC: 284 MG/DL (ref 65–140)
POTASSIUM SERPL-SCNC: 3.7 MMOL/L (ref 3.5–5.3)
PROT SERPL-MCNC: 7.3 G/DL (ref 6.4–8.4)
SODIUM SERPL-SCNC: 140 MMOL/L (ref 135–147)

## 2022-07-26 PROCEDURE — 82948 REAGENT STRIP/BLOOD GLUCOSE: CPT

## 2022-07-26 PROCEDURE — 99232 SBSQ HOSP IP/OBS MODERATE 35: CPT | Performed by: INTERNAL MEDICINE

## 2022-07-26 PROCEDURE — 80053 COMPREHEN METABOLIC PANEL: CPT | Performed by: PHYSICIAN ASSISTANT

## 2022-07-26 PROCEDURE — 99232 SBSQ HOSP IP/OBS MODERATE 35: CPT | Performed by: PHYSICIAN ASSISTANT

## 2022-07-26 RX ORDER — INSULIN GLARGINE 100 [IU]/ML
10 INJECTION, SOLUTION SUBCUTANEOUS
Status: DISCONTINUED | OUTPATIENT
Start: 2022-07-26 | End: 2022-07-27 | Stop reason: HOSPADM

## 2022-07-26 RX ORDER — TORSEMIDE 20 MG/1
20 TABLET ORAL ONCE
Status: COMPLETED | OUTPATIENT
Start: 2022-07-26 | End: 2022-07-26

## 2022-07-26 RX ORDER — TORSEMIDE 20 MG/1
40 TABLET ORAL DAILY
Status: DISCONTINUED | OUTPATIENT
Start: 2022-07-27 | End: 2022-07-27 | Stop reason: HOSPADM

## 2022-07-26 RX ADMIN — INSULIN GLARGINE 10 UNITS: 100 INJECTION, SOLUTION SUBCUTANEOUS at 22:46

## 2022-07-26 RX ADMIN — THIAMINE HCL TAB 100 MG 100 MG: 100 TAB at 08:24

## 2022-07-26 RX ADMIN — AMLODIPINE BESYLATE 5 MG: 5 TABLET ORAL at 08:23

## 2022-07-26 RX ADMIN — FLUOXETINE 20 MG: 20 CAPSULE ORAL at 08:23

## 2022-07-26 RX ADMIN — GABAPENTIN 300 MG: 300 CAPSULE ORAL at 22:46

## 2022-07-26 RX ADMIN — LORAZEPAM 0.5 MG: 0.5 TABLET ORAL at 22:45

## 2022-07-26 RX ADMIN — HEPARIN SODIUM 5000 UNITS: 5000 INJECTION INTRAVENOUS; SUBCUTANEOUS at 06:16

## 2022-07-26 RX ADMIN — INSULIN LISPRO 2 UNITS: 100 INJECTION, SOLUTION INTRAVENOUS; SUBCUTANEOUS at 06:49

## 2022-07-26 RX ADMIN — TORSEMIDE 20 MG: 20 TABLET ORAL at 08:23

## 2022-07-26 RX ADMIN — INSULIN LISPRO 3 UNITS: 100 INJECTION, SOLUTION INTRAVENOUS; SUBCUTANEOUS at 11:52

## 2022-07-26 RX ADMIN — FOLIC ACID TAB 400 MCG 400 MCG: 400 TAB at 08:24

## 2022-07-26 RX ADMIN — GABAPENTIN 300 MG: 300 CAPSULE ORAL at 17:19

## 2022-07-26 RX ADMIN — ASPIRIN 81 MG: 81 TABLET, COATED ORAL at 08:23

## 2022-07-26 RX ADMIN — Medication 4.5 MG: at 22:45

## 2022-07-26 RX ADMIN — CARVEDILOL 25 MG: 25 TABLET, FILM COATED ORAL at 08:23

## 2022-07-26 RX ADMIN — INSULIN LISPRO 4 UNITS: 100 INJECTION, SOLUTION INTRAVENOUS; SUBCUTANEOUS at 17:16

## 2022-07-26 RX ADMIN — INSULIN LISPRO 1 UNITS: 100 INJECTION, SOLUTION INTRAVENOUS; SUBCUTANEOUS at 22:48

## 2022-07-26 RX ADMIN — HEPARIN SODIUM 5000 UNITS: 5000 INJECTION INTRAVENOUS; SUBCUTANEOUS at 22:46

## 2022-07-26 RX ADMIN — CARVEDILOL 25 MG: 25 TABLET, FILM COATED ORAL at 17:18

## 2022-07-26 RX ADMIN — HEPARIN SODIUM 5000 UNITS: 5000 INJECTION INTRAVENOUS; SUBCUTANEOUS at 14:19

## 2022-07-26 RX ADMIN — TORSEMIDE 20 MG: 20 TABLET ORAL at 17:17

## 2022-07-26 RX ADMIN — GABAPENTIN 300 MG: 300 CAPSULE ORAL at 08:23

## 2022-07-26 RX ADMIN — ATORVASTATIN CALCIUM 40 MG: 40 TABLET, FILM COATED ORAL at 22:45

## 2022-07-26 NOTE — PROGRESS NOTES
[facility]  Progress Note - Tree Mortensen 1955, 61 y o  male MRN: [id number]  Unit/Bed#: CW2 216-02 Encounter: [id number]  Primary Care Provider: Marley Huang PA-C   Date and time admitted to hospital: 7/21/2022  9:43 PM    * Acute on chronic systolic congestive heart failure [facility])  Assessment & Plan  Wt Readings from Last 3 Encounters:   07/26/22 80 kg (176 lb 5 9 oz)   07/20/22 84 6 kg (186 lb 8 2 oz)   07/14/22 84 7 kg (186 lb 12 8 oz)     Recent admission for the same  As per discharge summary, plans regarding heart failure as follows:  Metoprolol, hydralazine and isosorbide discontinued  Coreg added  Torsemide changed to 20 mg daily on discharge  Patient reports compliance with these medications however may have increased salt intake lately  · Patient presented with progressive SOB and respiratory distress requiring BiPAP on admission   · ProBNP 26,238 on admission  CXR with vascular congestion and small bilateral pleural effusions  · Most recent limited echo July 2022 at [address] [facility] [address] with LVEF 40-50%  Global hypokinesis  Valves not assessed    · Appreciate cardiology consult and recommendations   · S/p IV diuresis   · Transitioned to PO torsemide 40 mg daily  · Continued on remainder of home cardiac regimen   · Defer need for repeat echocardiogram   · Monitor daily weights, intake and output, BMP   · Cardiac diet   · If labs/clinically stable tomorrow, can d/c tomorrow with outpt f/u     Transaminitis  Assessment & Plan  · Mild, consider due to hepatic congestion in the setting of volume overload   · Hepatic steatosis noted on renal US   · Monitor CMP    Pleural effusion, bacterial  Assessment & Plan  · Status post left sided thoracentesis in the ED with 1350 cc drained   · Protein and LDH within normal limits   · Cultures negative   · CXR following thoracentesis noting small bilateral effusions   · Currently, oxygen saturations stable on room air   · See plan above Acute respiratory failure with hypoxia (HCC)  Assessment & Plan  · In the setting of acute on chronic CHF, requiring BiPAP on admission   · CXR on admission: Small bilateral pleural effusions again noted with bibasilar atelectasis or consolidation, appear slightly increased on the right      · Status post left sided thoracentesis in ED with 1350 cc drained   · Repeat CXR following thoracentesis: Development of mild vascular congestion  Small bilateral pleural effusions   · Monitor and titrate supplemental O2 as able to maintain SpO2 > 90%  · Resolved and remains stable on room air     Type 2 diabetes mellitus, without long-term current use of insulin [facility])  Assessment & Plan  Lab Results   Component Value Date    HGBA1C 7 7 (H) 07/15/2022       Recent Labs     07/25/22  1631 07/25/22  2120 07/26/22  0555 07/26/22  1059   POCGLU 177* 311* 210* 260*       Blood Sugar Average: Last 72 hrs:  (P) 998 [id number]     · Hold Farxiga, metformin while inpatient   · QID accuchecks with SSI coverage for now   · Noted hyperglycemia, add Lantus 10 units QHS   · Monitor and adjust regimen as needed   · Carb controlled diet   · Hypoglycemia protocol     Mixed hyperlipidemia  Assessment & Plan  · On Lipitor 40 mg daily    Essential hypertension  Assessment & Plan  · BP acceptable, monitor routinely   · Continue home dose Coreg  · Diuresis per cardiology      Bipolar affective disorder, mixed, moderate (HCC)  Assessment & Plan  · Mood stable   · Continue home medical management: lorazepam 0 5 mg at bedtime, Prozac 20 mg daily  CKD (chronic kidney disease) stage 4, GFR 15-29 ml/min ([facility])  Assessment & Plan  Seen by nephrology on recent admission, felt to have baseline creatine in high 1s to low 2s  Creatinine 1 9 at time of discharge    · Creatinine noted to be up-trending again but stable compared to yesterday    · Continue to monitor with diuresis  · Avoid nephrotoxins and hypotension   · Monitor BMP daily   · May need to allow for higher Cr to keep euvolemic  · If renal function stable tomorrow he can be discharged         VTE Pharmacologic Prophylaxis: VTE Score: 4 Moderate Risk (Score 3-4) - Pharmacological DVT Prophylaxis Ordered: heparin  Patient Centered Rounds: I performed bedside rounds with nursing staff today  Discussions with Specialists or Other Care Team Provider: RN, cardiology     Education and Discussions with Family / Patient: Updated  (brother) via phone  Time Spent for Care: 30 minutes  More than 50% of total time spent on counseling and coordination of care as described above  Current Length of Stay: 5 day(s)  Current Patient Status: Inpatient   Certification Statement: The patient will continue to require additional inpatient hospital stay due to CHF  Discharge Plan: Anticipate discharge tomorrow to home  Code Status: Level 1 - Full Code    Subjective:   Pt has no acute complaints today, states he feels a lot better  Breathing is good, denies any pain  Nicholette Motts to go home tomorrow  Objective:     Vitals:   Temp (24hrs), Av 3 °F (36 8 °C), Min:97 8 °F (36 6 °C), Max:98 7 °F (37 1 °C)    Temp:  [97 8 °F (36 6 °C)-98 7 °F (37 1 °C)] 97 8 °F (36 6 °C)  HR:  [73-83] 83  Resp:  [18-20] 18  BP: ()/(60-91) 134/75  SpO2:  [92 %-96 %] 93 %  Body mass index is 30 27 kg/m²  Input and Output Summary (last 24 hours): Intake/Output Summary (Last 24 hours) at 2022 1229  Last data filed at 2022 0800  Gross per 24 hour   Intake 880 ml   Output 500 ml   Net 380 ml       Physical Exam:   Physical Exam  Vitals reviewed  Constitutional:       General: He is not in acute distress  Appearance: He is not toxic-appearing  HENT:      Head: Normocephalic and atraumatic  Eyes:      Extraocular Movements: Extraocular movements intact  Cardiovascular:      Rate and Rhythm: Normal rate and regular rhythm     Pulmonary:      Effort: Pulmonary effort is normal       Breath sounds: Normal breath sounds  Abdominal:      General: Bowel sounds are normal  There is no distension  Palpations: Abdomen is soft  Tenderness: There is no abdominal tenderness  Musculoskeletal:         General: Normal range of motion  Cervical back: Normal range of motion  Neurological:      General: No focal deficit present  Mental Status: He is alert and oriented to person, place, and time  Psychiatric:         Mood and Affect: Mood normal          Behavior: Behavior normal          Thought Content: Thought content normal           Additional Data:     Labs:  Results from last 7 days   Lab Units 07/21/22  2159   WBC Thousand/uL 5 16   HEMOGLOBIN g/dL 9 5*   HEMATOCRIT % 31 0*   PLATELETS Thousands/uL 157   NEUTROS PCT % 56   LYMPHS PCT % 30   MONOS PCT % 9   EOS PCT % 4     Results from last 7 days   Lab Units 07/26/22  0528   SODIUM mmol/L 140   POTASSIUM mmol/L 3 7   CHLORIDE mmol/L 106   CO2 mmol/L 27   BUN mg/dL 53*   CREATININE mg/dL 2 39*   ANION GAP mmol/L 7   CALCIUM mg/dL 9 2   ALBUMIN g/dL 2 8*   TOTAL BILIRUBIN mg/dL 0 26   ALK PHOS U/L 164*   ALT U/L 76   AST U/L 49*   GLUCOSE RANDOM mg/dL 204*         Results from last 7 days   Lab Units 07/26/22  1059 07/26/22  0555 07/25/22  2120 07/25/22  1631 07/25/22  1122 07/25/22  0534 07/24/22  2104 07/24/22  1146 07/24/22  0631 07/23/22  2122 07/23/22  1637 07/23/22  1147   POC GLUCOSE mg/dl 260* 210* 311* 177* 332* 197* 236* 219* 238* 184* 198* 269*               Lines/Drains:  Invasive Devices  Report    Peripheral Intravenous Line  Duration           Peripheral IV 07/25/22 Left;Proximal;Ventral (anterior) Forearm 1 day                      Imaging: No pertinent imaging reviewed      Recent Cultures (last 7 days):   Results from last 7 days   Lab Units 07/22/22  0045   GRAM STAIN RESULT  No Polys or Bacteria seen   BODY FLUID CULTURE, STERILE  No growth       Last 24 Hours Medication List:   Current Facility-Administered Medications   Medication Dose Route Frequency Provider Last Rate    acetaminophen  650 mg Oral Q6H PRN Dora Quijano MD      aluminum-magnesium hydroxide-simethicone  30 mL Oral Q6H PRN Dora Quijano MD      amLODIPine  5 mg Oral Daily Josefa Sheffield MD      aspirin  81 mg Oral Daily Dora Quijano MD      atorvastatin  40 mg Oral HS Dora Quijano MD      carvedilol  25 mg Oral BID With Meals Dora Quijano MD      docusate sodium  100 mg Oral BID PRN Dora Quijano MD      FLUoxetine  20 mg Oral Daily Dora Quijano MD      folic acid  148 mcg Oral Daily Dora Quijano MD      gabapentin  300 mg Oral TID Dora Quijano MD      heparin (porcine)  5,000 Units Subcutaneous [facility] [address] Dora Quijano MD      insulin glargine  10 Units Subcutaneous HS Leonor Sahni PA-C      insulin lispro  1-5 Units Subcutaneous HS Dora Quijano MD      insulin lispro  1-6 Units Subcutaneous TID AC Dora Quijano MD      LORazepam  0 5 mg Oral HS Dora Quijano MD      melatonin  4 5 mg Oral HS Dora Quijano MD      ondansetron  4 mg Intravenous Q6H PRN Dora Quijano MD      thiamine  100 mg Oral Daily MD Tima Avendano Presume ON 7/27/2022] torsemide  40 mg Oral Daily Rafael Patrick PA-C          Today, Patient Was Seen By: Rafael Patrick PA-C    **Please Note: This note may have been constructed using a voice recognition system  **

## 2022-07-26 NOTE — PROGRESS NOTES
Progress Note - Cardiology Team 1  Garrett Jolly [age] y o  male MRN: [id number]  Unit/Bed#: CW2 216-02 Encounter: [id number]        Principal Problem:    Acute on chronic systolic congestive heart failure (HCC)  Active Problems:    CKD (chronic kidney disease) stage 4, GFR 15-29 ml/min (HCC)    Bipolar affective disorder, mixed, moderate (HCC)    Essential hypertension    Mixed hyperlipidemia    Type 2 diabetes mellitus, without long-term current use of insulin (HCC)    Acute respiratory failure with hypoxia (HCC)    Pleural effusion, bacterial    Transaminitis        Assessment/Plan     1  Acute on chronic combined systolic and diastolic HF  LVEF 72-03%  + dietary indiscretions   BB- coreg  No ACE/ARB d/t CKD/LAZARO  Etiology unclear- pharmacological myoview stress test apical / inferior infarct  Previous LV notes- consider coronary angio however concern for risk of GIANNI and lack of anginal symptoms  PTA- torsemide 20mg ( recent admission for LAZARO- decreased from 40mg BID) , dapagliflozin  S/P IV lasix  Transitioned to demadex 20mg BID  Weight bed scale- suspect inaccurate  Follows at [facility]- needs close f/u  Monitor I O, symptoms  Plan discharge tomorrow am  OOB ambulate      2  Lt pleural effusion  S/P thoracentesis 1350cc clear fluid  Bilateral thoracentesis 5/2022  See #1     3  HTN- 's  Norvasc 5mg, coreg 25mg     4  HLD- atorvastatin 40mg      5  CKD 4- creat 2 3  Baseline 1 8-2    Subjective/Objective   Chief Complaint/Subjective  Patient out of bed ambulating  No shortness of breath or chest pain  Reviewed medications, plan,  follow-up          Vitals: /75   Pulse 83   Temp 97 8 °F (36 6 °C)   Resp 18   Wt 80 kg (176 lb 5 9 oz)   SpO2 93%   BMI 30 27 kg/m²     Vitals:    07/25/22 0531 07/26/22 0600   Weight: 79 8 kg (175 lb 14 4 oz) 80 kg (176 lb 5 9 oz)     Orthostatic Blood Pressures    Flowsheet Row Most Recent Value   Blood Pressure 134/75 filed at 07/26/2022 0741   Patient Position - Orthostatic VS Lying filed at 07/23/2022 0653            Intake/Output Summary (Last 24 hours) at 7/26/2022 0910  Last data filed at 7/26/2022 0800  Gross per 24 hour   Intake 1200 ml   Output 850 ml   Net 350 ml       Invasive Devices  Report    Peripheral Intravenous Line  Duration           Peripheral IV 07/25/22 Left;Proximal;Ventral (anterior) Forearm 1 day                Current Facility-Administered Medications   Medication Dose Route Frequency    acetaminophen (TYLENOL) tablet 650 mg  650 mg Oral Q6H PRN    aluminum-magnesium hydroxide-simethicone (MYLANTA) oral suspension 30 mL  30 mL Oral Q6H PRN    amLODIPine (NORVASC) tablet 5 mg  5 mg Oral Daily    aspirin (ECOTRIN LOW STRENGTH) EC tablet 81 mg  81 mg Oral Daily    atorvastatin (LIPITOR) tablet 40 mg  40 mg Oral HS    carvedilol (COREG) tablet 25 mg  25 mg Oral BID With Meals    docusate sodium (COLACE) capsule 100 mg  100 mg Oral BID PRN    FLUoxetine (PROzac) capsule 20 mg  20 mg Oral Daily    folic acid (FOLVITE) tablet 400 mcg  400 mcg Oral Daily    gabapentin (NEURONTIN) capsule 300 mg  300 mg Oral TID    heparin (porcine) subcutaneous injection 5,000 Units  5,000 Units Subcutaneous Q8H [address]    insulin lispro (HumaLOG) 100 units/mL subcutaneous injection 1-5 Units  1-5 Units Subcutaneous HS    insulin lispro (HumaLOG) 100 units/mL subcutaneous injection 1-6 Units  1-6 Units Subcutaneous TID AC    LORazepam (ATIVAN) tablet 0 5 mg  0 5 mg Oral HS    melatonin tablet 4 5 mg  4 5 mg Oral HS    ondansetron (ZOFRAN) injection 4 mg  4 mg Intravenous Q6H PRN    thiamine tablet 100 mg  100 mg Oral Daily    torsemide (DEMADEX) tablet 20 mg  20 mg Oral BID         Physical Exam: /75   Pulse 83   Temp 97 8 °F (36 6 °C)   Resp 18   Wt 80 kg (176 lb 5 9 oz)   SpO2 93%   BMI 30 27 kg/m²     General Appearance:    Alert, cooperative, no distress, appears stated age   Head:    Normocephalic, no scleral icterus   Eyes:    PERRL   Nose: Nares normal, septum midline, no drainage    Throat:   Lips, mucosa, and tongue normal   Neck:   Supple, symmetrical, trachea midline,             Lungs:     Clear to auscultation bilaterally, respirations unlabored   Chest Wall:    No tenderness or deformity    Heart:    Regular rate and rhythm, S1 and S2 normal, no murmur, rub   or gallop   Abdomen:     Soft, non-tender, bowel sounds active all four quadrants,     no masses, no organomegaly   Extremities:   Extremities normal, atraumatic, no cyanosis or edema   Pulses:   2+ and symmetric all extremities   Skin:   Skin color, texture, turgor normal, no rashes or lesions   Neurologic:   Alert and oriented to person place and time, no focal deficits                 Lab Results:   Recent Results (from the past 72 hour(s))   Fingerstick Glucose (POCT)    Collection Time: 07/23/22 11:47 AM   Result Value Ref Range    POC Glucose 269 (H) 65 - 140 mg/dl   Fingerstick Glucose (POCT)    Collection Time: 07/23/22  4:37 PM   Result Value Ref Range    POC Glucose 198 (H) 65 - 140 mg/dl   Fingerstick Glucose (POCT)    Collection Time: 07/23/22  9:22 PM   Result Value Ref Range    POC Glucose 184 (H) 65 - 140 mg/dl   Basic metabolic panel    Collection Time: 07/24/22  4:32 AM   Result Value Ref Range    Sodium 141 135 - 147 mmol/L    Potassium 4 1 3 5 - 5 3 mmol/L    Chloride 110 (H) 96 - 108 mmol/L    CO2 27 21 - 32 mmol/L    ANION GAP 4 4 - 13 mmol/L    BUN 50 (H) 5 - 25 mg/dL    Creatinine 2 27 (H) 0 60 - 1 30 mg/dL    Glucose 222 (H) 65 - 140 mg/dL    Calcium 8 7 8 3 - 10 1 mg/dL    eGFR 29 ml/min/1 73sq m   Fingerstick Glucose (POCT)    Collection Time: 07/24/22  6:31 AM   Result Value Ref Range    POC Glucose 238 (H) 65 - 140 mg/dl   Fingerstick Glucose (POCT)    Collection Time: 07/24/22 11:46 AM   Result Value Ref Range    POC Glucose 219 (H) 65 - 140 mg/dl   Fingerstick Glucose (POCT)    Collection Time: 07/24/22  9:04 PM   Result Value Ref Range    POC Glucose 236 (H) 65 - 140 mg/dl   Fingerstick Glucose (POCT)    Collection Time: 07/25/22  5:34 AM   Result Value Ref Range    POC Glucose 197 (H) 65 - 140 mg/dl   Basic metabolic panel    Collection Time: 07/25/22  9:03 AM   Result Value Ref Range    Sodium 142 135 - 147 mmol/L    Potassium 4 2 3 5 - 5 3 mmol/L    Chloride 110 (H) 96 - 108 mmol/L    CO2 27 21 - 32 mmol/L    ANION GAP 5 4 - 13 mmol/L    BUN 52 (H) 5 - 25 mg/dL    Creatinine 2 39 (H) 0 60 - 1 30 mg/dL    Glucose 243 (H) 65 - 140 mg/dL    Calcium 8 9 8 3 - 10 1 mg/dL    eGFR 27 ml/min/1 73sq m   Fingerstick Glucose (POCT)    Collection Time: 07/25/22 11:22 AM   Result Value Ref Range    POC Glucose 332 (H) 65 - 140 mg/dl   Fingerstick Glucose (POCT)    Collection Time: 07/25/22  4:31 PM   Result Value Ref Range    POC Glucose 177 (H) 65 - 140 mg/dl   Fingerstick Glucose (POCT)    Collection Time: 07/25/22  9:20 PM   Result Value Ref Range    POC Glucose 311 (H) 65 - 140 mg/dl   Comprehensive metabolic panel    Collection Time: 07/26/22  5:28 AM   Result Value Ref Range    Sodium 140 135 - 147 mmol/L    Potassium 3 7 3 5 - 5 3 mmol/L    Chloride 106 96 - 108 mmol/L    CO2 27 21 - 32 mmol/L    ANION GAP 7 4 - 13 mmol/L    BUN 53 (H) 5 - 25 mg/dL    Creatinine 2 39 (H) 0 60 - 1 30 mg/dL    Glucose 204 (H) 65 - 140 mg/dL    Calcium 9 2 8 3 - 10 1 mg/dL    Corrected Calcium 10 2 (H) 8 3 - 10 1 mg/dL    AST 49 (H) 5 - 45 U/L    ALT 76 12 - 78 U/L    Alkaline Phosphatase 164 (H) 46 - 116 U/L    Total Protein 7 3 6 4 - 8 4 g/dL    Albumin 2 8 (L) 3 5 - 5 0 g/dL    Total Bilirubin 0 26 0 20 - 1 00 mg/dL    eGFR 27 ml/min/1 73sq m   Fingerstick Glucose (POCT)    Collection Time: 07/26/22  5:55 AM   Result Value Ref Range    POC Glucose 210 (H) 65 - 140 mg/dl     Imaging: I have personally reviewed pertinent reports  Counseling / Coordination of Care  Total time spent today 20 minutes   Greater than 50% of total time was spent with the patient and / or family counseling and / or coordination of care

## 2022-07-26 NOTE — ASSESSMENT & PLAN NOTE
Seen by nephrology on recent admission, felt to have baseline creatine in high 1s to low 2s  Creatinine 1 9 at time of discharge    · Creatinine noted to be up-trending again but stable compared to yesterday    · Continue to monitor with diuresis  · Avoid nephrotoxins and hypotension   · Monitor BMP daily   · May need to allow for higher Cr to keep euvolemic  · If renal function stable tomorrow he can be discharged

## 2022-07-26 NOTE — PLAN OF CARE
Problem: RESPIRATORY - ADULT  Goal: Achieves optimal ventilation and oxygenation  Description: INTERVENTIONS:  - Assess for changes in respiratory status  - Assess for changes in mentation and behavior  - Position to facilitate oxygenation and minimize respiratory effort  - Oxygen administered by appropriate delivery if ordered  - Initiate smoking cessation education as indicated  - Encourage broncho-pulmonary hygiene including cough, deep breathe, Incentive Spirometry  - Assess the need for suctioning and aspirate as needed  - Assess and instruct to report SOB or any respiratory difficulty  - Respiratory Therapy support as indicated  7/26/2022 1034 by Ananda Burt  Outcome: Progressing  7/26/2022 1034 by Ananda Burt  Outcome: Progressing     Problem: MOBILITY - ADULT  Goal: Maintain or return to baseline ADL function  Description: INTERVENTIONS:  -  Assess patient's ability to carry out ADLs; assess patient's baseline for ADL function and identify physical deficits which impact ability to perform ADLs (bathing, care of mouth/teeth, toileting, grooming, dressing, etc )  - Assess/evaluate cause of self-care deficits   - Assess range of motion  - Assess patient's mobility; develop plan if impaired  - Assess patient's need for assistive devices and provide as appropriate  - Encourage maximum independence but intervene and supervise when necessary  - Involve family in performance of ADLs  - Assess for home care needs following discharge   - Consider OT consult to assist with ADL evaluation and planning for discharge  - Provide patient education as appropriate  Outcome: Progressing

## 2022-07-26 NOTE — ASSESSMENT & PLAN NOTE
Wt Readings from Last 3 Encounters:   07/26/22 80 kg (176 lb 5 9 oz)   07/20/22 84 6 kg (186 lb 8 2 oz)   07/14/22 84 7 kg (186 lb 12 8 oz)     Recent admission for the same  As per discharge summary, plans regarding heart failure as follows:  Metoprolol, hydralazine and isosorbide discontinued  Coreg added  Torsemide changed to 20 mg daily on discharge  Patient reports compliance with these medications however may have increased salt intake lately  · Patient presented with progressive SOB and respiratory distress requiring BiPAP on admission   · ProBNP 26,238 on admission  CXR with vascular congestion and small bilateral pleural effusions  · Most recent limited echo July 2022 at [address] with LVEF 40-50%  Global hypokinesis  Valves not assessed    · Appreciate cardiology consult and recommendations   · S/p IV diuresis   · Transitioned to PO torsemide 40 mg daily  · Continued on remainder of home cardiac regimen   · Defer need for repeat echocardiogram   · Monitor daily weights, intake and output, BMP   · Cardiac diet   · If labs/clinically stable tomorrow, can d/c tomorrow with outpt f/u

## 2022-07-26 NOTE — ASSESSMENT & PLAN NOTE
Lab Results   Component Value Date    HGBA1C 7 7 (H) 07/15/2022       Recent Labs     07/25/22  1631 07/25/22  2120 07/26/22  0555 07/26/22  1059   POCGLU 177* 311* 210* 260*       Blood Sugar Average: Last 72 hrs:  (P) 632 [id number]     · Hold Farxiga, metformin while inpatient   · QID accuchecks with SSI coverage for now   · Noted hyperglycemia, add Lantus 10 units QHS   · Monitor and adjust regimen as needed   · Carb controlled diet   · Hypoglycemia protocol

## 2022-07-27 VITALS
BODY MASS INDEX: 30.27 KG/M2 | HEART RATE: 76 BPM | TEMPERATURE: 97.5 F | RESPIRATION RATE: 18 BRPM | WEIGHT: 176.37 LBS | SYSTOLIC BLOOD PRESSURE: 144 MMHG | DIASTOLIC BLOOD PRESSURE: 82 MMHG | OXYGEN SATURATION: 96 %

## 2022-07-27 LAB
ANION GAP SERPL CALCULATED.3IONS-SCNC: 6 MMOL/L (ref 4–13)
BUN SERPL-MCNC: 49 MG/DL (ref 5–25)
CALCIUM SERPL-MCNC: 8.9 MG/DL (ref 8.3–10.1)
CHLORIDE SERPL-SCNC: 110 MMOL/L (ref 96–108)
CO2 SERPL-SCNC: 28 MMOL/L (ref 21–32)
CREAT SERPL-MCNC: 2.14 MG/DL (ref 0.6–1.3)
GFR SERPL CREATININE-BSD FRML MDRD: 31 ML/MIN/1.73SQ M
GLUCOSE SERPL-MCNC: 102 MG/DL (ref 65–140)
GLUCOSE SERPL-MCNC: 107 MG/DL (ref 65–140)
GLUCOSE SERPL-MCNC: 172 MG/DL (ref 65–140)
POTASSIUM SERPL-SCNC: 3.9 MMOL/L (ref 3.5–5.3)
SODIUM SERPL-SCNC: 144 MMOL/L (ref 135–147)

## 2022-07-27 PROCEDURE — 99239 HOSP IP/OBS DSCHRG MGMT >30: CPT | Performed by: PHYSICIAN ASSISTANT

## 2022-07-27 PROCEDURE — 80048 BASIC METABOLIC PNL TOTAL CA: CPT | Performed by: PHYSICIAN ASSISTANT

## 2022-07-27 PROCEDURE — 82948 REAGENT STRIP/BLOOD GLUCOSE: CPT

## 2022-07-27 RX ORDER — AMLODIPINE BESYLATE 5 MG/1
5 TABLET ORAL DAILY
Qty: 30 TABLET | Refills: 0 | Status: SHIPPED | OUTPATIENT
Start: 2022-07-28 | End: 2022-09-07

## 2022-07-27 RX ORDER — TORSEMIDE 20 MG/1
40 TABLET ORAL DAILY
Qty: 60 TABLET | Refills: 0 | Status: SHIPPED | OUTPATIENT
Start: 2022-07-27 | End: 2022-09-07

## 2022-07-27 RX ORDER — DIAPER,BRIEF,ADULT, DISPOSABLE
EACH MISCELLANEOUS DAILY
Qty: 72 EACH | Refills: 0 | Status: SHIPPED | OUTPATIENT
Start: 2022-07-27

## 2022-07-27 RX ADMIN — CARVEDILOL 25 MG: 25 TABLET, FILM COATED ORAL at 09:48

## 2022-07-27 RX ADMIN — THIAMINE HCL TAB 100 MG 100 MG: 100 TAB at 09:45

## 2022-07-27 RX ADMIN — FOLIC ACID TAB 400 MCG 400 MCG: 400 TAB at 09:46

## 2022-07-27 RX ADMIN — AMLODIPINE BESYLATE 5 MG: 5 TABLET ORAL at 09:46

## 2022-07-27 RX ADMIN — FLUOXETINE 20 MG: 20 CAPSULE ORAL at 09:46

## 2022-07-27 RX ADMIN — ASPIRIN 81 MG: 81 TABLET, COATED ORAL at 09:45

## 2022-07-27 RX ADMIN — GABAPENTIN 300 MG: 300 CAPSULE ORAL at 09:46

## 2022-07-27 RX ADMIN — HEPARIN SODIUM 5000 UNITS: 5000 INJECTION INTRAVENOUS; SUBCUTANEOUS at 06:35

## 2022-07-27 RX ADMIN — TORSEMIDE 40 MG: 20 TABLET ORAL at 09:46

## 2022-07-27 NOTE — ASSESSMENT & PLAN NOTE
Seen by nephrology on recent admission, felt to have baseline creatine in high 1s to low 2s   Creatinine 1 9 at time of discharge  · Continue to monitor with diuresis  · Avoid nephrotoxins and hypotension   · Monitor BMP daily   · May need to allow for higher Cr to keep euvolemic  · Cr stable today, stable for d/c

## 2022-07-27 NOTE — ASSESSMENT & PLAN NOTE
· BP acceptable, monitor routinely   · Continue home dose Coreg    Added Amlodipine 5 mg daily   · Diuresis per cardiology

## 2022-07-27 NOTE — DISCHARGE INSTRUCTIONS
Increase torsemide dosing to 40 mg daily  Start taking Amlodipine 5 mg daily    Please follow up with your cardiologist on discharge

## 2022-07-27 NOTE — DISCHARGE SUMMARY
[address] [facility]  Discharge- Mikey Flurry 1955, 61 y o  male MRN: [id number]  Unit/Bed#: CW2 216-02 Encounter: [id number]  Primary Care Provider: Savanna Correa PA-C   Date and time admitted to hospital: 7/21/2022  9:43 PM    * Acute on chronic systolic congestive heart failure [facility])  Assessment & Plan  Wt Readings from Last 3 Encounters:   07/26/22 80 kg (176 lb 5 9 oz)   07/20/22 84 6 kg (186 lb 8 2 oz)   07/14/22 84 7 kg (186 lb 12 8 oz)     Recent admission for the same  As per discharge summary, plans regarding heart failure as follows:  Metoprolol, hydralazine and isosorbide discontinued  Coreg added  Torsemide changed to 20 mg daily on discharge  Patient reports compliance with these medications however may have increased salt intake lately  · Patient presented with progressive SOB and respiratory distress requiring BiPAP on admission   · ProBNP 26,238 on admission  CXR with vascular congestion and small bilateral pleural effusions  · Most recent limited echo July 2022 at [facility] with LVEF 40-50%  Global hypokinesis  Valves not assessed    · Appreciate cardiology consult and recommendations   · S/p IV diuresis   · Transitioned to PO torsemide 40 mg daily  · Continued on remainder of home cardiac regimen   · Defer need for repeat echocardiogram   · Monitor daily weights, intake and output, BMP   · Cardiac diet   · D/c with outpt f/u with cardiology     Transaminitis  Assessment & Plan  · Mild, consider due to hepatic congestion in the setting of volume overload   · Hepatic steatosis noted on renal US   · Monitor CMP    Pleural effusion, bacterial  Assessment & Plan  · Status post left sided thoracentesis in the ED with 1350 cc drained   · Protein and LDH within normal limits   · Cultures negative   · CXR following thoracentesis noting small bilateral effusions   · Currently, oxygen saturations stable on room air   · See plan above     Acute respiratory failure with hypoxia ([address] )  Assessment & Plan  · In the setting of acute on chronic CHF, requiring BiPAP on admission   · CXR on admission: Small bilateral pleural effusions again noted with bibasilar atelectasis or consolidation, appear slightly increased on the right      · Status post left sided thoracentesis in ED with 1350 cc drained   · Repeat CXR following thoracentesis: Development of mild vascular congestion  Small bilateral pleural effusions   · Monitor and titrate supplemental O2 as able to maintain SpO2 > 90%  · Resolved and remains stable on room air     Mixed hyperlipidemia  Assessment & Plan  · On Lipitor 40 mg daily    Essential hypertension  Assessment & Plan  · BP acceptable, monitor routinely   · Continue home dose Coreg  Added Amlodipine 5 mg daily   · Diuresis per cardiology      Bipolar affective disorder, mixed, moderate (HCC)  Assessment & Plan  · Mood stable   · Continue home medical management: lorazepam 0 5 mg at bedtime, Prozac 20 mg daily  CKD (chronic kidney disease) stage 4, GFR 15-29 ml/min ([facility])  Assessment & Plan  Seen by nephrology on recent admission, felt to have baseline creatine in high 1s to low 2s   Creatinine 1 9 at time of discharge  · Continue to monitor with diuresis  · Avoid nephrotoxins and hypotension   · Monitor BMP daily   · May need to allow for higher Cr to keep euvolemic  · Cr stable today, stable for d/c         Medical Problems             Resolved Problems  Date Reviewed: 7/27/2022   None               Discharging Physician / Practitioner: Valerie Shipman PA-C  PCP: Haresh Alford PA-C  Admission Date:   Admission Orders (From admission, onward)     Ordered        07/21/22 2344  Inpatient Admission  Once                      Discharge Date: 07/27/22    Consultations During Hospital Stay:  · Cardiology     Procedures Performed:   · Thoracentesis 7/22 with 1350 cc drained     Significant Findings / Test Results:   · CXR: small b/l pleural effusion with atelectasis or consolidation, appears increased on RT   · CXR s/p thora: No pneumothorax following thoracentesis, mild vascular congestion, small b/l pleural effusions   · BNP 26,238    Incidental Findings:   · None     Test Results Pending at Discharge (will require follow up): · None     Outpatient Tests Requested:  · None    Complications:  None    Reason for Admission: acute CHF, respiratory failure     Hospital Course:   Alysha Grimaldo is a 61 y o  male patient who originally presented to the hospital on 7/21/2022 due to shortness of breath/respiratory distress initially requiring BiPAP  He did have R thoracentesis in ER with -1350 cc drained and felt improved  He was admitted for further treatment of respiratory failure and dx of acute on chronic CHF  He was seen by cardiology and had good response to IV diuretics  He is on room air at this time  He has been transitioned to PO Torsemide 40 mg daily (increased from 20 mg) and doing well with this  Renal function stable  He is stable for d/c with outpt f/u with cardiology and PCP     Please see above list of diagnoses and related plan for additional information  Condition at Discharge: good    Discharge Day Visit / Exam:   Subjective:    Pt reports he feels fine today, denies sob  Wants to go home  Vitals: Blood Pressure: 144/82 (07/27/22 0753)  Pulse: 76 (07/27/22 0753)  Temperature: 97 5 °F (36 4 °C) (07/27/22 0753)  Temp Source: Oral (07/24/22 2232)  Respirations: 18 (07/26/22 1531)  Weight - Scale: 80 kg (176 lb 5 9 oz) (07/26/22 0600)  SpO2: 96 % (07/27/22 0753)  Exam:   Physical Exam  Vitals reviewed  Constitutional:       General: He is not in acute distress  Appearance: He is not toxic-appearing  HENT:      Head: Normocephalic and atraumatic  Eyes:      General: No scleral icterus  Extraocular Movements: Extraocular movements intact  Cardiovascular:      Rate and Rhythm: Normal rate     Pulmonary:      Effort: Pulmonary effort is normal  No respiratory distress  Musculoskeletal:         General: No swelling  Normal range of motion  Cervical back: Normal range of motion  Skin:     General: Skin is warm and dry  Neurological:      General: No focal deficit present  Mental Status: He is alert and oriented to person, place, and time  Cranial Nerves: No cranial nerve deficit  Psychiatric:         Mood and Affect: Mood normal          Behavior: Behavior normal          Thought Content: Thought content normal          Judgment: Judgment normal           Discussion with Family: Updated  (brother) via phone  Discharge instructions/Information to patient and family:   See after visit summary for information provided to patient and family  Provisions for Follow-Up Care:  See after visit summary for information related to follow-up care and any pertinent home health orders  Disposition:   Home    Planned Readmission: no     Discharge Statement:  I spent 38 minutes discharging the patient  This time was spent on the day of discharge  I had direct contact with the patient on the day of discharge  Greater than 50% of the total time was spent examining patient, answering all patient questions, arranging and discussing plan of care with patient as well as directly providing post-discharge instructions  Additional time then spent on discharge activities  Discharge Medications:  See after visit summary for reconciled discharge medications provided to patient and/or family        **Please Note: This note may have been constructed using a voice recognition system**

## 2022-07-27 NOTE — CASE MANAGEMENT
Case Management Discharge Planning Note    Patient name Sandeep Shea  Location 2 216/CW2 149-91 MRN [id number]  : 1955 Date 2022       Current Admission Date: 2022  Current Admission Diagnosis:Acute on chronic systolic congestive heart failure [facility])   Patient Active Problem List    Diagnosis Date Noted    Transaminitis 2022    [facility] ) 2022    Pleural effusion, bacterial 2022    Hyperglycemia, unspecified     Metabolic acidosis     Diarrhea 2022    Type 2 diabetes mellitus, without long-term current use of insulin ([facility] ) 2022    High serum thyroid stimulating hormone (TSH) 2022    Weakness 2022    Ischemic cardiomyopathy 2022    Polysubstance abuse ([facility] ) 2022    Mixed hyperlipidemia 2022    Stage 3b chronic kidney disease ([facility] ) 2022    Acute on chronic systolic congestive heart failure ([facility] ) 2022    Acute hepatitis C virus infection 2022    Abnormal EKG 2022    CKD (chronic kidney disease) stage 4, GFR 15-29 ml/min ([facility] ) 2022    Normocytic anemia 2022    Essential hypertension 2015    Type II or unspecified type diabetes mellitus without mention of complication, uncontrolled 2012    Bipolar affective disorder, mixed, moderate ([facility] ) 2011      LOS (days): 6  Geometric Mean LOS (GMLOS) (days): 3 80  Days to GMLOS:-1 7     OBJECTIVE:  Risk of Unplanned Readmission Score: 31 51         Current admission status: Inpatient   Preferred Pharmacy:   [facility] [address] , [address] [address]  Phone: [phone] Fax: [phone]    Primary Care Provider: Maria Guadalupe Hernández PA-C    Primary Insurance: [address] [place] [address]  Secondary Insurance:     DISCHARGE DETAILS:                                [address]         Is the patient interested in [facility] at discharge?: Yes  [address] requested[de-identified] Nursing, Physical [address] Name[de-identified] [facility] Provider[de-identified] PCP  Home Health Services Needed[de-identified] Evaluate Functional Status and Safety, Heart Failure Management, Strengthening/Theraputic Exercises to Improve Function  Homebound Criteria Met[de-identified] Requires the Assistance of Another Person for Safe Ambulation or to Leave the Home  Supporting Clincal Findings[de-identified] Limited Endurance    DME Referral Provided  Referral made for DME?: No              Treatment [facility]ation[facility]  Discharge Destination Plan[de-identified] Home with [place] at Discharge : Family            Additional Comments: Patient cleared for discharge today, will resume care with [facility] for home health/PT  Brother will provide d/c transportation

## 2022-07-27 NOTE — ASSESSMENT & PLAN NOTE
Wt Readings from Last 3 Encounters:   07/26/22 80 kg (176 lb 5 9 oz)   07/20/22 84 6 kg (186 lb 8 2 oz)   07/14/22 84 7 kg (186 lb 12 8 oz)     Recent admission for the same  As per discharge summary, plans regarding heart failure as follows:  Metoprolol, hydralazine and isosorbide discontinued  Coreg added  Torsemide changed to 20 mg daily on discharge  Patient reports compliance with these medications however may have increased salt intake lately  · Patient presented with progressive SOB and respiratory distress requiring BiPAP on admission   · ProBNP 26,238 on admission  CXR with vascular congestion and small bilateral pleural effusions  · Most recent limited echo July 2022 at [address] with LVEF 40-50%  Global hypokinesis  Valves not assessed    · Appreciate cardiology consult and recommendations   · S/p IV diuresis   · Transitioned to PO torsemide 40 mg daily  · Continued on remainder of home cardiac regimen   · Defer need for repeat echocardiogram   · Monitor daily weights, intake and output, BMP   · Cardiac diet   · D/c with outpt f/u with cardiology

## 2022-07-28 ENCOUNTER — TRANSITIONAL CARE MANAGEMENT (OUTPATIENT)
Dept: FAMILY MEDICINE CLINIC | Facility: CLINIC | Age: 67
End: 2022-07-28

## 2022-07-28 ENCOUNTER — OFFICE VISIT (OUTPATIENT)
Dept: FAMILY MEDICINE CLINIC | Facility: CLINIC | Age: 67
End: 2022-07-28
Payer: COMMERCIAL

## 2022-07-28 VITALS
WEIGHT: 173 LBS | TEMPERATURE: 97.2 F | BODY MASS INDEX: 29.7 KG/M2 | HEART RATE: 72 BPM | OXYGEN SATURATION: 96 % | SYSTOLIC BLOOD PRESSURE: 102 MMHG | DIASTOLIC BLOOD PRESSURE: 58 MMHG

## 2022-07-28 DIAGNOSIS — G47.00 INSOMNIA, UNSPECIFIED TYPE: ICD-10-CM

## 2022-07-28 DIAGNOSIS — I10 ESSENTIAL HYPERTENSION: ICD-10-CM

## 2022-07-28 DIAGNOSIS — R53.81 PHYSICAL DECONDITIONING: ICD-10-CM

## 2022-07-28 DIAGNOSIS — Z09 HOSPITAL DISCHARGE FOLLOW-UP: Primary | ICD-10-CM

## 2022-07-28 DIAGNOSIS — N17.9 AKI (ACUTE KIDNEY INJURY) (HCC): ICD-10-CM

## 2022-07-28 DIAGNOSIS — R53.1 WEAKNESS: ICD-10-CM

## 2022-07-28 DIAGNOSIS — I50.23 ACUTE ON CHRONIC SYSTOLIC CONGESTIVE HEART FAILURE (HCC): ICD-10-CM

## 2022-07-28 DIAGNOSIS — Z87.2 HISTORY OF DECUBITUS ULCER: ICD-10-CM

## 2022-07-28 DIAGNOSIS — Z99.3 WHEELCHAIR BOUND: ICD-10-CM

## 2022-07-28 DIAGNOSIS — E11.42 DIABETIC POLYNEUROPATHY ASSOCIATED WITH TYPE 2 DIABETES MELLITUS (HCC): ICD-10-CM

## 2022-07-28 PROBLEM — E11.65 UNCONTROLLED TYPE 2 DIABETES MELLITUS WITH HYPERGLYCEMIA (HCC): Status: ACTIVE | Noted: 2022-04-23

## 2022-07-28 PROBLEM — I50.9 ACUTE EXACERBATION OF CHF (CONGESTIVE HEART FAILURE) (HCC): Status: ACTIVE | Noted: 2022-05-02

## 2022-07-28 PROBLEM — E87.1 HYPONATREMIA: Status: ACTIVE | Noted: 2022-04-23

## 2022-07-28 PROBLEM — J96.01 ACUTE RESPIRATORY FAILURE WITH HYPOXIA (HCC): Status: RESOLVED | Noted: 2022-07-22 | Resolved: 2022-07-28

## 2022-07-28 PROCEDURE — 99496 TRANSJ CARE MGMT HIGH F2F 7D: CPT | Performed by: PHYSICIAN ASSISTANT

## 2022-07-28 RX ORDER — LORAZEPAM 0.5 MG/1
0.5 TABLET ORAL
Qty: 30 TABLET | Refills: 0 | Status: CANCELLED | OUTPATIENT
Start: 2022-07-28

## 2022-07-28 RX ORDER — GABAPENTIN 300 MG/1
300 CAPSULE ORAL 3 TIMES DAILY
Qty: 90 CAPSULE | Refills: 0 | Status: SHIPPED | OUTPATIENT
Start: 2022-07-28

## 2022-07-28 RX ORDER — AMLODIPINE BESYLATE 5 MG/1
5 TABLET ORAL DAILY
Qty: 30 TABLET | Refills: 2 | Status: CANCELLED | OUTPATIENT
Start: 2022-07-28

## 2022-07-28 NOTE — UTILIZATION REVIEW
Notification of Discharge   This is a Notification of Discharge from our facility [address]  Please be advised that this patient has been discharge from our facility  Below you will find the admission and discharge date and time including the patients disposition  UTILIZATION REVIEW CONTACT:  Pedrito Martinez  Utilization   Network Utilization Review Department  Phone: [phone] x carefully listen to the prompts  All voicemails are confidential   Email: [email or web address]     PHYSICIAN ADVISORY SERVICES:  FOR WIJK-SZ-EAKA REVIEW - MEDICAL NECESSITY DENIAL  Phone: [phone]  Fax: [phone]  Email: [email or web address]     PRESENTATION DATE: 7/21/2022  9:43 PM  OBERVATION ADMISSION DATE:   INPATIENT ADMISSION DATE: 7/21/22 11:44 PM   DISCHARGE DATE: 7/27/2022  3:21 PM  DISPOSITION: Home/Self Care Home/Self Care      IMPORTANT INFORMATION:  Send all requests for admission clinical reviews, approved or denied determinations and any other requests to dedicated fax number below belonging to the campus where the patient is receiving treatment   List of dedicated fax numbers:  [address] DENIALS (Administrative/Medical Necessity) [phone]   [address] (Maternity/NICU/Pediatrics) [phone]   [facility] [phone]   130 [facility] [phone]   [address] [place] [address] [phone]   [address] [phone]   [facility]  [phone]   [address] [facility]  24[address] [facility] 1000 [address] [facility] [phone]

## 2022-07-28 NOTE — PROGRESS NOTES
Transition of Care  Follow-up After Hospitalization    Yoel Benton 61 y o  male   Date:  7/28/2022    TCM Call     Date and time call was made  7/28/2022  7:43 AM    Hospital care reviewed  Records reviewed    Patient was hospitialized at  [facility]    Date of Admission  07/21/22    Date of discharge  07/27/22    Diagnosis  Acute on chronic systolic congestive heart failure    Disposition  Home    Were the patients medications reviewed and updated  No    Current Symptoms  None    Back pain, left side, severity  Mild    Back pain, left side, onset  Ongoing      TCM Call     Post hospital issues  Reduced activity    Should patient be enrolled in anticoag monitoring? No    Scheduled for follow up? Yes    Patients specialists  Nephrologist; Cardiologist    Did you obtain your prescribed medications  Yes    Do you need help managing your prescriptions or medications  No    Is transportation to your appointment needed  No    I have advised the patient to call PCP with any new or worsening symptoms  Lexa Forrest MA    Living Arrangements  Family members    Are you recieving any outpatient services  No    Are you recieving home care services  Yes    Types of home care services  Nurse visit    Are you using any community resources  No    Current waiver services  No    Have you fallen in the last 12 months  No    Interperter language line needed  No    Counseling  Patient        Hospital records were reviewed  Medications upon discharge reviewed/updated  Discharge Disposition:  Home with home health   Follow up visits with other specialists: cardiology, nephrology, endocrinology, pending appt with psychiatry       Assessment and Plan:    Gianna Jimenez was seen today for transition of care management      Diagnoses and all orders for this visit:    Hospital discharge follow-up  -     Skin Protectants, Misc  (dimethicone-zinc oxide) cream; Apply topically 2 (two) times a day as needed for irritation    Essential hypertension  -     Comprehensive metabolic panel; Future    Diabetic polyneuropathy associated with type 2 diabetes mellitus ([place] [address] )  Comments:  continue gabapentin; continue Willma [place]; has upcoming endocrinology appt next week; encourage diabetic diet   Orders:  -     gabapentin (Neurontin) 300 mg capsule; Take 1 capsule (300 mg total) by mouth 3 (three) times a day  -     Referral to [address] [place] [address]; Future    History of decubitus ulcer  Comments:  healed  Orders:  -     Skin Protectants, Misc  (dimethicone-zinc oxide) cream; Apply topically 2 (two) times a day as needed for irritation    Physical deconditioning  -     Skin Protectants, Misc  (dimethicone-zinc oxide) cream; Apply topically 2 (two) times a day as needed for irritation    Weakness  -     Skin Protectants, Misc  (dimethicone-zinc oxide) cream; Apply topically 2 (two) times a day as needed for irritation    Wheelchair bound  -     Skin Protectants, Misc  (dimethicone-zinc oxide) cream; Apply topically 2 (two) times a day as needed for irritation    LAZARO (acute kidney injury) ([place] [address] )  Comments:  Cr now around 2, GFR 20-30s; will need to allow higher Cr to keep euvolemic; diueretics per cardiology; keep nephrology appt   Orders:  -     Referral to [address] [place] [address]; Future  -     Comprehensive metabolic panel; Future    Acute on chronic systolic congestive heart failure ([place] [address] )  Comments:  euvolemic, continue diuretics per cardiology; schedule cardiology follow up for 1 week   Orders:  -     Referral to [address] [place] [address]; Future  -     CBC and differential; Future    Insomnia, unspecified type  Comments:  does not need refill today; has follow up with psychiatry aug 2nd whom will manage ativan as needed           HPI:  HPI   Patient is a 60 yo male with complex medical history with multiple recent hospitalizations  Patient was hospitalized 7/15-7/20 due to LAZARO   He was given IVF and diuretics adjusted to 20 mg daily  Cardiology and nephrology following - he was taken off metoprolol, hydralazine and isosorbide and started on coreg  Patient returned and was hospitalized 7/21-7/27 for acute on chronic CHF  He was requiring bipap on admission with BNP 52298 and chest xray with vascular congestion and small effusions  He underwent IV diuresis and L thoracentesis and followed by cardiology  He lasix was changed to torsemide 40 mg daily  Patient was discharged on RA  Amlodipine 5 mg was added  His renal function was stable, may need to allow high Cr to avoid heart failure  He has nephrology and endocrinology appt arranged  Brother Xavier Santiago understands to call his cardiologist to arrange follow up within 1 week  His weight was stable this morning on home scale 173  His breathing is stable and has no sob  He needs improvement in diabetic diet  Brother states that home health needs new orders to resume home care  ROS: Review of Systems   Constitutional: Negative for diaphoresis, fever and unexpected weight change  Respiratory: Negative for shortness of breath  Cardiovascular: Negative for chest pain and leg swelling  Genitourinary: Negative for difficulty urinating  Musculoskeletal: Positive for gait problem  Neurological: Negative for dizziness and syncope  Psychiatric/Behavioral: Positive for sleep disturbance  Past Medical History:   Diagnosis Date    Anxiety     Arthritis     Coronary artery disease     D[facility]     Depression     [facility]     Infectious viral hepatitis     Memory loss     Visual impairment        History reviewed  No pertinent surgical history      Social History     Socioeconomic History    Marital status: Single     Spouse name: None    Number of children: None    Years of education: None    Highest education level: None   Occupational History    None   Tobacco Use    Smoking status: Former Smoker    Smokeless tobacco: Never Used   Vaping Use    Vaping Use: Never used   Substance and Sexual Activity    Alcohol use: Yes     Comment: socially    Drug use: Never    Sexual activity: None   Other Topics Concern    None   Social History Narrative    None     Social Determinants of Health     Financial Resource Strain: Not on file   Food Insecurity: No Food Insecurity    Worried About Running Out of Food in the Last Year: Never true    Son of Food in the Last Year: Never true   Transportation Needs: No Transportation Needs    Lack of Transportation (Medical): No    Lack of Transportation (Non-Medical): No   Physical Activity: Not on file   Stress: Not on file   Social Connections: Not on file   Intimate Partner Violence: Not on file   Housing Stability: Low Risk     Unable to Pay for Housing in the Last Year: No    Number of Places Lived in the Last Year: 1    Unstable Housing in the Last Year: No       History reviewed  No pertinent family history      No Known Allergies      Current Outpatient Medications:     Acetaminophen Extra Strength 500 MG tablet, Take 500 mg by mouth every 6 (six) hours as needed, Disp: , Rfl:     amLODIPine (NORVASC) 5 mg tablet, Take 1 tablet (5 mg total) by mouth daily, Disp: 30 tablet, Rfl: 0    Aspirin Low Dose 81 MG EC tablet, Take 81 mg by mouth daily, Disp: , Rfl:     atorvastatin (LIPITOR) 40 mg tablet, Take 40 mg by mouth daily at bedtime, Disp: , Rfl:     carvedilol (COREG) 25 mg tablet, Take 1 tablet (25 mg total) by mouth 2 (two) times a day with meals, Disp: 60 tablet, Rfl: 0    Disposable Gloves MISC, Use daily Large, Disp: 100 each, Rfl: 0    Farxiga 5 MG TABS, Take 5 mg by mouth in the morning, Disp: , Rfl:     FLUoxetine (PROzac) 20 mg capsule, Take 1 capsule (20 mg total) by mouth daily, Disp: 90 capsule, Rfl: 1    folic acid (FOLVITE) 1 mg tablet, Take 1 tablet (1 mg total) by mouth daily (Patient taking differently: Take 400 mcg by mouth daily), Disp: , Rfl: 0    gabapentin (Neurontin) 300 mg capsule, Take 1 capsule (300 mg total) by mouth 3 (three) times a day, Disp: 90 capsule, Rfl: 0    glucose blood (Accu-Chek Guide) test strip, Test sugars four times daily, Disp: 400 each, Rfl: 1    Incontinence Supply Disposable (Disposable Brief Large) MISC, Use daily, Disp: 72 each, Rfl: 0    Incontinence Supply Disposable (RA Adult Wipes) MISC, Use in the morning, Disp: 128 each, Rfl: 0    Incontinence Supply Disposable MISC, Use daily XL Disposable Bed Incontinence Pads, Disp: 100 each, Rfl: 0    Lancet Devices (OneTouch Delica Plus Lancing) MISC, Use 1 Dose in the morning Use as directed , Disp: , Rfl:     LORazepam (ATIVAN) 0 5 mg tablet, Take 0 5 mg by mouth daily at bedtime, Disp: , Rfl:     Melatonin 5 MG TABS, Take 5 mg by mouth daily at bedtime, Disp: , Rfl:     metFORMIN (GLUCOPHAGE) 500 mg tablet, Take 1 tablet (500 mg total) by mouth 2 (two) times a day with meals, Disp: 180 tablet, Rfl: 0    Skin Protectants, Misc  (dimethicone-zinc oxide) cream, Apply topically 2 (two) times a day as needed for irritation, Disp: 142 g, Rfl: 2    thiamine (VITAMIN B1) 100 mg tablet, Take 1 tablet (100 mg total) by mouth daily, Disp: , Rfl: 0    torsemide (DEMADEX) 20 mg tablet, Take 2 tablets (40 mg total) by mouth daily, Disp: 60 tablet, Rfl: 0  No current facility-administered medications for this visit  Physical Exam:  /58 (BP Location: Left arm, Patient Position: Sitting, Cuff Size: Standard)   Pulse 72   Temp (!) 97 2 °F (36 2 °C) (Tympanic)   Wt 78 5 kg (173 lb) Comment: Can't get weight  SpO2 96%   BMI 29 70 kg/m²     Physical Exam  Constitutional:       General: He is not in acute distress  Appearance: He is not toxic-appearing  HENT:      Head: Normocephalic and atraumatic  Eyes:      Conjunctiva/sclera: Conjunctivae normal       Pupils: Pupils are equal, round, and reactive to light     Cardiovascular:      Rate and Rhythm: Normal rate and regular rhythm  Pulmonary:      Effort: No respiratory distress  Breath sounds: Normal breath sounds  No wheezing or rales  Abdominal:      Palpations: Abdomen is soft  Musculoskeletal:      Right lower leg: No edema  Left lower leg: No edema  Skin:     General: Skin is warm and dry  Neurological:      General: No focal deficit present  Mental Status: He is alert and oriented to person, place, and time  Gait: Gait abnormal (wheelchair bound)     Psychiatric:         Mood and Affect: Mood normal          Behavior: Behavior normal              Labs:  Lab Results   Component Value Date    WBC 5 16 07/21/2022    HGB 9 5 (L) 07/21/2022    HCT 31 0 (L) 07/21/2022    MCV 95 07/21/2022     07/21/2022     Lab Results   Component Value Date    K 3 9 07/27/2022     (H) 07/27/2022    CO2 28 07/27/2022    BUN 49 (H) 07/27/2022    CREATININE 2 14 (H) 07/27/2022    CALCIUM 8 9 07/27/2022    CORRECTEDCA 10 2 (H) 07/26/2022    AST 49 (H) 07/26/2022    ALT 76 07/26/2022    ALKPHOS 164 (H) 07/26/2022    EGFR 31 07/27/2022

## 2022-07-29 ENCOUNTER — HOME CARE VISIT (OUTPATIENT)
Dept: HOME HEALTH SERVICES | Facility: HOME HEALTHCARE | Age: 67
End: 2022-07-29
Payer: COMMERCIAL

## 2022-07-29 VITALS
SYSTOLIC BLOOD PRESSURE: 114 MMHG | TEMPERATURE: 96.1 F | DIASTOLIC BLOOD PRESSURE: 70 MMHG | HEART RATE: 64 BPM | OXYGEN SATURATION: 98 % | RESPIRATION RATE: 20 BRPM

## 2022-07-29 PROCEDURE — 400013 VN SOC

## 2022-07-29 PROCEDURE — G0299 HHS/HOSPICE OF RN EA 15 MIN: HCPCS

## 2022-07-29 NOTE — CASE COMMUNICATION
Pt was not seen in [facility] yesterday  He was seen by Dr Dillan Saleh instead in [facility] states they needed a PCP and one was assigned in [facility] but he has no plans for his brother to be seen in [facility]  He will be followed by Dr Dilaln Saleh as PCP     ----- Message -----  From: Amisha Lucero  Sent: 7/29/2022  12:44 PM EDT  To: Sandra Courtney RN  Subject: PCP problem                                        This patient just ha d a follow-up with viry reardon on 7 28 in [place]>>is this an error? Please let me know--thank you!    ----- Message -----  From: Sandra Courtney RN  Sent: 7/29/2022  12:25 PM EDT  To:  Intake    Pts PCP is Dr Xiang Gudino in [place]

## 2022-07-30 NOTE — CASE COMMUNICATION
[facility] has resumed care for your patient to [address] [place] [address] service with the following disciplines:      SN and PT  This report is informational only, no responses is needed  Primary focus of home health care cardiac   diabetes  Patient stated goals of care remain out of the hospital  Anticipated visit pattern 2 times a week x 3 weeks  Gerhardt Sep and next visit date monday 08 01 22  See medication list - meds in home differ from AVS because on  07 28 22 pt was seen by PCP Dr Saira Lutz and metformin was discontinued  Significant clinical findings pt is very sleepy  could not stay awake during ANGELITA snv  Potential barriers to goal achievement intermittent confusion    Thank you for allowing us to participate in the care of your patient

## 2022-08-02 ENCOUNTER — TELEPHONE (OUTPATIENT)
Dept: NEPHROLOGY | Facility: CLINIC | Age: 67
End: 2022-08-02

## 2022-08-02 ENCOUNTER — TELEPHONE (OUTPATIENT)
Dept: FAMILY MEDICINE CLINIC | Facility: CLINIC | Age: 67
End: 2022-08-02

## 2022-08-02 ENCOUNTER — HOME CARE VISIT (OUTPATIENT)
Dept: HOME HEALTH SERVICES | Facility: HOME HEALTHCARE | Age: 67
End: 2022-08-02
Payer: COMMERCIAL

## 2022-08-02 VITALS
RESPIRATION RATE: 18 BRPM | HEART RATE: 76 BPM | OXYGEN SATURATION: 93 % | TEMPERATURE: 97.1 F | DIASTOLIC BLOOD PRESSURE: 52 MMHG | SYSTOLIC BLOOD PRESSURE: 108 MMHG

## 2022-08-02 PROCEDURE — G0299 HHS/HOSPICE OF RN EA 15 MIN: HCPCS

## 2022-08-02 NOTE — TELEPHONE ENCOUNTER
Received a call from patients brother  Kyle Gandara looking for Endo on [employer] I explained that this is nephrology and gave him the correct number [phone]  All questions were answered

## 2022-08-02 NOTE — TELEPHONE ENCOUNTER
Please have patient resume metformin 500 mg bid  Control diabetic diet  He was scheduled to have endocrinology follow up, please ensure this maintained

## 2022-08-02 NOTE — TELEPHONE ENCOUNTER
Norberto Stanford who is a [facility] visiting nurse called and stated that she was with patient currently and wanted to relay that yesterday and today his Blood sugar has been in the 400's  And His brother reported the other days that it has been in the higher 200's  He was advised to stop taking the Metformin at last visit on 07/22 and was told then if Blood sugar were to rise to possibly have to restart this medication again  Please advise as to what you would like to do  Norberto Stanford can be reached at [phone] if you would have any additional questions

## 2022-08-02 NOTE — TELEPHONE ENCOUNTER
Called Nona at the [facility], relayed message, she understood stated pt had appointment at the Endocrinologist yesterday but his brother canceled the appointment, Faiza Sewell told pt and his brother that they needed to reschedule the appointment  Faiza Sewell stated she will call the pt and his brother and let them know to start taking the Metformin again

## 2022-08-03 ENCOUNTER — TELEPHONE (OUTPATIENT)
Dept: FAMILY MEDICINE CLINIC | Facility: CLINIC | Age: 67
End: 2022-08-03

## 2022-08-03 ENCOUNTER — HOME CARE VISIT (OUTPATIENT)
Dept: HOME HEALTH SERVICES | Facility: HOME HEALTHCARE | Age: 67
End: 2022-08-03
Payer: COMMERCIAL

## 2022-08-03 NOTE — TELEPHONE ENCOUNTER
Pratik Sanders called for his brother Malaika Hooper  He saw the psych doctor yesterday and started on Seroquel, which he took his first dose yesterday  He feels he is having a bad reaction to it, "weakness in legs and dizziness " He contacted the psych office and is waiting for a call back to hear what he should do  He also noticed that the psych doctor increased the Gabapentin to 400mg as well  I did state I could pass the message along to our provider here to make them aware

## 2022-08-04 ENCOUNTER — HOME CARE VISIT (OUTPATIENT)
Dept: HOME HEALTH SERVICES | Facility: HOME HEALTHCARE | Age: 67
End: 2022-08-04
Payer: COMMERCIAL

## 2022-08-04 VITALS
SYSTOLIC BLOOD PRESSURE: 118 MMHG | HEART RATE: 69 BPM | OXYGEN SATURATION: 96 % | RESPIRATION RATE: 16 BRPM | TEMPERATURE: 97.5 F | DIASTOLIC BLOOD PRESSURE: 62 MMHG

## 2022-08-04 PROCEDURE — G0299 HHS/HOSPICE OF RN EA 15 MIN: HCPCS

## 2022-08-04 NOTE — TELEPHONE ENCOUNTER
Thanks for update  Stop medication and continue to monitor symptoms  Await response from psychiatry for recommended changes

## 2022-08-08 ENCOUNTER — HOME CARE VISIT (OUTPATIENT)
Dept: HOME HEALTH SERVICES | Facility: HOME HEALTHCARE | Age: 67
End: 2022-08-08
Payer: COMMERCIAL

## 2022-08-08 VITALS — OXYGEN SATURATION: 98 % | TEMPERATURE: 98 F | HEART RATE: 72 BPM

## 2022-08-08 PROCEDURE — G0299 HHS/HOSPICE OF RN EA 15 MIN: HCPCS

## 2022-08-09 ENCOUNTER — TELEPHONE (OUTPATIENT)
Dept: FAMILY MEDICINE CLINIC | Facility: CLINIC | Age: 67
End: 2022-08-09

## 2022-08-09 ENCOUNTER — HOME CARE VISIT (OUTPATIENT)
Dept: HOME HEALTH SERVICES | Facility: HOME HEALTHCARE | Age: 67
End: 2022-08-09
Payer: COMMERCIAL

## 2022-08-09 VITALS — SYSTOLIC BLOOD PRESSURE: 120 MMHG | HEART RATE: 68 BPM | OXYGEN SATURATION: 96 % | DIASTOLIC BLOOD PRESSURE: 72 MMHG

## 2022-08-09 PROCEDURE — G0151 HHCP-SERV OF PT,EA 15 MIN: HCPCS

## 2022-08-09 NOTE — TELEPHONE ENCOUNTER
Brother called states [facility]  Pt was told to take a 1/2 tab of amolodipine due to weakness, jittery and pt falling BP 89/55  Pulse 64  Pt was directed to go to the ER earlier today and didn't go  Brother states this is FYI

## 2022-08-09 NOTE — CASE COMMUNICATION
Patient will need snv between 0800 and 0900 for fasting labs on 08 11 22     Patient is not going to [facility] PCP office as scheduled on 08 11 22  that appt will be cancelled as per family as he is going to be followed by Dr Alex brito

## 2022-08-09 NOTE — TELEPHONE ENCOUNTER
Pt's brother Remo Fothergill called to say he needs appointment for pt he has weakness in the legs and is jittery, gave appointment for Thursday when they could make it  Remo Fothergill called back later and stated that something has to be done pt's legs have given out on his several times today and when they got home pt was jittery and weak took his blood pressure and it was 100/70 and his heart rate was in the 160's  After sitting a little it went down to the 80's  And 60's  I advised that pt go to the Er or Urgent care  Remo Fothergill stated he would take him

## 2022-08-10 ENCOUNTER — TELEPHONE (OUTPATIENT)
Dept: FAMILY MEDICINE CLINIC | Facility: CLINIC | Age: 67
End: 2022-08-10

## 2022-08-10 ENCOUNTER — RA CDI HCC (OUTPATIENT)
Dept: OTHER | Facility: HOSPITAL | Age: 67
End: 2022-08-10

## 2022-08-10 NOTE — CASE COMMUNICATION
Home  P T   treatmen  resumed  following  recent  hospitalization  To  continue  with  treatemnt  of  transfer  and  gait  training  Strengthening  ex  to  BLEs  and  balance  activities    Also  to  continue  to  instruct  in  HEP  and  home  safety

## 2022-08-10 NOTE — PROGRESS NOTES
E11 22  [facility]  coding opportunities          Chart Reviewed number of suggestions sent to Provider: 1     Patients Insurance     Medicare Insurance: [employer]

## 2022-08-10 NOTE — TELEPHONE ENCOUNTER
Called tried to leave a message for the brother unable to mailbox full  The message was in regard to cancelling appt for tomorrow due to labs

## 2022-08-11 ENCOUNTER — APPOINTMENT (OUTPATIENT)
Dept: LAB | Age: 67
End: 2022-08-11
Payer: COMMERCIAL

## 2022-08-11 ENCOUNTER — HOME CARE VISIT (OUTPATIENT)
Dept: HOME HEALTH SERVICES | Facility: HOME HEALTHCARE | Age: 67
End: 2022-08-11
Payer: COMMERCIAL

## 2022-08-11 VITALS
BODY MASS INDEX: 30.62 KG/M2 | RESPIRATION RATE: 18 BRPM | DIASTOLIC BLOOD PRESSURE: 60 MMHG | TEMPERATURE: 97 F | WEIGHT: 178.4 LBS | HEART RATE: 68 BPM | OXYGEN SATURATION: 98 % | SYSTOLIC BLOOD PRESSURE: 100 MMHG

## 2022-08-11 DIAGNOSIS — I10 ESSENTIAL HYPERTENSION: ICD-10-CM

## 2022-08-11 DIAGNOSIS — I50.23 ACUTE ON CHRONIC SYSTOLIC CONGESTIVE HEART FAILURE (HCC): ICD-10-CM

## 2022-08-11 DIAGNOSIS — N17.9 AKI (ACUTE KIDNEY INJURY) (HCC): ICD-10-CM

## 2022-08-11 LAB
ALBUMIN SERPL BCP-MCNC: 3 G/DL (ref 3.5–5)
ALP SERPL-CCNC: 177 U/L (ref 46–116)
ALT SERPL W P-5'-P-CCNC: 113 U/L (ref 12–78)
ANION GAP SERPL CALCULATED.3IONS-SCNC: 5 MMOL/L (ref 4–13)
AST SERPL W P-5'-P-CCNC: 62 U/L (ref 5–45)
BASOPHILS # BLD AUTO: 0.05 THOUSANDS/ΜL (ref 0–0.1)
BASOPHILS NFR BLD AUTO: 1 % (ref 0–1)
BILIRUB SERPL-MCNC: 0.25 MG/DL (ref 0.2–1)
BUN SERPL-MCNC: 48 MG/DL (ref 5–25)
CALCIUM ALBUM COR SERPL-MCNC: 9.9 MG/DL (ref 8.3–10.1)
CALCIUM SERPL-MCNC: 9.1 MG/DL (ref 8.3–10.1)
CHLORIDE SERPL-SCNC: 107 MMOL/L (ref 96–108)
CO2 SERPL-SCNC: 24 MMOL/L (ref 21–32)
CREAT SERPL-MCNC: 2.56 MG/DL (ref 0.6–1.3)
EOSINOPHIL # BLD AUTO: 0.47 THOUSAND/ΜL (ref 0–0.61)
EOSINOPHIL NFR BLD AUTO: 8 % (ref 0–6)
ERYTHROCYTE [DISTWIDTH] IN BLOOD BY AUTOMATED COUNT: 14.1 % (ref 11.6–15.1)
GFR SERPL CREATININE-BSD FRML MDRD: 25 ML/MIN/1.73SQ M
GLUCOSE P FAST SERPL-MCNC: 344 MG/DL (ref 65–99)
HCT VFR BLD AUTO: 35.7 % (ref 36.5–49.3)
HGB BLD-MCNC: 11.4 G/DL (ref 12–17)
IMM GRANULOCYTES # BLD AUTO: 0.02 THOUSAND/UL (ref 0–0.2)
IMM GRANULOCYTES NFR BLD AUTO: 0 % (ref 0–2)
LYMPHOCYTES # BLD AUTO: 2.09 THOUSANDS/ΜL (ref 0.6–4.47)
LYMPHOCYTES NFR BLD AUTO: 35 % (ref 14–44)
MCH RBC QN AUTO: 29.5 PG (ref 26.8–34.3)
MCHC RBC AUTO-ENTMCNC: 31.9 G/DL (ref 31.4–37.4)
MCV RBC AUTO: 93 FL (ref 82–98)
MONOCYTES # BLD AUTO: 0.43 THOUSAND/ΜL (ref 0.17–1.22)
MONOCYTES NFR BLD AUTO: 7 % (ref 4–12)
NEUTROPHILS # BLD AUTO: 2.89 THOUSANDS/ΜL (ref 1.85–7.62)
NEUTS SEG NFR BLD AUTO: 49 % (ref 43–75)
NRBC BLD AUTO-RTO: 0 /100 WBCS
PLATELET # BLD AUTO: 161 THOUSANDS/UL (ref 149–390)
PMV BLD AUTO: 11.6 FL (ref 8.9–12.7)
POTASSIUM SERPL-SCNC: 4.8 MMOL/L (ref 3.5–5.3)
PROT SERPL-MCNC: 8.1 G/DL (ref 6.4–8.4)
RBC # BLD AUTO: 3.86 MILLION/UL (ref 3.88–5.62)
SODIUM SERPL-SCNC: 136 MMOL/L (ref 135–147)
WBC # BLD AUTO: 5.95 THOUSAND/UL (ref 4.31–10.16)

## 2022-08-11 PROCEDURE — 36415 COLL VENOUS BLD VENIPUNCTURE: CPT

## 2022-08-11 PROCEDURE — 80053 COMPREHEN METABOLIC PANEL: CPT

## 2022-08-11 PROCEDURE — G0300 HHS/HOSPICE OF LPN EA 15 MIN: HCPCS

## 2022-08-11 PROCEDURE — 85025 COMPLETE CBC W/AUTO DIFF WBC: CPT

## 2022-08-11 NOTE — Clinical Note
Was informed pt changed his PCP to the following:  Dr Genet Saldivar  [address], [address]  [phone]    Please remove Mateo Sonja Galloway as of today 8 11 22

## 2022-08-11 NOTE — CASE COMMUNICATION
Updated medications today  Pt is currently taking seroquel 100mg at bedtime for sleep  Dr Morse Speaks prescribed medication 8 2 22  No adverse reactions  Pt feels it is helping him at this time  Pt has a reported fall 8 10 22  Pt was walking out of the bathroom with his walker  No injuries noted  Denies hitting head  Pt states he fell and hit his left arm (elbow) hit the floor  PCP called to make aware  Dr Vi Ferrari made aware  in addition to Marleny Razo PA-C  Pt is in between PCP at this time   Dr chilel with new PCP is today @ 10:15 am

## 2022-08-11 NOTE — CASE COMMUNICATION
Pt was walking out of the bathroom with his walker yesterday and fell on the floor  No injuries noted  Denies hitting head  Pt states he fell and hit his left arm (elbow) hit the floor  PCP called to make aware  Dr Vanesa Nuñez made aware in addition to Cecilia Perdomo PA-C   Pt had appt with Dr Dillan Saleh today at 10:30 am

## 2022-08-15 ENCOUNTER — HOME CARE VISIT (OUTPATIENT)
Dept: HOME HEALTH SERVICES | Facility: HOME HEALTHCARE | Age: 67
End: 2022-08-15
Payer: COMMERCIAL

## 2022-08-15 VITALS — OXYGEN SATURATION: 98 % | SYSTOLIC BLOOD PRESSURE: 108 MMHG | HEART RATE: 72 BPM | DIASTOLIC BLOOD PRESSURE: 62 MMHG

## 2022-08-15 PROCEDURE — G0151 HHCP-SERV OF PT,EA 15 MIN: HCPCS

## 2022-08-15 NOTE — CASE COMMUNICATION
Missed visit today as pt had an appt this am and arrived late thus being late for SN visit  SN waited for pt but still no arrival  Next visit will be planned for 3 09 49 for recertification  Dr Sahra Fitzpatrick notified via telephone call

## 2022-08-17 ENCOUNTER — HOME CARE VISIT (OUTPATIENT)
Dept: HOME HEALTH SERVICES | Facility: HOME HEALTHCARE | Age: 67
End: 2022-08-17
Payer: COMMERCIAL

## 2022-08-17 VITALS — OXYGEN SATURATION: 96 % | SYSTOLIC BLOOD PRESSURE: 116 MMHG | HEART RATE: 70 BPM | DIASTOLIC BLOOD PRESSURE: 62 MMHG

## 2022-08-17 PROCEDURE — G0151 HHCP-SERV OF PT,EA 15 MIN: HCPCS

## 2022-08-18 ENCOUNTER — HOME CARE VISIT (OUTPATIENT)
Dept: HOME HEALTH SERVICES | Facility: HOME HEALTHCARE | Age: 67
End: 2022-08-18
Payer: COMMERCIAL

## 2022-08-18 VITALS
SYSTOLIC BLOOD PRESSURE: 108 MMHG | TEMPERATURE: 97.1 F | DIASTOLIC BLOOD PRESSURE: 60 MMHG | HEART RATE: 65 BPM | OXYGEN SATURATION: 96 % | RESPIRATION RATE: 18 BRPM

## 2022-08-18 PROCEDURE — G0299 HHS/HOSPICE OF RN EA 15 MIN: HCPCS

## 2022-08-18 NOTE — CASE COMMUNICATION
Jayshree diasahrged  from  home  P T   as  of  today  8  17  Goals  achieved    Patient  in  agreement  with  DC

## 2022-08-18 NOTE — CASE COMMUNICATION
Home Health need continues for: cardiac assess for chf exacerbations  Primary diagnoses/co-morbidities/recent procedures in past 60 days that impact current episode: chf  Current level of functional ability:  requires assistance  Homebound status and living arrangements:  decreased endurance  fall risk     Goals accomplished and/or measurable progress toward unmet goals in past 60 days:  pt would like to ambulate more and not get admitte d to the hospital  Focus of care for next 60 days for each discipline ordered:  SN cardiac assess and diabetes teaching  Skin integrity/wound status: none  Code status:   Most recent fall risk:  high

## 2022-08-21 ENCOUNTER — HOME CARE VISIT (OUTPATIENT)
Dept: HOME HEALTH SERVICES | Facility: HOME HEALTHCARE | Age: 67
End: 2022-08-21
Payer: COMMERCIAL

## 2022-08-21 PROCEDURE — 400014 VN F/U

## 2022-08-21 NOTE — CASE COMMUNICATION
Patient brother states pt has pcp appt at 0900 08 22 22  phys is Dr Sahra Fitzpatrick who is not affiliated with [facility]   please move snv to 08 23 22

## 2022-08-22 ENCOUNTER — TELEPHONE (OUTPATIENT)
Dept: HOME HEALTH SERVICES | Facility: HOME HEALTHCARE | Age: 67
End: 2022-08-22

## 2022-08-23 ENCOUNTER — HOME CARE VISIT (OUTPATIENT)
Dept: HOME HEALTH SERVICES | Facility: HOME HEALTHCARE | Age: 67
End: 2022-08-23
Payer: COMMERCIAL

## 2022-08-23 NOTE — CASE COMMUNICATION
pt had family emergency had to cancel sn visit for today   visit pattern out of compliance notified Dr Therese Dockery

## 2022-08-25 ENCOUNTER — HOME CARE VISIT (OUTPATIENT)
Dept: HOME HEALTH SERVICES | Facility: HOME HEALTHCARE | Age: 67
End: 2022-08-25
Payer: COMMERCIAL

## 2022-08-25 VITALS
OXYGEN SATURATION: 99 % | SYSTOLIC BLOOD PRESSURE: 122 MMHG | HEART RATE: 76 BPM | DIASTOLIC BLOOD PRESSURE: 68 MMHG | TEMPERATURE: 98.8 F | RESPIRATION RATE: 18 BRPM

## 2022-08-26 ENCOUNTER — TELEPHONE (OUTPATIENT)
Dept: NEPHROLOGY | Facility: CLINIC | Age: 67
End: 2022-08-26

## 2022-08-26 NOTE — TELEPHONE ENCOUNTER
Appointment Confirmation   Person confirmed appointment with  If not patient, name of the person Voice msg    Date and time of appointment 8/29 3:30    Patient acknowledged and will be at appointment? no    Did you advise the patient that they will need a urine sample if they are a new patient?  N/A    Did you advise the patient to bring their current medications for verification? (including any OTC) Yes    Additional Information

## 2022-08-28 PROBLEM — R80.1 PERSISTENT PROTEINURIA: Status: ACTIVE | Noted: 2022-08-28

## 2022-08-30 ENCOUNTER — HOME CARE VISIT (OUTPATIENT)
Dept: HOME HEALTH SERVICES | Facility: HOME HEALTHCARE | Age: 67
End: 2022-08-30
Payer: COMMERCIAL

## 2022-08-30 VITALS
DIASTOLIC BLOOD PRESSURE: 66 MMHG | OXYGEN SATURATION: 98 % | HEART RATE: 75 BPM | TEMPERATURE: 97.1 F | SYSTOLIC BLOOD PRESSURE: 106 MMHG | RESPIRATION RATE: 18 BRPM

## 2022-08-30 PROCEDURE — G0299 HHS/HOSPICE OF RN EA 15 MIN: HCPCS

## 2022-08-30 PROCEDURE — 400014 VN F/U

## 2022-09-01 ENCOUNTER — HOME CARE VISIT (OUTPATIENT)
Dept: HOME HEALTH SERVICES | Facility: HOME HEALTHCARE | Age: 67
End: 2022-09-01
Payer: COMMERCIAL

## 2022-09-01 VITALS
DIASTOLIC BLOOD PRESSURE: 68 MMHG | OXYGEN SATURATION: 96 % | TEMPERATURE: 97.5 F | RESPIRATION RATE: 18 BRPM | HEART RATE: 75 BPM | SYSTOLIC BLOOD PRESSURE: 118 MMHG

## 2022-09-01 PROCEDURE — G0299 HHS/HOSPICE OF RN EA 15 MIN: HCPCS

## 2022-09-04 ENCOUNTER — HOSPITAL ENCOUNTER (INPATIENT)
Facility: HOSPITAL | Age: 67
LOS: 2 days | Discharge: HOME | DRG: 683 | End: 2022-09-07
Attending: EMERGENCY MEDICINE | Admitting: GENERAL PRACTICE
Payer: COMMERCIAL

## 2022-09-04 ENCOUNTER — APPOINTMENT (OUTPATIENT)
Dept: RADIOLOGY | Facility: HOSPITAL | Age: 67
DRG: 683 | End: 2022-09-04
Payer: COMMERCIAL

## 2022-09-04 DIAGNOSIS — F31.62 BIPOLAR AFFECTIVE DISORDER, MIXED, MODERATE (HCC): ICD-10-CM

## 2022-09-04 DIAGNOSIS — N17.9 AKI (ACUTE KIDNEY INJURY) (HCC): ICD-10-CM

## 2022-09-04 DIAGNOSIS — R94.31 EKG ABNORMALITIES: ICD-10-CM

## 2022-09-04 DIAGNOSIS — I95.9 HYPOTENSION: Primary | ICD-10-CM

## 2022-09-04 PROBLEM — N18.9 ACUTE KIDNEY INJURY SUPERIMPOSED ON CKD (HCC): Status: ACTIVE | Noted: 2022-09-04

## 2022-09-04 PROBLEM — I50.42 CHRONIC COMBINED SYSTOLIC AND DIASTOLIC HEART FAILURE (HCC): Status: ACTIVE | Noted: 2022-09-04

## 2022-09-04 PROBLEM — R42 DIZZINESS: Status: ACTIVE | Noted: 2022-09-04

## 2022-09-04 LAB
2HR DELTA HS TROPONIN: -2 NG/L
4HR DELTA HS TROPONIN: -2 NG/L
ALBUMIN SERPL BCP-MCNC: 2.9 G/DL (ref 3.5–5)
ALP SERPL-CCNC: 156 U/L (ref 46–116)
ALT SERPL W P-5'-P-CCNC: 124 U/L (ref 12–78)
ANION GAP SERPL CALCULATED.3IONS-SCNC: 1 MMOL/L (ref 4–13)
AST SERPL W P-5'-P-CCNC: 74 U/L (ref 5–45)
ATRIAL RATE: 69 BPM
ATRIAL RATE: 73 BPM
BASOPHILS # BLD AUTO: 0.05 THOUSANDS/ΜL (ref 0–0.1)
BASOPHILS NFR BLD AUTO: 1 % (ref 0–1)
BILIRUB SERPL-MCNC: 0.28 MG/DL (ref 0.2–1)
BUN SERPL-MCNC: 48 MG/DL (ref 5–25)
CALCIUM ALBUM COR SERPL-MCNC: 9.6 MG/DL (ref 8.3–10.1)
CALCIUM SERPL-MCNC: 8.7 MG/DL (ref 8.3–10.1)
CARDIAC TROPONIN I PNL SERPL HS: 14 NG/L
CARDIAC TROPONIN I PNL SERPL HS: 14 NG/L
CARDIAC TROPONIN I PNL SERPL HS: 16 NG/L
CHLORIDE SERPL-SCNC: 113 MMOL/L (ref 96–108)
CO2 SERPL-SCNC: 26 MMOL/L (ref 21–32)
CREAT SERPL-MCNC: 2.74 MG/DL (ref 0.6–1.3)
EOSINOPHIL # BLD AUTO: 0.37 THOUSAND/ΜL (ref 0–0.61)
EOSINOPHIL NFR BLD AUTO: 5 % (ref 0–6)
ERYTHROCYTE [DISTWIDTH] IN BLOOD BY AUTOMATED COUNT: 13.4 % (ref 11.6–15.1)
FLUAV RNA RESP QL NAA+PROBE: NEGATIVE
FLUBV RNA RESP QL NAA+PROBE: NEGATIVE
GFR SERPL CREATININE-BSD FRML MDRD: 23 ML/MIN/1.73SQ M
GLUCOSE SERPL-MCNC: 201 MG/DL (ref 65–140)
GLUCOSE SERPL-MCNC: 217 MG/DL (ref 65–140)
GLUCOSE SERPL-MCNC: 86 MG/DL (ref 65–140)
HCT VFR BLD AUTO: 34.4 % (ref 36.5–49.3)
HGB BLD-MCNC: 11.2 G/DL (ref 12–17)
IMM GRANULOCYTES # BLD AUTO: 0.02 THOUSAND/UL (ref 0–0.2)
IMM GRANULOCYTES NFR BLD AUTO: 0 % (ref 0–2)
LYMPHOCYTES # BLD AUTO: 2.1 THOUSANDS/ΜL (ref 0.6–4.47)
LYMPHOCYTES NFR BLD AUTO: 31 % (ref 14–44)
MCH RBC QN AUTO: 29.3 PG (ref 26.8–34.3)
MCHC RBC AUTO-ENTMCNC: 32.6 G/DL (ref 31.4–37.4)
MCV RBC AUTO: 90 FL (ref 82–98)
MONOCYTES # BLD AUTO: 0.69 THOUSAND/ΜL (ref 0.17–1.22)
MONOCYTES NFR BLD AUTO: 10 % (ref 4–12)
NEUTROPHILS # BLD AUTO: 3.65 THOUSANDS/ΜL (ref 1.85–7.62)
NEUTS SEG NFR BLD AUTO: 53 % (ref 43–75)
NRBC BLD AUTO-RTO: 0 /100 WBCS
P AXIS: 58 DEGREES
P AXIS: 63 DEGREES
PLATELET # BLD AUTO: 124 THOUSANDS/UL (ref 149–390)
PMV BLD AUTO: 10.5 FL (ref 8.9–12.7)
POTASSIUM SERPL-SCNC: 4.7 MMOL/L (ref 3.5–5.3)
PR INTERVAL: 152 MS
PR INTERVAL: 162 MS
PROT SERPL-MCNC: 7.9 G/DL (ref 6.4–8.4)
QRS AXIS: 64 DEGREES
QRS AXIS: 68 DEGREES
QRSD INTERVAL: 88 MS
QRSD INTERVAL: 92 MS
QT INTERVAL: 368 MS
QT INTERVAL: 394 MS
QTC INTERVAL: 405 MS
QTC INTERVAL: 422 MS
RBC # BLD AUTO: 3.82 MILLION/UL (ref 3.88–5.62)
RSV RNA RESP QL NAA+PROBE: NEGATIVE
SARS-COV-2 RNA RESP QL NAA+PROBE: NEGATIVE
SODIUM SERPL-SCNC: 140 MMOL/L (ref 135–147)
T WAVE AXIS: -30 DEGREES
T WAVE AXIS: -41 DEGREES
VENTRICULAR RATE: 69 BPM
VENTRICULAR RATE: 73 BPM
WBC # BLD AUTO: 6.88 THOUSAND/UL (ref 4.31–10.16)

## 2022-09-04 PROCEDURE — 99220 PR INITIAL OBSERVATION CARE/DAY 70 MINUTES: CPT | Performed by: INTERNAL MEDICINE

## 2022-09-04 PROCEDURE — 0241U HB NFCT DS VIR RESP RNA 4 TRGT: CPT

## 2022-09-04 PROCEDURE — 93010 ELECTROCARDIOGRAM REPORT: CPT | Performed by: INTERNAL MEDICINE

## 2022-09-04 PROCEDURE — 85025 COMPLETE CBC W/AUTO DIFF WBC: CPT

## 2022-09-04 PROCEDURE — 84484 ASSAY OF TROPONIN QUANT: CPT

## 2022-09-04 PROCEDURE — 96360 HYDRATION IV INFUSION INIT: CPT

## 2022-09-04 PROCEDURE — 93005 ELECTROCARDIOGRAM TRACING: CPT

## 2022-09-04 PROCEDURE — 99285 EMERGENCY DEPT VISIT HI MDM: CPT | Performed by: EMERGENCY MEDICINE

## 2022-09-04 PROCEDURE — 99285 EMERGENCY DEPT VISIT HI MDM: CPT

## 2022-09-04 PROCEDURE — 82948 REAGENT STRIP/BLOOD GLUCOSE: CPT

## 2022-09-04 PROCEDURE — 36415 COLL VENOUS BLD VENIPUNCTURE: CPT

## 2022-09-04 PROCEDURE — 71046 X-RAY EXAM CHEST 2 VIEWS: CPT

## 2022-09-04 PROCEDURE — 80053 COMPREHEN METABOLIC PANEL: CPT

## 2022-09-04 RX ORDER — CARVEDILOL 25 MG/1
25 TABLET ORAL 2 TIMES DAILY WITH MEALS
Status: DISCONTINUED | OUTPATIENT
Start: 2022-09-05 | End: 2022-09-06

## 2022-09-04 RX ORDER — FLUOXETINE HYDROCHLORIDE 20 MG/1
20 CAPSULE ORAL DAILY
Status: DISCONTINUED | OUTPATIENT
Start: 2022-09-05 | End: 2022-09-07 | Stop reason: HOSPADM

## 2022-09-04 RX ORDER — SODIUM CHLORIDE, SODIUM GLUCONATE, SODIUM ACETATE, POTASSIUM CHLORIDE, MAGNESIUM CHLORIDE, SODIUM PHOSPHATE, DIBASIC, AND POTASSIUM PHOSPHATE .53; .5; .37; .037; .03; .012; .00082 G/100ML; G/100ML; G/100ML; G/100ML; G/100ML; G/100ML; G/100ML
75 INJECTION, SOLUTION INTRAVENOUS CONTINUOUS
Status: DISCONTINUED | OUTPATIENT
Start: 2022-09-04 | End: 2022-09-04

## 2022-09-04 RX ORDER — CARVEDILOL 25 MG/1
25 TABLET ORAL 2 TIMES DAILY WITH MEALS
Status: DISCONTINUED | OUTPATIENT
Start: 2022-09-04 | End: 2022-09-04

## 2022-09-04 RX ORDER — INSULIN LISPRO 100 [IU]/ML
1-6 INJECTION, SOLUTION INTRAVENOUS; SUBCUTANEOUS
Status: DISCONTINUED | OUTPATIENT
Start: 2022-09-04 | End: 2022-09-07 | Stop reason: HOSPADM

## 2022-09-04 RX ORDER — INSULIN GLARGINE 100 [IU]/ML
15 INJECTION, SOLUTION SUBCUTANEOUS
Status: DISCONTINUED | OUTPATIENT
Start: 2022-09-04 | End: 2022-09-07 | Stop reason: HOSPADM

## 2022-09-04 RX ORDER — HEPARIN SODIUM 5000 [USP'U]/ML
5000 INJECTION, SOLUTION INTRAVENOUS; SUBCUTANEOUS EVERY 8 HOURS SCHEDULED
Status: DISCONTINUED | OUTPATIENT
Start: 2022-09-04 | End: 2022-09-07 | Stop reason: HOSPADM

## 2022-09-04 RX ORDER — LANOLIN ALCOHOL/MO/W.PET/CERES
100 CREAM (GRAM) TOPICAL DAILY
Status: DISCONTINUED | OUTPATIENT
Start: 2022-09-05 | End: 2022-09-07 | Stop reason: HOSPADM

## 2022-09-04 RX ORDER — ASPIRIN 81 MG/1
81 TABLET ORAL DAILY
Status: DISCONTINUED | OUTPATIENT
Start: 2022-09-05 | End: 2022-09-07 | Stop reason: HOSPADM

## 2022-09-04 RX ORDER — GABAPENTIN 300 MG/1
300 CAPSULE ORAL 3 TIMES DAILY
Status: DISCONTINUED | OUTPATIENT
Start: 2022-09-04 | End: 2022-09-07 | Stop reason: HOSPADM

## 2022-09-04 RX ORDER — ATORVASTATIN CALCIUM 40 MG/1
40 TABLET, FILM COATED ORAL
Status: DISCONTINUED | OUTPATIENT
Start: 2022-09-04 | End: 2022-09-07 | Stop reason: HOSPADM

## 2022-09-04 RX ORDER — INSULIN GLARGINE 100 [IU]/ML
20 INJECTION, SOLUTION SUBCUTANEOUS
Status: DISCONTINUED | OUTPATIENT
Start: 2022-09-04 | End: 2022-09-04

## 2022-09-04 RX ORDER — QUETIAPINE FUMARATE 100 MG/1
100 TABLET, FILM COATED ORAL
Status: DISCONTINUED | OUTPATIENT
Start: 2022-09-04 | End: 2022-09-05

## 2022-09-04 RX ORDER — FOLIC ACID 1 MG/1
1 TABLET ORAL DAILY
Status: DISCONTINUED | OUTPATIENT
Start: 2022-09-05 | End: 2022-09-07 | Stop reason: HOSPADM

## 2022-09-04 RX ORDER — SODIUM CHLORIDE, SODIUM GLUCONATE, SODIUM ACETATE, POTASSIUM CHLORIDE, MAGNESIUM CHLORIDE, SODIUM PHOSPHATE, DIBASIC, AND POTASSIUM PHOSPHATE .53; .5; .37; .037; .03; .012; .00082 G/100ML; G/100ML; G/100ML; G/100ML; G/100ML; G/100ML; G/100ML
75 INJECTION, SOLUTION INTRAVENOUS CONTINUOUS
Status: DISPENSED | OUTPATIENT
Start: 2022-09-04 | End: 2022-09-05

## 2022-09-04 RX ORDER — INSULIN LISPRO 100 [IU]/ML
5 INJECTION, SOLUTION INTRAVENOUS; SUBCUTANEOUS
Status: DISCONTINUED | OUTPATIENT
Start: 2022-09-04 | End: 2022-09-07 | Stop reason: HOSPADM

## 2022-09-04 RX ADMIN — INSULIN GLARGINE 15 UNITS: 100 INJECTION, SOLUTION SUBCUTANEOUS at 21:24

## 2022-09-04 RX ADMIN — INSULIN LISPRO 5 UNITS: 100 INJECTION, SOLUTION INTRAVENOUS; SUBCUTANEOUS at 19:27

## 2022-09-04 RX ADMIN — ATORVASTATIN CALCIUM 40 MG: 40 TABLET, FILM COATED ORAL at 19:28

## 2022-09-04 RX ADMIN — GABAPENTIN 300 MG: 300 CAPSULE ORAL at 21:24

## 2022-09-04 RX ADMIN — SODIUM CHLORIDE 250 ML: 0.9 INJECTION, SOLUTION INTRAVENOUS at 13:27

## 2022-09-04 RX ADMIN — SODIUM CHLORIDE, SODIUM GLUCONATE, SODIUM ACETATE, POTASSIUM CHLORIDE, MAGNESIUM CHLORIDE, SODIUM PHOSPHATE, DIBASIC, AND POTASSIUM PHOSPHATE 75 ML/HR: .53; .5; .37; .037; .03; .012; .00082 INJECTION, SOLUTION INTRAVENOUS at 18:21

## 2022-09-04 RX ADMIN — INSULIN LISPRO 2 UNITS: 100 INJECTION, SOLUTION INTRAVENOUS; SUBCUTANEOUS at 19:27

## 2022-09-04 RX ADMIN — QUETIAPINE FUMARATE 100 MG: 100 TABLET ORAL at 21:24

## 2022-09-04 RX ADMIN — HEPARIN SODIUM 5000 UNITS: 5000 INJECTION INTRAVENOUS; SUBCUTANEOUS at 21:25

## 2022-09-04 NOTE — ASSESSMENT & PLAN NOTE
· Patient was sitting in his brothers car for hour and a half while he was moving furniture  After he came out of the car he felt dizzy he went sat on the toilet and his brother checked his blood pressure it was 88/50  Patient denies losing consciousness  Denies chest pain palpitation or shortness of breath    · Troponin x2 negative  · EKG showed T-wave inversions in inf lat leads which were evident on EKG in June 2022  · His blood pressure is elevated in the hospital, denies chest pain  · Check orthostatic vitals  · Gentle hydration  · Continue Coreg with holding parameters  · Telemetry  · Repeat EKG in AM

## 2022-09-04 NOTE — ASSESSMENT & PLAN NOTE
· On Coreg 25 mg b i d   · Amlodipine 5 mg daily was discontinued by primary care doctor about a month ago  · On torsemide 40 mg daily  · Due to low blood pressure at home check orthostatic vitals  · Continue Coreg with holding parameters

## 2022-09-04 NOTE — ASSESSMENT & PLAN NOTE
Lab Results   Component Value Date    EGFR 23 09/04/2022    EGFR 25 08/11/2022    EGFR 31 07/27/2022    CREATININE 2 74 (H) 09/04/2022    CREATININE 2 56 (H) 08/11/2022    CREATININE 2 14 (H) 07/27/2022   · Patient has history of CKD stage 4, followed by Nephrology, baseline creatinine high 1s to low 2s  · Creatinine admission 2 74    Patient had labs done at [facility] 9/1 with creatinine of 2 95  · Had episodes of hypotension today at home, on torsemide 40 mg daily  · Received 250 cc of IV fluids, continue 75 cc/hour for another 6 hours  · Bladder scan negative  · Avoid hypotension  · Recheck BMP tomorrow

## 2022-09-04 NOTE — ED NOTES
Pt ambulated to and from bathroom without difficulty   Denies sob, dizziness, chest pain, lightheadedness     Yvonne Carmen RN  09/04/22 [id number]

## 2022-09-04 NOTE — ED ATTENDING ATTESTATION
Final Diagnosis:  1  Hypotension    2  LAZARO (acute kidney injury) ([facility] [address] )    3  EKG abnormalities      ED Course as of 09/05/22 1130   Sun Sep 04, 2022   1512 Creatinine(!): 2 74  CKD   1512 POCUS Cardiac/Lung/IVC:  - transthoracic echocardiogram was performed at bedside by myself and resident  - Images collected were adequate  - This was a technically difficult study due to lung interference and rib interference   - Apical, parasternal, subcostal views were obtained  - Findings: There was no obvious pericardial effusion   No obvious pleural effusion   LV function / EF mildly reduced (globally)   IVC was <2cm   TAPSE >2cm   EPSS 6mm   B-lines were not present       1528 EKG reviewed which  Extremely similar to the 1 from earlier today  Does have T-wave inversions in multiple leads however no dynamic changes  He has no chest pain  He is not short of breath  He is here because he felt/dizzy but that is already improved  Wants to eat food  Tish Faith MD, saw and evaluated the patient  All available labs and X-rays were ordered by me or the resident and have been reviewed by myself  I discussed the patient with the resident / non-physician and agree with the resident's / non-physician practitioner's findings and plan as documented in the resident's / non-physician practicitioner's note, except where noted  At this point, I agree with the current assessment done in the ED  I was present during key portions of all procedures performed unless otherwise stated  Chief Complaint   Patient presents with    Hypotension     Patient presents with low blood pressure (88/55)  Sates he is feeling a little lightheaded today  This is a 61 y o  male presenting for evaluation of low blood pressure  Hx of CHF  Recent admission for similar    Has visiting nurses who came, hypotensive relatively, 110/__  As he was being moved to the bathroom, his BP dropped to 88/55 and was symptomatic, lightheaded  Soft BM had at that time  Urinating normally  Feeling continued LH so came in for evaluation  Eating/drinking okay on his limited diet (liquid diet for unclear reasons)  PMH:   has a past medical history of Anxiety, Arthritis, Coronary artery disease, Dementia ([facility] [address] ), Depression, Diabetes mellitus ([facility] [address] ), Infectious viral hepatitis, Memory loss, and Visual impairment  PSH:   has no past surgical history on file  Social:  Social History     Substance and Sexual Activity   Alcohol Use Not Currently    Comment: socially     Social History     Tobacco Use   Smoking Status Former Smoker   Smokeless Tobacco Never Used     Social History     Substance and Sexual Activity   Drug Use Never     PE:  Vitals:    09/04/22 2258 09/05/22 0457 09/05/22 0600 09/05/22 0719   BP: 120/77 147/91  157/92   BP Location:       Pulse: 79 77  75   Resp:       Temp: 98 6 °F (37 °C) 98 4 °F (36 9 °C)  97 6 °F (36 4 °C)   TempSrc:       SpO2: 100% 98%  98%   Weight:   81 7 kg (180 lb 3 2 oz)    Height:       General: VSS, NAD, awake, alert  Well-nourished, well-developed  Appears stated age  Head: Normocephalic, atraumatic, nontender  Eyes: PERRL, EOM-I  No diplopia  No hyphema  No subconjunctival hemorrhages  Symmetrical lids  ENTAtraumatic external nose and ears  MMM  No stridor  Normal phonation  No drooling  Base of mouth is soft  No mastoid tenderness  Neck: Symmetric, trachea midline  No JVD  CV: Peripheral pulses +2 throughout  No chest wall tenderness  Lungs:   Unlabored   No retractions  No crepitus  No tachypnea  No paradoxical motion  Abd: +BS, soft, NT/ND    MSK:   FROM   No lower extremity edema  Back:   No CVAT  Skin: Dry, intact  Neuro: AAOx3, GCS 15, CN II-XII grossly intact  Motor grossly intact    Psychiatric/Behavioral: Appropriate mood and affect   Exam: deferred  A:  - LH, improved  - Hypotension  P:  - Cardiac workup  - POCUS  - Dispo    - 13 point ROS was performed and all are normal unless stated in the history above  - Nursing note reviewed  Vitals reviewed  - Orders placed by myself and/or advanced practitioner / resident     - Previous chart was reviewed  - No language barrier    - History obtained from patient  - There are no limitations to the history obtained  - Critical care time: Not applicable for this patient  Code Status: Level 1 - Full Code  Advance Directive and Living Will:      Power of :    POLST:      Medications   aspirin (ECOTRIN LOW STRENGTH) EC tablet 81 mg (81 mg Oral Given 9/5/22 0852)   atorvastatin (LIPITOR) tablet 40 mg (40 mg Oral Given 9/4/22 1928)   FLUoxetine (PROzac) capsule 20 mg (20 mg Oral Given 5/7/16 8933)   folic acid (FOLVITE) tablet 1 mg (1 mg Oral Given 9/5/22 0852)   gabapentin (NEURONTIN) capsule 300 mg (300 mg Oral Given 9/5/22 0852)   QUEtiapine (SEROquel) tablet 100 mg (100 mg Oral Given 9/4/22 2124)   thiamine tablet 100 mg (100 mg Oral Given 9/5/22 0852)   insulin lispro (HumaLOG) 100 units/mL subcutaneous injection 5 Units (5 Units Subcutaneous Given 9/5/22 0853)   insulin lispro (HumaLOG) 100 units/mL subcutaneous injection 1-6 Units (1 Units Subcutaneous Not Given 9/5/22 0617)   heparin (porcine) subcutaneous injection 5,000 Units (5,000 Units Subcutaneous Given 9/5/22 0617)   multi-electrolyte (PLASMALYTE-A/ISOLYTE-S PH 7 4) IV solution (75 mL/hr Intravenous New Bag 9/4/22 1821)   insulin glargine (LANTUS) subcutaneous injection 15 Units 0 15 mL (15 Units Subcutaneous Given 9/4/22 2124)   carvedilol (COREG) tablet 25 mg (25 mg Oral Given 9/5/22 0649)   sodium chloride 0 9 % bolus 250 mL (0 mL Intravenous Stopped 9/4/22 1421)     XR chest 2 views   Final Result      Resolved vascular congestion  Bibasilar subsegmental atelectasis  Approximate 3 cm rounded lucent region at the left lung base, possibly representing a pneumatocele    This is stable when compared to the July 22, 2022 exam  Nonemergent CT chest imaging could be performed for further evaluation  Workstation performed: [id number]           Orders Placed This Encounter   Procedures    COVID/FLU/RSV    XR chest 2 views    CBC and differential    Comprehensive metabolic panel    HS Troponin 0hr (reflex protocol)    HS Troponin I 2hr    HS Troponin I 4hr    Basic metabolic panel    Magnesium    CBC (With Platelets)    Diet [place]/CHO Controlled; Consistent Carbohydrate Diet Level 2 (5 carb servings/75 grams CHO/meal);  Fluid Restriction 1500 ML, Sodium 2 GM    Insulin Subcutaneous Notify Physician    Insulin Subcutaneous Instruction    Hypoglycemia Protocol    Fingerstick Glucose (POCT)    Vital Signs per unit routine    Ambulate patient    Daily weights - Use standing scale to weigh patient    I/O    Apply SCD or Foot pumps    Orthostatic blood pressure    Bladder scan    24 Hour Telemetry Monitoring    Level 1-Full Code: all life saving measures are indicated    ECG 12 lead    ECG 12 lead    ECG 12 lead    ECG 12 lead    ECG 12 lead    Place in Observation     Labs Reviewed   CBC AND DIFFERENTIAL - Abnormal       Result Value Ref Range Status    WBC 6 88  4 31 - 10 16 Thousand/uL Final    RBC 3 82 (*) 3 88 - 5 62 Million/uL Final    Hemoglobin 11 2 (*) 12 0 - 17 0 g/dL Final    Hematocrit 34 4 (*) 36 5 - 49 3 % Final    MCV 90  82 - 98 fL Final    MCH 29 3  26 8 - 34 3 pg Final    MCHC 32 6  31 4 - 37 4 g/dL Final    RDW 13 4  11 6 - 15 1 % Final    MPV 10 5  8 9 - 12 7 fL Final    Platelets 806 (*) 238 - 390 Thousands/uL Final    nRBC 0  /100 WBCs Final    Neutrophils Relative 53  43 - 75 % Final    Immat GRANS % 0  0 - 2 % Final    Lymphocytes Relative 31  14 - 44 % Final    Monocytes Relative 10  4 - 12 % Final    Eosinophils Relative 5  0 - 6 % Final    Basophils Relative 1  0 - 1 % Final    Neutrophils Absolute 3 65  1 85 - 7 62 Thousands/µL Final    Immature Grans Absolute 0 02  0 00 - 0 20 Thousand/uL Final    Lymphocytes Absolute 2 10  0 60 - 4 47 Thousands/µL Final    Monocytes Absolute 0 69  0 17 - 1 22 Thousand/µL Final    Eosinophils Absolute 0 37  0 00 - 0 61 Thousand/µL Final    Basophils Absolute 0 05  0 00 - 0 10 Thousands/µL Final   COMPREHENSIVE METABOLIC PANEL - Abnormal    Sodium 140  135 - 147 mmol/L Final    Potassium 4 7  3 5 - 5 3 mmol/L Final    Chloride 113 (*) 96 - 108 mmol/L Final    CO2 26  21 - 32 mmol/L Final    ANION GAP 1 (*) 4 - 13 mmol/L Final    BUN 48 (*) 5 - 25 mg/dL Final    Creatinine 2 74 (*) 0 60 - 1 30 mg/dL Final    Comment: Standardized to IDMS reference method    Glucose 86  65 - 140 mg/dL Final    Comment: If the patient is fasting, the ADA then defines impaired fasting glucose as > 100 mg/dL and diabetes as > or equal to 123 mg/dL  Specimen collection should occur prior to Sulfasalazine administration due to the potential for falsely depressed results  Specimen collection should occur prior to Sulfapyridine administration due to the potential for falsely elevated results  Calcium 8 7  8 3 - 10 1 mg/dL Final    Corrected Calcium 9 6  8 3 - 10 1 mg/dL Final    AST 74 (*) 5 - 45 U/L Final    Comment: Specimen collection should occur prior to Sulfasalazine administration due to the potential for falsely depressed results   (*) 12 - 78 U/L Final    Comment: Specimen collection should occur prior to Sulfasalazine and/or Sulfapyridine administration due to the potential for falsely depressed results  Alkaline Phosphatase 156 (*) 46 - 116 U/L Final    Total Protein 7 9  6 4 - 8 4 g/dL Final    Albumin 2 9 (*) 3 5 - 5 0 g/dL Final    Total Bilirubin 0 28  0 20 - 1 00 mg/dL Final    Comment: Use of this assay is not recommended for patients undergoing treatment with eltrombopag due to the potential for falsely elevated results      eGFR 23  ml/min/1 73sq m Final    Narrative:     [place] guidelines for Chronic Kidney Disease (CKD):   Stage 1 with normal or high GFR (GFR > 90 mL/min/1 73 square meters)    Stage 2 Mild CKD (GFR = 60-89 mL/min/1 73 square meters)    Stage 3A Moderate CKD (GFR = 45-59 mL/min/1 73 square meters)    Stage 3B Moderate CKD (GFR = 30-44 mL/min/1 73 square meters)    Stage 4 Severe CKD (GFR = 15-29 mL/min/1 73 square meters)    Stage 5 End Stage CKD (GFR <15 mL/min/1 73 square meters)  Note: GFR calculation is accurate only with a steady state creatinine   POCT GLUCOSE - Abnormal    POC Glucose 217 (*) 65 - 140 mg/dl Final   COVID19, INFLUENZA A/B, RSV PCR, [facility] - Normal    SARS-CoV-2 Negative  Negative Final    Comment:      INFLUENZA A PCR Negative  Negative Final    Comment:      INFLUENZA B PCR Negative  Negative Final    Comment:      RSV PCR Negative  Negative Final    Comment:      Narrative:     FOR PEDIATRIC PATIENTS - copy/paste COVID Guidelines URL to browser: [email or web address]  [employer]    SARS-CoV-2 assay is a Nucleic Acid Amplification assay intended for the  qualitative detection of nucleic acid from SARS-CoV-2 in nasopharyngeal  swabs  Results are for the presumptive identification of SARS-CoV-2 RNA  Positive results are indicative of infection with SARS-CoV-2, the virus  causing COVID-19, but do not rule out bacterial infection or co-infection  with other viruses  Laboratories within the [place] and its  territories are required to report all positive results to the appropriate  public health authorities  Negative results do not preclude SARS-CoV-2  infection and should not be used as the sole basis for treatment or other  patient management decisions  Negative results must be combined with  clinical observations, patient history, and epidemiological information  This test has not been FDA cleared or approved  This test has been authorized by FDA under an Emergency Use Authorization  (EUA)   This test is only authorized for the duration of time the  declaration that circumstances exist justifying the authorization of the  emergency use of an in vitro diagnostic tests for detection of SARS-CoV-2  virus and/or diagnosis of COVID-19 infection under section 564(b)(1) of  the Act, 21 U  S C  659PKO-3(F)(5), unless the authorization is terminated  or revoked sooner  The test has been validated but independent review by FDA  and CLIA is pending  Test performed using [employer] Gene[employer]pert: This RT-PCR assay targets N2,  a region unique to SARS-CoV-2  A conserved region in the E-gene was chosen  for pan-Sarbecovirus detection which includes SARS-CoV-2  HS TROPONIN I 0HR - Normal    hs TnI 0hr 16  "Refer to ACS Flowchart"- see link ng/L Final    Comment:                                              Initial (time 0) result  If >=50 ng/L, Myocardial injury suggested ;  Type of myocardial injury and treatment strategy  to be determined  If 5-49 ng/L, a delta result at 2 hours and or 4 hours will be needed to further evaluate  If <4 ng/L, and chest pain has been >3 hours since onset, patient may qualify for discharge based on the HEART score in the ED  If <5 ng/L and <3hours since onset of chest pain, a delta result at 2 hours will be needed to further evaluate  HS Troponin 99th Percentile URL of a Health Population=12 ng/L with a 95% Confidence Interval of 8-18 ng/L  Second Troponin (time 2 hours)  If calculated delta >= 20 ng/L,  Myocardial injury suggested ; Type of myocardial injury and treatment strategy to be determined  If 5-49 ng/L and the calculated delta is 5-19 ng/L, consult medical service for evaluation  Continue evaluation for ischemia on ecg and other possible etiology and repeat hs troponin at 4 hours  If delta is <5 ng/L at 2 hours, consider discharge based on risk stratification via the HEART score (if in ED), or LOGAN risk score in IP/Observation      HS Troponin 99th Percentile URL of a Health Population=12 ng/L with a 95% Confidence Interval of 8-18 ng/L    HS TROPONIN I 2HR - Normal    hs TnI 2hr 14  "Refer to ACS Flowchart"- see link ng/L Final    Comment:                                              Initial (time 0) result  If >=50 ng/L, Myocardial injury suggested ;  Type of myocardial injury and treatment strategy  to be determined  If 5-49 ng/L, a delta result at 2 hours and or 4 hours will be needed to further evaluate  If <4 ng/L, and chest pain has been >3 hours since onset, patient may qualify for discharge based on the HEART score in the ED  If <5 ng/L and <3hours since onset of chest pain, a delta result at 2 hours will be needed to further evaluate  HS Troponin 99th Percentile URL of a Health Population=12 ng/L with a 95% Confidence Interval of 8-18 ng/L  Second Troponin (time 2 hours)  If calculated delta >= 20 ng/L,  Myocardial injury suggested ; Type of myocardial injury and treatment strategy to be determined  If 5-49 ng/L and the calculated delta is 5-19 ng/L, consult medical service for evaluation  Continue evaluation for ischemia on ecg and other possible etiology and repeat hs troponin at 4 hours  If delta is <5 ng/L at 2 hours, consider discharge based on risk stratification via the HEART score (if in ED), or LOGAN risk score in IP/Observation  HS Troponin 99th Percentile URL of a Health Population=12 ng/L with a 95% Confidence Interval of 8-18 ng/L  Delta 2hr hsTnI -2  <20 ng/L Final   HS TROPONIN I 4HR - Normal    hs TnI 4hr 14  "Refer to ACS Flowchart"- see link ng/L Final    Comment:                                              Initial (time 0) result  If >=50 ng/L, Myocardial injury suggested ;  Type of myocardial injury and treatment strategy  to be determined  If 5-49 ng/L, a delta result at 2 hours and or 4 hours will be needed to further evaluate  If <4 ng/L, and chest pain has been >3 hours since onset, patient may qualify for discharge based on the HEART score in the ED    If <5 ng/L and <3hours since onset of chest pain, a delta result at 2 hours will be needed to further evaluate  HS Troponin 99th Percentile URL of a Health Population=12 ng/L with a 95% Confidence Interval of 8-18 ng/L  Second Troponin (time 2 hours)  If calculated delta >= 20 ng/L,  Myocardial injury suggested ; Type of myocardial injury and treatment strategy to be determined  If 5-49 ng/L and the calculated delta is 5-19 ng/L, consult medical service for evaluation  Continue evaluation for ischemia on ecg and other possible etiology and repeat hs troponin at 4 hours  If delta is <5 ng/L at 2 hours, consider discharge based on risk stratification via the HEART score (if in ED), or LOGAN risk score in IP/Observation  HS Troponin 99th Percentile URL of a Health Population=12 ng/L with a 95% Confidence Interval of 8-18 ng/L  Delta 4hr hsTnI -2  <20 ng/L Final     Time reflects when diagnosis was documented in both MDM as applicable and the Disposition within this note     Time User Action Codes Description Comment    9/5/2022 11:30 AM Yanira Shelter [I95 [id number]] Hypotension     9/5/2022 11:30 AM Armani Watkins Add [N17 9] LAZARO (acute kidney injury) ([facility] [address] )     9/5/2022 11:30 AM Roberto Raphael Add [R94 [id number]] EKG abnormalities       ED Disposition     ED Disposition   Admit    Condition   Stable    Date/Time   Sun Sep 4, 2022  5:07 PM    Comment   Case was discussed with Dr Ferdinand Del Cid and the patient's admission status was agreed to be Admission Status: inpatient status to the service of Dr Ferdinand Del Cid              Follow-up Information    None       Current Discharge Medication List      CONTINUE these medications which have NOT CHANGED    Details   Acetaminophen Extra Strength 500 MG tablet Take 500 mg by mouth every 6 (six) hours as needed      Aspirin Low Dose 81 MG EC tablet Take 81 mg by mouth daily      atorvastatin (LIPITOR) 40 mg tablet Take 40 mg by mouth daily at bedtime      carvedilol (COREG) 25 mg tablet Take 1 tablet (25 mg total) by mouth 2 (two) times a day with meals  Qty: 60 tablet, Refills: 0    Associated Diagnoses: Congestive heart failure, unspecified HF chronicity, unspecified heart failure type (HCC)      Farxiga 5 MG TABS Take 5 mg by mouth in the morning      FLUoxetine (PROzac) 20 mg capsule Take 1 capsule (20 mg total) by mouth daily  Qty: 90 capsule, Refills: 1    Associated Diagnoses: Bipolar affective disorder, mixed, moderate ([address] ); Anxiety      folic acid (FOLVITE) 1 mg tablet Take 1 tablet (1 mg total) by mouth daily  Refills: 0    Associated Diagnoses: Polysubstance abuse (HCC)      INSULIN ASPART FLEXPEN SC Inject 30 Units under the skin daily before breakfast  30 units in AM with breakfast and 10 units at Dinner  Indications: Type 2 Diabetes      Insulin Glargine Solostar 100 UNIT/ML SOPN Inject 20 Units under the skin in the morning  Indications: Type 2 Diabetes      Melatonin 5 MG TABS Take 5 mg by mouth daily at bedtime       QUEtiapine (SEROquel) 100 mg tablet Take 100 mg by mouth daily at bedtime  per prescription bottle in home  Indications: Generalized Anxiety Disorder      thiamine (VITAMIN B1) 100 mg tablet Take 1 tablet (100 mg total) by mouth daily  Refills: 0    Associated Diagnoses: Polysubstance abuse (HCC)      torsemide (DEMADEX) 20 mg tablet Take 2 tablets (40 mg total) by mouth daily  Qty: 60 tablet, Refills: 0    Associated Diagnoses: Congestive heart failure, unspecified HF chronicity, unspecified heart failure type (HCC)      amLODIPine (NORVASC) 5 mg tablet Take 1 tablet (5 mg total) by mouth daily  Qty: 30 tablet, Refills: 0    Associated Diagnoses: Essential hypertension      Disposable Gloves MISC Use daily Large  Qty: 100 each, Refills: 0    Associated Diagnoses: Weakness; Physical deconditioning; Wheelchair bound; Hospital discharge follow-up;  History of decubitus ulcer      gabapentin (Neurontin) 300 mg capsule Take 1 capsule (300 mg total) by mouth 3 (three) times a day  Qty: 90 capsule, Refills: 0    Associated Diagnoses: Diabetic polyneuropathy associated with type 2 diabetes mellitus (HCC)      glucose blood (Accu-Chek Guide) test strip Test sugars four times daily  Qty: 400 each, Refills: 1    Associated Diagnoses: Uncontrolled type 2 diabetes mellitus with hyperglycemia ([address] )      !! Incontinence Supply Disposable (Disposable Brief Large) MISC Use daily  Qty: 72 each, Refills: 0    Associated Diagnoses: Weakness; Physical deconditioning; Wheelchair bound; Hospital discharge follow-up; History of decubitus ulcer      !! Incontinence Supply Disposable (RA Adult Wipes) MISC Use in the morning  Qty: 128 each, Refills: 0    Associated Diagnoses: Weakness; Physical deconditioning; Wheelchair bound; Hospital discharge follow-up; History of decubitus ulcer      !! Incontinence Supply Disposable MISC Use daily XL Disposable Bed Incontinence Pads  Qty: 100 each, Refills: 0    Associated Diagnoses: Weakness; Physical deconditioning; Wheelchair bound; Hospital discharge follow-up; History of decubitus ulcer      Lancet Devices (OneTouch Delica Plus Lancing) MISC Use 1 Dose in the morning Use as directed     Comments: Not accepted by patients insurance      LORazepam (ATIVAN) 0 5 mg tablet Take 0 5 mg by mouth daily at bedtime      metFORMIN (GLUCOPHAGE) 500 mg tablet Take 1 tablet (500 mg total) by mouth 2 (two) times a day with meals  Qty: 180 tablet, Refills: 0    Associated Diagnoses: Uncontrolled type 2 diabetes mellitus with hyperglycemia (HCC)      Skin Protectants, Misc  (dimethicone-zinc oxide) cream Apply topically 2 (two) times a day as needed for irritation  Qty: 142 g, Refills: 2    Associated Diagnoses: Hospital discharge follow-up; History of decubitus ulcer; Physical deconditioning; Weakness; Wheelchair bound       !! - Potential duplicate medications found  Please discuss with provider  No discharge procedures on file    Prior to Admission Medications   Prescriptions Last Dose Informant Patient Reported? Taking? Acetaminophen Extra Strength 500 MG tablet   Yes Yes   Sig: Take 500 mg by mouth every 6 (six) hours as needed   Aspirin Low Dose 81 MG EC tablet 9/4/2022 at Unknown time  Yes Yes   Sig: Take 81 mg by mouth daily   Disposable Gloves MISC   No No   Sig: Use daily Large   FLUoxetine (PROzac) 20 mg capsule 9/4/2022 at Unknown time  No Yes   Sig: Take 1 capsule (20 mg total) by mouth daily   Farxiga 5 MG TABS 9/4/2022 at Unknown time  Yes Yes   Sig: Take 5 mg by mouth in the morning   INSULIN ASPART FLEXPEN SC 9/4/2022 at Unknown time  Yes Yes   Sig: Inject 30 Units under the skin daily before breakfast  30 units in AM with breakfast and 10 units at Dinner  Indications: Type 2 Diabetes   Incontinence Supply Disposable (Disposable Brief Large) MISC   No No   Sig: Use daily   Incontinence Supply Disposable (RA Adult Wipes) MISC   No No   Sig: Use in the morning   Incontinence Supply Disposable MISC   No No   Sig: Use daily XL Disposable Bed Incontinence Pads   Insulin Glargine Solostar 100 UNIT/ML SOPN 9/4/2022 at Unknown time  Yes Yes   Sig: Inject 20 Units under the skin in the morning  Indications: Type 2 Diabetes   LORazepam (ATIVAN) 0 5 mg tablet Not Taking at Unknown time  Yes No   Sig: Take 0 5 mg by mouth daily at bedtime   Patient not taking: Reported on 9/4/2022   Lancet Devices (OPHTHONIXuch Delica Plus Lancing) MISC   Yes No   Sig: Use 1 Dose in the morning Use as directed    Melatonin 5 MG TABS 9/3/2022 at Unknown time  Yes Yes   Sig: Take 5 mg by mouth daily at bedtime    QUEtiapine (SEROquel) 100 mg tablet 9/3/2022 at Unknown time  Yes Yes   Sig: Take 100 mg by mouth daily at bedtime   per prescription bottle in home  Indications: Generalized Anxiety Disorder   Skin Protectants, Misc  (dimethicone-zinc oxide) cream   No No   Sig: Apply topically 2 (two) times a day as needed for irritation   amLODIPine (NORVASC) 5 mg tablet Not Taking at Unknown time  No No   Sig: Take 1 tablet (5 mg total) by mouth daily   Patient not taking: Reported on 9/4/2022   atorvastatin (LIPITOR) 40 mg tablet 9/3/2022 at Unknown time  Yes Yes   Sig: Take 40 mg by mouth daily at bedtime   carvedilol (COREG) 25 mg tablet 9/4/2022 at Unknown time  No Yes   Sig: Take 1 tablet (25 mg total) by mouth 2 (two) times a day with meals   folic acid (FOLVITE) 1 mg tablet 9/4/2022 at Unknown time  No Yes   Sig: Take 1 tablet (1 mg total) by mouth daily   Patient taking differently: Take 400 mcg by mouth daily   gabapentin (Neurontin) 300 mg capsule   No No   Sig: Take 1 capsule (300 mg total) by mouth 3 (three) times a day   glucose blood (Accu-Chek Guide) test strip   No No   Sig: Test sugars four times daily   metFORMIN (GLUCOPHAGE) 500 mg tablet  Other (Specify) No No   Sig: Take 1 tablet (500 mg total) by mouth 2 (two) times a day with meals   Patient not taking: Reported on 7/29/2022   thiamine (VITAMIN B1) 100 mg tablet 9/4/2022 at Unknown time  No Yes   Sig: Take 1 tablet (100 mg total) by mouth daily   torsemide (DEMADEX) 20 mg tablet 9/4/2022 at Unknown time  No Yes   Sig: Take 2 tablets (40 mg total) by mouth daily      Facility-Administered Medications: None       Portions of the record may have been created with voice recognition software  Occasional wrong word or "sound a like" substitutions may have occurred due to the inherent limitations of voice recognition software  Read the chart carefully and recognize, using context, where substitutions have occurred      Electronically signed by:  Reddy Rosario

## 2022-09-04 NOTE — ED PROVIDER NOTES
History  Chief Complaint   Patient presents with    Hypotension     Patient presents with low blood pressure (88/55)  Sates he is feeling a little lightheaded today  This is a 82-year-old male with past medical history of CHF, CKD, type 2 diabetes mellitus, hypertension presenting to the ED after a lightheaded and presyncopal episode at home  Patient was with his brother who is at the bedside says the moving from the car to the home with assistance  On the way any began feeling very lightheaded, so they took a blood pressure on the home blood pressure monitor initially is 110/70, however he began feeling more lightheaded and as he sat down his blood was in the 80s over 50s  Patient is normally hypertensive, so his brother was very concerned the brought him into the emergency for evaluation  He had no chest pain, no diaphoresis, no shortness of breath, no nausea or vomiting  Prior to Admission Medications   Prescriptions Last Dose Informant Patient Reported? Taking?    Acetaminophen Extra Strength 500 MG tablet   Yes Yes   Sig: Take 500 mg by mouth every 6 (six) hours as needed   Aspirin Low Dose 81 MG EC tablet 9/4/2022 at Unknown time  Yes Yes   Sig: Take 81 mg by mouth daily   Disposable Gloves MISC   No No   Sig: Use daily Large   FLUoxetine (PROzac) 20 mg capsule 9/4/2022 at Unknown time  No Yes   Sig: Take 1 capsule (20 mg total) by mouth daily   Farxiga 5 MG TABS 9/4/2022 at Unknown time  Yes Yes   Sig: Take 5 mg by mouth in the morning   INSULIN ASPART FLEXPEN SC 9/4/2022 at Unknown time  Yes Yes   Sig: Inject 30 Units under the skin daily before breakfast  30 units in AM with breakfast and 10 units at Dinner  Indications: Type 2 Diabetes   Incontinence Supply Disposable (Disposable Brief Large) MISC   No No   Sig: Use daily   Incontinence Supply Disposable (RA Adult Wipes) MISC   No No   Sig: Use in the morning   Incontinence Supply Disposable MISC   No No   Sig: Use daily XL Disposable Bed Incontinence Pads   Insulin Glargine Solostar 100 UNIT/ML SOPN 9/4/2022 at Unknown time  Yes Yes   Sig: Inject 20 Units under the skin in the morning  Indications: Type 2 Diabetes   LORazepam (ATIVAN) 0 5 mg tablet Not Taking at Unknown time  Yes No   Sig: Take 0 5 mg by mouth daily at bedtime   Patient not taking: Reported on 9/4/2022   Lancet Devices (iBid2Saveuch Delica Plus Lancing) MISC   Yes No   Sig: Use 1 Dose in the morning Use as directed    Melatonin 5 MG TABS 9/3/2022 at Unknown time  Yes Yes   Sig: Take 5 mg by mouth daily at bedtime    QUEtiapine (SEROquel) 100 mg tablet 9/3/2022 at Unknown time  Yes Yes   Sig: Take 100 mg by mouth daily at bedtime   per prescription bottle in home  Indications: Generalized Anxiety Disorder   Skin Protectants, Misc  (dimethicone-zinc oxide) cream   No No   Sig: Apply topically 2 (two) times a day as needed for irritation   amLODIPine (NORVASC) 5 mg tablet Not Taking at Unknown time  No No   Sig: Take 1 tablet (5 mg total) by mouth daily   Patient not taking: Reported on 9/4/2022   atorvastatin (LIPITOR) 40 mg tablet 9/3/2022 at Unknown time  Yes Yes   Sig: Take 40 mg by mouth daily at bedtime   carvedilol (COREG) 25 mg tablet 9/4/2022 at Unknown time  No Yes   Sig: Take 1 tablet (25 mg total) by mouth 2 (two) times a day with meals   folic acid (FOLVITE) 1 mg tablet 9/4/2022 at Unknown time  No Yes   Sig: Take 1 tablet (1 mg total) by mouth daily   Patient taking differently: Take 400 mcg by mouth daily   gabapentin (Neurontin) 300 mg capsule   No No   Sig: Take 1 capsule (300 mg total) by mouth 3 (three) times a day   glucose blood (Accu-Chek Guide) test strip   No No   Sig: Test sugars four times daily   metFORMIN (GLUCOPHAGE) 500 mg tablet  Other (Specify) No No   Sig: Take 1 tablet (500 mg total) by mouth 2 (two) times a day with meals   Patient not taking: Reported on 7/29/2022   thiamine (VITAMIN B1) 100 mg tablet 9/4/2022 at Unknown time  No Yes   Sig: Take 1 tablet (100 mg total) by mouth daily   torsemide (DEMADEX) 20 mg tablet 9/4/2022 at Unknown time  No Yes   Sig: Take 2 tablets (40 mg total) by mouth daily      Facility-Administered Medications: None       Past Medical History:   Diagnosis Date    Anxiety     Arthritis     Coronary artery disease     Dementia ([facility] )     Depression     Diabetes mellitus ([facility] )     Infectious viral hepatitis     Memory loss     Visual impairment        History reviewed  No pertinent surgical history  History reviewed  No pertinent family history  I have reviewed and agree with the history as documented  E-Cigarette/Vaping    E-Cigarette Use Never User      E-Cigarette/Vaping Substances     Social History     Tobacco Use    Smoking status: Former Smoker    Smokeless tobacco: Never Used   Vaping Use    Vaping Use: Never used   Substance Use Topics    Alcohol use: Not Currently     Comment: socially    Drug use: Never        Review of Systems   Constitutional: Negative for chills and fever  HENT: Negative for ear pain and sore throat  Eyes: Negative for pain and visual disturbance  Respiratory: Negative for cough and shortness of breath  Cardiovascular: Negative for chest pain and palpitations  Gastrointestinal: Negative for abdominal pain and vomiting  Genitourinary: Negative for dysuria and hematuria  Musculoskeletal: Negative for arthralgias and back pain  Skin: Negative for color change and rash  Neurological: Positive for light-headedness  Negative for seizures, syncope and weakness  Psychiatric/Behavioral: Negative for confusion  All other systems reviewed and are negative        Physical Exam  ED Triage Vitals [09/04/22 1203]   Temperature Pulse Respirations Blood Pressure SpO2   98 °F (36 7 °C) 68 20 131/75 100 %      Temp Source Heart Rate Source Patient Position - Orthostatic VS BP Location FiO2 (%)   Tympanic Monitor Lying Left arm --      Pain Score       No Pain             Orthostatic Vital Signs  Vitals:    09/04/22 1203 09/04/22 1402 09/04/22 1558   BP: 131/75 (!) 176/98 (!) 176/98   Pulse: 68 72 72   Patient Position - Orthostatic VS: Lying Lying Lying       Physical Exam  Constitutional:       Appearance: He is obese  HENT:      Head: Normocephalic and atraumatic  Right Ear: External ear normal       Left Ear: External ear normal       Nose: Nose normal       Mouth/Throat:      Mouth: Mucous membranes are dry  Pharynx: Oropharynx is clear  Eyes:      Extraocular Movements: Extraocular movements intact  Pupils: Pupils are equal, round, and reactive to light  Cardiovascular:      Rate and Rhythm: Normal rate and regular rhythm  Pulses: Normal pulses  Heart sounds: Normal heart sounds  Pulmonary:      Effort: Pulmonary effort is normal       Breath sounds: No rhonchi or rales  Abdominal:      Palpations: Abdomen is soft  Tenderness: There is no abdominal tenderness  Hernia: No hernia is present  Comments: Protuberant   Musculoskeletal:         General: No swelling, tenderness or deformity  Normal range of motion  Cervical back: Normal range of motion and neck supple  Right lower leg: No edema  Left lower leg: No edema  Skin:     General: Skin is warm and dry  Capillary Refill: Capillary refill takes less than 2 seconds  Comments: Some anterior shin and knee lesions, that appeared to be in the later stages of healing  Neurological:      General: No focal deficit present  Mental Status: He is alert and oriented to person, place, and time  Mental status is at baseline     Psychiatric:         Mood and Affect: Mood normal          Behavior: Behavior normal          ED Medications  Medications   sodium chloride 0 9 % bolus 250 mL (0 mL Intravenous Stopped 9/4/22 1421)       Diagnostic Studies  Results Reviewed     Procedure Component Value Units Date/Time    HS Troponin I 2hr [[id number]]  (Normal) Collected: 09/04/22 1528    Lab Status: Final result Specimen: Blood from Arm, Left Updated: 09/04/22 1609     hs TnI 2hr 14 ng/L      Delta 2hr hsTnI -2 ng/L     HS Troponin I 4hr [[id number]]     Lab Status: No result Specimen: Blood     HS Troponin 0hr (reflex protocol) [[id number]]  (Normal) Collected: 09/04/22 1305    Lab Status: Final result Specimen: Blood from Arm, Left Updated: 09/04/22 1402     hs TnI 0hr 16 ng/L     Comprehensive metabolic panel [[id number]]  (Abnormal) Collected: 09/04/22 1305    Lab Status: Final result Specimen: Blood from Arm, Left Updated: 09/04/22 1348     Sodium 140 mmol/L      Potassium 4 7 mmol/L      Chloride 113 mmol/L      CO2 26 mmol/L      ANION GAP 1 mmol/L      BUN 48 mg/dL      Creatinine 2 74 mg/dL      Glucose 86 mg/dL      Calcium 8 7 mg/dL      Corrected Calcium 9 6 mg/dL      AST 74 U/L       U/L      Alkaline Phosphatase 156 U/L      Total Protein 7 9 g/dL      Albumin 2 9 g/dL      Total Bilirubin 0 28 mg/dL      eGFR 23 ml/min/1 73sq m     Narrative:      [place] guidelines for Chronic Kidney Disease (CKD):     Stage 1 with normal or high GFR (GFR > 90 mL/min/1 73 square meters)    Stage 2 Mild CKD (GFR = 60-89 mL/min/1 73 square meters)    Stage 3A Moderate CKD (GFR = 45-59 mL/min/1 73 square meters)    Stage 3B Moderate CKD (GFR = 30-44 mL/min/1 73 square meters)    Stage 4 Severe CKD (GFR = 15-29 mL/min/1 73 square meters)    Stage 5 End Stage CKD (GFR <15 mL/min/1 73 square meters)  Note: GFR calculation is accurate only with a steady state creatinine    CBC and differential [[id number]]  (Abnormal) Collected: 09/04/22 1305    Lab Status: Final result Specimen: Blood from Arm, Left Updated: 09/04/22 1334     WBC 6 88 Thousand/uL      RBC 3 82 Million/uL      Hemoglobin 11 2 g/dL      Hematocrit 34 4 %      MCV 90 fL      MCH 29 3 pg      MCHC 32 6 g/dL      RDW 13 4 %      MPV 10 5 fL      Platelets 373 Thousands/uL      nRBC 0 /100 WBCs Neutrophils Relative 53 %      Immat GRANS % 0 %      Lymphocytes Relative 31 %      Monocytes Relative 10 %      Eosinophils Relative 5 %      Basophils Relative 1 %      Neutrophils Absolute 3 65 Thousands/µL      Immature Grans Absolute 0 02 Thousand/uL      Lymphocytes Absolute 2 10 Thousands/µL      Monocytes Absolute 0 69 Thousand/µL      Eosinophils Absolute 0 37 Thousand/µL      Basophils Absolute 0 05 Thousands/µL                  XR chest 2 views    (Results Pending)         Procedures  Procedures      ED Course             HEART Risk Score    Flowsheet Row Most Recent Value   Heart Score Risk Calculator    History 1 Filed at: 09/04/2022 1618   ECG 1 Filed at: 09/04/2022 1618   Age 1 Filed at: 09/04/2022 1618   Risk Factors 2 Filed at: 09/04/2022 1618   Troponin 1 Filed at: 09/04/2022 1618   HEART Score 6 Filed at: 09/04/2022 1618                                MDM  Number of Diagnoses or Management Options  Diagnosis management comments:  80-year-old male with past medical history of CHF, CKD, type 2 diabetes mellitus, hypertension presenting to the ED after a lightheaded and presyncopal episode at home  Patient history and physical concerning cardiac etiology his lightheadedness EKG, trope ordered  Also concern for patient electrolyte abnormalities given history of CHF and diuretic use  CMP ordered  Patient looks clinically dry, only ordered 250 mL bolus of fluids given history of CHF  Bedside cardiac ultrasound done clear check for any pericardial fluid and to assess for any wall motion abnormalities  Patient cbc positive for mild anemia, stable based on patient's recent labs and thrombocytopenia also stable based off of patient's recent labs  However patient has an LAZARO as compared to most previous BMP, and given patient's heart score of 6, and kidney syncope score 5 patient [facility] and management         Amount and/or Complexity of Data Reviewed  Clinical lab tests: ordered and reviewed        Disposition  Final diagnoses:   None     ED Disposition     ED Disposition   Admit    Condition   Stable    Date/Time   Sun Sep 4, 2022  5:07 PM    Comment   Case was discussed with Dr Dayana Sage and the patient's admission status was agreed to be Admission Status: inpatient status to the service of Dr Dayana Sage   Follow-up Information    None         Patient's Medications   Discharge Prescriptions    No medications on file     No discharge procedures on file  PDMP Review       Value Time User    PDMP Reviewed  Yes 7/15/2022 10:57 PM [place], [place]           ED Provider  Attending physically available and evaluated [facility]  I managed the patient along with the ED Attending      Electronically Signed by         Xiomara Carranza DO  09/04/22 5147

## 2022-09-04 NOTE — ASSESSMENT & PLAN NOTE
· About a month ago patient was started on Seroquel 50 mg HS and increased to 100 mg HS  · His gabapentin was also increased from 300 mg t i d  to 400 mg but due to dizziness and weakness it was decreased back to 300 mg t i d

## 2022-09-04 NOTE — ED NOTES
Per nurses spoken to on all floors, no tele boxes available  Per ED charge nurse there is a tele box available for patient to go to [facility]       Shakira Epps RN  09/04/22 2180

## 2022-09-04 NOTE — ASSESSMENT & PLAN NOTE
Wt Readings from Last 3 Encounters:   09/04/22 80 8 kg (178 lb 3 2 oz)   08/11/22 80 9 kg (178 lb 6 4 oz)   07/28/22 78 5 kg (173 lb)       · Patient was admitted in July for acute on chronic systolic heart failure  · Most recent EKG in July 2022 at [facility] [place] showed ejection fraction of 40-50%    Global hypokinesis, valve not assessed  · No weight gain, no lower extremity swelling, lungs are clear, no respiratory distress, no JVD  · His home regimen is torsemide 40 mg daily, Coreg 25 mg b i d   · Patient will receive gentle hydration for total of 1 L overnight due to acute kidney injury  · Monitor volume status  · Daily weights  · Intake and output

## 2022-09-04 NOTE — H&P
[address] [facility]  H&P- Leslie Tidwell 1955, 61 y o  male MRN: [id number]  Unit/Bed#: MURRAY Encounter: [id number]  Primary Care Provider: Brandy Maddox DO   Date and time admitted to hospital: 9/4/2022 12:01 PM    * Acute kidney injury superimposed on CKD [facility])  Assessment & Plan  Lab Results   Component Value Date    EGFR 23 09/04/2022    EGFR 25 08/11/2022    EGFR 31 07/27/2022    CREATININE 2 74 (H) 09/04/2022    CREATININE 2 56 (H) 08/11/2022    CREATININE 2 14 (H) 07/27/2022   · Patient has history of CKD stage 4, followed by Nephrology, baseline creatinine high 1s to low 2s  · Creatinine admission 2 74  Patient had labs done at [facility] 9/1 with creatinine of 2 95  · Had episodes of hypotension today at home, on torsemide 40 mg daily  · Received 250 cc of IV fluids, continue 75 cc/hour for another 6 hours  · Bladder scan negative  · Avoid hypotension  · Recheck BMP tomorrow    Dizziness  Assessment & Plan  · Patient was sitting in his brothers car for hour and a half while he was moving furniture  After he came out of the car he felt dizzy he went sat on the toilet and his brother checked his blood pressure it was 88/50  Patient denies losing consciousness  Denies chest pain palpitation or shortness of breath    · Troponin x2 negative  · EKG showed T-wave inversions in inf lat leads which were evident on EKG in June 2022  · His blood pressure is elevated in the hospital, denies chest pain  · Check orthostatic vitals  · Gentle hydration  · Continue Coreg with holding parameters  · Telemetry  · Repeat EKG in AM    Chronic combined systolic and diastolic heart failure (HCC)  Assessment & Plan  Wt Readings from Last 3 Encounters:   09/04/22 80 8 kg (178 lb 3 2 oz)   08/11/22 80 9 kg (178 lb 6 4 oz)   07/28/22 78 5 kg (173 lb)       · Patient was admitted in July for acute on chronic systolic heart failure  · Most recent EKG in July 2022 at [facility] showed ejection fraction of 40-50%  Global hypokinesis, valve not assessed  · No weight gain, no lower extremity swelling, lungs are clear, no respiratory distress, no JVD  · His home regimen is torsemide 40 mg daily, Coreg 25 mg b i d   · Patient will receive gentle hydration for total of 1 L overnight due to acute kidney injury  · Monitor volume status  · Daily weights  · Intake and output      Type 2 diabetes mellitus, without long-term current use of insulin (HCC)  Assessment & Plan  Lab Results   Component Value Date    HGBA1C 7 7 (H) 07/15/2022       No results for input(s): POCGLU in the last 72 hours  Blood Sugar Average: Last 72 hrs:  ·  about a month ago patient was started on insulin due to uncontrolled diabetes, per brother his blood sugars are in the 400s at home  · He is on Basaglar 20 units HS, aspart 30 units in the morning and 10 units in the evening  · Blood sugar in the ED on the lower side  · Will start Lantus 15 units HS, Humalog 5 units t i d  with meals and sliding scale    Mixed hyperlipidemia  Assessment & Plan  · Continue statin    Essential hypertension  Assessment & Plan  · On Coreg 25 mg b i d   · Amlodipine 5 mg daily was discontinued by primary care doctor about a month ago  · On torsemide 40 mg daily  · Due to low blood pressure at home check orthostatic vitals  · Continue Coreg with holding parameters    Bipolar affective disorder, mixed, moderate (HCC)  Assessment & Plan  · About a month ago patient was started on Seroquel 50 mg HS and increased to 100 mg HS  · His gabapentin was also increased from 300 mg t i d  to 400 mg but due to dizziness and weakness it was decreased back to 300 mg t i d     VTE Pharmacologic Prophylaxis: VTE Score: 3 Moderate Risk (Score 3-4) - Pharmacological DVT Prophylaxis Ordered: heparin  Code Status: Level 1 - Full Code   Discussion with family: Updated  (brother) via phone      Anticipated Length of Stay: Patient will be admitted on an observation basis with an anticipated length of stay of less than 2 midnights secondary to Dizziness  Total Time for Visit, including Counseling / Coordination of Care: 60 minutes Greater than 50% of this total time spent on direct patient counseling and coordination of care  Chief Complaint:  Dizziness    History of Present Illness:  Jasmeet Diallo is a 61 y o  male with a PMH of systolic heart failure, hypertension, hyperlipidemia, CKD stage 4 who presents with dizziness  Patient was in his usual state of health this morning  He was in his brother's car for about an hour and I have while he was moving  Dear condition was on  He reports good p o  intake  After our and half he stepped out of the car and while walking he became dizzy he sat on the toilet and his brother checked his blood pressure it was 88/50  He denies any headache, visual changes, chest pain palpitation shortness of breath with this episode  He was brought to the ED   by his brother  Patient's brother  a few days ago and  is on Wednesday and he would like to be home by then  Review of Systems:  Review of Systems   Constitutional: Negative for activity change, appetite change, fatigue and fever  HENT: Negative  Eyes: Negative  Respiratory: Negative for chest tightness, shortness of breath and wheezing  Cardiovascular: Negative for chest pain, palpitations and leg swelling  Gastrointestinal: Negative for abdominal distention, abdominal pain, diarrhea, nausea and vomiting  Endocrine: Negative  Genitourinary: Negative for difficulty urinating  Musculoskeletal: Negative  Neurological: Positive for dizziness  Hematological: Negative  Psychiatric/Behavioral: Negative          Past Medical and Surgical History:   Past Medical History:   Diagnosis Date    Anxiety     Arthritis     Coronary artery disease     Dementia ([facility] [address] )     Depression     Diabetes mellitus (HCC)     Infectious viral hepatitis     Memory loss     Visual impairment        History reviewed  No pertinent surgical history  Meds/Allergies:  Prior to Admission medications    Medication Sig Start Date End Date Taking? Authorizing Provider   Acetaminophen Extra Strength 500 MG tablet Take 500 mg by mouth every 6 (six) hours as needed 4/29/22  Yes Historical Provider, MD   Aspirin Low Dose 81 MG EC tablet Take 81 mg by mouth daily 4/29/22  Yes Historical Provider, MD   atorvastatin (LIPITOR) 40 mg tablet Take 40 mg by mouth daily at bedtime 4/29/22  Yes Historical Provider, MD   carvedilol (COREG) 25 mg tablet Take 1 tablet (25 mg total) by mouth 2 (two) times a day with meals 7/19/22  Yes Radha Gracia MD   Farxiga 5 MG TABS Take 5 mg by mouth in the morning 6/10/22  Yes Historical Provider, MD   FLUoxetine (PROzac) 20 mg capsule Take 1 capsule (20 mg total) by mouth daily 6/14/22  Yes Jarad Mock PA-C   folic acid (FOLVITE) 1 mg tablet Take 1 tablet (1 mg total) by mouth daily  Patient taking differently: Take 400 mcg by mouth daily 6/22/22  Yes Mar Centeno MD   INSULIN ASPART FLEXPEN SC Inject 30 Units under the skin daily before breakfast  30 units in AM with breakfast and 10 units at Dinner  Indications: Type 2 Diabetes   Yes Historical Provider, MD   Insulin Glargine Solostar 100 UNIT/ML SOPN Inject 20 Units under the skin in the morning  Indications: Type 2 Diabetes   Yes Historical Provider, MD   Melatonin 5 MG TABS Take 5 mg by mouth daily at bedtime  5/13/22  Yes Historical Provider, MD   QUEtiapine (SEROquel) 100 mg tablet Take 100 mg by mouth daily at bedtime   per prescription bottle in home  Indications: Generalized Anxiety Disorder   Yes Historical Provider, MD   thiamine (VITAMIN B1) 100 mg tablet Take 1 tablet (100 mg total) by mouth daily 6/22/22  Yes Mar Centeno MD   torsemide (DEMADEX) 20 mg tablet Take 2 tablets (40 mg total) by mouth daily 7/27/22  Yes Kristy Brooke PA-C   amLODIPine (NORVASC) 5 mg tablet Take 1 tablet (5 mg total) by mouth daily  Patient not taking: Reported on 9/4/2022 7/28/22   Micah Bartlett PA-C   Disposable Gloves MISC Use daily Large 7/27/22   Leonor Sahni PA-C   gabapentin (Neurontin) 300 mg capsule Take 1 capsule (300 mg total) by mouth 3 (three) times a day 7/28/22   Adina Figueredo PA-C   glucose blood (Accu-Chek Guide) test strip Test sugars four times daily 7/15/22   Adina Figueredo PA-C   Incontinence Supply Disposable (Disposable Brief Large) MISC Use daily 7/27/22   Micah Bartlett PA-C   Incontinence Supply Disposable (RA Adult Wipes) MISC Use in the morning 7/27/22   Micah Bartlett PA-C   Incontinence Supply Disposable MISC Use daily XL Disposable Bed Incontinence Pads 7/27/22   Micah Bartlett PA-C   Lancet Devices (OneTouch Delica Plus [place]) MISC Use 1 Dose in the morning Use as directed  4/30/22   Historical Provider, MD   LORazepam (ATIVAN) 0 5 mg tablet Take 0 5 mg by mouth daily at bedtime  Patient not taking: Reported on 9/4/2022    Historical Provider, MD   metFORMIN (GLUCOPHAGE) 500 mg tablet Take 1 tablet (500 mg total) by mouth 2 (two) times a day with meals  Patient not taking: Reported on 7/29/2022 6/14/22   Adina Figueredo PA-C   Skin Protectants, Misc  (dimethicone-zinc oxide) cream Apply topically 2 (two) times a day as needed for irritation 7/28/22   Adina Figueredo PA-C     I have reviewed home medications with patient family member  Allergies: No Known Allergies    Social History:  Marital Status: Single   Substance Use History:   Social History     Substance and Sexual Activity   Alcohol Use Not Currently    Comment: socially     Social History     Tobacco Use   Smoking Status Former Smoker   Smokeless Tobacco Never Used     Social History     Substance and Sexual Activity   Drug Use Never       Family History:  History reviewed  No pertinent family history      Physical Exam:     Vitals:   Blood Pressure: 101/66 (09/04/22 1808)  Pulse: 74 (09/04/22 1808)  Temperature: 98 °F (36 7 °C) (09/04/22 1203)  Temp Source: Tympanic (09/04/22 1203)  Respirations: 18 (09/04/22 1808)  Height: 5' 4" (162 6 cm) (09/04/22 1203)  Weight - Scale: 80 8 kg (178 lb 3 2 oz) (09/04/22 1203)  SpO2: 100 % (09/04/22 1558)    Physical Exam  Constitutional:       General: He is not in acute distress  HENT:      Head: Atraumatic  Neck:      Comments: No JVD  Cardiovascular:      Rate and Rhythm: Normal rate and regular rhythm  Heart sounds: No murmur heard  No friction rub  No gallop  Pulmonary:      Effort: Pulmonary effort is normal  No respiratory distress  Breath sounds: Normal breath sounds  No wheezing  Abdominal:      General: Bowel sounds are normal  There is no distension  Palpations: Abdomen is soft  Tenderness: There is no abdominal tenderness  Musculoskeletal:         General: No swelling  Skin:     General: Skin is warm and dry  Neurological:      General: No focal deficit present  Mental Status: He is alert  Psychiatric:         Mood and Affect: Mood normal         Additional Data:     Lab Results:  Results from last 7 days   Lab Units 09/04/22  1305   WBC Thousand/uL 6 88   HEMOGLOBIN g/dL 11 2*   HEMATOCRIT % 34 4*   PLATELETS Thousands/uL 124*   NEUTROS PCT % 53   LYMPHS PCT % 31   MONOS PCT % 10   EOS PCT % 5     Results from last 7 days   Lab Units 09/04/22  1305   SODIUM mmol/L 140   POTASSIUM mmol/L 4 7   CHLORIDE mmol/L 113*   CO2 mmol/L 26   BUN mg/dL 48*   CREATININE mg/dL 2 74*   ANION GAP mmol/L 1*   CALCIUM mg/dL 8 7   ALBUMIN g/dL 2 9*   TOTAL BILIRUBIN mg/dL 0 28   ALK PHOS U/L 156*   ALT U/L 124*   AST U/L 74*   GLUCOSE RANDOM mg/dL 86                       Imaging:  Chest x-ray pending  XR chest 2 views    (Results Pending)       EKG and Other Studies Reviewed on Admission:   · EKG:  Normal sinus rhythm, T-wave inversion in inferolateral leads    Same T-wave inversion seen on EKG June 16, 2022    ** Please Note: This note has been constructed using a voice recognition system   **

## 2022-09-05 PROBLEM — I95.1 ORTHOSTATIC HYPOTENSION: Status: ACTIVE | Noted: 2022-09-04

## 2022-09-05 LAB
ANION GAP SERPL CALCULATED.3IONS-SCNC: 4 MMOL/L (ref 4–13)
ATRIAL RATE: 69 BPM
BUN SERPL-MCNC: 43 MG/DL (ref 5–25)
CALCIUM SERPL-MCNC: 8.6 MG/DL (ref 8.3–10.1)
CHLORIDE SERPL-SCNC: 112 MMOL/L (ref 96–108)
CO2 SERPL-SCNC: 25 MMOL/L (ref 21–32)
CREAT SERPL-MCNC: 2.47 MG/DL (ref 0.6–1.3)
ERYTHROCYTE [DISTWIDTH] IN BLOOD BY AUTOMATED COUNT: 13.3 % (ref 11.6–15.1)
GFR SERPL CREATININE-BSD FRML MDRD: 26 ML/MIN/1.73SQ M
GLUCOSE SERPL-MCNC: 112 MG/DL (ref 65–140)
GLUCOSE SERPL-MCNC: 131 MG/DL (ref 65–140)
GLUCOSE SERPL-MCNC: 140 MG/DL (ref 65–140)
GLUCOSE SERPL-MCNC: 157 MG/DL (ref 65–140)
GLUCOSE SERPL-MCNC: 174 MG/DL (ref 65–140)
HCT VFR BLD AUTO: 33 % (ref 36.5–49.3)
HGB BLD-MCNC: 10.7 G/DL (ref 12–17)
MAGNESIUM SERPL-MCNC: 2.1 MG/DL (ref 1.6–2.6)
MCH RBC QN AUTO: 29.4 PG (ref 26.8–34.3)
MCHC RBC AUTO-ENTMCNC: 32.4 G/DL (ref 31.4–37.4)
MCV RBC AUTO: 91 FL (ref 82–98)
P AXIS: 54 DEGREES
PLATELET # BLD AUTO: 122 THOUSANDS/UL (ref 149–390)
PMV BLD AUTO: 10.3 FL (ref 8.9–12.7)
POTASSIUM SERPL-SCNC: 4.2 MMOL/L (ref 3.5–5.3)
PR INTERVAL: 158 MS
QRS AXIS: 30 DEGREES
QRSD INTERVAL: 92 MS
QT INTERVAL: 432 MS
QTC INTERVAL: 462 MS
RBC # BLD AUTO: 3.64 MILLION/UL (ref 3.88–5.62)
SODIUM SERPL-SCNC: 141 MMOL/L (ref 135–147)
T WAVE AXIS: 70 DEGREES
VENTRICULAR RATE: 69 BPM
WBC # BLD AUTO: 5.78 THOUSAND/UL (ref 4.31–10.16)

## 2022-09-05 PROCEDURE — 93010 ELECTROCARDIOGRAM REPORT: CPT | Performed by: INTERNAL MEDICINE

## 2022-09-05 PROCEDURE — 85027 COMPLETE CBC AUTOMATED: CPT | Performed by: INTERNAL MEDICINE

## 2022-09-05 PROCEDURE — 82948 REAGENT STRIP/BLOOD GLUCOSE: CPT

## 2022-09-05 PROCEDURE — 83735 ASSAY OF MAGNESIUM: CPT | Performed by: INTERNAL MEDICINE

## 2022-09-05 PROCEDURE — 99232 SBSQ HOSP IP/OBS MODERATE 35: CPT | Performed by: PHYSICIAN ASSISTANT

## 2022-09-05 PROCEDURE — 80048 BASIC METABOLIC PNL TOTAL CA: CPT | Performed by: INTERNAL MEDICINE

## 2022-09-05 PROCEDURE — 93005 ELECTROCARDIOGRAM TRACING: CPT

## 2022-09-05 RX ORDER — SODIUM CHLORIDE, SODIUM GLUCONATE, SODIUM ACETATE, POTASSIUM CHLORIDE, MAGNESIUM CHLORIDE, SODIUM PHOSPHATE, DIBASIC, AND POTASSIUM PHOSPHATE .53; .5; .37; .037; .03; .012; .00082 G/100ML; G/100ML; G/100ML; G/100ML; G/100ML; G/100ML; G/100ML
75 INJECTION, SOLUTION INTRAVENOUS CONTINUOUS
Status: DISPENSED | OUTPATIENT
Start: 2022-09-05 | End: 2022-09-05

## 2022-09-05 RX ORDER — QUETIAPINE FUMARATE 25 MG/1
75 TABLET, FILM COATED ORAL
Status: DISCONTINUED | OUTPATIENT
Start: 2022-09-05 | End: 2022-09-07 | Stop reason: HOSPADM

## 2022-09-05 RX ADMIN — CARVEDILOL 25 MG: 25 TABLET, FILM COATED ORAL at 17:48

## 2022-09-05 RX ADMIN — INSULIN LISPRO 5 UNITS: 100 INJECTION, SOLUTION INTRAVENOUS; SUBCUTANEOUS at 17:47

## 2022-09-05 RX ADMIN — INSULIN LISPRO 5 UNITS: 100 INJECTION, SOLUTION INTRAVENOUS; SUBCUTANEOUS at 11:47

## 2022-09-05 RX ADMIN — CARVEDILOL 25 MG: 25 TABLET, FILM COATED ORAL at 06:49

## 2022-09-05 RX ADMIN — HEPARIN SODIUM 5000 UNITS: 5000 INJECTION INTRAVENOUS; SUBCUTANEOUS at 21:34

## 2022-09-05 RX ADMIN — FOLIC ACID 1 MG: 1 TABLET ORAL at 08:52

## 2022-09-05 RX ADMIN — INSULIN GLARGINE 15 UNITS: 100 INJECTION, SOLUTION SUBCUTANEOUS at 21:31

## 2022-09-05 RX ADMIN — GABAPENTIN 300 MG: 300 CAPSULE ORAL at 08:52

## 2022-09-05 RX ADMIN — HEPARIN SODIUM 5000 UNITS: 5000 INJECTION INTRAVENOUS; SUBCUTANEOUS at 06:17

## 2022-09-05 RX ADMIN — THIAMINE HCL TAB 100 MG 100 MG: 100 TAB at 08:52

## 2022-09-05 RX ADMIN — GABAPENTIN 300 MG: 300 CAPSULE ORAL at 17:51

## 2022-09-05 RX ADMIN — HEPARIN SODIUM 5000 UNITS: 5000 INJECTION INTRAVENOUS; SUBCUTANEOUS at 13:50

## 2022-09-05 RX ADMIN — FLUOXETINE 20 MG: 20 CAPSULE ORAL at 08:52

## 2022-09-05 RX ADMIN — INSULIN LISPRO 1 UNITS: 100 INJECTION, SOLUTION INTRAVENOUS; SUBCUTANEOUS at 11:47

## 2022-09-05 RX ADMIN — ATORVASTATIN CALCIUM 40 MG: 40 TABLET, FILM COATED ORAL at 17:48

## 2022-09-05 RX ADMIN — ASPIRIN 81 MG: 81 TABLET, COATED ORAL at 08:52

## 2022-09-05 RX ADMIN — QUETIAPINE FUMARATE 75 MG: 25 TABLET ORAL at 21:33

## 2022-09-05 RX ADMIN — INSULIN LISPRO 5 UNITS: 100 INJECTION, SOLUTION INTRAVENOUS; SUBCUTANEOUS at 08:53

## 2022-09-05 RX ADMIN — SODIUM CHLORIDE, SODIUM GLUCONATE, SODIUM ACETATE, POTASSIUM CHLORIDE, MAGNESIUM CHLORIDE, SODIUM PHOSPHATE, DIBASIC, AND POTASSIUM PHOSPHATE 75 ML/HR: .53; .5; .37; .037; .03; .012; .00082 INJECTION, SOLUTION INTRAVENOUS at 15:30

## 2022-09-05 RX ADMIN — GABAPENTIN 300 MG: 300 CAPSULE ORAL at 21:33

## 2022-09-05 NOTE — ASSESSMENT & PLAN NOTE
· Patient was sitting in his brothers car for hour and a half while he was moving furniture  After he came out of the car he felt dizzy he went sat on the toilet and his brother checked his blood pressure it was 88/50  Patient denies losing consciousness  Denies chest pain, palpitation or shortness of breath    · Troponin x2 negative  · EKG showed T-wave inversions in inf lat leads which were evident on EKG in June 2022  · (+) orthostatic vitals, SBP dropping from 130 to 80s today   · Give additional IVF today   · Thigh high compression stockings   · Continue Coreg with holding parameters  · Telemetry without any events, will d/c

## 2022-09-05 NOTE — ASSESSMENT & PLAN NOTE
· About a month ago patient was started on Seroquel 50 mg HS and increased to 100 mg HS  · Will decrease Seroquel to 75 mg QHS for today as patient with sedation/somnolence noted today   · His gabapentin was also increased from 300 mg t i d  to 400 mg but due to dizziness and weakness it was decreased back to 300 mg t i d   · Mood is stable

## 2022-09-05 NOTE — PROGRESS NOTES
[address] [facility]  Progress Note - Mikey Flurry 1955, 61 y o  male MRN: [id number]  Unit/Bed#: CW2 211-02 Encounter: [id number]  Primary Care Provider: Puja Obando DO   Date and time admitted to hospital: 9/4/2022 12:01 PM      DOS: 9/5/2022  * Acute kidney injury superimposed on CKD [facility])  Assessment & Plan  Lab Results   Component Value Date    EGFR 26 09/05/2022    EGFR 23 09/04/2022    EGFR 25 08/11/2022    CREATININE 2 47 (H) 09/05/2022    CREATININE 2 74 (H) 09/04/2022    CREATININE 2 56 (H) 08/11/2022   · Patient has history of CKD stage 4, followed by Nephrology, baseline creatinine high 1s to low 2s  · LAZARO POA with cr  2 74   · Likely in setting of hypotension at home and volume depletion   · Torsemide 40 mg on hold for now  · Given gentle fluids yesterday with improvement of cr  To 2 47   · Will give additional IVF x 6 hours for today in setting of orthostatic hypotension  · Bladder scan negative  · Avoid hypotension  · Recheck BMP tomorrow    Orthostatic hypotension  Assessment & Plan  · Patient was sitting in his brothers car for hour and a half while he was moving furniture  After he came out of the car he felt dizzy he went sat on the toilet and his brother checked his blood pressure it was 88/50  Patient denies losing consciousness  Denies chest pain, palpitation or shortness of breath    · Troponin x2 negative  · EKG showed T-wave inversions in inf lat leads which were evident on EKG in June 2022  · (+) orthostatic vitals, SBP dropping from 130 to 80s today   · Give additional IVF today   · Thigh high compression stockings   · Continue Coreg with holding parameters  · Telemetry without any events, will d/c     Chronic combined systolic and diastolic heart failure [facility])  Assessment & Plan  Wt Readings from Last 3 Encounters:   09/05/22 81 7 kg (180 lb 3 2 oz)   08/11/22 80 9 kg (178 lb 6 4 oz)   07/28/22 78 5 kg (173 lb)     · Patient was admitted in July for acute on chronic systolic heart failure  · Most recent EKG in July 2022 at [facility] showed ejection fraction of 40-50%  Global hypokinesis, valve not assessed  · No weight gain, no lower extremity swelling, lungs are clear, no respiratory distress, no JVD  · His home regimen is torsemide 40 mg daily, Coreg 25 mg b i d    · Torsemide on hold, continue trial of gentle IVF throughout today   · Monitor volume status  · Daily weights  · Intake and output      Transaminitis  Assessment & Plan  · Appears chronic on review   · Hepatic steatosis noted on prior imaging   · Trend CMP  · Abdominal exam is benign    Type 2 diabetes mellitus, without long-term current use of insulin [facility])  Assessment & Plan  Lab Results   Component Value Date    HGBA1C 7 7 (H) 07/15/2022       Recent Labs     09/04/22  1919 09/04/22  2101 09/05/22  0454 09/05/22  1140   POCGLU 217* 201* 112 174*       Blood Sugar Average: Last 72 hrs:  · (P) 176   · About a month ago patient was started on insulin due to uncontrolled diabetes, per brother his blood sugars are in the 400s at home  · He is on Basaglar 20 units HS, aspart 30 units in the morning and 10 units in the evening  · Blood sugar in the ED on the lower side  · Continue Lantus 15 units HS, Humalog 5 units t i d  with meals and sliding scale with stable sugars   · QID glucose checks   · Monitor and adjust regimen as needed    Mixed hyperlipidemia  Assessment & Plan  · Continue Lipitor 40 mg daily     Essential hypertension  Assessment & Plan  · BP fairly stable on review, (+) orthostatics   · Continue Coreg 25 mg b i d  with holding parameters  · Amlodipine 5 mg daily was discontinued by primary care doctor about a month ago  · Torsemide also on hold in setting of gentle IVF  · Monitor     Bipolar affective disorder, mixed, moderate (HCC)  Assessment & Plan  · About a month ago patient was started on Seroquel 50 mg HS and increased to 100 mg HS  · Will decrease Seroquel to 75 mg QHS for today as patient with sedation/somnolence noted today   · His gabapentin was also increased from 300 mg t i d  to 400 mg but due to dizziness and weakness it was decreased back to 300 mg t i d   · Continue Prozac 20 mg daily   · Mood is stable      VTE Pharmacologic Prophylaxis: VTE Score: 3 Moderate Risk (Score 3-4) - Pharmacological DVT Prophylaxis Ordered: heparin  Patient Centered Rounds: I evaluated the patient without nursing staff present due to speaking with nurse outside patient's room  Discussions with Specialists or Other Care Team Provider: Discussed with RN and reviewed previous notes     Education and Discussions with Family / Patient: Patient declined call to   Time Spent for Care: 20 minutes  More than 50% of total time spent on counseling and coordination of care as described above  Current Length of Stay: 0 day(s)  Current Patient Status: Inpatient   Certification Statement: The patient, admitted on an observation basis, will now require > 2 midnight hospital stay due to orthostatic hypotension, PT, IVF  Discharge Plan: Anticipate discharge in 24-48 hrs to discharge location to be determined pending rehab evaluations  Code Status: Level 1 - Full Code    Subjective:   Pt reports that he feels well today  Denies having any more dizziness or lightheadedness  Denies any chest pain or shortness of breath  Feels tired, says this is related to his Seroquel  Objective:     Vitals:   Temp (24hrs), Av °F (36 7 °C), Min:97 5 °F (36 4 °C), Max:98 6 °F (37 °C)    Temp:  [97 5 °F (36 4 °C)-98 6 °F (37 °C)] 97 6 °F (36 4 °C)  HR:  [67-79] 70  Resp:  [16-23] 18  BP: ()/(52-98) 86/52  SpO2:  [98 %-100 %] 98 %  Body mass index is 30 93 kg/m²  Input and Output Summary (last 24 hours):      Intake/Output Summary (Last 24 hours) at 2022 1459  Last data filed at 2022 1158  Gross per 24 hour   Intake 540 ml   Output --   Net 540 ml       Physical Exam:   Physical Exam  Vitals reviewed  Constitutional:       General: He is not in acute distress  Appearance: He is not toxic-appearing  Comments: Pt is in no acute distress lying in his hospital bed resting comfortably  Somnolent, falling asleep during evaluation, arousable to verbal stimuli   HENT:      Head: Normocephalic and atraumatic  Cardiovascular:      Rate and Rhythm: Normal rate and regular rhythm  Pulses: Normal pulses  Pulmonary:      Effort: Pulmonary effort is normal  No respiratory distress  Abdominal:      General: Bowel sounds are normal  There is no distension  Palpations: Abdomen is soft  Musculoskeletal:      Right lower leg: No edema  Left lower leg: No edema  Skin:     General: Skin is warm and dry  Findings: No erythema  Neurological:      Mental Status: He is alert            Additional Data:     Labs:  Results from last 7 days   Lab Units 09/05/22  0456 09/04/22  1305   WBC Thousand/uL 5 78 6 88   HEMOGLOBIN g/dL 10 7* 11 2*   HEMATOCRIT % 33 0* 34 4*   PLATELETS Thousands/uL 122* 124*   NEUTROS PCT %  --  53   LYMPHS PCT %  --  31   MONOS PCT %  --  10   EOS PCT %  --  5     Results from last 7 days   Lab Units 09/05/22  0456 09/04/22  1305   SODIUM mmol/L 141 140   POTASSIUM mmol/L 4 2 4 7   CHLORIDE mmol/L 112* 113*   CO2 mmol/L 25 26   BUN mg/dL 43* 48*   CREATININE mg/dL 2 47* 2 74*   ANION GAP mmol/L 4 1*   CALCIUM mg/dL 8 6 8 7   ALBUMIN g/dL  --  2 9*   TOTAL BILIRUBIN mg/dL  --  0 28   ALK PHOS U/L  --  156*   ALT U/L  --  124*   AST U/L  --  74*   GLUCOSE RANDOM mg/dL 131 86         Results from last 7 days   Lab Units 09/05/22  1140 09/05/22  0454 09/04/22  2101 09/04/22  1919   POC GLUCOSE mg/dl 174* 112 201* 217*               Lines/Drains:  Invasive Devices  Report    Peripheral Intravenous Line  Duration           Peripheral IV 09/04/22 Left Forearm 1 day                      Imaging: Reviewed radiology reports from this admission including: chest xray    Recent Cultures (last 7 days):         Last 24 Hours Medication List:   Current Facility-Administered Medications   Medication Dose Route Frequency Provider Last Rate    aspirin  81 mg Oral Daily Kesha Camarillo MD      atorvastatin  40 mg Oral Daily With Jason Diaz MD      carvedilol  25 mg Oral BID With Meals Kesha Camarillo MD      FLUoxetine  20 mg Oral Daily Kesha Camarillo MD      folic acid  1 mg Oral Daily Kesha Camarillo MD      gabapentin  300 mg Oral TID Kesha Camarillo MD      heparin (porcine)  5,000 Units Subcutaneous Q8H [facility] Kesha Camarillo MD      insulin glargine  15 Units Subcutaneous HS Marah Vela MD      insulin lispro  1-6 Units Subcutaneous TID AC Marah Vela MD      insulin lispro  5 Units Subcutaneous TID With Meals Kesha Camarillo MD      multi-electrolyte  75 mL/hr Intravenous Continuous Cait Rod PA-C      QUEtiapine  75 mg Oral HS Cait Rod PA-C      thiamine  100 mg Oral Daily Kesha Camarillo MD          Today, Patient Was Seen By: Didier Perez PA-C    **Please Note: This note may have been constructed using a voice recognition system  **

## 2022-09-05 NOTE — ASSESSMENT & PLAN NOTE
· BP fairly stable on review, (+) orthostatics   · Continue Coreg 25 mg b i d  with holding parameters  · Amlodipine 5 mg daily was discontinued by primary care doctor about a month ago  · Torsemide also on hold in setting of gentle IVF  · Monitor

## 2022-09-05 NOTE — ASSESSMENT & PLAN NOTE
Lab Results   Component Value Date    HGBA1C 7 7 (H) 07/15/2022       Recent Labs     09/04/22  1919 09/04/22  2101 09/05/22  0454 09/05/22  1140   POCGLU 217* 201* 112 174*       Blood Sugar Average: Last 72 hrs:  · (P) 176   · About a month ago patient was started on insulin due to uncontrolled diabetes, per brother his blood sugars are in the 400s at home  · He is on Basaglar 20 units HS, aspart 30 units in the morning and 10 units in the evening  · Blood sugar in the ED on the lower side  · Continue Lantus 15 units HS, Humalog 5 units t i d  with meals and sliding scale with stable sugars   · QID glucose checks   · Monitor and adjust regimen as needed

## 2022-09-05 NOTE — ASSESSMENT & PLAN NOTE
Wt Readings from Last 3 Encounters:   09/05/22 81 7 kg (180 lb 3 2 oz)   08/11/22 80 9 kg (178 lb 6 4 oz)   07/28/22 78 5 kg (173 lb)     · Patient was admitted in July for acute on chronic systolic heart failure  · Most recent EKG in July 2022 at [facility] showed ejection fraction of 40-50%  Global hypokinesis, valve not assessed  · No weight gain, no lower extremity swelling, lungs are clear, no respiratory distress, no JVD  · His home regimen is torsemide 40 mg daily, Coreg 25 mg b i d    · Torsemide on hold, continue trial of gentle IVF throughout today   · Monitor volume status  · Daily weights  · Intake and output

## 2022-09-05 NOTE — ASSESSMENT & PLAN NOTE
Lab Results   Component Value Date    EGFR 26 09/05/2022    EGFR 23 09/04/2022    EGFR 25 08/11/2022    CREATININE 2 47 (H) 09/05/2022    CREATININE 2 74 (H) 09/04/2022    CREATININE 2 56 (H) 08/11/2022   · Patient has history of CKD stage 4, followed by Nephrology, baseline creatinine high 1s to low 2s  · LAZARO POA with cr  2 74   · Likely in setting of hypotension at home and volume depletion   · Torsemide 40 mg on hold for now  · Given gentle fluids yesterday with improvement of cr   To 2 47   · Will give additional IVF x 6 hours for today in setting of orthostatic hypotension  · Bladder scan negative  · Avoid hypotension  · Recheck BMP tomorrow

## 2022-09-05 NOTE — ASSESSMENT & PLAN NOTE
· Appears chronic on review   · Hepatic steatosis noted on prior imaging   · Trend CMP  · Abdominal exam is benign

## 2022-09-05 NOTE — UTILIZATION REVIEW
Initial Clinical Review    Observation 9/4/22 @ , converted to inpatient admission 9/5/22 @ [phone] for continued care & tx for LAZARO  Admission: Date/Time/Statement:   Admission Orders (From admission, onward)     Ordered        09/05/22 1417  Inpatient Admission  Once            09/04/22 1707  Place in Observation  Once                      Orders Placed This Encounter   Procedures    Inpatient Admission     Standing Status:   Standing     Number of Occurrences:   1     Order Specific Question:   Level of Care     Answer:   Med Surg [16]     Order Specific Question:   Estimated length of stay     Answer:   More than 2 Midnights     Order Specific Question:   Certification     Answer:   I certify that inpatient services are medically necessary for this patient for a duration of greater than two midnights  See H&P and MD Progress Notes for additional information about the patient's course of treatment  ED Arrival Information     Expected   -    Arrival   9/4/2022 11:52    Acuity   Urgent            Means of arrival   Wheelchair    Escorted by   Family Member    Service   Hospitalist    Admission type   Urgent            Arrival complaint   Low Blood Pressure            Chief Complaint   Patient presents with    Hypotension     Patient presents with low blood pressure (88/55)  Sates he is feeling a little lightheaded today  Initial Presentation:  61 yom to ER from home s/p lightheaded and presyncopal episode at home  Hx CHF, CKD, type 2 diabetes mellitus, hypertension  Presents with dry mucous membranes  Admission work-up showing LAZARO, elevated LFT's  Placed in observation status for LAZARO  Observation to IP admission 9/5/22:  LAZARO on CKD, baseline Cr 1-2, likely 2nd hypotension & volume depletion  +orthostatics today wit SBP drop from 130-80, additional IVF given, thigh high compression stockings ordered       09/05/22 1204 -- 70 -- 86/52 Abnormal  63 Abnormal  -- -- Standing - Orthostatic VS   09/05/22 1203 -- -- -- 104/70 -- -- -- Sitting - Orthostatic VS   09/05/22 1158 -- -- -- 134/79 -- -- -- Lying - Orthostatic VS     Day 2- 9/6/22:  LAZARO, Cr trending downward  Pt with episode dizziness in shower this morning  CN 2-12 grossly intact, moves all 4 extremities, no dysarthria  Coreg d/c'd for BP drop to 75/43  Date/Time Temp Pulse Resp BP MAP (mmHg) SpO2 O2 Device Patient Position - Orthostatic VS   09/06/22 11:31:47 -- 66 -- 75/43 Abnormal  54 Abnormal  100 % -- --   09/06/22 11:29:37 -- 68 -- 128/77 94 97 % -- --   09/06/22 11:28:38 -- 68 -- 130/80 97 97 % -- --   09/06/22 08:36:30 97 5 °F (36 4 °C) 69 19 127/83 98 96 % None (Room air) Lying     ED Triage Vitals [09/04/22 1203]   Temperature Pulse Respirations Blood Pressure SpO2   98 °F (36 7 °C) 68 20 131/75 100 %      Temp Source Heart Rate Source Patient Position - Orthostatic VS BP Location FiO2 (%)   Tympanic Monitor Lying Left arm --      Pain Score       No Pain          Wt Readings from Last 1 Encounters:   09/06/22 81 9 kg (180 lb 9 oz)     Additional Vital Signs:   Date/Time Temp Pulse Resp BP MAP (mmHg) SpO2 O2 Device Patient Position - Orthostatic VS   09/05/22 04:57:27 98 4 °F (36 9 °C) 77 -- 147/91 110 98 % -- --   09/04/22 22:58:31 98 6 °F (37 °C) 79 -- 120/77 91 100 % -- --   09/04/22 2000 -- -- -- -- -- -- None (Room air) --   09/04/22 19:58:46 97 5 °F (36 4 °C) 67 18 139/89 106 98 % -- --   09/04/22 1900 -- 72 20 158/78 112 99 % None (Room air) --   09/04/22 1830 -- 72 23 Abnormal  174/88 Abnormal  120 100 % -- --   09/04/22 1808 -- 74 18 101/66 -- -- -- Standing - Orthostatic VS   09/04/22 1806 -- 70 18 140/83 -- -- -- Sitting - Orthostatic VS   09/04/22 1804 -- 70 18 139/83 -- -- -- Lying - Orthostatic VS       Pertinent Labs/Diagnostic Test Results:   XR chest 2 views   Final Result  (09/05 1002)      Resolved vascular congestion  Bibasilar subsegmental atelectasis        Approximate 3 cm rounded lucent region at the left lung base, possibly representing a pneumatocele  This is stable when compared to the July 22, 2022 exam   Nonemergent CT chest imaging could be performed for further evaluation        9/5  Ekg=  Normal sinus rhythm  Possible Inferior infarct , age undetermined    9/4  Ekg=  Normal sinus rhythm  ST & T wave abnormality, consider inferolateral ischemia    Results from last 7 days   Lab Units 09/04/22  1735   SARS-COV-2  Negative     Results from last 7 days   Lab Units 09/06/22  0454 09/05/22  0456 09/04/22  1305   WBC Thousand/uL 5 20 5 78 6 88   HEMOGLOBIN g/dL 10 8* 10 7* 11 2*   HEMATOCRIT % 33 8* 33 0* 34 4*   PLATELETS Thousands/uL 117* 122* 124*   NEUTROS ABS Thousands/µL  --   --  3 65     Results from last 7 days   Lab Units 09/06/22  0454 09/05/22  0456 09/04/22  1305   SODIUM mmol/L 142 141 140   POTASSIUM mmol/L 4 2 4 2 4 7   CHLORIDE mmol/L 113* 112* 113*   CO2 mmol/L 22 25 26   ANION GAP mmol/L 7 4 1*   BUN mg/dL 38* 43* 48*   CREATININE mg/dL 2 35* 2 47* 2 74*   EGFR ml/min/1 73sq m 28 26 23   CALCIUM mg/dL 8 7 8 6 8 7   MAGNESIUM mg/dL  --  2 1  --      Results from last 7 days   Lab Units 09/06/22  0454 09/04/22  1305   AST U/L 64* 74*   ALT U/L 110* 124*   ALK PHOS U/L 139* 156*   TOTAL PROTEIN g/dL 7 3 7 9   ALBUMIN g/dL 2 7* 2 9*   TOTAL BILIRUBIN mg/dL 0 27 0 28     Results from last 7 days   Lab Units 09/06/22  0526 09/05/22  2119 09/05/22  1617 09/05/22  1140 09/05/22  0454 09/04/22 2101 09/04/22  1919   POC GLUCOSE mg/dl 117 157* 140 174* 112 201* 217*     Results from last 7 days   Lab Units 09/06/22  0454 09/05/22  0456 09/04/22  1305   GLUCOSE RANDOM mg/dL 121 131 86     BETA-HYDROXYBUTYRATE   Date Value Ref Range Status   06/16/2022 0 0 <0 6 mmol/L Final      Results from last 7 days   Lab Units 09/04/22  1735 09/04/22  1528 09/04/22  1305   HS TNI 0HR ng/L  --   --  16   HS TNI 2HR ng/L  --  14  --    HSTNI D2 ng/L  --  -2  --    HS TNI 4HR ng/L 14  --   --    HSTNI D4 ng/L -2  --   --      Results from last 7 days   Lab Units 09/04/22  1735   INFLUENZA A PCR  Negative   INFLUENZA B PCR  Negative   RSV PCR  Negative     ED Treatment:   Medication Administration from 09/04/2022 1152 to 09/04/2022 1950       Date/Time Order Dose Route Action     09/04/2022 1327 sodium chloride 0 9 % bolus 250 mL 250 mL Intravenous New Bag     09/04/2022 1928 atorvastatin (LIPITOR) tablet 40 mg 40 mg Oral Given     09/04/2022 1927 insulin lispro (HumaLOG) 100 units/mL subcutaneous injection 5 Units 5 Units Subcutaneous Given     09/04/2022 1927 insulin lispro (HumaLOG) 100 units/mL subcutaneous injection 1-6 Units 2 Units Subcutaneous Given     09/04/2022 1821 multi-electrolyte (PLASMALYTE-A/ISOLYTE-S PH 7 4) IV solution 75 mL/hr Intravenous New Bag        Past Medical History:   Diagnosis Date    Anxiety     Arthritis     Coronary artery disease     Dementia ([facility])     Depression     Diabetes mellitus ([facility])     Infectious viral hepatitis     Memory loss     Visual impairment      Present on Admission:   Bipolar affective disorder, mixed, moderate ([facility])   Essential hypertension   Mixed hyperlipidemia   Type 2 diabetes mellitus, without long-term current use of insulin ([facility])   Transaminitis    Admitting Diagnosis: Low blood pressure [I95 9]  Age/Sex: 61 y o  male  Admission Orders:  Telemetry  Scd/foot pumps  accuchecks  Orthostatic BP  1500cc fluid restriction  Pt/ot eval & tx  Thigh high compression stockings    Scheduled Medications:  aspirin, 81 mg, Oral, Daily  atorvastatin, 40 mg, Oral, Daily With Dinner  carvedilol, 25 mg, Oral, BID With Meals>d/c'd 9/6  FLUoxetine, 20 mg, Oral, Daily  folic acid, 1 mg, Oral, Daily  gabapentin, 300 mg, Oral, TID  heparin (porcine), 5,000 Units, Subcutaneous, Q8H MAAME  insulin glargine, 15 Units, Subcutaneous, HS  insulin lispro, 1-6 Units, Subcutaneous, TID AC  insulin lispro, 5 Units, Subcutaneous, TID With Meals  QUEtiapine, 100 mg, Oral, HS  thiamine, 100 mg, Oral, Daily    Continuous IV Infusions:  multi-electrolyte (PLASMALYTE-A/ISOLYTE-S PH 7 4) IV solution  Rate: 75 mL/hr  Start: 09/05/22 1500 End: 09/05/22 2129    multi-electrolyte (PLASMALYTE-A/ISOLYTE-S PH 7 4) IV solution  Rate: 75 mL/hr   Start: 09/04/22 1800 End: 09/05/22 0020    Network Utilization Review Department  ATTENTION: Please call with any questions or concerns to [phone] and carefully listen to the prompts so that you are directed to the right person  All voicemails are confidential   Guy Vick all requests for admission clinical reviews, approved or denied determinations and any other requests to dedicated fax number below belonging to the campus where the patient is receiving treatment   List of dedicated fax numbers for the Facilities:  [address] DENIALS (Administrative/Medical Necessity) [phone]   [address] (Maternity/NICU/Pediatrics) [phone] [address]  [address] [facility] [place] [address] [place] [address] [address] [facility] [phone]

## 2022-09-06 ENCOUNTER — HOME CARE VISIT (OUTPATIENT)
Dept: HOME HEALTH SERVICES | Facility: HOME HEALTHCARE | Age: 67
End: 2022-09-06
Payer: COMMERCIAL

## 2022-09-06 LAB
ALBUMIN SERPL BCP-MCNC: 2.7 G/DL (ref 3.5–5)
ALP SERPL-CCNC: 139 U/L (ref 46–116)
ALT SERPL W P-5'-P-CCNC: 110 U/L (ref 12–78)
ANION GAP SERPL CALCULATED.3IONS-SCNC: 7 MMOL/L (ref 4–13)
AST SERPL W P-5'-P-CCNC: 64 U/L (ref 5–45)
BILIRUB SERPL-MCNC: 0.27 MG/DL (ref 0.2–1)
BUN SERPL-MCNC: 38 MG/DL (ref 5–25)
CALCIUM ALBUM COR SERPL-MCNC: 9.7 MG/DL (ref 8.3–10.1)
CALCIUM SERPL-MCNC: 8.7 MG/DL (ref 8.3–10.1)
CHLORIDE SERPL-SCNC: 113 MMOL/L (ref 96–108)
CO2 SERPL-SCNC: 22 MMOL/L (ref 21–32)
CREAT SERPL-MCNC: 2.35 MG/DL (ref 0.6–1.3)
ERYTHROCYTE [DISTWIDTH] IN BLOOD BY AUTOMATED COUNT: 13.3 % (ref 11.6–15.1)
GFR SERPL CREATININE-BSD FRML MDRD: 28 ML/MIN/1.73SQ M
GLUCOSE SERPL-MCNC: 117 MG/DL (ref 65–140)
GLUCOSE SERPL-MCNC: 121 MG/DL (ref 65–140)
GLUCOSE SERPL-MCNC: 163 MG/DL (ref 65–140)
GLUCOSE SERPL-MCNC: 205 MG/DL (ref 65–140)
GLUCOSE SERPL-MCNC: 223 MG/DL (ref 65–140)
HCT VFR BLD AUTO: 33.8 % (ref 36.5–49.3)
HGB BLD-MCNC: 10.8 G/DL (ref 12–17)
MCH RBC QN AUTO: 28.6 PG (ref 26.8–34.3)
MCHC RBC AUTO-ENTMCNC: 32 G/DL (ref 31.4–37.4)
MCV RBC AUTO: 89 FL (ref 82–98)
PLATELET # BLD AUTO: 117 THOUSANDS/UL (ref 149–390)
PMV BLD AUTO: 9.8 FL (ref 8.9–12.7)
POTASSIUM SERPL-SCNC: 4.2 MMOL/L (ref 3.5–5.3)
PROT SERPL-MCNC: 7.3 G/DL (ref 6.4–8.4)
RBC # BLD AUTO: 3.78 MILLION/UL (ref 3.88–5.62)
SODIUM SERPL-SCNC: 142 MMOL/L (ref 135–147)
WBC # BLD AUTO: 5.2 THOUSAND/UL (ref 4.31–10.16)

## 2022-09-06 PROCEDURE — 82948 REAGENT STRIP/BLOOD GLUCOSE: CPT

## 2022-09-06 PROCEDURE — 99232 SBSQ HOSP IP/OBS MODERATE 35: CPT | Performed by: GENERAL PRACTICE

## 2022-09-06 PROCEDURE — 97163 PT EVAL HIGH COMPLEX 45 MIN: CPT

## 2022-09-06 PROCEDURE — 85027 COMPLETE CBC AUTOMATED: CPT | Performed by: PHYSICIAN ASSISTANT

## 2022-09-06 PROCEDURE — 80053 COMPREHEN METABOLIC PANEL: CPT | Performed by: PHYSICIAN ASSISTANT

## 2022-09-06 RX ORDER — CARVEDILOL 12.5 MG/1
12.5 TABLET ORAL 2 TIMES DAILY WITH MEALS
Status: DISCONTINUED | OUTPATIENT
Start: 2022-09-06 | End: 2022-09-07 | Stop reason: HOSPADM

## 2022-09-06 RX ORDER — ACETAMINOPHEN 325 MG/1
650 TABLET ORAL EVERY 8 HOURS PRN
Status: DISCONTINUED | OUTPATIENT
Start: 2022-09-06 | End: 2022-09-07 | Stop reason: HOSPADM

## 2022-09-06 RX ORDER — ACETAMINOPHEN 325 MG/1
650 TABLET ORAL EVERY 6 HOURS PRN
Status: DISCONTINUED | OUTPATIENT
Start: 2022-09-06 | End: 2022-09-06

## 2022-09-06 RX ADMIN — INSULIN LISPRO 2 UNITS: 100 INJECTION, SOLUTION INTRAVENOUS; SUBCUTANEOUS at 12:20

## 2022-09-06 RX ADMIN — GABAPENTIN 300 MG: 300 CAPSULE ORAL at 08:41

## 2022-09-06 RX ADMIN — GABAPENTIN 300 MG: 300 CAPSULE ORAL at 17:25

## 2022-09-06 RX ADMIN — FOLIC ACID 1 MG: 1 TABLET ORAL at 08:41

## 2022-09-06 RX ADMIN — FLUOXETINE 20 MG: 20 CAPSULE ORAL at 08:41

## 2022-09-06 RX ADMIN — GABAPENTIN 300 MG: 300 CAPSULE ORAL at 21:29

## 2022-09-06 RX ADMIN — ATORVASTATIN CALCIUM 40 MG: 40 TABLET, FILM COATED ORAL at 17:25

## 2022-09-06 RX ADMIN — THIAMINE HCL TAB 100 MG 100 MG: 100 TAB at 08:41

## 2022-09-06 RX ADMIN — INSULIN LISPRO 5 UNITS: 100 INJECTION, SOLUTION INTRAVENOUS; SUBCUTANEOUS at 12:20

## 2022-09-06 RX ADMIN — HEPARIN SODIUM 5000 UNITS: 5000 INJECTION INTRAVENOUS; SUBCUTANEOUS at 04:55

## 2022-09-06 RX ADMIN — HEPARIN SODIUM 5000 UNITS: 5000 INJECTION INTRAVENOUS; SUBCUTANEOUS at 21:29

## 2022-09-06 RX ADMIN — INSULIN LISPRO 5 UNITS: 100 INJECTION, SOLUTION INTRAVENOUS; SUBCUTANEOUS at 17:26

## 2022-09-06 RX ADMIN — INSULIN LISPRO 5 UNITS: 100 INJECTION, SOLUTION INTRAVENOUS; SUBCUTANEOUS at 08:38

## 2022-09-06 RX ADMIN — CARVEDILOL 25 MG: 25 TABLET, FILM COATED ORAL at 04:55

## 2022-09-06 RX ADMIN — INSULIN GLARGINE 15 UNITS: 100 INJECTION, SOLUTION SUBCUTANEOUS at 21:29

## 2022-09-06 RX ADMIN — INSULIN LISPRO 1 UNITS: 100 INJECTION, SOLUTION INTRAVENOUS; SUBCUTANEOUS at 17:25

## 2022-09-06 RX ADMIN — CARVEDILOL 12.5 MG: 12.5 TABLET, FILM COATED ORAL at 17:28

## 2022-09-06 RX ADMIN — ASPIRIN 81 MG: 81 TABLET, COATED ORAL at 08:41

## 2022-09-06 RX ADMIN — QUETIAPINE FUMARATE 75 MG: 25 TABLET ORAL at 21:28

## 2022-09-06 RX ADMIN — HEPARIN SODIUM 5000 UNITS: 5000 INJECTION INTRAVENOUS; SUBCUTANEOUS at 13:35

## 2022-09-06 NOTE — ASSESSMENT & PLAN NOTE
Amlodipine 5 mg daily was discontinued by primary care doctor about a month ago  BP fairly stable on review, (+) orthostatics  Coreg decreased to 12 5 mg b i d      Plan:  · Discharged with Coreg 6 25 mg b i d   · Holding torsemide

## 2022-09-06 NOTE — PLAN OF CARE
Problem: PAIN - ADULT  Goal: Verbalizes/displays adequate comfort level or baseline comfort level  Description: Interventions:  - Encourage patient to monitor pain and request assistance  - Assess pain using appropriate pain scale  - Administer analgesics based on type and severity of pain and evaluate response  - Implement non-pharmacological measures as appropriate and evaluate response  - Consider cultural and social influences on pain and pain management  - Notify physician/advanced practitioner if interventions unsuccessful or patient reports new pain  Outcome: Progressing     Problem: INFECTION - ADULT  Goal: Absence or prevention of progression during hospitalization  Description: INTERVENTIONS:  - Assess and monitor for signs and symptoms of infection  - Monitor lab/diagnostic results  - Monitor all insertion sites, i e  indwelling lines, tubes, and drains  - Monitor endotracheal if appropriate and nasal secretions for changes in amount and color  - [place] appropriate cooling/warming therapies per order  - Administer medications as ordered  - Instruct and encourage patient and family to use good hand hygiene technique  - Identify and instruct in appropriate isolation precautions for identified infection/condition  Outcome: Progressing     Problem: INFECTION - ADULT  Goal: Absence of fever/infection during neutropenic period  Description: INTERVENTIONS:  - Monitor WBC    Outcome: Progressing     Problem: Knowledge Deficit  Goal: Patient/family/caregiver demonstrates understanding of disease process, treatment plan, medications, and discharge instructions  Description: Complete learning assessment and assess knowledge base    Interventions:  - Provide teaching at level of understanding  - Provide teaching via preferred learning methods  Outcome: Progressing The patient is a 13y Male complaining of abdominal pain.

## 2022-09-06 NOTE — PLAN OF CARE
Problem: PHYSICAL THERAPY ADULT  Goal: Performs mobility at highest level of function for planned discharge setting  See evaluation for individualized goals  Description: Treatment/Interventions: Functional transfer training, LE strengthening/ROM, Therapeutic exercise, Endurance training, Patient/family training, Gait training, Bed mobility, Equipment eval/education          See flowsheet documentation for full assessment, interventions and recommendations  Note: Prognosis: Good  Problem List: Decreased strength, Decreased endurance, Impaired balance, Decreased mobility, Decreased coordination, Decreased cognition, Impaired judgement, Decreased safety awareness, Pain  Assessment: Pt seen for phsical therapy evaluation  Pt is a 62 y/o male w/ history/comorbidities of systoilc HF, HTN, HLD, CKD, bipolar d/o, anxiety, dementia, visual impairment who is now admitted w/ dizziness after getting out of car- noted to be hypotensive  Dx include ? orthostasis, near syncope, LAZARO  Due to acute medical issues, pain, faiir risk, note unstable clinical picture  PT consulted to assess mobility, d/c needs  Pt presents w/ decreased functional mob, standing balance, endurance, B LE strength, barriers at home  Pt will benefit from skilled PT to correct for the above problems  Anticipate d/c home w/ brother when stable w/ no therapy needs  PT Discharge Recommendation: No rehabilitation needs    See flowsheet documentation for full assessment

## 2022-09-06 NOTE — PHYSICAL THERAPY NOTE
Physical Therapy Evaluation    Patient's Name: Leslie Tidwell    Admitting Diagnosis  Low blood pressure [I95 9]    Problem List  Patient Active Problem List   Diagnosis    Acute hepatitis C virus infection    Abnormal EKG    CKD (chronic kidney disease) stage 4, GFR 15-29 ml/min ([facility])    Bipolar affective disorder, mixed, moderate ([facility])    Acute on chronic systolic congestive heart failure (H[facility])    Essential hypertension    Mixed hyperlipidemia    Normocytic anemia    Stage 3b chronic kidney disease ([place] [address] )    Type II or unspecified type diabetes mellitus without mention of complication, uncontrolled    Weakness    Ischemic cardiomyopathy    Polysubstance abuse ([place] [address] )    Diarrhea    Type 2 diabetes mellitus, without long-term current use of insulin ([facility])    High serum thyroid stimulating hormone (TSH)    Metabolic acidosis    Hyperglycemia, unspecified    Pleural effusion, bacterial    Transaminitis    Acute exacerbation of CHF (congestive heart failure) ([place] [address] )    Acute kidney injury ([place] [address] )    Hyponatremia    Uncontrolled type 2 diabetes mellitus with hyperglycemia ([facility])    Persistent proteinuria    Acute kidney injury superimposed on CKD ([facility])    Orthostatic hypotension    Chronic combined systolic and diastolic heart failure ([facility])       Past Medical History  Past Medical History:   Diagnosis Date    Anxiety     Arthritis     Coronary artery disease     Dementia ([place] [address] )     Depression     Diabetes mellitus ([place] [address] )     Infectious viral hepatitis     Memory loss     Visual impairment        Past Surgical History  History reviewed  No pertinent surgical history  09/06/22 0950   PT Last Visit   PT Visit Date 09/06/22   Note Type   Note type Evaluation   Pain Assessment   Pain Assessment Tool 0-10   Pain Score 3   Pain Location/Orientation Location: Generalized; Location: Head   Patient's Stated Pain Goal No pain   Hospital Pain Intervention(s) Ambulation/increased activity   Restrictions/Precautions   Weight Bearing Precautions Per Order No   Other Precautions Multiple lines;Telemetry; Fall Risk;Cognitive   Home Living   Type of Home House   Additional Comments Per pt- resides w/ brother in 1 story home  Indep self care, brother assists if needed  ambulates w/ out device per pt  Prior Function   Level of [place] Independent with ADLs and functional mobility   Falls in the last 6 months 1 to 4   General   Family/Caregiver Present No   Cognition   Overall Cognitive Status Impaired   Arousal/Participation Arousable   Orientation Level Oriented X4   Memory Unable to assess   Following Commands Follows one step commands with increased time or repetition   Subjective   Subjective Impulsive, a bit of confusion but cooperative  Notes he feels generally weak, denies much dizziness   RLE Assessment   RLE Assessment   (strength grossly 4-/5)   LLE Assessment   LLE Assessment   (strength grossly 4-/5)   Coordination   Movements are Fluid and Coordinated 0   Coordination and Movement Description mild ataxia   Bed Mobility   Supine to Sit 5  Supervision   Additional items Assist x 1   Transfers   Sit to Stand 5  Supervision   Additional items Assist x 1   Stand to Sit 5  Supervision   Additional items Assist x 1   Ambulation/Elevation   Gait pattern   (mild ataxia, short step length, lateral sway)   Gait Assistance   (CGA of 1)   Additional items Assist x 1   Assistive Device None   Distance 25'x1 from bed to shower in [place]an[place]   Balance   Static Sitting Good   Dynamic Sitting Fair   Static Standing Fair   Dynamic Standing Fair -   Ambulatory Fair -   Endurance Deficit   Endurance Deficit Yes   Endurance Deficit Description fatigue, weakness, pain, cog status   Activity Tolerance   Activity Tolerance Patient limited by fatigue;Treatment limited secondary to medical complications (Comment); Patient limited by pain   Nurse Made Aware yes   Assessment   Prognosis Good   Problem List Decreased strength;Decreased endurance; Impaired balance;Decreased mobility; Decreased coordination;Decreased cognition; Impaired judgement;Decreased safety awareness;Pain   Assessment Pt seen for phsical therapy evaluation  Pt is a 62 y/o male w/ history/comorbidities of systoilc HF, HTN, HLD, CKD, bipolar d/o, anxiety, dementia, visual impairment who is now admitted w/ dizziness after getting out of car- noted to be hypotensive  Dx include ? orthostasis, near syncope, LAZARO  Due to acute medical issues, pain, faiir risk, note unstable clinical picture  PT consulted to assess mobility, d/c needs  Pt presents w/ decreased functional mob, standing balance, endurance, B LE strength, barriers at home  Pt will benefit from skilled PT to correct for the above problems  Anticipate d/c home w/ brother when stable w/ no therapy needs  Goals   Patient Goals to shower   STG Expiration Date 09/20/22   Short Term Goal #1 1-2 wks: bed mob and transfers w/ indep, standing balance to good/normal dynamically, ambulate 200 ft w/ indep, increase B LE strength by 1/2 -1 grade   PT Treatment Day 0   Plan   Treatment/Interventions Functional transfer training;LE strengthening/ROM; Therapeutic exercise; Endurance training;Patient/family training;Gait training;Bed mobility; Equipment eval/education   PT Frequency 3-5x/wk   Recommendation   PT Discharge Recommendation No rehabilitation needs   AM-PAC Basic Mobility Inpatient   Turning in Bed Without Bedrails 4   Lying on Back to Sitting on Edge of Flat Bed 4   Moving Bed to Chair 3   Standing Up From Chair 3   Walk in Room 3   Climb 3-5 Stairs 3   Basic Mobility Inpatient Raw Score 20   Basic Mobility Standardized Score 43 99   Highest Level Of Mobility   JH-HLM Goal 6: Walk 10 steps or more   JH-HLM Achieved 7: Walk 25 feet or more         Chang Cruz PT, DPT, CSRS

## 2022-09-06 NOTE — ASSESSMENT & PLAN NOTE
· Patient was sitting in his brothers car for hour and a half while he was moving furniture  After he came out of the car he felt dizzy he went sat on the toilet and his brother checked his blood pressure it was 88/50  Patient denies losing consciousness  Denies chest pain, palpitation or shortness of breath    · Troponin x2 negative  · EKG showed T-wave inversions in inf lat leads which were evident on EKG in June 2022  · (+) orthostatic vitals  · No events on telemetry  · Was able to ambulate with compression stockings on without dizziness    Plan:  · Discharge with Coreg 6 25 mg b i d  to avoid further symptoms of hypotension  · Advised patient to continue wearing thigh-high compression stockings while ambulating

## 2022-09-06 NOTE — ASSESSMENT & PLAN NOTE
· About a month ago patient was started on Seroquel 50 mg HS and increased to 100 mg HS  · His gabapentin was also increased from 300 mg t i d  to 400 mg but due to dizziness and weakness it was decreased back to 300 mg t i d   · Mood is stable  · Decreased Seroquel 75 mg due to somnolence    Plan:  · Continue Seroquel to 75 mg QHS

## 2022-09-06 NOTE — ASSESSMENT & PLAN NOTE
Lab Results   Component Value Date    EGFR 28 09/06/2022    EGFR 26 09/05/2022    EGFR 23 09/04/2022    CREATININE 2 35 (H) 09/06/2022    CREATININE 2 47 (H) 09/05/2022    CREATININE 2 74 (H) 09/04/2022   · Patient has history of CKD stage 4, followed by Nephrology, baseline creatinine high 1s to low 2s  · LAZARO POA with cr   2 74   · Bladder scan negative  · Received gentle IV fluids  · Likely in setting of hypotension at home and volume depletion     Plan  · Hold torsemide 40 mg until further evaluation by nephrologist  · Creatinine 2 56 on the day of discharge  · Follow-up with nephrologist tomorrow 9/8 - patient is aware of the appointment  · Consider repeat BMP within 1 week

## 2022-09-06 NOTE — ASSESSMENT & PLAN NOTE
Wt Readings from Last 3 Encounters:   09/06/22 81 9 kg (180 lb 9 oz)   08/11/22 80 9 kg (178 lb 6 4 oz)   07/28/22 78 5 kg (173 lb)     · Patient was admitted in July for acute on chronic systolic heart failure  · Most recent EKG in July 2022 at [facility] showed ejection fraction of 40-50%  Global hypokinesis, valve not assessed  · No weight gain, no lower extremity swelling, lungs are clear, no respiratory distress, no JVD    Plan:  · Hold torsemide  · Discharge with Coreg 12 5 mg b i d    · Cardiology follow-up scheduled for November 2022

## 2022-09-06 NOTE — PROGRESS NOTES
INTERNAL MEDICINE RESIDENCY PROGRESS NOTE     Name: Lobito Beatty   Age & Sex: 61 y o  male   MRN: [id number]  Unit/Bed#: CW2 211-02   Encounter: [id number]  Team: SLIM    PATIENT INFORMATION     Name: Lobito Beatty   Age & Sex: 61 y o  male   MRN: [id number]  Hospital Stay Days: 1    ASSESSMENT/PLAN     Principal Problem:    Acute kidney injury superimposed on CKD [facility])  Active Problems:    Orthostatic hypotension    Chronic combined systolic and diastolic heart failure ([place] [address] )    Essential hypertension    Bipolar affective disorder, mixed, moderate ([place] [address] )    Mixed hyperlipidemia    Type 2 diabetes mellitus, without long-term current use of insulin ([place] [address] )    Transaminitis      * Acute kidney injury superimposed on CKD [facility])  Assessment & Plan  Lab Results   Component Value Date    EGFR 28 09/06/2022    EGFR 26 09/05/2022    EGFR 23 09/04/2022    CREATININE 2 35 (H) 09/06/2022    CREATININE 2 47 (H) 09/05/2022    CREATININE 2 74 (H) 09/04/2022   · Patient has history of CKD stage 4, followed by Nephrology, baseline creatinine high 1s to low 2s  · LAZARO POA with cr  2 74   · Likely in setting of hypotension at home and volume depletion     Plan  · Torsemide 40 mg on hold for now - likely discharge with torsemide 20 mg daily  · Given gentle fluids yesterday with improvement of cr  To 2 47   · Bladder scan negative  · Avoid hypotension  · Recheck BMP tomorrow    Orthostatic hypotension  Assessment & Plan  · Patient was sitting in his brothers car for hour and a half while he was moving furniture  After he came out of the car he felt dizzy he went sat on the toilet and his brother checked his blood pressure it was 88/50  Patient denies losing consciousness  Denies chest pain, palpitation or shortness of breath    · Troponin x2 negative  · EKG showed T-wave inversions in inf lat leads which were evident on EKG in June 2022  · (+) orthostatic vitals, SBP dropping from 130 to 80s yesterday  · No events on telemetry    Plan:  · Repeat orthostatics with compression stockings on - if positive will decrease Coreg  · Thigh high compression stockings   · Continue Coreg with holding parameters  · PT eval pending      Chronic combined systolic and diastolic heart failure (HCC)  Assessment & Plan  Wt Readings from Last 3 Encounters:   09/06/22 81 9 kg (180 lb 9 oz)   08/11/22 80 9 kg (178 lb 6 4 oz)   07/28/22 78 5 kg (173 lb)     · Patient was admitted in July for acute on chronic systolic heart failure  · Most recent EKG in July 2022 at [facility] showed ejection fraction of 40-50%  Global hypokinesis, valve not assessed  · No weight gain, no lower extremity swelling, lungs are clear, no respiratory distress, no JVD  · His home regimen is torsemide 40 mg daily, Coreg 25 mg b i d    · Torsemide on hold  · Monitor volume status  · Daily weights  · Intake and output      Essential hypertension  Assessment & Plan  · BP fairly stable on review, (+) orthostatics     Plan:  · Repeat orthostatics with compression stockings on - if positive, will decrease Coreg  · Continue Coreg 25 mg b i d  with holding parameters  · Amlodipine 5 mg daily was discontinued by primary care doctor about a month ago  · Torsemide also on hold in setting of gentle IVF  · Monitor     Transaminitis  Assessment & Plan  · Appears chronic on review   · Hepatic steatosis noted on prior imaging   · Trend CMP  · Abdominal exam is benign    Type 2 diabetes mellitus, without long-term current use of insulin [facility])  Assessment & Plan  Lab Results   Component Value Date    HGBA1C 7 7 (H) 07/15/2022       Recent Labs     09/05/22  1617 09/05/22  2119 09/06/22  0526 09/06/22  1128   POCGLU 140 157* 117 205*       Blood Sugar Average: Last 72 hrs:  · (P) 165 375   · About a month ago patient was started on insulin due to uncontrolled diabetes, per brother his blood sugars are in the 400s at home  · He is on Basaglar 20 units HS, aspart 30 units in the morning and 10 units in the evening  · Blood sugar in the ED on the lower side  · Continue Lantus 15 units HS, Humalog 5 units t i d  with meals and sliding scale with stable sugars   · QID glucose checks   · Monitor and adjust regimen as needed    Mixed hyperlipidemia  Assessment & Plan  · Continue Lipitor 40 mg daily     Bipolar affective disorder, mixed, moderate (HCC)  Assessment & Plan  · About a month ago patient was started on Seroquel 50 mg HS and increased to 100 mg HS  · Continue Seroquel to 75 mg QHS as patient with sedation/somnolence noted yesterday   · His gabapentin was also increased from 300 mg t i d  to 400 mg but due to dizziness and weakness it was decreased back to 300 mg t i d   · Mood is stable      Disposition:  Continue inpatient care, likely discharge tomorrow     SUBJECTIVE     Patient seen and examined  No acute events overnight  Patient took a shower this morning and called a little dizzy while doing so  Brother at bedside  Brother states that he supervises showering at home and that patient uses a shower chair  OBJECTIVE     Vitals:    22 0836 22 1128 22 1129 22 1131   BP: 127/83 130/80 128/77 (!) 75/43   BP Location: Left arm      Pulse: 69 68 68 66   Resp: 19      Temp: 97 5 °F (36 4 °C)      TempSrc: Oral      SpO2: 96% 97% 97% 100%   Weight:       Height:          Temperature:   Temp (24hrs), Av 8 °F (36 6 °C), Min:97 5 °F (36 4 °C), Max:98 1 °F (36 7 °C)    Temperature: 97 5 °F (36 4 °C)  Intake & Output:  I/O        0701   0700  0701   0700  0701   0700    P  O   1020     I V  (mL/kg)  10 (0 1)     IV Piggyback 250      Total Intake(mL/kg) 250 (3 1) 1030 (12 6)     Urine (mL/kg/hr)  1450 (0 7)     Stool  0     Total Output  1450     Net +250 -420            Unmeasured Stool Occurrence  2 x         Weights:   IBW (Ideal Body Weight): 59 2 kg    Body mass index is 30 99 kg/m²    Weight (last 2 days)     Date/Time Weight    22 0522 81 9 (180 56)    09/05/22 0600 81 7 (180 2)    09/04/22 1203 80 8 (178 2)        Physical Exam  Vitals reviewed  Constitutional:       General: He is not in acute distress  HENT:      Head: Normocephalic and atraumatic  Nose: No rhinorrhea  Eyes:      General: No scleral icterus  Cardiovascular:      Rate and Rhythm: Normal rate and regular rhythm  Pulses: Normal pulses  Heart sounds: Normal heart sounds  No murmur heard  No friction rub  No gallop  Pulmonary:      Effort: Pulmonary effort is normal  No respiratory distress  Breath sounds: Normal breath sounds  No wheezing, rhonchi or rales  Abdominal:      General: Bowel sounds are normal  There is no distension  Palpations: Abdomen is soft  There is no mass  Tenderness: There is no abdominal tenderness  There is no guarding  Hernia: No hernia is present  Musculoskeletal:      Right lower leg: No edema  Left lower leg: No edema  Skin:     General: Skin is warm and dry  Coloration: Skin is not jaundiced  Neurological:      Mental Status: He is alert  Comments: CN 2-12 grossly intact, moves all 4 extremities, no dysarthria   Psychiatric:         Mood and Affect: Mood normal          Behavior: Behavior normal        LABORATORY DATA     Labs: I have personally reviewed pertinent reports    Results from last 7 days   Lab Units 09/06/22 0454 09/05/22  0456 09/04/22  1305   WBC Thousand/uL 5 20 5 78 6 88   HEMOGLOBIN g/dL 10 8* 10 7* 11 2*   HEMATOCRIT % 33 8* 33 0* 34 4*   PLATELETS Thousands/uL 117* 122* 124*   NEUTROS PCT %  --   --  53   MONOS PCT %  --   --  10      Results from last 7 days   Lab Units 09/06/22  0454 09/05/22  0456 09/04/22  1305   POTASSIUM mmol/L 4 2 4 2 4 7   CHLORIDE mmol/L 113* 112* 113*   CO2 mmol/L 22 25 26   BUN mg/dL 38* 43* 48*   CREATININE mg/dL 2 35* 2 47* 2 74*   CALCIUM mg/dL 8 7 8 6 8 7   ALK PHOS U/L 139*  --  156*   ALT U/L 110*  --  124*   AST U/L 64*  --  74* Results from last 7 days   Lab Units 09/05/22  0456   MAGNESIUM mg/dL 2 1                        IMAGING & DIAGNOSTIC TESTING     Radiology Results: I have personally reviewed pertinent reports  XR chest 2 views    Result Date: 9/5/2022  Impression: Resolved vascular congestion  Bibasilar subsegmental atelectasis  Approximate 3 cm rounded lucent region at the left lung base, possibly representing a pneumatocele  This is stable when compared to the July 22, 2022 exam   Nonemergent CT chest imaging could be performed for further evaluation  Workstation performed: [id number]     Other Diagnostic Testing: I have personally reviewed pertinent reports  ACTIVE MEDICATIONS     Current Facility-Administered Medications   Medication Dose Route Frequency    acetaminophen (TYLENOL) tablet 650 mg  650 mg Oral Q8H PRN    aspirin (ECOTRIN LOW STRENGTH) EC tablet 81 mg  81 mg Oral Daily    atorvastatin (LIPITOR) tablet 40 mg  40 mg Oral Daily With Dinner    carvedilol (COREG) tablet 25 mg  25 mg Oral BID With Meals    FLUoxetine (PROzac) capsule 20 mg  20 mg Oral Daily    folic acid (FOLVITE) tablet 1 mg  1 mg Oral Daily    gabapentin (NEURONTIN) capsule 300 mg  300 mg Oral TID    heparin (porcine) subcutaneous injection 5,000 Units  5,000 Units Subcutaneous Q8H [address]    insulin glargine (LANTUS) subcutaneous injection 15 Units 0 15 mL  15 Units Subcutaneous HS    insulin lispro (HumaLOG) 100 units/mL subcutaneous injection 1-6 Units  1-6 Units Subcutaneous TID AC    insulin lispro (HumaLOG) 100 units/mL subcutaneous injection 5 Units  5 Units Subcutaneous TID With Meals    QUEtiapine (SEROquel) tablet 75 mg  75 mg Oral HS    thiamine tablet 100 mg  100 mg Oral Daily       VTE Pharmacologic Prophylaxis: Heparin  VTE Mechanical Prophylaxis: sequential compression device    Portions of the record may have been created with voice recognition software    Occasional wrong word or "sound a like" substitutions may have occurred due to the inherent limitations of voice recognition software    Read the chart carefully and recognize, using context, where substitutions have occurred   ==  Fer Mora, DO  [address]  Internal Medicine Residency PGY-2

## 2022-09-06 NOTE — PLAN OF CARE
Problem: PAIN - ADULT  Goal: Verbalizes/displays adequate comfort level or baseline comfort level  Description: Interventions:  - Encourage patient to monitor pain and request assistance  - Assess pain using appropriate pain scale  - Administer analgesics based on type and severity of pain and evaluate response  - Implement non-pharmacological measures as appropriate and evaluate response  - Consider cultural and social influences on pain and pain management  - Notify physician/advanced practitioner if interventions unsuccessful or patient reports new pain  Outcome: Progressing     Problem: INFECTION - ADULT  Goal: Absence or prevention of progression during hospitalization  Description: INTERVENTIONS:  - Assess and monitor for signs and symptoms of infection  - Monitor lab/diagnostic results  - Monitor all insertion sites, i e  indwelling lines, tubes, and drains  - Monitor endotracheal if appropriate and nasal secretions for changes in amount and color  - [place] appropriate cooling/warming therapies per order  - Administer medications as ordered  - Instruct and encourage patient and family to use good hand hygiene technique  - Identify and instruct in appropriate isolation precautions for identified infection/condition  Outcome: Progressing  Goal: Absence of fever/infection during neutropenic period  Description: INTERVENTIONS:  - Monitor WBC    Outcome: Progressing     Problem: SAFETY ADULT  Goal: Patient will remain free of falls  Description: INTERVENTIONS:  - Educate patient/family on patient safety including physical limitations  - Instruct patient to call for assistance with activity   - Consult OT/PT to assist with strengthening/mobility   - Keep Call bell within reach  - Keep bed low and locked with side rails adjusted as appropriate  - Keep care items and personal belongings within reach  - Initiate and maintain comfort rounds  - Make Fall Risk Sign visible to staff  - Offer Toileting every  Hours, in advance of need  - Initiate/Maintain alarm  - Obtain necessary fall risk management equipment:   - Apply yellow socks and bracelet for high fall risk patients  - Consider moving patient to room near nurses station  Outcome: Progressing  Goal: Maintain or return to baseline ADL function  Description: INTERVENTIONS:  -  Assess patient's ability to carry out ADLs; assess patient's baseline for ADL function and identify physical deficits which impact ability to perform ADLs (bathing, care of mouth/teeth, toileting, grooming, dressing, etc )  - Assess/evaluate cause of self-care deficits   - Assess range of motion  - Assess patient's mobility; develop plan if impaired  - Assess patient's need for assistive devices and provide as appropriate  - Encourage maximum independence but intervene and supervise when necessary  - Involve family in performance of ADLs  - Assess for home care needs following discharge   - Consider OT consult to assist with ADL evaluation and planning for discharge  - Provide patient education as appropriate  Outcome: Progressing  Goal: Maintains/Returns to pre admission functional level  Description: INTERVENTIONS:  - Perform BMAT or MOVE assessment daily    - Set and communicate daily mobility goal to care team and patient/family/caregiver  - Collaborate with rehabilitation services on mobility goals if consulted  - Perform Range of Motion 3 times a day  - Reposition patient every 2 hours    - Dangle patient 3 times a day  - Stand patient 3 times a day  - Ambulate patient 3 times a day  - Out of bed to chair 3 times a day   - Out of bed for meals 3 times a day  - Out of bed for toileting  - Record patient progress and toleration of activity level   Outcome: Progressing     Problem: DISCHARGE PLANNING  Goal: Discharge to home or other facility with appropriate resources  Description: INTERVENTIONS:  - Identify barriers to discharge w/patient and caregiver  - Arrange for needed discharge resources and transportation as appropriate  - Identify discharge learning needs (meds, wound care, etc )  - Arrange for interpretive services to assist at discharge as needed  - Refer to Case Management Department for coordinating discharge planning if the patient needs post-hospital services based on physician/advanced practitioner order or complex needs related to functional status, cognitive ability, or social support system  Outcome: Progressing     Problem: Knowledge Deficit  Goal: Patient/family/caregiver demonstrates understanding of disease process, treatment plan, medications, and discharge instructions  Description: Complete learning assessment and assess knowledge base    Interventions:  - Provide teaching at level of understanding  - Provide teaching via preferred learning methods  Outcome: Progressing     Problem: Prexisting or High Potential for Compromised Skin Integrity  Goal: Skin integrity is maintained or improved  Description: INTERVENTIONS:  - Identify patients at risk for skin breakdown  - Assess and monitor skin integrity  - Assess and monitor nutrition and hydration status  - Monitor labs   - Assess for incontinence   - Turn and reposition patient  - Assist with mobility/ambulation  - Relieve pressure over bony prominences  - Avoid friction and shearing  - Provide appropriate hygiene as needed including keeping skin clean and dry  - Evaluate need for skin moisturizer/barrier cream  - Collaborate with interdisciplinary team   - Patient/family teaching  - Consider wound care consult   Outcome: Progressing     Problem: Potential for Falls  Goal: Patient will remain free of falls  Description: INTERVENTIONS:  - Educate patient/family on patient safety including physical limitations  - Instruct patient to call for assistance with activity   - Consult OT/PT to assist with strengthening/mobility   - Keep Call bell within reach  - Keep bed low and locked with side rails adjusted as appropriate  - Keep care items and personal belongings within reach  - Initiate and maintain comfort rounds  - Make Fall Risk Sign visible to staff  - Offer Toileting every  Hours, in advance of need  - Initiate/Maintain alarm  - Obtain necessary fall risk management equipment:   - Apply yellow socks and bracelet for high fall risk patients  - Consider moving patient to room near nurses station  Outcome: Progressing     Problem: MOBILITY - ADULT  Goal: Maintain or return to baseline ADL function  Description: INTERVENTIONS:  -  Assess patient's ability to carry out ADLs; assess patient's baseline for ADL function and identify physical deficits which impact ability to perform ADLs (bathing, care of mouth/teeth, toileting, grooming, dressing, etc )  - Assess/evaluate cause of self-care deficits   - Assess range of motion  - Assess patient's mobility; develop plan if impaired  - Assess patient's need for assistive devices and provide as appropriate  - Encourage maximum independence but intervene and supervise when necessary  - Involve family in performance of ADLs  - Assess for home care needs following discharge   - Consider OT consult to assist with ADL evaluation and planning for discharge  - Provide patient education as appropriate  Outcome: Progressing  Goal: Maintains/Returns to pre admission functional level  Description: INTERVENTIONS:  - Perform BMAT or MOVE assessment daily    - Set and communicate daily mobility goal to care team and patient/family/caregiver  - Collaborate with rehabilitation services on mobility goals if consulted  - Perform Range of Motion 3 times a day  - Reposition patient every 2 hours    - Dangle patient 3 times a day  - Stand patient 3 times a day  - Ambulate patient 3 times a day  - Out of bed to chair 3 times a day   - Out of bed for meals 3 times a day  - Out of bed for toileting  - Record patient progress and toleration of activity level   Outcome: Progressing

## 2022-09-06 NOTE — ASSESSMENT & PLAN NOTE
Lab Results   Component Value Date    HGBA1C 7 7 (H) 07/15/2022       Recent Labs     09/05/22  1617 09/05/22  2119 09/06/22  0526 09/06/22  1128   POCGLU 140 157* 117 205*       Blood Sugar Average: Last 72 hrs:  · (P) 165 375   · About a month ago patient was started on insulin due to uncontrolled diabetes, per brother his blood sugars are in the 400s at home  · He is on Basaglar 20 units HS, aspart 30 units in the morning and 10 units in the evening  · Blood sugar in the ED on the lower side    Plan:  · Continue Basaglar 20 units at night, aspart 30 units with breakfast and 10 units with dinner  · Discontinue Farxiga due to GFR 25

## 2022-09-07 VITALS
TEMPERATURE: 99.4 F | DIASTOLIC BLOOD PRESSURE: 56 MMHG | BODY MASS INDEX: 31.21 KG/M2 | HEART RATE: 77 BPM | SYSTOLIC BLOOD PRESSURE: 77 MMHG | HEIGHT: 64 IN | OXYGEN SATURATION: 100 % | RESPIRATION RATE: 18 BRPM | WEIGHT: 182.8 LBS

## 2022-09-07 LAB
ALBUMIN SERPL BCP-MCNC: 2.5 G/DL (ref 3.5–5)
ALP SERPL-CCNC: 163 U/L (ref 46–116)
ALT SERPL W P-5'-P-CCNC: 130 U/L (ref 12–78)
ANION GAP SERPL CALCULATED.3IONS-SCNC: 6 MMOL/L (ref 4–13)
AST SERPL W P-5'-P-CCNC: 93 U/L (ref 5–45)
BASOPHILS # BLD AUTO: 0.04 THOUSANDS/ΜL (ref 0–0.1)
BASOPHILS NFR BLD AUTO: 1 % (ref 0–1)
BILIRUB SERPL-MCNC: 0.42 MG/DL (ref 0.2–1)
BUN SERPL-MCNC: 41 MG/DL (ref 5–25)
CALCIUM ALBUM COR SERPL-MCNC: 9.4 MG/DL (ref 8.3–10.1)
CALCIUM SERPL-MCNC: 8.2 MG/DL (ref 8.3–10.1)
CHLORIDE SERPL-SCNC: 111 MMOL/L (ref 96–108)
CO2 SERPL-SCNC: 23 MMOL/L (ref 21–32)
CREAT SERPL-MCNC: 2.56 MG/DL (ref 0.6–1.3)
EOSINOPHIL # BLD AUTO: 0.34 THOUSAND/ΜL (ref 0–0.61)
EOSINOPHIL NFR BLD AUTO: 7 % (ref 0–6)
ERYTHROCYTE [DISTWIDTH] IN BLOOD BY AUTOMATED COUNT: 13.6 % (ref 11.6–15.1)
GFR SERPL CREATININE-BSD FRML MDRD: 25 ML/MIN/1.73SQ M
GLUCOSE SERPL-MCNC: 169 MG/DL (ref 65–140)
GLUCOSE SERPL-MCNC: 183 MG/DL (ref 65–140)
GLUCOSE SERPL-MCNC: 193 MG/DL (ref 65–140)
HCT VFR BLD AUTO: 32.8 % (ref 36.5–49.3)
HGB BLD-MCNC: 10.6 G/DL (ref 12–17)
IMM GRANULOCYTES # BLD AUTO: 0.03 THOUSAND/UL (ref 0–0.2)
IMM GRANULOCYTES NFR BLD AUTO: 1 % (ref 0–2)
LYMPHOCYTES # BLD AUTO: 1.94 THOUSANDS/ΜL (ref 0.6–4.47)
LYMPHOCYTES NFR BLD AUTO: 37 % (ref 14–44)
MCH RBC QN AUTO: 29.2 PG (ref 26.8–34.3)
MCHC RBC AUTO-ENTMCNC: 32.3 G/DL (ref 31.4–37.4)
MCV RBC AUTO: 90 FL (ref 82–98)
MONOCYTES # BLD AUTO: 0.56 THOUSAND/ΜL (ref 0.17–1.22)
MONOCYTES NFR BLD AUTO: 11 % (ref 4–12)
NEUTROPHILS # BLD AUTO: 2.32 THOUSANDS/ΜL (ref 1.85–7.62)
NEUTS SEG NFR BLD AUTO: 43 % (ref 43–75)
NRBC BLD AUTO-RTO: 0 /100 WBCS
PLATELET # BLD AUTO: 119 THOUSANDS/UL (ref 149–390)
PMV BLD AUTO: 10.6 FL (ref 8.9–12.7)
POTASSIUM SERPL-SCNC: 4.2 MMOL/L (ref 3.5–5.3)
PROT SERPL-MCNC: 7.2 G/DL (ref 6.4–8.4)
RBC # BLD AUTO: 3.63 MILLION/UL (ref 3.88–5.62)
SODIUM SERPL-SCNC: 140 MMOL/L (ref 135–147)
WBC # BLD AUTO: 5.23 THOUSAND/UL (ref 4.31–10.16)

## 2022-09-07 PROCEDURE — 99239 HOSP IP/OBS DSCHRG MGMT >30: CPT | Performed by: GENERAL PRACTICE

## 2022-09-07 PROCEDURE — 82948 REAGENT STRIP/BLOOD GLUCOSE: CPT

## 2022-09-07 PROCEDURE — 85025 COMPLETE CBC W/AUTO DIFF WBC: CPT | Performed by: STUDENT IN AN ORGANIZED HEALTH CARE EDUCATION/TRAINING PROGRAM

## 2022-09-07 PROCEDURE — 80053 COMPREHEN METABOLIC PANEL: CPT | Performed by: STUDENT IN AN ORGANIZED HEALTH CARE EDUCATION/TRAINING PROGRAM

## 2022-09-07 RX ORDER — QUETIAPINE FUMARATE 25 MG/1
75 TABLET, FILM COATED ORAL
Qty: 90 TABLET | Refills: 0 | Status: SHIPPED | OUTPATIENT
Start: 2022-09-07 | End: 2022-10-26

## 2022-09-07 RX ORDER — CARVEDILOL 12.5 MG/1
6.25 TABLET ORAL 2 TIMES DAILY WITH MEALS
Qty: 30 TABLET | Refills: 0 | Status: SHIPPED | OUTPATIENT
Start: 2022-09-07 | End: 2022-10-26

## 2022-09-07 RX ADMIN — THIAMINE HCL TAB 100 MG 100 MG: 100 TAB at 09:42

## 2022-09-07 RX ADMIN — INSULIN LISPRO 5 UNITS: 100 INJECTION, SOLUTION INTRAVENOUS; SUBCUTANEOUS at 08:00

## 2022-09-07 RX ADMIN — HEPARIN SODIUM 5000 UNITS: 5000 INJECTION INTRAVENOUS; SUBCUTANEOUS at 06:40

## 2022-09-07 RX ADMIN — FLUOXETINE 20 MG: 20 CAPSULE ORAL at 09:42

## 2022-09-07 RX ADMIN — GABAPENTIN 300 MG: 300 CAPSULE ORAL at 09:42

## 2022-09-07 RX ADMIN — FOLIC ACID 1 MG: 1 TABLET ORAL at 09:42

## 2022-09-07 RX ADMIN — INSULIN LISPRO 5 UNITS: 100 INJECTION, SOLUTION INTRAVENOUS; SUBCUTANEOUS at 12:09

## 2022-09-07 RX ADMIN — INSULIN LISPRO 2 UNITS: 100 INJECTION, SOLUTION INTRAVENOUS; SUBCUTANEOUS at 12:09

## 2022-09-07 RX ADMIN — INSULIN LISPRO 1 UNITS: 100 INJECTION, SOLUTION INTRAVENOUS; SUBCUTANEOUS at 06:40

## 2022-09-07 RX ADMIN — ASPIRIN 81 MG: 81 TABLET, COATED ORAL at 09:42

## 2022-09-07 NOTE — DISCHARGE INSTRUCTIONS
Do not take torsemide until you talk with your kidney doctor  Ask your kidney doctor to recheck her blood work  Follow up with PCP in 1-2 weeks  Follow-up with kidney doctor tomorrow

## 2022-09-07 NOTE — DISCHARGE SUMMARY
INTERNAL MEDICINE RESIDENCY DISCHARGE SUMMARY     Tree Mortensen   61 y o  male  MRN: [id number]  Room/Bed: [facility] 211/[facility] 211-02     [address] [place] [address]   Encounter: [id number]    Principal Problem:    Acute kidney injury superimposed on CKD [facility])  Active Problems:    Orthostatic hypotension    Chronic combined systolic and diastolic heart failure ([address] )    Essential hypertension    Type 2 diabetes mellitus, without long-term current use of insulin (HCC)    Bipolar affective disorder, mixed, moderate (HCC)    Mixed hyperlipidemia    Transaminitis      * Acute kidney injury superimposed on CKD [facility])  Assessment & Plan  Lab Results   Component Value Date    EGFR 28 09/06/2022    EGFR 26 09/05/2022    EGFR 23 09/04/2022    CREATININE 2 35 (H) 09/06/2022    CREATININE 2 47 (H) 09/05/2022    CREATININE 2 74 (H) 09/04/2022   · Patient has history of CKD stage 4, followed by Nephrology, baseline creatinine high 1s to low 2s  · LAZARO POA with cr  2 74   · Bladder scan negative  · Received gentle IV fluids  · Likely in setting of hypotension at home and volume depletion     Plan  · Hold torsemide 40 mg until further evaluation by nephrologist  · Creatinine 2 56 on the day of discharge  · Follow-up with nephrologist tomorrow 9/8 - patient is aware of the appointment  · Consider repeat BMP within 1 week    Orthostatic hypotension  Assessment & Plan  · Patient was sitting in his brothers car for hour and a half while he was moving furniture  After he came out of the car he felt dizzy he went sat on the toilet and his brother checked his blood pressure it was 88/50  Patient denies losing consciousness  Denies chest pain, palpitation or shortness of breath    · Troponin x2 negative  · EKG showed T-wave inversions in inf lat leads which were evident on EKG in June 2022  · (+) orthostatic vitals  · No events on telemetry  · Was able to ambulate with compression stockings on without dizziness    Plan:  · Discharge with Coreg 6 25 mg b i d  to avoid further symptoms of hypotension  · Advised patient to continue wearing thigh-high compression stockings while ambulating      Chronic combined systolic and diastolic heart failure (HCC)  Assessment & Plan  Wt Readings from Last 3 Encounters:   09/06/22 81 9 kg (180 lb 9 oz)   08/11/22 80 9 kg (178 lb 6 4 oz)   07/28/22 78 5 kg (173 lb)     · Patient was admitted in July for acute on chronic systolic heart failure  · Most recent EKG in July 2022 at [facility] showed ejection fraction of 40-50%  Global hypokinesis, valve not assessed  · No weight gain, no lower extremity swelling, lungs are clear, no respiratory distress, no JVD    Plan:  · Hold torsemide  · Discharge with Coreg 12 5 mg b i d  · Cardiology follow-up scheduled for November 2022      Essential hypertension  Assessment & Plan  Amlodipine 5 mg daily was discontinued by primary care doctor about a month ago  BP fairly stable on review, (+) orthostatics  Coreg decreased to 12 5 mg b i d      Plan:  · Discharged with Coreg 6 25 mg b i d   · Holding torsemide    Type 2 diabetes mellitus, without long-term current use of insulin [facility])  Assessment & Plan  Lab Results   Component Value Date    HGBA1C 7 7 (H) 07/15/2022       Recent Labs     09/05/22  1617 09/05/22  2119 09/06/22  0526 09/06/22  1128   POCGLU 140 157* 117 205*       Blood Sugar Average: Last 72 hrs:  · (P) 165 375   · About a month ago patient was started on insulin due to uncontrolled diabetes, per brother his blood sugars are in the 400s at home  · He is on Basaglar 20 units HS, aspart 30 units in the morning and 10 units in the evening  · Blood sugar in the ED on the lower side    Plan:  · Continue Basaglar 20 units at night, aspart 30 units with breakfast and 10 units with dinner  · Discontinue Farxiga due to GFR 25    Transaminitis  Assessment & Plan  · Appears chronic on review   · Hepatic steatosis noted on prior imaging   · Trend CMP  · Abdominal exam is benign    Mixed hyperlipidemia  Assessment & Plan  · Continue Lipitor 40 mg daily     Bipolar affective disorder, mixed, moderate (HCC)  Assessment & Plan  · About a month ago patient was started on Seroquel 50 mg HS and increased to 100 mg HS  · His gabapentin was also increased from 300 mg t i d  to 400 mg but due to dizziness and weakness it was decreased back to 300 mg t i d   · Mood is stable  · Decreased Seroquel 75 mg due to somnolence    Plan:  · Continue Seroquel to 75 mg QHS      DETAILS OF HOSPITAL COURSE     Per Dr Odalis Gomez H&P, "Haim De Jesus is a 61 y o  male with a PMH of systolic heart failure, hypertension, hyperlipidemia, CKD stage 4 who presents with dizziness  Patient was in his usual state of health this morning  He was in his brother's car for about an hour and I have while he was moving  Dear condition was on  He reports good p o  intake  After our and half he stepped out of the car and while walking he became dizzy he sat on the toilet and his brother checked his blood pressure it was 88/50  He denies any headache, visual changes, chest pain palpitation shortness of breath with this episode  He was brought to the ED   by his brother  Patient's brother  a few days ago and  is on Wednesday and he would like to be home by then "    Troponin negative  EKG showed normal sinus rhythm with T-wave inversions inferior lateral leads, unchanged from prior EKG in 2022  Monitored on telemetry without events  Orthostatic blood pressure positive  Patient started on thigh-high compression socks and decreased Coreg to 12 5 mg  Orthostatic blood pressures positive after decreasing Coreg  Also found to have LAZARO with creatinine 2 74 on admission  Patient received gentle IV fluids and torsemide was held  Creatinine decreased to 2 56    On the day of discharge, patient states he was able to walk around with the compression stockings on without any dizziness  Denied chest pain, nausea, vomiting, abdominal pain, difficulty urinating, shortness of breath  Physical Exam  Vitals reviewed  Constitutional:       General: He is not in acute distress  HENT:      Head: Normocephalic and atraumatic  Nose: No rhinorrhea  Eyes:      General: No scleral icterus  Cardiovascular:      Rate and Rhythm: Normal rate and regular rhythm  Pulses: Normal pulses  Heart sounds: Normal heart sounds  No murmur heard  No friction rub  No gallop  Pulmonary:      Effort: Pulmonary effort is normal  No respiratory distress  Breath sounds: Normal breath sounds  No wheezing, rhonchi or rales  Abdominal:      General: Bowel sounds are normal  There is no distension  Palpations: Abdomen is soft  There is no mass  Tenderness: There is no abdominal tenderness  There is no guarding  Hernia: No hernia is present  Musculoskeletal:      Right lower leg: No edema  Left lower leg: No edema  Skin:     General: Skin is warm and dry  Capillary Refill: Capillary refill takes less than 2 seconds  Coloration: Skin is not jaundiced  Comments: Bilateral compression stockings   Neurological:      Mental Status: He is alert        Comments: CN 2-12 grossly intact, moves all 4 extremities, no dysarthria   Psychiatric:         Mood and Affect: Mood normal          Behavior: Behavior normal            DISCHARGE INFORMATION     PCP at Discharge: August Bueno DO    Admitting Provider: Shannan Hassan DO  Admission Date: 9/4/2022    Discharge Provider: Shannan Hassan DO  Discharge Date:  09/07/2022    Discharge Disposition: Home with self care  Discharge Condition: stable  Discharge with Lines: no    Discharge Diet: diabetic diet  Activity Restrictions: none  Test Results Pending at Discharge:  N/a    Discharge Diagnoses:  Principal Problem:    Acute kidney injury superimposed on CKD ([facility] )  Active Problems:    Orthostatic hypotension    Chronic combined systolic and diastolic heart failure (HCC)    Essential hypertension    Type 2 diabetes mellitus, without long-term current use of insulin (HCC)    Bipolar affective disorder, mixed, moderate (HCC)    Mixed hyperlipidemia    Transaminitis  Resolved Problems:    * No resolved hospital problems  *      Consulting Providers:      Diagnostic & Therapeutic Procedures Performed:  XR chest 2 views    Result Date: 9/5/2022  Impression: Resolved vascular congestion  Bibasilar subsegmental atelectasis  Approximate 3 cm rounded lucent region at the left lung base, possibly representing a pneumatocele  This is stable when compared to the July 22, 2022 exam   Nonemergent CT chest imaging could be performed for further evaluation   Workstation performed: [id number]       Code Status: Level 1 - Full Code  Advance Directive & Living Will: <no information>  Power of :    POLST:      Medications:  Current Discharge Medication List      STOP taking these medications       Farxiga 5 MG TABS Comments:   Reason for Stopping:         Melatonin 5 MG TABS Comments:   Reason for Stopping:         torsemide (DEMADEX) 20 mg tablet Comments:   Reason for Stopping:         amLODIPine (NORVASC) 5 mg tablet Comments:   Reason for Stopping:         LORazepam (ATIVAN) 0 5 mg tablet Comments:   Reason for Stopping:         metFORMIN (GLUCOPHAGE) 500 mg tablet Comments:   Reason for Stopping:             Current Discharge Medication List        Current Discharge Medication List      CONTINUE these medications which have NOT CHANGED    Details   Acetaminophen Extra Strength 500 MG tablet Take 500 mg by mouth every 6 (six) hours as needed      Aspirin Low Dose 81 MG EC tablet Take 81 mg by mouth daily      atorvastatin (LIPITOR) 40 mg tablet Take 40 mg by mouth daily at bedtime      FLUoxetine (PROzac) 20 mg capsule Take 1 capsule (20 mg total) by mouth daily  Qty: 90 capsule, Refills: 1    Associated Diagnoses: Bipolar affective disorder, mixed, moderate ([facility] ); Anxiety      folic acid (FOLVITE) 1 mg tablet Take 1 tablet (1 mg total) by mouth daily  Refills: 0    Associated Diagnoses: Polysubstance abuse (HCC)      INSULIN ASPART FLEXPEN SC Inject 30 Units under the skin daily before breakfast  30 units in AM with breakfast and 10 units at Dinner  Indications: Type 2 Diabetes      Insulin Glargine Solostar 100 UNIT/ML SOPN Inject 20 Units under the skin in the morning  Indications: Type 2 Diabetes      thiamine (VITAMIN B1) 100 mg tablet Take 1 tablet (100 mg total) by mouth daily  Refills: 0    Associated Diagnoses: Polysubstance abuse (HCC)      Disposable Gloves MISC Use daily Large  Qty: 100 each, Refills: 0    Associated Diagnoses: Weakness; Physical deconditioning; Wheelchair bound; Hospital discharge follow-up; History of decubitus ulcer      gabapentin (Neurontin) 300 mg capsule Take 1 capsule (300 mg total) by mouth 3 (three) times a day  Qty: 90 capsule, Refills: 0    Associated Diagnoses: Diabetic polyneuropathy associated with type 2 diabetes mellitus (HCC)      glucose blood (Accu-Chek Guide) test strip Test sugars four times daily  Qty: 400 each, Refills: 1    Associated Diagnoses: Uncontrolled type 2 diabetes mellitus with hyperglycemia ([facility] )      !! Incontinence Supply Disposable (Disposable Brief Large) MISC Use daily  Qty: 72 each, Refills: 0    Associated Diagnoses: Weakness; Physical deconditioning; Wheelchair bound; Hospital discharge follow-up; History of decubitus ulcer      !! Incontinence Supply Disposable (RA Adult Wipes) MISC Use in the morning  Qty: 128 each, Refills: 0    Associated Diagnoses: Weakness; Physical deconditioning; Wheelchair bound; Hospital discharge follow-up; History of decubitus ulcer      !! Incontinence Supply Disposable MISC Use daily XL Disposable Bed Incontinence Pads  Qty: 100 each, Refills: 0    Associated Diagnoses: Weakness; Physical deconditioning;  Wheelchair bound; Hospital discharge follow-up; History of decubitus ulcer      Lancet Devices (OneTouch Delica Plus Lancing) MISC Use 1 Dose in the morning Use as directed     Comments: Not accepted by patients insurance      Skin Protectants, Misc  (dimethicone-zinc oxide) cream Apply topically 2 (two) times a day as needed for irritation  Qty: 142 g, Refills: 2    Associated Diagnoses: Hospital discharge follow-up; History of decubitus ulcer; Physical deconditioning; Weakness; Wheelchair bound       !! - Potential duplicate medications found  Please discuss with provider  Allergies:  No Known Allergies    FOLLOW-UP     PCP Outpatient Follow-up:  Follow up with PCP in 1-2 weeks  Consulting Providers Follow-up:  none required     Active Issues Requiring Follow-up:   CKD, orthostatic hypotension    Discharge Statement:   I spent 35 minutes discharging the patient  This time was spent on the day of discharge  I had direct contact with the patient on the day of discharge  Additional documentation is required if more than 30 minutes were spent on discharge  Portions of the record may have been created with voice recognition software  Occasional wrong word or "sound a like" substitutions may have occurred due to the inherent limitations of voice recognition software    Read the chart carefully and recognize, using context, where substitutions have occurred     ==  Reeves Cogan, DO  [address]  Internal Medicine Resident PGY-2

## 2022-09-08 NOTE — UTILIZATION REVIEW
Notification of Discharge   This is a Notification of Discharge from our facility [address]  Please be advised that this patient has been discharge from our facility  Below you will find the admission and discharge date and time including the patients disposition  UTILIZATION REVIEW CONTACT:  Roland Prescott  Utilization   Network Utilization Review Department  Phone: [phone] x carefully listen to the prompts  All voicemails are confidential   Email: [email or web address]     PHYSICIAN ADVISORY SERVICES:  FOR [id number] REVIEW - MEDICAL NECESSITY DENIAL  Phone: [phone]  Fax: [phone]  Email: [email or web address]     PRESENTATION DATE: 9/4/2022 12:01 PM  OBERVATION ADMISSION DATE:   INPATIENT ADMISSION DATE: 9/5/22  2:17 PM   DISCHARGE DATE: 9/7/2022  2:16 PM  DISPOSITION: Home/Self Care Home/Self Care      IMPORTANT INFORMATION:  Send all requests for admission clinical reviews, approved or denied determinations and any other requests to dedicated fax number below belonging to the campus where the patient is receiving treatment   List of dedicated fax numbers:  [address] DENIALS (Administrative/Medical Necessity) [phone]   [address] (Maternity/NICU/Pediatrics) [phone]   [facility] [phone]   130 [facility] [phone]   [address] [place] [address] [phone]   [address] [phone]   [facility]  [phone]   [address] [place] [address], [facility]  2401 [facility] 1000 [address] [facility] [phone]

## 2022-09-12 DIAGNOSIS — R53.1 WEAKNESS: ICD-10-CM

## 2022-09-12 DIAGNOSIS — I95.1 ORTHOSTATIC HYPOTENSION: ICD-10-CM

## 2022-09-12 DIAGNOSIS — N18.4 TYPE 2 DIABETES MELLITUS WITH STAGE 4 CHRONIC KIDNEY DISEASE, WITHOUT LONG-TERM CURRENT USE OF INSULIN (HCC): Primary | ICD-10-CM

## 2022-09-12 DIAGNOSIS — E11.22 TYPE 2 DIABETES MELLITUS WITH STAGE 4 CHRONIC KIDNEY DISEASE, WITHOUT LONG-TERM CURRENT USE OF INSULIN (HCC): Primary | ICD-10-CM

## 2022-09-13 ENCOUNTER — HOME CARE VISIT (OUTPATIENT)
Dept: HOME HEALTH SERVICES | Facility: HOME HEALTHCARE | Age: 67
End: 2022-09-13
Payer: COMMERCIAL

## 2022-09-13 PROCEDURE — G0299 HHS/HOSPICE OF RN EA 15 MIN: HCPCS

## 2022-09-14 ENCOUNTER — HOME CARE VISIT (OUTPATIENT)
Dept: HOME HEALTH SERVICES | Facility: HOME HEALTHCARE | Age: 67
End: 2022-09-14
Payer: COMMERCIAL

## 2022-09-14 VITALS
OXYGEN SATURATION: 99 % | SYSTOLIC BLOOD PRESSURE: 140 MMHG | RESPIRATION RATE: 16 BRPM | HEART RATE: 80 BPM | DIASTOLIC BLOOD PRESSURE: 70 MMHG | TEMPERATURE: 97.5 F

## 2022-09-14 PROCEDURE — G0151 HHCP-SERV OF PT,EA 15 MIN: HCPCS

## 2022-09-14 NOTE — CASE COMMUNICATION
Number of visits requested: 8  Coverage dates: 9/13/22-10/8/22    3 auth visits remain  ANGELITA done 9 13 22  do I need a new auth note for this pt?

## 2022-09-15 ENCOUNTER — LAB REQUISITION (OUTPATIENT)
Dept: LAB | Facility: HOSPITAL | Age: 67
End: 2022-09-15
Payer: COMMERCIAL

## 2022-09-15 ENCOUNTER — HOME CARE VISIT (OUTPATIENT)
Dept: HOME HEALTH SERVICES | Facility: HOME HEALTHCARE | Age: 67
End: 2022-09-15
Payer: COMMERCIAL

## 2022-09-15 VITALS
SYSTOLIC BLOOD PRESSURE: 118 MMHG | TEMPERATURE: 97.3 F | OXYGEN SATURATION: 98 % | HEART RATE: 70 BPM | RESPIRATION RATE: 18 BRPM | DIASTOLIC BLOOD PRESSURE: 56 MMHG

## 2022-09-15 DIAGNOSIS — I10 ESSENTIAL (PRIMARY) HYPERTENSION: ICD-10-CM

## 2022-09-15 LAB
ALBUMIN SERPL BCP-MCNC: 2.6 G/DL (ref 3.5–5)
ANION GAP SERPL CALCULATED.3IONS-SCNC: 6 MMOL/L (ref 4–13)
BACTERIA UR QL AUTO: ABNORMAL /HPF
BASOPHILS # BLD AUTO: 0.05 THOUSANDS/ΜL (ref 0–0.1)
BASOPHILS NFR BLD AUTO: 1 % (ref 0–1)
BILIRUB UR QL STRIP: NEGATIVE
BUN SERPL-MCNC: 41 MG/DL (ref 5–25)
CALCIUM ALBUM COR SERPL-MCNC: 9.7 MG/DL (ref 8.3–10.1)
CALCIUM SERPL-MCNC: 8.6 MG/DL (ref 8.3–10.1)
CHLORIDE SERPL-SCNC: 119 MMOL/L (ref 96–108)
CLARITY UR: CLEAR
CO2 SERPL-SCNC: 17 MMOL/L (ref 21–32)
COLOR UR: YELLOW
CREAT SERPL-MCNC: 2.13 MG/DL (ref 0.6–1.3)
CREAT UR-MCNC: 159 MG/DL
EOSINOPHIL # BLD AUTO: 0.39 THOUSAND/ΜL (ref 0–0.61)
EOSINOPHIL NFR BLD AUTO: 6 % (ref 0–6)
ERYTHROCYTE [DISTWIDTH] IN BLOOD BY AUTOMATED COUNT: 14.5 % (ref 11.6–15.1)
GFR SERPL CREATININE-BSD FRML MDRD: 31 ML/MIN/1.73SQ M
GLUCOSE SERPL-MCNC: 76 MG/DL (ref 65–140)
GLUCOSE UR STRIP-MCNC: NEGATIVE MG/DL
HCT VFR BLD AUTO: 32.9 % (ref 36.5–49.3)
HGB BLD-MCNC: 10.7 G/DL (ref 12–17)
HGB UR QL STRIP.AUTO: ABNORMAL
HYALINE CASTS #/AREA URNS LPF: ABNORMAL /LPF
IMM GRANULOCYTES # BLD AUTO: 0.05 THOUSAND/UL (ref 0–0.2)
IMM GRANULOCYTES NFR BLD AUTO: 1 % (ref 0–2)
KETONES UR STRIP-MCNC: NEGATIVE MG/DL
LEUKOCYTE ESTERASE UR QL STRIP: NEGATIVE
LYMPHOCYTES # BLD AUTO: 1.88 THOUSANDS/ΜL (ref 0.6–4.47)
LYMPHOCYTES NFR BLD AUTO: 28 % (ref 14–44)
MAGNESIUM SERPL-MCNC: 2.1 MG/DL (ref 1.6–2.6)
MCH RBC QN AUTO: 30.1 PG (ref 26.8–34.3)
MCHC RBC AUTO-ENTMCNC: 32.5 G/DL (ref 31.4–37.4)
MCV RBC AUTO: 92 FL (ref 82–98)
MONOCYTES # BLD AUTO: 0.51 THOUSAND/ΜL (ref 0.17–1.22)
MONOCYTES NFR BLD AUTO: 8 % (ref 4–12)
NEUTROPHILS # BLD AUTO: 3.81 THOUSANDS/ΜL (ref 1.85–7.62)
NEUTS SEG NFR BLD AUTO: 56 % (ref 43–75)
NITRITE UR QL STRIP: NEGATIVE
NON-SQ EPI CELLS URNS QL MICRO: ABNORMAL /HPF
NRBC BLD AUTO-RTO: 0 /100 WBCS
PH UR STRIP.AUTO: 5.5 [PH]
PHOSPHATE SERPL-MCNC: 3.5 MG/DL (ref 2.3–4.1)
PLATELET # BLD AUTO: 161 THOUSANDS/UL (ref 149–390)
PMV BLD AUTO: 10.5 FL (ref 8.9–12.7)
POTASSIUM SERPL-SCNC: 4.5 MMOL/L (ref 3.5–5.3)
PROT UR STRIP-MCNC: ABNORMAL MG/DL
PROT UR-MCNC: 314 MG/DL
PROT/CREAT UR: 1.97 MG/G{CREAT} (ref 0–0.1)
RBC # BLD AUTO: 3.56 MILLION/UL (ref 3.88–5.62)
RBC #/AREA URNS AUTO: ABNORMAL /HPF
SODIUM SERPL-SCNC: 142 MMOL/L (ref 135–147)
SP GR UR STRIP.AUTO: 1.02 (ref 1–1.03)
UROBILINOGEN UR STRIP-ACNC: <2 MG/DL
WBC # BLD AUTO: 6.69 THOUSAND/UL (ref 4.31–10.16)
WBC #/AREA URNS AUTO: ABNORMAL /HPF

## 2022-09-15 PROCEDURE — 82570 ASSAY OF URINE CREATININE: CPT | Performed by: INTERNAL MEDICINE

## 2022-09-15 PROCEDURE — 80069 RENAL FUNCTION PANEL: CPT | Performed by: INTERNAL MEDICINE

## 2022-09-15 PROCEDURE — 84156 ASSAY OF PROTEIN URINE: CPT | Performed by: INTERNAL MEDICINE

## 2022-09-15 PROCEDURE — 83735 ASSAY OF MAGNESIUM: CPT | Performed by: INTERNAL MEDICINE

## 2022-09-15 PROCEDURE — 81001 URINALYSIS AUTO W/SCOPE: CPT | Performed by: INTERNAL MEDICINE

## 2022-09-15 PROCEDURE — 85025 COMPLETE CBC W/AUTO DIFF WBC: CPT | Performed by: INTERNAL MEDICINE

## 2022-09-15 PROCEDURE — G0299 HHS/HOSPICE OF RN EA 15 MIN: HCPCS

## 2022-09-15 NOTE — CASE COMMUNICATION
PT evaluation assessment for home health was completed on 09/14/22  Pts is currently at functional mobility baseline  Pt uses RW and a SPC interchangeably depending on surface level  Pt is able to (I)ly ambulating household distances with use of RW  Pt able to exit home I(ly) with use of SPC and able to complete ISABEL  Pt lives with supportive brother who is able to A with transportation and daily needs  No further skilled home PT n eeds at this time   DC pt from skilled home PT

## 2022-09-20 ENCOUNTER — HOME CARE VISIT (OUTPATIENT)
Dept: HOME HEALTH SERVICES | Facility: HOME HEALTHCARE | Age: 67
End: 2022-09-20
Payer: COMMERCIAL

## 2022-09-20 VITALS
WEIGHT: 186.2 LBS | BODY MASS INDEX: 31.96 KG/M2 | SYSTOLIC BLOOD PRESSURE: 142 MMHG | DIASTOLIC BLOOD PRESSURE: 80 MMHG | HEART RATE: 88 BPM | RESPIRATION RATE: 18 BRPM | TEMPERATURE: 98.2 F | OXYGEN SATURATION: 98 %

## 2022-09-20 PROCEDURE — 400014 VN F/U

## 2022-09-20 PROCEDURE — G0300 HHS/HOSPICE OF LPN EA 15 MIN: HCPCS

## 2022-09-22 ENCOUNTER — HOME CARE VISIT (OUTPATIENT)
Dept: HOME HEALTH SERVICES | Facility: HOME HEALTHCARE | Age: 67
End: 2022-09-22
Payer: COMMERCIAL

## 2022-09-22 PROCEDURE — G0299 HHS/HOSPICE OF RN EA 15 MIN: HCPCS

## 2022-09-24 VITALS
RESPIRATION RATE: 18 BRPM | HEART RATE: 75 BPM | BODY MASS INDEX: 31.79 KG/M2 | DIASTOLIC BLOOD PRESSURE: 70 MMHG | OXYGEN SATURATION: 99 % | TEMPERATURE: 97.6 F | WEIGHT: 185.2 LBS | SYSTOLIC BLOOD PRESSURE: 110 MMHG

## 2022-09-27 ENCOUNTER — HOME CARE VISIT (OUTPATIENT)
Dept: HOME HEALTH SERVICES | Facility: HOME HEALTHCARE | Age: 67
End: 2022-09-27
Payer: COMMERCIAL

## 2022-09-27 ENCOUNTER — HOSPITAL ENCOUNTER (EMERGENCY)
Facility: HOSPITAL | Age: 67
Discharge: HOME | End: 2022-09-27
Attending: EMERGENCY MEDICINE
Payer: COMMERCIAL

## 2022-09-27 VITALS
TEMPERATURE: 97.9 F | RESPIRATION RATE: 18 BRPM | HEART RATE: 67 BPM | DIASTOLIC BLOOD PRESSURE: 109 MMHG | SYSTOLIC BLOOD PRESSURE: 179 MMHG | OXYGEN SATURATION: 98 %

## 2022-09-27 VITALS
SYSTOLIC BLOOD PRESSURE: 140 MMHG | RESPIRATION RATE: 18 BRPM | DIASTOLIC BLOOD PRESSURE: 72 MMHG | OXYGEN SATURATION: 94 % | HEART RATE: 79 BPM | TEMPERATURE: 98 F

## 2022-09-27 DIAGNOSIS — E16.2 HYPOGLYCEMIA: Primary | ICD-10-CM

## 2022-09-27 LAB
GLUCOSE SERPL-MCNC: 101 MG/DL (ref 65–140)
GLUCOSE SERPL-MCNC: 182 MG/DL (ref 65–140)

## 2022-09-27 PROCEDURE — 99283 EMERGENCY DEPT VISIT LOW MDM: CPT

## 2022-09-27 PROCEDURE — G0299 HHS/HOSPICE OF RN EA 15 MIN: HCPCS

## 2022-09-27 PROCEDURE — 82948 REAGENT STRIP/BLOOD GLUCOSE: CPT

## 2022-09-27 PROCEDURE — 99284 EMERGENCY DEPT VISIT MOD MDM: CPT | Performed by: EMERGENCY MEDICINE

## 2022-09-27 NOTE — ED PROVIDER NOTES
History  Chief Complaint   Patient presents with    Hypoglycemia - no symptoms     Pt's brother reports he took his 30u short acting insulin this morning after eating cereal, BG was 45  Pt was lethargic, pale, and diaphoretic  Pt arrives,  gcs 15     67M PMH DM, CKD, CHF, HTN presented to the emergency department after an episode of hypoglycemia  Patient's brother gave him his morning dose of 30 units short-acting insulin before breakfast   Patient became lightheaded and diaphoretic was checked have a blood sugar of 45  His brother gave him orange juice and a sandwich and he felt better afterwards  On arrival to the emergency department patient's blood sugar was 101 and on recheck later was 185  Patient currently denies any complaints and states that he feels back to his normal self  Patient does not take any oral hypoglycemic agents  He normally takes short-acting insulin in the morning and short and long-acting insulin before bedtime  Prior to Admission Medications   Prescriptions Last Dose Informant Patient Reported? Taking?    Acetaminophen Extra Strength 500 MG tablet   Yes No   Sig: Take 500 mg by mouth every 6 (six) hours as needed   Aspirin Low Dose 81 MG EC tablet   Yes No   Sig: Take 81 mg by mouth daily   Disposable Gloves MISC   No No   Sig: Use daily Large   FLUoxetine (PROzac) 20 mg capsule   No No   Sig: Take 1 capsule (20 mg total) by mouth daily   INSULIN ASPART FLEXPEN SC   Yes No   Sig: Inject 30 Units under the skin daily before breakfast  30 units in AM with breakfast and 10 units at Dinner  Indications: Type 2 Diabetes   Incontinence Supply Disposable (Disposable Brief Large) MISC   No No   Sig: Use daily   Incontinence Supply Disposable (RA Adult Wipes) MISC   No No   Sig: Use in the morning   Incontinence Supply Disposable MISC   No No   Sig: Use daily XL Disposable Bed Incontinence Pads   Insulin Glargine Solostar 100 UNIT/ML SOPN   Yes No   Sig: Inject 20 Units under the skin in the morning  Indications: Type 2 Diabetes   Lancet Devices (Wevebobuch Delica Plus Lancing) MISC   Yes No   Sig: Use 1 Dose in the morning Use as directed    QUEtiapine (SEROquel) 25 mg tablet   No No   Sig: Take 3 tablets (75 mg total) by mouth daily at bedtime   Skin Protectants, Misc  (dimethicone-zinc oxide) cream   No No   Sig: Apply topically 2 (two) times a day as needed for irritation   atorvastatin (LIPITOR) 40 mg tablet   Yes No   Sig: Take 40 mg by mouth daily at bedtime   carvedilol (COREG) 12 5 mg tablet   No No   Sig: Take 0 5 tablets (6 25 mg total) by mouth 2 (two) times a day with meals   folic acid (FOLVITE) 1 mg tablet   No No   Sig: Take 1 tablet (1 mg total) by mouth daily   Patient taking differently: Take 400 mcg by mouth daily   gabapentin (Neurontin) 300 mg capsule   No No   Sig: Take 1 capsule (300 mg total) by mouth 3 (three) times a day   glucose blood (Accu-Chek Guide) test strip   No No   Sig: Test sugars four times daily   thiamine (VITAMIN B1) 100 mg tablet   No No   Sig: Take 1 tablet (100 mg total) by mouth daily      Facility-Administered Medications: None       Past Medical History:   Diagnosis Date    Anxiety     Arthritis     Coronary artery disease     [facility]     Depression     Diabetes mellitus ([facility] )     Infectious viral hepatitis     Memory loss     Visual impairment        History reviewed  No pertinent surgical history  History reviewed  No pertinent family history  I have reviewed and agree with the history as documented  E-Cigarette/Vaping    E-Cigarette Use Never User      E-Cigarette/Vaping Substances     Social History     Tobacco Use    Smoking status: Former Smoker    Smokeless tobacco: Never Used   Vaping Use    Vaping Use: Never used   Substance Use Topics    Alcohol use: Not Currently     Comment: socially    Drug use: Never        Review of Systems   Constitutional: Negative for fatigue  HENT: Negative for sore throat  Respiratory: Negative for cough and shortness of breath  Gastrointestinal: Negative for abdominal pain, nausea and vomiting  Genitourinary: Negative for dysuria  Neurological: Negative for dizziness, syncope and light-headedness  Psychiatric/Behavioral: Negative for confusion  All other systems reviewed and are negative  Physical Exam  ED Triage Vitals [09/27/22 1241]   Temperature Pulse Respirations Blood Pressure SpO2   97 9 °F (36 6 °C) 68 18 149/95 98 %      Temp Source Heart Rate Source Patient Position - Orthostatic VS BP Location FiO2 (%)   Tympanic Monitor Sitting Left arm --      Pain Score       No Pain             Orthostatic Vital Signs  Vitals:    09/27/22 1241 09/27/22 1606   BP: 149/95 (!) 179/109   Pulse: 68 67   Patient Position - Orthostatic VS: Sitting Lying       Physical Exam  Vitals and nursing note reviewed  Constitutional:       General: He is not in acute distress  Appearance: He is not ill-appearing or diaphoretic  HENT:      Head: Normocephalic and atraumatic  Mouth/Throat:      Mouth: Mucous membranes are moist       Pharynx: Oropharynx is clear  Eyes:      Pupils: Pupils are equal, round, and reactive to light  Cardiovascular:      Rate and Rhythm: Normal rate and regular rhythm  Heart sounds: No murmur heard  Pulmonary:      Effort: Pulmonary effort is normal  No respiratory distress  Breath sounds: Normal breath sounds  No wheezing, rhonchi or rales  Abdominal:      General: Abdomen is flat  There is no distension  Palpations: Abdomen is soft  Tenderness: There is no abdominal tenderness  There is no guarding or rebound  Musculoskeletal:      Right lower leg: No edema  Left lower leg: No edema  Skin:     General: Skin is warm and dry  Neurological:      Mental Status: He is alert and oriented to person, place, and time           ED Medications  Medications - No data to display    Diagnostic Studies  Results Reviewed Procedure Component Value Units Date/Time    Fingerstick Glucose (POCT) [[id number]]  (Abnormal) Collected: 09/27/22 1437    Lab Status: Final result Updated: 09/27/22 1438     POC Glucose 182 mg/dl     Fingerstick Glucose (POCT) [[id number]]  (Normal) Collected: 09/27/22 1239    Lab Status: Final result Updated: 09/27/22 1244     POC Glucose 101 mg/dl                  No orders to display         Procedures  Procedures      ED Course                                       MDM  Number of Diagnoses or Management Options  Hypoglycemia  Diagnosis management comments: 67M PMH DM, CKD, CHF, HTN presented to the emergency department after an episode of hypoglycemia  Workup including vital signs, physical exam, point of care glucose  Patient currently asymptomatic, glucose normal on repeat checks  Likely episode of hypoglycemia after morning insulin dose, resolved after eating and back to baseline currently  Stable for discharge home with primary care follow up, discharge instructions and return precautions given  Disposition  Final diagnoses:   Hypoglycemia     Time reflects when diagnosis was documented in both MDM as applicable and the Disposition within this note     Time User Action Codes Description Comment    9/27/2022  3:59 PM Deanna Messer Add [E16 2] Hypoglycemia       ED Disposition     ED Disposition   Discharge    Condition   Stable    Date/Time   Tue Sep 27, 2022  4:00 PM    [address] discharge to home/self care                 Follow-up Information     Follow up With Specialties Details Why Contact Linda Rodriguez, DO Internal Medicine Call   [address]   [phone]            Discharge Medication List as of 9/27/2022  4:00 PM      CONTINUE these medications which have NOT CHANGED    Details   Acetaminophen Extra Strength 500 MG tablet Take 500 mg by mouth every 6 (six) hours as needed, Starting Fri 4/29/2022, Historical Med      Aspirin Low Dose 81 MG EC tablet Take 81 mg by mouth daily, Starting Fri 4/29/2022, Historical Med      atorvastatin (LIPITOR) 40 mg tablet Take 40 mg by mouth daily at bedtime, Starting Fri 4/29/2022, Historical Med      carvedilol (COREG) 12 5 mg tablet Take 0 5 tablets (6 25 mg total) by mouth 2 (two) times a day with meals, Starting Wed 9/7/2022, Until Fri 10/7/2022, Normal      Disposable Gloves MISC Use daily Large, Starting Wed 7/27/2022, Print      FLUoxetine (PROzac) 20 mg capsule Take 1 capsule (20 mg total) by mouth daily, Starting Tue 1/50/2205, Normal      folic acid (FOLVITE) 1 mg tablet Take 1 tablet (1 mg total) by mouth daily, Starting Wed 6/22/2022, No Print      gabapentin (Neurontin) 300 mg capsule Take 1 capsule (300 mg total) by mouth 3 (three) times a day, Starting Thu 7/28/2022, Normal      glucose blood (Accu-Chek Guide) test strip Test sugars four times daily, Normal      !! Incontinence Supply Disposable (Disposable Brief Large) MISC Use daily, Starting Wed 7/27/2022, Print      !! Incontinence Supply Disposable (RA Adult Wipes) MISC Use in the morning, Starting Wed 7/27/2022, Print      !! Incontinence Supply Disposable MISC Use daily XL Disposable Bed Incontinence Pads, Starting Wed 7/27/2022, Print      INSULIN ASPART FLEXPEN SC Inject 30 Units under the skin daily before breakfast  30 units in AM with breakfast and 10 units at Dinner  Indications: Type 2 Diabetes, Historical Med      Insulin Glargine Solostar 100 UNIT/ML SOPN Inject 20 Units under the skin in the morning   Indications: Type 2 Diabetes, Historical Med      Lancet Devices (OneTouch Delica Plus Lancing) MISC Use 1 Dose in the morning Use as directed , Starting Sat 4/30/2022, Historical Med      QUEtiapine (SEROquel) 25 mg tablet Take 3 tablets (75 mg total) by mouth daily at bedtime, Starting Wed 9/7/2022, Until Fri 10/7/2022, Normal      Skin Protectants, Misc  (dimethicone-zinc oxide) cream Apply topically 2 (two) times a day as needed for irritation, Starting Thu 7/28/2022, Normal      thiamine (VITAMIN B1) 100 mg tablet Take 1 tablet (100 mg total) by mouth daily, Starting Wed 6/22/2022, No Print       !! - Potential duplicate medications found  Please discuss with provider  No discharge procedures on file  PDMP Review       Value Time User    PDMP Reviewed  Yes 7/15/2022 10:57 PM Skye Corona           ED Provider  Attending physically available and evaluated THE [facility]  I managed the patient along with the ED Attending      Electronically Signed by         Chris Matthew MD  09/27/22 7233

## 2022-09-27 NOTE — DISCHARGE INSTRUCTIONS
Follow-up with your primary care doctor within 3 days  Continue to take your medications as prescribed  Return to the emergency room if you have any other concerns

## 2022-09-28 ENCOUNTER — HOME CARE VISIT (OUTPATIENT)
Dept: HOME HEALTH SERVICES | Facility: HOME HEALTHCARE | Age: 67
End: 2022-09-28
Payer: COMMERCIAL

## 2022-09-29 ENCOUNTER — HOME CARE VISIT (OUTPATIENT)
Dept: HOME HEALTH SERVICES | Facility: HOME HEALTHCARE | Age: 67
End: 2022-09-29
Payer: COMMERCIAL

## 2022-09-30 NOTE — ED ATTENDING ATTESTATION
9/27/2022  IRohini MD, saw and evaluated the patient  I have discussed the patient with the resident/non-physician practitioner and agree with the resident's/non-physician practitioner's findings, Plan of Care, and MDM as documented in the resident's/non-physician practitioner's note, except where noted  All available labs and Radiology studies were reviewed  I was present for key portions of any procedure(s) performed by the resident/non-physician practitioner and I was immediately available to provide assistance  At this point I agree with the current assessment done in the Emergency Department  I have conducted an independent evaluation of this patient a history and physical is as follows:    ED Course    44-year-old male history of hypoglycemia symptomatic at home  Blood sugar 45  Patient given oral carbohydrates airway she felt much better  The blood sugar 101 and 185  Patient offers no other complaints at this time knots off on radius  Vitals reviewed Heart regular rate rhythm  Lungs clear auscultation bilaterally  Abdomen soft nontender nondistended normal bowel sounds  Extremities no edema  Impression symptomatic hypoglycemia resolved  Patient was observed emergency department for several hours without any further episodes of hypoglycemia    Patient be discharged home follow-up PCP as outpatient          Critical Care Time  Procedures

## 2022-10-03 ENCOUNTER — HOME CARE VISIT (OUTPATIENT)
Dept: HOME HEALTH SERVICES | Facility: HOME HEALTHCARE | Age: 67
End: 2022-10-03
Payer: COMMERCIAL

## 2022-10-03 NOTE — CASE COMMUNICATION
Arrived to pt's home for schedueled visit for today  Pt's brother answered the door and infromed me that the pt was admitted to [facility] yesterday for chf exacerbation

## 2022-10-16 ENCOUNTER — HOME CARE VISIT (OUTPATIENT)
Dept: HOME HEALTH SERVICES | Facility: HOME HEALTHCARE | Age: 67
End: 2022-10-16
Payer: COMMERCIAL

## 2022-10-16 VITALS
OXYGEN SATURATION: 96 % | TEMPERATURE: 98.1 F | BODY MASS INDEX: 31.82 KG/M2 | HEART RATE: 78 BPM | SYSTOLIC BLOOD PRESSURE: 132 MMHG | WEIGHT: 185.4 LBS | RESPIRATION RATE: 20 BRPM | DIASTOLIC BLOOD PRESSURE: 70 MMHG

## 2022-10-16 PROCEDURE — G0299 HHS/HOSPICE OF RN EA 15 MIN: HCPCS

## 2022-10-17 ENCOUNTER — APPOINTMENT (OUTPATIENT)
Dept: LAB | Facility: HOSPITAL | Age: 67
End: 2022-10-17
Payer: COMMERCIAL

## 2022-10-17 DIAGNOSIS — N17.9 ACUTE KIDNEY INJURY (HCC): ICD-10-CM

## 2022-10-17 LAB
ANION GAP SERPL CALCULATED.3IONS-SCNC: 10 MMOL/L (ref 5–14)
BUN SERPL-MCNC: 58 MG/DL (ref 5–25)
CALCIUM SERPL-MCNC: 9 MG/DL (ref 8.4–10.2)
CHLORIDE SERPL-SCNC: 111 MMOL/L (ref 96–108)
CO2 SERPL-SCNC: 19 MMOL/L (ref 21–32)
CREAT SERPL-MCNC: 2.78 MG/DL (ref 0.7–1.5)
GFR SERPL CREATININE-BSD FRML MDRD: 22 ML/MIN/1.73SQ M
GLUCOSE P FAST SERPL-MCNC: 118 MG/DL (ref 70–99)
POTASSIUM SERPL-SCNC: 5.2 MMOL/L (ref 3.5–5.3)
SODIUM SERPL-SCNC: 140 MMOL/L (ref 135–147)

## 2022-10-17 PROCEDURE — 36415 COLL VENOUS BLD VENIPUNCTURE: CPT

## 2022-10-17 PROCEDURE — 80048 BASIC METABOLIC PNL TOTAL CA: CPT

## 2022-10-18 ENCOUNTER — HOME CARE VISIT (OUTPATIENT)
Dept: HOME HEALTH SERVICES | Facility: HOME HEALTHCARE | Age: 67
End: 2022-10-18
Payer: COMMERCIAL

## 2022-10-18 VITALS
SYSTOLIC BLOOD PRESSURE: 106 MMHG | RESPIRATION RATE: 16 BRPM | DIASTOLIC BLOOD PRESSURE: 60 MMHG | OXYGEN SATURATION: 97 % | TEMPERATURE: 97.7 F | HEART RATE: 64 BPM

## 2022-10-18 PROCEDURE — G0299 HHS/HOSPICE OF RN EA 15 MIN: HCPCS

## 2022-10-19 ENCOUNTER — HOME CARE VISIT (OUTPATIENT)
Dept: HOME HEALTH SERVICES | Facility: HOME HEALTHCARE | Age: 67
End: 2022-10-19
Payer: COMMERCIAL

## 2022-10-20 ENCOUNTER — HOME CARE VISIT (OUTPATIENT)
Dept: HOME HEALTH SERVICES | Facility: HOME HEALTHCARE | Age: 67
End: 2022-10-20
Payer: COMMERCIAL

## 2022-10-20 VITALS
DIASTOLIC BLOOD PRESSURE: 60 MMHG | TEMPERATURE: 97.8 F | RESPIRATION RATE: 18 BRPM | HEART RATE: 80 BPM | SYSTOLIC BLOOD PRESSURE: 100 MMHG

## 2022-10-20 PROCEDURE — 400016 VN ROC

## 2022-10-20 PROCEDURE — G0299 HHS/HOSPICE OF RN EA 15 MIN: HCPCS

## 2022-10-20 NOTE — CASE COMMUNICATION
Number of visits requested: 6  Coverage dates: 10/19/22-11/8/22    Unsure if new note was needed or one was sent at [facility] visit  Current auth visits ended

## 2022-10-22 NOTE — ASSESSMENT & PLAN NOTE
· BP acceptable, monitor routinely   · Continue home dose Coreg  · Diuresis per cardiology independent

## 2022-10-25 ENCOUNTER — HOME CARE VISIT (OUTPATIENT)
Dept: HOME HEALTH SERVICES | Facility: HOME HEALTHCARE | Age: 67
End: 2022-10-25
Payer: COMMERCIAL

## 2022-10-25 ENCOUNTER — APPOINTMENT (EMERGENCY)
Dept: RADIOLOGY | Facility: HOSPITAL | Age: 67
End: 2022-10-25
Payer: COMMERCIAL

## 2022-10-25 ENCOUNTER — HOSPITAL ENCOUNTER (OUTPATIENT)
Facility: HOSPITAL | Age: 67
Setting detail: OBSERVATION
LOS: 1 days | Discharge: HOME | End: 2022-10-26
Attending: EMERGENCY MEDICINE | Admitting: INTERNAL MEDICINE
Payer: COMMERCIAL

## 2022-10-25 DIAGNOSIS — R53.1 WEAKNESS: Primary | ICD-10-CM

## 2022-10-25 DIAGNOSIS — I50.9 ACUTE ON CHRONIC CONGESTIVE HEART FAILURE, UNSPECIFIED HEART FAILURE TYPE (HCC): ICD-10-CM

## 2022-10-25 DIAGNOSIS — I50.42 CHRONIC COMBINED SYSTOLIC AND DIASTOLIC HEART FAILURE (HCC): ICD-10-CM

## 2022-10-25 DIAGNOSIS — I50.43 ACUTE ON CHRONIC COMBINED SYSTOLIC AND DIASTOLIC HEART FAILURE (HCC): ICD-10-CM

## 2022-10-25 DIAGNOSIS — R77.8 ELEVATED TROPONIN: ICD-10-CM

## 2022-10-25 PROBLEM — R63.5 WEIGHT GAIN: Status: ACTIVE | Noted: 2022-10-25

## 2022-10-25 LAB
2HR DELTA HS TROPONIN: -7 NG/L
4HR DELTA HS TROPONIN: -22 NG/L
ALBUMIN SERPL BCP-MCNC: 2.7 G/DL (ref 3.5–5)
ALP SERPL-CCNC: 147 U/L (ref 46–116)
ALT SERPL W P-5'-P-CCNC: 86 U/L (ref 12–78)
AMMONIA PLAS-SCNC: 30 UMOL/L (ref 11–35)
ANION GAP SERPL CALCULATED.3IONS-SCNC: 7 MMOL/L (ref 4–13)
AST SERPL W P-5'-P-CCNC: 53 U/L (ref 5–45)
BACTERIA UR QL AUTO: ABNORMAL /HPF
BASOPHILS # BLD AUTO: 0.09 THOUSANDS/ÂΜL (ref 0–0.1)
BASOPHILS NFR BLD AUTO: 1 % (ref 0–1)
BILIRUB SERPL-MCNC: 0.26 MG/DL (ref 0.2–1)
BILIRUB UR QL STRIP: NEGATIVE
BUN SERPL-MCNC: 45 MG/DL (ref 5–25)
CALCIUM ALBUM COR SERPL-MCNC: 9.7 MG/DL (ref 8.3–10.1)
CALCIUM SERPL-MCNC: 8.7 MG/DL (ref 8.3–10.1)
CARDIAC TROPONIN I PNL SERPL HS: 134 NG/L
CARDIAC TROPONIN I PNL SERPL HS: 149 NG/L
CARDIAC TROPONIN I PNL SERPL HS: 156 NG/L
CHLORIDE SERPL-SCNC: 116 MMOL/L (ref 96–108)
CLARITY UR: CLEAR
CO2 SERPL-SCNC: 19 MMOL/L (ref 21–32)
COLOR UR: YELLOW
CREAT SERPL-MCNC: 2.31 MG/DL (ref 0.6–1.3)
EOSINOPHIL # BLD AUTO: 0.47 THOUSAND/ÂΜL (ref 0–0.61)
EOSINOPHIL NFR BLD AUTO: 5 % (ref 0–6)
ERYTHROCYTE [DISTWIDTH] IN BLOOD BY AUTOMATED COUNT: 14.8 % (ref 11.6–15.1)
FLUAV RNA RESP QL NAA+PROBE: NEGATIVE
FLUBV RNA RESP QL NAA+PROBE: NEGATIVE
GFR SERPL CREATININE-BSD FRML MDRD: 28 ML/MIN/1.73SQ M
GLUCOSE SERPL-MCNC: 108 MG/DL (ref 65–140)
GLUCOSE SERPL-MCNC: 126 MG/DL (ref 65–140)
GLUCOSE SERPL-MCNC: 183 MG/DL (ref 65–140)
GLUCOSE SERPL-MCNC: 197 MG/DL (ref 65–140)
GLUCOSE UR STRIP-MCNC: NEGATIVE MG/DL
HCT VFR BLD AUTO: 32.8 % (ref 36.5–49.3)
HGB BLD-MCNC: 10.3 G/DL (ref 12–17)
HGB UR QL STRIP.AUTO: ABNORMAL
HYALINE CASTS #/AREA URNS LPF: ABNORMAL /LPF
IMM GRANULOCYTES # BLD AUTO: 0.04 THOUSAND/UL (ref 0–0.2)
IMM GRANULOCYTES NFR BLD AUTO: 0 % (ref 0–2)
KETONES UR STRIP-MCNC: NEGATIVE MG/DL
LEUKOCYTE ESTERASE UR QL STRIP: NEGATIVE
LYMPHOCYTES # BLD AUTO: 1.82 THOUSANDS/ÂΜL (ref 0.6–4.47)
LYMPHOCYTES NFR BLD AUTO: 20 % (ref 14–44)
MCH RBC QN AUTO: 29.8 PG (ref 26.8–34.3)
MCHC RBC AUTO-ENTMCNC: 31.4 G/DL (ref 31.4–37.4)
MCV RBC AUTO: 95 FL (ref 82–98)
MONOCYTES # BLD AUTO: 0.69 THOUSAND/ÂΜL (ref 0.17–1.22)
MONOCYTES NFR BLD AUTO: 8 % (ref 4–12)
NEUTROPHILS # BLD AUTO: 6.02 THOUSANDS/ÂΜL (ref 1.85–7.62)
NEUTS SEG NFR BLD AUTO: 66 % (ref 43–75)
NITRITE UR QL STRIP: NEGATIVE
NON-SQ EPI CELLS URNS QL MICRO: ABNORMAL /HPF
NRBC BLD AUTO-RTO: 0 /100 WBCS
NT-PROBNP SERPL-MCNC: 7551 PG/ML
PH UR STRIP.AUTO: 5.5 [PH] (ref 4.5–8)
PLATELET # BLD AUTO: 156 THOUSANDS/UL (ref 149–390)
PMV BLD AUTO: 10 FL (ref 8.9–12.7)
POTASSIUM SERPL-SCNC: 5 MMOL/L (ref 3.5–5.3)
PROT SERPL-MCNC: 7.6 G/DL (ref 6.4–8.4)
PROT UR STRIP-MCNC: ABNORMAL MG/DL
RBC # BLD AUTO: 3.46 MILLION/UL (ref 3.88–5.62)
RBC #/AREA URNS AUTO: ABNORMAL /HPF
RSV RNA RESP QL NAA+PROBE: NEGATIVE
SARS-COV-2 RNA RESP QL NAA+PROBE: NEGATIVE
SODIUM SERPL-SCNC: 142 MMOL/L (ref 135–147)
SP GR UR STRIP.AUTO: 1.02 (ref 1–1.03)
TSH SERPL DL<=0.05 MIU/L-ACNC: 3.59 UIU/ML (ref 0.45–4.5)
UROBILINOGEN UR QL STRIP.AUTO: 0.2 E.U./DL
WBC # BLD AUTO: 9.13 THOUSAND/UL (ref 4.31–10.16)
WBC #/AREA URNS AUTO: ABNORMAL /HPF

## 2022-10-25 PROCEDURE — 0241U HB NFCT DS VIR RESP RNA 4 TRGT: CPT | Performed by: INTERNAL MEDICINE

## 2022-10-25 PROCEDURE — G1004 CDSM NDSC: HCPCS

## 2022-10-25 PROCEDURE — 36415 COLL VENOUS BLD VENIPUNCTURE: CPT

## 2022-10-25 PROCEDURE — 82948 REAGENT STRIP/BLOOD GLUCOSE: CPT

## 2022-10-25 PROCEDURE — 85025 COMPLETE CBC W/AUTO DIFF WBC: CPT

## 2022-10-25 PROCEDURE — 93005 ELECTROCARDIOGRAM TRACING: CPT

## 2022-10-25 PROCEDURE — 99220 PR INITIAL OBSERVATION CARE/DAY 70 MINUTES: CPT | Performed by: INTERNAL MEDICINE

## 2022-10-25 PROCEDURE — G0299 HHS/HOSPICE OF RN EA 15 MIN: HCPCS

## 2022-10-25 PROCEDURE — 83880 ASSAY OF NATRIURETIC PEPTIDE: CPT | Performed by: INTERNAL MEDICINE

## 2022-10-25 PROCEDURE — 84443 ASSAY THYROID STIM HORMONE: CPT

## 2022-10-25 PROCEDURE — 81001 URINALYSIS AUTO W/SCOPE: CPT

## 2022-10-25 PROCEDURE — 70450 CT HEAD/BRAIN W/O DYE: CPT

## 2022-10-25 PROCEDURE — 84484 ASSAY OF TROPONIN QUANT: CPT

## 2022-10-25 PROCEDURE — 71045 X-RAY EXAM CHEST 1 VIEW: CPT

## 2022-10-25 PROCEDURE — 80053 COMPREHEN METABOLIC PANEL: CPT

## 2022-10-25 PROCEDURE — 82140 ASSAY OF AMMONIA: CPT

## 2022-10-25 RX ORDER — GABAPENTIN 300 MG/1
300 CAPSULE ORAL 3 TIMES DAILY
Status: DISCONTINUED | OUTPATIENT
Start: 2022-10-25 | End: 2022-10-26 | Stop reason: HOSPADM

## 2022-10-25 RX ORDER — QUETIAPINE FUMARATE 100 MG/1
100 TABLET, FILM COATED ORAL
Status: DISCONTINUED | OUTPATIENT
Start: 2022-10-25 | End: 2022-10-26 | Stop reason: HOSPADM

## 2022-10-25 RX ORDER — FUROSEMIDE 10 MG/ML
20 INJECTION INTRAMUSCULAR; INTRAVENOUS ONCE
Status: COMPLETED | OUTPATIENT
Start: 2022-10-25 | End: 2022-10-25

## 2022-10-25 RX ORDER — HEPARIN SODIUM 5000 [USP'U]/ML
5000 INJECTION, SOLUTION INTRAVENOUS; SUBCUTANEOUS EVERY 8 HOURS SCHEDULED
Status: DISCONTINUED | OUTPATIENT
Start: 2022-10-25 | End: 2022-10-26 | Stop reason: HOSPADM

## 2022-10-25 RX ORDER — TORSEMIDE 20 MG/1
40 TABLET ORAL EVERY OTHER DAY
Status: DISCONTINUED | OUTPATIENT
Start: 2022-10-26 | End: 2022-10-25

## 2022-10-25 RX ORDER — HYDRALAZINE HYDROCHLORIDE 25 MG/1
25 TABLET, FILM COATED ORAL EVERY 8 HOURS
Status: DISCONTINUED | OUTPATIENT
Start: 2022-10-25 | End: 2022-10-26 | Stop reason: HOSPADM

## 2022-10-25 RX ORDER — ATORVASTATIN CALCIUM 40 MG/1
40 TABLET, FILM COATED ORAL
Status: DISCONTINUED | OUTPATIENT
Start: 2022-10-25 | End: 2022-10-26 | Stop reason: HOSPADM

## 2022-10-25 RX ORDER — TORSEMIDE 10 MG/1
10 TABLET ORAL DAILY
Status: DISCONTINUED | OUTPATIENT
Start: 2022-10-26 | End: 2022-10-25

## 2022-10-25 RX ORDER — ISOSORBIDE DINITRATE 10 MG/1
10 TABLET ORAL EVERY 8 HOURS
Status: DISCONTINUED | OUTPATIENT
Start: 2022-10-25 | End: 2022-10-26 | Stop reason: HOSPADM

## 2022-10-25 RX ORDER — INSULIN LISPRO 100 [IU]/ML
1-6 INJECTION, SOLUTION INTRAVENOUS; SUBCUTANEOUS
Status: DISCONTINUED | OUTPATIENT
Start: 2022-10-25 | End: 2022-10-26 | Stop reason: HOSPADM

## 2022-10-25 RX ORDER — ASPIRIN 81 MG/1
81 TABLET ORAL DAILY
Status: DISCONTINUED | OUTPATIENT
Start: 2022-10-26 | End: 2022-10-26 | Stop reason: HOSPADM

## 2022-10-25 RX ORDER — CARVEDILOL 12.5 MG/1
12.5 TABLET ORAL 2 TIMES DAILY WITH MEALS
Status: DISCONTINUED | OUTPATIENT
Start: 2022-10-25 | End: 2022-10-26 | Stop reason: HOSPADM

## 2022-10-25 RX ORDER — FLUOXETINE HYDROCHLORIDE 20 MG/1
20 CAPSULE ORAL DAILY
Status: DISCONTINUED | OUTPATIENT
Start: 2022-10-26 | End: 2022-10-26 | Stop reason: HOSPADM

## 2022-10-25 RX ADMIN — HEPARIN SODIUM 5000 UNITS: 5000 INJECTION INTRAVENOUS; SUBCUTANEOUS at 21:58

## 2022-10-25 RX ADMIN — ATORVASTATIN CALCIUM 40 MG: 40 TABLET, FILM COATED ORAL at 21:58

## 2022-10-25 RX ADMIN — QUETIAPINE FUMARATE 100 MG: 100 TABLET ORAL at 21:58

## 2022-10-25 RX ADMIN — CARVEDILOL 12.5 MG: 12.5 TABLET, FILM COATED ORAL at 18:20

## 2022-10-25 RX ADMIN — FUROSEMIDE 20 MG: 10 INJECTION, SOLUTION INTRAMUSCULAR; INTRAVENOUS at 18:20

## 2022-10-25 RX ADMIN — HYDRALAZINE HYDROCHLORIDE 25 MG: 25 TABLET, FILM COATED ORAL at 18:20

## 2022-10-25 RX ADMIN — GABAPENTIN 300 MG: 300 CAPSULE ORAL at 17:37

## 2022-10-25 RX ADMIN — HEPARIN SODIUM 5000 UNITS: 5000 INJECTION INTRAVENOUS; SUBCUTANEOUS at 17:38

## 2022-10-25 RX ADMIN — GABAPENTIN 300 MG: 300 CAPSULE ORAL at 21:58

## 2022-10-25 RX ADMIN — INSULIN LISPRO 1 UNITS: 100 INJECTION, SOLUTION INTRAVENOUS; SUBCUTANEOUS at 17:38

## 2022-10-25 NOTE — ASSESSMENT & PLAN NOTE
Lab Results   Component Value Date    EGFR 28 10/25/2022    EGFR 22 10/17/2022    EGFR 31 09/15/2022    CREATININE 2 31 (H) 10/25/2022    CREATININE 2 78 (H) 10/17/2022    CREATININE 2 13 (H) 09/15/2022   · Patient was admitted to [facility] found to have ischemic cardiomyopathy underwent right heart catheterization and left heart catheterization which showed multivessel disease   Developed anuria requiring temporary hemodialysis  · Creatinine currently seems to be at baseline

## 2022-10-25 NOTE — ASSESSMENT & PLAN NOTE
· Echo showed ejection fraction 20-25%, left heart catheterization showed multivessel disease requiring CABG eval  · Evaluated by CT surgery 10/24/2022, recommended outpatient PCI, high risk for CABG due to low ejection fraction and renal dysfunction  He has high-grade obstructive disease in LAD, disease, in 1 of the right coronary branches and the distal circumflex  Per CT surgery CABG to the LAD would be an option but he is high risk, distal circumflex and right coronary branches are not am unable to CABG due to small size  · Patient has new T-wave inversions (could have been present at [facility] earlier this month)    Denies chest pain or shortness of breath, reports fatigue with activities  · Elevated troponin  · Cardiology consult  · Continue aspirin, Lipitor, Coreg

## 2022-10-25 NOTE — ASSESSMENT & PLAN NOTE
Lab Results   Component Value Date    HGBA1C 7 7 (H) 10/03/2022       Recent Labs     10/25/22  1037 10/25/22  1453   POCGLU 126 197*       Blood Sugar Average: Last 72 hrs:  (P) 161 5   · Home regimen dapagliflozin 5 mg daily, Basaglar 20 units HS and sliding scale with meals  · Start Lantus 15 units HS with sliding scale  · Diabetic diet

## 2022-10-25 NOTE — ASSESSMENT & PLAN NOTE
Wt Readings from Last 3 Encounters:   10/16/22 84 1 kg (185 lb 6 4 oz)   09/22/22 84 kg (185 lb 3 2 oz)   09/20/22 84 5 kg (186 lb 3 2 oz)     · 10/02/2022 patient was admitted to [facility] with chest pain and shortness of breath, found to have ejection fraction of 20-25% on echo, underwent right heart catheterization which showed elevated PCWP, left heart catheterization showed multivessel disease  Discharged with a LifeVest  · Baseline weight is around 180-185 lb  · Per brother for the past few days his weight was in the 190s, his weight today 196 lb  · On a discharge summary from 10/14/2022 patient was discharged on torsemide 40 mg daily as needed for weight gain  Shortly after, his torsemide was changed to 10 mg every other day per cardiology  · Patient does not seem overtly fluid overloaded, no JVD, lungs are clear to auscultation, no lower extremity edema  In no distress  · Check BNP, on admission at [facility] it was above 16,000  · Took torsemide 10 mg this morning  · Heart failure consult, appreciate recommendations    Discussed with them and we are going to give a dose of Lasix 20 mg IV, start hydralazine and isordil  · Daily weight

## 2022-10-25 NOTE — ASSESSMENT & PLAN NOTE
· Patient is a 44-year-old male with a past medical history of insulin-dependent diabetes, hypertension, hyperlipidemia, CKD stage 4, ischemic cardiomyopathy with ejection fraction 20-25% who presented to the hospital with generalized weakness, abdominal bloating weight gain in the past 2 days    · Per brother in the past few days his weight was in the 190s, his baseline is around 180- 185 lb  · Today on admission weight is 196 lbs on standing scale  · Suspect due to heart failure exacerbation  · See plan below

## 2022-10-25 NOTE — CONSULTS
Advanced Heart Failure / Pulmonary Hypertension Service Consultation    Mario Iqbal 74 y o  male  MRN: [id number]  Unit/Bed#: ED 12; Encounter: [id number]    Assessment:  Principal Problem:    Weight gain  Active Problems:    CKD (chronic kidney disease) stage 4, GFR 15-29 ml/min (HCC)    Bipolar affective disorder, mixed, moderate (HCC)    Ischemic cardiomyopathy    Type 2 diabetes mellitus, without long-term current use of insulin (HCC)    Acute on chronic combined systolic and diastolic heart failure (HCC)    Generalized weakness      HPI:   Mario Iqbal is a 44-year-old male who presented to [facility] on 10/25/22 with weight gain, generalized weakness, fatigue, and abdominal distention  Past medical history is significant for HFrEF, ischemic cardiomyopathy, DM type 2, hyperlipidemia, hypertension, CKD stage IIIb, normocytic anemia, hep C  He typically follows with [facility] cardiology and was recently discharged from [facility]   During his admission, his echo showed severely decreased systolic function with LVEF 20-25% with global hypokinesis  He had a right heart catheterization which showed elevated PCWP of 31 and a left heart catheterization which showed multivessel disease  He was evaluated as an outpatient by [facility] CT surgery, who felt he would be too high risk for CABG given low EF and renal dysfunction and recommended PCI  He also apparently developed anuria and required acute hemodialysis, thought to be secondary to either contrast and/or cardiorenal syndrome as per documentation from admission  His LAZARO continue to resolve during the course of his stay and he did not require long-term dialysis  Dry weight reportedly around 181 lb per patient  He was started on carvedilol 12 5 mg BID at [facility] and losartan was stopped given LAZARO  Of note, his Coreg was decreased to 6 25 mg BID in 9/2022 given hypotension while admitted to [facility]    Home diuretic regimen is torsemide 10mg every other day, reports taking this today  Home medications also include aspirin 80mg, atorvastatin 40mg, Farxiga 5 mg (which was also stopped in September, presumably due to LAZARO)  LifeVest ordered by [address] cardiology, he reports he has had it on for 4-5 days  Denies any alerts or shocks  Patient seen and examined  Denies any complaints to me at this time  Specifically denies LE edema, dyspnea, chest pain, or abdominal distention  Reports that his brother is his caretaker and gives him his medications, and sent him in today  Heart failure service was consulted for acute on chronic CHF  Patient follows with [facility] for outpatient cardiology and intends to keep following with them after discharge  Objective: Intake/ Output: 0/325 (-325ml)  Weight: 196 lb   Telemetry: not on tele    Today's Plan:  • Strict I/Os, daily weights, cardiac diet  • Keep K>4  • Does not appear severely volume overloaded - will provide one dose Lasix IV and reevaluate tomorrow   • Is moderately hypertensive here, will add isordil/hydralazine to regimen today to reduce afterload given low EF  • Would hold Farxiga at this time  • Troponin elevated on arrival, downtrending [id number]  • Initial concern for new T wave inversions in I and aVL, but these were present on EKG from July and seem to be intermittent for him     Plan:  Acute on Chronic HFrEF; LVEF 20-25%; NYHA II/III; ACC/AHA Stage C  Etiology: initially thought to be nonischemic, but recent left heart catheterization with evidence of multivessel disease (high-grade obstructive disease in the LAD with disease involving the right coronary branches and the distal circumflex as per CT surgery note)    TTE 04/24/2022: LVEF 30%  Global hypokinesis with distal LAD regional wall motion abnormality  Grade 2 diastolic dysfunction with elevated left atrial filling pressures  TTE 07/06/2022:  LVEF 40-50%    TTE 10/2022: LVEF 20-25%    NM stress test 04/26/2022:  Global hypokinesis  LVEF 29%  Fixed and severely decreased perfusion is seen in most of the apical and inferior wall most likely secondary to infarcts  No reversible perfusion defect suggestive of ischemia  Neurohormonal Blockade:  --Beta Blocker:  Coreg 12 5 mg BID  --SVR reduction: isordil 10/hydralazine 25 TID  --ARNi / ACEi / ARB: no  --Aldosterone Antagonist: no  --SGLT2 Inhibitor: Farxiga 5mg QD as outpatient, held here  --Home Diuretic: torsemide 10mg every other day  --Inpatient Diuretic: one time dose Lasix 20mg IV    Sudden Cardiac Death Risk Reduction:  --LVEF 20-25%, has LifeVest from [facility]    Electrical Resynchronization:  --Candidacy for BiV device:     Advanced Therapies (if appropriate): Will continue to monitor  CKD stage 4, GFR 15-29 ml/min   Required acute HD transiently at [facility] while admitted earlier this month secondary to anuria, but LAZARO resolved and was discharged without the need for long term dialysis  Cr 2 31 here today, seems to be at baseline of 2 0 to 2 5  Bipolar affective disorder, mixed, moderate   Type 2 diabetes mellitus, without long-term current use of insulin   Management per primary team  Generalized weakness  Likely multifactorial given recent hospitalizations and worsening cardiomyopathy    Past Medical History:   Diagnosis Date   • Anxiety    • Arthritis    • Coronary artery disease    • D[facility] ([facility] )    • Depression    • Diabetes mellitus ([facility] )    • Infectious viral hepatitis    • Memory loss    • Visual impairment        Review of Systems   Constitutional: Positive for fatigue and unexpected weight change  Negative for chills and fever  HENT: Negative for ear pain and sore throat  Eyes: Negative for pain and visual disturbance  Respiratory: Negative for cough, chest tightness and shortness of breath  Cardiovascular: Negative for chest pain, palpitations and leg swelling  Gastrointestinal: Negative for abdominal distention, abdominal pain and vomiting  Genitourinary: Negative for dysuria and hematuria  Musculoskeletal: Negative for arthralgias and back pain  Skin: Negative for color change and rash  Neurological: Negative for seizures and syncope  All other systems reviewed and are negative  14-point ROS completed and negative except as stated above and/or in the HPI          Current Facility-Administered Medications:   •  [START ON 10/26/2022] aspirin (ECOTRIN LOW STRENGTH) EC tablet 81 mg, 81 mg, Oral, Daily, Marah Vela MD  •  atorvastatin (LIPITOR) tablet 40 mg, 40 mg, Oral, HS, Marah Vela MD  •  carvedilol (COREG) tablet 12 5 mg, 12 5 mg, Oral, BID With Meals, Bo Schroeder MD  •  [START ON 10/26/2022] FLUoxetine (PROzac) capsule 20 mg, 20 mg, Oral, Daily, Marah Vela MD  •  furosemide (LASIX) injection 20 mg, 20 mg, Intravenous, Once, Marta Verde PA-C  •  gabapentin (NEURONTIN) capsule 300 mg, 300 mg, Oral, TID, Bo Schroeder MD  •  heparin (porcine) subcutaneous injection 5,000 Units, 5,000 Units, Subcutaneous, Q8H [address] **AND** [CANCELED] Platelet count, , , Once, Bo Schroeder MD  •  hydrALAZINE (APRESOLINE) tablet 25 mg, 25 mg, Oral, Q8H, Marta Verde PA-C  •  insulin lispro (HumaLOG) 100 units/mL subcutaneous injection 1-6 Units, 1-6 Units, Subcutaneous, TID AC **AND** Fingerstick Glucose (POCT), , , TID AC, Marah Vela MD  •  isosorbide dinitrate (ISORDIL) tablet 10 mg, 10 mg, Oral, Q8H, Marta Verde PA-C  •  QUEtiapine (SEROquel) tablet 100 mg, 100 mg, Oral, HS, Marah Vela MD    Current Outpatient Medications:   •  Acetaminophen Extra Strength 500 MG tablet, Take 500 mg by mouth every 6 (six) hours as needed, Disp: , Rfl:   •  Aspirin Low Dose 81 MG EC tablet, Take 81 mg by mouth daily, Disp: , Rfl:   •  atorvastatin (LIPITOR) 40 mg tablet, Take 40 mg by mouth daily at bedtime, Disp: , Rfl:   •  carvedilol (COREG) 12 5 mg tablet, Take 0 5 tablets (6 25 mg total) by mouth 2 (two) times a day with meals, Disp: 30 tablet, Rfl: 0  •  carvedilol (COREG) 12 5 mg tablet, Take 12 5 mg by mouth 2 (two) times a day with meals  Indications: Cardiac Failure, Disp: , Rfl:   •  Dapagliflozin Propanediol (Farxiga) 5 MG TABS, Take 5 mg by mouth daily  Indications: Type 2 Diabetes, Disp: , Rfl:   •  Disposable Gloves MISC, Use daily Large, Disp: 100 each, Rfl: 0  •  FLUoxetine (PROzac) 20 mg capsule, Take 1 capsule (20 mg total) by mouth daily, Disp: 90 capsule, Rfl: 1  •  FLUoxetine (PROzac) 40 MG capsule, Take 40 mg by mouth every morning, Disp: , Rfl:   •  folic acid (FOLVITE) 1 mg tablet, Take 1 tablet (1 mg total) by mouth daily (Patient taking differently: Take 400 mcg by mouth daily), Disp: , Rfl: 0  •  gabapentin (Neurontin) 300 mg capsule, Take 1 capsule (300 mg total) by mouth 3 (three) times a day, Disp: 90 capsule, Rfl: 0  •  gabapentin (NEURONTIN) 400 mg capsule, Take 400 mg by mouth 3 (three) times a day, Disp: , Rfl:   •  glucose blood (Accu-Chek Guide) test strip, Test sugars four times daily, Disp: 400 each, Rfl: 1  •  Incontinence Supply Disposable (Disposable Brief Large) MISC, Use daily, Disp: 72 each, Rfl: 0  •  Incontinence Supply Disposable (RA Adult Wipes) MISC, Use in the morning, Disp: 128 each, Rfl: 0  •  Incontinence Supply Disposable MISC, Use daily XL Disposable Bed Incontinence Pads, Disp: 100 each, Rfl: 0  •  INSULIN ASPART FLEXPEN SC, Inject 30 Units under the skin daily before breakfast  30 units in AM with breakfast and 10 units at Dinner  Indications: Type 2 Diabetes, Disp: , Rfl:   •  insulin aspart protamine-insulin aspart (NovoLOG 70/30) 100 units/mL injection, Inject under the skin, Disp: , Rfl:   •  Insulin Glargine Solostar (Basaglar KwikPen) 100 UNIT/ML SOPN, Inject 2 Units into a muscle daily  Indications: Type 2 Diabetes, Disp: , Rfl:   •  Insulin Glargine Solostar 100 UNIT/ML SOPN, Inject 20 Units under the skin in the morning   Indications: Type 2 Diabetes, Disp: , Rfl:   •  Lancet Devices (OneTouch Delica Plus Lancing) MISC, Use 1 Dose in the morning Use as directed , Disp: , Rfl:   •  Melatonin 5 MG TABS, Take 5 mg by mouth daily at bedtime as needed (insomnia)  Indications: Trouble Sleeping, Disp: , Rfl:   •  QUEtiapine (SEROquel) 100 mg tablet, Take 100 mg by mouth daily at bedtime  Indications: Major Depressive Disorder, Disp: , Rfl:   •  QUEtiapine (SEROquel) 25 mg tablet, Take 3 tablets (75 mg total) by mouth daily at bedtime, Disp: 90 tablet, Rfl: 0  •  Skin Protectants, Misc  (dimethicone-zinc oxide) cream, Apply topically 2 (two) times a day as needed for irritation, Disp: 142 g, Rfl: 2  •  thiamine (VITAMIN B1) 100 mg tablet, Take 1 tablet (100 mg total) by mouth daily, Disp: , Rfl: 0  •  torsemide (DEMADEX) 20 mg tablet, Take 10 mg by mouth every other day 1/2 pill every other day for weight greather than 180lbs, Disp: , Rfl:     No Known Allergies  Social History     Socioeconomic History   • Marital status: Single     Spouse name: Not on file   • Number of children: Not on file   • Years of education: Not on file   • Highest education level: Not on file   Occupational History   • Not on file   Tobacco Use   • Smoking status: Former Smoker   • Smokeless tobacco: Never Used   Vaping Use   • Vaping Use: Never used   Substance and Sexual Activity   • Alcohol use: Not Currently     Comment: socially   • Drug use: Never   • Sexual activity: Not on file   Other Topics Concern   • Not on file   Social History Narrative   • Not on file     Social Determinants of Health     Financial Resource Strain: Not on file   Food Insecurity: No Food Insecurity   • Worried About Running Out of Food in the Last Year: Never true   • Ran Out of Food in the Last Year: Never true   Transportation Needs: No Transportation Needs   • Lack of Transportation (Medical): No   • Lack of Transportation (Non-Medical):  No   Physical Activity: Not on file   Stress: Not on file   Social Connections: Not on file   Intimate Partner Violence: Not on file   Housing Stability: Low Risk    • Unable to Pay for Housing in the Last Year: No   • Number of Places Lived in the Last Year: 1   • Unstable Housing in the Last Year: No     History reviewed  No pertinent family history  Vitals:  Blood pressure 166/92, pulse 72, temperature 98 °F (36 7 °C), temperature source Oral, resp  rate 16, weight 89 5 kg (197 lb 5 oz), SpO2 100 %  Body mass index is 33 87 kg/m² (pended)  No intake/output data recorded  Wt Readings from Last 10 Encounters:   10/25/22 89 5 kg (197 lb 5 oz)   10/16/22 84 1 kg (185 lb 6 4 oz)   09/22/22 84 kg (185 lb 3 2 oz)   09/20/22 84 5 kg (186 lb 3 2 oz)   09/14/22 (P) 84 4 kg (186 lb)   09/07/22 82 9 kg (182 lb 12 8 oz)   08/11/22 80 9 kg (178 lb 6 4 oz)   07/28/22 78 5 kg (173 lb)   07/26/22 80 kg (176 lb 5 9 oz)   07/20/22 84 6 kg (186 lb 8 2 oz)       Vitals:    10/25/22 1315 10/25/22 1538 10/25/22 1545 10/25/22 1615   BP: 170/94 166/92 166/92    BP Location:   Right arm    Pulse: 70 70 72    Resp: 13 18 16    Temp:       TempSrc:       SpO2: 100% 99% 100%    Weight:    89 5 kg (197 lb 5 oz)       Physical Exam  Constitutional:       General: He is not in acute distress  Appearance: Normal appearance  He is not toxic-appearing  HENT:      Head: Normocephalic  Nose: Nose normal       Mouth/Throat:      Mouth: Mucous membranes are moist    Eyes:      Conjunctiva/sclera: Conjunctivae normal    Neck:      Vascular: No JVD  Comments: JVP at the clavicle   Cardiovascular:      Rate and Rhythm: Normal rate and regular rhythm  Pulmonary:      Effort: Pulmonary effort is normal       Breath sounds: Normal breath sounds  Abdominal:      Palpations: Abdomen is soft  Tenderness: There is no abdominal tenderness  There is no guarding  Musculoskeletal:      Cervical back: Neck supple  Right lower leg: No edema  Left lower leg: No edema     Skin: General: Skin is dry  Neurological:      General: No focal deficit present  Mental Status: He is alert and oriented to person, place, and time     Psychiatric:         Mood and Affect: Mood normal          Behavior: Behavior normal          Labs & Results:      Results from last 7 days   Lab Units 10/25/22  1103   WBC Thousand/uL 9 13   HEMOGLOBIN g/dL 10 3*   HEMATOCRIT % 32 8*   PLATELETS Thousands/uL 156         Results from last 7 days   Lab Units 10/25/22  1103   POTASSIUM mmol/L 5 0   CHLORIDE mmol/L 116*   CO2 mmol/L 19*   BUN mg/dL 45*   CREATININE mg/dL 2 31*   CALCIUM mg/dL 8 7   ALK PHOS U/L 147*   ALT U/L 86*   AST U/L 53*           Lila Wright PA-C

## 2022-10-25 NOTE — H&P
[address] [facility]  H&P- Yessica Pinon 1955, 74 y o  male MRN: [id number]  Unit/Bed#: ED 12 Encounter: [id number]  Primary Care Provider: Marti Frey DO   Date and time admitted to hospital: 10/25/2022 10:22 AM    * Weight gain  Assessment & Plan  · Patient is a 29-year-old male with a past medical history of insulin-dependent diabetes, hypertension, hyperlipidemia, CKD stage 4, ischemic cardiomyopathy with ejection fraction 20-25% who presented to the hospital with generalized weakness, abdominal bloating weight gain in the past 2 days  · Per brother in the past few days his weight was in the 190s, his baseline is around 180- 185 lb  · Today on admission weight is 196 lbs on standing scale  · Suspect due to heart failure exacerbation  · See plan below      Generalized weakness  Assessment & Plan  · Multifactorial, due to recent hospitalization, ischemic cardiomyopathy, heart failure exacerbation  · Check for COVID    Acute on chronic combined systolic and diastolic heart failure (HCC)  Assessment & Plan  Wt Readings from Last 3 Encounters:   10/16/22 84 1 kg (185 lb 6 4 oz)   09/22/22 84 kg (185 lb 3 2 oz)   09/20/22 84 5 kg (186 lb 3 2 oz)     · 10/02/2022 patient was admitted to [facility] with chest pain and shortness of breath, found to have ejection fraction of 20-25% on echo, underwent right heart catheterization which showed elevated PCWP, left heart catheterization showed multivessel disease  Discharged with a LifeVest  · Baseline weight is around 180-185 lb  · Per brother for the past few days his weight was in the 190s, his weight today 196 lb  · On a discharge summary from 10/14/2022 patient was discharged on torsemide 40 mg daily as needed for weight gain   Shortly after, his torsemide was changed to 10 mg every other day per cardiology  · Patient does not seem overtly fluid overloaded, no JVD, lungs are clear to auscultation, no lower extremity edema  In no distress  · Check BNP, on admission at [facility] it was above 16,000  · Took torsemide 10 mg this morning  · Heart failure consult, appreciate recommendations  Discussed with them and we are going to give a dose of Lasix 20 mg IV, start hydralazine and isordil  · Daily weight        Ischemic cardiomyopathy  Assessment & Plan  · Echo showed ejection fraction 20-25%, left heart catheterization showed multivessel disease requiring CABG eval  · Evaluated by CT surgery 10/24/2022, recommended outpatient PCI, high risk for CABG due to low ejection fraction and renal dysfunction  He has high-grade obstructive disease in LAD, disease, in 1 of the right coronary branches and the distal circumflex  Per CT surgery CABG to the LAD would be an option but he is high risk, distal circumflex and right coronary branches are not am unable to CABG due to small size  · Patient has new T-wave inversions (could have been present at [facility] earlier this month)  Denies chest pain or shortness of breath, reports fatigue with activities  · Elevated troponin  · Cardiology consult  · Continue aspirin, Lipitor, Coreg    CKD (chronic kidney disease) stage 4, GFR 15-29 ml/min [facility])  Assessment & Plan  Lab Results   Component Value Date    EGFR 28 10/25/2022    EGFR 22 10/17/2022    EGFR 31 09/15/2022    CREATININE 2 31 (H) 10/25/2022    CREATININE 2 78 (H) 10/17/2022    CREATININE 2 13 (H) 09/15/2022   · Patient was admitted to [facility] found to have ischemic cardiomyopathy underwent right heart catheterization and left heart catheterization which showed multivessel disease   Developed anuria requiring temporary hemodialysis  · Creatinine currently seems to be at baseline     Type 2 diabetes mellitus, without long-term current use of insulin [facility])  Assessment & Plan  Lab Results   Component Value Date    HGBA1C 7 7 (H) 10/03/2022       Recent Labs     10/25/22  1037 10/25/22  1453   POCGLU 126 197*       Blood Sugar Average: Last 72 hrs:  (P) 161 5   · Home regimen dapagliflozin 5 mg daily, Basaglar 20 units HS and sliding scale with meals  · Start Lantus 15 units HS with sliding scale  · Diabetic diet    Bipolar affective disorder, mixed, moderate (HCC)  Assessment & Plan  · Continue Prozac and Seroquel    VTE Pharmacologic Prophylaxis: VTE Score: 3 Moderate Risk (Score 3-4) - Pharmacological DVT Prophylaxis Ordered: heparin  Code Status: Level 1 - Full Code   Discussion with family: Updated  (brother) via phone  Anticipated Length of Stay: Patient will be admitted on an observation basis with an anticipated length of stay of less than 2 midnights secondary to Mild heart failure, elevated troponin  Total Time for Visit, including Counseling / Coordination of Care: 60 minutes Greater than 50% of this total time spent on direct patient counseling and coordination of care  Chief Complaint:  Generalized weakness, abdominal bloating    History of Present Illness:  Colette Isaac is a 74 y o  male with a PMH of insulin-dependent diabetes, CKD stage 4, hyperlipidemia, hypertension, ischemic cardiomyopathy who presents with abdominal bloating and 4 lb weight gain  Patient was admitted October 2nd to [facility] with shortness of breath and chest pain  Echo showed ejection fraction of 20-25%, right heart catheterization showed elevated PCWP, left heart catheterization showed multivessel disease requiring CABG eval   He became aneuric and required temporary hemodialysis while admitted  He was seen by CT surgery 10/24  He has high-grade obstructive disease in LAD, disease in 1 of the right coronary branches and the distal circumflex, per CT surgery CABG to the LAD would be an option but he is high risk for complications due to low ejection fraction and renal dysfunction    CT surgery recommended OP PCI to LAD, his distal circumflex and right coronary branches are not amenable to CABG due to small size  He was discharged with a LifeVest   Per discharge summary he was on torsemide 40 mg as needed for weight gain  Shortly after his torsemide per Cardiology was decreased to 10 mg every other day  Patient presented to the hospital today due to abdominal bloating and weight gain  Yesterday CT surgery his weight was 191  Patient denies any chest pain, shortness of breath, lower extremity edema  Able to lay flat, no respiratory distress  Complaining of being fatigued, generalized weakness  Has regular bowel movements, no blood in the stool or melena  Has good urine output  Review of Systems:  Review of Systems   Constitutional: Positive for activity change and fatigue  HENT: Negative  Eyes: Negative  Respiratory: Negative for chest tightness and shortness of breath  Cardiovascular: Negative for chest pain, palpitations and leg swelling  Gastrointestinal: Negative for abdominal pain, blood in stool, constipation, diarrhea and nausea  Abdominal bloating   Genitourinary: Negative  Musculoskeletal: Negative  Skin: Negative  Neurological: Negative  Hematological: Negative  Psychiatric/Behavioral: Negative  Past Medical and Surgical History:   Past Medical History:   Diagnosis Date   • Anxiety    • Arthritis    • Coronary artery disease    • Dementia ([facility] )    • Depression    • Diabetes mellitus ([facility] )    • Infectious viral hepatitis    • Memory loss    • Visual impairment        History reviewed  No pertinent surgical history  Meds/Allergies:  Prior to Admission medications    Medication Sig Start Date End Date Taking?  Authorizing Provider   Acetaminophen Extra Strength 500 MG tablet Take 500 mg by mouth every 6 (six) hours as needed 4/29/22   Historical Provider, MD   Aspirin Low Dose 81 MG EC tablet Take 81 mg by mouth daily 4/29/22   Historical Provider, MD   atorvastatin (LIPITOR) 40 mg tablet Take 40 mg by mouth daily at bedtime 4/29/22 Historical Provider, MD   carvedilol (COREG) 12 5 mg tablet Take 0 5 tablets (6 25 mg total) by mouth 2 (two) times a day with meals 9/7/22 10/7/22  Margarita Caldwell DO   carvedilol (COREG) 12 5 mg tablet Take 12 5 mg by mouth 2 (two) times a day with meals  Indications: Cardiac Failure    Historical Provider, MD   Dapagliflozin Propanediol (Farxiga) 5 MG TABS Take 5 mg by mouth daily   Indications: Type 2 Diabetes    Historical Provider, MD   Disposable Gloves MISC Use daily Large 7/27/22   Leonor Sahni PA-C   FLUoxetine (PROzac) 20 mg capsule Take 1 capsule (20 mg total) by mouth daily 6/14/22   Laverna Barthel, PA-C   FLUoxetine (PROzac) 40 MG capsule Take 40 mg by mouth every morning 8/29/22   Historical Provider, MD   folic acid (FOLVITE) 1 mg tablet Take 1 tablet (1 mg total) by mouth daily  Patient taking differently: Take 400 mcg by mouth daily 6/22/22   Brandon Cummings MD   gabapentin (Neurontin) 300 mg capsule Take 1 capsule (300 mg total) by mouth 3 (three) times a day 7/28/22   Laverna Barthel, PA-C   gabapentin (NEURONTIN) 400 mg capsule Take 400 mg by mouth 3 (three) times a day 8/29/22   Historical Provider, MD   glucose blood (Accu-Chek Guide) test strip Test sugars four times daily 7/15/22   Laverna Barthel, PA-C   Incontinence Supply Disposable (Disposable Brief Large) MISC Use daily 7/27/22   Pina Wallace PA-C   Incontinence Supply Disposable (RA Adult Wipes) MISC Use in the morning 7/27/22   Leonor Sahni PA-C   Incontinence Supply Disposable MISC Use daily XL Disposable Bed Incontinence Pads 7/27/22   Leonor Sahni PA-C   INSULIN ASPART FLEXPEN SC Inject 30 Units under the skin daily before breakfast  30 units in AM with breakfast and 10 units at [facility]  Indications: Type 2 Diabetes    Historical Provider, MD   insulin aspart protamine-insulin aspart (NovoLOG 70/30) 100 units/mL injection Inject under the skin    Historical Provider, MD   Insulin Glargine Solostar (Basaglar KwikPen) 100 UNIT/ML SOPN Inject 2 Units into a muscle daily  Indications: Type 2 Diabetes    Historical Provider, MD   Insulin Glargine Solostar 100 UNIT/ML SOPN Inject 20 Units under the skin in the morning  Indications: Type 2 Diabetes    Historical Provider, MD   Lancet Devices (OneTouch Delica Plus [place]) MISC Use 1 Dose in the morning Use as directed  4/30/22   Historical Provider, MD   Melatonin 5 MG TABS Take 5 mg by mouth daily at bedtime as needed (insomnia)  Indications: Trouble Sleeping    Historical Provider, MD   QUEtiapine (SEROquel) 100 mg tablet Take 100 mg by mouth daily at bedtime  Indications: Major Depressive Disorder    Historical Provider, MD   QUEtiapine (SEROquel) 25 mg tablet Take 3 tablets (75 mg total) by mouth daily at bedtime 9/7/22 10/7/22  Linell Seat Minn[place], DO   Skin Protectants, Misc  (dimethicone-zinc oxide) cream Apply topically 2 (two) times a day as needed for irritation 7/28/22   Jeevan Choudhury PA-C   thiamine (VITAMIN B1) 100 mg tablet Take 1 tablet (100 mg total) by mouth daily 6/22/22   Carina Mckeon MD   torsemide (DEMADEX) 20 mg tablet Take 10 mg by mouth every other day 1/2 pill every other day for weight greather than 180lbs 9/27/22 9/27/23  Historical Provider, MD     I have reviewed home medications using recent Epic encounter  Allergies: No Known Allergies    Social History:  Marital Status: Single   Substance Use History:   Social History     Substance and Sexual Activity   Alcohol Use Not Currently    Comment: socially     Social History     Tobacco Use   Smoking Status Former Smoker   Smokeless Tobacco Never Used     Social History     Substance and Sexual Activity   Drug Use Never       Family History:  History reviewed  No pertinent family history      Physical Exam:     Vitals:   Blood Pressure: 166/92 (10/25/22 1545)  Pulse: 72 (10/25/22 1545)  Temperature: 98 °F (36 7 °C) (10/25/22 1043)  Temp Source: Oral (10/25/22 1043)  Respirations: 16 (10/25/22 1545)  Weight - Scale: 89 5 kg (197 lb 5 oz) (10/25/22 1615)  SpO2: 100 % (10/25/22 1545)    Physical Exam  Constitutional:       General: He is not in acute distress  HENT:      Head: Atraumatic  Cardiovascular:      Rate and Rhythm: Normal rate and regular rhythm  Heart sounds: No murmur heard  No friction rub  No gallop  Pulmonary:      Effort: Pulmonary effort is normal  No respiratory distress  Breath sounds: Normal breath sounds  No wheezing  Abdominal:      General: Bowel sounds are normal       Palpations: Abdomen is soft  Tenderness: There is no abdominal tenderness  Comments: Mild abdominal distension   Musculoskeletal:         General: No swelling  Cervical back: Neck supple  Skin:     General: Skin is warm and dry  Neurological:      General: No focal deficit present  Mental Status: He is alert  Psychiatric:         Mood and Affect: Mood normal           Additional Data:     Lab Results:  Results from last 7 days   Lab Units 10/25/22  1103   WBC Thousand/uL 9 13   HEMOGLOBIN g/dL 10 3*   HEMATOCRIT % 32 8*   PLATELETS Thousands/uL 156   NEUTROS PCT % 66   LYMPHS PCT % 20   MONOS PCT % 8   EOS PCT % 5     Results from last 7 days   Lab Units 10/25/22  1103   SODIUM mmol/L 142   POTASSIUM mmol/L 5 0   CHLORIDE mmol/L 116*   CO2 mmol/L 19*   BUN mg/dL 45*   CREATININE mg/dL 2 31*   ANION GAP mmol/L 7   CALCIUM mg/dL 8 7   ALBUMIN g/dL 2 7*   TOTAL BILIRUBIN mg/dL 0 26   ALK PHOS U/L 147*   ALT U/L 86*   AST U/L 53*   GLUCOSE RANDOM mg/dL 108         Results from last 7 days   Lab Units 10/25/22  1550 10/25/22  1453 10/25/22  1037   POC GLUCOSE mg/dl 183* 197* 126               Imaging: Reviewed radiology reports from this admission including: chest xray  CT head without contrast   Final Result by Noy Mabry MD (10/25 1230)      No acute intracranial abnormality                    Workstation performed: [id number]         XR chest 1 view portable   ED Interpretation by Brando Titus MD (10/25 6721)   No acute cardiopulm disease      Final Result by Nayla Burleson MD (10/25 3177)      No acute cardiopulmonary disease in the visualized chest                   Workstation performed: [id number]             EKG and Other Studies Reviewed on Admission:   · EKG: T-wave inversion in lead 1 and aVL  ** Please Note: This note has been constructed using a voice recognition system   **

## 2022-10-25 NOTE — ASSESSMENT & PLAN NOTE
· Multifactorial, due to recent hospitalization, ischemic cardiomyopathy, heart failure exacerbation  · Check for COVID

## 2022-10-25 NOTE — ED PROVIDER NOTES
History  Chief Complaint   Patient presents with   • Weakness - Generalized     Has a heart pack on  Pt complains of being bloated  Weakness and fatigue  75 y/o male patient with hx of CKD, CHF, DM presenting with generalized wakness and fatigue onset this morning  Per brother, patient has been incoherent  Patient has been gainng weight  Patient was somnolent after breakfast and had low sugar at home  Denies chest pain, Abd apin, SOB, fevers, urinary symptoms  Patient has a life vest  States he just feels weakness  States was recently dsicharged from [place] for chest pain  Hx limited secondary to patient's mental status  Prior to Admission Medications   Prescriptions Last Dose Informant Patient Reported? Taking? Acetaminophen Extra Strength 500 MG tablet   Yes No   Sig: Take 500 mg by mouth every 6 (six) hours as needed   Aspirin Low Dose 81 MG EC tablet   Yes No   Sig: Take 81 mg by mouth daily   Dapagliflozin Propanediol (Farxiga) 5 MG TABS   Yes No   Sig: Take 5 mg by mouth daily  Indications: Type 2 Diabetes   Disposable Gloves MISC   No No   Sig: Use daily Large   FLUoxetine (PROzac) 20 mg capsule   No No   Sig: Take 1 capsule (20 mg total) by mouth daily   FLUoxetine (PROzac) 40 MG capsule   Yes No   Sig: Take 40 mg by mouth every morning   INSULIN ASPART FLEXPEN SC   Yes No   Sig: Inject 30 Units under the skin daily before breakfast  30 units in AM with breakfast and 10 units at Dinner  Indications: Type 2 Diabetes   Incontinence Supply Disposable (Disposable Brief Large) MISC   No No   Sig: Use daily   Incontinence Supply Disposable (RA Adult Wipes) MISC   No No   Sig: Use in the morning   Incontinence Supply Disposable MISC   No No   Sig: Use daily XL Disposable Bed Incontinence Pads   Insulin Glargine Solostar (Basaglar KwikPen) 100 UNIT/ML SOPN   Yes No   Sig: Inject 2 Units into a muscle daily   Indications: Type 2 Diabetes   Insulin Glargine Solostar 100 UNIT/ML SOPN   Yes No Sig: Inject 20 Units under the skin in the morning  Indications: Type 2 Diabetes   Lancet Devices (OneTouch Delica Plus Lancing) MISC   Yes No   Sig: Use 1 Dose in the morning Use as directed    Melatonin 5 MG TABS   Yes No   Sig: Take 5 mg by mouth daily at bedtime as needed (insomnia)  Indications: Trouble Sleeping   QUEtiapine (SEROquel) 100 mg tablet   Yes No   Sig: Take 100 mg by mouth daily at bedtime  Indications: Major Depressive Disorder   QUEtiapine (SEROquel) 25 mg tablet   No No   Sig: Take 3 tablets (75 mg total) by mouth daily at bedtime   Skin Protectants, Misc  (dimethicone-zinc oxide) cream   No No   Sig: Apply topically 2 (two) times a day as needed for irritation   atorvastatin (LIPITOR) 40 mg tablet   Yes No   Sig: Take 40 mg by mouth daily at bedtime   carvedilol (COREG) 12 5 mg tablet   No No   Sig: Take 0 5 tablets (6 25 mg total) by mouth 2 (two) times a day with meals   carvedilol (COREG) 12 5 mg tablet   Yes No   Sig: Take 12 5 mg by mouth 2 (two) times a day with meals   Indications: Cardiac Failure   folic acid (FOLVITE) 1 mg tablet   No No   Sig: Take 1 tablet (1 mg total) by mouth daily   Patient taking differently: Take 400 mcg by mouth daily   gabapentin (NEURONTIN) 400 mg capsule   Yes No   Sig: Take 400 mg by mouth 3 (three) times a day   gabapentin (Neurontin) 300 mg capsule   No No   Sig: Take 1 capsule (300 mg total) by mouth 3 (three) times a day   glucose blood (Accu-Chek Guide) test strip   No No   Sig: Test sugars four times daily   insulin aspart protamine-insulin aspart (NovoLOG 70/30) 100 units/mL injection   Yes No   Sig: Inject under the skin   thiamine (VITAMIN B1) 100 mg tablet   No No   Sig: Take 1 tablet (100 mg total) by mouth daily   torsemide (DEMADEX) 20 mg tablet   Yes No   Sig: Take 10 mg by mouth every other day 1/2 pill every other day for weight greather than 180lbs      Facility-Administered Medications: None       Past Medical History:   Diagnosis Date • Anxiety    • Arthritis    • Coronary artery disease    • Dementia (HCC)    • Depression    • Diabetes mellitus ([facility] [address] )    • Infectious viral hepatitis    • Memory loss    • Visual impairment        History reviewed  No pertinent surgical history  History reviewed  No pertinent family history  I have reviewed and agree with the history as documented  E-Cigarette/Vaping   • E-Cigarette Use Never User      E-Cigarette/Vaping Substances     Social History     Tobacco Use   • Smoking status: Former Smoker   • Smokeless tobacco: Never Used   Vaping Use   • Vaping Use: Never used   Substance Use Topics   • Alcohol use: Not Currently     Comment: socially   • Drug use: Never        Review of Systems   Constitutional: Positive for activity change and fatigue  Negative for chills and fever  HENT: Negative for congestion, rhinorrhea and sore throat  Eyes: Negative for pain and visual disturbance  Respiratory: Negative for cough, chest tightness and shortness of breath  Cardiovascular: Negative for chest pain and leg swelling  Gastrointestinal: Negative for abdominal pain, diarrhea, nausea and vomiting  Genitourinary: Negative for difficulty urinating and dysuria  Musculoskeletal: Negative for arthralgias, back pain and myalgias  Skin: Negative for rash and wound  Neurological: Positive for weakness (generalized weakness)  Negative for dizziness and headaches  All other systems reviewed and are negative        Physical Exam  ED Triage Vitals [10/25/22 1043]   Temperature Pulse Respirations Blood Pressure SpO2   98 °F (36 7 °C) 67 18 104/60 98 %      Temp Source Heart Rate Source Patient Position - Orthostatic VS BP Location FiO2 (%)   Oral Monitor Lying Right arm --      Pain Score       No Pain             Orthostatic Vital Signs  Vitals:    10/25/22 1538 10/25/22 1545 10/25/22 1812 10/25/22 1923   BP: 166/92 166/92 144/80 140/85   Pulse: 70 72 77 79   Patient Position - Orthostatic VS:  Lying Physical Exam  Vitals reviewed  Constitutional:       Comments: Appears somnolent   HENT:      Head: Normocephalic and atraumatic  Nose: Nose normal       Mouth/Throat:      Mouth: Mucous membranes are moist       Pharynx: Oropharynx is clear  Eyes:      Extraocular Movements: Extraocular movements intact  Conjunctiva/sclera: Conjunctivae normal    Cardiovascular:      Rate and Rhythm: Normal rate and regular rhythm  Pulses: Normal pulses  Heart sounds: Normal heart sounds  Pulmonary:      Effort: Pulmonary effort is normal       Breath sounds: Normal breath sounds  Abdominal:      General: Bowel sounds are normal       Palpations: Abdomen is soft  Tenderness: There is no abdominal tenderness  Musculoskeletal:         General: Normal range of motion  Cervical back: Normal range of motion  Skin:     General: Skin is warm and dry  Neurological:      General: No focal deficit present  Mental Status: He is alert and oriented to person, place, and time  Mental status is at baseline  Cranial Nerves: No cranial nerve deficit  Sensory: No sensory deficit  Motor: No weakness  Coordination: Coordination normal       Comments:  Answering all commands but falling asleep during conversation         ED Medications  Medications   aspirin (ECOTRIN LOW STRENGTH) EC tablet 81 mg (has no administration in time range)   atorvastatin (LIPITOR) tablet 40 mg (has no administration in time range)   carvedilol (COREG) tablet 12 5 mg (12 5 mg Oral Given 10/25/22 1820)   FLUoxetine (PROzac) capsule 20 mg (has no administration in time range)   gabapentin (NEURONTIN) capsule 300 mg (300 mg Oral Given 10/25/22 1737)   QUEtiapine (SEROquel) tablet 100 mg (has no administration in time range)   heparin (porcine) subcutaneous injection 5,000 Units (5,000 Units Subcutaneous Given 10/25/22 1738)   insulin lispro (HumaLOG) 100 units/mL subcutaneous injection 1-6 Units (1 Units Subcutaneous Given 10/25/22 1738)   isosorbide dinitrate (ISORDIL) tablet 10 mg (has no administration in time range)   hydrALAZINE (APRESOLINE) tablet 25 mg (25 mg Oral Given 10/25/22 1820)   furosemide (LASIX) injection 20 mg (20 mg Intravenous Given 10/25/22 1820)       Diagnostic Studies  Results Reviewed     Procedure Component Value Units Date/Time    NT-BNP PRO [[id number]]  (Abnormal) Collected: 10/25/22 1103    Lab Status: Final result Specimen: Blood from Arm, Left Updated: 10/25/22 1622     NT-proBNP 7,551 pg/mL     COVID/FLU/RSV [[id number]]  (Normal) Collected: 10/25/22 1447    Lab Status: Final result Specimen: Nares from Nose Updated: 10/25/22 1551     SARS-CoV-2 Negative     INFLUENZA A PCR Negative     INFLUENZA B PCR Negative     RSV PCR Negative    Narrative:      FOR PEDIATRIC PATIENTS - copy/paste COVID Guidelines URL to browser: [email or web address]  [employer]    SARS-CoV-2 assay is a Nucleic Acid Amplification assay intended for the  qualitative detection of nucleic acid from SARS-CoV-2 in nasopharyngeal  swabs  Results are for the presumptive identification of SARS-CoV-2 RNA  Positive results are indicative of infection with SARS-CoV-2, the virus  causing COVID-19, but do not rule out bacterial infection or co-infection  with other viruses  Laboratories within the [place] and its  territories are required to report all positive results to the appropriate  public health authorities  Negative results do not preclude SARS-CoV-2  infection and should not be used as the sole basis for treatment or other  patient management decisions  Negative results must be combined with  clinical observations, patient history, and epidemiological information  This test has not been FDA cleared or approved  This test has been authorized by FDA under an Emergency Use Authorization  (EUA)   This test is only authorized for the duration of time the  declaration that circumstances exist justifying the authorization of the  emergency use of an in vitro diagnostic tests for detection of SARS-CoV-2  virus and/or diagnosis of COVID-19 infection under section 564(b)(1) of  the Act, 21 U  S C  692ULD-4(M)(8), unless the authorization is terminated  or revoked sooner  The test has been validated but independent review by FDA  and CLIA is pending  Test performed using [employer] GeneXpert: This RT-PCR assay targets N2,  a region unique to SARS-CoV-2  A conserved region in the E-gene was chosen  for pan-Sarbecovirus detection which includes SARS-CoV-2  According to CMS-2020-01-R, this platform meets the definition of high-throughput technology      Fingerstick Glucose (POCT) [[id number]]  (Abnormal) Collected: 10/25/22 1550    Lab Status: Final result Updated: 10/25/22 1550     POC Glucose 183 mg/dl     HS Troponin I 4hr [[id number]]  (Abnormal) Collected: 10/25/22 1455    Lab Status: Final result Specimen: Blood from Arm, Left Updated: 10/25/22 1536     hs TnI 4hr 134 ng/L      Delta 4hr hsTnI -22 ng/L     Fingerstick Glucose (POCT) [[id number]]  (Abnormal) Collected: 10/25/22 1453    Lab Status: Final result Updated: 10/25/22 1457     POC Glucose 197 mg/dl     Urine Microscopic [[id number]]  (Abnormal) Collected: 10/25/22 1314    Lab Status: Final result Specimen: Urine, Clean Catch Updated: 10/25/22 1354     RBC, UA 1-2 /hpf      WBC, UA 1-2 /hpf      Epithelial Cells Occasional /hpf      Bacteria, UA None Seen /hpf      Hyaline Casts, UA 3-5 /lpf     HS Troponin I 2hr [[id number]]  (Abnormal) Collected: 10/25/22 1253    Lab Status: Final result Specimen: Blood from Arm, Left Updated: 10/25/22 1344     hs TnI 2hr 149 ng/L      Delta 2hr hsTnI -7 ng/L     Urine Macroscopic, POC [[id number]]  (Abnormal) Collected: 10/25/22 1314    Lab Status: Final result Specimen: Urine Updated: 10/25/22 1316     Color, UA Yellow     Clarity, UA Clear     pH, UA 5 5     Leukocytes, UA Negative Nitrite, UA Negative     Protein,  (2+) mg/dl      Glucose, UA Negative mg/dl      Ketones, UA Negative mg/dl      Urobilinogen, UA 0 2 E U /dl      Bilirubin, UA Negative     Occult Blood, UA Trace     Specific [place], UA 1 025    Narrative:      CLINITEK RESULT    Comprehensive metabolic panel [[id number]]  (Abnormal) Collected: 10/25/22 1103    Lab Status: Final result Specimen: Blood from Arm, Left Updated: 10/25/22 1148     Sodium 142 mmol/L      Potassium 5 0 mmol/L      Chloride 116 mmol/L      CO2 19 mmol/L      ANION GAP 7 mmol/L      BUN 45 mg/dL      Creatinine 2 31 mg/dL      Glucose 108 mg/dL      Calcium 8 7 mg/dL      Corrected Calcium 9 7 mg/dL      AST 53 U/L      ALT 86 U/L      Alkaline Phosphatase 147 U/L      Total Protein 7 6 g/dL      Albumin 2 7 g/dL      Total Bilirubin 0 26 mg/dL      eGFR 28 ml/min/1 73sq m     Narrative:      [place] guidelines for Chronic Kidney Disease (CKD):   •  Stage 1 with normal or high GFR (GFR > 90 mL/min/1 73 square meters)  •  Stage 2 Mild CKD (GFR = 60-89 mL/min/1 73 square meters)  •  Stage 3A Moderate CKD (GFR = 45-59 mL/min/1 73 square meters)  •  Stage 3B Moderate CKD (GFR = 30-44 mL/min/1 73 square meters)  •  Stage 4 Severe CKD (GFR = 15-29 mL/min/1 73 square meters)  •  Stage 5 End Stage CKD (GFR <15 mL/min/1 73 square meters)  Note: GFR calculation is accurate only with a steady state creatinine    TSH, 3rd generation with Free T4 reflex [[id number]]  (Normal) Collected: 10/25/22 1103    Lab Status: Final result Specimen: Blood from Arm, Left Updated: 10/25/22 1148     TSH 3RD GENERATON 3 590 uIU/mL     Narrative:      Patients undergoing fluorescein dye angiography may retain small amounts of fluorescein in the body for 48-72 hours post procedure  Samples containing fluorescein can produce falsely depressed TSH values  If the patient had this procedure,a specimen should be resubmitted post fluorescein clearance        HS Troponin 0hr (reflex protocol) [[id number]]  (Abnormal) Collected: 10/25/22 1103    Lab Status: Final result Specimen: Blood from Arm, Left Updated: 10/25/22 1141     hs TnI 0hr 156 ng/L     Ammonia [[id number]]  (Normal) Collected: 10/25/22 1103    Lab Status: Final result Specimen: Blood from Arm, Left Updated: 10/25/22 1133     Ammonia 30 umol/L     CBC and differential [[id number]]  (Abnormal) Collected: 10/25/22 1103    Lab Status: Final result Specimen: Blood from Arm, Left Updated: 10/25/22 1113     WBC 9 13 Thousand/uL      RBC 3 46 Million/uL      Hemoglobin 10 3 g/dL      Hematocrit 32 8 %      MCV 95 fL      MCH 29 8 pg      MCHC 31 4 g/dL      RDW 14 8 %      MPV 10 0 fL      Platelets 263 Thousands/uL      nRBC 0 /100 WBCs      Neutrophils Relative 66 %      Immat GRANS % 0 %      Lymphocytes Relative 20 %      Monocytes Relative 8 %      Eosinophils Relative 5 %      Basophils Relative 1 %      Neutrophils Absolute 6 02 Thousands/µL      Immature Grans Absolute 0 04 Thousand/uL      Lymphocytes Absolute 1 82 Thousands/µL      Monocytes Absolute 0 69 Thousand/µL      Eosinophils Absolute 0 47 Thousand/µL      Basophils Absolute 0 09 Thousands/µL     Fingerstick Glucose (POCT) [[id number]]  (Normal) Collected: 10/25/22 1037    Lab Status: Final result Updated: 10/25/22 1038     POC Glucose 126 mg/dl                  CT head without contrast   Final Result by Kamar Kohler MD (10/25 1230)      No acute intracranial abnormality                    Workstation performed: [id number]         XR chest 1 view portable   ED Interpretation by Sukhjinder Jalloh MD (10/25 1208)   No acute cardiopulm disease      Final Result by Rui Funk MD (10/25 1607)      No acute cardiopulmonary disease in the visualized chest                   Workstation performed: [id number]               Procedures  Procedures      ED Course             HEART Risk Score    Flowsheet Row Most Recent Value   Heart Score Risk Calculator History 0 Filed at: 10/25/2022 2120   ECG 0 Filed at: 10/25/2022 2120   Age 2 Filed at: 10/25/2022 2120   Risk Factors 2 Filed at: 10/25/2022 2120   Troponin 2 Filed at: 10/25/2022 2120   HEART Score 6 Filed at: 10/25/2022 2120        Identification of Seniors at [facility] Most Recent Value   (ISAR) Identification of Seniors at Risk    Before the illness or injury that brought you to the Emergency, did you need someone to help you on a regular basis? 1 Filed at: 10/25/2022 1412   In the last 24 hours, have you needed more help than usual? 1 Filed at: 10/25/2022 1412   Have you been hospitalized for one or more nights during the past 6 months? 1 Filed at: 10/25/2022 1412   In general, do you see well? 0 Filed at: 10/25/2022 1412   In general, do you have serious problems with your memory? 0 Filed at: 10/25/2022 1412   Do you take more than three different medications every day? 1 Filed at: 10/25/2022 1412   ISAR Score 4 Filed at: 10/25/2022 1412                              MDM  Number of Diagnoses or Management Options  Elevated troponin  Weakness  Diagnosis management comments: 73 y/o male patient presenting with generalized weakness and fatigue  No CP, SOB, fevers  Was recently at [place] for CP and was put on a life vest  Exam shows somnolent patient but otherwise WNL  Patient answeing commands but falls asleep  Labs show elevated trop and creatinine  Creat is at baseline  CT head and cxray negative  Admitted to medicine for elevated troponin and generalized weakness          Amount and/or Complexity of Data Reviewed  Clinical lab tests: ordered and reviewed  Tests in the radiology section of CPT®: ordered and reviewed        Disposition  Final diagnoses:   Weakness   Elevated troponin     Time reflects when diagnosis was documented in both MDM as applicable and the Disposition within this note     Time User Action Codes Description Comment    10/25/2022  1:45 PM Judith Zheng Add [R53 1] Weakness     10/25/2022  1:45 PM Rodney Zheng Add [R77 8] Elevated troponin     10/25/2022  2:51 PM Marah Vela Add [I50 9] Acute on chronic congestive heart failure, unspecified [facility] )     10/25/2022  2:51 PM Marah Vela Add [I50 42] Chronic combined systolic and diastolic heart failure [facility])       ED Disposition     ED Disposition   Admit    Condition   Stable    Date/Time   Tue Oct 25, 2022  1:45 PM    Comment   Case was discussed with Dr Rin Waters and the patient's admission status was agreed to be Admission Status: observation status to the service of Dr Rin Waters   Follow-up Information    None         Current Discharge Medication List      CONTINUE these medications which have NOT CHANGED    Details   Acetaminophen Extra Strength 500 MG tablet Take 500 mg by mouth every 6 (six) hours as needed      Aspirin Low Dose 81 MG EC tablet Take 81 mg by mouth daily      atorvastatin (LIPITOR) 40 mg tablet Take 40 mg by mouth daily at bedtime      carvedilol (COREG) 12 5 mg tablet Take 12 5 mg by mouth 2 (two) times a day with meals  Indications: Cardiac Failure      Dapagliflozin Propanediol (Farxiga) 5 MG TABS Take 5 mg by mouth daily  Indications: Type 2 Diabetes      Disposable Gloves MISC Use daily Large  Qty: 100 each, Refills: 0    Associated Diagnoses: Weakness; Physical deconditioning; Wheelchair bound; Hospital discharge follow-up; History of decubitus ulcer      !! FLUoxetine (PROzac) 20 mg capsule Take 1 capsule (20 mg total) by mouth daily  Qty: 90 capsule, Refills: 1    Associated Diagnoses: Bipolar affective disorder, mixed, moderate ([facility] ); Anxiety      !! FLUoxetine (PROzac) 40 MG capsule Take 40 mg by mouth every morning      folic acid (FOLVITE) 1 mg tablet Take 1 tablet (1 mg total) by mouth daily  Refills: 0    Associated Diagnoses: Polysubstance abuse ([facility] )      ! ! gabapentin (Neurontin) 300 mg capsule Take 1 capsule (300 mg total) by mouth 3 (three) times a day  Qty: 90 capsule, Refills: 0    Associated Diagnoses: Diabetic polyneuropathy associated with type 2 diabetes mellitus ([facility] [address] )      ! ! gabapentin (NEURONTIN) 400 mg capsule Take 400 mg by mouth 3 (three) times a day      glucose blood (Accu-Chek Guide) test strip Test sugars four times daily  Qty: 400 each, Refills: 1    Associated Diagnoses: Uncontrolled type 2 diabetes mellitus with hyperglycemia ([facility] [address] )      !! Incontinence Supply Disposable (Disposable Brief Large) MISC Use daily  Qty: 72 each, Refills: 0    Associated Diagnoses: Weakness; Physical deconditioning; Wheelchair bound; Hospital discharge follow-up; History of decubitus ulcer      !! Incontinence Supply Disposable (RA Adult Wipes) MISC Use in the morning  Qty: 128 each, Refills: 0    Associated Diagnoses: Weakness; Physical deconditioning; Wheelchair bound; Hospital discharge follow-up; History of decubitus ulcer      !! Incontinence Supply Disposable MISC Use daily XL Disposable Bed Incontinence Pads  Qty: 100 each, Refills: 0    Associated Diagnoses: Weakness; Physical deconditioning; Wheelchair bound; Hospital discharge follow-up; History of decubitus ulcer      INSULIN ASPART FLEXPEN SC Inject 30 Units under the skin daily before breakfast  30 units in AM with breakfast and 10 units at Dinner  Indications: Type 2 Diabetes      insulin aspart protamine-insulin aspart (NovoLOG 70/30) 100 units/mL injection Inject under the skin      !! Insulin Glargine Solostar (Basaglar KwikPen) 100 UNIT/ML SOPN Inject 2 Units into a muscle daily  Indications: Type 2 Diabetes      !! Insulin Glargine Solostar 100 UNIT/ML SOPN Inject 20 Units under the skin in the morning  Indications: Type 2 Diabetes      Lancet Devices (OneTouch Delica Plus Lancing) MISC Use 1 Dose in the morning Use as directed     Comments: Not accepted by patients insurance      Melatonin 5 MG TABS Take 5 mg by mouth daily at bedtime as needed (insomnia)   Indications: Trouble Sleeping QUEtiapine (SEROquel) 100 mg tablet Take 100 mg by mouth daily at bedtime  Indications: Major Depressive Disorder      Skin Protectants, Misc  (dimethicone-zinc oxide) cream Apply topically 2 (two) times a day as needed for irritation  Qty: 142 g, Refills: 2    Associated Diagnoses: Hospital discharge follow-up; History of decubitus ulcer; Physical deconditioning; Weakness; Wheelchair bound      thiamine (VITAMIN B1) 100 mg tablet Take 1 tablet (100 mg total) by mouth daily  Refills: 0    Associated Diagnoses: Polysubstance abuse (HCC)      torsemide (DEMADEX) 20 mg tablet Take 10 mg by mouth every other day 1/2 pill every other day for weight greather than 180lbs       ! ! - Potential duplicate medications found  Please discuss with provider  No discharge procedures on file  PDMP Review       Value Time User    PDMP Reviewed  Yes 7/15/2022 10:57 PM Skye Rodriguez           ED Provider  Attending physically available and evaluated THE [facility]  I managed the patient along with the ED Attending      Electronically Signed by         Haley Cruz MD  10/25/22 4332

## 2022-10-26 VITALS
WEIGHT: 199.3 LBS | BODY MASS INDEX: 34.21 KG/M2 | DIASTOLIC BLOOD PRESSURE: 76 MMHG | SYSTOLIC BLOOD PRESSURE: 135 MMHG | HEART RATE: 71 BPM | RESPIRATION RATE: 20 BRPM | OXYGEN SATURATION: 97 % | TEMPERATURE: 98.1 F

## 2022-10-26 LAB
ANION GAP SERPL CALCULATED.3IONS-SCNC: 7 MMOL/L (ref 4–13)
ATRIAL RATE: 66 BPM
BUN SERPL-MCNC: 46 MG/DL (ref 5–25)
CALCIUM SERPL-MCNC: 8.6 MG/DL (ref 8.3–10.1)
CHLORIDE SERPL-SCNC: 115 MMOL/L (ref 96–108)
CO2 SERPL-SCNC: 19 MMOL/L (ref 21–32)
CREAT SERPL-MCNC: 2.28 MG/DL (ref 0.6–1.3)
ERYTHROCYTE [DISTWIDTH] IN BLOOD BY AUTOMATED COUNT: 14.9 % (ref 11.6–15.1)
GFR SERPL CREATININE-BSD FRML MDRD: 29 ML/MIN/1.73SQ M
GLUCOSE SERPL-MCNC: 139 MG/DL (ref 65–140)
GLUCOSE SERPL-MCNC: 155 MG/DL (ref 65–140)
GLUCOSE SERPL-MCNC: 172 MG/DL (ref 65–140)
GLUCOSE SERPL-MCNC: 233 MG/DL (ref 65–140)
HCT VFR BLD AUTO: 30.6 % (ref 36.5–49.3)
HGB BLD-MCNC: 9.8 G/DL (ref 12–17)
MAGNESIUM SERPL-MCNC: 1.9 MG/DL (ref 1.6–2.6)
MCH RBC QN AUTO: 29.6 PG (ref 26.8–34.3)
MCHC RBC AUTO-ENTMCNC: 32 G/DL (ref 31.4–37.4)
MCV RBC AUTO: 92 FL (ref 82–98)
P AXIS: 38 DEGREES
PLATELET # BLD AUTO: 149 THOUSANDS/UL (ref 149–390)
PMV BLD AUTO: 10.7 FL (ref 8.9–12.7)
POTASSIUM SERPL-SCNC: 5.2 MMOL/L (ref 3.5–5.3)
PR INTERVAL: 158 MS
QRS AXIS: 37 DEGREES
QRSD INTERVAL: 88 MS
QT INTERVAL: 408 MS
QTC INTERVAL: 427 MS
RBC # BLD AUTO: 3.31 MILLION/UL (ref 3.88–5.62)
SODIUM SERPL-SCNC: 141 MMOL/L (ref 135–147)
T WAVE AXIS: 142 DEGREES
VENTRICULAR RATE: 66 BPM
WBC # BLD AUTO: 5.96 THOUSAND/UL (ref 4.31–10.16)

## 2022-10-26 PROCEDURE — 99217 PR OBSERVATION CARE DISCHARGE MANAGEMENT: CPT | Performed by: HOSPITALIST

## 2022-10-26 PROCEDURE — 93010 ELECTROCARDIOGRAM REPORT: CPT | Performed by: INTERNAL MEDICINE

## 2022-10-26 PROCEDURE — 80048 BASIC METABOLIC PNL TOTAL CA: CPT | Performed by: INTERNAL MEDICINE

## 2022-10-26 PROCEDURE — 85027 COMPLETE CBC AUTOMATED: CPT | Performed by: INTERNAL MEDICINE

## 2022-10-26 PROCEDURE — 83735 ASSAY OF MAGNESIUM: CPT | Performed by: INTERNAL MEDICINE

## 2022-10-26 PROCEDURE — 82948 REAGENT STRIP/BLOOD GLUCOSE: CPT

## 2022-10-26 RX ORDER — HYDRALAZINE HYDROCHLORIDE 25 MG/1
25 TABLET, FILM COATED ORAL EVERY 8 HOURS
Qty: 90 TABLET | Refills: 0 | Status: SHIPPED | OUTPATIENT
Start: 2022-10-26

## 2022-10-26 RX ORDER — ISOSORBIDE DINITRATE 10 MG/1
10 TABLET ORAL EVERY 8 HOURS
Qty: 90 TABLET | Refills: 0 | Status: SHIPPED | OUTPATIENT
Start: 2022-10-26 | End: 2022-11-25

## 2022-10-26 RX ADMIN — INSULIN LISPRO 3 UNITS: 100 INJECTION, SOLUTION INTRAVENOUS; SUBCUTANEOUS at 11:11

## 2022-10-26 RX ADMIN — GABAPENTIN 300 MG: 300 CAPSULE ORAL at 08:28

## 2022-10-26 RX ADMIN — CARVEDILOL 12.5 MG: 12.5 TABLET, FILM COATED ORAL at 06:30

## 2022-10-26 RX ADMIN — HYDRALAZINE HYDROCHLORIDE 25 MG: 25 TABLET, FILM COATED ORAL at 02:44

## 2022-10-26 RX ADMIN — ISOSORBIDE DINITRATE 10 MG: 10 TABLET ORAL at 17:13

## 2022-10-26 RX ADMIN — ISOSORBIDE DINITRATE 10 MG: 10 TABLET ORAL at 11:10

## 2022-10-26 RX ADMIN — FLUOXETINE 20 MG: 20 CAPSULE ORAL at 08:28

## 2022-10-26 RX ADMIN — HYDRALAZINE HYDROCHLORIDE 25 MG: 25 TABLET, FILM COATED ORAL at 17:13

## 2022-10-26 RX ADMIN — GABAPENTIN 300 MG: 300 CAPSULE ORAL at 17:13

## 2022-10-26 RX ADMIN — CARVEDILOL 12.5 MG: 12.5 TABLET, FILM COATED ORAL at 17:13

## 2022-10-26 RX ADMIN — HEPARIN SODIUM 5000 UNITS: 5000 INJECTION INTRAVENOUS; SUBCUTANEOUS at 06:30

## 2022-10-26 RX ADMIN — ISOSORBIDE DINITRATE 10 MG: 10 TABLET ORAL at 02:44

## 2022-10-26 RX ADMIN — HYDRALAZINE HYDROCHLORIDE 25 MG: 25 TABLET, FILM COATED ORAL at 11:09

## 2022-10-26 RX ADMIN — ASPIRIN 81 MG: 81 TABLET, COATED ORAL at 08:28

## 2022-10-26 RX ADMIN — INSULIN LISPRO 1 UNITS: 100 INJECTION, SOLUTION INTRAVENOUS; SUBCUTANEOUS at 17:13

## 2022-10-26 NOTE — CASE COMMUNICATION
Pt discharged from [facility] services today at the request of the pt's caregiver  Pt is not able to acheive goals due to self limiting behaviors and noncompliance with disease management

## 2022-10-26 NOTE — CASE MANAGEMENT
Case Management Assessment & Discharge Planning Note    Patient name Yessica Pinon  Location 2 215/CW2 215-02 MRN [id number]  : 1955 Date 10/26/2022       Current Admission Date: 10/25/2022  Current Admission Diagnosis:Weight gain   Patient Active Problem List    Diagnosis Date Noted   • Weight gain 10/25/2022   • Generalized weakness 10/25/2022   • Acute kidney injury superimposed on CKD ([facility] ) 2022   • Orthostatic hypotension 2022   • Acute on chronic combined systolic and diastolic heart failure ([facility] ) 2022   • Persistent proteinuria 2022   • Transaminitis 2022   • Pleural effusion, bacterial 2022   • Hyperglycemia, unspecified    • Metabolic acidosis    • Diarrhea 2022   • Type 2 diabetes mellitus, without long-term current use of insulin ([place] [address] ) 2022   • High serum thyroid stimulating hormone (TSH) 2022   • Weakness 2022   • Ischemic cardiomyopathy 2022   • Polysubstance abuse ([place] [address] ) 2022   • Mixed hyperlipidemia 2022   • Stage 3b chronic kidney disease ([place] [address] ) 2022   • Acute on chronic systolic congestive heart failure ([facility] ) 2022   • Acute exacerbation of CHF (congestive heart failure) ([place] [address] ) 2022   • Acute hepatitis C virus infection 2022   • Abnormal EKG 2022   • CKD (chronic kidney disease) stage 4, GFR 15-29 ml/min ([facility]) 2022   • Normocytic anemia 2022   • Acute kidney injury ([facility] ) 2022   • Hyponatremia 2022   • Uncontrolled type 2 diabetes mellitus with hyperglycemia ([place] [address] ) 2022   • Essential hypertension 2015   • Type II or unspecified type diabetes mellitus without mention of complication, uncontrolled 2012   • Bipolar affective disorder, mixed, moderate ([place] [address] ) 2011      LOS (days): 1  Geometric Mean LOS (GMLOS) (days):   Days to GMLOS:     OBJECTIVE:              Current admission status: Observation       Preferred Pharmacy: [facility] , [address]  [facility] [address]  Phone: [phone] Fax: [phone]    Primary Care Provider: Sadiq Crespo DO    Primary Insurance: [facility] REP  Secondary Insurance:     ASSESSMENT:  [address], [address] Representative - Brother   Primary Phone: [phone] (Mobile)                  DISCHARGE DETAILS:  Comments - Freedom of Choice: referral sent and declined due to patient refusing the services  Other Referral/Resources/Interventions Provided:  Referral Comments: Patient is no longer with [facility] services-referral declined in [place]

## 2022-10-26 NOTE — ASSESSMENT & PLAN NOTE
· Echo showed ejection fraction 20-25%, left heart catheterization showed multivessel disease requiring CABG eval  · Evaluated by CT surgery 10/24/2022, recommended outpatient PCI, high risk for CABG due to low ejection fraction and renal dysfunction  He has high-grade obstructive disease in LAD, disease, in 1 of the right coronary branches and the distal circumflex  Per CT surgery CABG to the LAD would be an option but he is high risk, distal circumflex and right coronary branches are not ammenable to CABG due to small size  · Patient has new T-wave inversions (could have been present at [facility] earlier this month)    Denies chest pain or shortness of breath, reports fatigue with activities  · Troponins elevated but flat, likely non-MI trop elevation  · Cardiology following  · Continue aspirin, Lipitor, Coreg

## 2022-10-26 NOTE — ASSESSMENT & PLAN NOTE
· Multifactorial, due to recent hospitalization, ischemic cardiomyopathy, heart failure exacerbation  · COVID/Flu/RSV NEG

## 2022-10-26 NOTE — UTILIZATION REVIEW
Initial Clinical Review    Admission: Date/Time/Statement:   Admission Orders (From admission, onward)     Ordered        10/25/22 1409  Place in Observation  Once                      Orders Placed This Encounter   Procedures   • Place in Observation     Standing Status:   Standing     Number of Occurrences:   1     Order Specific Question:   Level of Care     Answer:   Med Surg [16]     ED Arrival Information     Expected   -    Arrival   10/25/2022 10:16    Acuity   Urgent            Means of arrival   Walk-In    Escorted by   Family Member    Service   Hospitalist    Admission type   Emergency            Arrival complaint   SOB           Chief Complaint   Patient presents with   • Weakness - Generalized     Has a heart pack on  Pt complains of being bloated  Weakness and fatigue  Initial Presentation: 74 y o  male  with a PMH of insulin-dependent diabetes, CKD stage 4, hyperlipidemia, hypertension, ischemic cardiomyopathy who presents with abdominal bloating and 4 lb weight gain  Patient was admitted October 2nd to [facility] with shortness of breath and chest pain  Echo showed ejection fraction of 20-25%, right heart catheterization showed elevated PCWP, left heart catheterization showed multivessel disease requiring CABG eval   He became aneuric and required temporary hemodialysis while admitted  He was seen by CT surgery 10/24  He has high-grade obstructive disease in LAD, disease in 1 of the right coronary branches and the distal circumflex, per CT surgery CABG to the LAD would be an option but he is high risk for complications due to low ejection fraction and renal dysfunction  CT surgery recommended OP PCI to LAD, his distal circumflex and right coronary branches are not amenable to CABG due to small size  He was discharged with a LifeVest   Per discharge summary he was on torsemide 40 mg as needed for weight gain    Shortly after his torsemide per Cardiology was decreased to 10 mg every other day  Patient presented to the hospital today due to abdominal bloating and weight gain  Yesterday CT surgery his weight was 191  Patient denies any chest pain, shortness of breath, lower extremity edema  Able to lay flat, no respiratory distress  Complaining of being fatigued, generalized weakness  Has regular bowel movements, no blood in the stool or melena  Has good urine output  ADMIT OBSERVATION STATUS  Weight gain  Assessment & Plan  · Patient is a 71-year-old male with a past medical history of insulin-dependent diabetes, hypertension, hyperlipidemia, CKD stage 4, ischemic cardiomyopathy with ejection fraction 20-25% who presented to the hospital with generalized weakness, abdominal bloating weight gain in the past 2 days  · Per brother in the past few days his weight was in the 190s, his baseline is around 180- 185 lb  · Today on admission weight is 196 lbs on standing scale  · Suspect due to heart failure exacerbation  · See plan below       10/25 Cardiology Consult:  Assessment / Plan:     # cardiomyopathy, mixed ischemic and non-ischemic  # Acute on chronic systolic and diastolic heart failure  · Only very slightly volume up on exam   Recommend lasix low dose x1  · For guideline medical therapy, continue home coreg  Avoid ACE/ARB/ARNI and MRA in setting of CKD with recent LAZARO  Borderline GFR for SLGTi  Would add isordil/hydralazine  · No ICD  Has lifevest Rx from [facility]     # CAD  · Saw surgeon yesterday at [facility] and recommending PCI of LAD  · EKG shows sinus rhythm with TWI in lateral leads (this was not on most recent EKG but he has had this on intermittent EKG's in the past)  · Troponin mildly elevated but stable to downtrending  No chest pain  · Assuming he remains chest pain free and hemodynamically stable, with downtrending troponins, he can follow up outpatient at [facility]     # DM  · A1c 7 7   management per primary team      # HTN  · Elevated BP here in ED  Recommend adding isordil/hydralazine      # HLD  · He has CAD  He needs to be on a statin      # CKD  · With recent episode of LAZARO where he required transient dialysis  · Avoid nephrotoxins  Trend Cr (baseline 2-2 5)  ·   Date:     Day 2:      ED Triage Vitals [10/25/22 1043]   Temperature Pulse Respirations Blood Pressure SpO2   98 °F (36 7 °C) 67 18 104/60 98 %      Temp Source Heart Rate Source Patient Position - Orthostatic VS BP Location FiO2 (%)   Oral Monitor Lying Right arm --      Pain Score       No Pain          Wt Readings from Last 1 Encounters:   10/26/22 90 4 kg (199 lb 4 8 oz)     Additional Vital Signs:   Date/Time Temp Pulse Resp BP MAP (mmHg) SpO2 O2 Device Patient Position - Orthostatic VS   10/26/22 07:21:02 98 5 °F (36 9 °C) 75 18 117/70 86 97 % -- --   10/26/22 02:43:56 -- -- -- 135/84 101 -- -- --   10/25/22 2215 -- -- -- -- -- 98 % None (Room air) --   10/25/22 19:23:14 98 1 °F (36 7 °C) 79 18 140/85 103 98 % None (Room air) Lying   10/25/22 18:12:40 -- 77 20 144/80 101 98 % -- --   10/25/22 1812 -- -- -- -- -- -- None (Room air) --   10/25/22 1545 -- 72 16 166/92 120 100 % None (Room air) Lying   10/25/22 1538 -- 70 18 166/92 -- 99 % -- --   10/25/22 1315 -- 70 13 170/94 126 100 % None (Room air) --   10/25/22 1252 -- -- -- -- -- -- None (Room air) --   10/25/22 1043 98 °F (36 7 °C) 67 18 104/60 -- 98 % None (Room air) Lying       Pertinent Labs/Diagnostic Test Results:   CT head without contrast   Final Result by Alethea Keen MD (10/25 1230)      No acute intracranial abnormality                    Workstation performed: [id number]         XR chest 1 view portable   ED Interpretation by John Rosario MD (10/25 1200)   No acute cardiopulm disease      Final Result by Ngozi Gutierrez MD (10/25 8267)      No acute cardiopulmonary disease in the visualized chest                   Workstation performed: [id number]           Results from last 7 days   Lab Units 10/25/22  6933 SARS-COV-2  Negative     Results from last 7 days   Lab Units 10/26/22  0543 10/25/22  1103   WBC Thousand/uL 5 96 9 13   HEMOGLOBIN g/dL 9 8* 10 3*   HEMATOCRIT % 30 6* 32 8*   PLATELETS Thousands/uL 149 156   NEUTROS ABS Thousands/µL  --  6 02         Results from last 7 days   Lab Units 10/26/22  0543 10/25/22  1103   SODIUM mmol/L 141 142   POTASSIUM mmol/L 5 2 5 0   CHLORIDE mmol/L 115* 116*   CO2 mmol/L 19* 19*   ANION GAP mmol/L 7 7   BUN mg/dL 46* 45*   CREATININE mg/dL 2 28* 2 31*   EGFR ml/min/1 73sq m 29 28   CALCIUM mg/dL 8 6 8 7   MAGNESIUM mg/dL 1 9  --      Results from last 7 days   Lab Units 10/25/22  1103   AST U/L 53*   ALT U/L 86*   ALK PHOS U/L 147*   TOTAL PROTEIN g/dL 7 6   ALBUMIN g/dL 2 7*   TOTAL BILIRUBIN mg/dL 0 26   AMMONIA umol/L 30     Results from last 7 days   Lab Units 10/26/22  0542 10/25/22  1550 10/25/22  1453 10/25/22  1037   POC GLUCOSE mg/dl 139 183* 197* 126     Results from last 7 days   Lab Units 10/26/22  0543 10/25/22  1103   GLUCOSE RANDOM mg/dL 155* 108             BETA-HYDROXYBUTYRATE   Date Value Ref Range Status   06/16/2022 0 0 <0 6 mmol/L Final      Results from last 7 days   Lab Units 10/25/22  1455 10/25/22  1253 10/25/22  1103   HS TNI 0HR ng/L  --   --  156*   HS TNI 2HR ng/L  --  149*  --    HSTNI D2 ng/L  --  -7  --    HS TNI 4HR ng/L 134*  --   --    HSTNI D4 ng/L -22  --   --      Results from last 7 days   Lab Units 10/25/22  1103   TSH 3RD GENERATON uIU/mL 3 590     Results from last 7 days   Lab Units 10/25/22  1103   NT-PRO BNP pg/mL 7,551*     Results from last 7 days   Lab Units 10/25/22  1314   CLARITY UA  Clear   COLOR UA  Yellow   SPEC GRAV UA  1 025   PH UA  5 5   GLUCOSE UA mg/dl Negative   KETONES UA mg/dl Negative   BLOOD UA  Trace*   PROTEIN UA mg/dl 100 (2+)*   NITRITE UA  Negative   BILIRUBIN UA  Negative   UROBILINOGEN UA E U /dl 0 2   LEUKOCYTES UA  Negative   WBC UA /hpf 1-2   RBC UA /hpf 1-2   BACTERIA UA /hpf None Seen   EPITHELIAL CELLS WET PREP /hpf Occasional     Results from last 7 days   Lab Units 10/25/22  1447   INFLUENZA A PCR  Negative   INFLUENZA B PCR  Negative   RSV PCR  Negative     ED Treatment:   Medication Administration from 10/25/2022 1016 to 10/25/2022 1802       Date/Time Order Dose Route Action     10/25/2022 1737 gabapentin (NEURONTIN) capsule 300 mg 300 mg Oral Given     10/25/2022 1738 heparin (porcine) subcutaneous injection 5,000 Units 5,000 Units Subcutaneous Given     10/25/2022 1738 insulin lispro (HumaLOG) 100 units/mL subcutaneous injection 1-6 Units 1 Units Subcutaneous Given        Past Medical History:   Diagnosis Date   • Anxiety    • Arthritis    • Coronary artery disease    • Dementia ([facility])    • Depression    • Diabetes mellitus ([facility] )    • Infectious viral hepatitis    • Memory loss    • Visual impairment      Present on Admission:  • Acute on chronic combined systolic and diastolic heart failure ([facility])  • Bipolar affective disorder, mixed, moderate ([facility])  • CKD (chronic kidney disease) stage 4, GFR 15-29 ml/min ([facility])  • Ischemic cardiomyopathy  • Type 2 diabetes mellitus, without long-term current use of insulin ([facility])      Admitting Diagnosis: Chronic combined systolic and diastolic heart failure ([facility]) [I50 42]  Weakness [R53 1]  SOB (shortness of breath) [R06 02]  Elevated troponin [R77 8]  Acute on chronic congestive heart failure, unspecified heart failure type ([facility] ) [I50 9]  Age/Sex: 74 y o  male  Admission Orders:  Scheduled Medications:  aspirin, 81 mg, Oral, Daily  atorvastatin, 40 mg, Oral, HS  carvedilol, 12 5 mg, Oral, BID With Meals  FLUoxetine, 20 mg, Oral, Daily  gabapentin, 300 mg, Oral, TID  heparin (porcine), 5,000 Units, Subcutaneous, Q8H MAAME  hydrALAZINE, 25 mg, Oral, Q8H  insulin lispro, 1-6 Units, Subcutaneous, TID AC  isosorbide dinitrate, 10 mg, Oral, Q8H  QUEtiapine, 100 mg, Oral, HS      Continuous IV Infusions:     PRN Meds:       IP CONSULT TO CASE MANAGEMENT  IP CONSULT TO NUTRITION SERVICES  IP CONSULT TO CARDIOLOGY    Network Utilization Review Department  ATTENTION: Please call with any questions or concerns to [phone] and carefully listen to the prompts so that you are directed to the right person  All voicemails are confidential   Cheryl Lewis all requests for admission clinical reviews, approved or denied determinations and any other requests to dedicated fax number below belonging to the campus where the patient is receiving treatment   List of dedicated fax numbers for the Facilities:  [address] DENIALS (Administrative/Medical Necessity) [phone]   [address] (Maternity/NICU/Pediatrics) [phone]   7 Kasey Brown [phone]   [place] [address] [phone]   [address] [place] [address] Yessenia Terry 28 [phone]   1558 [facility] [place] [facility] 134 815 [facility] [phone]

## 2022-10-26 NOTE — ASSESSMENT & PLAN NOTE
Wt Readings from Last 3 Encounters:   10/26/22 90 4 kg (199 lb 4 8 oz)   10/16/22 84 1 kg (185 lb 6 4 oz)   09/22/22 84 kg (185 lb 3 2 oz)     · 10/02/2022 patient was admitted to [facility] with chest pain and shortness of breath, found to have ejection fraction of 20-25% on echo, underwent right heart catheterization which showed elevated PCWP, left heart catheterization showed multivessel disease  Discharged with a LifeVest  · Baseline weight is around 180-185 lb  · Per brother for the past few days his weight was in the 190s, his weight today 196 lb  · On a discharge summary from 10/14/2022 patient was discharged on torsemide 40 mg daily as needed for weight gain  Shortly after, his torsemide was changed to 10 mg every other day per cardiology  · On admission patient did not appear overtly fluid overloaded, was without JVD, lungs were clear to auscultation, pt did not have lower extremity edema and was in no distress    · proBNP 7750 (at [facility] it was above 16,000)  · Took torsemide 10 mg the morning of admission  · Cardiology following  · S/p Lasix 20 mg IV x 1  · Started on hydralazine and isordil

## 2022-10-26 NOTE — DISCHARGE INSTR - AVS FIRST PAGE
You need to follow up with your [facility] cardiologist within 1 week  I called your Amanda at [phone]  I spoke to the pharmacist to reports that your on insulin Aspart 30U in AM and 10U in PM  You had been on Basaglar 15U at night in the past but this was discontinued by her prescribing doctor  You need to follow up with your PCP tomorrow to discuss

## 2022-10-26 NOTE — PROGRESS NOTES
Advanced Heart Failure / Pulmonary Hypertension Service Progress Note    Yesscia Pinon 74 y o  male   MRN: [id number]  Unit/Bed#: CW2 215-02; Encounter: [id number]    Assessment:  Principal Problem:    Weight gain  Active Problems:    CKD (chronic kidney disease) stage 4, GFR 15-29 ml/min (HCC)    Bipolar affective disorder, mixed, moderate (HCC)    Ischemic cardiomyopathy    Type 2 diabetes mellitus, without long-term current use of insulin (HCC)    Acute on chronic combined systolic and diastolic heart failure (HCC)    Generalized weakness      Subjective: In short, this is a 68-year-old male with a complex medical history including ischemic cardiomyopathy, HFrEF, diabetes type 2, hypertension, hyperlipidemia, CAD, bipolar disorder, substance abuse, hep C who presented for generalized weakness, fatigue, weight gain  Typically follows with [facility] cardiology, and was by [facility] CT surgery on 10/24/22, recommended PCI LAD over CABG  Troponin elevated 156 on arrival, stable on repeat  Denied chest pain, shortness of breath, lower extremity edema  He is unsure of his medication regimen but does report taking his diuretics the morning of admission  He appeared minimally volume overloaded on evaluation and was given Lasix IV 20 mg and initiated on Isordil/hydralazine given elevated BP here  Patient seen and examined  No significant events overnight  Reports feeling well-rested, states that his generalized weakness and fatigue has resolved at this time  Denies chest pain, dyspnea, leg swelling, abdominal distension, lightheadedness  Reports tolerating the addition of isordil/hydralazine very well  States that he has an appointment with his cardiologist at [place] next week  Has been thoroughly counseled on strict return precautions  States that he feels "really good" and is ready for discharge  Objective:    Intake/ Output: 120/1325 (-1205)  Weight: 199 (bed scale)  Telemetry: no events noted Today's Plan:  · Strict I/Os, daily weights, cardiac diet  · Keep K>4  · Trend Cr, today 2 28 (baseline 2-2 5)  · Given one dose IV Lasix yesterday for very minimal volume overload, can restart oral regimen on discharge  · Isordil/hydralazine added to regimen, would continue current doses of both on discharge  Can be titrated as outpatient by his cardiologist at [facility]  · Avoid ACE/ARB/ARNi as well as MRA in the setting of CKD and recent significant LAZARO  · Borderline GFR for SGLT2    Plan:  Acute on Chronic HFrEF; LVEF 20-25%; NYHA II/III; ACC/AHA Stage C  Etiology: initially thought to be nonischemic, but recent left heart catheterization with evidence of multivessel disease (high-grade obstructive disease in the LAD with disease involving the right coronary branches and the distal circumflex as per CT surgery note)     TTE 04/24/2022: LVEF 30%  Global hypokinesis with distal LAD regional wall motion abnormality  Grade 2 diastolic dysfunction with elevated left atrial filling pressures  TTE 07/06/2022:  LVEF 40-50%  TTE 10/2022: LVEF 20-25%     NM stress test 04/26/2022:  Global hypokinesis  LVEF 29%  Fixed and severely decreased perfusion is seen in most of the apical and inferior wall most likely secondary to infarcts  No reversible perfusion defect suggestive of ischemia         Neurohormonal Blockade:  --Beta Blocker:  Coreg 12 5 mg BID  --SVR reduction: isordil 10/hydralazine 25 TID  --ARNi / ACEi / ARB: no  --Aldosterone Antagonist: no  --SGLT2 Inhibitor: Farxiga 5mg QD as outpatient, held here  --Home Diuretic: torsemide 10mg every other day  --Inpatient Diuretic: one time dose Lasix 20mg IV     Sudden Cardiac Death Risk Reduction:  --LVEF 20-25%, has LifeVest from [facility]     Electrical Resynchronization:  --Candidacy for BiV device:      Advanced Therapies (if appropriate):  Will continue to monitor      CKD stage 4, GFR 15-29 ml/min   Required acute HD transiently at [facility] while admitted earlier this month secondary to anuria, but LAZARO resolved and was discharged without the need for long term dialysis  Cr 2 28 today, seems to be at baseline of 2 0 to 2 5  Bipolar affective disorder, mixed, moderate   Type 2 diabetes mellitus, without long-term current use of insulin   Management per primary team  Generalized weakness  Likely multifactorial given recent hospitalizations and worsening cardiomyopathy    Vitals:   Blood pressure 137/77, pulse 67, temperature 98 1 °F (36 7 °C), temperature source Oral, resp  rate 20, weight 90 4 kg (199 lb 4 8 oz), SpO2 98 %  Body mass index is 34 21 kg/m² (pended)  I/O last 3 completed shifts: In: 120 [P O :120]  Out: 1325 [Urine:1325]    Wt Readings from Last 10 Encounters:   10/26/22 90 4 kg (199 lb 4 8 oz)   10/16/22 84 1 kg (185 lb 6 4 oz)   09/22/22 84 kg (185 lb 3 2 oz)   09/20/22 84 5 kg (186 lb 3 2 oz)   09/14/22 (P) 84 4 kg (186 lb)   09/07/22 82 9 kg (182 lb 12 8 oz)   08/11/22 80 9 kg (178 lb 6 4 oz)   07/28/22 78 5 kg (173 lb)   07/26/22 80 kg (176 lb 5 9 oz)   07/20/22 84 6 kg (186 lb 8 2 oz)     Vitals:    10/26/22 0243 10/26/22 0549 10/26/22 0721 10/26/22 1111   BP: 135/84  117/70 137/77   BP Location:   Right arm Right arm   Pulse:   75 67   Resp:   18 20   Temp:   98 5 °F (36 9 °C) 98 1 °F (36 7 °C)   TempSrc:   Oral Oral   SpO2:   97% 98%   Weight:  90 4 kg (199 lb 4 8 oz)         Physical Exam  Constitutional:       General: He is not in acute distress  Appearance: Normal appearance  He is not toxic-appearing  HENT:      Head: Normocephalic  Nose: Nose normal       Mouth/Throat:      Mouth: Mucous membranes are moist    Eyes:      Conjunctiva/sclera: Conjunctivae normal    Neck:      Vascular: No JVD  Cardiovascular:      Rate and Rhythm: Normal rate and regular rhythm  Pulmonary:      Effort: Pulmonary effort is normal  No respiratory distress  Breath sounds: Normal breath sounds  No rales  Abdominal:      Palpations: Abdomen is soft  Tenderness: There is no abdominal tenderness  There is no guarding  Musculoskeletal:      Cervical back: Neck supple  Right lower leg: No edema  Left lower leg: No edema  Skin:     General: Skin is dry  Neurological:      General: No focal deficit present  Mental Status: He is alert and oriented to person, place, and time     Psychiatric:         Mood and Affect: Mood normal          Behavior: Behavior normal          Current Facility-Administered Medications:   •  aspirin (ECOTRIN LOW STRENGTH) EC tablet 81 mg, 81 mg, Oral, Daily, Marah Vela MD, 81 mg at 10/26/22 3726  •  atorvastatin (LIPITOR) tablet 40 mg, 40 mg, Oral, HS, Marah Vela MD, 40 mg at 10/25/22 2158  •  carvedilol (COREG) tablet 12 5 mg, 12 5 mg, Oral, BID With Meals, Jeffery Ortiz MD, 12 5 mg at 10/26/22 0630  •  FLUoxetine (PROzac) capsule 20 mg, 20 mg, Oral, Daily, Jeffery Ortiz MD, 20 mg at 10/26/22 0823  •  gabapentin (NEURONTIN) capsule 300 mg, 300 mg, Oral, TID, Jeffery Ortiz MD, 300 mg at 10/26/22 7549  •  heparin (porcine) subcutaneous injection 5,000 Units, 5,000 Units, Subcutaneous, Q8H [facility], 5,000 Units at 10/26/22 0630 **AND** [CANCELED] Platelet count, , , Once, Jeffery Ortiz MD  •  hydrALAZINE (APRESOLINE) tablet 25 mg, 25 mg, Oral, Q8H, Michelle Killian PA-C, 25 mg at 10/26/22 1109  •  insulin lispro (HumaLOG) 100 units/mL subcutaneous injection 1-6 Units, 1-6 Units, Subcutaneous, TID AC, 3 Units at 10/26/22 1111 **AND** Fingerstick Glucose (POCT), , , TID AC, Marah Vela MD  •  isosorbide dinitrate (ISORDIL) tablet 10 mg, 10 mg, Oral, Q8H, Michelle Killian PA-C, 10 mg at 10/26/22 1110  •  QUEtiapine (SEROquel) tablet 100 mg, 100 mg, Oral, HS, Marah MD Mirian, 100 mg at 10/25/22 2499    Labs & Results:      Results from last 7 days   Lab Units 10/26/22  0543 10/25/22  1103   WBC Thousand/uL 5 96 9 13   HEMOGLOBIN g/dL 9 8* 10 3*   HEMATOCRIT % 30 6* 32 8*   PLATELETS Thousands/uL 149 156         Results from last 7 days   Lab Units 10/26/22  0543 10/25/22  1103   POTASSIUM mmol/L 5 2 5 0   CHLORIDE mmol/L 115* 116*   CO2 mmol/L 19* 19*   BUN mg/dL 46* 45*   CREATININE mg/dL 2 28* 2 31*   CALCIUM mg/dL 8 6 8 7   ALK PHOS U/L  --  147*   ALT U/L  --  86*   AST U/L  --  53*           Jorgito Ansari PA-C

## 2022-10-26 NOTE — ASSESSMENT & PLAN NOTE
Lab Results   Component Value Date    HGBA1C 7 7 (H) 10/03/2022       Recent Labs     10/25/22  1453 10/25/22  1550 10/26/22  0542 10/26/22  1111   POCGLU 197* 183* 139 233*       Blood Sugar Average: Last 72 hrs:  (P) 175 6   · Home regimen dapagliflozin 5 mg daily, Basaglar 20 units HS and sliding scale with meals  · Currently only on SSI  · Diabetic diet

## 2022-10-26 NOTE — ASSESSMENT & PLAN NOTE
· Patient is a 79-year-old male with a past medical history of insulin-dependent diabetes, hypertension, hyperlipidemia, CKD stage 4, ischemic cardiomyopathy with ejection fraction 20-25% who presented to the hospital with generalized weakness, abdominal bloating weight gain in the past 2 days    · Per brother in the few days PTA his weight was in the 190s, his baseline is around 180- 185 lb  · On admission weight is 196 lbs on standing scale  · Suspect due to heart failure exacerbation  · See plan below

## 2022-10-26 NOTE — ASSESSMENT & PLAN NOTE
· Patient is a 29-year-old male with a past medical history of insulin-dependent diabetes, hypertension, hyperlipidemia, CKD stage 4, ischemic cardiomyopathy with ejection fraction 20-25% who presented to the hospital with generalized weakness, abdominal bloating weight gain in the past 2 days    · Per brother in the few days PTA his weight was in the 190s, his baseline is around 180- 185 lb  · On admission weight is 196 lbs on standing scale  · Suspect due to heart failure exacerbation  · See plan below

## 2022-10-26 NOTE — ASSESSMENT & PLAN NOTE
Wt Readings from Last 3 Encounters:   10/26/22 90 4 kg (199 lb 4 8 oz)   10/16/22 84 1 kg (185 lb 6 4 oz)   09/22/22 84 kg (185 lb 3 2 oz)     · 10/02/2022 patient was admitted to [facility] with chest pain and shortness of breath, found to have ejection fraction of 20-25% on echo, underwent right heart catheterization which showed elevated PCWP, left heart catheterization showed multivessel disease  Discharged with a LifeVest  · Baseline weight is around 180-185 lb  · Per brother for the past few days his weight was in the 190s, his weight today 196 lb  · On a discharge summary from 10/14/2022 patient was discharged on torsemide 40 mg daily as needed for weight gain  Shortly after, his torsemide was changed to 10 mg every other day per cardiology  · Patient does not seem overtly fluid overloaded, no JVD, lungs are clear to auscultation, no lower extremity edema   In no distress  · Check proBNP 7750 (at [facility] it was above 16,000)  · Took torsemide 10 mg this morning  · Cardiology following  · S/p Lasix 20 mg IV x 1  · Started on hydralazine and isordil  · Daily weight

## 2022-10-26 NOTE — DISCHARGE SUMMARY
[facility]  Discharge- Jovita Certain 1955, 74 y o  male MRN: [id number]  Unit/Bed#: Heike Rainey 215-02 Encounter: [id number]  Primary Care Provider: eBn Sharp DO   Date and time admitted to hospital: 10/25/2022 10:22 AM    Acute on chronic combined systolic and diastolic heart failure [facility])  Assessment & Plan  Wt Readings from Last 3 Encounters:   10/26/22 90 4 kg (199 lb 4 8 oz)   10/16/22 84 1 kg (185 lb 6 4 oz)   09/22/22 84 kg (185 lb 3 2 oz)     · 10/02/2022 patient was admitted to [facility] with chest pain and shortness of breath, found to have ejection fraction of 20-25% on echo, underwent right heart catheterization which showed elevated PCWP, left heart catheterization showed multivessel disease  Discharged with a LifeVest  · Baseline weight is around 180-185 lb  · Per brother for the past few days his weight was in the 190s, his weight today 196 lb  · On a discharge summary from 10/14/2022 patient was discharged on torsemide 40 mg daily as needed for weight gain  Shortly after, his torsemide was changed to 10 mg every other day per cardiology  · On admission patient did not appear overtly fluid overloaded, was without JVD, lungs were clear to auscultation, pt did not have lower extremity edema and was in no distress  · proBNP 7750 (at [facility] it was above 16,000)  · Took torsemide 10 mg the morning of admission  · Cardiology following  · S/p Lasix 20 mg IV x 1  · Started on hydralazine and isordil          * Weight gain  Assessment & Plan  · Patient is a 29-year-old male with a past medical history of insulin-dependent diabetes, hypertension, hyperlipidemia, CKD stage 4, ischemic cardiomyopathy with ejection fraction 20-25% who presented to the hospital with generalized weakness, abdominal bloating weight gain in the past 2 days    · Per brother in the few days PTA his weight was in the 190s, his baseline is around 180- 185 lb  · On admission weight is 196 lbs on standing scale  · Suspect due to heart failure exacerbation  · See plan below      Generalized weakness  Assessment & Plan  · Multifactorial, due to recent hospitalization, ischemic cardiomyopathy, heart failure exacerbation  · COVID/Flu/RSV NEG    Type 2 diabetes mellitus, without long-term current use of insulin [facility])  Assessment & Plan  Lab Results   Component Value Date    HGBA1C 7 7 (H) 10/03/2022       Recent Labs     10/25/22  1453 10/25/22  1550 10/26/22  0542 10/26/22  1111   POCGLU 197* 183* 139 233*       Blood Sugar Average: Last 72 hrs:  (P) 175 1     I called the pt's Amanda at [phone]  I spoke to the pharmacist to reports that patient was on insulin Aspart 30U in AM and 10U in PM  He had been on Basaglar 15U at night in the past but this was discontinued by her prescribing doctor  The patient needs to follow up with his PCP in one day to discuss  This was documented on the patient's discharge instructions  Ischemic cardiomyopathy  Assessment & Plan  · Echo showed ejection fraction 20-25%, left heart catheterization showed multivessel disease requiring CABG eval  · Evaluated by CT surgery 10/24/2022, recommended outpatient PCI, high risk for CABG due to low ejection fraction and renal dysfunction  He has high-grade obstructive disease in LAD, disease, in 1 of the right coronary branches and the distal circumflex  Per CT surgery CABG to the LAD would be an option but he is high risk, distal circumflex and right coronary branches are not ammenable to CABG due to small size  · Patient has new T-wave inversions (could have been present at [facility] earlier this month)    Denies chest pain or shortness of breath, reports fatigue with activities  · Troponins elevated but flat, likely non-MI trop elevation  · Cardiology following  · Continue aspirin, Lipitor, Coreg  · Started on Isordil and hydralazine    Bipolar affective disorder, mixed, moderate [facility])  Assessment & Plan  · Continue Prozac and Seroquel    CKD (chronic kidney disease) stage 4, GFR 15-29 ml/min [facility])  Assessment & Plan  Lab Results   Component Value Date    EGFR 29 10/26/2022    EGFR 28 10/25/2022    EGFR 22 10/17/2022    CREATININE 2 28 (H) 10/26/2022    CREATININE 2 31 (H) 10/25/2022    CREATININE 2 78 (H) 10/17/2022   · Patient was admitted to [facility] found to have ischemic cardiomyopathy underwent right heart catheterization and left heart catheterization which showed multivessel disease  Developed anuria requiring temporary hemodialysis  · Creatinine currently seems to be at baseline       Medical Problems             Resolved Problems  Date Reviewed: 10/26/2022   None               Discharging Physician / Practitioner: Leeroy Stanley  PCP: Mikki Miller DO  Admission Date:   Admission Orders (From admission, onward)     Ordered        10/25/22 1409  Place in Observation  Once            10/25/22 1346  INPATIENT ADMISSION  Once,   Status:  Canceled                      Discharge Date: 10/26/22    Consultations During Hospital Stay:  · Cardiology    Procedures Performed:   · None    Significant Findings / Test Results:   · CT brain: No acute intracranial abnormality  · CXR: No acute cardiopulmonary disease in the visualized chest     Incidental Findings:   · None    Test Results Pending at Discharge (will require follow up): · None     Outpatient Tests Requested:  · None    Complications:  None    Reason for Admission: weight gain    Hospital Course:   Hyacinth Berger is a 74 y o  male patient who originally presented to the hospital on 10/25/2022 due to weight gain as outlined in the H&P done on admission  Please see above list of diagnoses and related plan for additional information       Condition at Discharge: good    Discharge Day Visit / Exam:   * Please refer to separate progress note for these details *    Discussion with Family: Updated  (sister) via phone  Discharge instructions/Information to patient and family:   See after visit summary for information provided to patient and family  Provisions for Follow-Up Care:  See after visit summary for information related to follow-up care and any pertinent home health orders  Disposition:   Home    Planned Readmission: None     Discharge Statement:  I spent 60 minutes discharging the patient  This time was spent on the day of discharge  I had direct contact with the patient on the day of discharge  Greater than 50% of the total time was spent examining patient, answering all patient questions, arranging and discussing plan of care with patient as well as directly providing post-discharge instructions  Additional time then spent on discharge activities  Discharge Medications:  See after visit summary for reconciled discharge medications provided to patient and/or family        **Please Note: This note may have been constructed using a voice recognition system**

## 2022-10-26 NOTE — ASSESSMENT & PLAN NOTE
Lab Results   Component Value Date    EGFR 29 10/26/2022    EGFR 28 10/25/2022    EGFR 22 10/17/2022    CREATININE 2 28 (H) 10/26/2022    CREATININE 2 31 (H) 10/25/2022    CREATININE 2 78 (H) 10/17/2022   · Patient was admitted to [facility] found to have ischemic cardiomyopathy underwent right heart catheterization and left heart catheterization which showed multivessel disease   Developed anuria requiring temporary hemodialysis  · Creatinine currently seems to be at baseline

## 2022-10-26 NOTE — ASSESSMENT & PLAN NOTE
Lab Results   Component Value Date    EGFR 29 10/26/2022    EGFR 28 10/25/2022    EGFR 22 10/17/2022    CREATININE 2 28 (H) 10/26/2022    CREATININE 2 31 (H) 10/25/2022    CREATININE 2 78 (H) 10/17/2022   · Patient was admitted to [facility] found to have ischemic cardiomyopathy underwent right heart catheterization and left heart catheterization which showed multivessel disease   Developed anuria requiring temporary hemodialysis  · Creatinine currently seems to be at baseline

## 2022-10-26 NOTE — PROGRESS NOTES
[address] [facility]  Progress Note - [place] Kasandra 1955, 74 y o  male MRN: [id number]  Unit/Bed#: CW2 215-02 Encounter: [id number]  Primary Care Provider: Ana Ramos DO   Date and time admitted to hospital: 10/25/2022 10:22 AM    Acute on chronic combined systolic and diastolic heart failure [facility])  Assessment & Plan  Wt Readings from Last 3 Encounters:   10/26/22 90 4 kg (199 lb 4 8 oz)   10/16/22 84 1 kg (185 lb 6 4 oz)   09/22/22 84 kg (185 lb 3 2 oz)     · 10/02/2022 patient was admitted to [facility] with chest pain and shortness of breath, found to have ejection fraction of 20-25% on echo, underwent right heart catheterization which showed elevated PCWP, left heart catheterization showed multivessel disease  Discharged with a LifeVest  · Baseline weight is around 180-185 lb  · Per brother for the past few days his weight was in the 190s, his weight today 196 lb  · On a discharge summary from 10/14/2022 patient was discharged on torsemide 40 mg daily as needed for weight gain  Shortly after, his torsemide was changed to 10 mg every other day per cardiology  · Patient does not seem overtly fluid overloaded, no JVD, lungs are clear to auscultation, no lower extremity edema  In no distress  · Check proBNP 7750 (at [facility] it was above 16,000)  · Took torsemide 10 mg this morning  · Cardiology following  · S/p Lasix 20 mg IV x 1  · Started on hydralazine and isordil  · Daily weight        * Weight gain  Assessment & Plan  · Patient is a 16-year-old male with a past medical history of insulin-dependent diabetes, hypertension, hyperlipidemia, CKD stage 4, ischemic cardiomyopathy with ejection fraction 20-25% who presented to the hospital with generalized weakness, abdominal bloating weight gain in the past 2 days    · Per brother in the few days PTA his weight was in the 190s, his baseline is around 180- 185 lb  · On admission weight is 196 lbs on standing scale  · Suspect due to heart failure exacerbation  · See plan below      Generalized weakness  Assessment & Plan  · Multifactorial, due to recent hospitalization, ischemic cardiomyopathy, heart failure exacerbation  · COVID/Flu/RSV NEG    Type 2 diabetes mellitus, without long-term current use of insulin [facility])  Assessment & Plan  Lab Results   Component Value Date    HGBA1C 7 7 (H) 10/03/2022       Recent Labs     10/25/22  1453 10/25/22  1550 10/26/22  0542 10/26/22  1111   POCGLU 197* 183* 139 233*       Blood Sugar Average: Last 72 hrs:  (P) 175 6   · Home regimen dapagliflozin 5 mg daily, Basaglar 20 units HS and sliding scale with meals  · Currently only on SSI  · Diabetic diet    Ischemic cardiomyopathy  Assessment & Plan  · Echo showed ejection fraction 20-25%, left heart catheterization showed multivessel disease requiring CABG eval  · Evaluated by CT surgery 10/24/2022, recommended outpatient PCI, high risk for CABG due to low ejection fraction and renal dysfunction  He has high-grade obstructive disease in LAD, disease, in 1 of the right coronary branches and the distal circumflex  Per CT surgery CABG to the LAD would be an option but he is high risk, distal circumflex and right coronary branches are not ammenable to CABG due to small size  · Patient has new T-wave inversions (could have been present at [facility] earlier this month)    Denies chest pain or shortness of breath, reports fatigue with activities  · Troponins elevated but flat, likely non-MI trop elevation  · Cardiology following  · Continue aspirin, Lipitor, Coreg    Bipolar affective disorder, mixed, moderate (HCC)  Assessment & Plan  · Continue Prozac and Seroquel    CKD (chronic kidney disease) stage 4, GFR 15-29 ml/min [facility])  Assessment & Plan  Lab Results   Component Value Date    EGFR 29 10/26/2022    EGFR 28 10/25/2022    EGFR 22 10/17/2022    CREATININE 2 28 (H) 10/26/2022    CREATININE 2 31 (H) 10/25/2022    CREATININE 2 78 (H) 10/17/2022   · Patient was admitted to [facility] found to have ischemic cardiomyopathy underwent right heart catheterization and left heart catheterization which showed multivessel disease  Developed anuria requiring temporary hemodialysis  · Creatinine currently seems to be at baseline         VTE Pharmacologic Prophylaxis: Heparin    Patient Centered Rounds: I performed bedside rounds with nursing staff today  Time Spent for Care: 20 minutes  More than 50% of total time spent on counseling and coordination of care as described above  Current Length of Stay: 1 day(s)  Current Patient Status: Observation   Discharge Plan: Medically stable for d/c pending my discussion with cardiology    Code Status: Level 1 - Full Code    Subjective:   Pt denies SOB  Objective:     Vitals:   Temp (24hrs), Av 2 °F (36 8 °C), Min:98 1 °F (36 7 °C), Max:98 5 °F (36 9 °C)    Temp:  [98 1 °F (36 7 °C)-98 5 °F (36 9 °C)] 98 1 °F (36 7 °C)  HR:  [67-79] 67  Resp:  [16-20] 20  BP: (117-166)/(70-92) 137/77  SpO2:  [97 %-100 %] 98 %  Body mass index is 34 21 kg/m² (pended)  Input and Output Summary (last 24 hours):      Intake/Output Summary (Last 24 hours) at 10/26/2022 1422  Last data filed at 10/26/2022 0900  Gross per 24 hour   Intake 240 ml   Output 1700 ml   Net -1460 ml       Physical Exam:   Gen: NAD, AAOx3, well developed, well nourished  Eyes: EOMI, PERRLA, no scleral icterus  ENMT:  no nasal discharge, no otic discharge, moist mucous membranes  Neck:  Supple  Cardiovascular:  Regular rate and rhythm, normal S1-S2, no murmurs, rubs, or gallops  Lungs:  Clear to auscultation bilaterally, no wheezes, or rales, or rhonchi  Abdomen:  Positive bowel sounds, soft, nontender, nondistended, no palpable organomegaly   Skin:  Intact, no obvious lesions or rashes, no edema  Neuro: Cranial nerves 2-12 are intact, non-focal, 5/5 strength in all 4 extremities      Additional Data:     Labs:  Results from last 7 days   Lab Units 10/26/22  0543 10/25/22  1103   WBC Thousand/uL 5 96 9 13   HEMOGLOBIN g/dL 9 8* 10 3*   HEMATOCRIT % 30 6* 32 8*   PLATELETS Thousands/uL 149 156   NEUTROS PCT %  --  66   LYMPHS PCT %  --  20   MONOS PCT %  --  8   EOS PCT %  --  5     Results from last 7 days   Lab Units 10/26/22  0543 10/25/22  1103   SODIUM mmol/L 141 142   POTASSIUM mmol/L 5 2 5 0   CHLORIDE mmol/L 115* 116*   CO2 mmol/L 19* 19*   BUN mg/dL 46* 45*   CREATININE mg/dL 2 28* 2 31*   ANION GAP mmol/L 7 7   CALCIUM mg/dL 8 6 8 7   ALBUMIN g/dL  --  2 7*   TOTAL BILIRUBIN mg/dL  --  0 26   ALK PHOS U/L  --  147*   ALT U/L  --  86*   AST U/L  --  53*   GLUCOSE RANDOM mg/dL 155* 108         Results from last 7 days   Lab Units 10/26/22  1111 10/26/22  0542 10/25/22  1550 10/25/22  1453 10/25/22  1037   POC GLUCOSE mg/dl 233* 139 183* 197* 126               Lines/Drains:  Invasive Devices  Report    Peripheral Intravenous Line  Duration           Peripheral IV 10/25/22 Left Forearm 1 day                Recent Cultures (last 7 days):         Last 24 Hours Medication List:   Current Facility-Administered Medications   Medication Dose Route Frequency Provider Last Rate   • aspirin  81 mg Oral Daily Maren Victor MD     • atorvastatin  40 mg Oral HS Maren Victor MD     • carvedilol  12 5 mg Oral BID With Meals Maren Victor MD     • FLUoxetine  20 mg Oral Daily Maren Victor MD     • gabapentin  300 mg Oral TID Maren Victor MD     • heparin (porcine)  5,000 Units Subcutaneous Q8H [address] Maren Victor MD     • hydrALAZINE  25 mg Oral Q8H Erlin Rm PA-C     • insulin lispro  1-6 Units Subcutaneous TID AC Maren Victor MD     • isosorbide dinitrate  10 mg Oral Q8H Erlin Rm PA-C     • QUEtiapine  100 mg Oral HS Maren Victor MD          Today, Patient Was Seen By: Lyubov Nova    **Please Note: This note may have been constructed using a voice recognition system  **

## 2022-10-26 NOTE — ASSESSMENT & PLAN NOTE
Lab Results   Component Value Date    HGBA1C 7 7 (H) 10/03/2022       Recent Labs     10/25/22  1453 10/25/22  1550 10/26/22  0542 10/26/22  1111   POCGLU 197* 183* 139 233*       Blood Sugar Average: Last 72 hrs:  (P) 175 6     I called the pt's Amanda at [phone]  I spoke to the pharmacist to reports that patient was on insulin Aspart 30U in AM and 10U in PM  He had been on Basaglar 15U at night in the past but this was discontinued by her prescribing doctor  The patient needs to follow up with his PCP in one day to discuss  This was documented on the patient's discharge instructions

## 2022-10-26 NOTE — ASSESSMENT & PLAN NOTE
· Echo showed ejection fraction 20-25%, left heart catheterization showed multivessel disease requiring CABG eval  · Evaluated by CT surgery 10/24/2022, recommended outpatient PCI, high risk for CABG due to low ejection fraction and renal dysfunction  He has high-grade obstructive disease in LAD, disease, in 1 of the right coronary branches and the distal circumflex  Per CT surgery CABG to the LAD would be an option but he is high risk, distal circumflex and right coronary branches are not ammenable to CABG due to small size  · Patient has new T-wave inversions (could have been present at [facility] earlier this month)    Denies chest pain or shortness of breath, reports fatigue with activities  · Troponins elevated but flat, likely non-MI trop elevation  · Cardiology following  · Continue aspirin, Lipitor, Coreg  · Started on Isordil and hydralazine

## 2022-10-27 NOTE — ED ATTENDING ATTESTATION
10/25/2022  ITiara DO, saw and evaluated the patient  I have discussed the patient with the resident/non-physician practitioner and agree with the resident's/non-physician practitioner's findings, Plan of Care, and MDM as documented in the resident's/non-physician practitioner's note, except where noted  All available labs and Radiology studies were reviewed  I was present for key portions of any procedure(s) performed by the resident/non-physician practitioner and I was immediately available to provide assistance  At this point I agree with the current assessment done in the Emergency Department  I have conducted an independent evaluation of this patient a history and physical is as follows:    53-year-old male presents with generalized weakness and fatigue that started this morning  Patient has been sleepy and also was noted to have a low blood sugar at home  Patient denies chest pain, no abdominal pain, no shortness of breath or fevers  Patient does have a LifeVest and was recently discharged from [facility] for chest pain  History limited secondary to patient's mental status  On exam-no acute distress, appears sleepy, heart regular, no respiratory distress    Plan-check labs    ED Course         Critical Care Time  Procedures

## 2022-11-23 ENCOUNTER — HOSPITAL ENCOUNTER (INPATIENT)
Facility: HOSPITAL | Age: 67
LOS: 13 days | Discharge: HOME | End: 2022-12-06
Attending: EMERGENCY MEDICINE | Admitting: INTERNAL MEDICINE

## 2022-11-23 ENCOUNTER — APPOINTMENT (EMERGENCY)
Dept: RADIOLOGY | Facility: HOSPITAL | Age: 67
End: 2022-11-23

## 2022-11-23 DIAGNOSIS — I50.23 ACUTE ON CHRONIC SYSTOLIC CONGESTIVE HEART FAILURE (HCC): ICD-10-CM

## 2022-11-23 DIAGNOSIS — R41.89 COGNITIVE CHANGE: ICD-10-CM

## 2022-11-23 DIAGNOSIS — E11.42 DIABETIC POLYNEUROPATHY ASSOCIATED WITH TYPE 2 DIABETES MELLITUS (HCC): ICD-10-CM

## 2022-11-23 DIAGNOSIS — I50.43 ACUTE ON CHRONIC COMBINED SYSTOLIC AND DIASTOLIC HEART FAILURE (HCC): ICD-10-CM

## 2022-11-23 DIAGNOSIS — F31.62 BIPOLAR AFFECTIVE DISORDER, MIXED, MODERATE (HCC): ICD-10-CM

## 2022-11-23 DIAGNOSIS — J90 PLEURAL EFFUSION, BILATERAL: ICD-10-CM

## 2022-11-23 DIAGNOSIS — F19.10 POLYSUBSTANCE ABUSE (HCC): ICD-10-CM

## 2022-11-23 DIAGNOSIS — F41.9 ANXIETY: ICD-10-CM

## 2022-11-23 DIAGNOSIS — N18.9 ACUTE KIDNEY INJURY SUPERIMPOSED ON CKD (HCC): ICD-10-CM

## 2022-11-23 DIAGNOSIS — R41.89 COGNITIVE IMPAIRMENT: ICD-10-CM

## 2022-11-23 DIAGNOSIS — R06.00 DYSPNEA: Primary | ICD-10-CM

## 2022-11-23 DIAGNOSIS — N17.9 AKI (ACUTE KIDNEY INJURY) (HCC): ICD-10-CM

## 2022-11-23 DIAGNOSIS — E11.65 UNCONTROLLED TYPE 2 DIABETES MELLITUS WITH HYPERGLYCEMIA (HCC): ICD-10-CM

## 2022-11-23 DIAGNOSIS — R77.8 ELEVATED TROPONIN: ICD-10-CM

## 2022-11-23 DIAGNOSIS — Z91.14 NONCOMPLIANCE WITH MEDICATION REGIMEN: ICD-10-CM

## 2022-11-23 DIAGNOSIS — N17.9 ACUTE KIDNEY INJURY SUPERIMPOSED ON CKD (HCC): ICD-10-CM

## 2022-11-23 LAB
2HR DELTA HS TROPONIN: 23 NG/L
4HR DELTA HS TROPONIN: -11 NG/L
ALBUMIN SERPL BCP-MCNC: 2.8 G/DL (ref 3.5–5)
ALP SERPL-CCNC: 133 U/L (ref 46–116)
ALT SERPL W P-5'-P-CCNC: 35 U/L (ref 12–78)
ANION GAP SERPL CALCULATED.3IONS-SCNC: 8 MMOL/L (ref 4–13)
AST SERPL W P-5'-P-CCNC: 18 U/L (ref 5–45)
ATRIAL RATE: 83 BPM
ATRIAL RATE: 86 BPM
BASE EX.OXY STD BLDV CALC-SCNC: 96.5 % (ref 60–80)
BASE EXCESS BLDV CALC-SCNC: -6.6 MMOL/L
BASOPHILS # BLD AUTO: 0.05 THOUSANDS/ÂΜL (ref 0–0.1)
BASOPHILS NFR BLD AUTO: 1 % (ref 0–1)
BILIRUB SERPL-MCNC: 0.5 MG/DL (ref 0.2–1)
BUN SERPL-MCNC: 72 MG/DL (ref 5–25)
CALCIUM ALBUM COR SERPL-MCNC: 9.9 MG/DL (ref 8.3–10.1)
CALCIUM SERPL-MCNC: 8.9 MG/DL (ref 8.3–10.1)
CARDIAC TROPONIN I PNL SERPL HS: 761 NG/L
CARDIAC TROPONIN I PNL SERPL HS: 772 NG/L
CARDIAC TROPONIN I PNL SERPL HS: 795 NG/L
CHLORIDE SERPL-SCNC: 108 MMOL/L (ref 96–108)
CO2 SERPL-SCNC: 19 MMOL/L (ref 21–32)
CREAT SERPL-MCNC: 3.51 MG/DL (ref 0.6–1.3)
EOSINOPHIL # BLD AUTO: 0.02 THOUSAND/ÂΜL (ref 0–0.61)
EOSINOPHIL NFR BLD AUTO: 0 % (ref 0–6)
ERYTHROCYTE [DISTWIDTH] IN BLOOD BY AUTOMATED COUNT: 13.8 % (ref 11.6–15.1)
FLUAV RNA RESP QL NAA+PROBE: NEGATIVE
FLUBV RNA RESP QL NAA+PROBE: NEGATIVE
GFR SERPL CREATININE-BSD FRML MDRD: 17 ML/MIN/1.73SQ M
GLUCOSE SERPL-MCNC: 215 MG/DL (ref 65–140)
GLUCOSE SERPL-MCNC: 295 MG/DL (ref 65–140)
GLUCOSE SERPL-MCNC: 362 MG/DL (ref 65–140)
HCO3 BLDV-SCNC: 18 MMOL/L (ref 24–30)
HCT VFR BLD AUTO: 28.1 % (ref 36.5–49.3)
HGB BLD-MCNC: 9 G/DL (ref 12–17)
IMM GRANULOCYTES # BLD AUTO: 0.08 THOUSAND/UL (ref 0–0.2)
IMM GRANULOCYTES NFR BLD AUTO: 1 % (ref 0–2)
LYMPHOCYTES # BLD AUTO: 0.76 THOUSANDS/ÂΜL (ref 0.6–4.47)
LYMPHOCYTES NFR BLD AUTO: 10 % (ref 14–44)
MCH RBC QN AUTO: 29.7 PG (ref 26.8–34.3)
MCHC RBC AUTO-ENTMCNC: 32 G/DL (ref 31.4–37.4)
MCV RBC AUTO: 93 FL (ref 82–98)
MONOCYTES # BLD AUTO: 0.69 THOUSAND/ÂΜL (ref 0.17–1.22)
MONOCYTES NFR BLD AUTO: 9 % (ref 4–12)
NEUTROPHILS # BLD AUTO: 6.39 THOUSANDS/ÂΜL (ref 1.85–7.62)
NEUTS SEG NFR BLD AUTO: 79 % (ref 43–75)
NRBC BLD AUTO-RTO: 0 /100 WBCS
NT-PROBNP SERPL-MCNC: ABNORMAL PG/ML
O2 CT BLDV-SCNC: 13.6 ML/DL
P AXIS: 67 DEGREES
P AXIS: 67 DEGREES
PCO2 BLDV: 32.7 MM HG (ref 42–50)
PH BLDV: 7.36 [PH] (ref 7.3–7.4)
PLATELET # BLD AUTO: 184 THOUSANDS/UL (ref 149–390)
PMV BLD AUTO: 10.3 FL (ref 8.9–12.7)
PO2 BLDV: 109.1 MM HG (ref 35–45)
POTASSIUM SERPL-SCNC: 4.6 MMOL/L (ref 3.5–5.3)
PR INTERVAL: 144 MS
PR INTERVAL: 146 MS
PROT SERPL-MCNC: 8.7 G/DL (ref 6.4–8.4)
QRS AXIS: 78 DEGREES
QRS AXIS: 80 DEGREES
QRSD INTERVAL: 96 MS
QRSD INTERVAL: 96 MS
QT INTERVAL: 342 MS
QT INTERVAL: 342 MS
QTC INTERVAL: 401 MS
QTC INTERVAL: 409 MS
RBC # BLD AUTO: 3.03 MILLION/UL (ref 3.88–5.62)
RSV RNA RESP QL NAA+PROBE: NEGATIVE
SARS-COV-2 RNA RESP QL NAA+PROBE: NEGATIVE
SODIUM SERPL-SCNC: 135 MMOL/L (ref 135–147)
T WAVE AXIS: 117 DEGREES
T WAVE AXIS: 139 DEGREES
VENTRICULAR RATE: 83 BPM
VENTRICULAR RATE: 86 BPM
WBC # BLD AUTO: 7.99 THOUSAND/UL (ref 4.31–10.16)

## 2022-11-23 RX ORDER — LANOLIN ALCOHOL/MO/W.PET/CERES
100 CREAM (GRAM) TOPICAL DAILY
Status: DISCONTINUED | OUTPATIENT
Start: 2022-11-23 | End: 2022-12-06 | Stop reason: HOSPADM

## 2022-11-23 RX ORDER — ASPIRIN 81 MG/1
81 TABLET ORAL DAILY
Status: DISCONTINUED | OUTPATIENT
Start: 2022-11-23 | End: 2022-12-06 | Stop reason: HOSPADM

## 2022-11-23 RX ORDER — HYDRALAZINE HYDROCHLORIDE 25 MG/1
25 TABLET, FILM COATED ORAL EVERY 8 HOURS
Status: DISCONTINUED | OUTPATIENT
Start: 2022-11-23 | End: 2022-11-27

## 2022-11-23 RX ORDER — INSULIN LISPRO 100 [IU]/ML
1-6 INJECTION, SOLUTION INTRAVENOUS; SUBCUTANEOUS
Status: DISCONTINUED | OUTPATIENT
Start: 2022-11-23 | End: 2022-12-06 | Stop reason: HOSPADM

## 2022-11-23 RX ORDER — QUETIAPINE FUMARATE 100 MG/1
100 TABLET, FILM COATED ORAL
Status: DISCONTINUED | OUTPATIENT
Start: 2022-11-23 | End: 2022-12-06 | Stop reason: HOSPADM

## 2022-11-23 RX ORDER — LANOLIN ALCOHOL/MO/W.PET/CERES
5 CREAM (GRAM) TOPICAL
Status: DISCONTINUED | OUTPATIENT
Start: 2022-11-23 | End: 2022-12-06 | Stop reason: HOSPADM

## 2022-11-23 RX ORDER — INSULIN LISPRO 100 [IU]/ML
2-12 INJECTION, SOLUTION INTRAVENOUS; SUBCUTANEOUS
Status: DISCONTINUED | OUTPATIENT
Start: 2022-11-23 | End: 2022-12-06 | Stop reason: HOSPADM

## 2022-11-23 RX ORDER — CARVEDILOL 12.5 MG/1
12.5 TABLET ORAL 2 TIMES DAILY WITH MEALS
Status: DISCONTINUED | OUTPATIENT
Start: 2022-11-23 | End: 2022-11-24

## 2022-11-23 RX ORDER — GABAPENTIN 300 MG/1
300 CAPSULE ORAL DAILY
Status: DISCONTINUED | OUTPATIENT
Start: 2022-11-23 | End: 2022-12-06 | Stop reason: HOSPADM

## 2022-11-23 RX ORDER — ONDANSETRON 4 MG/1
4 TABLET, ORALLY DISINTEGRATING ORAL EVERY 6 HOURS PRN
COMMUNITY

## 2022-11-23 RX ORDER — HEPARIN SODIUM 5000 [USP'U]/ML
5000 INJECTION, SOLUTION INTRAVENOUS; SUBCUTANEOUS EVERY 8 HOURS SCHEDULED
Status: DISCONTINUED | OUTPATIENT
Start: 2022-11-23 | End: 2022-12-06 | Stop reason: HOSPADM

## 2022-11-23 RX ORDER — HYDRALAZINE HYDROCHLORIDE 25 MG/1
25 TABLET, FILM COATED ORAL EVERY 8 HOURS
Status: DISCONTINUED | OUTPATIENT
Start: 2022-11-23 | End: 2022-11-23

## 2022-11-23 RX ORDER — ISOSORBIDE DINITRATE 10 MG/1
10 TABLET ORAL EVERY 8 HOURS
Status: DISCONTINUED | OUTPATIENT
Start: 2022-11-23 | End: 2022-11-26

## 2022-11-23 RX ORDER — FUROSEMIDE 10 MG/ML
40 INJECTION INTRAMUSCULAR; INTRAVENOUS
Status: DISCONTINUED | OUTPATIENT
Start: 2022-11-23 | End: 2022-11-24

## 2022-11-23 RX ORDER — CHOLECALCIFEROL (VITAMIN D3) 125 MCG
10 CAPSULE ORAL
Status: DISCONTINUED | OUTPATIENT
Start: 2022-11-23 | End: 2022-11-23

## 2022-11-23 RX ORDER — FLUOXETINE HYDROCHLORIDE 20 MG/1
40 CAPSULE ORAL DAILY
Status: DISCONTINUED | OUTPATIENT
Start: 2022-11-23 | End: 2022-12-06 | Stop reason: HOSPADM

## 2022-11-23 RX ORDER — INSULIN GLARGINE 100 [IU]/ML
10 INJECTION, SOLUTION SUBCUTANEOUS
Status: DISCONTINUED | OUTPATIENT
Start: 2022-11-23 | End: 2022-12-04

## 2022-11-23 RX ORDER — AMOXICILLIN AND CLAVULANATE POTASSIUM 500; 125 MG/1; MG/1
1 TABLET, FILM COATED ORAL EVERY 12 HOURS SCHEDULED
COMMUNITY
End: 2022-12-06

## 2022-11-23 RX ORDER — ATORVASTATIN CALCIUM 40 MG/1
40 TABLET, FILM COATED ORAL
Status: DISCONTINUED | OUTPATIENT
Start: 2022-11-23 | End: 2022-12-06 | Stop reason: HOSPADM

## 2022-11-23 RX ORDER — INSULIN LISPRO 100 [IU]/ML
2-12 INJECTION, SOLUTION INTRAVENOUS; SUBCUTANEOUS
Status: DISCONTINUED | OUTPATIENT
Start: 2022-11-23 | End: 2022-11-23

## 2022-11-23 RX ADMIN — HEPARIN SODIUM 5000 UNITS: 5000 INJECTION INTRAVENOUS; SUBCUTANEOUS at 15:23

## 2022-11-23 RX ADMIN — HEPARIN SODIUM 5000 UNITS: 5000 INJECTION INTRAVENOUS; SUBCUTANEOUS at 21:06

## 2022-11-23 RX ADMIN — THIAMINE HCL TAB 100 MG 100 MG: 100 TAB at 15:22

## 2022-11-23 RX ADMIN — ISOSORBIDE DINITRATE 10 MG: 10 TABLET ORAL at 21:05

## 2022-11-23 RX ADMIN — FUROSEMIDE 40 MG: 10 INJECTION, SOLUTION INTRAVENOUS at 15:25

## 2022-11-23 RX ADMIN — CARVEDILOL 12.5 MG: 12.5 TABLET, FILM COATED ORAL at 17:13

## 2022-11-23 RX ADMIN — ASPIRIN 81 MG: 81 TABLET, COATED ORAL at 15:22

## 2022-11-23 RX ADMIN — INSULIN GLARGINE 10 UNITS: 100 INJECTION, SOLUTION SUBCUTANEOUS at 21:06

## 2022-11-23 RX ADMIN — QUETIAPINE FUMARATE 100 MG: 100 TABLET ORAL at 21:05

## 2022-11-23 RX ADMIN — GABAPENTIN 300 MG: 300 CAPSULE ORAL at 15:22

## 2022-11-23 RX ADMIN — ATORVASTATIN CALCIUM 40 MG: 40 TABLET, FILM COATED ORAL at 21:05

## 2022-11-23 RX ADMIN — INSULIN LISPRO 6 UNITS: 100 INJECTION, SOLUTION INTRAVENOUS; SUBCUTANEOUS at 17:14

## 2022-11-23 RX ADMIN — HYDRALAZINE HYDROCHLORIDE 25 MG: 25 TABLET, FILM COATED ORAL at 21:06

## 2022-11-23 RX ADMIN — INSULIN LISPRO 2 UNITS: 100 INJECTION, SOLUTION INTRAVENOUS; SUBCUTANEOUS at 21:35

## 2022-11-23 NOTE — ED ATTENDING ATTESTATION
Final Diagnosis:  1  Dyspnea    2  Pleural effusion, bilateral    3  Noncompliance with medication regimen      ED Course as of 11/23/22 1324   Wed Nov 23, 2022   1147 POCUS Cardiac/Lung/IVC:  - transthoracic echocardiogram was performed at bedside by myself and resident  - Images collected were adequate  - This was a technically difficult study due to lung interference  - Apical, parasternal, subcostal views were obtained  - Findings: There was no obvious pericardial effusion   +bilateral obvious pleural effusions: LEFT small/trace; RIGHT small/moderate  LV function / EF grossly reduced (<30%), especially near septum  IVC was plump, 2 2cm with <50% compressibility    TAPSE reduced but was off-axis cut    EPSS 1 1cm   B-lines were not present    Consistent with chronic systolic dysfunction but some findings for fluid overload  1323 NT-proBNP(!): 85,152   1323 hs TnI 0hr(!): 772   1323 Creatinine(!): 3 51  LAZARO       I, Soo Campbell MD, saw and evaluated the patient  All available labs and X-rays were ordered by me or the resident and have been reviewed by myself  I discussed the patient with the resident / non-physician and agree with the resident's / non-physician practitioner's findings and plan as documented in the resident's / non-physician practicitioner's note, except where noted  At this point, I agree with the current assessment done in the ED  I was present during key portions of all procedures performed unless otherwise stated  Chief Complaint   Patient presents with   • Shortness of Breath     Pt c/o SOB, midsternal CP, and abdominal distension for past two days, pt hx HF, pt states he stopped taking his diuretics for past two days, reports he tested flu + for primary care provider     This is a 74 y o  male presenting for evaluation of CHF likely  The patient has been having CP, and belly distention for several days, getting worse    No f/ch/n/v   +orthopnea   +non-compliant with medications for 2-3 days  Denies any urinary tract infection symptoms (burning, itching, pain, blood, frequency)  Flu positive? PMH:   has a past medical history of Anxiety, Arthritis, CHF (congestive heart failure) ([email or web address] ), Coronary artery disease, Dementia ([facility] ), Depression, Diabetes mellitus ([facility] 75 ), Infectious viral hepatitis, Memory loss, and Visual impairment  PSH:   has no past surgical history on file  Social:  Social History     Substance and Sexual Activity   Alcohol Use Yes    Comment: socially     Social History     Tobacco Use   Smoking Status Former   Smokeless Tobacco Never     Social History     Substance and Sexual Activity   Drug Use Never     PE:  Vitals:    11/23/22 1029 11/23/22 1224   BP: 155/90 (!) 174/95   BP Location: Left arm    Pulse: 85 82   Resp: (!) 25 19   Temp: 98 5 °F (36 9 °C)    TempSrc: Oral    SpO2: 95% 92%   General: VSS, NAD, awake, alert  Well-nourished, well-developed  Appears stated age  Head: Normocephalic, atraumatic, nontender  Eyes: PERRL, EOM-I  No diplopia  No hyphema  No subconjunctival hemorrhages  Symmetrical lids  ENTAtraumatic external nose and ears  MMM  No stridor  Normal phonation  No drooling  Base of mouth is soft  No mastoid tenderness  Neck: Symmetric, trachea midline  No JVD  CV: Peripheral pulses +2 throughout  No chest wall tenderness  Lungs:   Unlabored   No retractions  No crepitus  +tachypnea  No paradoxical motion  Abd: +BS, soft, NT/ND    MSK:   FROM   No lower extremity edema  Back:   No CVAT  Skin: Dry, intact  Neuro: AAOx3, GCS 15, CN II-XII grossly intact  Motor grossly intact  Psychiatric/Behavioral: He is distractable but will eventually answer questions though sometimes I have to repeat them     Exam: deferred  A:  - Dyspnea  P:  - CHF due to non-compliance of medications highly suspected  - diuretics, admission  - not candidate for thoracentesis due to not following instructions  - 13 point ROS was performed and all are normal unless stated in the history above  - Nursing note reviewed  Vitals reviewed  - Orders placed by myself and/or advanced practitioner / resident     - Previous chart was reviewed  - No language barrier    - History obtained from patient  - There are limitations to the history obtained  Reasons ROS could not be obtained: delerium (slight)  - Critical care time: Not applicable for this patient  Code Status: Prior  Advance Directive and Living Will:      Power of :    POLST:      Medications - No data to display  XR chest 1 view portable   ED Interpretation   Mild pulmonary edema  Final Result      Mild pulmonary vascular congestion  Workstation performed: [id number]           Orders Placed This Encounter   Procedures   • POC Cardiac US   • XR chest 1 view portable   • CBC and differential   • Comprehensive metabolic panel   • HS Troponin 0hr (reflex protocol)   • NT-BNP PRO   • HS Troponin I 2hr   • HS Troponin I 4hr   • Notify physician of an increase in chest pain, symptomatic hypotension, a change in cardiac rhythm, or an O2 saturation of less than 90%  • Notify physician immediately if patient has persistent Chest Pain     • Insert peripheral IV   • Continuous cardiac monitoring   • Continuous pulse oximetry   • 48 Hour Telemetry Monitoring   • Inpatient Consult to Case Management   • Inpatient consult to Heart Failure Service   • ECG 12 lead   • ECG 12 lead   • INPATIENT ADMISSION     Labs Reviewed   CBC AND DIFFERENTIAL - Abnormal       Result Value Ref Range Status    WBC 7 99  4 31 - 10 16 Thousand/uL Final    RBC 3 03 (*) 3 88 - 5 62 Million/uL Final    Hemoglobin 9 0 (*) 12 0 - 17 0 g/dL Final    Hematocrit 28 1 (*) 36 5 - 49 3 % Final    MCV 93  82 - 98 fL Final    MCH 29 7  26 8 - 34 3 pg Final    MCHC 32 0  31 4 - 37 4 g/dL Final    RDW 13 8  11 6 - 15 1 % Final    MPV 10 3  8 9 - 12 7 fL Final    Platelets 893  799 - 390 Thousands/uL Final    nRBC 0  /100 WBCs Final    Neutrophils Relative 79 (*) 43 - 75 % Final    Immat GRANS % 1  0 - 2 % Final    Lymphocytes Relative 10 (*) 14 - 44 % Final    Monocytes Relative 9  4 - 12 % Final    Eosinophils Relative 0  0 - 6 % Final    Basophils Relative 1  0 - 1 % Final    Neutrophils Absolute 6 39  1 85 - 7 62 Thousands/µL Final    Immature Grans Absolute 0 08  0 00 - 0 20 Thousand/uL Final    Lymphocytes Absolute 0 76  0 60 - 4 47 Thousands/µL Final    Monocytes Absolute 0 69  0 17 - 1 22 Thousand/µL Final    Eosinophils Absolute 0 02  0 00 - 0 61 Thousand/µL Final    Basophils Absolute 0 05  0 00 - 0 10 Thousands/µL Final   COMPREHENSIVE METABOLIC PANEL - Abnormal    Sodium 135  135 - 147 mmol/L Final    Potassium 4 6  3 5 - 5 3 mmol/L Final    Chloride 108  96 - 108 mmol/L Final    CO2 19 (*) 21 - 32 mmol/L Final    ANION GAP 8  4 - 13 mmol/L Final    BUN 72 (*) 5 - 25 mg/dL Final    Creatinine 3 51 (*) 0 60 - 1 30 mg/dL Final    Comment: Standardized to IDMS reference method    Glucose 362 (*) 65 - 140 mg/dL Final    Comment: If the patient is fasting, the ADA then defines impaired fasting glucose as > 100 mg/dL and diabetes as > or equal to 123 mg/dL  Specimen collection should occur prior to Sulfasalazine administration due to the potential for falsely depressed results  Specimen collection should occur prior to Sulfapyridine administration due to the potential for falsely elevated results  Calcium 8 9  8 3 - 10 1 mg/dL Final    Corrected Calcium 9 9  8 3 - 10 1 mg/dL Final    AST 18  5 - 45 U/L Final    Comment: Specimen collection should occur prior to Sulfasalazine administration due to the potential for falsely depressed results  ALT 35  12 - 78 U/L Final    Comment: Specimen collection should occur prior to Sulfasalazine and/or Sulfapyridine administration due to the potential for falsely depressed results       Alkaline Phosphatase 133 (*) 46 - 116 U/L Final    Total Protein 8 7 (*) 6 4 - 8 4 g/dL Final    Albumin 2 8 (*) 3 5 - 5 0 g/dL Final    Total Bilirubin 0 50  0 20 - 1 00 mg/dL Final    Comment: Use of this assay is not recommended for patients undergoing treatment with eltrombopag due to the potential for falsely elevated results  eGFR 17  ml/min/1 73sq m Final    Narrative:     [place] guidelines for Chronic Kidney Disease (CKD):   •  Stage 1 with normal or high GFR (GFR > 90 mL/min/1 73 square meters)  •  Stage 2 Mild CKD (GFR = 60-89 mL/min/1 73 square meters)  •  Stage 3A Moderate CKD (GFR = 45-59 mL/min/1 73 square meters)  •  Stage 3B Moderate CKD (GFR = 30-44 mL/min/1 73 square meters)  •  Stage 4 Severe CKD (GFR = 15-29 mL/min/1 73 square meters)  •  Stage 5 End Stage CKD (GFR <15 mL/min/1 73 square meters)  Note: GFR calculation is accurate only with a steady state creatinine   HS TROPONIN I 0HR - Abnormal    hs TnI 0hr 772 (*) "Refer to ACS Flowchart"- see link ng/L Final    Comment:                                              Initial (time 0) result  If >=50 ng/L, Myocardial injury suggested ;  Type of myocardial injury and treatment strategy  to be determined  If 5-49 ng/L, a delta result at 2 hours and or 4 hours will be needed to further evaluate  If <4 ng/L, and chest pain has been >3 hours since onset, patient may qualify for discharge based on the HEART score in the ED  If <5 ng/L and <3hours since onset of chest pain, a delta result at 2 hours will be needed to further evaluate  HS Troponin 99th Percentile URL of a Health Population=12 ng/L with a 95% Confidence Interval of 8-18 ng/L  Second Troponin (time 2 hours)  If calculated delta >= 20 ng/L,  Myocardial injury suggested ; Type of myocardial injury and treatment strategy to be determined  If 5-49 ng/L and the calculated delta is 5-19 ng/L, consult medical service for evaluation    Continue evaluation for ischemia on ecg and other possible etiology and repeat hs troponin at 4 hours  If delta is <5 ng/L at 2 hours, consider discharge based on risk stratification via the HEART score (if in ED), or LOGAN risk score in IP/Observation  HS Troponin 99th Percentile URL of a Health Population=12 ng/L with a 95% Confidence Interval of 8-18 ng/L  NT-BNP PRO (BRAIN NATRIURETIC PEPTIDE) - Abnormal    NT-proBNP 85,152 (*) <125 pg/mL Final   HS TROPONIN I 2HR   HS TROPONIN I 4HR     Time reflects when diagnosis was documented in both MDM as applicable and the Disposition within this note     Time User Action Codes Description Comment    11/23/2022 11:51 AM Mliss Chloe Add [J90] Chronic bilateral pleural effusions     11/23/2022 11:51 AM Armani Juarez Remove [J90] Chronic bilateral pleural effusions     11/23/2022 11:51 AM Armani Juarez Add [J90] Pleural effusion, bilateral     11/23/2022 11:51 AM Armani Juarez Add [R06 00] Dyspnea     11/23/2022 11:51 AM Mliss Chloe Add [Z91 14] Noncompliance with medication regimen     11/23/2022 11:51 AM Armani Juarez Modify [J90] Pleural effusion, bilateral     11/23/2022 11:51 AM Mliss Chloe Modify [R06 00] Dyspnea       ED Disposition     ED Disposition   Admit    Condition   Stable    Date/Time   Wed Nov 23, 2022 12:50 PM    Comment   Case was discussed with Dr Akua Crespo and the patient's admission status was agreed to be Admission Status: inpatient status to the service of Dr Akua Crespo   Follow-up Information    None       Patient's Medications   Discharge Prescriptions    No medications on file     No discharge procedures on file  Prior to Admission Medications   Prescriptions Last Dose Informant Patient Reported? Taking?    Acetaminophen Extra Strength 500 MG tablet   Yes No   Sig: Take 500 mg by mouth every 6 (six) hours as needed   Aspirin Low Dose 81 MG EC tablet Unknown  Yes No   Sig: Take 81 mg by mouth daily   Dapagliflozin Propanediol 5 MG TABS Unknown  Yes No   Sig: Take 5 mg by mouth daily  Indications: Type 2 Diabetes   Disposable Gloves MISC   No No   Sig: Use daily Large   FLUoxetine (PROzac) 20 mg capsule Unknown  No No   Sig: Take 1 capsule (20 mg total) by mouth daily   Patient taking differently: Take 40 mg by mouth daily   INSULIN ASPART FLEXPEN SC   Yes No   Sig: Inject 30 Units under the skin daily before breakfast  30 units in AM with breakfast and 10 units at Dinner  Indications: Type 2 Diabetes   Incontinence Supply Disposable (Disposable Brief Large) MISC   No No   Sig: Use daily   Incontinence Supply Disposable (RA Adult Wipes) MISC   No No   Sig: Use in the morning   Incontinence Supply Disposable MISC   No No   Sig: Use daily XL Disposable Bed Incontinence Pads   Lancet Devices (Distil Networksuch Delica Plus Lancing) MISC   Yes No   Sig: Use 1 Dose in the morning Use as directed    Melatonin 5 MG TABS Unknown  Yes No   Sig: Take 10 mg by mouth daily at bedtime as needed (insomnia)   QUEtiapine (SEROquel) 100 mg tablet Unknown  Yes No   Sig: Take 100 mg by mouth daily at bedtime  Indications: Major Depressive Disorder   Skin Protectants, Misc  (dimethicone-zinc oxide) cream   No No   Sig: Apply topically 2 (two) times a day as needed for irritation   amoxicillin-clavulanate (AUGMENTIN) 500-125 mg per tablet   Yes No   Sig: Take 1 tablet by mouth every 12 (twelve) hours   atorvastatin (LIPITOR) 40 mg tablet Unknown  Yes No   Sig: Take 40 mg by mouth daily at bedtime   carvedilol (COREG) 12 5 mg tablet Unknown  Yes No   Sig: Take 12 5 mg by mouth 2 (two) times a day with meals   Indications: Cardiac Failure   folic acid (FOLVITE) 1 mg tablet   No No   Sig: Take 1 tablet (1 mg total) by mouth daily   Patient taking differently: Take 400 mcg by mouth daily   gabapentin (Neurontin) 300 mg capsule Unknown  No No   Sig: Take 1 capsule (300 mg total) by mouth 3 (three) times a day   Patient taking differently: Take 300 mg by mouth daily   glucose blood (Accu-Chek Guide) test strip   No No   Sig: Test sugars four times daily   hydrALAZINE (APRESOLINE) 25 mg tablet Unknown  No No   Sig: Take 1 tablet (25 mg total) by mouth every 8 (eight) hours   isosorbide dinitrate (ISORDIL) 10 mg tablet Unknown  No No   Sig: Take 1 tablet (10 mg total) by mouth every 8 (eight) hours   ondansetron (ZOFRAN-ODT) 4 mg disintegrating tablet   Yes No   Sig: Take 4 mg by mouth every 6 (six) hours as needed for nausea or vomiting   thiamine (VITAMIN B1) 100 mg tablet Unknown  No No   Sig: Take 1 tablet (100 mg total) by mouth daily   torsemide (DEMADEX) 20 mg tablet Unknown  Yes No   Sig: Take 40 mg by mouth every other day Take 2 tabs daily  Take as needed for 3lb weight gain within 2 days      Facility-Administered Medications: None       Portions of the record may have been created with voice recognition software  Occasional wrong word or "sound a like" substitutions may have occurred due to the inherent limitations of voice recognition software  Read the chart carefully and recognize, using context, where substitutions have occurred      Electronically signed by:  Linette Caicedo

## 2022-11-23 NOTE — CONSULTS
Consultation - Nephrology   THE [facility] 74 y o  male MRN: [id number]  Unit/Bed#: ED 20 Encounter: [id number]    ASSESSMENT/PLAN:  LAZARO (POA) on CKD IV:  Suspect multifactorial with component prerenal azotemia, cardiorenal syndrome   -Admission creatinine 3 51 on 11/23/22   -Peak creatinine 3 51  trending  -baseline creatinine recently appears 2 1-2 7 since July  -history of multiple episodes of LAZARO recently in April, May, June, July  -reported history of LAZARO requiring hemodialysis and Carmella   Details unclear   -workup: UA on 10/25/2022 with 2+ protein, 1-2 RBCs, 1-2 wbc's, 3-5 hyaline casts   -Imaging:  Renal ultrasound July 2022 with bilateral renal cysts but no hydronephrosis  -volume status hypervolemic  -nonoliguric   -Plan:  • Clinically, no acute indication for renal replacement therapy (dialysis)  • Diurese per primary service  On IV Lasix 40 mg b i d  • Check UA  • Bladder scan, PVR, urinary retention protocol  • Strict I/Os, daily weights  • Avoid nephrotoxins, NSAIDs, IV contrast if possible  • Avoid hypotension  Maintain MAP >65  • Trend BMP  • Adjust meds to appropriate GFR  • Optimize hemodynamic status to avoid delay in renal recovery  • d/w Dr Cristi Santos    Chronic kidney disease IV:    -Etiology:  Suspect secondary to cardiorenal syndrome, hypertensive nephrosclerosis, age-related nephron loss and multiple episodes of LAZARO  -Baseline creatinine recently appears 2 1-2 7, previously 1 7-1 9  -Outpatient Nephrologist:  Dr Yony Ferrari with [facility]  -Will need follow-up with established nephrologist on discharge    Hypertension/Volume status:  -BP hypertensive, in the setting of volume overload  -volume status hypervolemic  -Home medications:  Carvedilol 12 5 mg b i d , Isordil 10 mg q 8 hours, torsemide 40 mg every other day  , hydralazine 25 mg q 8 hours  -Current medications:  Carvedilol 12 5 mg b i d , Lasix 40 mg IV b i d  hydralazine 25 mg q 8 hours, Isordil 10 mg q 8 hours  -Optimize hemodynamic status to avoid delay in renal recovery  -recommend hold parameters on antihypertensive's for SBP <130 mmHg  -Avoid hypotension or fluctuations in blood pressure  Maintain MAP >65  -low sodium (2 gm) diet  -continue to trend    Acute on chronic HFrEF:  -presenting with progressive dyspnea, chest pain and confusion with diuretics noncompliance  -EF 30-35% with global hypokinesis, G2 DD  -home diuretics: Torsemide 40 mg every other day with noncompliance  -on IV Lasix 40 mg b i d   -fluid restriction  -strict I/Os, daily weights  -low-sodium (2 g) diet  -management per primary team    Low bicarbonate level:  -in the setting of advanced kidney disease   -current bicarb unchanged at 19, AG 8  -continue to monitor    Proteinuria:  -UA with 2+ protein in October  -check UA now and repeat in steady state  -continue follow-up    Anemia of CKD:  -Hgb 9 0  Goal >10  -iron studies in June 2022 normal  -continue to trend  -Transfuse for Hgb <7 0  -management per primary team    Bone Mineral Disease of CKD:  -phosphorus 3 5 on 9/15/22  -continue to monitor and follow phosphorus, PTH, calcium, magnesium as outpatient     DM II:  -stable  -HgbA1C 7 7  -continue to optimize glycemic control to slow progression of chronic kidney disease  -management per primary team      HISTORY OF PRESENT ILLNESS:  Requesting Physician: Benson Escobedo MD  Reason for Consult: LAZARO, POA on CKD    Jose Juan Wilson is a 74y o  year old male with CKD 3 with multiple episodes of prior LAZARO with one episode requiring dialysis and Carmella followed by [facility], DM 2, CAD, chronic HFrEF with EF 30%, HTN, HLD, bipolar disorder, remote history of polysubstance abuse, hepatitis-C who was admitted to [facility] after presenting with confusion, weakness, dyspnea, chest pain and reported noncompliance with home diuretics  Creatinine 3 5 on admission with most recent baseline creatinine and appearing 2 1-2 7  Most recent creatinine 2 61 on 11/15/2022    Patient with multiple episodes of LAZARO and admissions for acute exacerbation of heart failure  Patient recently admitted to [place] [id number]  from 11/13/-11/15 for acute exacerbation of CHF  Patient diuresed with IV diuretics  Creatinine 2 61 on hospital discharge  Patient presently confused regarding medications and history and unable to provide accurate history  EMR reviewed  A renal consultation is requested today for assistance in the management of LAZARO on CKD  PAST MEDICAL HISTORY:  Past Medical History:   Diagnosis Date   • Anxiety    • Arthritis    • CHF (congestive heart failure) ([facility] )    • Coronary artery disease    • Dementia ([place] [address] )    • Depression    • Diabetes mellitus ([place] [address] )    • Infectious viral hepatitis    • Memory loss    • Visual impairment        PAST SURGICAL HISTORY:  History reviewed  No pertinent surgical history  ALLERGIES:  No Known Allergies    SOCIAL HISTORY:  Social History     Substance and Sexual Activity   Alcohol Use Yes    Comment: socially     Social History     Substance and Sexual Activity   Drug Use Never     Social History     Tobacco Use   Smoking Status Former   Smokeless Tobacco Never       FAMILY HISTORY:  History reviewed  No pertinent family history  MEDICATIONS:    Current Facility-Administered Medications:   •  furosemide (LASIX) injection 40 mg, 40 mg, Intravenous, BID (diuretic), EMMA Skinner    Current Outpatient Medications:   •  Acetaminophen Extra Strength 500 MG tablet, Take 500 mg by mouth every 6 (six) hours as needed, Disp: , Rfl:   •  amoxicillin-clavulanate (AUGMENTIN) 500-125 mg per tablet, Take 1 tablet by mouth every 12 (twelve) hours, Disp: , Rfl:   •  Aspirin Low Dose 81 MG EC tablet, Take 81 mg by mouth daily, Disp: , Rfl:   •  atorvastatin (LIPITOR) 40 mg tablet, Take 40 mg by mouth daily at bedtime, Disp: , Rfl:   •  carvedilol (COREG) 12 5 mg tablet, Take 12 5 mg by mouth 2 (two) times a day with meals   Indications: Cardiac Failure, Disp: , Rfl: •  Dapagliflozin Propanediol 5 MG TABS, Take 5 mg by mouth daily   Indications: Type 2 Diabetes, Disp: , Rfl:   •  Disposable Gloves MISC, Use daily Large, Disp: 100 each, Rfl: 0  •  FLUoxetine (PROzac) 20 mg capsule, Take 1 capsule (20 mg total) by mouth daily (Patient taking differently: Take 40 mg by mouth daily), Disp: 90 capsule, Rfl: 1  •  folic acid (FOLVITE) 1 mg tablet, Take 1 tablet (1 mg total) by mouth daily (Patient taking differently: Take 400 mcg by mouth daily), Disp: , Rfl: 0  •  gabapentin (Neurontin) 300 mg capsule, Take 1 capsule (300 mg total) by mouth 3 (three) times a day (Patient taking differently: Take 300 mg by mouth daily), Disp: 90 capsule, Rfl: 0  •  glucose blood (Accu-Chek Guide) test strip, Test sugars four times daily, Disp: 400 each, Rfl: 1  •  hydrALAZINE (APRESOLINE) 25 mg tablet, Take 1 tablet (25 mg total) by mouth every 8 (eight) hours, Disp: 90 tablet, Rfl: 0  •  Incontinence Supply Disposable (Disposable Brief Large) MISC, Use daily, Disp: 72 each, Rfl: 0  •  Incontinence Supply Disposable (RA Adult Wipes) MISC, Use in the morning, Disp: 128 each, Rfl: 0  •  Incontinence Supply Disposable MISC, Use daily XL Disposable Bed Incontinence Pads, Disp: 100 each, Rfl: 0  •  INSULIN ASPART FLEXPEN SC, Inject 30 Units under the skin daily before breakfast  30 units in AM with breakfast and 10 units at Dinner  Indications: Type 2 Diabetes, Disp: , Rfl:   •  isosorbide dinitrate (ISORDIL) 10 mg tablet, Take 1 tablet (10 mg total) by mouth every 8 (eight) hours, Disp: 90 tablet, Rfl: 0  •  Lancet Devices (OneTouch Delica Plus Lancing) MISC, Use 1 Dose in the morning Use as directed , Disp: , Rfl:   •  Melatonin 5 MG TABS, Take 10 mg by mouth daily at bedtime as needed (insomnia), Disp: , Rfl:   •  ondansetron (ZOFRAN-ODT) 4 mg disintegrating tablet, Take 4 mg by mouth every 6 (six) hours as needed for nausea or vomiting, Disp: , Rfl:   •  QUEtiapine (SEROquel) 100 mg tablet, Take 100 mg by mouth daily at bedtime  Indications: Major Depressive Disorder, Disp: , Rfl:   •  Skin Protectants, Misc  (dimethicone-zinc oxide) cream, Apply topically 2 (two) times a day as needed for irritation, Disp: 142 g, Rfl: 2  •  thiamine (VITAMIN B1) 100 mg tablet, Take 1 tablet (100 mg total) by mouth daily, Disp: , Rfl: 0  •  torsemide (DEMADEX) 20 mg tablet, Take 40 mg by mouth every other day Take 2 tabs daily  Take as needed for 3lb weight gain within 2 days, Disp: , Rfl:     REVIEW OF SYSTEMS:  A complete 10 point review of systems was performed and found to be negative unless otherwise noted in the history of present illness  General: No fevers, chills  Cardiovascular:  + chest pain, No leg edema  Respiratory: No cough, sputum production,  + shortness of breath  Gastrointestinal:  No nausea/vomiting,  + diarrhea,  + abdominal pain  Genitourinary: No hematuria  No foamy urine  No dysuria    PHYSICAL EXAM:  Current Weight:    First Weight:    Vitals:    11/23/22 1029 11/23/22 1224   BP: 155/90 (!) 174/95   BP Location: Left arm    Pulse: 85 82   Resp: (!) 25 19   Temp: 98 5 °F (36 9 °C)    TempSrc: Oral    SpO2: 95% 92%     No intake or output data in the 24 hours ending 11/23/22 1423  General:  Awake, alert, appears comfortable and in no acute distress  Nontoxic  Skin:  No rash, warm, good skin turgor   Eyes:  PERRL, EOMI, sclerae nonicteric   no periorbital edema   ENT:  Moist mucous membranes  Neck:  Trachea midline, symmetric  No JVD  No carotid bruits  Chest:  Bibasilar crackles  CVS:  Regular rate and rhythm without murmur, gallop or rub  S1 and S2 identified and normal   No S3, S4    Abdomen:  Soft, nontender, nondistended without masses  Normal bowel sounds x 4 quadrants  No bruit  Extremities:  Warm, pink, motor and sensory intact and well perfused  No cyanosis, pallor  No BLE edema    Neuro:  Awake, alert, oriented x2   confused regarding medications and history Grossly intact  Psych:  Appropriate affect  Mentating appropriately  Normal mental status exam but confused  Invasive Devices:  None       Lab Results:   Results from last 7 days   Lab Units 11/23/22  1121   WBC Thousand/uL 7 99   HEMOGLOBIN g/dL 9 0*   HEMATOCRIT % 28 1*   PLATELETS Thousands/uL 184   SODIUM mmol/L 135   POTASSIUM mmol/L 4 6   CHLORIDE mmol/L 108   CO2 mmol/L 19*   BUN mg/dL 72*   CREATININE mg/dL 3 51*   CALCIUM mg/dL 8 9   ALK PHOS U/L 133*   ALT U/L 35   AST U/L 18       Imaging Studies:  Renal ultrasound 7/17/2022:  Imaging study reviewed  No evidence of hydronephrosis  Normal echogenicity and contour bilaterally  2 5 x 2 x 1 7 cm right interpolar simple cysts,  1 6 x 1 1 x 1 1 upper pole left simple cyst and 8 x 7 x 7 mm left lower pole cyst     CXR 11/23/2022:  Imaging study reviewed  Mild pulmonary vascular congestion  No pneumothorax or effusion  EMR, including Epic and Care Everywhere, reviewed  I have personally reviewed the blood work as stated above and in my note  I have personally reviewed renal ultrasound, CXR imaging studies  I have personally reviewed internal Medicine, co-consultants and prior nephrology notes

## 2022-11-23 NOTE — ASSESSMENT & PLAN NOTE
· On coreg, hydralazine, and isosorbide dinitrate for this outpatient  Continue while inpatient     · Monitor BP, avoid hypotension

## 2022-11-23 NOTE — H&P
[address] [facility]  H&P- Konstantin Feliz 1955, 74 y o  male MRN: [id number]  Unit/Bed#: ED 20 Encounter: [id number]  Primary Care Provider: Malia Lundberg DO   Date and time admitted to hospital: 11/23/2022 10:54 AM    * Acute on chronic combined systolic and diastolic congestive heart failure [facility])  Assessment & Plan  Wt Readings from Last 3 Encounters:   10/26/22 90 4 kg (199 lb 4 8 oz)   10/16/22 84 1 kg (185 lb 6 4 oz)   09/22/22 84 kg (185 lb 3 2 oz)     Presented to ED with complaints of SOB  Has a history of noncompliance with his medications and was recently seen in [facility] ED on 11/13 for CHF exacerbation as well  · Takes torsemide 40 mg QD and as needed for a 3 pound weight gain in 2 days, unsure when he took this last    · Echo at bedside: No obvious pericardial effusion, +bilateral obvious pleural effusions: LEFT small/trace; RIGHT small/moderate  EF grossly reduced (<30%), especially near septum  IVC was plump, 2 2cm with <50% compressibility  B-lines were not present  Consistent with chronic systolic dysfunction but some findings for fluid overload  · Follows with cardiology outpatient and was planning for CT surg consult for CABG evaluation, had ED visit in 10/2022 which states he should continue wearing his life vest, not presently wearing  · Baseline weight appears to be 180-185 pounds  · CXR: Mild pulmonary vascular congestion  · BNP: 85,152  · Consult to heart failure placed  · Will start on IV Lasix and assess patient's response while waiting for HF recommendations  Monitor creatinine  · Monitor I&Os and daily weights, 48 hour telemetry monitoring  · Fluid restriction to 1500 mL and 2 g sodium restriction    Elevated troponin  Assessment & Plan  Of note, pt follows with [facility] cardiology and underwent ischemic workup  Last Echo with EF20-25%  He is supposed to be wearing a lifevest but is noncompliant with this   He was also supposed to follow with CT surgery for CABG evaluation which he did not follow through with  · EKG sinus rhythm with PACs  · Trend troponins   · Continue telemetry    Type 2 diabetes mellitus ([facility] [address] )  Assessment & Plan  Lab Results   Component Value Date    HGBA1C 7 7 (H) 10/03/2022       No results for input(s): POCGLU in the last 72 hours  Blood Sugar Average: Last 72 hrs:  · Outpatient on Farxiga and Novolog inject 30 units under the skin daily with morning meals and 10 units with pm meals  · Follows with endocrinology outpatient  · Add SSI and accucheck QID; lantus 10u HS  · Diabetic diet  · Hypoglycemia protocol    LAZARO superimposed on CKD (chronic kidney disease) stage 4, GFR 15-29 ml/min [facility])  Assessment & Plan  Lab Results   Component Value Date    EGFR 17 11/23/2022    EGFR 29 10/26/2022    EGFR 28 10/25/2022    CREATININE 3 51 (H) 11/23/2022    CREATININE 2 28 (H) 10/26/2022    CREATININE 2 31 (H) 10/25/2022     · Baseline creatinine appears to be approximately 2 1-2 5  · With LAZARO superimposed on CKD likely in the setting of acute CHF exacerbation and suspected demand ischemia  · Managed on torsemide 40 QD as an outpatient, unsure medication compliance; will diurese with IV lasix and assess response  · Nephrology consulted; agree with diuresis and recommend PVR and daily labs  · Avoid hypotension, nephrotoxins and NSAIDs  · Strict I/O and daily weights  · 1500mL fluid restriction    Mixed hyperlipidemia  Assessment & Plan  · On atorvastatin outpatient, continue for inpatient admission    Essential hypertension  Assessment & Plan  · On coreg, hydralazine, and isosorbide dinitrate for this outpatient  Continue while inpatient  · Monitor BP, avoid hypotension    VTE Prophylaxis: Enoxaparin (Lovenox)  / sequential compression device   Code Status: Level 1 - Full code  Discussion with family: Spoke with brother on the phone       Anticipated Length of Stay:  Patient will be admitted on an Inpatient basis with an anticipated length of stay of  Greater than 2 midnights  Justification for Hospital Stay: CHF exacerbation, LAZARO superimposed on CKD    Total Time for Visit, including Counseling / Coordination of Care: 60 minutes  Greater than 50% of this total time spent on direct patient counseling and coordination of care  Chief Complaint:   Shortness of breath    History of Present Illness:    Sandeep Shea is a 74 y o  male with PMHx of CHF, insulin-dependent diabetes, CKD who presents with shortness of breath, midsternal CP, and abdominal distension x 2 days  Of note, pt is a very poor historian and has difficulty answering complex medical question  He deferred all questions to his brother for clarification  He was able to convey that he had difficulty breathing 2/2 abdominal distension and chest tightness/discomfort  In the ED, his workup showed proBNP 73688, troponin 772 --> 795, and LAZARO superimposed on CKD  He had a bedside echo which showed bilateral pleural effusions and a CXR that showed pulmonary vascular congestion  The heart failure team was consulted and formal evaluation is pending  Furthermore, nephrology was consulted who agreed with diuresis and recommended PVR and daily labs  After further chart review, it appears pt is chronically noncompliant  Family reports he does not take his meds as prescribed  His last Echo revealed EF 20-25%  He also underwent ischemic evaluation through [facility]  His cardiac cath showed severe multuvessel CAD, low normal CO/CI, LVEDP 10mmHg, and PCWP 12mmHg  He was subsequently recommended to follow with CT surgery for CABG evaluation but it appears he never followed through with this  He is also supposed to be wearing a lifevest but per family is often noncompliant with this as well  Review of Systems:    Review of Systems   Constitutional: Positive for unexpected weight change  HENT: Negative      Eyes: Positive for visual disturbance (reports he feels like there is "sand" obscuring his vision)  Respiratory: Positive for chest tightness and shortness of breath  Cardiovascular: Negative for leg swelling  Chest pain: chest discomfort  Gastrointestinal: Positive for abdominal distention  Endocrine: Negative  Genitourinary: Negative  Musculoskeletal: Negative  Joint swelling: visual    Skin: Negative  Allergic/Immunologic: Negative  Neurological: Negative  Hematological: Negative  Psychiatric/Behavioral: Positive for confusion  Past Medical and Surgical History:     Past Medical History:   Diagnosis Date   • Anxiety    • Arthritis    • CHF (congestive heart failure) ([place] [address] )    • Coronary artery disease    • Dementia ([place] [address] )    • Depression    • Diabetes mellitus ([place] [address] )    • Infectious viral hepatitis    • Memory loss    • Visual impairment        History reviewed  No pertinent surgical history  Meds/Allergies:    Prior to Admission medications    Medication Sig Start Date End Date Taking? Authorizing Provider   Acetaminophen Extra Strength 500 MG tablet Take 500 mg by mouth every 6 (six) hours as needed 4/29/22   Historical Provider, MD   amoxicillin-clavulanate (AUGMENTIN) 500-125 mg per tablet Take 1 tablet by mouth every 12 (twelve) hours    Historical Provider, MD   Aspirin Low Dose 81 MG EC tablet Take 81 mg by mouth daily 4/29/22   Historical Provider, MD   atorvastatin (LIPITOR) 40 mg tablet Take 40 mg by mouth daily at bedtime 4/29/22   Historical Provider, MD   carvedilol (COREG) 12 5 mg tablet Take 12 5 mg by mouth 2 (two) times a day with meals  Indications: Cardiac Failure    Historical Provider, MD   Dapagliflozin Propanediol 5 MG TABS Take 5 mg by mouth daily   Indications: Type 2 Diabetes    Historical Provider, MD   Disposable Gloves MISC Use daily Large 7/27/22   Leonor Sahni PA-C   FLUoxetine (PROzac) 20 mg capsule Take 1 capsule (20 mg total) by mouth daily  Patient taking differently: Take 40 mg by mouth daily 6/14/22   Rajinder Rojas PA-C   folic acid (FOLVITE) 1 mg tablet Take 1 tablet (1 mg total) by mouth daily  Patient taking differently: Take 400 mcg by mouth daily 6/22/22   Min Sullivan MD   gabapentin (Neurontin) 300 mg capsule Take 1 capsule (300 mg total) by mouth 3 (three) times a day  Patient taking differently: Take 300 mg by mouth daily 7/28/22   Oriana Hickey PA-C   glucose blood (Accu-Chek Guide) test strip Test sugars four times daily 7/15/22   Oriana Hickey PA-C   hydrALAZINE (APRESOLINE) 25 mg tablet Take 1 tablet (25 mg total) by mouth every 8 (eight) hours 10/26/22   Stef Piper MD   Incontinence Supply Disposable (Disposable Brief Large) MISC Use daily 7/27/22   Leonor Sahni PA-C   Incontinence Supply Disposable (RA Adult Wipes) MISC Use in the morning 7/27/22   Leonor Sahni PA-C   Incontinence Supply Disposable MISC Use daily XL Disposable Bed Incontinence Pads 7/27/22   Leonor Sahni PA-C   INSULIN ASPART FLEXPEN SC Inject 30 Units under the skin daily before breakfast  30 units in AM with breakfast and 10 units at [facility]  Indications: Type 2 Diabetes    Historical Provider, MD   isosorbide dinitrate (ISORDIL) 10 mg tablet Take 1 tablet (10 mg total) by mouth every 8 (eight) hours 10/26/22 11/25/22  Stef Piper MD   Lancet Devices (OneTouch Delica Plus [place]) MISC Use 1 Dose in the morning Use as directed  4/30/22   Historical Provider, MD   Melatonin 5 MG TABS Take 10 mg by mouth daily at bedtime as needed (insomnia)    Historical Provider, MD   ondansetron (ZOFRAN-ODT) 4 mg disintegrating tablet Take 4 mg by mouth every 6 (six) hours as needed for nausea or vomiting    Historical Provider, MD   QUEtiapine (SEROquel) 100 mg tablet Take 100 mg by mouth daily at bedtime   Indications: Major Depressive Disorder    Historical Provider, MD   Skin Protectants, Misc  (dimethicone-zinc oxide) cream Apply topically 2 (two) times a day as needed for irritation 7/28/22   Oriana Hickey PA-C   thiamine (VITAMIN B1) 100 mg tablet Take 1 tablet (100 mg total) by mouth daily 6/22/22   Yenni Carrero MD   torsemide (DEMADEX) 20 mg tablet Take 40 mg by mouth every other day Take 2 tabs daily  Take as needed for 3lb weight gain within 2 days 9/27/22 9/27/23  Historical Provider, MD     I have reviewed home medications with patient personally  Will call brother to verify    Allergies: No Known Allergies    Social History:     Marital Status: Single   Patient Pre-hospital Living Situation: Home  Patient Pre-hospital Level of Mobility: Mobile  Patient Pre-hospital Diet Restrictions: should follow low sodium, fluid restriction, carbohydrate diet  Substance Use History:   Social History     Substance and Sexual Activity   Alcohol Use Yes    Comment: socially     Social History     Tobacco Use   Smoking Status Former   Smokeless Tobacco Never     Social History     Substance and Sexual Activity   Drug Use Never       Family History:    History reviewed  No pertinent family history  Physical Exam:     Vitals:   Blood Pressure: 155/96 (11/23/22 1411)  Pulse: 80 (11/23/22 1411)  Temperature: 98 5 °F (36 9 °C) (11/23/22 1029)  Temp Source: Oral (11/23/22 1029)  Respirations: 15 (11/23/22 1411)  SpO2: 93 % (11/23/22 1411)    Physical Exam  Vitals reviewed  Constitutional:       Appearance: He is ill-appearing  Cardiovascular:      Rate and Rhythm: Normal rate and regular rhythm  Pulses: Normal pulses  Heart sounds: Normal heart sounds  No murmur heard  Pulmonary:      Comments: Diminished breath sounds in bilateral bases    Chest:      Chest wall: Tenderness present  Abdominal:      General: Bowel sounds are normal  There is distension  Musculoskeletal:         General: No swelling  Right lower leg: No edema  Left lower leg: No edema  Skin:     General: Skin is warm and dry  Capillary Refill: Capillary refill takes less than 2 seconds  Neurological:      Mental Status: He is alert   He is disoriented  Comments: confusion         Additional Data:     Lab Results: I have personally reviewed pertinent reports  Results from last 7 days   Lab Units 11/23/22  1121   WBC Thousand/uL 7 99   HEMOGLOBIN g/dL 9 0*   HEMATOCRIT % 28 1*   PLATELETS Thousands/uL 184   NEUTROS PCT % 79*   LYMPHS PCT % 10*   MONOS PCT % 9   EOS PCT % 0     Results from last 7 days   Lab Units 11/23/22  1121   SODIUM mmol/L 135   POTASSIUM mmol/L 4 6   CHLORIDE mmol/L 108   CO2 mmol/L 19*   BUN mg/dL 72*   CREATININE mg/dL 3 51*   ANION GAP mmol/L 8   CALCIUM mg/dL 8 9   ALBUMIN g/dL 2 8*   TOTAL BILIRUBIN mg/dL 0 50   ALK PHOS U/L 133*   ALT U/L 35   AST U/L 18   GLUCOSE RANDOM mg/dL 362*                       Imaging: I have personally reviewed pertinent reports  CXR and US bedside procedure results from ED    XR chest 1 view portable   ED Interpretation by Steve Ortega MD (11/23 0993)   Mild pulmonary edema  Final Result by Madeline Henriquez MD (11/23 8302)      Mild pulmonary vascular congestion  Workstation performed: [id number]             EKG, Pathology, and Other Studies Reviewed on Admission:   · EKG: NSR with PAC    Allscripts / Epic Records Reviewed: Yes     ** Please Note: This note has been constructed using a voice recognition system   **

## 2022-11-23 NOTE — ASSESSMENT & PLAN NOTE
Lab Results   Component Value Date    HGBA1C 7 7 (H) 10/03/2022       No results for input(s): POCGLU in the last 72 hours      Blood Sugar Average: Last 72 hrs:  · Outpatient on Farxiga and Novolog inject 30 units under the skin daily with morning meals and 10 units with pm meals  · Follows with endocrinology outpatient  · Add SSI and accucheck QID; lantus 10u HS  · Diabetic diet  · Hypoglycemia protocol

## 2022-11-23 NOTE — CONSULTS
Advanced Heart Failure / Pulmonary Hypertension Service Consultation    Antonette Santa 74 y o  male  MRN: [id number]  Unit/Bed#: ED 20; Encounter: [id number]    Assessment:  Principal Problem:    Acute on chronic combined systolic and diastolic congestive heart failure ([address] )  Active Problems:    LAZARO superimposed on CKD (chronic kidney disease) stage 4, GFR 15-29 ml/min (HCC)    Essential hypertension    Mixed hyperlipidemia    Type 2 diabetes mellitus (HCC)    Elevated troponin      HPI:   Antonette Santa is a 61-year-old male who presented to [facility] on 11/23/22 with dyspnea, midsternal chest pain, and abdominal distension x 2 days, after not taking his diuretics for two days  PMH includes HTN, DM2, CKD, HFrEF with LVEF 20-25%, bipolar disorder, remote history polysubstance abuse, hepatitis C  He was diagnosed with a newly discovered cardiomyopathy in 4/2022, at which time myocardial perfusion scan was negative for ischemia  He was admitted in July 2022 and aggressively diuresed and started on GDMT  Cath was deferred at that time secondary to his kidney function  He was subsequently admitted to the hospital on 10/2/2022 with acute systolic CHF in the setting of uncontrolled hypertension  He also had elevated troponin  His presenting creatinine was 2 17 mg/dL however it peaked at 5 54 mg/dL on 10/6/2022  During this time patient was being diuresed with IV diuretics  Patient eventually underwent right heart cath on 10/4/2022 which showed elevated wedge pressure  Patient was dialyzed prior to proceeding with left heart cath/coronary angiogram on 10/10/2022 which showed multivessel coronary artery disease  CT surgery was consulted for coronary bypass evaluation but didn't find him a good candidate for CABG  Planned for phone visit with Dr Nolan Winslow with [facility] to discuss staged PCI for LAD and possibly circumflex      Presenting today with confusion, weakness, dyspnea, chest pain and reported noncompliance with home diuretics x2 days  Has had multiple admissions for acute CHF exacerbations and LAZARO, last at [facility] from 11/13-11/15 for HF  Currently takes torsemide 10mg QOD, isordil 10mg TID/hydralazine 25mg TID, Farxiga 5mg QD (held), Coreg 12 5mg BID and has reportedly been compliant with his LifeVest    Patient seen and examined  Endorses frequent falls at home, unsure what medications he has been taking  He reports shortness of breath, denies chest pain  Denies recent alcohol or drug use, but is very tangential and confused during our conversation  Heart failure service was consulted for management of volume status  Patient follows with [facility] for outpatient cardiology  Objective: Intake/ Output: none documented  Weight: no data today, 199lb 10/26/22    Today's Plan:  • Very confused on exam, would consider cardiac etiology for this to be less likely  • Recommend neuroimaging and substance/toxicology workup per primary team  • Strict I/Os, daily weights, cardiac diet  • IV diuresis ordered with Lasix 40mg BID, agree with gentle diuresis for now   • NT proBNP 85, 152 - significantly higher than previous  • Warm and hypervolemic on exam  • Keep K>4, Mg>2  • Trend Cr, today 3 51 (baseline 2-2 5)  • Avoid ACE/ARB/ARNi as well as MRA in the setting of LAZARO on CKD     Plan:  Acute on Chronic HFrEF; LVEF 20-25%; NYHA II/III; ACC/AHA Stage C  Etiology: initially thought to be nonischemic, but recent left heart catheterization with evidence of multivessel disease (high-grade obstructive disease in the LAD with disease involving the right coronary branches and the distal circumflex as per CT surgery note)     TTE 04/24/2022: LVEF 30%   Global hypokinesis with distal LAD regional wall motion abnormality   Grade 2 diastolic dysfunction with elevated left atrial filling pressures  TTE 07/06/2022:  LVEF 40-50%  TTE 10/3/22: LVEF 20-25%, LV mildly dilated  Global hypokinesis   Grade II DD  RV cavity size normal, systolic function mildly reduced  Trace TR, MR      Stress test: Myocardial perfusion scan performed on 4/26/2022:  1  No definite evidence of ischemia  Apical and inferior wall infarct as described  2  Global hypokinesis with severely decreased left ventricular ejection fraction at 29%  Coronary angiogram: Performed on 10/10/2022:  Severe multivessel CAD as described above   Normal cardiac filling pressures   PAP:30/10/17 mm Hg; PCWP: 12 mm Hg  Low normal CO/CI (3 80/2 07)  LVEDP of 10 mm Hg     Neurohormonal Blockade:  --Beta Blocker:  Coreg 12 5 mg BID  --SVR reduction: isordil 10/hydralazine 25 TID  --ARNi / ACEi / ARB: no  --Aldosterone Antagonist: no  --SGLT2 Inhibitor: Farxiga 5mg QD as outpatient, held here  --Home Diuretic: torsemide 10mg every other day  --Inpatient Diuretic: Lasix 40mg IV BID     Sudden Cardiac Death Risk Reduction:  --LVEF 20-25%, has LifeVest from [facility]     Electrical Resynchronization:  --Candidacy for BiV device:      Advanced Therapies (if appropriate): Will continue to monitor      CKD stage 4, GFR 15-29 ml/min   Required acute HD transiently at [facility] while admitted secondary to anuria, but LAZARO resolved and was discharged without the need for long term dialysis  Cr 3 51 today, baseline of 2 0-2 5  Appreciate nephrology recommendations   Bipolar affective disorder, mixed, moderate   Type 2 diabetes mellitus, without long-term current use of insulin   Management per primary team  Generalized weakness  Likely multifactorial given recent hospitalizations and worsening cardiomyopathy   Very confused on exam today, nonfocal neuro exam  Recommend additional workup as above       XR chest 1 view portable [[id number]] Collected: 11/23/22 1242   Order Status: Completed Updated: 11/23/22 1246   Narrative:     CHEST     INDICATION:   chest pain  COMPARISON:  Chest radiograph dated 10/25/2022       EXAM PERFORMED/VIEWS:  TO CHEST PORTABLE     FINDINGS: Cardiomediastinal silhouette appears unremarkable  No focal airspace consolidation  Mild pulmonary vascular congestion  No pneumothorax or pleural effusion  Osseous structures appear within normal limits for patient age  Impression:       Mild pulmonary vascular congestion  Past Medical History:   Diagnosis Date   • Anxiety    • Arthritis    • CHF (congestive heart failure) ([facility] )    • Coronary artery disease    • Dementia (HCC)    • Depression    • Diabetes mellitus ([facility] )    • Infectious viral hepatitis    • Memory loss    • Visual impairment        Review of Systems   Constitutional: Positive for activity change and fatigue  Negative for chills and fever  HENT: Negative for ear pain and sore throat  Eyes: Negative for pain and visual disturbance  Respiratory: Positive for shortness of breath  Negative for cough  Cardiovascular: Positive for chest pain  Negative for palpitations  Gastrointestinal: Negative for abdominal pain and vomiting  Genitourinary: Negative for dysuria and hematuria  Musculoskeletal: Negative for arthralgias and back pain  Skin: Negative for color change and rash  Neurological: Negative for seizures and syncope  All other systems reviewed and are negative  14-point ROS completed and negative except as stated above and/or in the HPI          Current Facility-Administered Medications:   •  aspirin (ECOTRIN LOW STRENGTH) EC tablet 81 mg, 81 mg, Oral, Daily, Lazarus Confer, CRNP  •  atorvastatin (LIPITOR) tablet 40 mg, 40 mg, Oral, HS, Lazarus Confer, CRNP  •  carvedilol (COREG) tablet 12 5 mg, 12 5 mg, Oral, BID With Meals, Lazarus Confer, CRNP  •  FLUoxetine (PROzac) capsule 40 mg, 40 mg, Oral, Daily, Lazarus Confer, CRNP  •  furosemide (LASIX) injection 40 mg, 40 mg, Intravenous, BID (diuretic), Lazarus Confer, CRNP  •  gabapentin (NEURONTIN) capsule 300 mg, 300 mg, Oral, Daily, Lazarus Confer, CRNP  •  heparin (porcine) subcutaneous injection 5,000 Units, 5,000 Units, Subcutaneous, Q8H [address] **AND** [CANCELED] Platelet count, , , Once, Oval [place], CRNP  •  hydrALAZINE (APRESOLINE) tablet 25 mg, 25 mg, Oral, Q8H, Oval [place], CRNP  •  insulin lispro (HumaLOG) 100 units/mL subcutaneous injection 2-12 Units, 2-12 Units, Subcutaneous, TID AC **AND** Fingerstick Glucose (POCT), , , TID AC, Oval [place], CRNP  •  insulin lispro (HumaLOG) 100 units/mL subcutaneous injection 2-12 Units, 2-12 Units, Subcutaneous, HS, Oval [place], CRNP  •  isosorbide dinitrate (ISORDIL) tablet 10 mg, 10 mg, Oral, Q8H, Oval [place], CRNP  •  Melatonin TABS 10 mg, 10 mg, Oral, HS PRN, Oval [place], CRNP  •  QUEtiapine (SEROquel) tablet 100 mg, 100 mg, Oral, HS, Oval [place], CRNP  •  thiamine tablet 100 mg, 100 mg, Oral, Daily, Oval [place], CRNP    Current Outpatient Medications:   •  Acetaminophen Extra Strength 500 MG tablet, Take 500 mg by mouth every 6 (six) hours as needed, Disp: , Rfl:   •  amoxicillin-clavulanate (AUGMENTIN) 500-125 mg per tablet, Take 1 tablet by mouth every 12 (twelve) hours, Disp: , Rfl:   •  Aspirin Low Dose 81 MG EC tablet, Take 81 mg by mouth daily, Disp: , Rfl:   •  atorvastatin (LIPITOR) 40 mg tablet, Take 40 mg by mouth daily at bedtime, Disp: , Rfl:   •  carvedilol (COREG) 12 5 mg tablet, Take 12 5 mg by mouth 2 (two) times a day with meals  Indications: Cardiac Failure, Disp: , Rfl:   •  Dapagliflozin Propanediol 5 MG TABS, Take 5 mg by mouth daily   Indications: Type 2 Diabetes, Disp: , Rfl:   •  Disposable Gloves MISC, Use daily Large, Disp: 100 each, Rfl: 0  •  FLUoxetine (PROzac) 20 mg capsule, Take 1 capsule (20 mg total) by mouth daily (Patient taking differently: Take 40 mg by mouth daily), Disp: 90 capsule, Rfl: 1  •  folic acid (FOLVITE) 1 mg tablet, Take 1 tablet (1 mg total) by mouth daily (Patient taking differently: Take 400 mcg by mouth daily), Disp: , Rfl: 0  •  gabapentin (Neurontin) 300 mg capsule, Take 1 capsule (300 mg total) by mouth 3 (three) times a day (Patient taking differently: Take 300 mg by mouth daily), Disp: 90 capsule, Rfl: 0  •  glucose blood (Accu-Chek Guide) test strip, Test sugars four times daily, Disp: 400 each, Rfl: 1  •  hydrALAZINE (APRESOLINE) 25 mg tablet, Take 1 tablet (25 mg total) by mouth every 8 (eight) hours, Disp: 90 tablet, Rfl: 0  •  Incontinence Supply Disposable (Disposable Brief Large) MISC, Use daily, Disp: 72 each, Rfl: 0  •  Incontinence Supply Disposable (RA Adult Wipes) MISC, Use in the morning, Disp: 128 each, Rfl: 0  •  Incontinence Supply Disposable MISC, Use daily XL Disposable Bed Incontinence Pads, Disp: 100 each, Rfl: 0  •  INSULIN ASPART FLEXPEN SC, Inject 30 Units under the skin daily before breakfast  30 units in AM with breakfast and 10 units at Dinner  Indications: Type 2 Diabetes, Disp: , Rfl:   •  isosorbide dinitrate (ISORDIL) 10 mg tablet, Take 1 tablet (10 mg total) by mouth every 8 (eight) hours, Disp: 90 tablet, Rfl: 0  •  Lancet Devices (OneTouch Delica Plus Lancing) MISC, Use 1 Dose in the morning Use as directed , Disp: , Rfl:   •  Melatonin 5 MG TABS, Take 10 mg by mouth daily at bedtime as needed (insomnia), Disp: , Rfl:   •  ondansetron (ZOFRAN-ODT) 4 mg disintegrating tablet, Take 4 mg by mouth every 6 (six) hours as needed for nausea or vomiting, Disp: , Rfl:   •  QUEtiapine (SEROquel) 100 mg tablet, Take 100 mg by mouth daily at bedtime  Indications: Major Depressive Disorder, Disp: , Rfl:   •  Skin Protectants, Misc  (dimethicone-zinc oxide) cream, Apply topically 2 (two) times a day as needed for irritation, Disp: 142 g, Rfl: 2  •  thiamine (VITAMIN B1) 100 mg tablet, Take 1 tablet (100 mg total) by mouth daily, Disp: , Rfl: 0  •  torsemide (DEMADEX) 20 mg tablet, Take 40 mg by mouth every other day Take 2 tabs daily   Take as needed for 3lb weight gain within 2 days, Disp: , Rfl:     No Known Allergies  Social History     Socioeconomic History   • Marital status: Single     Spouse name: Not on file   • Number of children: Not on file   • Years of education: Not on file   • Highest education level: Not on file   Occupational History   • Not on file   Tobacco Use   • Smoking status: Former   • Smokeless tobacco: Never   Vaping Use   • Vaping Use: Never used   Substance and Sexual Activity   • Alcohol use: Yes     Comment: socially   • Drug use: Never   • Sexual activity: Not on file   Other Topics Concern   • Not on file   Social History Narrative   • Not on file     Social Determinants of Health     Financial Resource Strain: Not on file   Food Insecurity: No Food Insecurity   • Worried About Running Out of Food in the Last Year: Never true   • Ran Out of Food in the Last Year: Never true   Transportation Needs: No Transportation Needs   • Lack of Transportation (Medical): No   • Lack of Transportation (Non-Medical): No   Physical Activity: Not on file   Stress: Not on file   Social Connections: Not on file   Intimate Partner Violence: Not on file   Housing Stability: Low Risk    • Unable to Pay for Housing in the Last Year: No   • Number of Places Lived in the Last Year: 1   • Unstable Housing in the Last Year: No     History reviewed  No pertinent family history  Vitals:  Blood pressure 155/96, pulse 80, temperature 98 5 °F (36 9 °C), temperature source Oral, resp  rate 15, SpO2 93 %  There is no height or weight on file to calculate BMI  No intake/output data recorded      Wt Readings from Last 10 Encounters:   10/26/22 90 4 kg (199 lb 4 8 oz)   10/16/22 84 1 kg (185 lb 6 4 oz)   09/22/22 84 kg (185 lb 3 2 oz)   09/20/22 84 5 kg (186 lb 3 2 oz)   09/14/22 (P) 84 4 kg (186 lb)   09/07/22 82 9 kg (182 lb 12 8 oz)   08/11/22 80 9 kg (178 lb 6 4 oz)   07/28/22 78 5 kg (173 lb)   07/26/22 80 kg (176 lb 5 9 oz)   07/20/22 84 6 kg (186 lb 8 2 oz)       Vitals:    11/23/22 1029 11/23/22 1224 11/23/22 1411   BP: 155/90 (!) 174/95 155/96   BP Location: Left arm Pulse: 85 82 80   Resp: (!) 25 19 15   Temp: 98 5 °F (36 9 °C)     TempSrc: Oral     SpO2: 95% 92% 93%       Physical Exam  Constitutional:       Appearance: Normal appearance  He is obese  HENT:      Head: Normocephalic  Nose: Nose normal       Mouth/Throat:      Mouth: Mucous membranes are moist    Eyes:      Conjunctiva/sclera: Conjunctivae normal    Neck:      Vascular: JVD present  Comments: JVP elevated, at mandible   Cardiovascular:      Rate and Rhythm: Normal rate and regular rhythm  Pulmonary:      Effort: Pulmonary effort is normal       Breath sounds: Normal breath sounds  Abdominal:      General: There is distension  Tenderness: There is no abdominal tenderness  There is no guarding  Musculoskeletal:      Cervical back: Neck supple  Right lower leg: No edema  Left lower leg: No edema  Skin:     General: Skin is dry  Neurological:      General: No focal deficit present  Mental Status: He is alert and oriented to person, place, and time  Comments: Confused regarding timeline of symptoms and recent events   Psychiatric:         Mood and Affect: Mood is anxious  Speech: Speech is tangential        Labs & Results:      Results from last 7 days   Lab Units 11/23/22  1121   WBC Thousand/uL 7 99   HEMOGLOBIN g/dL 9 0*   HEMATOCRIT % 28 1*   PLATELETS Thousands/uL 184         Results from last 7 days   Lab Units 11/23/22  1121   POTASSIUM mmol/L 4 6   CHLORIDE mmol/L 108   CO2 mmol/L 19*   BUN mg/dL 72*   CREATININE mg/dL 3 51*   CALCIUM mg/dL 8 9   ALK PHOS U/L 133*   ALT U/L 35   AST U/L 18             Thank you for the opportunity to participate in the care of this patient      Nanci Ansari PA-C  Advanced Heart Failure  [facility]

## 2022-11-23 NOTE — ASSESSMENT & PLAN NOTE
Wt Readings from Last 3 Encounters:   10/26/22 90 4 kg (199 lb 4 8 oz)   10/16/22 84 1 kg (185 lb 6 4 oz)   09/22/22 84 kg (185 lb 3 2 oz)     Presented to ED with complaints of SOB  Has a history of noncompliance with his medications and was recently seen in [facility] ED on 11/13 for CHF exacerbation as well  · Takes torsemide 40 mg QD and as needed for a 3 pound weight gain in 2 days, unsure when he took this last    · Echo at bedside: No obvious pericardial effusion, +bilateral obvious pleural effusions: LEFT small/trace; RIGHT small/moderate  EF grossly reduced (<30%), especially near septum  IVC was plump, 2 2cm with <50% compressibility  B-lines were not present  Consistent with chronic systolic dysfunction but some findings for fluid overload  · Follows with cardiology outpatient and was planning for CT surg consult for CABG evaluation, had ED visit in 10/2022 which states he should continue wearing his life vest, not presently wearing  · Baseline weight appears to be 180-185 pounds  · CXR: Mild pulmonary vascular congestion  · BNP: 85,152  · Consult to heart failure placed  · Will start on IV Lasix and assess patient's response while waiting for HF recommendations   Monitor creatinine  · Monitor I&Os and daily weights, 48 hour telemetry monitoring  · Fluid restriction to 1500 mL and 2 g sodium restriction

## 2022-11-23 NOTE — ASSESSMENT & PLAN NOTE
Lab Results   Component Value Date    EGFR 17 11/23/2022    EGFR 29 10/26/2022    EGFR 28 10/25/2022    CREATININE 3 51 (H) 11/23/2022    CREATININE 2 28 (H) 10/26/2022    CREATININE 2 31 (H) 10/25/2022     · Baseline creatinine appears to be approximately 2 1-2 5  · With LAZARO superimposed on CKD likely in the setting of acute CHF exacerbation and suspected demand ischemia  · Managed on torsemide 40 QD as an outpatient, unsure medication compliance; will diurese with IV lasix and assess response  · Nephrology consulted; agree with diuresis and recommend PVR and daily labs  · Avoid hypotension, nephrotoxins and NSAIDs  · Strict I/O and daily weights  · 1500mL fluid restriction

## 2022-11-23 NOTE — ED PROVIDER NOTES
History  Chief Complaint   Patient presents with   • Shortness of Breath     Pt c/o SOB, midsternal CP, and abdominal distension for past two days, pt hx HF, pt states he stopped taking his diuretics for past two days, reports he tested flu + for primary care provider     60-year-old male past medical history significant for CHF, insulin-dependent diabetes that presents the ED today for shortness of breath times last 3 days  Patient also complains of substernal chest pain  He also complains of abdominal distention  He states that he has history of heart failure  He is noncompliant with his diuretics reportedly  Denies any nausea vomiting  Denies any radiation of his chest pain  Unable to tell me whether not he has orthopnea          Prior to Admission Medications   Prescriptions Last Dose Informant Patient Reported? Taking? Acetaminophen Extra Strength 500 MG tablet   Yes No   Sig: Take 500 mg by mouth every 6 (six) hours as needed   Aspirin Low Dose 81 MG EC tablet   Yes No   Sig: Take 81 mg by mouth daily   Dapagliflozin Propanediol (Farxiga) 5 MG TABS   Yes No   Sig: Take 5 mg by mouth daily   Indications: Type 2 Diabetes   Disposable Gloves MISC   No No   Sig: Use daily Large   FLUoxetine (PROzac) 20 mg capsule   No No   Sig: Take 1 capsule (20 mg total) by mouth daily   INSULIN ASPART FLEXPEN SC   Yes No   Sig: Inject 30 Units under the skin daily before breakfast  30 units in AM with breakfast and 10 units at Dinner  Indications: Type 2 Diabetes   Incontinence Supply Disposable (Disposable Brief Large) MISC   No No   Sig: Use daily   Incontinence Supply Disposable (RA Adult Wipes) MISC   No No   Sig: Use in the morning   Incontinence Supply Disposable MISC   No No   Sig: Use daily XL Disposable Bed Incontinence Pads   Lancet Devices (OneTouch Delica Plus Lancing) MISC   Yes No   Sig: Use 1 Dose in the morning Use as directed    Melatonin 5 MG TABS   Yes No   Sig: Take 5 mg by mouth daily at bedtime as needed (insomnia)  Indications: Trouble Sleeping   QUEtiapine (SEROquel) 100 mg tablet   Yes No   Sig: Take 100 mg by mouth daily at bedtime  Indications: Major Depressive Disorder   Skin Protectants, Misc  (dimethicone-zinc oxide) cream   No No   Sig: Apply topically 2 (two) times a day as needed for irritation   atorvastatin (LIPITOR) 40 mg tablet   Yes No   Sig: Take 40 mg by mouth daily at bedtime   carvedilol (COREG) 12 5 mg tablet   Yes No   Sig: Take 12 5 mg by mouth 2 (two) times a day with meals  Indications: Cardiac Failure   folic acid (FOLVITE) 1 mg tablet   No No   Sig: Take 1 tablet (1 mg total) by mouth daily   Patient taking differently: Take 400 mcg by mouth daily   gabapentin (Neurontin) 300 mg capsule   No No   Sig: Take 1 capsule (300 mg total) by mouth 3 (three) times a day   glucose blood (Accu-Chek Guide) test strip   No No   Sig: Test sugars four times daily   hydrALAZINE (APRESOLINE) 25 mg tablet   No No   Sig: Take 1 tablet (25 mg total) by mouth every 8 (eight) hours   isosorbide dinitrate (ISORDIL) 10 mg tablet   No No   Sig: Take 1 tablet (10 mg total) by mouth every 8 (eight) hours   thiamine (VITAMIN B1) 100 mg tablet   No No   Sig: Take 1 tablet (100 mg total) by mouth daily   torsemide (DEMADEX) 20 mg tablet   Yes No   Sig: Take 10 mg by mouth every other day 1/2 pill every other day for weight greather than 180lbs      Facility-Administered Medications: None       Past Medical History:   Diagnosis Date   • Anxiety    • Arthritis    • CHF (congestive heart failure) (HCC)    • Coronary artery disease    • Dementia (HCC)    • Depression    • Diabetes mellitus (HCC)    • Infectious viral hepatitis    • Memory loss    • Visual impairment        History reviewed  No pertinent surgical history  History reviewed  No pertinent family history  I have reviewed and agree with the history as documented      E-Cigarette/Vaping   • E-Cigarette Use Never User      E-Cigarette/Vaping Substances Social History     Tobacco Use   • Smoking status: Former   • Smokeless tobacco: Never   Vaping Use   • Vaping Use: Never used   Substance Use Topics   • Alcohol use: Yes     Comment: socially   • Drug use: Never        Review of Systems   Constitutional: Negative for chills and fever  HENT: Negative for hearing loss  Eyes: Negative for visual disturbance  Respiratory: Positive for shortness of breath  Cardiovascular: Positive for chest pain  Gastrointestinal: Negative for abdominal pain, constipation, diarrhea, nausea and vomiting  Genitourinary: Negative for difficulty urinating  Musculoskeletal: Negative for myalgias  Skin: Negative for rash  Neurological: Negative for dizziness  Psychiatric/Behavioral: Negative for agitation  All other systems reviewed and are negative  Physical Exam  ED Triage Vitals [11/23/22 1029]   Temperature Pulse Respirations Blood Pressure SpO2   98 5 °F (36 9 °C) 85 (!) 25 155/90 95 %      Temp Source Heart Rate Source Patient Position - Orthostatic VS BP Location FiO2 (%)   Oral Monitor Sitting Left arm --      Pain Score       --             Orthostatic Vital Signs  Vitals:    11/23/22 1029 11/23/22 1224   BP: 155/90 (!) 174/95   Pulse: 85 82   Patient Position - Orthostatic VS: Sitting        Physical Exam  Vitals and nursing note reviewed  Constitutional:       General: He is not in acute distress  Appearance: Normal appearance  He is not ill-appearing  HENT:      Head: Normocephalic and atraumatic  Right Ear: External ear normal       Left Ear: External ear normal       Nose: Nose normal  No congestion  Mouth/Throat:      Mouth: Mucous membranes are moist       Pharynx: Oropharynx is clear  No oropharyngeal exudate  Eyes:      Extraocular Movements: Extraocular movements intact  Conjunctiva/sclera: Conjunctivae normal       Pupils: Pupils are equal, round, and reactive to light     Cardiovascular:      Rate and Rhythm: Normal rate and regular rhythm  Pulses: Normal pulses  Heart sounds: Normal heart sounds  Pulmonary:      Effort: Pulmonary effort is normal  No respiratory distress  Breath sounds: Normal breath sounds  Abdominal:      General: Abdomen is flat  Bowel sounds are normal  There is distension  Palpations: Abdomen is soft  Tenderness: There is no abdominal tenderness  Musculoskeletal:         General: No swelling  Normal range of motion  Cervical back: Normal range of motion  Skin:     General: Skin is warm and dry  Capillary Refill: Capillary refill takes less than 2 seconds  Neurological:      General: No focal deficit present  Mental Status: He is alert and oriented to person, place, and time  Mental status is at baseline  Motor: No weakness        Gait: Gait normal    Psychiatric:         Mood and Affect: Mood normal          Behavior: Behavior normal          ED Medications  Medications - No data to display    Diagnostic Studies  Results Reviewed     Procedure Component Value Units Date/Time    Comprehensive metabolic panel [[id number]]  (Abnormal) Collected: 11/23/22 1121    Lab Status: Final result Specimen: Blood from Arm, Left Updated: 11/23/22 1237     Sodium 135 mmol/L      Potassium 4 6 mmol/L      Chloride 108 mmol/L      CO2 19 mmol/L      ANION GAP 8 mmol/L      BUN 72 mg/dL      Creatinine 3 51 mg/dL      Glucose 362 mg/dL      Calcium 8 9 mg/dL      Corrected Calcium 9 9 mg/dL      AST 18 U/L      ALT 35 U/L      Alkaline Phosphatase 133 U/L      Total Protein 8 7 g/dL      Albumin 2 8 g/dL      Total Bilirubin 0 50 mg/dL      eGFR 17 ml/min/1 73sq m     Narrative:      [place] guidelines for Chronic Kidney Disease (CKD):   •  Stage 1 with normal or high GFR (GFR > 90 mL/min/1 73 square meters)  •  Stage 2 Mild CKD (GFR = 60-89 mL/min/1 73 square meters)  •  Stage 3A Moderate CKD (GFR = 45-59 mL/min/1 73 square meters)  •  Stage 3B Moderate CKD (GFR = 30-44 mL/min/1 73 square meters)  •  Stage 4 Severe CKD (GFR = 15-29 mL/min/1 73 square meters)  •  Stage 5 End Stage CKD (GFR <15 mL/min/1 73 square meters)  Note: GFR calculation is accurate only with a steady state creatinine    NT-BNP PRO [[id number]]  (Abnormal) Collected: 11/23/22 1121    Lab Status: Final result Specimen: Blood from Arm, Left Updated: 11/23/22 1237     NT-proBNP 85,152 pg/mL     HS Troponin 0hr (reflex protocol) [[id number]]  (Abnormal) Collected: 11/23/22 1121    Lab Status: Final result Specimen: Blood from Arm, Left Updated: 11/23/22 1209     hs TnI 0hr 772 ng/L     HS Troponin I 4hr [[id number]]     Lab Status: No result Specimen: Blood     HS Troponin I 2hr [[id number]]     Lab Status: No result Specimen: Blood     CBC and differential [[id number]]  (Abnormal) Collected: 11/23/22 1121    Lab Status: Final result Specimen: Blood from Arm, Left Updated: 11/23/22 1131     WBC 7 99 Thousand/uL      RBC 3 03 Million/uL      Hemoglobin 9 0 g/dL      Hematocrit 28 1 %      MCV 93 fL      MCH 29 7 pg      MCHC 32 0 g/dL      RDW 13 8 %      MPV 10 3 fL      Platelets 023 Thousands/uL      nRBC 0 /100 WBCs      Neutrophils Relative 79 %      Immat GRANS % 1 %      Lymphocytes Relative 10 %      Monocytes Relative 9 %      Eosinophils Relative 0 %      Basophils Relative 1 %      Neutrophils Absolute 6 39 Thousands/µL      Immature Grans Absolute 0 08 Thousand/uL      Lymphocytes Absolute 0 76 Thousands/µL      Monocytes Absolute 0 69 Thousand/µL      Eosinophils Absolute 0 02 Thousand/µL      Basophils Absolute 0 05 Thousands/µL                  XR chest 1 view portable   ED Interpretation by Charley Montague MD (11/23 1149)   Mild pulmonary edema  Final Result by Kenzie Mccabe MD (11/23 1241)      Mild pulmonary vascular congestion                    Workstation performed: [id number]               Procedures  POC Cardiac US    Date/Time: 11/23/2022 11:27 AM  Performed by: Parag Hoover MD  Authorized by: Parag Hoover MD     Patient location:  ED  Other Assisting Provider: Yes (comment) (Dr Raghav Leroy)    Procedure details:     Exam Type:  Diagnostic    Indications: suspected volume depletion, chest pain and dyspnea      Assessment / Evaluation for: cardiac function, pericardial effusion and intravascular volume status      Exam Type: initial exam      Image quality: diagnostic      Image availability:  Images available in PACS, still images obtained and video obtained  Patient Details:     Cardiac Rhythm:  Regular    Mechanical ventilation: No    Cardiac findings:     Echo technique: limited 2D      Views obtained: parasternal long axis, parasternal short axis, subcostal and apical      Pericardial effusion: absent      Tamponade physiology: absent      Wall motion: normal      LV systolic function comment:  Stroke volume decreased    RV dilation: none    Pulmonary findings:     Left Lung Findings: left pleural effusion present      Right lung findings: right pleural effusion present      B-lines: no B-lines present    IVC findings:     IVC Inspiratory Collapse: absent    Interpretation:     Fluid Status:  Euvolemic          ED Course  ED Course as of 11/23/22 1313   Wed Nov 23, 2022   1238 NT-proBNP(!): 18,877               Identification of Seniors at Risk    [employer] Most Recent Value   (ISAR) Identification of Seniors at Risk    Before the illness or injury that brought you to the Emergency, did you need someone to help you on a regular basis? 0 Filed at: 11/23/2022 1035   In the last 24 hours, have you needed more help than usual? 1 Filed at: 11/23/2022 1035   Have you been hospitalized for one or more nights during the past 6 months? 1 Filed at: 11/23/2022 1035   In general, do you see well? 0 Filed at: 11/23/2022 1035   In general, do you have serious problems with your memory? 1 Filed at: 11/23/2022 1035   Do you take more than three different medications every day? 1 Filed at: 11/23/2022 1035   ISAR Score 4 Filed at: 11/23/2022 1035                              [facility]  Number of Diagnoses or Management Options  Dyspnea  Noncompliance with medication regimen  Pleural effusion, bilateral  Diagnosis management comments: 42-year-old male presenting to the ED today for shortness of breath  At this time differential diagnosis includes ACS, CHF exacerbation or dyspnea secondary to his pleural effusions  Will evaluate with a CBC, CMP, troponin, 12 lead EKG, chest x-ray  Bedside ultrasound showed bilateral pleural effusions  See procedure note for details  Patient was found to have an elevated BNP  Will give 20 mg of IV Lasix now  Case discussed with [facility] the patient was admitted to their service  Disposition  Final diagnoses:   Pleural effusion, bilateral   Dyspnea   Noncompliance with medication regimen     Time reflects when diagnosis was documented in both MDM as applicable and the Disposition within this note     Time User Action Codes Description Comment    11/23/2022 11:51 AM Nerissa Basil Add [J90] Chronic bilateral pleural effusions     11/23/2022 11:51 AM Mylinda Spire, [employer] Remove [J90] Chronic bilateral pleural effusions     11/23/2022 11:51 AM Mylinda Spire, [employer] Add [J90] Pleural effusion, bilateral     11/23/2022 11:51 AM Mylinda Spire, [employer] Add [R06 00] Dyspnea     11/23/2022 11:51 AM Nerissa Basil Add [Z91 14] Noncompliance with medication regimen     11/23/2022 11:51 AM Mylinda Spire, [employer] Modify [J90] Pleural effusion, bilateral     11/23/2022 11:51 AM Nerissa Basil Modify [R06 00] Dyspnea       ED Disposition     ED Disposition   Admit    Condition   Stable    Date/Time   Wed Nov 23, 2022 12:50 PM    Comment   Case was discussed with Dr Jair Carey and the patient's admission status was agreed to be Admission Status: inpatient status to the service of Dr Jair Carey              Follow-up Information    None Patient's Medications   Discharge Prescriptions    No medications on file     No discharge procedures on file  PDMP Review       Value Time User    PDMP Reviewed  Yes 7/15/2022 10:57 PM Joann Power           ED Provider  Attending physically available and evaluated [facility]  I managed the patient along with the ED Attending      Electronically Signed by         Johnny Solis MD  11/23/22 8293

## 2022-11-23 NOTE — ASSESSMENT & PLAN NOTE
Of note, pt follows with [facility] cardiology and underwent ischemic workup  Last Echo with EF20-25%  He is supposed to be wearing a lifevest but is noncompliant with this  He was also supposed to follow with CT surgery for CABG evaluation which he did not follow through with    · EKG sinus rhythm with PACs  · Trend troponins   · Continue telemetry

## 2022-11-24 PROBLEM — R41.89 COGNITIVE IMPAIRMENT: Status: ACTIVE | Noted: 2022-11-24

## 2022-11-24 PROBLEM — I25.10 CAD (CORONARY ARTERY DISEASE): Status: ACTIVE | Noted: 2022-11-23

## 2022-11-24 LAB
ANION GAP SERPL CALCULATED.3IONS-SCNC: 7 MMOL/L (ref 4–13)
BASOPHILS # BLD AUTO: 0.04 THOUSANDS/ÂΜL (ref 0–0.1)
BASOPHILS NFR BLD AUTO: 1 % (ref 0–1)
BUN SERPL-MCNC: 75 MG/DL (ref 5–25)
CALCIUM SERPL-MCNC: 8.7 MG/DL (ref 8.3–10.1)
CHLORIDE SERPL-SCNC: 109 MMOL/L (ref 96–108)
CO2 SERPL-SCNC: 21 MMOL/L (ref 21–32)
CREAT SERPL-MCNC: 3.46 MG/DL (ref 0.6–1.3)
EOSINOPHIL # BLD AUTO: 0.24 THOUSAND/ÂΜL (ref 0–0.61)
EOSINOPHIL NFR BLD AUTO: 4 % (ref 0–6)
ERYTHROCYTE [DISTWIDTH] IN BLOOD BY AUTOMATED COUNT: 13.9 % (ref 11.6–15.1)
GFR SERPL CREATININE-BSD FRML MDRD: 17 ML/MIN/1.73SQ M
GLUCOSE SERPL-MCNC: 143 MG/DL (ref 65–140)
GLUCOSE SERPL-MCNC: 164 MG/DL (ref 65–140)
GLUCOSE SERPL-MCNC: 201 MG/DL (ref 65–140)
GLUCOSE SERPL-MCNC: 203 MG/DL (ref 65–140)
GLUCOSE SERPL-MCNC: 88 MG/DL (ref 65–140)
HCT VFR BLD AUTO: 25.7 % (ref 36.5–49.3)
HGB BLD-MCNC: 8.3 G/DL (ref 12–17)
IMM GRANULOCYTES # BLD AUTO: 0.05 THOUSAND/UL (ref 0–0.2)
IMM GRANULOCYTES NFR BLD AUTO: 1 % (ref 0–2)
LYMPHOCYTES # BLD AUTO: 1.65 THOUSANDS/ÂΜL (ref 0.6–4.47)
LYMPHOCYTES NFR BLD AUTO: 27 % (ref 14–44)
MCH RBC QN AUTO: 31 PG (ref 26.8–34.3)
MCHC RBC AUTO-ENTMCNC: 32.3 G/DL (ref 31.4–37.4)
MCV RBC AUTO: 96 FL (ref 82–98)
MONOCYTES # BLD AUTO: 0.69 THOUSAND/ÂΜL (ref 0.17–1.22)
MONOCYTES NFR BLD AUTO: 11 % (ref 4–12)
NEUTROPHILS # BLD AUTO: 3.53 THOUSANDS/ÂΜL (ref 1.85–7.62)
NEUTS SEG NFR BLD AUTO: 56 % (ref 43–75)
NRBC BLD AUTO-RTO: 0 /100 WBCS
PLATELET # BLD AUTO: 181 THOUSANDS/UL (ref 149–390)
PMV BLD AUTO: 11.5 FL (ref 8.9–12.7)
POTASSIUM SERPL-SCNC: 3.8 MMOL/L (ref 3.5–5.3)
RBC # BLD AUTO: 2.68 MILLION/UL (ref 3.88–5.62)
SODIUM SERPL-SCNC: 137 MMOL/L (ref 135–147)
WBC # BLD AUTO: 6.2 THOUSAND/UL (ref 4.31–10.16)

## 2022-11-24 RX ORDER — ACETAMINOPHEN 325 MG/1
650 TABLET ORAL EVERY 6 HOURS PRN
Status: DISCONTINUED | OUTPATIENT
Start: 2022-11-24 | End: 2022-12-06 | Stop reason: HOSPADM

## 2022-11-24 RX ORDER — CARVEDILOL 12.5 MG/1
12.5 TABLET ORAL EVERY 12 HOURS SCHEDULED
Status: DISCONTINUED | OUTPATIENT
Start: 2022-11-24 | End: 2022-12-06 | Stop reason: HOSPADM

## 2022-11-24 RX ORDER — INSULIN LISPRO 100 [IU]/ML
5 INJECTION, SOLUTION INTRAVENOUS; SUBCUTANEOUS
Status: DISCONTINUED | OUTPATIENT
Start: 2022-11-24 | End: 2022-12-05

## 2022-11-24 RX ORDER — FUROSEMIDE 10 MG/ML
40 INJECTION INTRAMUSCULAR; INTRAVENOUS
Status: DISCONTINUED | OUTPATIENT
Start: 2022-11-24 | End: 2022-11-25

## 2022-11-24 RX ADMIN — ISOSORBIDE DINITRATE 10 MG: 10 TABLET ORAL at 06:46

## 2022-11-24 RX ADMIN — QUETIAPINE FUMARATE 100 MG: 100 TABLET ORAL at 23:00

## 2022-11-24 RX ADMIN — INSULIN LISPRO 4 UNITS: 100 INJECTION, SOLUTION INTRAVENOUS; SUBCUTANEOUS at 12:55

## 2022-11-24 RX ADMIN — ISOSORBIDE DINITRATE 10 MG: 10 TABLET ORAL at 23:00

## 2022-11-24 RX ADMIN — CARVEDILOL 12.5 MG: 12.5 TABLET, FILM COATED ORAL at 06:43

## 2022-11-24 RX ADMIN — ISOSORBIDE DINITRATE 10 MG: 10 TABLET ORAL at 16:05

## 2022-11-24 RX ADMIN — HEPARIN SODIUM 5000 UNITS: 5000 INJECTION INTRAVENOUS; SUBCUTANEOUS at 15:52

## 2022-11-24 RX ADMIN — THIAMINE HCL TAB 100 MG 100 MG: 100 TAB at 12:50

## 2022-11-24 RX ADMIN — INSULIN LISPRO 4 UNITS: 100 INJECTION, SOLUTION INTRAVENOUS; SUBCUTANEOUS at 18:27

## 2022-11-24 RX ADMIN — HYDRALAZINE HYDROCHLORIDE 25 MG: 25 TABLET, FILM COATED ORAL at 06:43

## 2022-11-24 RX ADMIN — INSULIN LISPRO 5 UNITS: 100 INJECTION, SOLUTION INTRAVENOUS; SUBCUTANEOUS at 18:26

## 2022-11-24 RX ADMIN — FUROSEMIDE 40 MG: 10 INJECTION, SOLUTION INTRAVENOUS at 10:36

## 2022-11-24 RX ADMIN — FLUOXETINE HYDROCHLORIDE 40 MG: 20 CAPSULE ORAL at 12:49

## 2022-11-24 RX ADMIN — ASPIRIN 81 MG: 81 TABLET, COATED ORAL at 12:49

## 2022-11-24 RX ADMIN — ATORVASTATIN CALCIUM 40 MG: 40 TABLET, FILM COATED ORAL at 23:00

## 2022-11-24 RX ADMIN — HEPARIN SODIUM 5000 UNITS: 5000 INJECTION INTRAVENOUS; SUBCUTANEOUS at 23:01

## 2022-11-24 RX ADMIN — FUROSEMIDE 40 MG: 10 INJECTION, SOLUTION INTRAVENOUS at 15:52

## 2022-11-24 RX ADMIN — HEPARIN SODIUM 5000 UNITS: 5000 INJECTION INTRAVENOUS; SUBCUTANEOUS at 06:44

## 2022-11-24 RX ADMIN — HYDRALAZINE HYDROCHLORIDE 25 MG: 25 TABLET, FILM COATED ORAL at 23:01

## 2022-11-24 RX ADMIN — CARVEDILOL 12.5 MG: 12.5 TABLET, FILM COATED ORAL at 23:00

## 2022-11-24 RX ADMIN — GABAPENTIN 300 MG: 300 CAPSULE ORAL at 12:49

## 2022-11-24 NOTE — PROGRESS NOTES
[address] [facility]  Progress Note - Konstantin Feliz 1955, 74 y o  male MRN: [id number]  Unit/Bed#: CW2 216-02 Encounter: [id number]  Primary Care Provider: Malia Lundberg DO   Date and time admitted to hospital: 11/23/2022 10:54 AM    * Acute on chronic systolic congestive heart failure (HCC)  Assessment & Plan  Wt Readings from Last 3 Encounters:   11/24/22 88 1 kg (194 lb 3 2 oz)   10/26/22 90 4 kg (199 lb 4 8 oz)   10/16/22 84 1 kg (185 lb 6 4 oz)     · Has a history of noncompliance with his medications and was recently seen in [facility] 11/13-11/15 for CHF exacerbation as well and at [facility] 10/26  · On discharge from [facility] was instructed to take torsemide 40 mg daily  Prior to this had been increased to 20 mg b i d  on 11/9 and prior to this was on 10 mg QOD  · Patient not taking medications as prescribed as an outpatient  · Weight on discharge from [facility] was 198  During last admission weights discussed with family and dry wt approx 190-196  · Cardiology and Nephrology following     · Patient is not compliant with saving urine for measurement - discussed with patient and nursing need for accurate I&Os  · At this time given weight decreased to 194 this morning, continue current IV diuretic dose of 40 mg b i d   · Continue monitor on telemetry    LAZARO superimposed on CKD (chronic kidney disease) stage 4, GFR 15-29 ml/min [facility])  Assessment & Plan  Lab Results   Component Value Date    EGFR 17 11/24/2022    EGFR 17 11/23/2022    EGFR 29 10/26/2022    CREATININE 3 46 (H) 11/24/2022    CREATININE 3 51 (H) 11/23/2022    CREATININE 2 28 (H) 10/26/2022     · Baseline creatinine appears to be approximately 2 1-2 7  · Follows with [facility]  · Acute kidney injury present on admission felt to be secondary to volume overload, cardiorenal syndrome  · Nephrology following, continue to monitor with diuresis    CAD (coronary artery disease)  Assessment & Plan  · Patient follows with [facility] Cardiology  · Known multivessel severe coronary artery disease  · Felt to not be a CABG candidate it as per cardiology notes from [facility]  · Was to be optimized prior to PCI to circumflex and LAD  · Most recent echocardiogram 11/8/2022 at [facility] with an EF of 30-35%  · Has a life vest   Is noncompliant with this  · Continue outpatient follow-up with Cardiology  · Continue Coreg, isosorbide, hydralazine, aspirin and statin    Essential hypertension  Assessment & Plan  · Continue Coreg, hydralazine and isosorbide  · Monitor with diuresis  · Blood pressure currently 120 to 130s    Bipolar affective disorder, mixed, moderate (HCC)  Assessment & Plan  · Continue Seroquel and Prozac    Cognitive impairment  Assessment & Plan  · Patient at times will not converse however with prompting was noted to sit up, speak to me and eat lunch  Nursing staff report that this has been present during prior admissions as well  · Patient able to provide me information regarding the holiday and some history  · When seen by occupational therapy earlier patient did poorly on cognitive evaluation  · Obtain Geriatrics consultation  · Occupational therapy recommending rehab upon discharge  Type 2 diabetes mellitus [facility])  Assessment & Plan  Lab Results   Component Value Date    HGBA1C 7 7 (H) 10/03/2022     Recent Labs     11/23/22  1641 11/23/22  2125 11/24/22  0655 11/24/22  1120   POCGLU 295* 215* 143* 201*       Blood Sugar Average: Last 72 hrs:  · (P) 213 5   · Outpatient on Farxiga and Novolog  · Follows with endocrinology outpatient  · AM glucose 143 with lantus 10 units QHS - will continue  · Home regimen of novolog is 30 with breakfast and 10 with dinner but was having glucoses in the 30s with this regimen (giving and not eating)  · Add Humalog 5 units t i d   With meals  · Diabetic diet    Normocytic anemia  Assessment & Plan  · Baseline hemoglobin appears to be 1-10  · Current hemoglobin 8 3 down from 9 0 on admission  · No signs or symptoms of bleeding at this time  · Recommend outpatient follow-up    VTE Pharmacologic Prophylaxis: VTE Score: 4 Moderate Risk (Score 3-4) - Pharmacological DVT Prophylaxis Ordered: heparin  Patient Centered Rounds: I performed bedside rounds with nursing staff today  Discussions with Specialists or Other Care Team Provider: nursing    Education and Discussions with Family / Patient: Attempted to update  (brother) via phone  Left voicemail  1302    Time Spent for Care: 45 minutes  More than 50% of total time spent on counseling and coordination of care as described above  Current Length of Stay: 1 day(s)  Current Patient Status: Inpatient   Certification Statement: The patient will continue to require additional inpatient hospital stay due to IV diuresis, LAZARO  Discharge Plan: Anticipate discharge in 48-72 hrs to rehab facility  Code Status: Level 1 - Full Code    Subjective:   Feeling okay - wants to eat breakfast   Urinated this AM but did not save it  Thinks he needs to urinate again  Thinks he is peeing more often than usual    Objective:     Vitals:   Temp (24hrs), Av 2 °F (36 8 °C), Min:97 9 °F (36 6 °C), Max:98 4 °F (36 9 °C)    Temp:  [97 9 °F (36 6 °C)-98 4 °F (36 9 °C)] 98 4 °F (36 9 °C)  HR:  [70-84] 70  Resp:  [15-30] 18  BP: (126-175)/() 126/75  SpO2:  [93 %-97 %] 95 %  Body mass index is 33 33 kg/m²  Input and Output Summary (last 24 hours): Intake/Output Summary (Last 24 hours) at 2022 1308  Last data filed at 2022 0900  Gross per 24 hour   Intake 850 ml   Output 850 ml   Net 0 ml   patient not saving urine    Physical Exam:   Physical Exam  Vitals and nursing note reviewed  Constitutional:       General: He is not in acute distress  Appearance: Normal appearance  He is obese  He is not diaphoretic  HENT:      Head: Normocephalic and atraumatic        Mouth/Throat:      Mouth: Mucous membranes are moist    Cardiovascular:      Rate and Rhythm: Normal rate and regular rhythm  Pulmonary:      Effort: Pulmonary effort is normal       Breath sounds: Rales present  No wheezing  Abdominal:      General: Bowel sounds are normal       Palpations: Abdomen is soft  There is no mass  Tenderness: There is no abdominal tenderness  There is no guarding  Musculoskeletal:      Right lower leg: Edema ( trace) present  Left lower leg: Edema ( trace) present  Skin:     General: Skin is warm and dry  Neurological:      Mental Status: He is alert  Comments: Initially would not speak to me however on further prompting is able to tell me his name, location, the current holiday and follows commands  Sitting up and eating lunch on the side of the bed using both upper extremities equally     Psychiatric:         Mood and Affect: Mood normal          Behavior: Behavior normal         Additional Data:    Labs:  Results from last 7 days   Lab Units 11/24/22  0453   WBC Thousand/uL 6 20   HEMOGLOBIN g/dL 8 3*   HEMATOCRIT % 25 7*   PLATELETS Thousands/uL 181   NEUTROS PCT % 56   LYMPHS PCT % 27   MONOS PCT % 11   EOS PCT % 4     Results from last 7 days   Lab Units 11/24/22  0453 11/23/22  1121   SODIUM mmol/L 137 135   POTASSIUM mmol/L 3 8 4 6   CHLORIDE mmol/L 109* 108   CO2 mmol/L 21 19*   BUN mg/dL 75* 72*   CREATININE mg/dL 3 46* 3 51*   ANION GAP mmol/L 7 8   CALCIUM mg/dL 8 7 8 9   ALBUMIN g/dL  --  2 8*   TOTAL BILIRUBIN mg/dL  --  0 50   ALK PHOS U/L  --  133*   ALT U/L  --  35   AST U/L  --  18   GLUCOSE RANDOM mg/dL 164* 362*         Results from last 7 days   Lab Units 11/24/22  1120 11/24/22  0655 11/23/22  2125 11/23/22  1641   POC GLUCOSE mg/dl 201* 143* 215* 295*       Lines/Drains:  Invasive Devices     Peripheral Intravenous Line  Duration           Peripheral IV 11/23/22 Left;Ventral (anterior) Forearm 1 day              Telemetry:  Telemetry Orders (From admission, onward)             50 Hour Telemetry Monitoring  Continuous x 48 hours        References:    Telemetry Guidelines   Question:  Reason for 48 Hour Telemetry  Answer:  Acute Decompensated CHF (continuous diuretic infusion or total diuretic dose > 200 mg daily, associated electrolyte derangement, ionotropic drip, history of ventricular arrhythmia, or new EF <35%)                 Telemetry Reviewed: not currently attached to system - will place on system now  Indication for Continued Telemetry Use: Lifevest (remains on tele entire hospital stay)         Imaging: Reviewed radiology reports from this admission including: chest xray    Recent Cultures (last 7 days):     Last 24 Hours Medication List:   Current Facility-Administered Medications   Medication Dose Route Frequency Provider Last Rate   • acetaminophen  650 mg Oral Q6H PRN Stefany Shirley PA-C     • aspirin  81 mg Oral Daily EMMA Cuenca     • atorvastatin  40 mg Oral HS EMMA Cuenca     • carvedilol  12 5 mg Oral BID With Meals EMMA Cuenca     • FLUoxetine  40 mg Oral Daily EMMA Cuenca     • furosemide  40 mg Intravenous BID (diuretic) EMMA Cuenca     • gabapentin  300 mg Oral Daily EMMA Cuenca     • heparin (porcine)  5,000 Units Subcutaneous [facility] hospital EMMA Cuenca     • hydrALAZINE  25 mg Oral Q8H Claribel Rocha PA-C     • insulin glargine  10 Units Subcutaneous HS EMMA Cuenca     • insulin lispro  1-6 Units Subcutaneous HS EMMA Mcginnis     • insulin lispro  2-12 Units Subcutaneous TID AC EMMA Cuenca     • insulin lispro  5 Units Subcutaneous TID With Meals Stefany Shirley PA-C     • isosorbide dinitrate  10 mg Oral Q8H EMMA Cuenca     • melatonin  4 5 mg Oral HS PRN EMMA Cuenca     • QUEtiapine  100 mg Oral HS EMMA Cuenca     • thiamine  100 mg Oral Daily EMMA Cuenca        Today, Patient Was Seen By: Jil Salazar PA-C    **Please Note: This note may have been constructed using a voice recognition system  **

## 2022-11-24 NOTE — PROGRESS NOTES
Advanced Heart Failure / Pulmonary Hypertension Service Progress Note    Yoshi Garcia 74 y o  male   MRN: [id number]  Unit/Bed#: CW2 216-02; Encounter: [id number]    Assessment:  Principal Problem:    Acute on chronic systolic congestive heart failure ([address] )  Active Problems:    LAZARO superimposed on CKD (chronic kidney disease) stage 4, GFR 15-29 ml/min (HCC)    Bipolar affective disorder, mixed, moderate (HCC)    Essential hypertension    Normocytic anemia    Type 2 diabetes mellitus (HCC)    CAD (coronary artery disease)    Cognitive impairment      Subjective:   "Yoshi Garcia is a 46-year-old male who [facility] with dyspnea, midsternal chest pain, and abdominal distension x 2 days, after not taking his diuretics for two days  PMH includes HTN, DM2, CKD, HFrEF with LVEF 20-25%, bipolar disorder, remote history polysubstance abuse, hepatitis C  He was diagnosed with a newly discovered cardiomyopathy in 4/2022, at which time myocardial perfusion scan was negative for ischemia  He was admitted in July 2022 and aggressively diuresed and started on GDMT  Cath was deferred at that time secondary to his kidney function       He was subsequently admitted to the hospital on 10/2/2022 with acute systolic CHF in the setting of uncontrolled hypertension  He also had elevated troponin  His presenting creatinine was 2 17 mg/dL however it peaked at 5 54 mg/dL on 10/6/2022  During this time patient was being diuresed with IV diuretics  Patient eventually underwent right heart cath on 10/4/2022 which showed elevated wedge pressure  Patient was dialyzed prior to proceeding with left heart cath/coronary angiogram on 10/10/2022 which showed multivessel coronary artery disease  CT surgery was consulted for coronary bypass evaluation but didn't find him a good candidate for CABG   Planned for phone visit with Dr Raisa Valverde with [facility] to discuss staged PCI for LAD and possibly circumflex      Presenting today with confusion, weakness, dyspnea, chest pain and reported noncompliance with home diuretics x2 days  Has had multiple admissions for acute CHF exacerbations and LAZARO, last at [place] [id number] from 11/13-11/15 for HF "    Patient seen and examined  No significant events overnight  Feeling tired  Denies SOB at rest but does report ALVAREZ with ambulating to bathroom  Denies LH, dizziness, and CP  Objective: Intake/ Output: 730 mL / 850 mL (net negative 120 mL)  Accurate? Weight: 194 lbs, unspecified scale (203 lbs on 11/23)  Telemetry: NSR, rates in 70s  Today's Plan:  • Only ? 650 mL urine out after 40 mg IV Lasix this AM  Plan to continue current IV Lasix dose for the afternoon; may warrant increasing Friday AM if not running significantly net negative  • Urinary retention protocol ordered  • Dry weight estimated to be around 180 lbs per outpatient cardiology notes  Plan:  Acute on chronic HFrEF; LVEF 20-25%; NYHA III; ACC/AHA Stage C   Etiology: "initially thought to be nonischemic, but recent left heart catheterization with evidence of multivessel disease "   TTE 04/24/2022 (at [facility], per report): LVEF 30%  Global hypokinesis with distal LAD regional wall motion abnormality  Grade 2 DD with elevated left atrial filling pressures  TTE 07/06/2022 (at [facility], per report): LVEF 40-50%  TTE 10/03/2022 (at [facility], per report): LVEF 20-25%, LV mildly dilated  Global hypokinesis  Grade 2 DD  Normal RV size, systolic function mildly reduced  Trace TR and MR  LHC 10/10/2022 (at [facility], per report): "showed proximal and mid LAD stenosis at multiple levels  Mid to distal left circumflex also has severe disease " LVEDP 10 mmHg  [address] 10/10/2022 (at [facility], per report): RA 1  RV 25/5  PA 30/10/17  PCWP 12  PVR 1 32 BARBOSA  CO 3 8  CI 2 07  Pharmacotherapies / Neurohormonal Blockade:  --Beta Blocker: carvedilol 12 5 mg q12 hours  --ARNi / ACEi / ARB: No, CKD precludes     --Aldosterone Antagonist: No, CKD precludes  --SGLT2 Inhibitor: No (on dapagliflozin 10 mg daily as outpatient)  --SVR Reducing Agents: hydralazine 25 mg q8 hours; isosorbide dintirate 10 mg q8 hours  --Home Diuretic: torsemide 10 mg QOD  --Inpatient Diuretic: IV Lasix 40 mg BID  Sudden Cardiac Death Risk Reduction:  --LVEF 20-25%  Wearing LifeVest as outpatient  Electrical Resynchronization:  --Candidacy for BiV device: narrow QRS  Advanced Therapies (if appropriate): Will continue to monitor  Coronary artery disease, multivessel   LHC 10/10/2022 (at [facility], per report): "showed proximal and mid LAD stenosis at multiple levels  Mid to distal left circumflex also has severe disease " LVEDP 10 mmHg  Deemed to be poor candidate for CABG at [facility] during 10/2022 admission  May be a candidate for staged PCI  Continue on aspirin, statin, and BB as above  Chronic kidney disease, stage IV   Baseline creatinine of 2 1-2 7  Did briefly require HD during 10/2022 admission at [facility]  Today, creatinine of 3 46 (3 51 on 11/23)  Hypertension  Diabetes mellitus, type II  History of Hepatitis C  History of bipolar disorder  History of substance abuse    Vitals:   Blood pressure 126/75, pulse 70, temperature 98 4 °F (36 9 °C), temperature source Oral, resp  rate 18, height 5' 4" (1 626 m), weight 88 1 kg (194 lb 3 2 oz), SpO2 95 %  Body mass index is 33 33 kg/m²  I/O last 3 completed shifts: In: 36 [P O :720;  I V :10]  Out: 850 [Urine:850]    Wt Readings from Last 10 Encounters:   11/24/22 88 1 kg (194 lb 3 2 oz)   10/26/22 90 4 kg (199 lb 4 8 oz)   10/16/22 84 1 kg (185 lb 6 4 oz)   09/22/22 84 kg (185 lb 3 2 oz)   09/20/22 84 5 kg (186 lb 3 2 oz)   09/14/22 (P) 84 4 kg (186 lb)   09/07/22 82 9 kg (182 lb 12 8 oz)   08/11/22 80 9 kg (178 lb 6 4 oz)   07/28/22 78 5 kg (173 lb)   07/26/22 80 kg (176 lb 5 9 oz)     Vitals:    11/24/22 0643 11/24/22 0645 11/24/22 0700 11/24/22 0747   BP: 130/80 130/80 126/75   BP Location:    Left arm   Pulse: 73 73  70   Resp:    18   Temp:    98 4 °F (36 9 °C)   TempSrc:    Oral   SpO2:  94%  95%   Weight:   88 1 kg (194 lb 3 2 oz)    Height:           Physical Exam  Vitals reviewed  Constitutional:       General: He is awake  He is not in acute distress  Appearance: Normal appearance  He is well-developed and overweight  He is not toxic-appearing or diaphoretic  HENT:      Head: Normocephalic  Nose: Nose normal       Mouth/Throat:      Mouth: Mucous membranes are moist    Eyes:      General: No scleral icterus  Conjunctiva/sclera: Conjunctivae normal    Neck:      Vascular: JVD (~12 cm) present  Trachea: No tracheal deviation  Cardiovascular:      Rate and Rhythm: Normal rate and regular rhythm  Heart sounds: No murmur heard  Pulmonary:      Effort: Pulmonary effort is normal  No tachypnea, bradypnea or respiratory distress  Breath sounds: Normal air entry  No decreased air movement  No wheezing or rales  Abdominal:      General: Bowel sounds are normal  There is distension (trace)  Palpations: Abdomen is soft  Tenderness: There is no abdominal tenderness  Musculoskeletal:      Cervical back: Neck supple  Right lower le+ Edema present  Left lower le+ Edema present  Skin:     General: Skin is warm and dry  Coloration: Skin is not jaundiced or pale  Neurological:      General: No focal deficit present  Mental Status: He is alert and oriented to person, place, and time  Psychiatric:         Mood and Affect: Mood normal  Affect is flat  Speech: Speech normal          Behavior: Behavior is cooperative  Thought Content:  Thought content normal      Central Line: No   Caceres Catheter: No     Current Facility-Administered Medications:   •  acetaminophen (TYLENOL) tablet 650 mg, 650 mg, Oral, Q6H PRN, Stefany Shirley PA-C  •  aspirin (ECOTRIN LOW STRENGTH) EC tablet 81 mg, 81 mg, Oral, Daily, EMMA Natarajan, 81 mg at 11/24/22 1249  •  atorvastatin (LIPITOR) tablet 40 mg, 40 mg, Oral, HS, EMMA Natarajan, 40 mg at 11/23/22 2105  •  carvedilol (COREG) tablet 12 5 mg, 12 5 mg, Oral, Q12H [address], Rui Fallon PA-C  •  FLUoxetine (PROzac) capsule 40 mg, 40 mg, Oral, Daily, EMMA Natarajan, 40 mg at 11/24/22 1249  •  furosemide (LASIX) injection 40 mg, 40 mg, Intravenous, BID (diuretic), Rui Fallon PA-C  •  gabapentin (NEURONTIN) capsule 300 mg, 300 mg, Oral, Daily, EMMA Natarajan, 300 mg at 11/24/22 1249  •  heparin (porcine) subcutaneous injection 5,000 Units, 5,000 Units, Subcutaneous, Q8H [address], 5,000 Units at 11/24/22 0644 **AND** [CANCELED] Platelet count, , , Once, EMMA Natarajan  •  hydrALAZINE (APRESOLINE) tablet 25 mg, 25 mg, Oral, Q8H, Claribel Rocha PA-C, 25 mg at 11/24/22 6419  •  insulin glargine (LANTUS) subcutaneous injection 10 Units 0 1 mL, 10 Units, Subcutaneous, HS, EMMA Natarajan, 10 Units at 11/23/22 2106  •  insulin lispro (HumaLOG) 100 units/mL subcutaneous injection 1-6 Units, 1-6 Units, Subcutaneous, HS, VasEMMA Cummins, 2 Units at 11/23/22 2135  •  insulin lispro (HumaLOG) 100 units/mL subcutaneous injection 2-12 Units, 2-12 Units, Subcutaneous, TID AC, 4 Units at 11/24/22 1255 **AND** Fingerstick Glucose (POCT), , , TID AC, EMMA Natarajan  •  insulin lispro (HumaLOG) 100 units/mL subcutaneous injection 5 Units, 5 Units, Subcutaneous, TID With Meals, Pavithra Sebasoast Fern PA-C  •  isosorbide dinitrate (ISORDIL) tablet 10 mg, 10 mg, Oral, Q8H, EMMA Walker, 10 mg at 11/24/22 1324  •  melatonin tablet 4 5 mg, 4 5 mg, Oral, HS PRN, EMMA Natarajan  •  QUEtiapine (SEROquel) tablet 100 mg, 100 mg, Oral, HS, EMMA Natarajan, 100 mg at 11/23/22 2101  •  thiamine tablet 100 mg, 100 mg, Oral, Daily, EMMA Natarajan, 100 mg at 11/24/22 1250    Labs & Results:      Results from last 7 days   Lab Units 11/24/22  2974 11/23/22  1121   WBC Thousand/uL 6 20 7 99   HEMOGLOBIN g/dL 8 3* 9 0*   HEMATOCRIT % 25 7* 28 1*   PLATELETS Thousands/uL 181 184         Results from last 7 days   Lab Units 11/24/22  0453 11/23/22  1121   POTASSIUM mmol/L 3 8 4 6   CHLORIDE mmol/L 109* 108   CO2 mmol/L 21 19*   BUN mg/dL 75* 72*   CREATININE mg/dL 3 46* 3 51*   CALCIUM mg/dL 8 7 8 9   ALK PHOS U/L  --  133*   ALT U/L  --  35   AST U/L  --  [address] Izabel Corrales PAAmberC

## 2022-11-24 NOTE — ASSESSMENT & PLAN NOTE
Lab Results   Component Value Date    HGBA1C 7 7 (H) 10/03/2022     Recent Labs     11/23/22  1641 11/23/22  2125 11/24/22  0655 11/24/22  1120   POCGLU 295* 215* 143* 201*       Blood Sugar Average: Last 72 hrs:  · (P) 213 5   · Outpatient on Farxiga and Novolog  · Follows with endocrinology outpatient  · AM glucose 143 with lantus 10 units QHS - will continue  · Home regimen of novolog is 30 with breakfast and 10 with dinner but was having glucoses in the 30s with this regimen (giving and not eating)  · Add Humalog 5 units t i d   With meals  · Diabetic diet

## 2022-11-24 NOTE — PLAN OF CARE
Problem: OCCUPATIONAL THERAPY ADULT  Goal: Performs self-care activities at highest level of function for planned discharge setting  See evaluation for individualized goals  Description: Treatment Interventions: ADL retraining, Functional transfer training, UE strengthening/ROM, Endurance training, Patient/family training, Cognitive reorientation, Equipment evaluation/education, Compensatory technique education, Continued evaluation, Activityengagement  Equipment Recommended: Shower/Tub chair with back ($)       See flowsheet documentation for full assessment, interventions and recommendations  Note: Limitation: Decreased ADL status, Decreased UE strength, Decreased Safe judgement during ADL, Decreased cognition, Decreased endurance, Decreased self-care trans, Decreased high-level ADLs  Prognosis: Fair  Assessment: Pt is a 74year old male seen for initial OT evaluation s/p admission to [facility] 11/23/22 with acute on chronic heart failure, LAZARO on CKD, DM, and confusion  Pt is a questionable historian due to severe cognitive deficits, but reports that he goes back and forth between his brother and sister's house and denies ever being alone  Pt reports being I with ADLs, IADLs and functional mobility with use of rw  Reports he drives both a car and motor cycle  Per chart, poor medication management  Pt is currently demonstrating the following occupational deficits: ADLs with Juan, functional transfers with Juan and functional mobility with Juan  Factors currently limiting pt's independence in these areas include: severe cognitive deficits, generalized weakness, endurance, balance, questionable support  From an OT standpoint, recommend STR vs OP OT AND 24/7 supervision upon d/c pending progress and confirmation of assist available in home environment  See below for OT goals to be addressed 2-3x/wk    Recommendation: (S) Geriatric Consult  OT Discharge Recommendation: Post acute rehabilitation services (vs OP neuro OT and 24/7 supervision)

## 2022-11-24 NOTE — ASSESSMENT & PLAN NOTE
Wt Readings from Last 3 Encounters:   11/24/22 88 1 kg (194 lb 3 2 oz)   10/26/22 90 4 kg (199 lb 4 8 oz)   10/16/22 84 1 kg (185 lb 6 4 oz)     · Has a history of noncompliance with his medications and was recently seen in [facility] 11/13-11/15 for CHF exacerbation as well and at [facility] 10/26  · On discharge from [facility] was instructed to take torsemide 40 mg daily  Prior to this had been increased to 20 mg b i d  on 11/9 and prior to this was on 10 mg QOD  · Patient not taking medications as prescribed as an outpatient  · Weight on discharge from [facility] was 198  During last admission weights discussed with family and dry wt approx 190-196  · Cardiology and Nephrology following     · Patient is not compliant with saving urine for measurement - discussed with patient and nursing need for accurate I&Os  · At this time given weight decreased to 194 this morning, continue current IV diuretic dose of 40 mg b i d   · Continue monitor on telemetry

## 2022-11-24 NOTE — ASSESSMENT & PLAN NOTE
· Continue Coreg, hydralazine and isosorbide  · Monitor with diuresis  · Blood pressure currently 120 to 130s

## 2022-11-24 NOTE — ASSESSMENT & PLAN NOTE
· Patient at times will not converse however with prompting was noted to sit up, speak to me and eat lunch  Nursing staff report that this has been present during prior admissions as well  · Patient able to provide me information regarding the holiday and some history  · When seen by occupational therapy earlier patient did poorly on cognitive evaluation  · Obtain Geriatrics consultation  · Occupational therapy recommending rehab upon discharge

## 2022-11-24 NOTE — OCCUPATIONAL THERAPY NOTE
Occupational Therapy Evaluation     Patient Name: Jonathon Fisher  Today's Date: 11/24/2022  Problem List  Principal Problem:    Acute on chronic combined systolic and diastolic congestive heart failure (HCC)  Active Problems:    LAZARO superimposed on CKD (chronic kidney disease) stage 4, GFR 15-29 ml/min (HCC)    Essential hypertension    Mixed hyperlipidemia    Type 2 diabetes mellitus (HCC)    Elevated troponin    Past Medical History  Past Medical History:   Diagnosis Date    Anxiety     Arthritis     CHF (congestive heart failure) ([facility])     Coronary artery disease     Dementia (HCC)     Depression     Diabetes mellitus ([facility] [address] )     Infectious viral hepatitis     Memory loss     Visual impairment      Past Surgical History  History reviewed  No pertinent surgical history  11/24/22 1033   OT Last Visit   OT Visit Date 11/24/22   Note Type   Note type Evaluation  (and follow up treatment session)   Pain Assessment   Pain Assessment Tool 0-10   Restrictions/Precautions   Weight Bearing Precautions Per Order No   Home Living   Additional Comments Pt is a questionable/poor historian due to severe cognitive deficits  Reports he goes back and forth between his siblings houses  Denies ever being home alone but this will need to be confirmed with family   Prior Function   Level of [place] Independent with ADLs   Lives With Family   Vocational Unemployed   Lifestyle   Autonomy Pt reports being I with ADLs, IADLs and functional mobility with use of rw  Reports he drives both a car and motor cycle  Per chart, poor medication management  Reciprocal Relationships lives with siblings   Service to Others retired  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   Subjective   Subjective "inappropriate"    Pt states this at least 10x throughout session towards himself and his roommate   ADL   Eating Assistance 5  Supervision/Setup   Grooming Assistance 4  Minimal Assistance   [id number] 4  Minimal Assistance   LB Bathing Assistance 4  Minimal Assistance   UB Dressing Assistance 4  Minimal Assistance   LB Dressing Assistance 4  Minimal Assistance   Toileting Assistance  4  Minimal Assistance   Bed Mobility   Supine to Sit 5  Supervision   Sit to Supine 5  Supervision   Additional Comments Left supine in bed with all needs within reach, alarm activated   Transfers   Sit to Stand 4  Minimal assistance   Additional items Assist x 1; Increased time required; Impulsive;Verbal cues   Stand to Sit 4  Minimal assistance   Additional items Assist x 1; Increased time required;Verbal cues; Impulsive   Additional Comments use of rw with cues for safe positioning however not corrected with cues  uncontrolled descent   Functional Mobility   Functional Mobility 4  Minimal assistance   Additional Comments use of rw and cues for safety  unsteady throughout  1 LOB   Balance   Static Sitting Fair +   Dynamic Sitting Fair   Static Standing Fair -   Dynamic Standing Poor +   Ambulatory Poor +   Activity Tolerance   Activity Tolerance Patient tolerated treatment well   Nurse Made Aware Pt cleared by JANE Anderson for OT evaluation and OOB mobility   RUE Assessment   RUE Assessment WFL  (generalized weakness)   LUE Assessment   LUE Assessment   (generalized weakness)   Psychosocial   Psychosocial (WDL)   (odd affect  inappropriate to therapist at times )   Cognition   Overall Cognitive Status (S)  Impaired   Arousal/Participation Alert; Responsive; Cooperative   Attention Attends with cues to redirect   Orientation Level Oriented to person;Oriented to place  (oriented to general time and situation)   Memory Decreased recall of precautions;Decreased recall of recent events;Decreased short term memory;Decreased long term memory;Decreased recall of biographical information   Following Commands Follows one step commands with increased time or repetition   Comments Pt is pleasant but inappropriate sexually towards therapist throughout    Also noted to yell "inappropriate!" at himself and neighbor at least 10x  Pt chooses to complete half of cognitive assessment with eyes closed "you gotta rest your eyes too baby"  Overall, severe cognitive impairments  Unclear cognitive baseline, however at this point would recommend 24/7 supervision, assist with all med mgmt, no driving   Cognition Assessment Tools MOCA   Score (S)  6  (SEVERE)   Assessment   Limitation Decreased ADL status; Decreased UE strength;Decreased Safe judgement during ADL;Decreased cognition;Decreased endurance;Decreased self-care trans;Decreased high-level ADLs   Prognosis Fair   Assessment Pt is a 74year old male seen for initial OT evaluation s/p admission to [facility] 11/23/22 with acute on chronic heart failure, LAZARO on CKD, DM, and confusion  Pt is a questionable historian due to severe cognitive deficits, but reports that he goes back and forth between his brother and sister's house and denies ever being alone  Pt reports being I with ADLs, IADLs and functional mobility with use of rw  Reports he drives both a car and motor cycle  Per chart, poor medication management  Pt is currently demonstrating the following occupational deficits: ADLs with Juan, functional transfers with Juan and functional mobility with Juan  Factors currently limiting pt's independence in these areas include: severe cognitive deficits, generalized weakness, endurance, balance, questionable support  From an OT standpoint, recommend STR vs OP OT AND 24/7 supervision upon d/c pending progress and confirmation of assist available in home environment  See below for OT goals to be addressed 2-3x/wk  Goals   Patient Goals to eat an orange   Plan   Treatment Interventions ADL retraining;Functional transfer training;UE strengthening/ROM; Endurance training;Patient/family training;Cognitive reorientation;Equipment evaluation/education; Compensatory technique education;Continued evaluation; Activityengagement   Goal Expiration Date 22   OT Treatment Day 1   OT Frequency 2-3x/wk   Recommendation   Recommendation (S)  Geriatric Consult   OT Discharge Recommendation Post acute rehabilitation services  (vs OP neuro OT and  supervision)   Equipment Recommended Shower/Tub chair with back ($)   AM-PAC Daily Activity Inpatient   Lower Body Dressing 3   Bathing 3   Toileting 3   Upper Body Dressing 3   Grooming 3   Eating 3   Daily Activity Raw Score 18   Daily Activity Standardized Score (Calc for Raw Score >=11) 38 66   AM-PAC Applied Cognition Inpatient   Following a Speech/Presentation 1   Understanding Ordinary Conversation 2   Taking Medications 1   Remembering Where Things Are Placed or Put Away 1   Remembering List of 4-5 Errands 1   Taking Care of Complicated Tasks 1   Applied Cognition Raw Score 7   Applied Cognition Standardized Score 15 17   Additional Treatment Session   Start Time 1011   End Time 1033   Treatment Assessment Pt is seen for additional OT session following initial evaluation for formal cognitive assessment  Pt completed [place] Cognitive Assessment and scored 6/30, implying severe cognitive deficits for age/education  See below for more details  PT SEEN FOR ALVA COGNITIVE ASSESSMENT  SCORED  6/30 INDICATING SEVERE  COGNITIVE IMPAIRMENT FOR AGE/EDUCATION  SCORES ARE AS FOLLOWS:    VISUOSPATIAL/EXECUTIVE FUNCTION: 1/5  NAMIN/3  ATTENTION: 0/6  LANGUAGE: 0/3  Pt was able to repeat sentences back to therapist  Pt was able to produce N of words starting with the letter F in 1 minute  ABSTRACTION: 0/2  Pt was able to identify the similarity of two items x2 trials  DELAYED RECALL: 0/5  ORIENTATION: 3/6  Pt is oriented to year, place and city  Disoriented to date, month, day  Goals:  Pt will be attentive for 100% of formal cognitive assessment for safe discharge/planning  Pt will complete all self care tasks with distant supervision    Pt will complete functional transfers on/off all surfaces and household mobility with Judy with use of DME as appropriate      Anabella Mccullough, KAY, OTR/L, CSRS

## 2022-11-24 NOTE — ASSESSMENT & PLAN NOTE
· Patient follows with [facility]  · Known multivessel severe coronary artery disease  · Felt to not be a CABG candidate it as per cardiology notes from [facility]  · Was to be optimized prior to PCI to circumflex and LAD  · Most recent echocardiogram 11/8/2022 at [facility] with an EF of 30-35%  · Has a life vest   Is noncompliant with this    · Continue outpatient follow-up with Cardiology  · Continue Coreg, isosorbide, hydralazine, aspirin and statin

## 2022-11-24 NOTE — PROGRESS NOTES
NEPHROLOGY PROGRESS NOTE   German Clark 74 y o  male MRN: [id number]  Unit/Bed#: CW2 216-02 Encounter: [id number]      ASSESSMENT & PLAN:  1  Acute kidney injury on top of CKD stage 4 with baseline creatinine around 2 1-2 7  Patient follows with [facility]  Acute kidney injury suspected secondary to volume overload, cardiorenal syndrome, congestive heart failure exacerbation, patient reported not taking diuretics previously due to confusion  Continue with intravenously diuresis, goal negative of 1-1 5 L a day  Monitor ins and outs, follow daily labs  Follow daily labs  Avoid relative hypotension    2  Chronic kidney disease stage 4 baseline 2 1-2 7, follow with [facility]  Reported prior episode of acute kidney injury requiring temporary dialysis while in [place]    3  Acute on chronic HFrEF with an EF of 30-35%, cardiomyopathy mixed ischemic and nonischemic, coronary artery disease, cardiology on board  Continue with intravenously diuretics  Follow low-salt diet  Follow daily labs  4  Anemia, suspected component of chronic kidney disease, monitor H&H    5  Hypertension, blood pressure stable, avoid relative hypotension      6  Confusion, workup per primary team        SUBJECTIVE:  Patient seen and examined, somnolent but arousable, reports still feeling confused, reports feeling better, denies chest pain, no shortness of breath, no abdominal pain, no nausea, no vomiting    OBJECTIVE:  Current Weight: Weight - Scale: 88 1 kg (194 lb 3 2 oz)  Vitals:    11/24/22 0747   BP: 126/75   Pulse: 70   Resp: 18   Temp: 98 4 °F (36 9 °C)   SpO2: 95%       Intake/Output Summary (Last 24 hours) at 11/24/2022 0858  Last data filed at 11/24/2022 0501  Gross per 24 hour   Intake 730 ml   Output 850 ml   Net -120 ml       General:  Somnolent but arousable, reported states feeling confused, in not acute distress  Eyes: conjunctivae pink, anicteric sclerae  ENT: lips and mucous membranes moist  Neck: supple, no JVD  Chest:  Slightly decreased breath sounds bilateral, no crackles, ronchus or wheezings  CVS: distinct S1 & S2, normal rate, regular rhythm  Abdomen: soft, non-tender, non-distended, normoactive bowel sounds  Extremities: no significant edema of both legs  Skin: no rash  Neuro:  Somnolent but arousable, reports still feeling confused, following commands        Medications:    Current Facility-Administered Medications:   •  aspirin (ECOTRIN LOW STRENGTH) EC tablet 81 mg, 81 mg, Oral, Daily, EMMA Wilkes, 81 mg at 11/23/22 1522  •  atorvastatin (LIPITOR) tablet 40 mg, 40 mg, Oral, HS, EMMA Wilkes, 40 mg at 11/23/22 2105  •  carvedilol (COREG) tablet 12 5 mg, 12 5 mg, Oral, BID With Meals, EMMA Wilkes, 12 5 mg at 11/24/22 0645  •  FLUoxetine (PROzac) capsule 40 mg, 40 mg, Oral, Daily, EMMA Wilkes  •  furosemide (LASIX) injection 40 mg, 40 mg, Intravenous, BID (diuretic), EMMA Wilkes, 40 mg at 11/23/22 1525  •  gabapentin (NEURONTIN) capsule 300 mg, 300 mg, Oral, Daily, EMMA Wilkes, 300 mg at 11/23/22 1522  •  heparin (porcine) subcutaneous injection 5,000 Units, 5,000 Units, Subcutaneous, Q8H [facility], 5,000 Units at 11/24/22 0644 **AND** [CANCELED] Platelet count, , , Once, EMMA Wilkes  •  hydrALAZINE (APRESOLINE) tablet 25 mg, 25 mg, Oral, Q8H, Claribel Rocha PA-C, 25 mg at 11/24/22 4801  •  insulin glargine (LANTUS) subcutaneous injection 10 Units 0 1 mL, 10 Units, Subcutaneous, HS, EMMA Wilkes, 10 Units at 11/23/22 2106  •  insulin lispro (HumaLOG) 100 units/mL subcutaneous injection 1-6 Units, 1-6 Units, Subcutaneous, HS, EMMA Lopez, 2 Units at 11/23/22 2135  •  insulin lispro (HumaLOG) 100 units/mL subcutaneous injection 2-12 Units, 2-12 Units, Subcutaneous, TID AC, 6 Units at 11/23/22 1714 **AND** Fingerstick Glucose (POCT), , , TID AC, EMMA Wilkes  •  isosorbide dinitrate (ISORDIL) tablet 10 mg, 10 mg, Oral, Q8H, Cielo Kim, CRNP, 10 mg at 11/24/22 7363  •  melatonin tablet 4 5 mg, 4 5 mg, Oral, HS PRN, EMMA Adkins  •  QUEtiapine (SEROquel) tablet 100 mg, 100 mg, Oral, HS, EMMA Adkins, 100 mg at 11/23/22 2105  •  thiamine tablet 100 mg, 100 mg, Oral, Daily, EMMA Adkins, 100 mg at 11/23/22 1522    Invasive Devices:        Lab Results:   Results from last 7 days   Lab Units 11/24/22  0453 11/23/22  1121   WBC Thousand/uL 6 20 7 99   HEMOGLOBIN g/dL 8 3* 9 0*   HEMATOCRIT % 25 7* 28 1*   PLATELETS Thousands/uL 181 184   SODIUM mmol/L 137 135   POTASSIUM mmol/L 3 8 4 6   CHLORIDE mmol/L 109* 108   CO2 mmol/L 21 19*   BUN mg/dL 75* 72*   CREATININE mg/dL 3 46* 3 51*   CALCIUM mg/dL 8 7 8 9   ALK PHOS U/L  --  133*   ALT U/L  --  35   AST U/L  --  18       Previous work up:  See previous notes      Portions of the record may have been created with voice recognition software  Occasional wrong word or "sound a like" substitutions may have occurred due to the inherent limitations of voice recognition software  Read the chart carefully and recognize, using context, where substitutions have occurred  If you have any questions, please contact the dictating provider

## 2022-11-24 NOTE — PLAN OF CARE
Problem: PHYSICAL THERAPY ADULT  Goal: Performs mobility at highest level of function for planned discharge setting  See evaluation for individualized goals  Description: Treatment/Interventions: Functional transfer training, LE strengthening/ROM, ADL retraining, Therapeutic exercise, Cognitive reorientation, Patient/family training, Equipment eval/education, Bed mobility, Gait training, Continued evaluation, Spoke to nursing, OT  Equipment Recommended: Tatianna Renee (pt reportedly owns)       See flowsheet documentation for full assessment, interventions and recommendations  Note: Prognosis: Fair  Problem List: Decreased strength, Decreased endurance, Impaired balance, Decreased mobility, Decreased cognition, Decreased safety awareness, Impaired judgement  Assessment: Pt seen for moderate complexity PT evaluation  Pt with active PT eval/treat orders  Pt is a 74 y o  male who was admitted to [facility] on 11/23/2022  Pt's current dx/ problem list include: acute on chronic systolic congestive heart failure  Personal factors affecting pt at time of initial examination include: inability to perform IADLs, inability to perform ADLs, inability to ambulate household distances  Due to acute medical issues, ongoing medical workup for primary dx; fall risk, decreased activity tolerance compared to baseline, increased assistance needed from caregiver at current time, multiple lines, decline in overall functional mobility status; health management issues; note unstable clinical picture (moderate complexity)  Pt is a questionable historian and reports living with sister  Per conversation with OT, pt goes back and forth between his sister and brother  He reports that baseline he is independent with mobility but requires assistance with dressing  Currently, upon initial examination, pt  is requiring supervision for supine to sit and min A for sit to stand and ambulation   PT to continue to follow and assess functional transfers and ambulation as appropriate and able  Currently, pt presents functioning below baseline and with overall mobility deficits 2* to: decreased LE strength/AROM; limited flexibility;  generalized weakness/ deconditioning; decreased endurance; decreased activity tolerance; impaired balance; gait deviations  Pt currently at increased risk for falls  At the end of evaluation, pt was left seated edge of bed with all needs in reach  Pt would benefit from continued skilled PT services while in hospital to address remaining limitations and maximize functional potential  Pending clarification of home set up, if patient has 24/7 support anticipate patient will be able to go home with increased family support and HHPT  The patient's AM-PAC Basic Mobility Inpatient Short Form Raw Score is 20  A Raw score of greater than 16 suggests the patient may benefit from discharge to home  Please also refer to the recommendation of the Physical Therapist for safe discharge planning  PT Discharge Recommendation: Home with home health rehabilitation    See flowsheet documentation for full assessment

## 2022-11-24 NOTE — ASSESSMENT & PLAN NOTE
· Baseline hemoglobin appears to be 1-10  · Current hemoglobin 8 3 down from 9 0 on admission  · No signs or symptoms of bleeding at this time  · Recommend outpatient follow-up

## 2022-11-24 NOTE — ASSESSMENT & PLAN NOTE
Lab Results   Component Value Date    EGFR 17 11/24/2022    EGFR 17 11/23/2022    EGFR 29 10/26/2022    CREATININE 3 46 (H) 11/24/2022    CREATININE 3 51 (H) 11/23/2022    CREATININE 2 28 (H) 10/26/2022     · Baseline creatinine appears to be approximately 2 1-2 7  · Follows with [facility]  · Acute kidney injury present on admission felt to be secondary to volume overload, cardiorenal syndrome  · Nephrology following, continue to monitor with diuresis

## 2022-11-24 NOTE — PHYSICAL THERAPY NOTE
Physical Therapy Evaluation     Patient's Name: German Clark    Admitting Diagnosis  Acute on chronic combined systolic and diastolic heart failure ([facility] ) [I50 43]  Dyspnea [R06 00]  Elevated troponin [R77 8]  Noncompliance with medication regimen [Z91 14]  Pleural effusion, bilateral [J90]  LAZARO (acute kidney injury) ([facility] ) [N17 9]  Acute on chronic systolic congestive heart failure ([facility] ) [I50 23]  Acute kidney injury superimposed on CKD ([facility] ) [N17 9, N18 9]    Problem List  Patient Active Problem List   Diagnosis    Acute hepatitis C virus infection    Abnormal EKG    LAZARO superimposed on CKD (chronic kidney disease) stage 4, GFR 15-29 ml/min ([facility])    Bipolar affective disorder, mixed, moderate ([facility])    Acute on chronic systolic congestive heart failure ([facility])    Essential hypertension    Mixed hyperlipidemia    Normocytic anemia    Stage 3b chronic kidney disease ([facility])    Type 2 diabetes mellitus ([facility])    Weakness    Ischemic cardiomyopathy    Polysubstance abuse ([place] [address] )    Diarrhea    Type 2 diabetes mellitus, without long-term current use of insulin ([facility])    High serum thyroid stimulating hormone (TSH)    Metabolic acidosis    Hyperglycemia, unspecified    Pleural effusion, bacterial    Transaminitis    Acute on chronic systolic congestive heart failure (HCC)    Acute kidney injury ([facility] )    Hyponatremia    Uncontrolled type 2 diabetes mellitus with hyperglycemia ([facility])    Persistent proteinuria    Acute kidney injury superimposed on CKD (HCC)    Orthostatic hypotension    Acute on chronic combined systolic and diastolic heart failure (HCC)    Weight gain    Generalized weakness    CAD (coronary artery disease)    Cognitive impairment       Past Medical History  Past Medical History:   Diagnosis Date    Anxiety     Arthritis     CHF (congestive heart failure) ([facility])     Coronary artery disease     Dementia ([facility])     Depression     Diabetes mellitus ([facility] )     Infectious viral hepatitis     Memory loss     Visual impairment        Past Surgical History  History reviewed  No pertinent surgical history  11/24/22 0887   PT Last Visit   PT Visit Date 11/24/22   Note Type   Note type Evaluation   Pain Assessment   Pain Assessment Tool 0-10   Pain Score No Pain   Restrictions/Precautions   Weight Bearing Precautions Per Order No   Other Precautions Cognitive; Fall Risk;Multiple lines;Telemetry   Home Living   Type of [address] One level   Home Equipment Walker   Additional Comments Pt is a qestionable historian  He reports that he lives with his sister and that his brother helps get him dressed  Pt reports that he walks with RW and has had several falls 3-4 recently  Reports that they do not allow him to drive  Prior Function   Lives With [facility] in the last 6 months 1 to 4  (3-4)   Vocational Unemployed   Comments   (Upon OT evaluation, it is reported that pt split's his time between his sister and brother  Will need to clarify  Pt reports that he is not home alone )   General   Family/Caregiver Present No   Cognition   Overall Cognitive Status Impaired   Attention Attends with cues to redirect   Following Commands Follows one step commands with increased time or repetition   Comments Pt is sexually inappropriate to therapist throughout session  Repeatedly asking for a "lap dance" despite redirection  RLE Assessment   RLE Assessment WNL  (Grossly 4/5 MMT, acessed w/ mobility)   LLE Assessment   LLE Assessment WNL  (Grossly 4/5 MMT, acessed w/ mobility)   Bed Mobility   Supine to Sit 5  Supervision   Sit to Supine 5  Supervision   Additional Comments   (Pt left seated EOB at end of session with breakfast tray)   Transfers   Sit to Stand 4  Minimal assistance   Additional items Assist x 1; Increased time required;Verbal cues   Stand to Sit 4  Minimal assistance   Additional items Assist x 1; Increased time required;Verbal cues   Additional Comments Utilized RW   Impoper use of RW despite cuing Ambulation/Elevation   Gait pattern Decreased foot clearance; Forward Flexion; Wide SONG   Gait Assistance 4  Minimal assist   Additional items Assist x 1;Verbal cues   Assistive Device Rolling walker   Distance 40'   Ambulation/Elevation Additional Comments Pt had decreased awaraness of surrondings  Required assitance to navigate enviroment with RW  Balance   Static Sitting Fair +   Dynamic Sitting Fair   Static Standing Fair -   Dynamic Standing Poor +   Ambulatory Poor +   Activity Tolerance   Activity Tolerance Patient tolerated treatment well;Patient limited by fatigue   Nurse Made Aware RN cleared pt for PT evaluation   Assessment   Prognosis Fair   Problem List Decreased strength;Decreased endurance; Impaired balance;Decreased mobility; Decreased cognition;Decreased safety awareness; Impaired judgement   Assessment Pt seen for moderate complexity PT evaluation  Pt with active PT eval/treat orders  Pt is a 74 y o  male who was admitted to [place] [address] on 11/23/2022  Pt's current dx/ problem list include: acute on chronic systolic congestive heart failure  Personal factors affecting pt at time of initial examination include: inability to perform IADLs, inability to perform ADLs, inability to ambulate household distances  Due to acute medical issues, ongoing medical workup for primary dx; fall risk, decreased activity tolerance compared to baseline, increased assistance needed from caregiver at current time, multiple lines, decline in overall functional mobility status; health management issues; note unstable clinical picture (moderate complexity)  Pt is a questionable historian and reports living with sister  Per conversation with OT, pt goes back and forth between his sister and brother  He reports that baseline he is independent with mobility but requires assistance with dressing    Currently, upon initial examination, pt  is requiring supervision for supine to sit and min A for sit to stand and ambulation  PT to continue to follow and assess functional transfers and ambulation as appropriate and able  Currently, pt presents functioning below baseline and with overall mobility deficits 2* to: decreased LE strength/AROM; limited flexibility;  generalized weakness/ deconditioning; decreased endurance; decreased activity tolerance; impaired balance; gait deviations  Pt currently at increased risk for falls  At the end of evaluation, pt was left seated edge of bed with all needs in reach  Pt would benefit from continued skilled PT services while in hospital to address remaining limitations and maximize functional potential  Pending clarification of home set up, if patient has 24/7 support anticipate patient will be able to go home with increased family support and HHPT  The patient's AM-PAC Basic Mobility Inpatient Short Form Raw Score is 20  A Raw score of greater than 16 suggests the patient may benefit from discharge to home  Please also refer to the recommendation of the Physical Therapist for safe discharge planning  Goals   Patient Goals To eat breakfast   STG Expiration Date 12/08/22   Short Term Goal #1 STG 1  Pt will be able to perform bed mobility with mod I in order to improve overall functional mobility and assist in safe d/c  STG 2  Pt will be able to perform functional transfer with mod I in order to improve overall functional mobility and assist in safe d/c  STG 3  Pt will be able to ambulate at least 100 feet with least restrictive device with mod I A in order to improve overall functional mobility and assist in safe d/c  STG 4  Pt will improve sitting/standing static/dynamic balance 1 grade in order to improve functional mobility and assist in safe d/c  STG 5  Pt will improve LE strength by one grade in order to improve functional mobility and assist in safe d/c  STG 6   Pt will negotiate at least 1 step at mod I level A in order to improve overall funcitonal mobility and assist in safe d/c Plan   Treatment/Interventions Functional transfer training;LE strengthening/ROM;ADL retraining; Therapeutic exercise;Cognitive reorientation;Patient/family training;Equipment eval/education; Bed mobility;Gait training;Continued evaluation;Spoke to nursing;OT   PT Frequency 2-3x/wk   Recommendation   PT Discharge Recommendation Home with home health rehabilitation   Equipment Recommended Josefina Ray  (pt reportedly owns)   Michael 8 in Bed Without Bedrails 4   Lying on Back to Sitting on Edge of Flat Bed 4   Moving Bed to Chair 3   Standing Up From Chair 3   Walk in Room 3   Climb 3-5 Stairs 3   Basic Mobility Inpatient Raw Score 20   Basic Mobility Standardized Score 43 99   Highest Level Of Mobility   JH-HLM Goal 6: Walk 10 steps or more   JH-HLM Achieved 7: Walk 25 feet or more         Romayne Carwin, PT

## 2022-11-25 LAB
ANION GAP SERPL CALCULATED.3IONS-SCNC: 6 MMOL/L (ref 4–13)
BACTERIA UR QL AUTO: ABNORMAL /HPF
BILIRUB UR QL STRIP: NEGATIVE
BUN SERPL-MCNC: 79 MG/DL (ref 5–25)
CALCIUM SERPL-MCNC: 8.8 MG/DL (ref 8.3–10.1)
CHLORIDE SERPL-SCNC: 108 MMOL/L (ref 96–108)
CLARITY UR: CLEAR
CO2 SERPL-SCNC: 24 MMOL/L (ref 21–32)
COLOR UR: ABNORMAL
CREAT SERPL-MCNC: 3.72 MG/DL (ref 0.6–1.3)
ERYTHROCYTE [DISTWIDTH] IN BLOOD BY AUTOMATED COUNT: 13.6 % (ref 11.6–15.1)
GFR SERPL CREATININE-BSD FRML MDRD: 16 ML/MIN/1.73SQ M
GLUCOSE SERPL-MCNC: 141 MG/DL (ref 65–140)
GLUCOSE SERPL-MCNC: 147 MG/DL (ref 65–140)
GLUCOSE SERPL-MCNC: 147 MG/DL (ref 65–140)
GLUCOSE SERPL-MCNC: 178 MG/DL (ref 65–140)
GLUCOSE SERPL-MCNC: 187 MG/DL (ref 65–140)
GLUCOSE SERPL-MCNC: 217 MG/DL (ref 65–140)
GLUCOSE UR STRIP-MCNC: ABNORMAL MG/DL
HCT VFR BLD AUTO: 27.9 % (ref 36.5–49.3)
HGB BLD-MCNC: 8.9 G/DL (ref 12–17)
HGB UR QL STRIP.AUTO: NEGATIVE
KETONES UR STRIP-MCNC: NEGATIVE MG/DL
LEUKOCYTE ESTERASE UR QL STRIP: NEGATIVE
MCH RBC QN AUTO: 29.8 PG (ref 26.8–34.3)
MCHC RBC AUTO-ENTMCNC: 31.9 G/DL (ref 31.4–37.4)
MCV RBC AUTO: 93 FL (ref 82–98)
NITRITE UR QL STRIP: NEGATIVE
NON-SQ EPI CELLS URNS QL MICRO: ABNORMAL /HPF
PH UR STRIP.AUTO: 5.5 [PH]
PLATELET # BLD AUTO: 211 THOUSANDS/UL (ref 149–390)
PMV BLD AUTO: 11.3 FL (ref 8.9–12.7)
POTASSIUM SERPL-SCNC: 3.9 MMOL/L (ref 3.5–5.3)
PROT UR STRIP-MCNC: ABNORMAL MG/DL
RBC # BLD AUTO: 2.99 MILLION/UL (ref 3.88–5.62)
RBC #/AREA URNS AUTO: ABNORMAL /HPF
SODIUM SERPL-SCNC: 138 MMOL/L (ref 135–147)
SP GR UR STRIP.AUTO: 1.01 (ref 1–1.03)
UROBILINOGEN UR STRIP-ACNC: <2 MG/DL
WBC # BLD AUTO: 6.05 THOUSAND/UL (ref 4.31–10.16)
WBC #/AREA URNS AUTO: ABNORMAL /HPF

## 2022-11-25 RX ORDER — FUROSEMIDE 10 MG/ML
40 INJECTION INTRAMUSCULAR; INTRAVENOUS
Status: COMPLETED | OUTPATIENT
Start: 2022-11-25 | End: 2022-11-25

## 2022-11-25 RX ADMIN — FUROSEMIDE 40 MG: 10 INJECTION, SOLUTION INTRAVENOUS at 08:42

## 2022-11-25 RX ADMIN — FLUOXETINE HYDROCHLORIDE 40 MG: 20 CAPSULE ORAL at 08:42

## 2022-11-25 RX ADMIN — INSULIN LISPRO 5 UNITS: 100 INJECTION, SOLUTION INTRAVENOUS; SUBCUTANEOUS at 17:27

## 2022-11-25 RX ADMIN — INSULIN LISPRO 5 UNITS: 100 INJECTION, SOLUTION INTRAVENOUS; SUBCUTANEOUS at 06:40

## 2022-11-25 RX ADMIN — INSULIN LISPRO 5 UNITS: 100 INJECTION, SOLUTION INTRAVENOUS; SUBCUTANEOUS at 12:00

## 2022-11-25 RX ADMIN — GABAPENTIN 300 MG: 300 CAPSULE ORAL at 08:42

## 2022-11-25 RX ADMIN — CARVEDILOL 12.5 MG: 12.5 TABLET, FILM COATED ORAL at 21:31

## 2022-11-25 RX ADMIN — THIAMINE HCL TAB 100 MG 100 MG: 100 TAB at 08:42

## 2022-11-25 RX ADMIN — INSULIN LISPRO 1 UNITS: 100 INJECTION, SOLUTION INTRAVENOUS; SUBCUTANEOUS at 21:34

## 2022-11-25 RX ADMIN — ISOSORBIDE DINITRATE 10 MG: 10 TABLET ORAL at 17:27

## 2022-11-25 RX ADMIN — ASPIRIN 81 MG: 81 TABLET, COATED ORAL at 08:42

## 2022-11-25 RX ADMIN — INSULIN LISPRO 4 UNITS: 100 INJECTION, SOLUTION INTRAVENOUS; SUBCUTANEOUS at 06:40

## 2022-11-25 RX ADMIN — HEPARIN SODIUM 5000 UNITS: 5000 INJECTION INTRAVENOUS; SUBCUTANEOUS at 14:16

## 2022-11-25 RX ADMIN — FUROSEMIDE 40 MG: 10 INJECTION, SOLUTION INTRAMUSCULAR; INTRAVENOUS at 17:30

## 2022-11-25 RX ADMIN — HYDRALAZINE HYDROCHLORIDE 25 MG: 25 TABLET, FILM COATED ORAL at 06:39

## 2022-11-25 RX ADMIN — HYDRALAZINE HYDROCHLORIDE 25 MG: 25 TABLET, FILM COATED ORAL at 21:32

## 2022-11-25 RX ADMIN — HEPARIN SODIUM 5000 UNITS: 5000 INJECTION INTRAVENOUS; SUBCUTANEOUS at 21:27

## 2022-11-25 RX ADMIN — HYDRALAZINE HYDROCHLORIDE 25 MG: 25 TABLET, FILM COATED ORAL at 14:16

## 2022-11-25 RX ADMIN — QUETIAPINE FUMARATE 100 MG: 100 TABLET ORAL at 21:31

## 2022-11-25 RX ADMIN — ISOSORBIDE DINITRATE 10 MG: 10 TABLET ORAL at 06:38

## 2022-11-25 RX ADMIN — ATORVASTATIN CALCIUM 40 MG: 40 TABLET, FILM COATED ORAL at 21:31

## 2022-11-25 RX ADMIN — HEPARIN SODIUM 5000 UNITS: 5000 INJECTION INTRAVENOUS; SUBCUTANEOUS at 06:36

## 2022-11-25 RX ADMIN — CARVEDILOL 12.5 MG: 12.5 TABLET, FILM COATED ORAL at 08:42

## 2022-11-25 RX ADMIN — INSULIN GLARGINE 10 UNITS: 100 INJECTION, SOLUTION SUBCUTANEOUS at 21:27

## 2022-11-25 NOTE — ASSESSMENT & PLAN NOTE
Lab Results   Component Value Date    HGBA1C 7 7 (H) 10/03/2022     Recent Labs     11/24/22  1702 11/24/22  2146 11/25/22  0200 11/25/22  0530   POCGLU 203* 88 147* 217*       Blood Sugar Average: Last 72 hrs:  · (P) 188 625   · Outpatient on Farxiga and Novolog  · Follows with endocrinology outpatient  · Home regimen of novolog is 30 with breakfast and 10 with dinner but was having glucoses in the 30s with this regimen (giving and not eating)  · Continue current regimen Lantus 10 units QHS and humalog 5 units t i d   With meals  · Diabetic diet

## 2022-11-25 NOTE — PLAN OF CARE
Problem: PAIN - ADULT  Goal: Verbalizes/displays adequate comfort level or baseline comfort level  Description: Interventions:  - Encourage patient to monitor pain and request assistance  - Assess pain using appropriate pain scale  - Administer analgesics based on type and severity of pain and evaluate response  - Implement non-pharmacological measures as appropriate and evaluate response  - Consider cultural and social influences on pain and pain management  - Notify physician/advanced practitioner if interventions unsuccessful or patient reports new pain  Outcome: Progressing     Problem: PAIN - ADULT  Goal: Verbalizes/displays adequate comfort level or baseline comfort level  Description: Interventions:  - Encourage patient to monitor pain and request assistance  - Assess pain using appropriate pain scale  - Administer analgesics based on type and severity of pain and evaluate response  - Implement non-pharmacological measures as appropriate and evaluate response  - Consider cultural and social influences on pain and pain management  - Notify physician/advanced practitioner if interventions unsuccessful or patient reports new pain  Outcome: Progressing     Problem: INFECTION - ADULT  Goal: Absence or prevention of progression during hospitalization  Description: INTERVENTIONS:  - Assess and monitor for signs and symptoms of infection  - Monitor lab/diagnostic results  - Monitor all insertion sites, i e  indwelling lines, tubes, and drains  - Monitor endotracheal if appropriate and nasal secretions for changes in amount and color  - [place] appropriate cooling/warming therapies per order  - Administer medications as ordered  - Instruct and encourage patient and family to use good hand hygiene technique  - Identify and instruct in appropriate isolation precautions for identified infection/condition  Outcome: Progressing

## 2022-11-25 NOTE — CONSULTS
Consultation - Geriatrics   Mando 74 y o  male MRN: [id number]  Unit/Bed#: CW2 216-02 Encounter: [id number]      Assessment/Plan  Cognitive impairment  Likely underlying dementia  At risk secondary to hx of chronic lacunar infarcts  MOCA 6/30 (11/24/22)  TSH 3 590 (10/25/22)  CT head (10/25/22): chronic lacunar infarcts, mild microangiopathic changes  Continue vitamin B 12 supplementation    Frailty  Clinical Frail Scale: 6- Moderately Frail  Needs assistance with IADLs and ADLs  Recommend 24/7 supervision  Albumin 2 8, recommend protein supplementation  PT/OT  Rehab post hospitalization  In the setting of frequent admission, hx of non compliance would family consider placement  Would expect pt does not have decision making capacity, would need neuropsych for evaluation    Ambulatory dysfunction  Fall precautions  Uses wheelchair for mobility  PT/OT    Bipolar depression  Follows with psychiatry as outpatient  Continue seroquel, prozac, gabapentin    LAZARO on CKD 4  Baseline cr 2 1-2 7  Nephology on consult    Acute on chronic HF  Cardiology on consult  Follows as outpatient  Hx of non compliance with medications    Advanced care planning  Full code    Home medication review  [facility] [id number]  Asa 81 mg po daily  Atorvastatin 40 mg po daily  Carvedilol 12 5  mg po BID  Fluoxetine 40 mg po daily  Gabapentin 400 mg po TID  Insulin Aspart 30 units SC Q am  Insulin Aspart 10 units SC Q pm  Insulin Basaglar 15 units QHS  Seroquel 100 mg po QHS- dose increased from 25 mg on 10/17/22  Torsemide 20 mg po every other day  Torsemide 40 mg po daily prn weight gain greater than 40 pounds  Isosorbide 10 mg po Q 8  Hydralazine 25 mg po Q 8          History of Present Illness   Physician Requesting Consult: Taryn Sosa DO  Reason for Consult / Principal Problem: cognitive change  Hx and PE limited by: NA  HPI: Mando is a 74y o  year old male who presents with shortness of breath, chest pain, abdominal distention x 2 days  Pt had admission 11/13-11/15/22 for hypoglycemia, HF, CKD  He has hx of non compliance with medications, 11 hospital encounters this year  He follows with [facility] as outpatient  He has anxiety, HF, Bipolar, Depression, DM2, chronic hepatitis C, life vest     Prior to arrival pt lives at home with his family  He needs assistance with IADLs and ADLs  He uses wheelchair for mobility  He is incontinent of urine  Upon exam pt is sleeping in bed, awakens easily  Per nursing no issues  Inpatient consult to Gerontology  Consult performed by: EMMA Trotter  Consult ordered by: Felisha Phan PA-C          Review of Systems   Constitutional: Negative for unexpected weight change  HENT: Negative for hearing loss  Eyes: Negative for visual disturbance  Respiratory: Negative for cough  Cardiovascular: Negative for chest pain  Gastrointestinal: Negative for constipation  Genitourinary:        Incontinence   Musculoskeletal: Positive for gait problem  Neurological: Positive for weakness  Psychiatric/Behavioral: Negative for sleep disturbance  Historical Information   Past Medical History:   Diagnosis Date   • Anxiety    • Arthritis    • [facility] (congestive heart failure) ([facility] )    • Coronary artery disease    • Dementia (HCC)    • Depression    • Diabetes mellitus ([facility] )    • Infectious viral hepatitis    • Memory loss    • Visual impairment      History reviewed  No pertinent surgical history    Social History   Social History     Substance and Sexual Activity   Alcohol Use Yes   • Alcohol/week: 2 0 standard drinks   • Types: 2 Shots of liquor per week    Comment: socially     Social History     Substance and Sexual Activity   Drug Use Yes   • Types: Methamphetamines     Social History     Tobacco Use   Smoking Status Former   Smokeless Tobacco Never         Family History:   Family History   Problem Relation Age of Onset   • Heart disease Mother    • Diabetes Mother    • Cirrhosis Father        Meds/Allergies   Current meds:   Current Facility-Administered Medications   Medication Dose Route Frequency   • acetaminophen (TYLENOL) tablet 650 mg  650 mg Oral Q6H PRN   • aspirin (ECOTRIN LOW STRENGTH) EC tablet 81 mg  81 mg Oral Daily   • atorvastatin (LIPITOR) tablet 40 mg  40 mg Oral HS   • carvedilol (COREG) tablet 12 5 mg  12 5 mg Oral Q12H [address]   • FLUoxetine (PROzac) capsule 40 mg  40 mg Oral Daily   • furosemide (LASIX) injection 40 mg  40 mg Intravenous BID (diuretic)   • gabapentin (NEURONTIN) capsule 300 mg  300 mg Oral Daily   • heparin (porcine) subcutaneous injection 5,000 Units  5,000 Units Subcutaneous Q8H [address]   • hydrALAZINE (APRESOLINE) tablet 25 mg  25 mg Oral Q8H   • insulin glargine (LANTUS) subcutaneous injection 10 Units 0 1 mL  10 Units Subcutaneous HS   • insulin lispro (HumaLOG) 100 units/mL subcutaneous injection 1-6 Units  1-6 Units Subcutaneous HS   • insulin lispro (HumaLOG) 100 units/mL subcutaneous injection 2-12 Units  2-12 Units Subcutaneous TID AC   • insulin lispro (HumaLOG) 100 units/mL subcutaneous injection 5 Units  5 Units Subcutaneous TID With Meals   • isosorbide dinitrate (ISORDIL) tablet 10 mg  10 mg Oral Q8H   • melatonin tablet 4 5 mg  4 5 mg Oral HS PRN   • QUEtiapine (SEROquel) tablet 100 mg  100 mg Oral HS   • thiamine tablet 100 mg  100 mg Oral Daily      Current PTA meds:  Medications Prior to Admission   Medication   • Acetaminophen Extra Strength 500 MG tablet   • amoxicillin-clavulanate (AUGMENTIN) 500-125 mg per tablet   • Aspirin Low Dose 81 MG EC tablet   • atorvastatin (LIPITOR) 40 mg tablet   • carvedilol (COREG) 12 5 mg tablet   • Dapagliflozin Propanediol 5 MG TABS   • Disposable Gloves MISC   • FLUoxetine (PROzac) 20 mg capsule   • folic acid (FOLVITE) 1 mg tablet   • gabapentin (Neurontin) 300 mg capsule   • glucose blood (Accu-Chek Guide) test strip   • hydrALAZINE (APRESOLINE) 25 mg tablet   • Incontinence Supply Disposable (Disposable Brief Large) MISC   • Incontinence Supply Disposable (RA Adult Wipes) MISC   • Incontinence Supply Disposable MISC   • INSULIN ASPART FLEXPEN SC   • isosorbide dinitrate (ISORDIL) 10 mg tablet   • Lancet Devices (OneTouch Delica Plus Lancing) MISC   • Melatonin 5 MG TABS   • ondansetron (ZOFRAN-ODT) 4 mg disintegrating tablet   • QUEtiapine (SEROquel) 100 mg tablet   • Skin Protectants, Misc  (dimethicone-zinc oxide) cream   • thiamine (VITAMIN B1) 100 mg tablet   • torsemide (DEMADEX) 20 mg tablet        No Known Allergies    Objective   Vitals: Blood pressure 140/77, pulse 68, temperature (!) 97 2 °F (36 2 °C), temperature source Oral, resp  rate 17, height 5' 4" (1 626 m), weight 87 9 kg (193 lb 12 8 oz), SpO2 97 %  ,Body mass index is 33 27 kg/m²  Physical Exam  Vitals and nursing note reviewed  HENT:      Head: Normocephalic  Eyes:      General:         Right eye: No discharge  Left eye: No discharge  Cardiovascular:      Rate and Rhythm: Normal rate and regular rhythm  Pulses: Normal pulses  Pulmonary:      Effort: Pulmonary effort is normal    Abdominal:      General: Bowel sounds are normal       Palpations: Abdomen is soft  Musculoskeletal:         General: Normal range of motion  Cervical back: Normal range of motion  Skin:     General: Skin is warm and dry  Neurological:      Mental Status: He is alert and oriented to person, place, and time  Mental status is at baseline     Psychiatric:         Mood and Affect: Mood normal          Lab Results:   Results from last 7 days   Lab Units 11/25/22  0433   WBC Thousand/uL 6 05   HEMOGLOBIN g/dL 8 9*   HEMATOCRIT % 27 9*   PLATELETS Thousands/uL 211        Results from last 7 days   Lab Units 11/25/22  0433 11/24/22  0453 11/23/22  1121   POTASSIUM mmol/L 3 9   < > 4 6   CHLORIDE mmol/L 108   < > 108   CO2 mmol/L 24   < > 19*   BUN mg/dL 79*   < > 72*   CREATININE mg/dL 3 72*   < > 3 51*   CALCIUM mg/dL 8 8   < > 8 9   ALK PHOS U/L  --   --  133*   ALT U/L  --   --  35   AST U/L  --   --  18    < > = values in this interval not displayed  Imaging Studies: I have personally reviewed pertinent reports  EKG, Pathology, and Other Studies: I have personally reviewed pertinent reports      VTE Prophylaxis: Sequential compression device (Venodyne)     Code Status: Level 1 - Full Code

## 2022-11-25 NOTE — ASSESSMENT & PLAN NOTE
· Baseline hemoglobin appears to be 9-10  · Current hemoglobin stable, slightly below baseline  · No signs or symptoms of bleeding at this time  · Recommend outpatient follow-up

## 2022-11-25 NOTE — ASSESSMENT & PLAN NOTE
Wt Readings from Last 3 Encounters:   11/25/22 87 9 kg (193 lb 12 8 oz)   10/26/22 90 4 kg (199 lb 4 8 oz)   10/16/22 84 1 kg (185 lb 6 4 oz)     Has a history of noncompliance with his medications and was recently seen in [facility] 11/13-11/15 for CHF exacerbation as well and at [facility] 10/26  On discharge from [facility] was instructed to take torsemide 40 mg daily  Prior to this had been increased to 20 mg b i d  on 11/9 and prior to this was on 10 mg QOD    · Patient not taking medications as prescribed as an outpatient  · Most recent echocardiogram with LVEF 30%   · Patient has life vest but is non compliant; monitor on telemetry while inpatient   · Cardiology and Nephrology following  · Currently on IV Lasix 40 mg BID   · Monitor daily weights, intake and output   · Patient is not compliant with saving urine for measurement - discussed with patient and nursing need for accurate I&Os

## 2022-11-25 NOTE — PROGRESS NOTES
Advanced Heart Failure / Pulmonary Hypertension Service Progress Note    Rayburn Barthel 74 y o  male   MRN: [id number]  Unit/Bed#: CW2 216-02; Encounter: [id number]    Assessment:  Principal Problem:    Acute on chronic systolic congestive heart failure ([address] )  Active Problems:    LAZARO superimposed on CKD (chronic kidney disease) stage 4, GFR 15-29 ml/min (HCC)    Bipolar affective disorder, mixed, moderate (HCC)    Essential hypertension    Normocytic anemia    Type 2 diabetes mellitus (HCC)    CAD (coronary artery disease)    Cognitive impairment      Subjective:   Rayburn Barthel is a 72-year-old male who presented to [facility] on 11/23/22 with dyspnea, midsternal chest pain, and abdominal distension x 2 days, after not taking his diuretics for two days  PMH includes HTN, DM2, CKD, HFrEF with LVEF 20-25%, bipolar disorder, remote history polysubstance abuse, hepatitis C  He was diagnosed with a newly discovered cardiomyopathy in 4/2022, at which time myocardial perfusion scan was negative for ischemia  He was admitted in July 2022 and aggressively diuresed and started on GDMT  Cath was deferred at that time secondary to his kidney function       He was subsequently admitted to the hospital on 10/2/2022 with acute systolic CHF in the setting of uncontrolled hypertension  He also had elevated troponin  His presenting creatinine was 2 17 mg/dL however it peaked at 5 54 mg/dL on 10/6/2022  During this time patient was being diuresed with IV diuretics  Patient eventually underwent right heart cath on 10/4/2022 which showed elevated wedge pressure  Patient was dialyzed prior to proceeding with left heart cath/coronary angiogram on 10/10/2022 which showed multivessel coronary artery disease  CT surgery was consulted for coronary bypass evaluation but didn't find him a good candidate for CABG   Planned for phone visit with Dr Alfredo Salas with [facility] to discuss staged PCI for LAD and possibly circumflex      Presenting to [facility] with confusion, weakness, dyspnea, chest pain and reported noncompliance with home diuretics x2 days  Has had multiple admissions for acute CHF exacerbations and LAZARO, last at [place] [id number] from 11/13-11/15 for HF  Patient seen and examined  No significant events overnight  Reports feeling better today  Denies any complaints at this time  Objective: Intake/ Output: 725/1325 (-600)  Weight: 203>193 lb (bed scale) on Lasix 40mg IV BID  Telemetry: no acute events     Today's Plan:  • Strict I/Os, daily weights, cardiac diet  • Would continue IV Lasix 40mg through today, and consider transitioning to PO tomorrow  • NT proBNP 85,152 on admission - significantly higher than previous  • Warm and still slightly volume up on exam  • Keep K>4, Mg>2  • Trend Cr, today 3 72 (baseline 2-2 5)  • Avoid ACE/ARB/ARNi as well as MRA in the setting of LAZARO on CKD      Plan:  Acute on Chronic HFrEF; LVEF 20-25%; NYHA II/III; ACC/AHA Stage C  Etiology: initially thought to be nonischemic, but recent left heart catheterization with evidence of multivessel disease (high-grade obstructive disease in the LAD with disease involving the right coronary branches and the distal circumflex as per CT surgery note)     TTE 04/24/2022: LVEF 30%   Global hypokinesis with distal LAD regional wall motion abnormality   Grade 2 diastolic dysfunction with elevated left atrial filling pressures  TTE 07/06/2022:  LVEF 40-50%  TTE 10/3/22: LVEF 20-25%, LV mildly dilated  Global hypokinesis  Grade II DD  RV cavity size normal, systolic function mildly reduced  Trace TR, MR      Stress test: Myocardial perfusion scan performed on 4/26/2022:  1  No definite evidence of ischemia  Apical and inferior wall infarct as described  2  Global hypokinesis with severely decreased left ventricular ejection fraction at 29%      Coronary angiogram: Performed on 10/10/2022:  Severe multivessel CAD as described above   Normal cardiac filling pressures   PAP:30/10/17 mm Hg; PCWP: 12 mm Hg  Low normal CO/CI (3 80/2 07)  LVEDP of 10 mm Hg     Neurohormonal Blockade:  --Beta Blocker:  Coreg 12 5 mg BID  --SVR reduction: isordil 10/hydralazine 25 TID  --ARNi / ACEi / ARB: no, CKD precludes  --Aldosterone Antagonist: no, CKD precludes  --SGLT2 Inhibitor: Farxiga 5mg QD as outpatient, held here  --Home Diuretic: torsemide 10mg every other day  --Inpatient Diuretic: Lasix 40mg IV BID     Sudden Cardiac Death Risk Reduction:  --LVEF 20-25%, has LifeVest from [facility]     Electrical Resynchronization:  --Candidacy for BiV device:      Advanced Therapies (if appropriate): Will continue to monitor  CAD  [de-identified] of care at [facility]  Was turned down for CABG by CT surgery at [facility], but is planned for PCI of LAD with [facility] in near future   Troponin elevated on arrival, but stable x3    No reported chest pain - likely myocardial injury in setting of severe HF and HTN  Continue aspirin, statin  Optimize heart failure management and blood pressure  Would repeat troponin if chest pain develops  CKD stage 4, GFR 15-29 ml/min   Required acute HD transiently at [facility] while admitted secondary to anuria, but LAZARO resolved and was discharged without the need for long term dialysis  Cr 3 72 today, baseline of 2 0-2 5  Appreciate nephrology input   HTN  Continue antihypertensives as above  Bipolar affective disorder, mixed, moderate   Type 2 diabetes mellitus, without long-term current use of insulin   Management per primary team  Hold SGLT2i at this time  Generalized weakness/confusion  Likely multifactorial given recent hospitalizations and worsening cardiomyopathy   Very confused on initial exam, seems improved today  Prior polysubstance abuse   Reports remote history of heavy meth abuse  Reports last ETOH use 4 days prior to admission          XR chest 1 view portable [[id number]] Collected: 11/23/22 1242   Order Status: Completed Updated: 11/23/22 1246   Narrative:   CHEST     INDICATION:   chest pain  COMPARISON:  Chest radiograph dated 10/25/2022  EXAM PERFORMED/VIEWS:  [facility] CHEST PORTABLE     FINDINGS:     Cardiomediastinal silhouette appears unremarkable  No focal airspace consolidation  Mild pulmonary vascular congestion  No pneumothorax or pleural effusion  Osseous structures appear within normal limits for patient age  Impression:       Mild pulmonary vascular congestion  Vitals:   Blood pressure 140/77, pulse 68, temperature (!) 97 2 °F (36 2 °C), temperature source Oral, resp  rate 17, height 5' 4" (1 626 m), weight 87 9 kg (193 lb 12 8 oz), SpO2 97 %  Body mass index is 33 27 kg/m²  I/O last 3 completed shifts: In: 6180 [P O :1445; I V :10]  Out: 2175 [Urine:2175]    Wt Readings from Last 10 Encounters:   11/25/22 87 9 kg (193 lb 12 8 oz)   10/26/22 90 4 kg (199 lb 4 8 oz)   10/16/22 84 1 kg (185 lb 6 4 oz)   09/22/22 84 kg (185 lb 3 2 oz)   09/20/22 84 5 kg (186 lb 3 2 oz)   09/14/22 (P) 84 4 kg (186 lb)   09/07/22 82 9 kg (182 lb 12 8 oz)   08/11/22 80 9 kg (178 lb 6 4 oz)   07/28/22 78 5 kg (173 lb)   07/26/22 80 kg (176 lb 5 9 oz)     Vitals:    11/25/22 0546 11/25/22 0637 11/25/22 0702 11/25/22 1106   BP:  136/77 138/78 140/77   BP Location:   Left arm Left arm   Pulse:  69  68   Resp:   20 17   Temp:   97 5 °F (36 4 °C) (!) 97 2 °F (36 2 °C)   TempSrc:   Oral Oral   SpO2:  97%  97%   Weight: 87 9 kg (193 lb 12 8 oz)      Height:           Physical Exam  Constitutional:       Appearance: Normal appearance  HENT:      Head: Normocephalic  Nose: Nose normal       Mouth/Throat:      Mouth: Mucous membranes are moist    Eyes:      Conjunctiva/sclera: Conjunctivae normal    Neck:      Comments: JVP moderately elevated, improving   Cardiovascular:      Rate and Rhythm: Normal rate and regular rhythm  Pulmonary:      Effort: Pulmonary effort is normal       Breath sounds: Normal breath sounds     Abdominal:      Palpations: Abdomen is soft  Tenderness: There is no abdominal tenderness  There is no guarding  Musculoskeletal:      Cervical back: Neck supple  Right lower leg: No edema  Left lower leg: No edema  Skin:     General: Skin is dry  Neurological:      General: No focal deficit present  Mental Status: He is alert and oriented to person, place, and time     Psychiatric:         Mood and Affect: Mood normal          Behavior: Behavior normal            Current Facility-Administered Medications:   •  acetaminophen (TYLENOL) tablet 650 mg, 650 mg, Oral, Q6H PRN, Stefany Shirley PA-C  •  aspirin (ECOTRIN LOW STRENGTH) EC tablet 81 mg, 81 mg, Oral, Daily, EMMA Call, 81 mg at 11/25/22 2655  •  atorvastatin (LIPITOR) tablet 40 mg, 40 mg, Oral, HS, EMMA Call, 40 mg at 11/24/22 2300  •  carvedilol (COREG) tablet 12 5 mg, 12 5 mg, Oral, Q12H [address], Francine Schrader PA-C, 12 5 mg at 11/25/22 7456  •  FLUoxetine (PROzac) capsule 40 mg, 40 mg, Oral, Daily, EMMA Call, 40 mg at 11/25/22 4407  •  furosemide (LASIX) injection 40 mg, 40 mg, Intravenous, BID (diuretic), Garry Rodriguez PA-C, 40 mg at 11/25/22 5481  •  gabapentin (NEURONTIN) capsule 300 mg, 300 mg, Oral, Daily, EMMA Call, 300 mg at 11/25/22 5942  •  heparin (porcine) subcutaneous injection 5,000 Units, 5,000 Units, Subcutaneous, Q8H [address], 5,000 Units at 11/25/22 0636 **AND** [CANCELED] Platelet count, , , Once, EMMA Call  •  hydrALAZINE (APRESOLINE) tablet 25 mg, 25 mg, Oral, Q8H, Claribel Rocha PA-C, 25 mg at 11/25/22 2492  •  insulin glargine (LANTUS) subcutaneous injection 10 Units 0 1 mL, 10 Units, Subcutaneous, HS, EMMA Call, 10 Units at 11/23/22 2106  •  insulin lispro (HumaLOG) 100 units/mL subcutaneous injection 1-6 Units, 1-6 Units, Subcutaneous, HS, EMMA Feliciano, 2 Units at 11/23/22 2135  •  insulin lispro (HumaLOG) 100 units/mL subcutaneous injection 2-12 Units, 2-12 Units, Subcutaneous, TID AC, 4 Units at 11/25/22 0640 **AND** Fingerstick Glucose (POCT), , , TID AC, Devaughn Shown, CRNP  •  insulin lispro (HumaLOG) 100 units/mL subcutaneous injection 5 Units, 5 Units, Subcutaneous, TID With Meals, Tsering Whitfield PA-C, 5 Units at 11/25/22 1200  •  isosorbide dinitrate (ISORDIL) tablet 10 mg, 10 mg, Oral, Q8H, EMMA Walker, 10 mg at 11/25/22 3706  •  melatonin tablet 4 5 mg, 4 5 mg, Oral, HS PRN, Devaughn Shown, CRNP  •  QUEtiapine (SEROquel) tablet 100 mg, 100 mg, Oral, HS, Devaughn Shown, CRNP, 100 mg at 11/24/22 2300  •  thiamine tablet 100 mg, 100 mg, Oral, Daily, Devaughn Shown, CRNP, 100 mg at 11/25/22 0842    Labs & Results:      Results from last 7 days   Lab Units 11/25/22  0433 11/24/22  0453 11/23/22  1121   WBC Thousand/uL 6 05 6 20 7 99   HEMOGLOBIN g/dL 8 9* 8 3* 9 0*   HEMATOCRIT % 27 9* 25 7* 28 1*   PLATELETS Thousands/uL 211 181 184         Results from last 7 days   Lab Units 11/25/22  0433 11/24/22  0453 11/23/22  1121   POTASSIUM mmol/L 3 9 3 8 4 6   CHLORIDE mmol/L 108 109* 108   CO2 mmol/L 24 21 19*   BUN mg/dL 79* 75* 72*   CREATININE mg/dL 3 72* 3 46* 3 51*   CALCIUM mg/dL 8 8 8 7 8 9   ALK PHOS U/L  --   --  133*   ALT U/L  --   --  35   AST U/L  --   --  18             Thank you for the opportunity to participate in the care of this patient      Carlos Hernandez PA-C  Advanced Heart Failure  [facility]

## 2022-11-25 NOTE — ASSESSMENT & PLAN NOTE
· Patient at times will not converse however with prompting was noted to sit up, speak to me and eat lunch  Nursing staff report that this has been present during prior admissions as well    · When seen by occupational therapy, patient did poorly on cognitive evaluation  · [facility] [address] consultation  · OT recommending rehab vs home with 24/7 supervision upon discharge

## 2022-11-25 NOTE — ASSESSMENT & PLAN NOTE
Lab Results   Component Value Date    EGFR 16 11/25/2022    EGFR 17 11/24/2022    EGFR 17 11/23/2022    CREATININE 3 72 (H) 11/25/2022    CREATININE 3 46 (H) 11/24/2022    CREATININE 3 51 (H) 11/23/2022     · Baseline creatinine approximately 2 1-2 7  Follows with [facility]    · LAZARO POA with creatinine of 3 51 felt to be secondary to volume overload, cardiorenal syndrome  · Nephrology following, appreciate inputs   · Continue to monitor with diuresis, see plan above   · Creatinine up-trending today

## 2022-11-25 NOTE — PLAN OF CARE
Problem: MOBILITY - ADULT  Goal: Maintain or return to baseline ADL function  Description: INTERVENTIONS:  -  Assess patient's ability to carry out ADLs; assess patient's baseline for ADL function and identify physical deficits which impact ability to perform ADLs (bathing, care of mouth/teeth, toileting, grooming, dressing, etc )  - Assess/evaluate cause of self-care deficits   - Assess range of motion  - Assess patient's mobility; develop plan if impaired  - Assess patient's need for assistive devices and provide as appropriate  - Encourage maximum independence but intervene and supervise when necessary  - Involve family in performance of ADLs  - Assess for home care needs following discharge   - Consider OT consult to assist with ADL evaluation and planning for discharge  - Provide patient education as appropriate  Outcome: Progressing     Problem: PAIN - ADULT  Goal: Verbalizes/displays adequate comfort level or baseline comfort level  Description: Interventions:  - Encourage patient to monitor pain and request assistance  - Assess pain using appropriate pain scale  - Administer analgesics based on type and severity of pain and evaluate response  - Implement non-pharmacological measures as appropriate and evaluate response  - Consider cultural and social influences on pain and pain management  - Notify physician/advanced practitioner if interventions unsuccessful or patient reports new pain  Outcome: Progressing     Problem: INFECTION - ADULT  Goal: Absence or prevention of progression during hospitalization  Description: INTERVENTIONS:  - Assess and monitor for signs and symptoms of infection  - Monitor lab/diagnostic results  - Monitor all insertion sites, i e  indwelling lines, tubes, and drains  - Monitor endotracheal if appropriate and nasal secretions for changes in amount and color  - [facility] appropriate cooling/warming therapies per order  - Administer medications as ordered  - Instruct and encourage patient and family to use good hand hygiene technique  - Identify and instruct in appropriate isolation precautions for identified infection/condition  Outcome: Progressing  Goal: Absence of fever/infection during neutropenic period  Description: INTERVENTIONS:  - Monitor WBC    Outcome: Progressing     Problem: SAFETY ADULT  Goal: Maintain or return to baseline ADL function  Description: INTERVENTIONS:  -  Assess patient's ability to carry out ADLs; assess patient's baseline for ADL function and identify physical deficits which impact ability to perform ADLs (bathing, care of mouth/teeth, toileting, grooming, dressing, etc )  - Assess/evaluate cause of self-care deficits   - Assess range of motion  - Assess patient's mobility; develop plan if impaired  - Assess patient's need for assistive devices and provide as appropriate  - Encourage maximum independence but intervene and supervise when necessary  - Involve family in performance of ADLs  - Assess for home care needs following discharge   - Consider OT consult to assist with ADL evaluation and planning for discharge  - Provide patient education as appropriate  Outcome: Progressing

## 2022-11-25 NOTE — ASSESSMENT & PLAN NOTE
· BP acceptable, monitor routinely   · Continue Coreg, hydralazine and isosorbide  · IV diuresis as above

## 2022-11-25 NOTE — UTILIZATION REVIEW
Initial Clinical Review    Admission: Date/Time/Statement:   Admission Orders (From admission, onward)     Ordered        11/23/22 1303  [facility] [place] [place]  Once                      Orders Placed This Encounter   Procedures   • INPATIENT ADMISSION     Standing Status:   Standing     Number of Occurrences:   1     Order Specific Question:   Level of Care     Answer:   Med Surg [16]     Order Specific Question:   Estimated length of stay     Answer:   More than 2 Midnights     Order Specific Question:   Certification     Answer:   I certify that inpatient services are medically necessary for this patient for a duration of greater than two midnights  See H&P and MD Progress Notes for additional information about the patient's course of treatment  ED Arrival Information     Expected   -    Arrival   11/23/2022 10:15    Acuity   Urgent            Means of arrival   Wheelchair    Escorted by   Family Member    Service   Hospitalist    Admission type   Emergency            Arrival complaint   sob,cp           Chief Complaint   Patient presents with   • Shortness of Breath     Pt c/o SOB, midsternal CP, and abdominal distension for past two days, pt hx HF, pt states he stopped taking his diuretics for past two days, reports he tested flu + for primary care provider       Initial Presentation: 74 y o  male presents to ed from home for evaluation and treatment of shortness of breath, mid sternal chest pain and abdominal distension  Patient states he has not taken diuretics for 2 days and that he tested + for flu  PMHX: CHF, CAD, DEMENTIA, DM  Clinical assessment significant for confusion, tachypnea and hypertension  BUN 72, CR 3 51, BNP 64873, TROP 722  Imaging shows pulmonary vascular congestion  Initially treated with iv lasix 40 mg x 1, carvedilol, aspirin, sq heparin  Admit to inpatient med surg for acute on chronic CHF  Plan iv lasix bid    Acute on chronic kidney injury ( baseline 1 5 to 21 5)  Check bladder scan for retention, consult nephrology as patient has required dialysis in the past  Trend bmp and intake/  output  confusion likely metabolic  Due to hyperglycemia will start him on basal bolus regimen and titrate up  Insulin with sliding scale  Hold dapagliflozin in hospital    Consult nephrology: goal sbp 140,check post void resirual, agree with iv diuretics, intake/ output and daily wt  Consult cardiology heart failure"  Patient with ef 20-25% and multi vessel disease  Agree with iv diuresis  Patient has cardiologist at another outside hospital that plans PCI of LAD/  Recommend alcohol and tox screen and psych consult to evaluate confusion which may be due to bipolar medication  Date: 11-24-22    Day 2:  Inpatient med surg  Patient is non compliant with saving his urine for measurement  Weight increased today to 194  Patient is also non compliant with using a life vest  Continue iv diuretic  OT cognitive evaluation completed  Patient did poorly on cog eval   Consult geriatrics  11-25-22  Inpatient med surg   Continue lasix bid  Creatinine trending up today 3 46 to 3 51  Patient needs 25 /7 supervision to discharge safely to home        ED Triage Vitals   11/23/22 1029 11/23/22 1029 11/23/22 1029 11/23/22 1029 11/23/22 1029   98 5 °F (36 9 °C) 85 (!) 25 155/90 95 %      Oral Monitor         No Pain          Wt Readings from Last 1 Encounters:   11/25/22 87 9 kg (193 lb 12 8 oz)     Additional Vital Signs:     Date/Time Temp Pulse Resp BP MAP (mmHg) SpO2 O2 Device   11/25/22 07:02:06 97 5 °F (36 4 °C) -- 20 138/78 98 -- None (Room air)   11/25/22 06:37:45 -- 69 -- 136/77 97 97 % --   11/25/22 04:13:46 97 8 °F (36 6 °C) 64 16 130/72 91 94 % None (Room air)   11/24/22 22:58:40 -- 71 -- 135/107   Abnormal  116 94 % --   11/24/22 22:52:32 -- 76 -- 124/70 88 96 % --   11/24/22 20:22:12 98 6 °F (37 °C) 62 -- 125/70 88 96 % --   11/24/22 15:32:19 98 1 °F (36 7 °C) 63 -- 122/70 87 96 % --   11/24/22 07:47:52 98 4 °F (36 9 °C) 70 18 126/75 92 95 % None (Room air)   11/23/22 20:03:15 97 9 °F (36 6 °C) 74 -- 140/86 104 94 % --   11/23/22 1730 -- 82 19 160/95 -- 96 % --   11/23/22 1642 -- -- -- 175/103   Abnormal  131 -- --   11/23/22 1641 -- 84 30   Abnormal  -- -- 96 % --   11/23/22 1640 -- 84 22 -- -- 96 % --   11/23/22 1639 -- 84 24   Abnormal  -- -- 96 % --   11/23/22 1638 -- 82 20 -- -- 95 % --   11/23/22 1637 -- 82 18 -- -- 95 % --   11/23/22 1636 -- 82 17 -- -- 96 % --   11/23/22 1635 -- 82 20 -- -- 97 % --   11/23/22 1411 -- 80 15 155/96 122 93 % --   11/23/22 1224 -- 82 19 174/95   Abnormal  129 92 % --   11/23/22 1029 98 5 °F (36 9 °C) 85 25   Abnormal  155/90 -- 95 % None (Room air)       Pertinent Labs/Diagnostic Test Results:       Collection Time Result Time Vent R Atrial R DE Int  QRSD Int  QT Int  QTC Int  P Axis QRS Axis T Wave Ax    11/23/22 11:06:00 11/23/22 16:29:11 86 86 144 96 342 409 67 78 139                         Sinus rhythm with Premature atrial complexes   ST & T wave abnormality, consider lateral ischemia   Abnormal ECG               POCUS Cardiac/Lung/IVC:  11-23-22   - transthoracic echocardiogram was performed at bedside  - This was a technically difficult study due to lung interference  - Apical, parasternal, subcostal views were obtained  - Findings: There was no obvious pericardial effusion              +bilateral obvious pleural effusions: LEFT small/trace; RIGHTsmall/moderate  LV function / EF grossly reduced (<30%), especially near septum  IVC was plump, 2 2cm with <50% compressibility               TAPSE reduced but was off-axis cut               EPSS 1 1cm              B-lines were not present     Consistent with chronic systolic dysfunction but some findings for fluid overload              XR chest 1 view portable      Final  (11/23 3779)      Mild pulmonary vascular congestion          Results from last 7 days   Lab Units 11/23/22  1349   SARS-COV-2  Negative     Results from last 7 days   Lab Units 11/25/22  0433 11/24/22  0453 11/23/22  1121   WBC Thousand/uL 6 05 6 20 7 99   HEMOGLOBIN g/dL 8 9* 8 3* 9 0*   HEMATOCRIT % 27 9* 25 7* 28 1*   PLATELETS Thousands/uL 211 181 184   NEUTROS ABS Thousands/µL  --  3 53 6 39         Results from last 7 days   Lab Units 11/25/22  0433 11/24/22  0453 11/23/22  1121   SODIUM mmol/L 138 137 135   POTASSIUM mmol/L 3 9 3 8 4 6   CHLORIDE mmol/L 108 109* 108   CO2 mmol/L 24 21 19*   ANION GAP mmol/L 6 7 8   BUN mg/dL 79* 75* 72*   CREATININE mg/dL 3 72* 3 46* 3 51*   EGFR ml/min/1 73sq m 16 17 17   CALCIUM mg/dL 8 8 8 7 8 9     Results from last 7 days   Lab Units 11/23/22  1121   AST U/L 18   ALT U/L 35   ALK PHOS U/L 133*   TOTAL PROTEIN g/dL 8 7*   ALBUMIN g/dL 2 8*   TOTAL BILIRUBIN mg/dL 0 50     Results from last 7 days   Lab Units 11/25/22  0530 11/25/22  0200 11/24/22  2146 11/24/22  1702 11/24/22  1120 11/24/22  0655 11/23/22  2125 11/23/22  1641   POC GLUCOSE mg/dl 217* 147* 88 203* 201* 143* 215* 295*     Results from last 7 days   Lab Units 11/25/22  0433 11/24/22  0453 11/23/22  1121   GLUCOSE RANDOM mg/dL 178* 164* 362*       Results from last 7 days   Lab Units 11/23/22  1519   PH ANGELICA  7 359   PCO2 ANGELICA mm Hg 32 7*   PO2 ANGELICA mm Hg 109 1*   HCO3 ANGELICA mmol/L 18 0*   BASE EXC ANGELICA mmol/L -6 6   O2 CONTENT ANGELICA ml/dL 13 6   O2 HGB, VENOUS % 96 5*     Results from last 7 days   Lab Units 11/23/22  1543 11/23/22  1325 11/23/22  1121   HS TNI 0HR ng/L  --   --  772*   HS TNI 2HR ng/L  --  795*  --    HSTNI D2 ng/L  --  23*  --    HS TNI 4HR ng/L 761*  --   --    HSTNI D4 ng/L -11  --   --        Results from last 7 days   Lab Units 11/23/22  1121   NT-PRO BNP pg/mL 85,152*       Results from last 7 days   Lab Units 11/23/22  1349   INFLUENZA A PCR  Negative   INFLUENZA B PCR  Negative   RSV PCR  Negative       ED Treatment:   Medication Administration from 11/23/2022 1015 to 11/23/2022 1938 Date/Time Order Dose Route Action     11/23/2022 1522 EST aspirin (ECOTRIN LOW STRENGTH) EC tablet 81 mg 81 mg Oral Given     11/23/2022 1713 EST carvedilol (COREG) tablet 12 5 mg 12 5 mg Oral Given     11/23/2022 1522 EST gabapentin (NEURONTIN) capsule 300 mg 300 mg Oral Given     11/23/2022 1522 EST thiamine tablet 100 mg 100 mg Oral Given     11/23/2022 1523 EST heparin (porcine) subcutaneous injection 5,000 Units 5,000 Units Subcutaneous Given     11/23/2022 1714 EST insulin lispro (HumaLOG) 100 units/mL subcutaneous injection 2-12 Units 6 Units Subcutaneous Given     11/23/2022 1525 EST furosemide (LASIX) injection 40 mg 40 mg Intravenous Given        Past Medical History:   Diagnosis   • Anxiety   • Arthritis   • CHF (congestive heart failure) ([facility])   • Coronary artery disease   • Dementia ([facility])   • Depression   • Diabetes mellitus ([place] [address] )   • Infectious viral hepatitis   • Memory loss   • Visual impairment     Present on Admission:    • LAZARO superimposed on CKD (chronic kidney disease) stage 4, GFR 15-29 ml/min ([facility])  • Essential hypertension  • Type 2 diabetes mellitus ([facility])  • Acute on chronic systolic congestive heart failure ([facility])  • Bipolar affective disorder, mixed, moderate ([facility])  • Normocytic anemia      Admitting Diagnosis:     Acute on chronic combined systolic and diastolic heart failure ([place] [address] ) [I50 43]  Dyspnea [R06 00]  Elevated troponin [R77 8]  Noncompliance with medication regimen [Z91 14]  Pleural effusion, bilateral [J90]  LAZARO (acute kidney injury) ([place] [address] ) [N17 9]  Acute on chronic systolic congestive heart failure (HCC) [I50 23]  Acute kidney injury superimposed on CKD ([place] [address] ) [N17 9, N18 9]      Age/Sex: 74 y o  male    Scheduled Medications:  aspirin, 81 mg, Oral, Daily  atorvastatin, 40 mg, Oral, HS  carvedilol, 12 5 mg, Oral, Q12H MAAME  FLUoxetine, 40 mg, Oral, Daily  furosemide, 40 mg, Intravenous, BID (diuretic)  gabapentin, 300 mg, Oral, Daily  heparin (porcine), 5,000 Units, Subcutaneous, Q8H MAAME  hydrALAZINE, 25 mg, Oral, Q8H  insulin glargine, 10 Units, Subcutaneous, HS  insulin lispro, 1-6 Units, Subcutaneous, HS  insulin lispro, 2-12 Units, Subcutaneous, TID AC  insulin lispro, 5 Units, Subcutaneous, TID With Meals  isosorbide dinitrate, 10 mg, Oral, Q8H  QUEtiapine, 100 mg, Oral, HS  thiamine, 100 mg, Oral, Daily      Continuous IV Infusions:     PRN Meds:  acetaminophen, 650 mg, Oral, Q6H PRN  melatonin, 4 5 mg, Oral, HS PRN        IP CONSULT TO CASE MANAGEMENT  IP CONSULT TO HEART FAILURE SERVICE  IP CONSULT TO NEPHROLOGY  IP CONSULT TO NUTRITION SERVICES  IP CONSULT TO CASE MANAGEMENT  IP CONSULT TO GERONTOLOGY    Network Utilization Review Department  ATTENTION: Please call with any questions or concerns to [phone] and carefully listen to the prompts so that you are directed to the right person  All voicemails are confidential   Harlene Pair all requests for admission clinical reviews, approved or denied determinations and any other requests to dedicated fax number below belonging to the campus where the patient is receiving treatment   List of dedicated fax numbers for the Facilities:  [address] [place] [address] DENIALS (Administrative/Medical Necessity) [phone]   [address] (Maternity/NICU/Pediatrics) [phone]   [address] Kasey Brown [phone]   [facility] [place]  [phone]   [address] [place] [address] Yessenia Terry 28 [phone]   [facility]4 [facility] [place] 134 [address] [facility] [phone]

## 2022-11-25 NOTE — ASSESSMENT & PLAN NOTE
Known multivessel severe coronary artery disease  Patient follows with [facility] Cardiology, felt to not be a CABG candidate as per record review  Was to be optimized prior to PCI to circumflex and LAD  · Most recent echocardiogram 11/8/2022 at [facility] with an EF of 30-35%  · Has a life vest   Is noncompliant with this    · Continue outpatient follow-up with Cardiology  · Continue Coreg, isosorbide, hydralazine, aspirin and statin

## 2022-11-25 NOTE — PROGRESS NOTES
[address] [facility]  Progress Note - Leslie Tidwell 1955, 74 y o  male MRN: [id number]  Unit/Bed#: CW2 216-02 Encounter: [id number]  Primary Care Provider: Brandy Maddox DO   Date and time admitted to hospital: 11/23/2022 10:54 AM    * Acute on chronic systolic congestive heart failure (HCC)  Assessment & Plan  Wt Readings from Last 3 Encounters:   11/25/22 87 9 kg (193 lb 12 8 oz)   10/26/22 90 4 kg (199 lb 4 8 oz)   10/16/22 84 1 kg (185 lb 6 4 oz)     Has a history of noncompliance with his medications and was recently seen in [facility] 11/13-11/15 for CHF exacerbation as well and at [facility] 10/26  On discharge from [facility] was instructed to take torsemide 40 mg daily  Prior to this had been increased to 20 mg b i d  on 11/9 and prior to this was on 10 mg QOD  · Patient not taking medications as prescribed as an outpatient  · Most recent echocardiogram with LVEF 30%   · Patient has life vest but is non compliant; monitor on telemetry while inpatient   · Cardiology and Nephrology following  · Currently on IV Lasix 40 mg BID   · Monitor daily weights, intake and output   · Patient is not compliant with saving urine for measurement - discussed with patient and nursing need for accurate I&Os    LAZARO superimposed on CKD (chronic kidney disease) stage 4, GFR 15-29 ml/min [facility])  Assessment & Plan  Lab Results   Component Value Date    EGFR 16 11/25/2022    EGFR 17 11/24/2022    EGFR 17 11/23/2022    CREATININE 3 72 (H) 11/25/2022    CREATININE 3 46 (H) 11/24/2022    CREATININE 3 51 (H) 11/23/2022     · Baseline creatinine approximately 2 1-2 7  Follows with [facility]    · LAZARO POA with creatinine of 3 51 felt to be secondary to volume overload, cardiorenal syndrome  · Nephrology following, appreciate inputs   · Continue to monitor with diuresis, see plan above   · Creatinine up-trending today    CAD (coronary artery disease)  Assessment & Plan  Known multivessel severe coronary artery disease  Patient follows with [facility] Cardiology, felt to not be a CABG candidate as per record review  Was to be optimized prior to PCI to circumflex and LAD  · Most recent echocardiogram 11/8/2022 at [facility] with an EF of 30-35%  · Has a life vest   Is noncompliant with this  · Continue outpatient follow-up with Cardiology  · Continue Coreg, isosorbide, hydralazine, aspirin and statin    Essential hypertension  Assessment & Plan  · BP acceptable, monitor routinely   · Continue Coreg, hydralazine and isosorbide  · IV diuresis as above     Normocytic anemia  Assessment & Plan  · Baseline hemoglobin appears to be 9-10  · Current hemoglobin stable, slightly below baseline  · No signs or symptoms of bleeding at this time  · Recommend outpatient follow-up    Type 2 diabetes mellitus [facility])  Assessment & Plan  Lab Results   Component Value Date    HGBA1C 7 7 (H) 10/03/2022     Recent Labs     11/24/22  1702 11/24/22  2146 11/25/22  0200 11/25/22  0530   POCGLU 203* 88 147* 217*       Blood Sugar Average: Last 72 hrs:  · (P) 188 625   · Outpatient on Farxiga and Novolog  · Follows with endocrinology outpatient  · Home regimen of novolog is 30 with breakfast and 10 with dinner but was having glucoses in the 30s with this regimen (giving and not eating)  · Continue current regimen Lantus 10 units QHS and humalog 5 units t i d  With meals  · Diabetic diet    Cognitive impairment  Assessment & Plan  · Patient at times will not converse however with prompting was noted to sit up, speak to me and eat lunch  Nursing staff report that this has been present during prior admissions as well    · When seen by occupational therapy, patient did poorly on cognitive evaluation  · Appreciate Geriatrics consultation  · OT recommending rehab vs home with 24/7 supervision upon discharge    Bipolar affective disorder, mixed, moderate ([address] )  Assessment & Plan  · Continue Seroquel and Prozac      VTE Pharmacologic Prophylaxis: VTE Score: 4 Moderate Risk (Score 3-4) - Pharmacological DVT Prophylaxis Ordered: heparin  Patient Centered Rounds: I performed bedside rounds with nursing staff today  Discussions with Specialists or Other Care Team Provider: primary RN, case management     Education and Discussions with Family / Patient: Patient declined call to   Time Spent for Care: 20 minutes  More than 50% of total time spent on counseling and coordination of care as described above  Current Length of Stay: 2 day(s)  Current Patient Status: Inpatient   Certification Statement: The patient will continue to require additional inpatient hospital stay due to LAZARO, IV diuresis   Discharge Plan: Anticipate discharge in 48-72 hrs to home with  supervision and [place] [address] vs rehab    Code Status: Level 1 - Full Code    Subjective:   Patient resting comfortably in bed, offers no complaints  Specifically denies chest pain or SOB  Objective:     Vitals:   Temp (24hrs), Av °F (36 7 °C), Min:97 5 °F (36 4 °C), Max:98 6 °F (37 °C)    Temp:  [97 5 °F (36 4 °C)-98 6 °F (37 °C)] 97 5 °F (36 4 °C)  HR:  [62-76] 69  Resp:  [16-20] 20  BP: (122-138)/() 138/78  SpO2:  [94 %-97 %] 97 %  Body mass index is 33 27 kg/m²  Input and Output Summary (last 24 hours): Intake/Output Summary (Last 24 hours) at 2022 0819  Last data filed at 2022 1207  Gross per 24 hour   Intake 725 ml   Output 1325 ml   Net -600 ml       Physical Exam:   Physical Exam  Vitals and nursing note reviewed  Constitutional:       General: He is not in acute distress  Appearance: He is obese  Cardiovascular:      Rate and Rhythm: Normal rate and regular rhythm  Pulmonary:      Effort: Pulmonary effort is normal  No respiratory distress  Comments: SpO2 high 90s on room air   Abdominal:      General: Bowel sounds are normal  There is distension  Tenderness: There is no abdominal tenderness     Musculoskeletal: Right lower leg: No edema  Left lower leg: No edema  Neurological:      General: No focal deficit present  Mental Status: He is alert  Mental status is at baseline  Psychiatric:         Mood and Affect: Affect is flat  Cognition and Memory: Cognition is impaired  Additional Data:     Labs:  Results from last 7 days   Lab Units 11/25/22  0433 11/24/22  0453   WBC Thousand/uL 6 05 6 20   HEMOGLOBIN g/dL 8 9* 8 3*   HEMATOCRIT % 27 9* 25 7*   PLATELETS Thousands/uL 211 181   NEUTROS PCT %  --  56   LYMPHS PCT %  --  27   MONOS PCT %  --  11   EOS PCT %  --  4     Results from last 7 days   Lab Units 11/25/22  0433 11/24/22  0453 11/23/22  1121   SODIUM mmol/L 138   < > 135   POTASSIUM mmol/L 3 9   < > 4 6   CHLORIDE mmol/L 108   < > 108   CO2 mmol/L 24   < > 19*   BUN mg/dL 79*   < > 72*   CREATININE mg/dL 3 72*   < > 3 51*   ANION GAP mmol/L 6   < > 8   CALCIUM mg/dL 8 8   < > 8 9   ALBUMIN g/dL  --   --  2 8*   TOTAL BILIRUBIN mg/dL  --   --  0 50   ALK PHOS U/L  --   --  133*   ALT U/L  --   --  35   AST U/L  --   --  18   GLUCOSE RANDOM mg/dL 178*   < > 362*    < > = values in this interval not displayed           Results from last 7 days   Lab Units 11/25/22  0530 11/25/22  0200 11/24/22  2146 11/24/22  1702 11/24/22  1120 11/24/22  0655 11/23/22  2125 11/23/22  1641   POC GLUCOSE mg/dl 217* 147* 88 203* 201* 143* 215* 295*               Lines/Drains:  Invasive Devices     Peripheral Intravenous Line  Duration           Peripheral IV 11/23/22 Left;Ventral (anterior) Forearm 1 day                  Telemetry:  Telemetry Orders (From admission, onward)             LifeVest Patient: Continuous Telemetry Monitoring during hospitalization (non-expiring)  Continuous LifeVest Telemetry Monitoring        References:    LifeVest Policy                 Telemetry Reviewed: Normal Sinus Rhythm  Indication for Continued Telemetry Use: Lifevest (remains on tele entire hospital stay) Imaging: No pertinent imaging reviewed  Recent Cultures (last 7 days):         Last 24 Hours Medication List:   Current Facility-Administered Medications   Medication Dose Route Frequency Provider Last Rate   • acetaminophen  650 mg Oral Q6H PRN Stefany Shirley PA-C     • aspirin  81 mg Oral Daily EMMA Ayala     • atorvastatin  40 mg Oral HS EMMA Ayala     • carvedilol  12 5 mg Oral Q12H [address] Mickey Mcwilliams PA-C     • FLUoxetine  40 mg Oral Daily EMMA Ayala     • furosemide  40 mg Intravenous BID (diuretic) Mickey Mcwilliams PA-C     • gabapentin  300 mg Oral Daily EMMA Ayala     • heparin (porcine)  5,000 Units Subcutaneous [facility] EMMA Ayala     • hydrALAZINE  25 mg Oral Q8H Claribel Rocha PA-C     • insulin glargine  10 Units Subcutaneous HS EMMA Ayala     • insulin lispro  1-6 Units Subcutaneous HS EMMA Huerta     • insulin lispro  2-12 Units Subcutaneous TID AC EMMA Ayala     • insulin lispro  5 Units Subcutaneous TID With Meals Stefany Shirley PA-C     • isosorbide dinitrate  10 mg Oral Q8H EMMA Ayala     • melatonin  4 5 mg Oral HS PRN EMMA Ayala     • QUEtiapine  100 mg Oral HS EMMA Ayala     • thiamine  100 mg Oral Daily EMMA Ayala          Today, Patient Was Seen By: Bart Delgado PA-C    **Please Note: This note may have been constructed using a voice recognition system  **

## 2022-11-25 NOTE — CASE MANAGEMENT
Case Management Assessment & Discharge Planning Note    Patient name Rayburn Barthel  Location [facility] 216/[facility] 632-27 MRN [id number]  : 1955 Date 2022       Current Admission Date: 2022  Current Admission Diagnosis:Acute on chronic systolic congestive heart failure [facility])   Patient Active Problem List    Diagnosis Date Noted   • Cognitive impairment 2022   • CAD (coronary artery disease) 2022   • Weight gain 10/25/2022   • Generalized weakness 10/25/2022   • Acute kidney injury superimposed on CKD ([facility] ) 2022   • Orthostatic hypotension 2022   • Acute on chronic combined systolic and diastolic heart failure ([facility] ) 2022   • Persistent proteinuria 2022   • Transaminitis 2022   • Pleural effusion, bacterial 2022   • Hyperglycemia, unspecified    • Metabolic acidosis    • Diarrhea 2022   • Type 2 diabetes mellitus, without long-term current use of insulin ([facility] ) 2022   • High serum thyroid stimulating hormone (TSH) 2022   • Weakness 2022   • Ischemic cardiomyopathy 2022   • Polysubstance abuse ([facility] ) 2022   • Mixed hyperlipidemia 2022   • Stage 3b chronic kidney disease ([facility] ) 2022   • Acute on chronic systolic congestive heart failure ([facility] ) 2022   • Acute on chronic systolic congestive heart failure ([facility] ) 2022   • Acute hepatitis C virus infection 2022   • Abnormal EKG 2022   • LAZARO superimposed on CKD (chronic kidney disease) stage 4, GFR 15-29 ml/min ([facility]) 2022   • Normocytic anemia 2022   • Acute kidney injury ([facility] ) 2022   • Hyponatremia 2022   • Uncontrolled type 2 diabetes mellitus with hyperglycemia ([facility] ) 2022   • Essential hypertension 2015   • Type 2 diabetes mellitus ([facility] ) 2012   • Bipolar affective disorder, mixed, moderate ([facility] ) 2011      LOS (days): 2  Geometric Mean LOS (GMLOS) (days):   Days to GMLOS: OBJECTIVE:    Risk of Unplanned Readmission Score: 52 04         Current admission status: Inpatient       Preferred Pharmacy:   [address], [address]  [address] [facility] [address]  Phone: [phone] Fax: [phone]    Primary Care Provider: Sam Garcia DO    [facility]  Secondary Insurance:     ASSESSMENT:  [address], [address] Representative - Brother   Primary Phone: [phone] (Mobile)                              Patient Information  Admitted from[de-identified] Home  Mental Status: Confused  During Assessment patient was accompanied by: Not accompanied during assessment  Assessment information provided by[de-identified] Patient (left voicemail message for brother Antonio Jacobson)  Support Systems: Family members  [place] of Residence: [address] [facility] do you live in?: [address] [place] [address] entry access options   Select all that apply : Stairs  Number of steps to enter home : 3  Type of Current Residence: 2 story home  Upon entering residence, is there a bedroom on the main floor (no further steps)?: Yes  Upon entering residence, is there a bathroom on the main floor (no further steps)?: Yes  In the last 12 months, was there a time when you were not able to pay the mortgage or rent on time?: No  In the last 12 months, was there a time when you did not have a steady place to sleep or slept in a shelter (including now)?: No  Homeless/housing insecurity resource given?: N/A  Living Arrangements: Lives w/ Extended Family    Activities of Daily Living Prior to Admission  Functional Status: Assistance  Completes ADLs independently?: Yes  Ambulates independently?: Yes  Does patient use assisted devices?: Yes  Assisted Devices (DME) used: [employer]  Does patient currently own DME?: Yes  What DME does the patient currently own?: [employer]bie Sierra         Patient Information Continued  Income Source: Pension/FDC  Does patient have prescription coverage?: Yes  Within the past 12 months, you worried that your food would run out before you got the money to buy more : Never true  Within the past 12 months, the food you bought just didn't last and you didn't have money to get more : Never true  Food insecurity resource given?: N/A  Does patient receive dialysis treatments?: No  Does patient have a history of substance abuse?: No  Does patient have a history of Mental Health Diagnosis?: Yes (Bi-polar)  Is patient receiving treatment for mental health?: Yes (via PCP)  Has patient received inpatient treatment related to mental health in the last 2 years?: No         Means of Transportation  In the past 12 months, has lack of transportation kept you from medical appointments or from getting medications?: No  In the past 12 months, has lack of transportation kept you from meetings, work, or from getting things needed for daily living?: No  Was application for public transport provided?: N/A        DISCHARGE DETAILS:    Discharge planning discussed with[de-identified] patient -- left message for brother Rudi Lozada of Choice: Yes     CM contacted family/caregiver?: Yes (left voicemail for brother Rosas Young)  Were Treatment Team discharge recommendations reviewed with patient/caregiver?: No (awaiting call back from brother)          Contacts  Patient Contacts: Mere Ramo, brother  Relationship to Patient[de-identified] Family  Contact Method: Phone  Phone Number: [phone]  Reason/Outcome: Emergency Contact                        Treatment Team Recommendation: Short Term Rehab, Home with [facility]  Discharge Destination Plan[de-identified] Other (TBD)  Transport at Discharge : Family                      Patient/caregiver received discharge checklist   Content reviewed  Patient/caregiver encouraged to participate in discharge plan of care prior to discharge home    CM reviewed d/c planning process including the following: identifying help at home, patient preference for d/c planning needs, Discharge Lounge, [facility] Meds to Bed program, availability of treatment team to discuss questions or concerns patient and/or family may have regarding understanding medications and recognizing signs and symptoms once discharged  CM also encouraged patient to follow up with all recommended appointments after discharge  Patient advised of importance for patient and family to participate in managing patient’s medical well being  Additional Comments: Introduced self and role to patient  Patient expressed confusion, stating "I'm going to need a place to live"  As per chart, patient resides with extended family and is independent at baseline with ambulation  Current recommendation C v  STR  Left voicemail message for brother Patience York for return call to discuss discharge plan

## 2022-11-25 NOTE — PROGRESS NOTES
NEPHROLOGY PROGRESS NOTE   Raul Love 74 y o  male MRN: [id number]  Unit/Bed#: CW2 216-02 Encounter: [id number]      ASSESSMENT & PLAN:  1  Acute kidney injury on top of CKD stage 4 with baseline creatinine around 2 1-2 7  Patient follows with Dr Beti Mendez from [facility]  Acute kidney injury suspected secondary to volume overload, cardiorenal syndrome, congestive heart failure exacerbation, patient reported not taking diuretics previously due to confusion  Continue with intravenously diuretics to achieve euvolemia  Creatinine slightly elevated, will allow higher creatinine to achieve euvolemia  Monitor ins and outs, follow daily labs  Avoid relative hypotension    2  Chronic kidney disease stage 4 baseline 2 1-2 7, follows with Dr Beti Mendez from [facility]  Reported prior episode of acute kidney injury requiring temporary dialysis while in [facility]  Patient will need to continue close follow-up with his outpatient nephrologist after discharge    3  Acute on chronic HFrEF with an EF of 30-35%, cardiomyopathy mixed ischemic and nonischemic, coronary artery disease, cardiology on board  Continue with intravenously diuretics to achieve euvolemia  Monitor ins and outs and follow daily weight    4  Anemia, suspected component of chronic kidney disease, monitor H&H    5  Hypertension, blood pressure stable, avoid relative hypotension      6  Confusion, workup per primary team, clinically improving        SUBJECTIVE:  Patient seen and examined, any sitting in bed eating breakfast   More awake and interactive today, denies significant complaints, no chest pain, no shortness of breath, no nausea, no vomiting, no abdominal pain    OBJECTIVE:  Current Weight: Weight - Scale: 87 9 kg (193 lb 12 8 oz)  Vitals:    11/25/22 0702   BP: 138/78   Pulse:    Resp: 20   Temp: 97 5 °F (36 4 °C)   SpO2:        Intake/Output Summary (Last 24 hours) at 11/25/2022 3520  Last data filed at 11/25/2022 0840  Gross per 24 hour   Intake 845 ml   Output 1325 ml   Net -480 ml       General: conscious, cooperative, in not acute distress  Eyes: conjunctivae pink, anicteric sclerae  ENT: lips and mucous membranes moist, on room air  Neck: supple, no JVD  Chest:  Decreased breath sounds at bases, no crackles, ronchus or wheezings  CVS: distinct S1 & S2, normal rate, regular rhythm  Abdomen:  Obese, non-tender, non-distended, normoactive bowel sounds  Extremities: no edema of both legs  Skin: no rash  Neuro: awake, alert, more interactive          Medications:    Current Facility-Administered Medications:   •  acetaminophen (TYLENOL) tablet 650 mg, 650 mg, Oral, Q6H PRN, Stefany Shirley PA-C  •  aspirin (ECOTRIN LOW STRENGTH) EC tablet 81 mg, 81 mg, Oral, Daily, Evans Spatz, CRNP, 81 mg at 11/24/22 1249  •  atorvastatin (LIPITOR) tablet 40 mg, 40 mg, Oral, HS, Evans Spatz, CRNP, 40 mg at 11/24/22 2300  •  carvedilol (COREG) tablet 12 5 mg, 12 5 mg, Oral, Q12H [address], Francine Schrader PA-C, 12 5 mg at 11/24/22 2300  •  FLUoxetine (PROzac) capsule 40 mg, 40 mg, Oral, Daily, Evans Spatz, CRNP, 40 mg at 11/24/22 1249  •  furosemide (LASIX) injection 40 mg, 40 mg, Intravenous, BID (diuretic), rC Easley PA-C, 40 mg at 11/24/22 1552  •  gabapentin (NEURONTIN) capsule 300 mg, 300 mg, Oral, Daily, Evans Spatz, CRNP, 300 mg at 11/24/22 1249  •  heparin (porcine) subcutaneous injection 5,000 Units, 5,000 Units, Subcutaneous, Q8H [address], 5,000 Units at 11/25/22 0636 **AND** [CANCELED] Platelet count, , , Once, Evans Spatz, CRNP  •  hydrALAZINE (APRESOLINE) tablet 25 mg, 25 mg, Oral, Q8H, Claribel Rocha PA-C, 25 mg at 11/25/22 1620  •  insulin glargine (LANTUS) subcutaneous injection 10 Units 0 1 mL, 10 Units, Subcutaneous, HS, Evans Spatz, CRNP, 10 Units at 11/23/22 2106  •  insulin lispro (HumaLOG) 100 units/mL subcutaneous injection 1-6 Units, 1-6 Units, Subcutaneous, HS, EMMA Jacobson, 2 Units at 11/23/22 2135  •  insulin lispro (HumaLOG) 100 units/mL subcutaneous injection 2-12 Units, 2-12 Units, Subcutaneous, TID AC, 4 Units at 11/25/22 0640 **AND** Fingerstick Glucose (POCT), , , TID AC, Wadie Reza, CRNP  •  insulin lispro (HumaLOG) 100 units/mL subcutaneous injection 5 Units, 5 Units, Subcutaneous, TID With Meals, Prashant Diaz PA-C, 5 Units at 11/25/22 0640  •  isosorbide dinitrate (ISORDIL) tablet 10 mg, 10 mg, Oral, Q8H, Wadie Reza, CRNP, 10 mg at 11/25/22 5248  •  melatonin tablet 4 5 mg, 4 5 mg, Oral, HS PRN, Wadie Reza, CRNP  •  QUEtiapine (SEROquel) tablet 100 mg, 100 mg, Oral, HS, Wadie Reza, CRNP, 100 mg at 11/24/22 2300  •  thiamine tablet 100 mg, 100 mg, Oral, Daily, Wadie Reza, CRNP, 100 mg at 11/24/22 1250    Invasive Devices:        Lab Results:   Results from last 7 days   Lab Units 11/25/22  0433 11/24/22  0453 11/23/22  1121   WBC Thousand/uL 6 05 6 20 7 99   HEMOGLOBIN g/dL 8 9* 8 3* 9 0*   HEMATOCRIT % 27 9* 25 7* 28 1*   PLATELETS Thousands/uL 211 181 184   SODIUM mmol/L 138 137 135   POTASSIUM mmol/L 3 9 3 8 4 6   CHLORIDE mmol/L 108 109* 108   CO2 mmol/L 24 21 19*   BUN mg/dL 79* 75* 72*   CREATININE mg/dL 3 72* 3 46* 3 51*   CALCIUM mg/dL 8 8 8 7 8 9   ALK PHOS U/L  --   --  133*   ALT U/L  --   --  35   AST U/L  --   --  18       Previous work up:  See previous notes      Portions of the record may have been created with voice recognition software  Occasional wrong word or "sound a like" substitutions may have occurred due to the inherent limitations of voice recognition software  Read the chart carefully and recognize, using context, where substitutions have occurred  If you have any questions, please contact the dictating provider

## 2022-11-26 LAB
ANION GAP SERPL CALCULATED.3IONS-SCNC: 6 MMOL/L (ref 4–13)
BUN SERPL-MCNC: 70 MG/DL (ref 5–25)
CALCIUM SERPL-MCNC: 8.8 MG/DL (ref 8.3–10.1)
CHLORIDE SERPL-SCNC: 108 MMOL/L (ref 96–108)
CO2 SERPL-SCNC: 26 MMOL/L (ref 21–32)
CREAT SERPL-MCNC: 3.08 MG/DL (ref 0.6–1.3)
GFR SERPL CREATININE-BSD FRML MDRD: 20 ML/MIN/1.73SQ M
GLUCOSE SERPL-MCNC: 127 MG/DL (ref 65–140)
GLUCOSE SERPL-MCNC: 155 MG/DL (ref 65–140)
GLUCOSE SERPL-MCNC: 213 MG/DL (ref 65–140)
GLUCOSE SERPL-MCNC: 235 MG/DL (ref 65–140)
GLUCOSE SERPL-MCNC: 268 MG/DL (ref 65–140)
POTASSIUM SERPL-SCNC: 4.1 MMOL/L (ref 3.5–5.3)
SODIUM SERPL-SCNC: 140 MMOL/L (ref 135–147)

## 2022-11-26 RX ORDER — TORSEMIDE 20 MG/1
40 TABLET ORAL 2 TIMES DAILY
Status: DISCONTINUED | OUTPATIENT
Start: 2022-11-26 | End: 2022-11-27

## 2022-11-26 RX ORDER — FUROSEMIDE 10 MG/ML
40 INJECTION INTRAMUSCULAR; INTRAVENOUS ONCE
Status: COMPLETED | OUTPATIENT
Start: 2022-11-26 | End: 2022-11-26

## 2022-11-26 RX ORDER — ISOSORBIDE DINITRATE 20 MG/1
20 TABLET ORAL EVERY 8 HOURS
Status: DISCONTINUED | OUTPATIENT
Start: 2022-11-26 | End: 2022-12-06 | Stop reason: HOSPADM

## 2022-11-26 RX ADMIN — INSULIN LISPRO 5 UNITS: 100 INJECTION, SOLUTION INTRAVENOUS; SUBCUTANEOUS at 06:37

## 2022-11-26 RX ADMIN — ATORVASTATIN CALCIUM 40 MG: 40 TABLET, FILM COATED ORAL at 21:34

## 2022-11-26 RX ADMIN — HYDRALAZINE HYDROCHLORIDE 25 MG: 25 TABLET, FILM COATED ORAL at 14:01

## 2022-11-26 RX ADMIN — INSULIN LISPRO 4 UNITS: 100 INJECTION, SOLUTION INTRAVENOUS; SUBCUTANEOUS at 06:37

## 2022-11-26 RX ADMIN — ISOSORBIDE DINITRATE 10 MG: 10 TABLET ORAL at 08:27

## 2022-11-26 RX ADMIN — INSULIN LISPRO 2 UNITS: 100 INJECTION, SOLUTION INTRAVENOUS; SUBCUTANEOUS at 11:37

## 2022-11-26 RX ADMIN — ASPIRIN 81 MG: 81 TABLET, COATED ORAL at 08:25

## 2022-11-26 RX ADMIN — FUROSEMIDE 40 MG: 10 INJECTION, SOLUTION INTRAMUSCULAR; INTRAVENOUS at 09:32

## 2022-11-26 RX ADMIN — HEPARIN SODIUM 5000 UNITS: 5000 INJECTION INTRAVENOUS; SUBCUTANEOUS at 21:34

## 2022-11-26 RX ADMIN — INSULIN LISPRO 4 UNITS: 100 INJECTION, SOLUTION INTRAVENOUS; SUBCUTANEOUS at 17:22

## 2022-11-26 RX ADMIN — TORSEMIDE 40 MG: 20 TABLET ORAL at 17:20

## 2022-11-26 RX ADMIN — HEPARIN SODIUM 5000 UNITS: 5000 INJECTION INTRAVENOUS; SUBCUTANEOUS at 14:01

## 2022-11-26 RX ADMIN — INSULIN LISPRO 5 UNITS: 100 INJECTION, SOLUTION INTRAVENOUS; SUBCUTANEOUS at 11:37

## 2022-11-26 RX ADMIN — QUETIAPINE FUMARATE 100 MG: 100 TABLET ORAL at 21:34

## 2022-11-26 RX ADMIN — ISOSORBIDE DINITRATE 20 MG: 20 TABLET ORAL at 14:47

## 2022-11-26 RX ADMIN — INSULIN LISPRO 5 UNITS: 100 INJECTION, SOLUTION INTRAVENOUS; SUBCUTANEOUS at 17:22

## 2022-11-26 RX ADMIN — HEPARIN SODIUM 5000 UNITS: 5000 INJECTION INTRAVENOUS; SUBCUTANEOUS at 06:38

## 2022-11-26 RX ADMIN — ISOSORBIDE DINITRATE 10 MG: 10 TABLET ORAL at 01:34

## 2022-11-26 RX ADMIN — GABAPENTIN 300 MG: 300 CAPSULE ORAL at 08:25

## 2022-11-26 RX ADMIN — ISOSORBIDE DINITRATE 20 MG: 20 TABLET ORAL at 23:09

## 2022-11-26 RX ADMIN — FLUOXETINE HYDROCHLORIDE 40 MG: 20 CAPSULE ORAL at 08:25

## 2022-11-26 RX ADMIN — INSULIN GLARGINE 10 UNITS: 100 INJECTION, SOLUTION SUBCUTANEOUS at 21:35

## 2022-11-26 RX ADMIN — CARVEDILOL 12.5 MG: 12.5 TABLET, FILM COATED ORAL at 08:25

## 2022-11-26 RX ADMIN — CARVEDILOL 12.5 MG: 12.5 TABLET, FILM COATED ORAL at 21:34

## 2022-11-26 RX ADMIN — THIAMINE HCL TAB 100 MG 100 MG: 100 TAB at 08:25

## 2022-11-26 RX ADMIN — HYDRALAZINE HYDROCHLORIDE 25 MG: 25 TABLET, FILM COATED ORAL at 06:38

## 2022-11-26 NOTE — ASSESSMENT & PLAN NOTE
Wt Readings from Last 3 Encounters:   11/26/22 85 9 kg (189 lb 6 oz)   10/26/22 90 4 kg (199 lb 4 8 oz)   10/16/22 84 1 kg (185 lb 6 4 oz)     Has a history of noncompliance with his medications and was recently seen in [facility] 11/13-11/15 for CHF exacerbation as well and at [facility] 10/26  On discharge from [facility] was instructed to take torsemide 40 mg daily  Prior to this had been increased to 20 mg b i d  on 11/9 and prior to this was on 10 mg QOD    · Patient not taking medications as prescribed as an outpatient  · Most recent echocardiogram with LVEF 30%   · Patient has life vest but is non compliant; monitor on telemetry while inpatient   · Cardiology and Nephrology following  · Status post IV diuresis, transitioned to increased dose Lasix 40 mg PO BID on 11/26, transitioned to PO torsemide on 11/27, monitor for 24 hours possible discharge Monday   · Isordil increased to 20 mg q8h  · Continued on home dose Coreg, hydralazine  · Monitor daily weights, intake and output   · Close outpatient follow-up

## 2022-11-26 NOTE — ASSESSMENT & PLAN NOTE
Lab Results   Component Value Date    HGBA1C 7 7 (H) 10/03/2022     Recent Labs     11/25/22  1106 11/25/22  1637 11/25/22  2109 11/26/22  0529   POCGLU 141* 147* 187* 235*       Blood Sugar Average: Last 72 hrs:  · (P) 789 [id number]   · Outpatient on Farxiga and Novolog  · Follows with endocrinology outpatient  · Home regimen of novolog is 30 with breakfast and 10 with dinner but was having glucoses in the 30s with this regimen (giving and not eating)  · Continue current regimen Lantus 10 units QHS and humalog 5 units t i d   With meals  · Diabetic diet

## 2022-11-26 NOTE — PROGRESS NOTES
[facility]  Progress Note - Osbaldo Hilliard 1955, 74 y o  male MRN: [id number]  Unit/Bed#: CW2 216-02 Encounter: [id number]  Primary Care Provider: Dacia Ott DO   Date and time admitted to hospital: 11/23/2022 10:54 AM    * Acute on chronic systolic congestive heart failure (HCC)  Assessment & Plan  Wt Readings from Last 3 Encounters:   11/26/22 85 9 kg (189 lb 6 oz)   10/26/22 90 4 kg (199 lb 4 8 oz)   10/16/22 84 1 kg (185 lb 6 4 oz)     Has a history of noncompliance with his medications and was recently seen in [facility] 11/13-11/15 for CHF exacerbation as well and at [facility] 10/26  On discharge from [facility] was instructed to take torsemide 40 mg daily  Prior to this had been increased to 20 mg b i d  on 11/9 and prior to this was on 10 mg QOD  · Patient not taking medications as prescribed as an outpatient  · Most recent echocardiogram with LVEF 30%   · Patient has life vest but is non compliant; monitor on telemetry while inpatient   · Cardiology and Nephrology following  · Currently on IV Lasix 40 mg BID, plan for one dose IV this AM then transition to 40 mg PO BID  · Isordil increased to 20 mg q8h  · Continued on home dose Coreg, hydralazine  · Monitor daily weights, intake and output     LAZARO superimposed on CKD (chronic kidney disease) stage 4, GFR 15-29 ml/min [facility])  Assessment & Plan  Lab Results   Component Value Date    EGFR 20 11/26/2022    EGFR 16 11/25/2022    EGFR 17 11/24/2022    CREATININE 3 08 (H) 11/26/2022    CREATININE 3 72 (H) 11/25/2022    CREATININE 3 46 (H) 11/24/2022     · Baseline creatinine approximately 2 1-2 7  Follows with [facility]    · LAZARO POA with creatinine of 3 51 felt to be secondary to volume overload, cardiorenal syndrome  · Nephrology following, appreciate inputs   · Continue to monitor with diuresis, see plan above   · Creatinine down-trending today    CAD (coronary artery disease)  Assessment & Plan  Known multivessel severe coronary artery disease  Patient follows with [facility], felt to not be a CABG candidate as per record review, was to be optimized prior to planned PCI to circumflex and LAD  · Troponin elevation on admission due to acute heart failure and hypertension   · Most recent echocardiogram 11/8/2022 at [facility] with an EF of 30-35%  · Has a life vest but is noncompliant with this  · Continue outpatient follow-up with Cardiology  · Continue Coreg, isosorbide, hydralazine, aspirin and statin    Essential hypertension  Assessment & Plan  · BP acceptable, monitor routinely   · Continue Coreg, hydralazine and isosorbide  · IV diuresis as above     Normocytic anemia  Assessment & Plan  · Baseline hemoglobin appears to be 9-10  · Current hemoglobin stable, slightly below baseline  · No signs or symptoms of bleeding at this time  · Recommend outpatient follow-up    Type 2 diabetes mellitus, with long-term current use of insulin [facility])  Assessment & Plan  Lab Results   Component Value Date    HGBA1C 7 7 (H) 10/03/2022     Recent Labs     11/25/22  1106 11/25/22  1637 11/25/22  2109 11/26/22  0529   POCGLU 141* 147* 187* 235*       Blood Sugar Average: Last 72 hrs:  · (P) 141 [id number]   · Outpatient on Farxiga and Novolog  · Follows with endocrinology outpatient  · Home regimen of novolog is 30 with breakfast and 10 with dinner but was having glucoses in the 30s with this regimen (giving and not eating)  · Continue current regimen Lantus 10 units QHS and humalog 5 units t i d  With meals  · Diabetic diet    Cognitive impairment  Assessment & Plan  · Patient at times will not converse however with prompting was noted to sit up, speak to me and eat lunch  Nursing staff report that this has been present during prior admissions as well  · When seen by occupational therapy, patient did poorly on cognitive evaluation   [address] [place] [address] 6/30 (11/24/22)  · Appreciate Geriatrics consultation  · Patient likely with underlying dementia, likely does not have decision making capacity   · OT recommending rehab vs home with 24/7 supervision upon discharge  · Given frequent readmissions and noncompliance, will discuss with family regarding placement  Patient will likely need formal neuropsych evaluation for capacity  Bipolar affective disorder, mixed, moderate (HCC)  Assessment & Plan  · Continue Seroquel and Prozac      VTE Pharmacologic Prophylaxis: VTE Score: 4 Moderate Risk (Score 3-4) - Pharmacological DVT Prophylaxis Ordered: heparin  Patient Centered Rounds: I performed bedside rounds with nursing staff today  Discussions with Specialists or Other Care Team Provider: primary RN, case management     Education and Discussions with Family / Patient: Attempted to update  (brother) via phone  Left voicemail  Time Spent for Care: 20 minutes  More than 50% of total time spent on counseling and coordination of care as described above  Current Length of Stay: 3 day(s)  Current Patient Status: Inpatient   Certification Statement: The patient will continue to require additional inpatient hospital stay due to IV diuresis, pending final cardiology/nephrology clearance, dispo planning  Discharge Plan: Anticipate discharge in 24-48 hrs to home with 24/7 supervision vs rehab    Code Status: Level 1 - Full Code    Subjective:   Patient sleeping comfortably upon entering the room, easily arouses to voice  He offers no complaints and specifically denies chest pain or SOB  Discussed may be ready for discharge tomorrow  I asked patient who he lives with and he tells me he plans to live alone following discharge  He tells me he was living with his brother Sammy Hernandez prior to admission but cannot return to live with him  When I ask why he said "he's being weird, and I don't want to fight, so I'll live by myself if I have to"   Discussed several re-admissions due to noncompliance with medication regimen and our concern for his safety living alone, patient responded by saying "I'm going to try and stay out of the hospital"  Objective:     Vitals:   Temp (24hrs), Av °F (36 7 °C), Min:97 2 °F (36 2 °C), Max:98 9 °F (37 2 °C)    Temp:  [97 2 °F (36 2 °C)-98 9 °F (37 2 °C)] 98 8 °F (37 1 °C)  HR:  [62-81] 81  Resp:  [17-20] 19  BP: (137-142)/(77-81) 138/80  SpO2:  [91 %-99 %] 92 %  Body mass index is 32 51 kg/m²  Input and Output Summary (last 24 hours): Intake/Output Summary (Last 24 hours) at 2022 0951  Last data filed at 2022 0940  Gross per 24 hour   Intake 1320 ml   Output 1000 ml   Net 320 ml       Physical Exam:   Physical Exam  Vitals reviewed  Constitutional:       General: He is not in acute distress  Cardiovascular:      Rate and Rhythm: Normal rate  Pulmonary:      Effort: Pulmonary effort is normal  No respiratory distress  Neurological:      General: No focal deficit present  Mental Status: He is alert  Mental status is at baseline  Psychiatric:         Cognition and Memory: Cognition is impaired  Memory is impaired  Additional Data:     Labs:  Results from last 7 days   Lab Units 22  0433 22  0453   WBC Thousand/uL 6 05 6 20   HEMOGLOBIN g/dL 8 9* 8 3*   HEMATOCRIT % 27 9* 25 7*   PLATELETS Thousands/uL 211 181   NEUTROS PCT %  --  56   LYMPHS PCT %  --  27   MONOS PCT %  --  11   EOS PCT %  --  4     Results from last 7 days   Lab Units 22  0444 22  0453 22  1121   SODIUM mmol/L 140   < > 135   POTASSIUM mmol/L 4 1   < > 4 6   CHLORIDE mmol/L 108   < > 108   CO2 mmol/L 26   < > 19*   BUN mg/dL 70*   < > 72*   CREATININE mg/dL 3 08*   < > 3 51*   ANION GAP mmol/L 6   < > 8   CALCIUM mg/dL 8 8   < > 8 9   ALBUMIN g/dL  --   --  2 8*   TOTAL BILIRUBIN mg/dL  --   --  0 50   ALK PHOS U/L  --   --  133*   ALT U/L  --   --  35   AST U/L  --   --  18   GLUCOSE RANDOM mg/dL 268*   < > 362*    < > = values in this interval not displayed  Results from last 7 days   Lab Units 11/26/22  0529 11/25/22  2109 11/25/22  1637 11/25/22  1106 11/25/22  0530 11/25/22  0200 11/24/22  2146 11/24/22  1702 11/24/22  1120 11/24/22  0655 11/23/22  2125 11/23/22  1641   POC GLUCOSE mg/dl 235* 187* 147* 141* 217* 147* 88 203* 201* 143* 215* 295*               Lines/Drains:  Invasive Devices     Peripheral Intravenous Line  Duration           Peripheral IV 11/23/22 Left;Ventral (anterior) Forearm 2 days                  Telemetry:  Telemetry Orders (From admission, onward)             LifeVest Patient: Continuous Telemetry Monitoring during hospitalization (non-expiring)  Continuous LifeVest Telemetry Monitoring        References:    LifeVest Policy                 Telemetry Reviewed: Normal Sinus Rhythm  Indication for Continued Telemetry Use: Lifevest (remains on tele entire hospital stay)             Imaging: No pertinent imaging reviewed      Recent Cultures (last 7 days):         Last 24 Hours Medication List:   Current Facility-Administered Medications   Medication Dose Route Frequency Provider Last Rate   • acetaminophen  650 mg Oral Q6H PRN Stefany Shirley PA-C     • aspirin  81 mg Oral Daily EMMA Masters     • atorvastatin  40 mg Oral HS EMMA Masters     • carvedilol  12 5 mg Oral Q12H [address] Rodney Weller PA-C     • FLUoxetine  40 mg Oral Daily EMMA Masters     • gabapentin  300 mg Oral Daily EMMA Masters     • heparin (porcine)  5,000 Units Subcutaneous [facility] EMMA Mastres     • hydrALAZINE  25 mg Oral Q8H Claribel Rocha PA-C     • insulin glargine  10 Units Subcutaneous HS EMMA Masters     • insulin lispro  1-6 Units Subcutaneous HS EMMA Bhatti     • insulin lispro  2-12 Units Subcutaneous TID AC EMMA Masters     • insulin lispro  5 Units Subcutaneous TID With Meals Stefany Shirley PA-C     • isosorbide dinitrate  20 mg Oral Q8H Francine Schrader PA-C     • melatonin  4 5 mg Oral HS PRN EMMA Connelly     • QUEtiapine  100 mg Oral HS EMMA Connelly     • thiamine  100 mg Oral Daily EMMA Connelly     • torsemide  40 mg Oral BID Jose Bhatt PA-C          Today, Patient Was Seen By: Bobby Cruz PA-C    **Please Note: This note may have been constructed using a voice recognition system  **

## 2022-11-26 NOTE — ASSESSMENT & PLAN NOTE
Wt Readings from Last 3 Encounters:   11/26/22 85 9 kg (189 lb 6 oz)   10/26/22 90 4 kg (199 lb 4 8 oz)   10/16/22 84 1 kg (185 lb 6 4 oz)     Has a history of noncompliance with his medications and was recently seen in [facility] 11/13-11/15 for CHF exacerbation as well and at [facility] 10/26  On discharge from [facility] was instructed to take torsemide 40 mg daily  Prior to this had been increased to 20 mg b i d  on 11/9 and prior to this was on 10 mg QOD    · Patient not taking medications as prescribed as an outpatient  · Most recent echocardiogram with LVEF 30%   · Patient has life vest but is non compliant; monitor on telemetry while inpatient   · Cardiology and Nephrology following  · Currently on IV Lasix 40 mg BID, plan for one dose IV this AM then transition to 40 mg PO BID  · Isordil increased to 20 mg q8h  · Continued on home dose Coreg, hydralazine  · Monitor daily weights, intake and output

## 2022-11-26 NOTE — PROGRESS NOTES
NEPHROLOGY PROGRESS NOTE   Renan Delgado 74 y o  male MRN: [id number]  Unit/Bed#: CW2 216-02 Encounter: [id number]      ASSESSMENT & PLAN:  1  Acute kidney injury on top of CKD stage 4 with baseline creatinine around 2 1-2 7  Patient follows with Dr Irene Sepulveda from [facility]  Acute kidney injury suspected secondary to volume overload, cardiorenal syndrome, congestive heart failure exacerbation, patient reported not taking diuretics previously due to confusion  Admitted with a creatinine of 3 5, kidney function improving with creatinine down to 3 08  Continue with diuretics to achieve euvolemia as per Cardiology  Avoid relative hypotension, monitor ins and outs, follow daily labs    2  Chronic kidney disease stage 4 baseline 2 1-2 7, follows with Dr Irene Sepulveda from [facility]  Reported prior episode of acute kidney injury requiring temporary dialysis while in [facility]  Will need to continue close follow-up with his outpatient nephrologist after discharge    3  Acute on chronic HFrEF with an EF of 30-35%, cardiomyopathy mixed ischemic and nonischemic, coronary artery disease, cardiology on board  Continue with diuretics to achieve euvolemia  Monitor ins and outs and follow daily weight    4  Anemia, suspected component of chronic kidney disease, monitor H&H, hemoglobin low but stable, transfuse for hemoglobin less than 7    5  Hypertension, blood pressure stable, avoid relative hypotension      6  Confusion, cognitive impairment, geriatrics on board        SUBJECTIVE:  Patient seen and examined, eating breakfast, denies any complaints, states he is feeling better, denies any chest pain, shortness of breath, no nausea, no vomiting, no abdominal pain    OBJECTIVE:  Current Weight: Weight - Scale: 85 9 kg (189 lb 6 oz)  Vitals:    11/26/22 0622   BP: 138/80   Pulse: 81   Resp: 19   Temp: 98 8 °F (37 1 °C)   SpO2: 92%       Intake/Output Summary (Last 24 hours) at 11/26/2022 [address] filed at 11/26/2022 0500  Gross per 24 hour   Intake 720 ml   Output 1000 ml   Net -280 ml       General: conscious, cooperative, in not acute distress  Eyes: conjunctivae pink, anicteric sclerae  ENT: lips and mucous membranes moist, on room air  Neck: supple, no JVD  Chest:  Slightly decreased breath sounds at bases, no crackles, ronchus or wheezings  CVS: distinct S1 & S2, normal rate, regular rhythm  Abdomen: soft, non-tender, non-distended, normoactive bowel sounds  Extremities: no edema of both legs  Skin: no rash  Neuro: awake, alert, interactive          Medications:    Current Facility-Administered Medications:   •  acetaminophen (TYLENOL) tablet 650 mg, 650 mg, Oral, Q6H PRN, Stefany Shirley PA-C  •  aspirin (ECOTRIN LOW STRENGTH) EC tablet 81 mg, 81 mg, Oral, Daily, Donelda Raid, CRNP, 81 mg at 11/25/22 4468  •  atorvastatin (LIPITOR) tablet 40 mg, 40 mg, Oral, HS, Donelda Raid, CRNP, 40 mg at 11/25/22 2131  •  carvedilol (COREG) tablet 12 5 mg, 12 5 mg, Oral, Q12H [address], Orlie Primes, PA-C, 12 5 mg at 11/25/22 2131  •  FLUoxetine (PROzac) capsule 40 mg, 40 mg, Oral, Daily, Donelda Raid, CRNP, 40 mg at 11/25/22 2276  •  gabapentin (NEURONTIN) capsule 300 mg, 300 mg, Oral, Daily, Donelda Raid, CRNP, 300 mg at 11/25/22 8882  •  heparin (porcine) subcutaneous injection 5,000 Units, 5,000 Units, Subcutaneous, Q8H [address], 5,000 Units at 11/26/22 3607 **AND** [CANCELED] Platelet count, , , Once, KYLEIGH PerezNP  •  hydrALAZINE (APRESOLINE) tablet 25 mg, 25 mg, Oral, Q8H, Claribel AlmonteJosefina, PA-C, 25 mg at 11/26/22 1797  •  insulin glargine (LANTUS) subcutaneous injection 10 Units 0 1 mL, 10 Units, Subcutaneous, HS, EMMA Perez, 10 Units at 11/25/22 2127  •  insulin lispro (HumaLOG) 100 units/mL subcutaneous injection 1-6 Units, 1-6 Units, Subcutaneous, HS, EMMA Pierre, 1 Units at 11/25/22 2134  •  insulin lispro (HumaLOG) 100 units/mL subcutaneous injection 2-12 Units, 2-12 Units, Subcutaneous, TID AC, 4 Units at 11/26/22 2682 **AND** Fingerstick Glucose (POCT), , , TID AC, Rosa Ring, CRNP  •  insulin lispro (HumaLOG) 100 units/mL subcutaneous injection 5 Units, 5 Units, Subcutaneous, TID With Meals, Ant Slim, PA-C, 5 Units at 11/26/22 3951  •  isosorbide dinitrate (ISORDIL) tablet 10 mg, 10 mg, Oral, Q8H, Rosa Ring, CRNP, 10 mg at 11/26/22 0134  •  melatonin tablet 4 5 mg, 4 5 mg, Oral, HS PRN, Rosa Ring, CRNP  •  QUEtiapine (SEROquel) tablet 100 mg, 100 mg, Oral, HS, Rosa Ring, CRNP, 100 mg at 11/25/22 2131  •  thiamine tablet 100 mg, 100 mg, Oral, Daily, Rosa Ring, CRNP, 100 mg at 11/25/22 5515    Invasive Devices:        Lab Results:   Results from last 7 days   Lab Units 11/26/22  0444 11/25/22  0433 11/24/22  0453 11/23/22  1121   WBC Thousand/uL  --  6 05 6 20 7 99   HEMOGLOBIN g/dL  --  8 9* 8 3* 9 0*   HEMATOCRIT %  --  27 9* 25 7* 28 1*   PLATELETS Thousands/uL  --  211 181 184   SODIUM mmol/L 140 138 137 135   POTASSIUM mmol/L 4 1 3 9 3 8 4 6   CHLORIDE mmol/L 108 108 109* 108   CO2 mmol/L 26 24 21 19*   BUN mg/dL 70* 79* 75* 72*   CREATININE mg/dL 3 08* 3 72* 3 46* 3 51*   CALCIUM mg/dL 8 8 8 8 8 7 8 9   ALK PHOS U/L  --   --   --  133*   ALT U/L  --   --   --  35   AST U/L  --   --   --  18       Previous work up:  See previous notes      Portions of the record may have been created with voice recognition software  Occasional wrong word or "sound a like" substitutions may have occurred due to the inherent limitations of voice recognition software  Read the chart carefully and recognize, using context, where substitutions have occurred  If you have any questions, please contact the dictating provider

## 2022-11-26 NOTE — ASSESSMENT & PLAN NOTE
Lab Results   Component Value Date    HGBA1C 7 7 (H) 10/03/2022     Recent Labs     11/25/22  1637 11/25/22  2109 11/26/22  0529 11/26/22  1106   POCGLU 147* 187* 235* 155*       Blood Sugar Average: Last 72 hrs:  · (P) 596 [id number]   · Outpatient on Farxiga and Novolog  · Follows with endocrinology outpatient  · Home regimen of novolog is 30 with breakfast and 10 with dinner but was having glucoses in the 30s with this regimen (giving and not eating)  · Continue current regimen Lantus 10 units QHS and humalog 5 units t i d   With meals  · Diabetic diet

## 2022-11-26 NOTE — PROGRESS NOTES
Advanced Heart Failure / Pulmonary Hypertension Service Progress Note    Lynsey Calles 74 y o  male   MRN: [id number]  Unit/Bed#: CW2 216-02; Encounter: [id number]    Assessment:  Principal Problem:    Acute on chronic systolic congestive heart failure ([address] )  Active Problems:    LAZARO superimposed on CKD (chronic kidney disease) stage 4, GFR 15-29 ml/min (HCC)    Bipolar affective disorder, mixed, moderate (HCC)    Essential hypertension    Normocytic anemia    Type 2 diabetes mellitus, with long-term current use of insulin (HCC)    CAD (coronary artery disease)    Cognitive impairment      Subjective:   "Lynsey Calles is a 62-year-old male who presented to [facility] on 11/23/22 with dyspnea, midsternal chest pain, and abdominal distension x 2 days, after not taking his diuretics for two days  PMH includes HTN, DM2, CKD, HFrEF with LVEF 20-25%, bipolar disorder, remote history polysubstance abuse, hepatitis C  He was diagnosed with a newly discovered cardiomyopathy in 4/2022, at which time myocardial perfusion scan was negative for ischemia  He was admitted in July 2022 and aggressively diuresed and started on GDMT  Cath was deferred at that time secondary to his kidney function       He was subsequently admitted to the hospital on 10/2/2022 with acute systolic CHF in the setting of uncontrolled hypertension  He also had elevated troponin  His presenting creatinine was 2 17 mg/dL however it peaked at 5 54 mg/dL on 10/6/2022  During this time patient was being diuresed with IV diuretics  Patient eventually underwent right heart cath on 10/4/2022 which showed elevated wedge pressure  Patient was dialyzed prior to proceeding with left heart cath/coronary angiogram on 10/10/2022 which showed multivessel coronary artery disease  CT surgery was consulted for coronary bypass evaluation but didn't find him a good candidate for CABG   Planned for phone visit with Dr Candido Metcalf with [facility] to discuss staged PCI for LAD and possibly circumflex      Presenting today with confusion, weakness, dyspnea, chest pain and reported noncompliance with home diuretics x2 days  Has had multiple admissions for acute CHF exacerbations and LAZARO, last at [place] [id number] from 11/13-11/15 for HF "    Patient seen and examined  No significant events overnight  No current complaints  Ambulating to bathroom without SOB  Denies PND and orthopnea  Mild frustration with fluid restriction  When discussing rationale for this, patient stated that he has no intention of continuing on restriction once discharge  Objective: Intake/ Output: Not accurate  Weight: 189 lbs (193 lbs on 11/25)  Both bed bed scales  Telemetry: NSR, rates in 70s  Today's Plan:  • Will give another 40 mg IV Lasix this AM  Switch to PO torsemide 40 mg BID  • Increase Isordil to 20 mg q8 hours  • Will liberalize fluid intake as patient has no intention of complying with restriction as outpatient  • Dry weight estimated to be around 180 lbs per outpatient cardiology notes  • Patient planning to continue to follow with [facility] cardiology as outpatient  Plan:  Acute on chronic HFrEF; LVEF 20-25%; NYHA III; ACC/AHA Stage C   Etiology: "initially thought to be nonischemic, but recent left heart catheterization with evidence of multivessel disease "   TTE 04/24/2022 (at [facility], per report): LVEF 30%  Global hypokinesis with distal LAD regional wall motion abnormality  Grade 2 DD with elevated left atrial filling pressures  TTE 07/06/2022 (at [facility], per report): LVEF 40-50%  TTE 10/03/2022 (at [facility], per report): LVEF 20-25%, LV mildly dilated  Global hypokinesis  Grade 2 DD  Normal RV size, systolic function mildly reduced  Trace TR and MR  LHC 10/10/2022 (at [facility], per report): "showed proximal and mid LAD stenosis at multiple levels  Mid to distal left circumflex also has severe disease " LVEDP 10 mmHg  [address] 10/10/2022 (at [facility], per report): RA 1  RV 25/5   PA 30/10/17  PCWP 12  PVR 1 32 BARBOSA  CO 3 8  CI 2 07  Pharmacotherapies / Neurohormonal Blockade:  --Beta Blocker: carvedilol 12 5 mg q12 hours  --ARNi / ACEi / ARB: No, CKD precludes  --Aldosterone Antagonist: No, CKD precludes  --SGLT2 Inhibitor: No (on dapagliflozin 10 mg daily as outpatient)  --SVR Reducing Agents: hydralazine 25 mg q8 hours; isosorbide dintirate 20 mg q8 hours  --Home Diuretic: torsemide 10 mg QOD  --Inpatient Diuretic: PO torsemide 40 mg BID  Sudden Cardiac Death Risk Reduction:  --LVEF 20-25%  Wearing LifeVest as outpatient  Electrical Resynchronization:  --Candidacy for BiV device: narrow QRS  Advanced Therapies (if appropriate): Will continue to monitor  Coronary artery disease, multivessel   C 10/10/2022 (at [facility], per report): "showed proximal and mid LAD stenosis at multiple levels  Mid to distal left circumflex also has severe disease " LVEDP 10 mmHg  Deemed to be poor candidate for CABG at [facility] during 10/2022 admission  May be a candidate for staged PCI at [facility]  Continue on aspirin, statin, and BB as above  Chronic kidney disease, stage IV   Baseline creatinine of 2 1-2 7  Did briefly require HD during 10/2022 admission at [facility]  Today, creatinine of 3 08 (3 72 on 11/24)  Hypertension  Diabetes mellitus, type II  History of hepatitis C  History of bipolar disorder  History of substance abuse    Vitals:   Blood pressure 138/80, pulse 81, temperature 98 8 °F (37 1 °C), temperature source Oral, resp  rate 19, height 5' 4" (1 626 m), weight 85 9 kg (189 lb 6 oz), SpO2 92 %  Body mass index is 32 51 kg/m²  I/O last 3 completed shifts:   In: 1220 [P O :1220]  Out: 1825 [Urine:1825]    Wt Readings from Last 10 Encounters:   11/26/22 85 9 kg (189 lb 6 oz)   10/26/22 90 4 kg (199 lb 4 8 oz)   10/16/22 84 1 kg (185 lb 6 4 oz)   09/22/22 84 kg (185 lb 3 2 oz)   09/20/22 84 5 kg (186 lb 3 2 oz)   09/14/22 (P) 84 4 kg (186 lb)   09/07/22 82 9 kg (182 lb 12 8 oz)   08/11/22 80 9 kg (178 lb 6 4 oz)   07/28/22 78 5 kg (173 lb)   07/26/22 80 kg (176 lb 5 9 oz)     Vitals:    11/26/22 0136 11/26/22 0415 11/26/22 0533 11/26/22 0622   BP: 138/81 137/79  138/80   BP Location:  Left arm  Left arm   Pulse: 77 80  81   Resp:  20  19   Temp:  98 9 °F (37 2 °C)  98 8 °F (37 1 °C)   TempSrc:  Oral  Oral   SpO2: 96% 91%  92%   Weight:   85 9 kg (189 lb 6 oz)    Height:           Physical Exam  Vitals reviewed  Constitutional:       General: He is awake  He is not in acute distress  Appearance: Normal appearance  He is well-developed and overweight  He is ill-appearing  He is not toxic-appearing or diaphoretic  HENT:      Head: Normocephalic  Nose: Nose normal       Mouth/Throat:      Mouth: Mucous membranes are moist    Eyes:      General: No scleral icterus  Conjunctiva/sclera: Conjunctivae normal    Neck:      Vascular: JVD present  Trachea: No tracheal deviation  Cardiovascular:      Rate and Rhythm: Normal rate and regular rhythm  No extrasystoles are present  Pulses: Normal pulses  Heart sounds: No murmur heard  Pulmonary:      Effort: Pulmonary effort is normal  No tachypnea, bradypnea or respiratory distress  Breath sounds: Normal air entry  No decreased air movement  No decreased breath sounds or rhonchi  Abdominal:      General: Bowel sounds are normal  There is distension  Palpations: Abdomen is soft  Tenderness: There is no abdominal tenderness  Musculoskeletal:      Cervical back: Neck supple  Right lower leg: Edema (trace) present  Left lower leg: Edema (trace) present  Skin:     General: Skin is warm and dry  Coloration: Skin is not jaundiced or pale  Neurological:      General: No focal deficit present  Mental Status: He is alert and oriented to person, place, and time     Psychiatric:         Attention and Perception: Attention normal          Mood and Affect: Mood and affect normal  Speech: Speech normal          Behavior: Behavior normal  Behavior is cooperative  Thought Content:  Thought content normal      Central Line: No   Caceres Catheter: No     Current Facility-Administered Medications:   •  acetaminophen (TYLENOL) tablet 650 mg, 650 mg, Oral, Q6H PRN, Stefany Shirley PA-C  •  aspirin (ECOTRIN LOW STRENGTH) EC tablet 81 mg, 81 mg, Oral, Daily, Larina Kelsie, KYLEIGHNP, 81 mg at 11/26/22 0825  •  atorvastatin (LIPITOR) tablet 40 mg, 40 mg, Oral, HS, LarKYLEIGH VegaNP, 40 mg at 11/25/22 2131  •  carvedilol (COREG) tablet 12 5 mg, 12 5 mg, Oral, Q12H [facility], Kyle Sun PA-C, 12 5 mg at 11/26/22 0825  •  FLUoxetine (PROzac) capsule 40 mg, 40 mg, Oral, Daily, Larina Kelsie, CRNP, 40 mg at 11/26/22 0825  •  furosemide (LASIX) injection 40 mg, 40 mg, Intravenous, Once, Kyle Sun PA-C  •  gabapentin (NEURONTIN) capsule 300 mg, 300 mg, Oral, Daily, Larina KYLEIGH IglesiasNP, 300 mg at 11/26/22 0825  •  heparin (porcine) subcutaneous injection 5,000 Units, 5,000 Units, Subcutaneous, Q8H [facility], 5,000 Units at 11/26/22 8222 **AND** [CANCELED] Platelet count, , , Once, EMMA Real  •  hydrALAZINE (APRESOLINE) tablet 25 mg, 25 mg, Oral, Q8H, Claribel Rocha PA-C, 25 mg at 11/26/22 2980  •  insulin glargine (LANTUS) subcutaneous injection 10 Units 0 1 mL, 10 Units, Subcutaneous, HS, EMMA Real, 10 Units at 11/25/22 2127  •  insulin lispro (HumaLOG) 100 units/mL subcutaneous injection 1-6 Units, 1-6 Units, Subcutaneous, HS, EMMA Caban, 1 Units at 11/25/22 2134  •  insulin lispro (HumaLOG) 100 units/mL subcutaneous injection 2-12 Units, 2-12 Units, Subcutaneous, TID AC, 4 Units at 11/26/22 0637 **AND** Fingerstick Glucose (POCT), , , TID AC, EMMA Real  •  insulin lispro (HumaLOG) 100 units/mL subcutaneous injection 5 Units, 5 Units, Subcutaneous, TID With Meals, Dorinda Ramirez PA-C, 5 Units at 11/26/22 9872  •  isosorbide dinitrate (ISORDIL) tablet 20 mg, 20 mg, Oral, Q8H, Francine Schrader PA-C  •  melatonin tablet 4 5 mg, 4 5 mg, Oral, HS PRN, EMMA Drummond  •  QUEtiapine (SEROquel) tablet 100 mg, 100 mg, Oral, HS, EMMA Drummond, 100 mg at 11/25/22 2131  •  thiamine tablet 100 mg, 100 mg, Oral, Daily, EMMA Drummond, 100 mg at 11/26/22 0825  •  torsemide (DEMADEX) tablet 40 mg, 40 mg, Oral, BID, Colleen Tamez PA-C    Labs & Results:      Results from last 7 days   Lab Units 11/25/22  0433 11/24/22  0453 11/23/22  1121   WBC Thousand/uL 6 05 6 20 7 99   HEMOGLOBIN g/dL 8 9* 8 3* 9 0*   HEMATOCRIT % 27 9* 25 7* 28 1*   PLATELETS Thousands/uL 211 181 184         Results from last 7 days   Lab Units 11/26/22  0444 11/25/22  0433 11/24/22  0453 11/23/22  1121   POTASSIUM mmol/L 4 1 3 9 3 8 4 6   CHLORIDE mmol/L 108 108 109* 108   CO2 mmol/L 26 24 21 19*   BUN mg/dL 70* 79* 75* 72*   CREATININE mg/dL 3 08* 3 72* 3 46* 3 51*   CALCIUM mg/dL 8 8 8 8 8 7 8 9   ALK PHOS U/L  --   --   --  133*   ALT U/L  --   --   --  35   AST U/L  --   --   --  25         Raz Bojorquez PA-C

## 2022-11-26 NOTE — PLAN OF CARE
Problem: MOBILITY - ADULT  Goal: Maintain or return to baseline ADL function  Description: INTERVENTIONS:  -  Assess patient's ability to carry out ADLs; assess patient's baseline for ADL function and identify physical deficits which impact ability to perform ADLs (bathing, care of mouth/teeth, toileting, grooming, dressing, etc )  - Assess/evaluate cause of self-care deficits   - Assess range of motion  - Assess patient's mobility; develop plan if impaired  - Assess patient's need for assistive devices and provide as appropriate  - Encourage maximum independence but intervene and supervise when necessary  - Involve family in performance of ADLs  - Assess for home care needs following discharge   - Consider OT consult to assist with ADL evaluation and planning for discharge  - Provide patient education as appropriate  Outcome: Progressing     Problem: PAIN - ADULT  Goal: Verbalizes/displays adequate comfort level or baseline comfort level  Description: Interventions:  - Encourage patient to monitor pain and request assistance  - Assess pain using appropriate pain scale  - Administer analgesics based on type and severity of pain and evaluate response  - Implement non-pharmacological measures as appropriate and evaluate response  - Consider cultural and social influences on pain and pain management  - Notify physician/advanced practitioner if interventions unsuccessful or patient reports new pain  Outcome: Progressing     Problem: INFECTION - ADULT  Goal: Absence or prevention of progression during hospitalization  Description: INTERVENTIONS:  - Assess and monitor for signs and symptoms of infection  - Monitor lab/diagnostic results  - Monitor all insertion sites, i e  indwelling lines, tubes, and drains  - Monitor endotracheal if appropriate and nasal secretions for changes in amount and color  - [place] appropriate cooling/warming therapies per order  - Administer medications as ordered  - Instruct and encourage patient and family to use good hand hygiene technique  - Identify and instruct in appropriate isolation precautions for identified infection/condition  Outcome: Progressing

## 2022-11-26 NOTE — ASSESSMENT & PLAN NOTE
· Patient at times will not converse however with prompting was noted to sit up, speak to me and eat lunch  Nursing staff report that this has been present during prior admissions as well  · When seen by occupational therapy, patient did poorly on cognitive evaluation   [address] 6/30 (11/24/22)  · Appreciate Geriatrics consultation  · Patient likely with underlying dementia, likely does not have decision making capacity   · OT recommending rehab vs home with 24/7 supervision upon discharge  · Given frequent readmissions both here and at [facility] secondary to noncompliance, discussed with family (brother Joleen Fraire and sister Harden Solo) regarding placement on 11/27  · Family agreeable to neuropsychiatry evaluation with hopeful discharge to rehab / long term care, consult placed

## 2022-11-26 NOTE — ASSESSMENT & PLAN NOTE
Lab Results   Component Value Date    EGFR 20 11/26/2022    EGFR 16 11/25/2022    EGFR 17 11/24/2022    CREATININE 3 08 (H) 11/26/2022    CREATININE 3 72 (H) 11/25/2022    CREATININE 3 46 (H) 11/24/2022     · Baseline creatinine approximately 2 1-2 7  Follows with [facility]    · LAZARO POA with creatinine of 3 51 felt to be secondary to volume overload, cardiorenal syndrome  · Nephrology following, appreciate inputs   · Continue to monitor with diuresis, see plan above   · Creatinine continues to trend down, currently 2 68

## 2022-11-26 NOTE — ASSESSMENT & PLAN NOTE
Lab Results   Component Value Date    EGFR 20 11/26/2022    EGFR 16 11/25/2022    EGFR 17 11/24/2022    CREATININE 3 08 (H) 11/26/2022    CREATININE 3 72 (H) 11/25/2022    CREATININE 3 46 (H) 11/24/2022     · Baseline creatinine approximately 2 1-2 7  Follows with [facility]    · LAZARO POA with creatinine of 3 51 felt to be secondary to volume overload, cardiorenal syndrome  · Nephrology following, appreciate inputs   · Continue to monitor with diuresis, see plan above   · Creatinine down-trending today

## 2022-11-26 NOTE — ASSESSMENT & PLAN NOTE
· BP acceptable, monitor routinely   · Continue Coreg, hydralazine and isosorbide  · Diuresis as above

## 2022-11-26 NOTE — ASSESSMENT & PLAN NOTE
Known multivessel severe coronary artery disease  Patient follows with [facility]iology, felt to not be a CABG candidate as per record review, was to be optimized prior to planned PCI to circumflex and LAD    · Troponin elevation on admission due to acute heart failure and hypertension   · Most recent echocardiogram 11/8/2022 at [facility] with an EF of 30-35%  · Has a life vest but is noncompliant with this  · Continue outpatient follow-up with Cardiology  · Continue Coreg, isosorbide, hydralazine, aspirin and statin

## 2022-11-26 NOTE — ASSESSMENT & PLAN NOTE
Known multivessel severe coronary artery disease  Patient follows with [facility] Cardiology, felt to not be a CABG candidate as per record review, was to be optimized prior to planned PCI to circumflex and LAD    · Troponin elevation on admission due to acute heart failure and hypertension   · Most recent echocardiogram 11/8/2022 at [facility] with an EF of 30-35%  · Has a life vest but is noncompliant with this  · Continue outpatient follow-up with Cardiology  · Continue Coreg, isosorbide, hydralazine, aspirin and statin

## 2022-11-26 NOTE — ASSESSMENT & PLAN NOTE
· Patient at times will not converse however with prompting was noted to sit up, speak to me and eat lunch  Nursing staff report that this has been present during prior admissions as well  · When seen by occupational therapy, patient did poorly on cognitive evaluation  [address] [place] [address] 6/30 (11/24/22)  · Appreciate Geriatrics consultation  · Patient likely with underlying dementia, likely does not have decision making capacity   · OT recommending rehab vs home with 24/7 supervision upon discharge  · Given frequent readmissions and noncompliance, will discuss with family regarding placement  Patient will likely need formal neuropsych evaluation for capacity

## 2022-11-27 LAB
ANION GAP SERPL CALCULATED.3IONS-SCNC: 5 MMOL/L (ref 4–13)
BUN SERPL-MCNC: 65 MG/DL (ref 5–25)
CALCIUM SERPL-MCNC: 9.1 MG/DL (ref 8.3–10.1)
CHLORIDE SERPL-SCNC: 109 MMOL/L (ref 96–108)
CO2 SERPL-SCNC: 24 MMOL/L (ref 21–32)
CREAT SERPL-MCNC: 2.84 MG/DL (ref 0.6–1.3)
GFR SERPL CREATININE-BSD FRML MDRD: 22 ML/MIN/1.73SQ M
GLUCOSE SERPL-MCNC: 130 MG/DL (ref 65–140)
GLUCOSE SERPL-MCNC: 132 MG/DL (ref 65–140)
GLUCOSE SERPL-MCNC: 140 MG/DL (ref 65–140)
GLUCOSE SERPL-MCNC: 158 MG/DL (ref 65–140)
GLUCOSE SERPL-MCNC: 176 MG/DL (ref 65–140)
POTASSIUM SERPL-SCNC: 4.2 MMOL/L (ref 3.5–5.3)
SODIUM SERPL-SCNC: 138 MMOL/L (ref 135–147)

## 2022-11-27 RX ORDER — TORSEMIDE 20 MG/1
40 TABLET ORAL 2 TIMES DAILY
Status: DISCONTINUED | OUTPATIENT
Start: 2022-11-27 | End: 2022-12-03

## 2022-11-27 RX ORDER — TORSEMIDE 20 MG/1
60 TABLET ORAL ONCE
Status: COMPLETED | OUTPATIENT
Start: 2022-11-27 | End: 2022-11-27

## 2022-11-27 RX ORDER — HYDRALAZINE HYDROCHLORIDE 25 MG/1
37.5 TABLET, FILM COATED ORAL EVERY 8 HOURS
Status: DISCONTINUED | OUTPATIENT
Start: 2022-11-27 | End: 2022-11-30

## 2022-11-27 RX ADMIN — ATORVASTATIN CALCIUM 40 MG: 40 TABLET, FILM COATED ORAL at 21:05

## 2022-11-27 RX ADMIN — ISOSORBIDE DINITRATE 20 MG: 20 TABLET ORAL at 06:23

## 2022-11-27 RX ADMIN — ISOSORBIDE DINITRATE 20 MG: 20 TABLET ORAL at 13:16

## 2022-11-27 RX ADMIN — CARVEDILOL 12.5 MG: 12.5 TABLET, FILM COATED ORAL at 09:09

## 2022-11-27 RX ADMIN — QUETIAPINE FUMARATE 100 MG: 100 TABLET ORAL at 21:05

## 2022-11-27 RX ADMIN — TORSEMIDE 40 MG: 20 TABLET ORAL at 17:00

## 2022-11-27 RX ADMIN — CARVEDILOL 12.5 MG: 12.5 TABLET, FILM COATED ORAL at 21:05

## 2022-11-27 RX ADMIN — GABAPENTIN 300 MG: 300 CAPSULE ORAL at 09:09

## 2022-11-27 RX ADMIN — INSULIN LISPRO 5 UNITS: 100 INJECTION, SOLUTION INTRAVENOUS; SUBCUTANEOUS at 09:11

## 2022-11-27 RX ADMIN — ISOSORBIDE DINITRATE 20 MG: 20 TABLET ORAL at 21:40

## 2022-11-27 RX ADMIN — HEPARIN SODIUM 5000 UNITS: 5000 INJECTION INTRAVENOUS; SUBCUTANEOUS at 21:05

## 2022-11-27 RX ADMIN — HEPARIN SODIUM 5000 UNITS: 5000 INJECTION INTRAVENOUS; SUBCUTANEOUS at 13:16

## 2022-11-27 RX ADMIN — INSULIN LISPRO 2 UNITS: 100 INJECTION, SOLUTION INTRAVENOUS; SUBCUTANEOUS at 11:33

## 2022-11-27 RX ADMIN — INSULIN GLARGINE 10 UNITS: 100 INJECTION, SOLUTION SUBCUTANEOUS at 21:06

## 2022-11-27 RX ADMIN — FLUOXETINE HYDROCHLORIDE 40 MG: 20 CAPSULE ORAL at 09:09

## 2022-11-27 RX ADMIN — ASPIRIN 81 MG: 81 TABLET, COATED ORAL at 09:09

## 2022-11-27 RX ADMIN — INSULIN LISPRO 5 UNITS: 100 INJECTION, SOLUTION INTRAVENOUS; SUBCUTANEOUS at 11:33

## 2022-11-27 RX ADMIN — HEPARIN SODIUM 5000 UNITS: 5000 INJECTION INTRAVENOUS; SUBCUTANEOUS at 06:23

## 2022-11-27 RX ADMIN — THIAMINE HCL TAB 100 MG 100 MG: 100 TAB at 09:09

## 2022-11-27 RX ADMIN — HYDRALAZINE HYDROCHLORIDE 25 MG: 25 TABLET, FILM COATED ORAL at 06:23

## 2022-11-27 RX ADMIN — TORSEMIDE 60 MG: 20 TABLET ORAL at 09:09

## 2022-11-27 RX ADMIN — INSULIN LISPRO 5 UNITS: 100 INJECTION, SOLUTION INTRAVENOUS; SUBCUTANEOUS at 17:22

## 2022-11-27 NOTE — PROGRESS NOTES
[address] [facility]  Progress Note - Jose Juan Wilson 1955, 74 y o  male MRN: [id number]  Unit/Bed#: CW2 216-02 Encounter: [id number]  Primary Care Provider: Vanesa Nuñez DO   Date and time admitted to hospital: 11/23/2022 10:54 AM    * Acute on chronic systolic congestive heart failure (HCC)  Assessment & Plan  Wt Readings from Last 3 Encounters:   11/26/22 85 9 kg (189 lb 6 oz)   10/26/22 90 4 kg (199 lb 4 8 oz)   10/16/22 84 1 kg (185 lb 6 4 oz)     Has a history of noncompliance with his medications and was recently seen in [facility] 11/13-11/15 for CHF exacerbation as well and at [facility] 10/26  On discharge from [facility] was instructed to take torsemide 40 mg daily  Prior to this had been increased to 20 mg b i d  on 11/9 and prior to this was on 10 mg QOD  · Patient not taking medications as prescribed as an outpatient  · Most recent echocardiogram with LVEF 30%   · Patient has life vest but is non compliant; monitor on telemetry while inpatient   · Cardiology and Nephrology following  · Status post IV diuresis, transitioned to increased dose Lasix 40 mg PO BID on 11/26, transitioned to PO torsemide on 11/27, monitor for 24 hours possible discharge Monday   · Isordil increased to 20 mg q8h  · Continued on home dose Coreg, hydralazine  · Monitor daily weights, intake and output   · Close outpatient follow-up     Cognitive impairment  Assessment & Plan  · Patient at times will not converse however with prompting was noted to sit up, speak to me and eat lunch  Nursing staff report that this has been present during prior admissions as well  · When seen by occupational therapy, patient did poorly on cognitive evaluation   [facility] 6/30 (11/24/22)  · Appreciate Geriatrics consultation  · Patient likely with underlying dementia, likely does not have decision making capacity   · OT recommending rehab vs home with 24/7 supervision upon discharge  · Given frequent readmissions both here and at [address] secondary to noncompliance, discussed with family (brother Sammy Hernandez and sister Claude Loh) regarding placement on 11/27  · Family agreeable to neuropsychiatry evaluation with hopeful discharge to rehab / long term care, consult placed     CAD (coronary artery disease)  Assessment & Plan  Known multivessel severe coronary artery disease  Patient follows with [facility] Cardiology, felt to not be a CABG candidate as per record review, was to be optimized prior to planned PCI to circumflex and LAD  · Troponin elevation on admission due to acute heart failure and hypertension   · Most recent echocardiogram 11/8/2022 at [facility] with an EF of 30-35%  · Has a life vest but is noncompliant with this  · Continue outpatient follow-up with Cardiology  · Continue Coreg, isosorbide, hydralazine, aspirin and statin    Type 2 diabetes mellitus, with long-term current use of insulin [facility])  Assessment & Plan  Lab Results   Component Value Date    HGBA1C 7 7 (H) 10/03/2022     Recent Labs     11/25/22  1637 11/25/22  2109 11/26/22  0529 11/26/22  1106   POCGLU 147* 187* 235* 155*       Blood Sugar Average: Last 72 hrs:  · (P) 006 [id number]   · Outpatient on Farxiga and Novolog  · Follows with endocrinology outpatient  · Home regimen of novolog is 30 with breakfast and 10 with dinner but was having glucoses in the 30s with this regimen (giving and not eating)  · Continue current regimen Lantus 10 units QHS and humalog 5 units t i d   With meals  · Diabetic diet    Normocytic anemia  Assessment & Plan  · Baseline hemoglobin appears to be 9-10  · Current hemoglobin stable, slightly below baseline  · No signs or symptoms of bleeding at this time  · Recommend outpatient follow-up    Essential hypertension  Assessment & Plan  · BP acceptable, monitor routinely   · Continue Coreg, hydralazine and isosorbide  · Diuresis as above     Bipolar affective disorder, mixed, moderate (HCC)  Assessment & Plan  · Continue Seroquel and Prozac    LAZARO superimposed on CKD (chronic kidney disease) stage 4, GFR 15-29 ml/min [facility])  Assessment & Plan  Lab Results   Component Value Date    EGFR 20 2022    EGFR 16 2022    EGFR 17 2022    CREATININE 3 08 (H) 2022    CREATININE 3 72 (H) 2022    CREATININE 3 46 (H) 2022     · Baseline creatinine approximately 2 1-2 7  Follows with [facility]  · LAZARO POA with creatinine of 3 51 felt to be secondary to volume overload, cardiorenal syndrome  · Nephrology following, appreciate inputs   · Continue to monitor with diuresis, see plan above   · Creatinine continues to trend down, currently 2 68          VTE Pharmacologic Prophylaxis: VTE Score: 4 Moderate Risk (Score 3-4) - Pharmacological DVT Prophylaxis Ordered: heparin  Patient Centered Rounds: I performed bedside rounds with nursing staff today  Discussions with Specialists or Other Care Team Provider: None     Education and Discussions with Family / Patient: Patient declined call to   Time Spent for Care: 30 minutes  More than 50% of total time spent on counseling and coordination of care as described above  Current Length of Stay: 4 day(s)  Current Patient Status: Inpatient   Certification Statement: The patient will continue to require additional inpatient hospital stay due to monitor on po diuretics for 24 hours, neuropsych consult on    Discharge Plan: Anticipate discharge in 24-48 hrs to discharge location to be determined pending rehab evaluations  Code Status: Level 1 - Full Code    Subjective:   Patient seen and examined, no complaints  Denies SOB, chest pain, leg swelling       Objective:     Vitals:   Temp (24hrs), Av 5 °F (36 9 °C), Min:97 2 °F (36 2 °C), Max:99 2 °F (37 3 °C)    Temp:  [97 2 °F (36 2 °C)-99 2 °F (37 3 °C)] 98 3 °F (36 8 °C)  HR:  [71-83] 72  Resp:  [18] 18  BP: (112-138)/() 112/60  SpO2:  [94 %-99 %] 96 %  Body mass index is 32 51 kg/m²  Input and Output Summary (last 24 hours): Intake/Output Summary (Last 24 hours) at 11/27/2022 1342  Last data filed at 11/27/2022 1322  Gross per 24 hour   Intake 640 ml   Output 1000 ml   Net -360 ml       Physical Exam:   Physical Exam  Constitutional:       Appearance: Normal appearance  He is not ill-appearing  Eyes:      General:         Right eye: No discharge  Left eye: No discharge  Conjunctiva/sclera: Conjunctivae normal    Neck:      Comments: No JVD  Cardiovascular:      Rate and Rhythm: Normal rate and regular rhythm  Pulses: Normal pulses  Heart sounds: No murmur heard  Pulmonary:      Effort: Pulmonary effort is normal  No respiratory distress  Breath sounds: Normal breath sounds  No wheezing or rales  Abdominal:      General: Bowel sounds are normal  There is no distension  Palpations: Abdomen is soft  Tenderness: There is no abdominal tenderness  Musculoskeletal:      Cervical back: Neck supple  Right lower leg: No edema  Left lower leg: No edema  Neurological:      Mental Status: He is alert     Psychiatric:         Mood and Affect: Mood normal          Behavior: Behavior normal           Additional Data:     Labs:  Results from last 7 days   Lab Units 11/25/22  0433 11/24/22  0453   WBC Thousand/uL 6 05 6 20   HEMOGLOBIN g/dL 8 9* 8 3*   HEMATOCRIT % 27 9* 25 7*   PLATELETS Thousands/uL 211 181   NEUTROS PCT %  --  56   LYMPHS PCT %  --  27   MONOS PCT %  --  11   EOS PCT %  --  4     Results from last 7 days   Lab Units 11/27/22  0649 11/24/22  0453 11/23/22  1121   SODIUM mmol/L 138   < > 135   POTASSIUM mmol/L 4 2   < > 4 6   CHLORIDE mmol/L 109*   < > 108   CO2 mmol/L 24   < > 19*   BUN mg/dL 65*   < > 72*   CREATININE mg/dL 2 84*   < > 3 51*   ANION GAP mmol/L 5   < > 8   CALCIUM mg/dL 9 1   < > 8 9   ALBUMIN g/dL  --   --  2 8*   TOTAL BILIRUBIN mg/dL  --   --  0 50   ALK PHOS U/L  --   --  133*   ALT U/L  --   -- 35   AST U/L  --   --  18   GLUCOSE RANDOM mg/dL 158*   < > 362*    < > = values in this interval not displayed  Results from last 7 days   Lab Units 11/27/22  1114 11/27/22  0630 11/26/22  2114 11/26/22  1610 11/26/22  1106 11/26/22  0529 11/25/22  2109 11/25/22  1637 11/25/22  1106 11/25/22  0530 11/25/22  0200 11/24/22  2146   POC GLUCOSE mg/dl 176* 140 127 213* 155* 235* 187* 147* 141* 217* 147* 88               Lines/Drains:  Invasive Devices     Peripheral Intravenous Line  Duration           Peripheral IV 11/23/22 Left;Ventral (anterior) Forearm 4 days                  Telemetry:  Telemetry Orders (From admission, onward)             LifeVest Patient: Continuous Telemetry Monitoring during hospitalization (non-expiring)  Continuous LifeVest Telemetry Monitoring        References:    LifeVest Policy                 Telemetry Reviewed: Normal Sinus Rhythm  Indication for Continued Telemetry Use: Lifevest (remains on tele entire hospital stay)             Imaging: No pertinent imaging reviewed      Recent Cultures (last 7 days):         Last 24 Hours Medication List:   Current Facility-Administered Medications   Medication Dose Route Frequency Provider Last Rate   • acetaminophen  650 mg Oral Q6H PRN Stefany Shirley PA-C     • aspirin  81 mg Oral Daily Ottis Staff, EMMA     • atorvastatin  40 mg Oral HS [facility] Staff, KYLIEGHNP     • carvedilol  12 5 mg Oral Q12H [address] Crow Crespo PA-C     • FLUoxetine  40 mg Oral Daily Ottharvinder StaffEMMA     • gabapentin  300 mg Oral Daily Ottis StaffEMMA     • heparin (porcine)  5,000 Units Subcutaneous [facility] Ottharvinder StaffEMMA     • hydrALAZINE  37 5 mg Oral Q8H Crow Crespo PA-C     • insulin glargine  10 Units Subcutaneous HS EMMA Padilla     • insulin lispro  1-6 Units Subcutaneous HS EMMA Chu     • insulin lispro  2-12 Units Subcutaneous TID AC Ottharvinder StaffEMMA     • insulin lispro  5 Units Subcutaneous TID With Meals Stefany Shirley PA-C     • isosorbide dinitrate  20 mg Oral Q8H Balaji Chase PA-C     • melatonin  4 5 mg Oral HS PRN Devaughn Shown, CRNP     • QUEtiapine  100 mg Oral HS Julianocekeren Shown, CRNP     • thiamine  100 mg Oral Daily Devaughn Shown, CRNP     • torsemide  40 mg Oral BID Balaji Chase PA-C          Today, Patient Was Seen By: Khoi Brewer DO    **Please Note: This note may have been constructed using a voice recognition system  **

## 2022-11-27 NOTE — PLAN OF CARE
Problem: MOBILITY - ADULT  Goal: Maintain or return to baseline ADL function  Description: INTERVENTIONS:  -  Assess patient's ability to carry out ADLs; assess patient's baseline for ADL function and identify physical deficits which impact ability to perform ADLs (bathing, care of mouth/teeth, toileting, grooming, dressing, etc )  - Assess/evaluate cause of self-care deficits   - Assess range of motion  - Assess patient's mobility; develop plan if impaired  - Assess patient's need for assistive devices and provide as appropriate  - Encourage maximum independence but intervene and supervise when necessary  - Involve family in performance of ADLs  - Assess for home care needs following discharge   - Consider OT consult to assist with ADL evaluation and planning for discharge  - Provide patient education as appropriate  Outcome: Progressing     Problem: PAIN - ADULT  Goal: Verbalizes/displays adequate comfort level or baseline comfort level  Description: Interventions:  - Encourage patient to monitor pain and request assistance  - Assess pain using appropriate pain scale  - Administer analgesics based on type and severity of pain and evaluate response  - Implement non-pharmacological measures as appropriate and evaluate response  - Consider cultural and social influences on pain and pain management  - Notify physician/advanced practitioner if interventions unsuccessful or patient reports new pain  Outcome: Progressing     Problem: INFECTION - ADULT  Goal: Absence or prevention of progression during hospitalization  Description: INTERVENTIONS:  - Assess and monitor for signs and symptoms of infection  - Monitor lab/diagnostic results  - Monitor all insertion sites, i e  indwelling lines, tubes, and drains  - Monitor endotracheal if appropriate and nasal secretions for changes in amount and color  - [place] appropriate cooling/warming therapies per order  - Administer medications as ordered  - Instruct and encourage patient and family to use good hand hygiene technique  - Identify and instruct in appropriate isolation precautions for identified infection/condition  Outcome: Progressing

## 2022-11-27 NOTE — PROGRESS NOTES
Advanced Heart Failure / Pulmonary Hypertension Service Progress Note    Geovanny Baum 74 y o  male   MRN: [id number]  Unit/Bed#: CW2 216-02; Encounter: [id number]    Assessment:  Principal Problem:    Acute on chronic systolic congestive heart failure ([address] )  Active Problems:    LAZARO superimposed on CKD (chronic kidney disease) stage 4, GFR 15-29 ml/min (HCC)    Bipolar affective disorder, mixed, moderate (HCC)    Essential hypertension    Normocytic anemia    Type 2 diabetes mellitus, with long-term current use of insulin (HCC)    CAD (coronary artery disease)    Cognitive impairment      Subjective:   "Geovanny Baum is a 72-year-old male who presented to [facility] on 11[facility]22 with dyspnea, midsternal chest pain, and abdominal distension x 2 days, after not taking his diuretics for two days  PMH includes HTN, DM2, CKD, HFrEF with LVEF 20-25%, bipolar disorder, remote history polysubstance abuse, hepatitis C  He was diagnosed with a newly discovered cardiomyopathy in 4/2022, at which time myocardial perfusion scan was negative for ischemia  He was admitted in July 2022 and aggressively diuresed and started on GDMT  Cath was deferred at that time secondary to his kidney function       He was subsequently admitted to the hospital on 10/2/2022 with acute systolic CHF in the setting of uncontrolled hypertension  He also had elevated troponin  His presenting creatinine was 2 17 mg/dL however it peaked at 5 54 mg/dL on 10/6/2022  During this time patient was being diuresed with IV diuretics  Patient eventually underwent right heart cath on 10/4/2022 which showed elevated wedge pressure  Patient was dialyzed prior to proceeding with left heart cath/coronary angiogram on 10/10/2022 which showed multivessel coronary artery disease  CT surgery was consulted for coronary bypass evaluation but didn't find him a good candidate for CABG   Planned for phone visit with Dr Rocío Huggins with [facility] to discuss staged PCI for LAD and possibly circumflex      Presenting today with confusion, weakness, dyspnea, chest pain and reported noncompliance with home diuretics x2 days  Has had multiple admissions for acute CHF exacerbations and LAZARO, last at [place] [address] from 11/13-11/15 for HF "    Patient seen and examined  No significant events overnight  No current cardiac complaints  Objective: Intake/ Output: Not accurate  Weight: 189 lbs, standing (189 lbs on 11/26, bed)  Telemetry: NSR, rates in 70s  Today's Plan:  • Volume remains up on exam  Will give 60 mg PO torsemide this AM, and continue PO torsemide 40 mg BID this afternoon  • Increase hydralazine to 37 5 mg q8 hours  • Will liberalize fluid intake as patient has no intention of complying with restriction as outpatient  • Dry weight estimated to be around 180 lbs per outpatient cardiology notes  • Patient planning to continue to follow with [facility] cardiology as outpatient  May be stable for discharge tomorrow? Plan:  Acute on chronic HFrEF; LVEF 20-25%; NYHA III; ACC/AHA Stage C   Etiology: "initially thought to be nonischemic, but recent left heart catheterization with evidence of multivessel disease "   TTE 04/24/2022 (at [facility], per report): LVEF 30%  Global hypokinesis with distal LAD regional wall motion abnormality  Grade 2 DD with elevated left atrial filling pressures  TTE 07/06/2022 (at [facility], per report): LVEF 40-50%  TTE 10/03/2022 (at [facility], per report): LVEF 20-25%, LV mildly dilated  Global hypokinesis  Grade 2 DD  Normal RV size, systolic function mildly reduced  Trace TR and MR  LHC 10/10/2022 (at [facility], per report): "showed proximal and mid LAD stenosis at multiple levels  Mid to distal left circumflex also has severe disease " LVEDP 10 mmHg  [address] 10/10/2022 (at [facility], per report): RA 1  RV 25/5  PA 30/10/17  PCWP 12  PVR 1 32 BARBOSA  CO 3 8  CI 2 07        Pharmacotherapies / Neurohormonal Blockade:  --Beta Blocker: carvedilol 12 5 mg q12 hours  --ARNi / ACEi / ARB: No, CKD precludes  --Aldosterone Antagonist: No, CKD precludes  --SGLT2 Inhibitor: No (on dapagliflozin 10 mg daily as outpatient)  --SVR Reducing Agents: hydralazine 37 5 mg q8 hours; isosorbide dintirate 20 mg q8 hours  --Home Diuretic: torsemide 10 mg QOD  --Inpatient Diuretic: PO torsemide 40 mg BID  Sudden Cardiac Death Risk Reduction:  --LVEF 20-25%  Wearing LifeVest as outpatient  Electrical Resynchronization:  --Candidacy for BiV device: narrow QRS  Advanced Therapies (if appropriate): Will continue to monitor  Coronary artery disease, multivessel   LHC 10/10/2022 (at [facility], per report): "showed proximal and mid LAD stenosis at multiple levels  Mid to distal left circumflex also has severe disease " LVEDP 10 mmHg  Deemed to be poor candidate for CABG at [facility] during 10/2022 admission  May be a candidate for staged PCI at [facility] per documentation  Continue on aspirin, statin, and BB as above  Chronic kidney disease, stage IV   Baseline creatinine of 2 1-2 7  Did briefly require HD during 10/2022 admission at [facility]  Today, creatinine of 2 84 (3 08 on 11/26)  Hypertension  Diabetes mellitus, type II  History of hepatitis C  History of bipolar disorder  History of substance abuse    Vitals:   Blood pressure (!) 132/109, pulse 71, temperature (!) 97 2 °F (36 2 °C), resp  rate 17, height 5' 4" (1 626 m), weight 85 9 kg (189 lb 6 4 oz), SpO2 99 %  Body mass index is 32 51 kg/m²  I/O last 3 completed shifts:   In: 1840 [P O :1840]  Out: 1100 [Urine:1100]    Wt Readings from Last 10 Encounters:   11/27/22 85 9 kg (189 lb 6 4 oz)   10/26/22 90 4 kg (199 lb 4 8 oz)   10/16/22 84 1 kg (185 lb 6 4 oz)   09/22/22 84 kg (185 lb 3 2 oz)   09/20/22 84 5 kg (186 lb 3 2 oz)   09/14/22 (P) 84 4 kg (186 lb)   09/07/22 82 9 kg (182 lb 12 8 oz)   08/11/22 80 9 kg (178 lb 6 4 oz)   07/28/22 78 5 kg (173 lb)   07/26/22 80 kg (176 lb 5 9 oz)     Vitals: 11/26/22 2308 11/27/22 0402 11/27/22 0600 11/27/22 0622   BP: 138/77 135/72  (!) 132/109   BP Location:       Pulse: 83 72  71   Resp:       Temp: 98 7 °F (37 1 °C) (!) 97 2 °F (36 2 °C)     TempSrc:       SpO2: 95% 99%  99%   Weight:   85 9 kg (189 lb 6 4 oz)    Height:           Physical Exam  Vitals reviewed  Constitutional:       General: He is awake  He is not in acute distress  Appearance: Normal appearance  He is well-developed and overweight  He is not toxic-appearing or diaphoretic  HENT:      Head: Normocephalic  Nose: Nose normal       Mouth/Throat:      Mouth: Mucous membranes are moist    Eyes:      General: No scleral icterus  Conjunctiva/sclera: Conjunctivae normal    Neck:      Vascular: JVD present  Trachea: No tracheal deviation  Cardiovascular:      Rate and Rhythm: Normal rate and regular rhythm  No extrasystoles are present  Pulses: Normal pulses  Heart sounds: No murmur heard  Pulmonary:      Effort: Pulmonary effort is normal  No tachypnea, bradypnea or respiratory distress  Breath sounds: Normal air entry  No decreased air movement  No decreased breath sounds, wheezing or rales  Abdominal:      General: Bowel sounds are normal  There is distension  Palpations: Abdomen is soft  Tenderness: There is no abdominal tenderness  Musculoskeletal:      Cervical back: Neck supple  Right lower leg: Edema present  Left lower leg: Edema present  Skin:     General: Skin is warm and dry  Coloration: Skin is not jaundiced or pale  Neurological:      General: No focal deficit present  Mental Status: He is alert and oriented to person, place, and time  Psychiatric:         Attention and Perception: Attention normal          Mood and Affect: Mood and affect normal          Speech: Speech normal          Behavior: Behavior normal  Behavior is cooperative  Thought Content:  Thought content normal        Central Line: No   Caceres Catheter: No     Current Facility-Administered Medications:   •  acetaminophen (TYLENOL) tablet 650 mg, 650 mg, Oral, Q6H PRN, Stefany Shirley PA-C  •  aspirin (ECOTRIN LOW STRENGTH) EC tablet 81 mg, 81 mg, Oral, Daily, Catalinais Staff, CRNP, 81 mg at 11/26/22 0825  •  atorvastatin (LIPITOR) tablet 40 mg, 40 mg, Oral, HS, Angela Staff, CRNP, 40 mg at 11/26/22 2134  •  carvedilol (COREG) tablet 12 5 mg, 12 5 mg, Oral, Q12H [facility], Crow Crespo, HELEN, 12 5 mg at 11/26/22 2134  •  FLUoxetine (PROzac) capsule 40 mg, 40 mg, Oral, Daily, Angela Staff, CRNP, 40 mg at 11/26/22 0825  •  gabapentin (NEURONTIN) capsule 300 mg, 300 mg, Oral, Daily, Angela Staff, CRNP, 300 mg at 11/26/22 0825  •  heparin (porcine) subcutaneous injection 5,000 Units, 5,000 Units, Subcutaneous, Q8H [facility], 5,000 Units at 11/27/22 1820 **AND** [CANCELED] Platelet count, , , Once, EMMA Padilla  •  hydrALAZINE (APRESOLINE) tablet 37 5 mg, 37 5 mg, Oral, Q8H, Francine Schrader PA-C  •  insulin glargine (LANTUS) subcutaneous injection 10 Units 0 1 mL, 10 Units, Subcutaneous, HS, EMMA Padilla, 10 Units at 11/26/22 2135  •  insulin lispro (HumaLOG) 100 units/mL subcutaneous injection 1-6 Units, 1-6 Units, Subcutaneous, HS, EMMA Chu, 1 Units at 11/25/22 2134  •  insulin lispro (HumaLOG) 100 units/mL subcutaneous injection 2-12 Units, 2-12 Units, Subcutaneous, TID AC, 4 Units at 11/26/22 1722 **AND** Fingerstick Glucose (POCT), , , TID AC, EMMA Padilla  •  insulin lispro (HumaLOG) 100 units/mL subcutaneous injection 5 Units, 5 Units, Subcutaneous, TID With Meals, Virgel Polk, PA-C, 5 Units at 11/26/22 1722  •  isosorbide dinitrate (ISORDIL) tablet 20 mg, 20 mg, Oral, Q8H, Francine Schrader PA-C, 20 mg at 11/27/22 8214  •  melatonin tablet 4 5 mg, 4 5 mg, Oral, HS PRN, Angela Staff, CRNP  •  QUEtiapine (SEROquel) tablet 100 mg, 100 mg, Oral, HS, Angela Staff, CRNP, 100 mg at 11/26/22 2134  •  thiamine tablet 100 mg, 100 mg, Oral, Daily, EMMA Mendiola, 100 mg at 11/26/22 0825  •  torsemide (DEMADEX) tablet 40 mg, 40 mg, Oral, BID, Ivone Quinones PA-C  •  torsemide (DEMADEX) tablet 60 mg, 60 mg, Oral, Once, Ivone Quinones PA-C    Labs & Results:      Results from last 7 days   Lab Units 11/25/22 0433 11/24/22 0453 11/23/22  1121   WBC Thousand/uL 6 05 6 20 7 99   HEMOGLOBIN g/dL 8 9* 8 3* 9 0*   HEMATOCRIT % 27 9* 25 7* 28 1*   PLATELETS Thousands/uL 211 181 184         Results from last 7 days   Lab Units 11/27/22  0649 11/26/22 0444 11/25/22  0433 11/24/22  0453 11/23/22  1121   POTASSIUM mmol/L 4 2 4 1 3 9   < > 4 6   CHLORIDE mmol/L 109* 108 108   < > 108   CO2 mmol/L 24 26 24   < > 19*   BUN mg/dL 65* 70* 79*   < > 72*   CREATININE mg/dL 2 84* 3 08* 3 72*   < > 3 51*   CALCIUM mg/dL 9 1 8 8 8 8   < > 8 9   ALK PHOS U/L  --   --   --   --  133*   ALT U/L  --   --   --   --  35   AST U/L  --   --   --   --  18    < > = values in this interval not displayed           Albino Garcia PA-C

## 2022-11-27 NOTE — PROGRESS NOTES
NEPHROLOGY PROGRESS NOTE   Mikey Jones 74 y o  male MRN: [id number]  Unit/Bed#: CW2 216-02 Encounter: [id number]      ASSESSMENT & PLAN:  1  Acute kidney injury on top of CKD stage 4 with baseline creatinine around 2 1-2 7  Patient follows with Dr Shakira Prieto from [facility]  Acute kidney injury suspected secondary to volume overload, cardiorenal syndrome, congestive heart failure exacerbation, patient reported not taking diuretics previously due to confusion  Admitted with a creatinine of 3 5, kidney function continues to improve with creatinine down to 2 84 that is close to his baseline  Continue with diuretics to achieve euvolemia as per Cardiology, plan to switch to p o  Diuretics today  Avoid relative hypotension, monitor ins and outs, follow daily labs    2  Chronic kidney disease stage 4 baseline 2 1-2 7, follows with Dr Shakira Prieto from [facility]  Reported prior episode of acute kidney injury requiring temporary dialysis while in [facility]  He will need to continue close follow-up with his outpatient nephrologist after discharge    3  Acute on chronic HFrEF with an EF of 30-35%, cardiomyopathy mixed ischemic and nonischemic, coronary artery disease, cardiology on board  Plan to change to oral diuretics today  Monitor ins and outs and follow daily weight    4  Anemia, suspected component of chronic kidney disease, monitor H&H, hemoglobin low but stable, transfuse for hemoglobin less than 7    5  Hypertension, blood pressure overall stable, avoid relative hypotension  6  Confusion, cognitive impairment, geriatrics on board        SUBJECTIVE:  Patient seen and examined, sleeping lying flat on room air, in no acute distress  Hard to wake up        OBJECTIVE:  Current Weight: Weight - Scale: 85 9 kg (189 lb 6 4 oz)  Vitals:    11/27/22 0622   BP: (!) 132/109   Pulse: 71   Resp:    Temp:    SpO2: 99%       Intake/Output Summary (Last 24 hours) at 11/27/2022 0834  Last data filed at 11/27/2022 3050  Gross per 24 hour   Intake 1360 ml   Output 600 ml   Net 760 ml       General:  Sleeping, in not acute distress  Eyes: conjunctivae pink, anicteric sclerae  ENT: lips and mucous membranes moist, on room  Neck: supple, no JVD  Chest: clear breath sounds bilateral, no crackles, ronchus or wheezings  CVS: distinct S1 & S2, normal rate, regular rhythm  Abdomen: soft, non-tender, non-distended, normoactive bowel sounds  Extremities: no edema of both legs  Skin: no rash  Neuro:  Sleeping          Medications:    Current Facility-Administered Medications:   •  acetaminophen (TYLENOL) tablet 650 mg, 650 mg, Oral, Q6H PRN, Stefany Shirley PA-C  •  aspirin (ECOTRIN LOW STRENGTH) EC tablet 81 mg, 81 mg, Oral, Daily, Doneleona EMMA Carrera, 81 mg at 11/26/22 0825  •  atorvastatin (LIPITOR) tablet 40 mg, 40 mg, Oral, HS, DoneEMMA Kim, 40 mg at 11/26/22 2134  •  carvedilol (COREG) tablet 12 5 mg, 12 5 mg, Oral, Q12H [address], Roslyn Chiu PA-C, 12 5 mg at 11/26/22 2134  •  FLUoxetine (PROzac) capsule 40 mg, 40 mg, Oral, Daily, Donelda Debi CRNP, 40 mg at 11/26/22 0825  •  gabapentin (NEURONTIN) capsule 300 mg, 300 mg, Oral, Daily, Doneleona EMMA Carrera, 300 mg at 11/26/22 0825  •  heparin (porcine) subcutaneous injection 5,000 Units, 5,000 Units, Subcutaneous, Q8H [address], 5,000 Units at 11/27/22 7477 **AND** [CANCELED] Platelet count, , , Once, EMMA Perez  •  hydrALAZINE (APRESOLINE) tablet 37 5 mg, 37 5 mg, Oral, Q8H, Francine Schrader PA-C  •  insulin glargine (LANTUS) subcutaneous injection 10 Units 0 1 mL, 10 Units, Subcutaneous, HS, EMMA Perez, 10 Units at 11/26/22 2135  •  insulin lispro (HumaLOG) 100 units/mL subcutaneous injection 1-6 Units, 1-6 Units, Subcutaneous, HS, EMMA Pierre, 1 Units at 11/25/22 2134  •  insulin lispro (HumaLOG) 100 units/mL subcutaneous injection 2-12 Units, 2-12 Units, Subcutaneous, TID AC, 4 Units at 11/26/22 1722 **AND** Fingerstick Glucose (POCT), , , TID AC, Roddy Antes, CRNP  •  insulin lispro (HumaLOG) 100 units/mL subcutaneous injection 5 Units, 5 Units, Subcutaneous, TID With Meals, Chance Hawkins PA-C, 5 Units at 11/26/22 1722  •  isosorbide dinitrate (ISORDIL) tablet 20 mg, 20 mg, Oral, Q8H, Francine Schrader PA-C, 20 mg at 11/27/22 0803  •  melatonin tablet 4 5 mg, 4 5 mg, Oral, HS PRN, Roddy Antes, CRNP  •  QUEtiapine (SEROquel) tablet 100 mg, 100 mg, Oral, HS, Roddy Antes, CRNP, 100 mg at 11/26/22 2134  •  thiamine tablet 100 mg, 100 mg, Oral, Daily, Roddy Antes, CRNP, 100 mg at 11/26/22 0825  •  torsemide (DEMADEX) tablet 40 mg, 40 mg, Oral, BID, Carrie De La Cruz PA-C, 40 mg at 11/26/22 1720    Invasive Devices:        Lab Results:   Results from last 7 days   Lab Units 11/27/22  0649 11/26/22  0444 11/25/22  0433 11/24/22  0453 11/23/22  1121   WBC Thousand/uL  --   --  6 05 6 20 7 99   HEMOGLOBIN g/dL  --   --  8 9* 8 3* 9 0*   HEMATOCRIT %  --   --  27 9* 25 7* 28 1*   PLATELETS Thousands/uL  --   --  211 181 184   SODIUM mmol/L 138 140 138 137 135   POTASSIUM mmol/L 4 2 4 1 3 9 3 8 4 6   CHLORIDE mmol/L 109* 108 108 109* 108   CO2 mmol/L 24 26 24 21 19*   BUN mg/dL 65* 70* 79* 75* 72*   CREATININE mg/dL 2 84* 3 08* 3 72* 3 46* 3 51*   CALCIUM mg/dL 9 1 8 8 8 8 8 7 8 9   ALK PHOS U/L  --   --   --   --  133*   ALT U/L  --   --   --   --  35   AST U/L  --   --   --   --  18       Previous work up:  See previous notes      Portions of the record may have been created with voice recognition software  Occasional wrong word or "sound a like" substitutions may have occurred due to the inherent limitations of voice recognition software  Read the chart carefully and recognize, using context, where substitutions have occurred  If you have any questions, please contact the dictating provider

## 2022-11-28 LAB
ANION GAP SERPL CALCULATED.3IONS-SCNC: 6 MMOL/L (ref 4–13)
BUN SERPL-MCNC: 68 MG/DL (ref 5–25)
CALCIUM SERPL-MCNC: 9.1 MG/DL (ref 8.3–10.1)
CHLORIDE SERPL-SCNC: 109 MMOL/L (ref 96–108)
CO2 SERPL-SCNC: 24 MMOL/L (ref 21–32)
CREAT SERPL-MCNC: 2.85 MG/DL (ref 0.6–1.3)
GFR SERPL CREATININE-BSD FRML MDRD: 22 ML/MIN/1.73SQ M
GLUCOSE SERPL-MCNC: 105 MG/DL (ref 65–140)
GLUCOSE SERPL-MCNC: 125 MG/DL (ref 65–140)
GLUCOSE SERPL-MCNC: 135 MG/DL (ref 65–140)
GLUCOSE SERPL-MCNC: 261 MG/DL (ref 65–140)
GLUCOSE SERPL-MCNC: 98 MG/DL (ref 65–140)
POTASSIUM SERPL-SCNC: 4 MMOL/L (ref 3.5–5.3)
SODIUM SERPL-SCNC: 139 MMOL/L (ref 135–147)

## 2022-11-28 RX ADMIN — Medication 4.5 MG: at 22:35

## 2022-11-28 RX ADMIN — HEPARIN SODIUM 5000 UNITS: 5000 INJECTION INTRAVENOUS; SUBCUTANEOUS at 22:37

## 2022-11-28 RX ADMIN — HYDRALAZINE HYDROCHLORIDE 37.5 MG: 25 TABLET, FILM COATED ORAL at 15:38

## 2022-11-28 RX ADMIN — HYDRALAZINE HYDROCHLORIDE 37.5 MG: 25 TABLET, FILM COATED ORAL at 06:48

## 2022-11-28 RX ADMIN — ATORVASTATIN CALCIUM 40 MG: 40 TABLET, FILM COATED ORAL at 22:37

## 2022-11-28 RX ADMIN — TORSEMIDE 40 MG: 20 TABLET ORAL at 09:01

## 2022-11-28 RX ADMIN — ASPIRIN 81 MG: 81 TABLET, COATED ORAL at 09:01

## 2022-11-28 RX ADMIN — INSULIN LISPRO 5 UNITS: 100 INJECTION, SOLUTION INTRAVENOUS; SUBCUTANEOUS at 12:02

## 2022-11-28 RX ADMIN — ISOSORBIDE DINITRATE 20 MG: 20 TABLET ORAL at 06:50

## 2022-11-28 RX ADMIN — INSULIN LISPRO 6 UNITS: 100 INJECTION, SOLUTION INTRAVENOUS; SUBCUTANEOUS at 17:16

## 2022-11-28 RX ADMIN — INSULIN LISPRO 5 UNITS: 100 INJECTION, SOLUTION INTRAVENOUS; SUBCUTANEOUS at 17:16

## 2022-11-28 RX ADMIN — CARVEDILOL 12.5 MG: 12.5 TABLET, FILM COATED ORAL at 22:36

## 2022-11-28 RX ADMIN — HYDRALAZINE HYDROCHLORIDE 37.5 MG: 25 TABLET, FILM COATED ORAL at 22:35

## 2022-11-28 RX ADMIN — CARVEDILOL 12.5 MG: 12.5 TABLET, FILM COATED ORAL at 09:01

## 2022-11-28 RX ADMIN — THIAMINE HCL TAB 100 MG 100 MG: 100 TAB at 09:01

## 2022-11-28 RX ADMIN — TORSEMIDE 40 MG: 20 TABLET ORAL at 17:16

## 2022-11-28 RX ADMIN — INSULIN LISPRO 5 UNITS: 100 INJECTION, SOLUTION INTRAVENOUS; SUBCUTANEOUS at 09:03

## 2022-11-28 RX ADMIN — ISOSORBIDE DINITRATE 20 MG: 20 TABLET ORAL at 15:39

## 2022-11-28 RX ADMIN — QUETIAPINE FUMARATE 100 MG: 100 TABLET ORAL at 22:37

## 2022-11-28 RX ADMIN — HEPARIN SODIUM 5000 UNITS: 5000 INJECTION INTRAVENOUS; SUBCUTANEOUS at 06:48

## 2022-11-28 RX ADMIN — INSULIN GLARGINE 10 UNITS: 100 INJECTION, SOLUTION SUBCUTANEOUS at 22:37

## 2022-11-28 RX ADMIN — ISOSORBIDE DINITRATE 20 MG: 20 TABLET ORAL at 22:41

## 2022-11-28 RX ADMIN — FLUOXETINE HYDROCHLORIDE 40 MG: 20 CAPSULE ORAL at 09:01

## 2022-11-28 RX ADMIN — GABAPENTIN 300 MG: 300 CAPSULE ORAL at 09:01

## 2022-11-28 NOTE — ASSESSMENT & PLAN NOTE
Known multivessel severe coronary artery disease  Patient follows with [facility], felt to not be a CABG candidate as per record review, was to be optimized prior to planned PCI to circumflex and LAD    · Troponin elevation on admission due to acute heart failure and hypertension   · Most recent echocardiogram 11/8/2022 at [facility] with an EF of 30-35%  · Has a life vest but is noncompliant with this  · Continue outpatient follow-up with Cardiology  · Continue Coreg, isosorbide, hydralazine, aspirin and statin

## 2022-11-28 NOTE — ASSESSMENT & PLAN NOTE
Lab Results   Component Value Date    EGFR 22 11/28/2022    EGFR 22 11/27/2022    EGFR 20 11/26/2022    CREATININE 2 85 (H) 11/28/2022    CREATININE 2 84 (H) 11/27/2022    CREATININE 3 08 (H) 11/26/2022     · Baseline creatinine approximately 2 1-2 7  Follows with [facility]    · LAZARO POA with creatinine of 3 51 felt to be secondary to volume overload, cardiorenal syndrome  · Nephrology following, appreciate inputs   · Continue to monitor with diuresis, see plan above   · Creatinine stable today

## 2022-11-28 NOTE — PROGRESS NOTES
Advanced Heart Failure/Pulmonary Hypertension - Attending Attestation - German Clark 74 y o  male MRN: [id number]      Please see complete HF note documented by the fellow/AP; this is attending attestation  Assessment:  74 y o  male Hx per chart p/w ADHF  Acute on chronic HFrEF; LVEF 20-25%; NYHA III; ACC/AHA Stage C  Etiology may be mixed; now with severe 2VD  Recurrent admits for HF  Rx compliance issues  Coronary artery disease, multivessel              C 10/10/2022 (at [facility], per report): "showed proximal and mid LAD stenosis at multiple levels  Mid to distal left circumflex also has severe disease " LVEDP 10 mmHg  Deemed to be poor candidate for CABG at [facility] during 10/2022 admission  May be a candidate for staged PCI at [facility] per documentation  Continue on aspirin, statin, and BB  Chronic kidney disease, stage IV              Baseline creatinine of 2 1-2 7  Did briefly require HD during 10/2022 admission at [facility]  Hypertension  Diabetes mellitus, type II  History of hepatitis C  History of bipolar disorder  History of substance abuse  Noncompliance  cognitive impairment      Reviewed all pertinent labs/data including:  HR 70s  SBP 120s  Weight 92>>84  I/Os:  net neg 1 7L  Cr 3 range>>2 8 (Baseline creatinine of 2 1-2 7)      Plan:  Warm, euvolemic on exam, appears comfortable    gdmt below    cw plan for ischemic eval and Tx as already arranged at [facility]  Per report - this is pt wishes    DC planning    Asa  Statin    Return to wearing lifevest on DC    Pharmacotherapies / Neurohormonal Blockade:  --Beta Blocker: carvedilol 12 5 mg q12 hours  --ARNi / ACEi / ARB: No, CKD precludes  --Aldosterone Antagonist: No, CKD precludes  --SGLT2 Inhibitor: No 2/2 GFR (on dapagliflozin 10 mg daily as outpatient)  --SVR Reducing Agents: hydralazine 37 5 mg q8 hours>can increase to 50 tid if BP remains up; isosorbide dintirate 20 mg q8 hours       --Home Diuretic: torsemide 10 mg QOD  --Inpatient Diuretic: PO torsemide 40 mg BID     Sudden Cardiac Death Risk Reduction:  --LVEF 20-25%  Wearing LifeVest as outpatient       Electrical Resynchronization:  --Candidacy for BiV device: narrow QRS     Advanced Therapies (if appropriate): Will continue to monitor  Studies:    TTE 04/24/2022 (at [facility], per report): LVEF 30%  Global hypokinesis with distal LAD regional wall motion abnormality  Grade 2 DD with elevated left atrial filling pressures  TTE 07/06/2022 (at [facility], per report): LVEF 40-50%  TTE 10/03/2022 (at [facility], per report): LVEF 20-25%, LV mildly dilated  Global hypokinesis  Grade 2 DD  Normal RV size, systolic function mildly reduced  Trace TR and MR  LHC 10/10/2022 (at [facility], per report): "showed proximal and mid LAD stenosis at multiple levels  Mid to distal left circumflex also has severe disease " LVEDP 10 mmHg  [address] 10/10/2022 (at [facility], per report): RA 1  RV 25/5  PA 30/10/17  PCWP 12  PVR 1 32 BARBOSA  CO 3 8  CI 2 07  Thank you for the opportunity to participate in the care of this patient      Cornell Edge MD  Attending Physician  Advanced Heart Failure and Transplant Cardiology  [employer]

## 2022-11-28 NOTE — ASSESSMENT & PLAN NOTE
· Baseline hemoglobin appears to be 9-10  · No signs or symptoms of bleeding at this time  · Recommend outpatient follow-up

## 2022-11-28 NOTE — ASSESSMENT & PLAN NOTE
· Patient at times will not converse however with prompting was noted to sit up, speak to me and eat lunch  Nursing staff report that this has been present during prior admissions as well  · When seen by occupational therapy, patient did poorly on cognitive evaluation   [address] [place] [address] 6/30 (11/24/22)  · Appreciate Geriatrics consultation  · Patient likely with underlying dementia, likely does not have decision making capacity   · OT recommending rehab vs home with 24/7 supervision upon discharge  · Given frequent readmissions both here and at [address] [place] [address] [address] secondary to noncompliance, discussed with family (brother Carolann Coffman and sister Inderjit Weldon) regarding placement on 11/27  · Family agreeable to neuropsychiatry evaluation with hopeful discharge to rehab / long term care, consult placed

## 2022-11-28 NOTE — PROGRESS NOTES
[facility]  Progress Note - Lobito Beatty 1955, 74 y o  male MRN: [id number]  Unit/Bed#: CW2 216-02 Encounter: [id number]  Primary Care Provider: Claudia Johnson DO   Date and time admitted to hospital: 11/23/2022 10:54 AM    Addendum: CM discussed recommendation for rehab with patient and he is agreeable at this time; therefore, will discontinue Neuropsych evaluation  * Acute on chronic systolic congestive heart failure (HCC)  Assessment & Plan  Wt Readings from Last 3 Encounters:   11/28/22 84 kg (185 lb 3 2 oz)   10/26/22 90 4 kg (199 lb 4 8 oz)   10/16/22 84 1 kg (185 lb 6 4 oz)     Has a history of noncompliance with his medications and was recently seen in [facility] 11/13-11/15 for CHF exacerbation as well and at [facility] 10/26  On discharge from [facility] was instructed to take torsemide 40 mg daily  Prior to this had been increased to 20 mg b i d  on 11/9 and prior to this was on 10 mg QOD  · Patient not taking medications as prescribed as an outpatient  · Most recent echocardiogram with LVEF 30%   · Patient has life vest but is non compliant; monitor on telemetry while inpatient   · Cardiology and Nephrology following  · Status post IV diuresis, transitioned to increased dose torsemide 40 mg PO BID on 11/27   · Isordil increased to 20 mg q8h  · Continued on home dose Coreg, hydralazine  · Monitor daily weights, intake and output   · Close outpatient follow-up     LAZARO superimposed on CKD (chronic kidney disease) stage 4, GFR 15-29 ml/min [facility])  Assessment & Plan  Lab Results   Component Value Date    EGFR 22 11/28/2022    EGFR 22 11/27/2022    EGFR 20 11/26/2022    CREATININE 2 85 (H) 11/28/2022    CREATININE 2 84 (H) 11/27/2022    CREATININE 3 08 (H) 11/26/2022     · Baseline creatinine approximately 2 1-2 7  Follows with [facility]    · LAZARO POA with creatinine of 3 51 felt to be secondary to volume overload, cardiorenal syndrome  · Nephrology following, appreciate inputs   · Continue to monitor with diuresis, see plan above   · Creatinine stable today     CAD (coronary artery disease)  Assessment & Plan  Known multivessel severe coronary artery disease  Patient follows with [facility], felt to not be a CABG candidate as per record review, was to be optimized prior to planned PCI to circumflex and LAD  · Troponin elevation on admission due to acute heart failure and hypertension   · Most recent echocardiogram 11/8/2022 at [facility] with an EF of 30-35%  · Has a life vest but is noncompliant with this  · Continue outpatient follow-up with Cardiology  · Continue Coreg, isosorbide, hydralazine, aspirin and statin    Cognitive impairment  Assessment & Plan  · Patient at times will not converse however with prompting was noted to sit up, speak to me and eat lunch  Nursing staff report that this has been present during prior admissions as well  · When seen by occupational therapy, patient did poorly on cognitive evaluation   [address] [place] [address] 6/30 (11/24/22)  · Appreciate Geriatrics consultation  · Patient likely with underlying dementia, likely does not have decision making capacity   · OT recommending rehab vs home with 24/7 supervision upon discharge  · Given frequent readmissions both here and at [facility] secondary to noncompliance, discussed with family (brother Flory Basurto and sister Catrina Blizzard) regarding placement on 11/27  · Family agreeable to neuropsychiatry evaluation with hopeful discharge to rehab / long term care, consult placed     Normocytic anemia  Assessment & Plan  · Baseline hemoglobin appears to be 9-10  · No signs or symptoms of bleeding at this time  · Recommend outpatient follow-up    Essential hypertension  Assessment & Plan  · BP acceptable, monitor routinely   · Continue Coreg, hydralazine and isosorbide  · Diuresis as above     Type 2 diabetes mellitus, with long-term current use of insulin [facility])  Assessment & Plan  Lab Results   Component Value Date    HGBA1C 7 7 (H) 10/03/2022     Recent Labs     22  1114 22  1543 22  2115 22  0549   POCGLU 176* 130 132 98       Blood Sugar Average: Last 72 hrs:  · (P) 160 [id number]   · Outpatient on Farxiga and Novolog  · Follows with endocrinology outpatient  · Home regimen of novolog is 30 with breakfast and 10 with dinner but was having glucoses in the 30s with this regimen (giving and not eating)  · Continue current regimen Lantus 10 units QHS and humalog 5 units t i d  With meals  · Diabetic diet    Bipolar affective disorder, mixed, moderate (HCC)  Assessment & Plan  · Continue Seroquel and Prozac      VTE Pharmacologic Prophylaxis: VTE Score: 4 Moderate Risk (Score 3-4) - Pharmacological DVT Prophylaxis Ordered: heparin  Patient Centered Rounds: I performed bedside rounds with nursing staff today  Discussions with Specialists or Other Care Team Provider: primary RN, case management    Education and Discussions with Family / Patient: will call brother Birdena Boast with update later today  Time Spent for Care: 20 minutes  More than 50% of total time spent on counseling and coordination of care as described above  Current Length of Stay: 5 day(s)  Current Patient Status: Inpatient   Certification Statement: The patient will continue to require additional inpatient hospital stay due to pending final cardiology clearance, awaiting neuropsych eval for dispo planning   Discharge Plan: Anticipate discharge in 24-48 hrs to pending outcome of capacity evalatedilbertoon, possible d/c to rehab    Code Status: Level 1 - Full Code    Subjective:   Patient sleeping comfortably upon entering the room, easily arouses to voice  Offers no complaints this morning        Objective:     Vitals:   Temp (24hrs), Av 5 °F (36 9 °C), Min:98 1 °F (36 7 °C), Max:99 °F (37 2 °C)    Temp:  [98 1 °F (36 7 °C)-99 °F (37 2 °C)] 98 1 °F (36 7 °C)  HR:  [69-75] 70  Resp:  [18] 18  BP: (112-132)/(60-78) 129/78  SpO2:  [96 %-99 %] 99 %  Body mass index is 31 79 kg/m²  Input and Output Summary (last 24 hours): Intake/Output Summary (Last 24 hours) at 11/28/2022 0945  Last data filed at 11/28/2022 0900  Gross per 24 hour   Intake 400 ml   Output 1925 ml   Net -1525 ml       Physical Exam:   Physical Exam  Vitals reviewed  Constitutional:       General: He is not in acute distress  Cardiovascular:      Rate and Rhythm: Normal rate and regular rhythm  Pulmonary:      Effort: Pulmonary effort is normal  No respiratory distress  Neurological:      General: No focal deficit present  Mental Status: He is alert and oriented to person, place, and time  Mental status is at baseline  Psychiatric:         Cognition and Memory: Cognition is impaired  Additional Data:     Labs:  Results from last 7 days   Lab Units 11/25/22  0433 11/24/22  0453   WBC Thousand/uL 6 05 6 20   HEMOGLOBIN g/dL 8 9* 8 3*   HEMATOCRIT % 27 9* 25 7*   PLATELETS Thousands/uL 211 181   NEUTROS PCT %  --  56   LYMPHS PCT %  --  27   MONOS PCT %  --  11   EOS PCT %  --  4     Results from last 7 days   Lab Units 11/28/22  0550 11/24/22  0453 11/23/22  1121   SODIUM mmol/L 139   < > 135   POTASSIUM mmol/L 4 0   < > 4 6   CHLORIDE mmol/L 109*   < > 108   CO2 mmol/L 24   < > 19*   BUN mg/dL 68*   < > 72*   CREATININE mg/dL 2 85*   < > 3 51*   ANION GAP mmol/L 6   < > 8   CALCIUM mg/dL 9 1   < > 8 9   ALBUMIN g/dL  --   --  2 8*   TOTAL BILIRUBIN mg/dL  --   --  0 50   ALK PHOS U/L  --   --  133*   ALT U/L  --   --  35   AST U/L  --   --  18   GLUCOSE RANDOM mg/dL 105   < > 362*    < > = values in this interval not displayed           Results from last 7 days   Lab Units 11/28/22  0549 11/27/22  2115 11/27/22  1543 11/27/22  1114 11/27/22  0630 11/26/22  2114 11/26/22  1610 11/26/22  1106 11/26/22  0529 11/25/22  2109 11/25/22  1637 11/25/22  1106   POC GLUCOSE mg/dl 98 132 130 176* 140 127 213* 155* 235* 187* 147* 141* Lines/Drains:  Invasive Devices     Peripheral Intravenous Line  Duration           Peripheral IV 11/27/22 Dorsal (posterior); Left Hand <1 day                  Telemetry:  Telemetry Orders (From admission, onward)             LifeVest Patient: Continuous Telemetry Monitoring during hospitalization (non-expiring)  Continuous LifeVest Telemetry Monitoring        References:    LifeVest Policy                 Telemetry Reviewed: Normal Sinus Rhythm  Indication for Continued Telemetry Use: Lifevest (remains on tele entire hospital stay)             Imaging: No pertinent imaging reviewed      Recent Cultures (last 7 days):         Last 24 Hours Medication List:   Current Facility-Administered Medications   Medication Dose Route Frequency Provider Last Rate   • acetaminophen  650 mg Oral Q6H PRN Stefany Shirley PA-C     • aspirin  81 mg Oral Daily EMMA aHrrell     • atorvastatin  40 mg Oral HS EMMA Harrell     • carvedilol  12 5 mg Oral Q12H [address] Drew Green PA-C     • FLUoxetine  40 mg Oral Daily EMMA Harrell     • gabapentin  300 mg Oral Daily EMMA Harrell     • heparin (porcine)  5,000 Units Subcutaneous [facility] EMMA Harrell     • hydrALAZINE  37 5 mg Oral Q8H Drew Green PA-C     • insulin glargine  10 Units Subcutaneous HS EMMA Harrell     • insulin lispro  1-6 Units Subcutaneous HS Tayla Ivan, EMMA     • insulin lispro  2-12 Units Subcutaneous TID AC EMMA Harrell     • insulin lispro  5 Units Subcutaneous TID With Meals Stefany Shirley PA-C     • isosorbide dinitrate  20 mg Oral Q8H Drew Green PA-C     • melatonin  4 5 mg Oral HS PRN EMMA Harrell     • QUEtiapine  100 mg Oral HS EMMA Harrell     • thiamine  100 mg Oral Daily EMMA Harrell     • torsemide  40 mg Oral BID Drew Green PA-C          Today, Patient Was Seen By: Rey Pearson PA-C    **Please Note: This note may have been constructed using a voice recognition system  **

## 2022-11-28 NOTE — ASSESSMENT & PLAN NOTE
Lab Results   Component Value Date    HGBA1C 7 7 (H) 10/03/2022     Recent Labs     11/27/22  1114 11/27/22  1543 11/27/22  2115 11/28/22  0549   POCGLU 176* 130 132 98       Blood Sugar Average: Last 72 hrs:  · (P) 160 [id number]   · Outpatient on Farxiga and Novolog  · Follows with endocrinology outpatient  · Home regimen of novolog is 30 with breakfast and 10 with dinner but was having glucoses in the 30s with this regimen (giving and not eating)  · Continue current regimen Lantus 10 units QHS and humalog 5 units t i d   With meals  · Diabetic diet

## 2022-11-28 NOTE — PROGRESS NOTES
Advanced Heart Failure / Pulmonary Hypertension Service Progress Note    Haim De Jesus 74 y o  male   MRN: [id number]  Unit/Bed#: CW2 216-02; Encounter: [id number]    Assessment:  Principal Problem:    Acute on chronic systolic congestive heart failure ([facility] [address] )  Active Problems:    LAZARO superimposed on CKD (chronic kidney disease) stage 4, GFR 15-29 ml/min (HCC)    Bipolar affective disorder, mixed, moderate (HCC)    Essential hypertension    Normocytic anemia    Type 2 diabetes mellitus, with long-term current use of insulin ([facility])    CAD (coronary artery disease)    Cognitive impairment      Subjective:   Haim De Jesus is a 72-year-old male who presented to [facility] on 11/23/22 with dyspnea, midsternal chest pain, and abdominal distension x 2 days, after not taking his diuretics for two days  PMH includes HTN, DM2, CKD, HFrEF with LVEF 20-25%, bipolar disorder, remote history polysubstance abuse, hepatitis C  He was diagnosed with a newly discovered cardiomyopathy in 4/2022, at which time myocardial perfusion scan was negative for ischemia  He was admitted in July 2022 and aggressively diuresed and started on GDMT  Cath was deferred at that time secondary to his kidney function       He was subsequently admitted to the hospital on 10/2/2022 with acute systolic CHF in the setting of uncontrolled hypertension  He also had elevated troponin  His presenting creatinine was 2 17 mg/dL however it peaked at 5 54 mg/dL on 10/6/2022  During this time patient was being diuresed with IV diuretics  Patient eventually underwent right heart cath on 10/4/2022 which showed elevated wedge pressure  Patient was dialyzed prior to proceeding with left heart cath/coronary angiogram on 10/10/2022 which showed multivessel coronary artery disease  CT surgery was consulted for coronary bypass evaluation but didn't find him a good candidate for CABG   Planned for phone visit with Dr Glenn Padron with [facility] to discuss staged PCI for LAD and possibly circumflex      Presenting to B with confusion, weakness, dyspnea, chest pain and reported noncompliance with home diuretics x2 days  Has had multiple admissions for acute CHF exacerbations and LAZARO, last at [place] [id number] from 11/13-11/15 for HF  Patient seen and examined  No significant events overnight  Reports feeling significantly improved since admission, closer to his baseline  Denies any complaints at this time  Pending neuropsych eval and potential placement per primary team     Objective: Intake/ Output: 220/1925 (-1705)  Weight: 203>193>>185 lb   Telemetry: no acute events     Today's Plan:  • Fluid intake liberalized to better correspond with outpatient habits  • Dry weight estimated to be approximately 180lbs per outpatient notes   • Transitioned to PO torsemide over the weekend, would continue this regimen  • Keep K>4, Mg>2  • Trend Cr, today 2 85 (baseline 2-2 7)  • Avoid ACE/ARB/ARNi as well as MRA in the setting of LAZARO on CKD      Plan:  Acute on Chronic HFrEF; LVEF 20-25%; NYHA II/III; ACC/AHA Stage C  Etiology: initially thought to be nonischemic, but recent left heart catheterization with evidence of multivessel disease (high-grade obstructive disease in the LAD with disease involving the right coronary branches and the distal circumflex as per CT surgery note)     TTE 04/24/2022: LVEF 30%   Global hypokinesis with distal LAD regional wall motion abnormality   Grade 2 diastolic dysfunction with elevated left atrial filling pressures  TTE 07/06/2022:  LVEF 40-50%  TTE 10/3/22: LVEF 20-25%, LV mildly dilated  Global hypokinesis  Grade II DD  RV cavity size normal, systolic function mildly reduced  Trace TR, MR      Stress test: Myocardial perfusion scan performed on 4/26/2022:  1  No definite evidence of ischemia  Apical and inferior wall infarct as described  2  Global hypokinesis with severely decreased left ventricular ejection fraction at 29%      Coronary angiogram: Performed on 10/10/2022:  Severe multivessel CAD as described above   Normal cardiac filling pressures   PAP:30/10/17 mm Hg; PCWP: 12 mm Hg  Low normal CO/CI (3 80/2 07)  LVEDP of 10 mm Hg     Neurohormonal Blockade:  --Beta Blocker:  Coreg 12 5 mg BID  --SVR reduction: isordil 20/hydralazine 37 5 mg TID  --ARNi / ACEi / ARB: no, CKD precludes  --Aldosterone Antagonist: no, CKD precludes  --SGLT2 Inhibitor: Farxiga 5mg QD as outpatient, held here  --Home Diuretic: torsemide 10mg every other day  --Inpatient Diuretic: torsemide 40 BID     Sudden Cardiac Death Risk Reduction:  --LVEF 20-25%, has LifeVest from [facility]     Electrical Resynchronization:  --Candidacy for BiV device:      Advanced Therapies (if appropriate): Will continue to monitor  CAD  [de-identified] of care at [facility]  Was turned down for CABG by CT surgery at [facility], but is planned for PCI of LAD with [facility] in near future   Troponin elevated on arrival, but stable x3    No reported chest pain - likely myocardial injury in setting of severe HF and HTN  Continue aspirin, statin  Optimize heart failure management and blood pressure  Would repeat troponin if chest pain develops  CKD stage 4, GFR 15-29 ml/min   Required acute HD transiently at [facility] while admitted secondary to anuria, but LAZARO resolved and was discharged without the need for long term dialysis  Cr 2 85 today, baseline of 2 0-2 7  Appreciate nephrology input   HTN  Continue antihypertensives as above  Bipolar affective disorder, mixed, moderate   Type 2 diabetes mellitus, without long-term current use of insulin   Management per primary team  Hold SGLT2i at this time  Generalized weakness/confusion  Likely multifactorial given recent hospitalizations and worsening cardiomyopathy   Very confused on initial exam, seems improved today  Prior polysubstance abuse   Reports remote history of heavy meth abuse  Reports last ETOH use 4 days prior to admission          XR chest 1 view portable [[id number]] Collected: 11/23/22 1242   Order Status: Completed Updated: 11/23/22 1246   Narrative:     CHEST     INDICATION:   chest pain  COMPARISON:  Chest radiograph dated 10/25/2022  EXAM PERFORMED/VIEWS:  CT CHEST PORTABLE     FINDINGS:     Cardiomediastinal silhouette appears unremarkable  No focal airspace consolidation  Mild pulmonary vascular congestion  No pneumothorax or pleural effusion  Osseous structures appear within normal limits for patient age  Impression:       Mild pulmonary vascular congestion  Vitals:   Blood pressure 126/76, pulse 74, temperature 98 °F (36 7 °C), resp  rate 18, height 5' 4" (1 626 m), weight 84 kg (185 lb 3 2 oz), SpO2 96 %  Body mass index is 31 79 kg/m²  I/O last 3 completed shifts: In: 460 [P O :460]  Out: 2525 [Urine:2525]    Wt Readings from Last 10 Encounters:   11/28/22 84 kg (185 lb 3 2 oz)   10/26/22 90 4 kg (199 lb 4 8 oz)   10/16/22 84 1 kg (185 lb 6 4 oz)   09/22/22 84 kg (185 lb 3 2 oz)   09/20/22 84 5 kg (186 lb 3 2 oz)   09/14/22 (P) 84 4 kg (186 lb)   09/07/22 82 9 kg (182 lb 12 8 oz)   08/11/22 80 9 kg (178 lb 6 4 oz)   07/28/22 78 5 kg (173 lb)   07/26/22 80 kg (176 lb 5 9 oz)     Vitals:    11/28/22 0600 11/28/22 0622 11/28/22 1106 11/28/22 1519   BP:  129/78 128/77 126/76   BP Location:  Left arm     Pulse:  70 67 74   Resp:  18 18 18   Temp:  98 1 °F (36 7 °C) 98 1 °F (36 7 °C) 98 °F (36 7 °C)   TempSrc:  Oral     SpO2:  99% 96% 96%   Weight: 84 kg (185 lb 3 2 oz)      Height:         Physical Exam  Constitutional:       Appearance: Normal appearance  HENT:      Head: Normocephalic  Nose: Nose normal       Mouth/Throat:      Mouth: Mucous membranes are moist    Eyes:      Conjunctiva/sclera: Conjunctivae normal    Cardiovascular:      Rate and Rhythm: Normal rate and regular rhythm  Pulmonary:      Effort: Pulmonary effort is normal       Breath sounds: Normal breath sounds     Musculoskeletal:      Cervical back: Neck supple  Skin:     General: Skin is dry  Neurological:      General: No focal deficit present  Mental Status: He is alert and oriented to person, place, and time     Psychiatric:         Mood and Affect: Mood normal          Behavior: Behavior normal              Current Facility-Administered Medications:   •  acetaminophen (TYLENOL) tablet 650 mg, 650 mg, Oral, Q6H PRN, Stefany Shirley PA-C  •  aspirin (ECOTRIN LOW STRENGTH) EC tablet 81 mg, 81 mg, Oral, Daily, EMMA Call, 81 mg at 11/28/22 0901  •  atorvastatin (LIPITOR) tablet 40 mg, 40 mg, Oral, HS, EMMA Call, 40 mg at 11/27/22 2105  •  carvedilol (COREG) tablet 12 5 mg, 12 5 mg, Oral, Q12H [address], Garry Rodriguez PA-C, 12 5 mg at 11/28/22 0901  •  FLUoxetine (PROzac) capsule 40 mg, 40 mg, Oral, Daily, EMMA Call, 40 mg at 11/28/22 0901  •  gabapentin (NEURONTIN) capsule 300 mg, 300 mg, Oral, Daily, EMMA Call, 300 mg at 11/28/22 0901  •  heparin (porcine) subcutaneous injection 5,000 Units, 5,000 Units, Subcutaneous, Q8H [address], 5,000 Units at 11/28/22 0648 **AND** [CANCELED] Platelet count, , , Once, EMMA Call  •  hydrALAZINE (APRESOLINE) tablet 37 5 mg, 37 5 mg, Oral, Q8H, Francine Schrader PA-C, 37 5 mg at 11/28/22 5260  •  insulin glargine (LANTUS) subcutaneous injection 10 Units 0 1 mL, 10 Units, Subcutaneous, HS, EMMA Call, 10 Units at 11/27/22 2106  •  insulin lispro (HumaLOG) 100 units/mL subcutaneous injection 1-6 Units, 1-6 Units, Subcutaneous, HS, KYLEIGH FelicianoNP, 1 Units at 11/25/22 2134  •  insulin lispro (HumaLOG) 100 units/mL subcutaneous injection 2-12 Units, 2-12 Units, Subcutaneous, TID AC, 2 Units at 11/27/22 1133 **AND** Fingerstick Glucose (POCT), , , TID AC, EMMA Call  •  insulin lispro (HumaLOG) 100 units/mL subcutaneous injection 5 Units, 5 Units, Subcutaneous, TID With Meals, Lyndsay Sanders PA-C, 5 Units at 11/28/22 1202  •  isosorbide dinitrate (ISORDIL) tablet 20 mg, 20 mg, Oral, Q8H, Francine Schrader PA-C, 20 mg at 11/28/22 9227  •  melatonin tablet 4 5 mg, 4 5 mg, Oral, HS PRN, EMMA Wilkes  •  QUEtiapine (SEROquel) tablet 100 mg, 100 mg, Oral, HS, EMMA Wilkes, 100 mg at 11/27/22 2105  •  thiamine tablet 100 mg, 100 mg, Oral, Daily, EMMA Wilkes, 100 mg at 11/28/22 0901  •  torsemide (DEMADEX) tablet 40 mg, 40 mg, Oral, BID, Cole Hernandez PA-C, 40 mg at 11/28/22 0901    Labs & Results:      Results from last 7 days   Lab Units 11/25/22  0433 11/24/22  0453 11/23/22  1121   WBC Thousand/uL 6 05 6 20 7 99   HEMOGLOBIN g/dL 8 9* 8 3* 9 0*   HEMATOCRIT % 27 9* 25 7* 28 1*   PLATELETS Thousands/uL 211 181 184         Results from last 7 days   Lab Units 11/28/22  0550 11/27/22  0649 11/26/22  0444 11/24/22  0453 11/23/22  1121   POTASSIUM mmol/L 4 0 4 2 4 1   < > 4 6   CHLORIDE mmol/L 109* 109* 108   < > 108   CO2 mmol/L 24 24 26   < > 19*   BUN mg/dL 68* 65* 70*   < > 72*   CREATININE mg/dL 2 85* 2 84* 3 08*   < > 3 51*   CALCIUM mg/dL 9 1 9 1 8 8   < > 8 9   ALK PHOS U/L  --   --   --   --  133*   ALT U/L  --   --   --   --  35   AST U/L  --   --   --   --  18    < > = values in this interval not displayed  Thank you for the opportunity to participate in the care of this patient      Ira Carroll PA-C  Advanced Heart Failure  [facility]

## 2022-11-28 NOTE — PROGRESS NOTES
NEPHROLOGY PROGRESS NOTE   Marie Kidney 74 y o  male MRN: [id number]  Unit/Bed#: 2 216-02 Encounter: [id number]  Reason for Consult: LAZARO CKD    ASSESSMENT AND PLAN:  LAZARO on CKD stage 4, baseline creatinine 2 1 to 2 7, follows with [facility]  -LAZARO suspect secondary to cardiorenal, CHF exacerbation  -admission creatinine 3 5 now overall improving 2 8 and seems to have plateaued since yesterday   -now remains on torsemide 40 mg p o  B i d , continue same  -may have to accept higher creatinine to keep patient euvolemic   -has history of prior episode of LAZARO requiring temporary dialysis in [facility]   - this admission shows no significant hematuria, trace proteinuria  Acute on chronic systolic CHF, echo shows EF 30 to 35%, cardiology follow-up  -volume status improving, weight reducing  Now remains on p o  Torsemide as above   -continue to monitor daily weight, accurate intake and output    Anemia in CKD, hemoglobin slightly lower, continue to monitor closely, transfuse p r n  For hemoglobin less than seven  Hypertension, blood pressure overall acceptable  Currently remains on Coreg, hydralazine, Imdur for GDMT    Encephalopathy, cognitive impairment, management as per primary team     Discussed above plan in detail with primary team     SUBJECTIVE:  Patient seen and examined at bedside  Overall feels better, denies any worsening shortness of breath, chest pain, nausea vomiting      OBJECTIVE:  Current Weight: Weight - Scale: 84 kg (185 lb 3 2 oz)  Vitals:    11/28/22 1106   BP: 128/77   Pulse: 67   Resp: 18   Temp: 98 1 °F (36 7 °C)   SpO2: 96%       Intake/Output Summary (Last 24 hours) at 11/28/2022 1301  Last data filed at 11/28/2022 1250  Gross per 24 hour   Intake 760 ml   Output 2675 ml   Net -1915 ml     Wt Readings from Last 3 Encounters:   11/28/22 84 kg (185 lb 3 2 oz)   10/26/22 90 4 kg (199 lb 4 8 oz)   10/16/22 84 1 kg (185 lb 6 4 oz)     Temp Readings from Last 3 Encounters:   11/28/22 98 1 °F (36 7 °C)   10/26/22 98 1 °F (36 7 °C)   10/20/22 97 8 °F (36 6 °C) (Temporal)     BP Readings from Last 3 Encounters:   11/28/22 128/77   10/26/22 135/76   10/20/22 100/60     Pulse Readings from Last 3 Encounters:   11/28/22 67   10/26/22 71   10/20/22 80        Physical Examination:  General:  Lying in bed, no acute distress   Eyes:  Mild conjunctival pallor present  ENT:  External examination of ears and nose unremarkable  Neck:  No obvious lymphadenopathy appreciated  Respiratory:  Bilateral air entry present  CVS:  S1, S2 present  GI:  Soft, nondistended  CNS:  Active alert  Skin:  No new rash  Musculoskeletal:  No obvious new gross deformity noted    Medications:    Current Facility-Administered Medications:   •  acetaminophen (TYLENOL) tablet 650 mg, 650 mg, Oral, Q6H PRN, Stefany Shirley PA-C  •  aspirin (ECOTRIN LOW STRENGTH) EC tablet 81 mg, 81 mg, Oral, Daily, Devaughn Shown, CRNP, 81 mg at 11/28/22 0901  •  atorvastatin (LIPITOR) tablet 40 mg, 40 mg, Oral, HS, Devaughn Shown, CRNP, 40 mg at 11/27/22 2105  •  carvedilol (COREG) tablet 12 5 mg, 12 5 mg, Oral, Q12H [address], Francine Schrader PA-C, 12 5 mg at 11/28/22 0901  •  FLUoxetine (PROzac) capsule 40 mg, 40 mg, Oral, Daily, Devaughn Shown, CRNP, 40 mg at 11/28/22 0901  •  gabapentin (NEURONTIN) capsule 300 mg, 300 mg, Oral, Daily, Devaughn Shown, CRNP, 300 mg at 11/28/22 0901  •  heparin (porcine) subcutaneous injection 5,000 Units, 5,000 Units, Subcutaneous, Q8H [address], 5,000 Units at 11/28/22 0648 **AND** [CANCELED] Platelet count, , , Once, Devaughn Shown, CRNP  •  hydrALAZINE (APRESOLINE) tablet 37 5 mg, 37 5 mg, Oral, Q8H, Francine Schrader PA-C, 37 5 mg at 11/28/22 3899  •  insulin glargine (LANTUS) subcutaneous injection 10 Units 0 1 mL, 10 Units, Subcutaneous, SHAWN, EMMA Sidhu, 10 Units at 11/27/22 2106  •  insulin lispro (HumaLOG) 100 units/mL subcutaneous injection 1-6 Units, 1-6 Units, Subcutaneous, Kaylee ESCOBAR Sa, CRNP, 1 Units at 11/25/22 2134  •  insulin lispro (HumaLOG) 100 units/mL subcutaneous injection 2-12 Units, 2-12 Units, Subcutaneous, TID AC, 2 Units at 11/27/22 1133 **AND** Fingerstick Glucose (POCT), , , TID AC, EMMA Real  •  insulin lispro (HumaLOG) 100 units/mL subcutaneous injection 5 Units, 5 Units, Subcutaneous, TID With Meals, Dorinda Ramirez PA-C, 5 Units at 11/28/22 1202  •  isosorbide dinitrate (ISORDIL) tablet 20 mg, 20 mg, Oral, Q8H, Francine Schrader PA-C, 20 mg at 11/28/22 1378  •  melatonin tablet 4 5 mg, 4 5 mg, Oral, HS PRN, EMMA Real  •  QUEtiapine (SEROquel) tablet 100 mg, 100 mg, Oral, HS, EMMA Real, 100 mg at 11/27/22 2105  •  thiamine tablet 100 mg, 100 mg, Oral, Daily, EMMA Real, 100 mg at 11/28/22 0901  •  torsemide (DEMADEX) tablet 40 mg, 40 mg, Oral, BID, Kyle Sun PA-C, 40 mg at 11/28/22 0901    Laboratory Results:  Results from last 7 days   Lab Units 11/28/22  0550 11/27/22  0649 11/26/22  0444 11/25/22  0433 11/24/22  0453 11/23/22  1121   WBC Thousand/uL  --   --   --  6 05 6 20 7 99   HEMOGLOBIN g/dL  --   --   --  8 9* 8 3* 9 0*   HEMATOCRIT %  --   --   --  27 9* 25 7* 28 1*   PLATELETS Thousands/uL  --   --   --  211 181 184   SODIUM mmol/L 139 138 140 138 137 135   POTASSIUM mmol/L 4 0 4 2 4 1 3 9 3 8 4 6   CHLORIDE mmol/L 109* 109* 108 108 109* 108   CO2 mmol/L 24 24 26 24 21 19*   BUN mg/dL 68* 65* 70* 79* 75* 72*   CREATININE mg/dL 2 85* 2 84* 3 08* 3 72* 3 46* 3 51*   CALCIUM mg/dL 9 1 9 1 8 8 8 8 8 7 8 9       XR chest 1 view portable   ED Interpretation by Kasandra Neff MD (11/23 0744)   Mild pulmonary edema  Final Result by Amara Marrero MD (11/23 4588)      Mild pulmonary vascular congestion  Workstation performed: [id number]             Portions of the record may have been created with voice recognition software   Occasional wrong word or "sound a like" substitutions may have occurred due to the inherent limitations of voice recognition software  Read the chart carefully and recognize, using context, where substitutions have occurred

## 2022-11-28 NOTE — ASSESSMENT & PLAN NOTE
Wt Readings from Last 3 Encounters:   11/28/22 84 kg (185 lb 3 2 oz)   10/26/22 90 4 kg (199 lb 4 8 oz)   10/16/22 84 1 kg (185 lb 6 4 oz)     Has a history of noncompliance with his medications and was recently seen in [facility] 11/13-11/15 for CHF exacerbation as well and at [facility] 10/26  On discharge from [facility] [place] was instructed to take torsemide 40 mg daily  Prior to this had been increased to 20 mg b i d  on 11/9 and prior to this was on 10 mg QOD    · Patient not taking medications as prescribed as an outpatient  · Most recent echocardiogram with LVEF 30%   · Patient has life vest but is non compliant; monitor on telemetry while inpatient   · Cardiology and Nephrology following  · Status post IV diuresis, transitioned to increased dose torsemide 40 mg PO BID on 11/27   · Isordil increased to 20 mg q8h  · Continued on home dose Coreg, hydralazine  · Monitor daily weights, intake and output   · Close outpatient follow-up

## 2022-11-29 LAB
ANION GAP SERPL CALCULATED.3IONS-SCNC: 5 MMOL/L (ref 4–13)
BUN SERPL-MCNC: 68 MG/DL (ref 5–25)
CALCIUM SERPL-MCNC: 8.9 MG/DL (ref 8.3–10.1)
CHLORIDE SERPL-SCNC: 107 MMOL/L (ref 96–108)
CO2 SERPL-SCNC: 26 MMOL/L (ref 21–32)
CREAT SERPL-MCNC: 2.84 MG/DL (ref 0.6–1.3)
GFR SERPL CREATININE-BSD FRML MDRD: 22 ML/MIN/1.73SQ M
GLUCOSE SERPL-MCNC: 163 MG/DL (ref 65–140)
GLUCOSE SERPL-MCNC: 164 MG/DL (ref 65–140)
GLUCOSE SERPL-MCNC: 171 MG/DL (ref 65–140)
GLUCOSE SERPL-MCNC: 175 MG/DL (ref 65–140)
GLUCOSE SERPL-MCNC: 208 MG/DL (ref 65–140)
POTASSIUM SERPL-SCNC: 3.9 MMOL/L (ref 3.5–5.3)
SODIUM SERPL-SCNC: 138 MMOL/L (ref 135–147)

## 2022-11-29 RX ADMIN — QUETIAPINE FUMARATE 100 MG: 100 TABLET ORAL at 21:39

## 2022-11-29 RX ADMIN — INSULIN LISPRO 2 UNITS: 100 INJECTION, SOLUTION INTRAVENOUS; SUBCUTANEOUS at 06:46

## 2022-11-29 RX ADMIN — HYDRALAZINE HYDROCHLORIDE 37.5 MG: 25 TABLET, FILM COATED ORAL at 14:29

## 2022-11-29 RX ADMIN — INSULIN LISPRO 5 UNITS: 100 INJECTION, SOLUTION INTRAVENOUS; SUBCUTANEOUS at 06:47

## 2022-11-29 RX ADMIN — ISOSORBIDE DINITRATE 20 MG: 20 TABLET ORAL at 06:48

## 2022-11-29 RX ADMIN — THIAMINE HCL TAB 100 MG 100 MG: 100 TAB at 08:20

## 2022-11-29 RX ADMIN — ISOSORBIDE DINITRATE 20 MG: 20 TABLET ORAL at 21:42

## 2022-11-29 RX ADMIN — INSULIN GLARGINE 10 UNITS: 100 INJECTION, SOLUTION SUBCUTANEOUS at 21:40

## 2022-11-29 RX ADMIN — GABAPENTIN 300 MG: 300 CAPSULE ORAL at 08:22

## 2022-11-29 RX ADMIN — HEPARIN SODIUM 5000 UNITS: 5000 INJECTION INTRAVENOUS; SUBCUTANEOUS at 14:29

## 2022-11-29 RX ADMIN — INSULIN LISPRO 5 UNITS: 100 INJECTION, SOLUTION INTRAVENOUS; SUBCUTANEOUS at 11:36

## 2022-11-29 RX ADMIN — CARVEDILOL 12.5 MG: 12.5 TABLET, FILM COATED ORAL at 08:22

## 2022-11-29 RX ADMIN — INSULIN LISPRO 2 UNITS: 100 INJECTION, SOLUTION INTRAVENOUS; SUBCUTANEOUS at 16:54

## 2022-11-29 RX ADMIN — HYDRALAZINE HYDROCHLORIDE 37.5 MG: 25 TABLET, FILM COATED ORAL at 21:39

## 2022-11-29 RX ADMIN — FLUOXETINE HYDROCHLORIDE 40 MG: 20 CAPSULE ORAL at 08:22

## 2022-11-29 RX ADMIN — HEPARIN SODIUM 5000 UNITS: 5000 INJECTION INTRAVENOUS; SUBCUTANEOUS at 06:47

## 2022-11-29 RX ADMIN — ASPIRIN 81 MG: 81 TABLET, COATED ORAL at 08:22

## 2022-11-29 RX ADMIN — INSULIN LISPRO 5 UNITS: 100 INJECTION, SOLUTION INTRAVENOUS; SUBCUTANEOUS at 16:55

## 2022-11-29 RX ADMIN — TORSEMIDE 40 MG: 20 TABLET ORAL at 17:18

## 2022-11-29 RX ADMIN — ATORVASTATIN CALCIUM 40 MG: 40 TABLET, FILM COATED ORAL at 21:38

## 2022-11-29 RX ADMIN — INSULIN LISPRO 2 UNITS: 100 INJECTION, SOLUTION INTRAVENOUS; SUBCUTANEOUS at 11:35

## 2022-11-29 RX ADMIN — INSULIN LISPRO 2 UNITS: 100 INJECTION, SOLUTION INTRAVENOUS; SUBCUTANEOUS at 21:41

## 2022-11-29 RX ADMIN — HEPARIN SODIUM 5000 UNITS: 5000 INJECTION INTRAVENOUS; SUBCUTANEOUS at 21:38

## 2022-11-29 RX ADMIN — CARVEDILOL 12.5 MG: 12.5 TABLET, FILM COATED ORAL at 21:39

## 2022-11-29 RX ADMIN — TORSEMIDE 40 MG: 20 TABLET ORAL at 08:20

## 2022-11-29 NOTE — OCCUPATIONAL THERAPY NOTE
Occupational Therapy Progress Note     Patient Name: Denece Merlin  Today's Date: 11/29/2022  Problem List  Principal Problem:    Acute on chronic systolic congestive heart failure ([address] )  Active Problems:    LAZARO superimposed on CKD (chronic kidney disease) stage 4, GFR 15-29 ml/min (HCC)    Bipolar affective disorder, mixed, moderate (HCC)    Essential hypertension    Normocytic anemia    Type 2 diabetes mellitus, with long-term current use of insulin ([facility])    CAD (coronary artery disease)    Cognitive impairment            11/29/22 1100   OT Last Visit   OT Visit Date 11/29/22   Note Type   Note Type Treatment   Pain Assessment   Pain Score No Pain   Restrictions/Precautions   Weight Bearing Precautions Per Order No   Other Precautions Cognitive; Bed Alarm; Impulsive; Fall Risk   Lifestyle   Autonomy Pt's brother present at end of session and reports that the pt lives with him and he is home with him all the time  States that he typically is I with ADLs however he does assist him sometimes as needed  Pt's brother also states that he requires more assistance whe nhe stops taking medication and eats poorly   Reciprocal Relationships Supportive brother   ADL   Where Assessed Edge of bed   UB Dressing Assistance 4  Minimal Assistance   [id number]; Thread LUE;Pull around back   LB Dressing Assistance 3  Moderate Assistance   LB Dressing Deficit Don/doff R sock; Don/doff L sock   Bed Mobility   Supine to Sit 4  Minimal assistance   Additional items Assist x 1;HOB elevated   Sit to Supine 5  Supervision   Transfers   Sit to Stand 5  Supervision   Additional items Increased time required; Impulsive;Verbal cues   Stand to Sit 5  Supervision   Additional items Increased time required; Impulsive;Verbal cues   Stand pivot 4  Minimal assistance   Additional items Assist x 1; Increased time required; Impulsive;Verbal cues   Additional Comments RW   Functional Mobility   Functional Mobility 4  Minimal assistance Additional Comments Continues to be unsteady and require VC for safety   Additional items Rolling walker   Subjective   Subjective "what are we gonna do?"   Cognition   Overall Cognitive Status Impaired   Arousal/Participation Cooperative; Alert   Attention Attends with cues to redirect   Orientation Level Oriented to person;Oriented to place; Disoriented to situation;Oriented to time   Memory Decreased recall of recent events   Following Commands Follows one step commands with increased time or repetition   Comments Overall pleasant and cooperative  Continues to be impulsive and require increased cues for overall safety  Pt presents unkempt with visibally soiled gown - unbothered by same when made aware increased encouragment required to participate in self cares   Activity Tolerance   Activity Tolerance Patient limited by fatigue   Medical Staff Made Aware NSG aware   Assessment   Assessment Pt was seen this date for OT tx session focusing on self care tasks, bed mobility, sit to stand progressions, standing tolerance, tranfers, functional mobility, safety awareness, compensatory techniques, energy conservation, and overall activity tolerance  Pt presents supine and is agreeable to OT tx session  Pt completes previously mentioned tasks at documented assist levels please see above in flow sheet  Pt continues to require overall S- min A for transfers and mobility and min - mod A  for self care tasks  Pt is overall limited by decreased safety, decreased health management, decreased balance, increased fatigue, decreased strength, endurance, and activity tolerance and continues to function below baseline level  Pt resting in modified chair position at end of session with all needs in reach and alarm on  Pt would benefit from continued OT Tx to improve overall functional abilities and increase independence  Will continue to follow with current POC     Plan   Treatment Interventions ADL retraining;Functional transfer training;UE strengthening/ROM; Endurance training;Patient/family training;Cognitive reorientation;Equipment evaluation/education; Compensatory technique education;Continued evaluation; Activityengagement   Goal Expiration Date 12/04/22   OT Treatment Day 2   OT Frequency 2-3x/wk   Recommendation   OT Discharge Recommendation Post acute rehabilitation services   AM-PAC Daily Activity Inpatient   Lower Body Dressing 2   Bathing 3   Toileting 3   Upper Body Dressing 3   Grooming 3   Eating 4   Daily Activity Raw Score 18   Daily Activity Standardized Score (Calc for Raw Score >=11) 38 66   AM-PAC Applied Cognition Inpatient   Following a Speech/Presentation 2   Understanding Ordinary Conversation 4   Taking Medications 1   Remembering Where Things Are Placed or Put Away 2   Remembering List of 4-5 Errands 2   Taking Care of Complicated Tasks 1   Applied Cognition Raw Score 12   Applied Cognition Standardized Score 28 82

## 2022-11-29 NOTE — ASSESSMENT & PLAN NOTE
· Patient at times will not converse however with prompting was noted to sit up, speak to me and eat lunch  Nursing staff report that this has been present during prior admissions as well  · When seen by occupational therapy, patient did poorly on cognitive evaluation  MOCA 6/30 (11/24/22)  · Appreciate Geriatrics consultation  · OT recommending rehab vs home with 24/7 supervision upon discharge  · Given frequent readmissions both here and at [address] [place] [address] [address] secondary to noncompliance, discussed with family (brother Chao Stanley and sister Erendira Santiago) regarding placement on 11/27  · Patient is now agreeable to rehab, will forego neuropsych evaluation  · Patient will be discharged to rehab    He can decide to leave rehab once he is ready

## 2022-11-29 NOTE — ASSESSMENT & PLAN NOTE
Known multivessel severe coronary artery disease  Patient follows with [facility], felt to not be a CABG candidate as per record review, was to be optimized prior to planned PCI to circumflex and LAD    · Troponin elevation on admission due to acute heart failure and hypertension   · Most recent echocardiogram 11/8/2022 at [facility] with an EF of 30-35%  · Has a life vest but is noncompliant with this  · Continue outpatient follow-up with Cardiology  · Continue Coreg, isosorbide, hydralazine, aspirin and statin

## 2022-11-29 NOTE — PROGRESS NOTES
[facility]  Progress Note - [place] 1955, 74 y o  male MRN: [id number]  Unit/Bed#: CW2 216-02 Encounter: [id number]  Primary Care Provider: Rima Espino DO   Date and time admitted to hospital: 11/23/2022 10:54 AM    Cognitive impairment  Assessment & Plan  · Patient at times will not converse however with prompting was noted to sit up, speak to me and eat lunch  Nursing staff report that this has been present during prior admissions as well  · When seen by occupational therapy, patient did poorly on cognitive evaluation  MOCA 6/30 (11/24/22)  · Appreciate Geriatrics consultation  · OT recommending rehab vs home with 24/7 supervision upon discharge  · Given frequent readmissions both here and at [facility] secondary to noncompliance, discussed with family (brother Bradly Wright and sister Nikole Jorge) regarding placement on 11/27  · Patient is now agreeable to rehab, will forego neuropsych evaluation  · Patient will be discharged to rehab  He can decide to leave rehab once he is ready      CAD (coronary artery disease)  Assessment & Plan  Known multivessel severe coronary artery disease  Patient follows with [facility], felt to not be a CABG candidate as per record review, was to be optimized prior to planned PCI to circumflex and LAD    · Troponin elevation on admission due to acute heart failure and hypertension   · Most recent echocardiogram 11/8/2022 at [facility] with an EF of 30-35%  · Has a life vest but is noncompliant with this  · Continue outpatient follow-up with Cardiology  · Continue Coreg, isosorbide, hydralazine, aspirin and statin    Type 2 diabetes mellitus, with long-term current use of insulin [facility])  Assessment & Plan  Lab Results   Component Value Date    HGBA1C 7 7 (H) 10/03/2022     Recent Labs     11/28/22  2233 11/29/22  0530 11/29/22  1119 11/29/22  1612   POCGLU 135 164* 171* 163*       Blood Sugar Average: Last 72 hrs:  · (P) 250 [id number]   · Outpatient on Farxiga and Novolog  · Follows with endocrinology outpatient  · Home regimen of novolog is 30 with breakfast and 10 with dinner but was having glucoses in the 30s with this regimen (giving and not eating)  · Continue current regimen Lantus 10 units QHS and humalog 5 units t i d  With meals  · Diabetic diet    Normocytic anemia  Assessment & Plan  · Baseline hemoglobin appears to be 9-10  · No signs or symptoms of bleeding at this time  · Recommend outpatient follow-up    Essential hypertension  Assessment & Plan  · BP acceptable, monitor routinely   · Continue Coreg, hydralazine and isosorbide  · Diuresis as above     Bipolar affective disorder, mixed, moderate (HCC)  Assessment & Plan  · Continue Seroquel and Prozac  · Patient is competent on my exam, he is willing to attend rehab  Although may not be in his best interests he may leave rehab at any time if he chooses to  LAZARO superimposed on CKD (chronic kidney disease) stage 4, GFR 15-29 ml/min [facility])  Assessment & Plan  Lab Results   Component Value Date    EGFR 22 11/29/2022    EGFR 22 11/28/2022    EGFR 22 11/27/2022    CREATININE 2 84 (H) 11/29/2022    CREATININE 2 85 (H) 11/28/2022    CREATININE 2 84 (H) 11/27/2022     · Baseline creatinine approximately 2 1-2 7  Follows with [facility]  · LAZARO POA with creatinine of 3 51 felt to be secondary to volume overload, cardiorenal syndrome  · Nephrology following, appreciate inputs   · Continue to monitor with diuresis, see plan above   · Creatinine stable today at 2 84    * Acute on chronic systolic congestive heart failure (HCC)  Assessment & Plan  Wt Readings from Last 3 Encounters:   11/29/22 83 6 kg (184 lb 4 8 oz)   10/26/22 90 4 kg (199 lb 4 8 oz)   10/16/22 84 1 kg (185 lb 6 4 oz)     Has a history of noncompliance with his medications and was recently seen in [facility] 11/13-11/15 for CHF exacerbation as well and at [facility] [place] 10/26   On discharge from [facility] was instructed to take torsemide 40 mg daily  Prior to this had been increased to 20 mg b i d  on  and prior to this was on 10 mg QOD  · Patient not taking medications as prescribed as an outpatient  · Most recent echocardiogram with LVEF 30%   · Patient has life vest but is non compliant; monitor on telemetry while inpatient   · Cardiology and Nephrology following  · Status post IV diuresis, transitioned to increased dose torsemide 40 mg PO BID on    · Isordil increased to 20 mg q8h  · Continued on home dose Coreg, hydralazine  · Monitor daily weights, intake and output   · Close outpatient follow-up           VTE Pharmacologic Prophylaxis: VTE Score: 4 Moderate Risk (Score 3-4) - Pharmacological DVT Prophylaxis Ordered: heparin  Patient Centered Rounds: I performed bedside rounds with nursing staff today  Discussions with Specialists or Other Care Team Provider: n/a    Education and Discussions with Family / Patient: Attempted to call patient's brother but stated prescriber not available  Time Spent for Care: 30 minutes  More than 50% of total time spent on counseling and coordination of care as described above  Current Length of Stay: 6 day(s)  Current Patient Status: Inpatient   Certification Statement: The patient will continue to require additional inpatient hospital stay due to Pending placement to rehab  Discharge Plan: Anticipate discharge in 24-48 hrs to rehab facility  Code Status: Level 1 - Full Code    Subjective:   Patient is alert oriented x3, denies any acute complaints    Objective:     Vitals:   Temp (24hrs), Av 7 °F (36 5 °C), Min:97 3 °F (36 3 °C), Max:98 3 °F (36 8 °C)    Temp:  [97 3 °F (36 3 °C)-98 3 °F (36 8 °C)] 97 3 °F (36 3 °C)  HR:  [63-78] 68  Resp:  [16-18] 17  BP: ()/(57-85) 141/85  SpO2:  [95 %-99 %] 95 %  Body mass index is 31 64 kg/m²  Input and Output Summary (last 24 hours):      Intake/Output Summary (Last 24 hours) at 2022 1754  Last data filed at 11/29/2022 1536  Gross per 24 hour   Intake 300 ml   Output 950 ml   Net -650 ml       Physical Exam:   Physical Exam  Vitals and nursing note reviewed  Constitutional:       General: He is not in acute distress  Appearance: He is well-developed  He is not ill-appearing, toxic-appearing or diaphoretic  HENT:      Head: Normocephalic and atraumatic  Eyes:      General: No scleral icterus  Conjunctiva/sclera: Conjunctivae normal    Cardiovascular:      Rate and Rhythm: Normal rate and regular rhythm  Heart sounds: No murmur heard  No friction rub  No gallop  Pulmonary:      Effort: Pulmonary effort is normal  No respiratory distress  Breath sounds: Normal breath sounds  No stridor  No wheezing, rhonchi or rales  Chest:      Chest wall: No tenderness  Abdominal:      General: There is no distension  Palpations: Abdomen is soft  There is no mass  Tenderness: There is no abdominal tenderness  There is no guarding or rebound  Hernia: No hernia is present  Musculoskeletal:         General: No swelling, tenderness, deformity or signs of injury  Cervical back: Neck supple  Skin:     General: Skin is warm and dry  Capillary Refill: Capillary refill takes less than 2 seconds  Coloration: Skin is not jaundiced or pale  Neurological:      Mental Status: He is alert and oriented to person, place, and time     Psychiatric:         Mood and Affect: Mood normal           Additional Data:     Labs:  Results from last 7 days   Lab Units 11/25/22  0433 11/24/22  0453   WBC Thousand/uL 6 05 6 20   HEMOGLOBIN g/dL 8 9* 8 3*   HEMATOCRIT % 27 9* 25 7*   PLATELETS Thousands/uL 211 181   NEUTROS PCT %  --  56   LYMPHS PCT %  --  27   MONOS PCT %  --  11   EOS PCT %  --  4     Results from last 7 days   Lab Units 11/29/22  0531 11/24/22  0453 11/23/22  1121   SODIUM mmol/L 138   < > 135   POTASSIUM mmol/L 3 9   < > 4 6   CHLORIDE mmol/L 107   < > 108   CO2 mmol/L 26 < > 19*   BUN mg/dL 68*   < > 72*   CREATININE mg/dL 2 84*   < > 3 51*   ANION GAP mmol/L 5   < > 8   CALCIUM mg/dL 8 9   < > 8 9   ALBUMIN g/dL  --   --  2 8*   TOTAL BILIRUBIN mg/dL  --   --  0 50   ALK PHOS U/L  --   --  133*   ALT U/L  --   --  35   AST U/L  --   --  18   GLUCOSE RANDOM mg/dL 175*   < > 362*    < > = values in this interval not displayed  Results from last 7 days   Lab Units 11/29/22  1612 11/29/22  1119 11/29/22  0530 11/28/22  2233 11/28/22  1611 11/28/22  1114 11/28/22  0549 11/27/22  2115 11/27/22  1543 11/27/22  1114 11/27/22  0630 11/26/22  2114   POC GLUCOSE mg/dl 163* 171* 164* 135 261* 125 98 132 130 176* 140 127               Lines/Drains:  Invasive Devices     Peripheral Intravenous Line  Duration           Peripheral IV 11/27/22 Dorsal (posterior); Left Hand 2 days                  Telemetry:  Telemetry Orders (From admission, onward)             LifeVest Patient: Continuous Telemetry Monitoring during hospitalization (non-expiring)  Continuous LifeVest Telemetry Monitoring        References:    LifeVest Policy                 Telemetry Reviewed: Normal Sinus Rhythm  Indication for Continued Telemetry Use: No indication for continued use  Will discontinue  Imaging: No pertinent imaging reviewed      Recent Cultures (last 7 days):         Last 24 Hours Medication List:   Current Facility-Administered Medications   Medication Dose Route Frequency Provider Last Rate   • acetaminophen  650 mg Oral Q6H PRN Stefany Shirley PA-C     • aspirin  81 mg Oral Daily EMMA Harrell     • atorvastatin  40 mg Oral HS EMMA Harrell     • carvedilol  12 5 mg Oral Q12H [facility] Drew Green PA-C     • FLUoxetine  40 mg Oral Daily EMMA Harrell     • gabapentin  300 mg Oral Daily EMMA Harrell     • heparin (porcine)  5,000 Units Subcutaneous [facility] EMMA Harrell     • hydrALAZINE  37 5 mg Oral Q8H Drew Green PA-C     • insulin glargine 10 Units Subcutaneous HS EMMA Masters     • insulin lispro  1-6 Units Subcutaneous HS EMMA Bhatti     • insulin lispro  2-12 Units Subcutaneous TID AC EMMA Masters     • insulin lispro  5 Units Subcutaneous TID With Meals Stefany Shirley PA-C     • isosorbide dinitrate  20 mg Oral Q8H Rodney Weller PA-C     • melatonin  4 5 mg Oral HS PRN EMMA Masters     • QUEtiapine  100 mg Oral HS EMMA Masters     • thiamine  100 mg Oral Daily EMMA Masters     • torsemide  40 mg Oral BID Rodney Weller PA-C          Today, Patient Was Seen By: Nathalia Sorto DO    **Please Note: This note may have been constructed using a voice recognition system  **

## 2022-11-29 NOTE — PLAN OF CARE
Problem: PHYSICAL THERAPY ADULT  Goal: Performs mobility at highest level of function for planned discharge setting  See evaluation for individualized goals  Description: Treatment/Interventions: Functional transfer training, LE strengthening/ROM, Therapeutic exercise, Endurance training, Cognitive reorientation, Patient/family training, Equipment eval/education, Bed mobility, Gait training, Spoke to nursing, OT  Equipment Recommended: Leonor Jauregui (pt reportedly owns)       See flowsheet documentation for full assessment, interventions and recommendations  Outcome: Progressing  Note: Prognosis: Fair  Problem List: Decreased strength, Decreased endurance, Impaired balance, Decreased coordination, Decreased mobility, Decreased cognition, Impaired judgement, Decreased safety awareness  Assessment: Pt supine in bed upon arrival  Pt tx session focused on bed mobility, transfers, ambulation, LE strengthening, and static/dynamic balance  Pt continues to progress towards S when performing bed mobility and transfers  Pt requires minAx1 during ambulation 2* to bouts of unsteadiness, cognition, and decreased safety awareness  Pt able to perform seated balance activities for functional time and navigate from bed to chair safely  Pt strength continues to progress and is able to perform sit to stand transfers efficiently and effectively  Pt is approaching baseline and progressing towards goals  Pt would benefit from increased social support in conjunction with therapy to ensure pt safety and maximize mobility  Performed at least 2 patient identifiers during session: Name and Matilde Fritz The patient's AM-PAC Basic Mobility Inpatient Short Form Raw Score is 21  A Raw score of greater than 16 suggests the patient may benefit from discharge to home  Please also refer to the recommendation of the Physical Therapist for safe discharge planning   Pt would benefit from continued inpatient skilled physical therapy to address said deficits and to maximum patient functional mobility  PT recommendation is for pt to be D/C with post acute rehab services  Barriers to Discharge: Decreased caregiver support, Inaccessible home environment     PT Discharge Recommendation: Post acute rehabilitation services    See flowsheet documentation for full assessment

## 2022-11-29 NOTE — PROGRESS NOTES
NEPHROLOGY PROGRESS NOTE   Antonette Santa 74 y o  male MRN: [id number]  Unit/Bed#: CW2 216-02 Encounter: [id number]  Reason for Consult: LAZARO CKD    ASSESSMENT AND PLAN:  LAZARO on CKD stage 4, baseline creatinine 2 1 to 2 7, follows with [facility]  -LAZARO suspect secondary to cardiorenal, CHF exacerbation  -admission creatinine 3 5 now overall improving 2 8 and seems to have plateaued today   -now remains on torsemide 40 mg p o  B i d , continue same  -may have to accept higher creatinine to keep patient euvolemic   -has history of prior episode of LAZARO requiring temporary dialysis in [facility] this admission shows no significant hematuria, trace proteinuria      Acute on chronic systolic CHF, echo shows EF 30 to 35%, cardiology follow-up  -volume status improving, weight reducing  Now remains on p o  Torsemide as above  Continue same on discharge   -continue to monitor daily weight, accurate intake and output     Anemia in CKD, hemoglobin slightly lower, continue to monitor closely, transfuse p r n  For hemoglobin less than seven      Hypertension, blood pressure overall acceptable  Currently remains on Coreg, hydralazine, Imdur for GDMT     Encephalopathy, cognitive impairment, management as per primary team      Discussed above plan in detail with primary team   Overall stable from renal standpoint for discharge  Will need close outpatient Nephrology follow-up  If gets discharged, would recommend repeat BMP in one week  SUBJECTIVE:  Patient seen and examined at bedside    Denies chest pain, worsening shortness of breath, nausea, vomiting, abdominal pain    OBJECTIVE:  Current Weight: Weight - Scale: 83 6 kg (184 lb 4 8 oz)  Vitals:    11/29/22 0707   BP: 110/63   Pulse: 64   Resp: 18   Temp: (!) 97 3 °F (36 3 °C)   SpO2: 96%       Intake/Output Summary (Last 24 hours) at 11/29/2022 1033  Last data filed at 11/29/2022 0853  Gross per 24 hour   Intake 720 ml   Output 1350 ml   Net -630 ml     Wt Readings from Last 3 Encounters:   11/29/22 83 6 kg (184 lb 4 8 oz)   10/26/22 90 4 kg (199 lb 4 8 oz)   10/16/22 84 1 kg (185 lb 6 4 oz)     Temp Readings from Last 3 Encounters:   11/29/22 (!) 97 3 °F (36 3 °C) (Oral)   10/26/22 98 1 °F (36 7 °C)   10/20/22 97 8 °F (36 6 °C) (Temporal)     BP Readings from Last 3 Encounters:   11/29/22 110/63   10/26/22 135/76   10/20/22 100/60     Pulse Readings from Last 3 Encounters:   11/29/22 64   10/26/22 71   10/20/22 80        Physical Examination:  General:  Lying in bed, no acute distress   Eyes:  Mild conjunctival pallor present  ENT:  External examination of ears and nose unremarkable  Neck:  No Obvious lymphadenopathy appreciated  Respiratory:  Bilateral air entry present  CVS:  S1, S2 present  GI:  Nondistended  CNS:  Active alert, follows commands  Skin:  No new rash  Musculoskeletal:  No obvious new gross deformity noted    Medications:    Current Facility-Administered Medications:   •  acetaminophen (TYLENOL) tablet 650 mg, 650 mg, Oral, Q6H PRN, Stefany Shirley PA-C  •  aspirin (ECOTRIN LOW STRENGTH) EC tablet 81 mg, 81 mg, Oral, Daily, Larina Kelsie, CRNP, 81 mg at 11/29/22 9884  •  atorvastatin (LIPITOR) tablet 40 mg, 40 mg, Oral, HS, Larina Kelsie, CRNP, 40 mg at 11/28/22 2237  •  carvedilol (COREG) tablet 12 5 mg, 12 5 mg, Oral, Q12H [address], Francine Schrader PA-C, 12 5 mg at 11/29/22 6081  •  FLUoxetine (PROzac) capsule 40 mg, 40 mg, Oral, Daily, Larina Kelsie, CRNP, 40 mg at 11/29/22 8460  •  gabapentin (NEURONTIN) capsule 300 mg, 300 mg, Oral, Daily, Larina Kelsie, CRNP, 300 mg at 11/29/22 2021  •  heparin (porcine) subcutaneous injection 5,000 Units, 5,000 Units, Subcutaneous, Q8H [address], 5,000 Units at 11/29/22 0647 **AND** [CANCELED] Platelet count, , , Once, EMMA Real  •  hydrALAZINE (APRESOLINE) tablet 37 5 mg, 37 5 mg, Oral, Q8H, Francine Schrader PA-C, 37 5 mg at 11/28/22 3348  •  insulin glargine (LANTUS) subcutaneous injection 10 Units 0 1 mL, 10 Units, Subcutaneous, HS, EMMA Bliss, 10 Units at 11/28/22 2237  •  insulin lispro (HumaLOG) 100 units/mL subcutaneous injection 1-6 Units, 1-6 Units, Subcutaneous, HS, EMMA High, 1 Units at 11/25/22 2134  •  insulin lispro (HumaLOG) 100 units/mL subcutaneous injection 2-12 Units, 2-12 Units, Subcutaneous, TID AC, 2 Units at 11/29/22 0646 **AND** Fingerstick Glucose (POCT), , , TID AC, EMMA Bliss  •  insulin lispro (HumaLOG) 100 units/mL subcutaneous injection 5 Units, 5 Units, Subcutaneous, TID With Meals, Saba Karimi PA-C, 5 Units at 11/29/22 4255  •  isosorbide dinitrate (ISORDIL) tablet 20 mg, 20 mg, Oral, Q8H, Francine Schrader PA-C, 20 mg at 11/29/22 0162  •  melatonin tablet 4 5 mg, 4 5 mg, Oral, HS PRN, EMMA Bliss, 4 5 mg at 11/28/22 2235  •  QUEtiapine (SEROquel) tablet 100 mg, 100 mg, Oral, HS, EMMA Bliss, 100 mg at 11/28/22 2237  •  thiamine tablet 100 mg, 100 mg, Oral, Daily, EMMA Bliss, 100 mg at 11/29/22 0820  •  torsemide (DEMADEX) tablet 40 mg, 40 mg, Oral, BID, Carlitos Gomez PA-C, 40 mg at 11/29/22 0820    Laboratory Results:  Results from last 7 days   Lab Units 11/29/22  0531 11/28/22  0550 11/27/22  0649 11/26/22  0444 11/25/22  0433 11/24/22  0453 11/23/22  1121   WBC Thousand/uL  --   --   --   --  6 05 6 20 7 99   HEMOGLOBIN g/dL  --   --   --   --  8 9* 8 3* 9 0*   HEMATOCRIT %  --   --   --   --  27 9* 25 7* 28 1*   PLATELETS Thousands/uL  --   --   --   --  211 181 184   SODIUM mmol/L 138 139 138 140 138 137 135   POTASSIUM mmol/L 3 9 4 0 4 2 4 1 3 9 3 8 4 6   CHLORIDE mmol/L 107 109* 109* 108 108 109* 108   CO2 mmol/L 26 24 24 26 24 21 19*   BUN mg/dL 68* 68* 65* 70* 79* 75* 72*   CREATININE mg/dL 2 84* 2 85* 2 84* 3 08* 3 72* 3 46* 3 51*   CALCIUM mg/dL 8 9 9 1 9 1 8 8 8 8 8 7 8 9       XR chest 1 view portable   ED Interpretation by Rossana Hebert MD (11/23 8424)   Mild pulmonary edema        Final Result by Verenice Grady Powell MD (11/23 2926)      Mild pulmonary vascular congestion  Workstation performed: [id number]             Portions of the record may have been created with voice recognition software  Occasional wrong word or "sound a like" substitutions may have occurred due to the inherent limitations of voice recognition software  Read the chart carefully and recognize, using context, where substitutions have occurred

## 2022-11-29 NOTE — ASSESSMENT & PLAN NOTE
Lab Results   Component Value Date    HGBA1C 7 7 (H) 10/03/2022     Recent Labs     11/28/22  2233 11/29/22  0530 11/29/22  1119 11/29/22  1612   POCGLU 135 164* 171* 163*       Blood Sugar Average: Last 72 hrs:  · (P) 842 [id number]   · Outpatient on Farxiga and Novolog  · Follows with endocrinology outpatient  · Home regimen of novolog is 30 with breakfast and 10 with dinner but was having glucoses in the 30s with this regimen (giving and not eating)  · Continue current regimen Lantus 10 units QHS and humalog 5 units t i d   With meals  · Diabetic diet

## 2022-11-29 NOTE — ASSESSMENT & PLAN NOTE
Lab Results   Component Value Date    EGFR 22 11/29/2022    EGFR 22 11/28/2022    EGFR 22 11/27/2022    CREATININE 2 84 (H) 11/29/2022    CREATININE 2 85 (H) 11/28/2022    CREATININE 2 84 (H) 11/27/2022     · Baseline creatinine approximately 2 1-2 7  Follows with [facility]    · LAZARO POA with creatinine of 3 51 felt to be secondary to volume overload, cardiorenal syndrome  · Nephrology following, appreciate inputs   · Continue to monitor with diuresis, see plan above   · Creatinine stable today at 2 84

## 2022-11-29 NOTE — ASSESSMENT & PLAN NOTE
Wt Readings from Last 3 Encounters:   11/29/22 83 6 kg (184 lb 4 8 oz)   10/26/22 90 4 kg (199 lb 4 8 oz)   10/16/22 84 1 kg (185 lb 6 4 oz)     Has a history of noncompliance with his medications and was recently seen in [facility] 11/13-11/15 for CHF exacerbation as well and at [facility] 10/26  On discharge from [facility] was instructed to take torsemide 40 mg daily  Prior to this had been increased to 20 mg b i d  on 11/9 and prior to this was on 10 mg QOD    · Patient not taking medications as prescribed as an outpatient  · Most recent echocardiogram with LVEF 30%   · Patient has life vest but is non compliant; monitor on telemetry while inpatient   · Cardiology and Nephrology following  · Status post IV diuresis, transitioned to increased dose torsemide 40 mg PO BID on 11/27   · Isordil increased to 20 mg q8h  · Continued on home dose Coreg, hydralazine  · Monitor daily weights, intake and output   · Close outpatient follow-up

## 2022-11-29 NOTE — PLAN OF CARE
Problem: OCCUPATIONAL THERAPY ADULT  Goal: Performs self-care activities at highest level of function for planned discharge setting  See evaluation for individualized goals  Description: Treatment Interventions: ADL retraining, Functional transfer training, UE strengthening/ROM, Endurance training, Patient/family training, Cognitive reorientation, Equipment evaluation/education, Compensatory technique education, Continued evaluation, Activityengagement  Equipment Recommended: Shower/Tub chair with back ($)       See flowsheet documentation for full assessment, interventions and recommendations  Outcome: Progressing  Note: Limitation: Decreased ADL status, Decreased UE strength, Decreased Safe judgement during ADL, Decreased cognition, Decreased endurance, Decreased self-care trans, Decreased high-level ADLs  Prognosis: Fair  Assessment: Pt was seen this date for OT tx session focusing on self care tasks, bed mobility, sit to stand progressions, standing tolerance, tranfers, functional mobility, safety awareness, compensatory techniques, energy conservation, and overall activity tolerance  Pt presents supine and is agreeable to OT tx session  Pt completes previously mentioned tasks at documented assist levels please see above in flow sheet  Pt continues to require overall S- min A for transfers and mobility and min - mod A  for self care tasks  Pt is overall limited by decreased safety, decreased health management, decreased balance, increased fatigue, decreased strength, endurance, and activity tolerance and continues to function below baseline level  Pt resting in modified chair position at end of session with all needs in reach and alarm on  Pt would benefit from continued OT Tx to improve overall functional abilities and increase independence  Will continue to follow with current POC    Recommendation: (S) Geriatric Consult  OT Discharge Recommendation: Post acute rehabilitation services

## 2022-11-29 NOTE — PROGRESS NOTES
-- Patient: Nita Horowitz  -- MRN: [id number]  -- Aidin Request ID: [id number]  -- Level of care reserved: Gerald Yann  -- Partner Reserved[facility] [address] [facility], [address] [facility] [phone]  -- Clinical needs requested:  -- Geography searched: 10 miles around [facility]  -- Start of Service:  -- Request sent: 10:18am EST on 11/28/2022 by Anne Kussmaul  -- Partner reserved: 4:12pm EST on 11/29/2022 by Anne Kussmaul  -- Choice list shared:

## 2022-11-29 NOTE — ASSESSMENT & PLAN NOTE
· Continue Seroquel and Prozac  · Patient is competent on my exam, he is willing to attend rehab  Although may not be in his best interests he may leave rehab at any time if he chooses to

## 2022-11-29 NOTE — PHYSICAL THERAPY NOTE
PHYSICAL THERAPY NOTE          Patient Name: Katja Valle  Today's Date: 11/29/2022 11/29/22 1055   PT Last Visit   PT Visit Date 11/29/22   Note Type   Note Type Treatment   Pain Assessment   Pain Assessment Tool 0-10   Pain Score No Pain   Hospital Pain Intervention(s) Repositioned; Ambulation/increased activity; Emotional support   Restrictions/Precautions   Weight Bearing Precautions Per Order No   Other Precautions Cognitive; Impulsive; Fall Risk   General   Chart Reviewed Yes   Response to Previous Treatment Patient with no complaints from previous session  Family/Caregiver Present No   Cognition   Overall Cognitive Status Impaired   Arousal/Participation Cooperative; Alert   Attention Attends with cues to redirect   Orientation Level Oriented to person;Oriented to place; Disoriented to situation;Oriented to time   Memory Decreased recall of recent events   Following Commands Follows one step commands with increased time or repetition   Subjective   Subjective Pt supine in bed upon arrival  Pt asleep, but able to be aroused and willing to participate in therapy  Bed Mobility   Supine to Sit 5  Supervision   Sit to Supine 5  Supervision   Additional Comments Pt sat EOB with fair trunk/postural control  Transfers   Sit to Stand 5  Supervision   Additional items Increased time required; Impulsive;Verbal cues   Stand to Sit 5  Supervision   Additional items Assist x 1; Increased time required;Verbal cues; Impulsive   Additional Comments used RW   Ambulation/Elevation   Gait pattern Decreased foot clearance; Forward Flexion; Excessively slow   Gait Assistance 4  Minimal assist   Additional items Assist x 1;Verbal cues; Tactile cues   Assistive Device Rolling walker   Distance 18+24+13+88   Balance   Static Sitting Fair +   Dynamic Sitting Fair   Static Standing Fair -   Dynamic Standing Poor +   Ambulatory Poor +   Endurance Deficit Endurance Deficit Yes   Activity Tolerance   Activity Tolerance Patient tolerated treatment well   Nurse Made Aware yes   Exercises   Balance training  Pt performed static sitting ~10 while performing OT ADL; pt performed 7 sit to stands to improve LE strength, trunk stability, and dynamic balance  Assessment   Prognosis Fair   Problem List Decreased strength;Decreased endurance; Impaired balance;Decreased coordination;Decreased mobility; Decreased cognition; Impaired judgement;Decreased safety awareness   Assessment Pt supine in bed upon arrival  Pt tx session focused on bed mobility, transfers, ambulation, LE strengthening, and static/dynamic balance  Pt continues to progress towards S when performing bed mobility and transfers  Pt requires minAx1 during ambulation 2* to bouts of unsteadiness, cognition, and decreased safety awareness  Pt able to perform seated balance activities for functional time and navigate from bed to chair safely  Pt strength continues to progress and is able to perform sit to stand transfers efficiently and effectively  Pt is approaching baseline and progressing towards goals  Pt would benefit from increased social support in conjunction with therapy to ensure pt safety and maximize mobility  Performed at least 2 patient identifiers during session: Name and Orren Deed The patient's AM-PAC Basic Mobility Inpatient Short Form Raw Score is 21  A Raw score of greater than 16 suggests the patient may benefit from discharge to home  Please also refer to the recommendation of the Physical Therapist for safe discharge planning  Pt would benefit from continued inpatient skilled physical therapy to address said deficits and to maximum patient functional mobility  PT recommendation is for pt to be D/C with post acute rehab services  Barriers to Discharge Decreased caregiver support; Inaccessible home environment   Goals   Patient Goals to go home   STG Expiration Date 12/08/22   Short Term Goal #1 STG 1  Pt will be able to perform bed mobility with mod I in order to improve overall functional mobility and assist in safe d/c  STG 2  Pt will be able to perform functional transfer with mod I in order to improve overall functional mobility and assist in safe d/c  STG 3  Pt will be able to ambulate at least 100 feet with least restrictive device with mod I A in order to improve overall functional mobility and assist in safe d/c  STG 4  Pt will improve sitting/standing static/dynamic balance 1 grade in order to improve functional mobility and assist in safe d/c  STG 5  Pt will improve LE strength by one grade in order to improve functional mobility and assist in safe d/c  STG 6  Pt will negotiate at least 1 step at mod I level A in order to improve overall funcitonal mobility and assist in safe d/c   Plan   Treatment/Interventions Functional transfer training;LE strengthening/ROM; Therapeutic exercise; Endurance training;Cognitive reorientation;Patient/family training;Equipment eval/education; Bed mobility;Gait training;Spoke to nursing;OT   Progress Slow progress, cognitive deficits   PT Frequency 2-3x/wk   Recommendation   PT Discharge Recommendation Post acute rehabilitation services   AM-PAC Basic Mobility Inpatient   Turning in Bed Without Bedrails 4   Lying on Back to Sitting on Edge of Flat Bed 4   Moving Bed to Chair 3   Standing Up From Chair 4   Walk in Room 3   Climb 3-5 Stairs 3   Basic Mobility Inpatient Raw Score 21   Basic Mobility Standardized Score 45 55   Highest Level Of Mobility   JH-HLM Goal 6: Walk 10 steps or more   JH-HLM Achieved 7: Walk 25 feet or more   Education   Education Provided Mobility training;Assistive device   Patient Demonstrates acceptance/verbal understanding;Reinforcement needed   End of Consult   Patient Position at End of Consult Supine; All needs within [facility], [facility]

## 2022-11-30 LAB
ANION GAP SERPL CALCULATED.3IONS-SCNC: 8 MMOL/L (ref 4–13)
BASOPHILS # BLD AUTO: 0.05 THOUSANDS/ÂΜL (ref 0–0.1)
BASOPHILS NFR BLD AUTO: 1 % (ref 0–1)
BUN SERPL-MCNC: 69 MG/DL (ref 5–25)
CALCIUM SERPL-MCNC: 9.1 MG/DL (ref 8.3–10.1)
CHLORIDE SERPL-SCNC: 107 MMOL/L (ref 96–108)
CO2 SERPL-SCNC: 24 MMOL/L (ref 21–32)
CREAT SERPL-MCNC: 2.91 MG/DL (ref 0.6–1.3)
EOSINOPHIL # BLD AUTO: 0.4 THOUSAND/ÂΜL (ref 0–0.61)
EOSINOPHIL NFR BLD AUTO: 5 % (ref 0–6)
ERYTHROCYTE [DISTWIDTH] IN BLOOD BY AUTOMATED COUNT: 13.6 % (ref 11.6–15.1)
GFR SERPL CREATININE-BSD FRML MDRD: 21 ML/MIN/1.73SQ M
GLUCOSE SERPL-MCNC: 104 MG/DL (ref 65–140)
GLUCOSE SERPL-MCNC: 113 MG/DL (ref 65–140)
GLUCOSE SERPL-MCNC: 126 MG/DL (ref 65–140)
GLUCOSE SERPL-MCNC: 132 MG/DL (ref 65–140)
GLUCOSE SERPL-MCNC: 96 MG/DL (ref 65–140)
HCT VFR BLD AUTO: 32 % (ref 36.5–49.3)
HGB BLD-MCNC: 10.1 G/DL (ref 12–17)
IMM GRANULOCYTES # BLD AUTO: 0.06 THOUSAND/UL (ref 0–0.2)
IMM GRANULOCYTES NFR BLD AUTO: 1 % (ref 0–2)
LYMPHOCYTES # BLD AUTO: 2 THOUSANDS/ÂΜL (ref 0.6–4.47)
LYMPHOCYTES NFR BLD AUTO: 27 % (ref 14–44)
MCH RBC QN AUTO: 29.2 PG (ref 26.8–34.3)
MCHC RBC AUTO-ENTMCNC: 31.6 G/DL (ref 31.4–37.4)
MCV RBC AUTO: 93 FL (ref 82–98)
MONOCYTES # BLD AUTO: 0.56 THOUSAND/ÂΜL (ref 0.17–1.22)
MONOCYTES NFR BLD AUTO: 7 % (ref 4–12)
NEUTROPHILS # BLD AUTO: 4.45 THOUSANDS/ÂΜL (ref 1.85–7.62)
NEUTS SEG NFR BLD AUTO: 59 % (ref 43–75)
NRBC BLD AUTO-RTO: 0 /100 WBCS
PLATELET # BLD AUTO: 248 THOUSANDS/UL (ref 149–390)
PMV BLD AUTO: 11.2 FL (ref 8.9–12.7)
POTASSIUM SERPL-SCNC: 4.1 MMOL/L (ref 3.5–5.3)
RBC # BLD AUTO: 3.46 MILLION/UL (ref 3.88–5.62)
SODIUM SERPL-SCNC: 139 MMOL/L (ref 135–147)
WBC # BLD AUTO: 7.52 THOUSAND/UL (ref 4.31–10.16)

## 2022-11-30 RX ORDER — HYDRALAZINE HYDROCHLORIDE 50 MG/1
50 TABLET, FILM COATED ORAL EVERY 8 HOURS
Status: DISCONTINUED | OUTPATIENT
Start: 2022-11-30 | End: 2022-12-06 | Stop reason: HOSPADM

## 2022-11-30 RX ADMIN — ATORVASTATIN CALCIUM 40 MG: 40 TABLET, FILM COATED ORAL at 21:21

## 2022-11-30 RX ADMIN — INSULIN LISPRO 5 UNITS: 100 INJECTION, SOLUTION INTRAVENOUS; SUBCUTANEOUS at 12:20

## 2022-11-30 RX ADMIN — INSULIN LISPRO 5 UNITS: 100 INJECTION, SOLUTION INTRAVENOUS; SUBCUTANEOUS at 16:59

## 2022-11-30 RX ADMIN — INSULIN GLARGINE 10 UNITS: 100 INJECTION, SOLUTION SUBCUTANEOUS at 21:22

## 2022-11-30 RX ADMIN — TORSEMIDE 40 MG: 20 TABLET ORAL at 17:00

## 2022-11-30 RX ADMIN — HYDRALAZINE HYDROCHLORIDE 50 MG: 50 TABLET, FILM COATED ORAL at 21:25

## 2022-11-30 RX ADMIN — CARVEDILOL 12.5 MG: 12.5 TABLET, FILM COATED ORAL at 08:43

## 2022-11-30 RX ADMIN — ISOSORBIDE DINITRATE 20 MG: 20 TABLET ORAL at 06:55

## 2022-11-30 RX ADMIN — HEPARIN SODIUM 5000 UNITS: 5000 INJECTION INTRAVENOUS; SUBCUTANEOUS at 21:22

## 2022-11-30 RX ADMIN — QUETIAPINE FUMARATE 100 MG: 100 TABLET ORAL at 21:22

## 2022-11-30 RX ADMIN — ISOSORBIDE DINITRATE 20 MG: 20 TABLET ORAL at 21:25

## 2022-11-30 RX ADMIN — CARVEDILOL 12.5 MG: 12.5 TABLET, FILM COATED ORAL at 21:22

## 2022-11-30 RX ADMIN — HEPARIN SODIUM 5000 UNITS: 5000 INJECTION INTRAVENOUS; SUBCUTANEOUS at 06:51

## 2022-11-30 RX ADMIN — GABAPENTIN 300 MG: 300 CAPSULE ORAL at 08:43

## 2022-11-30 RX ADMIN — FLUOXETINE HYDROCHLORIDE 40 MG: 20 CAPSULE ORAL at 08:43

## 2022-11-30 RX ADMIN — ASPIRIN 81 MG: 81 TABLET, COATED ORAL at 08:43

## 2022-11-30 RX ADMIN — HYDRALAZINE HYDROCHLORIDE 50 MG: 50 TABLET, FILM COATED ORAL at 14:41

## 2022-11-30 RX ADMIN — ISOSORBIDE DINITRATE 20 MG: 20 TABLET ORAL at 14:41

## 2022-11-30 RX ADMIN — HEPARIN SODIUM 5000 UNITS: 5000 INJECTION INTRAVENOUS; SUBCUTANEOUS at 14:41

## 2022-11-30 RX ADMIN — TORSEMIDE 40 MG: 20 TABLET ORAL at 08:43

## 2022-11-30 RX ADMIN — INSULIN LISPRO 5 UNITS: 100 INJECTION, SOLUTION INTRAVENOUS; SUBCUTANEOUS at 08:44

## 2022-11-30 RX ADMIN — THIAMINE HCL TAB 100 MG 100 MG: 100 TAB at 08:43

## 2022-11-30 NOTE — ASSESSMENT & PLAN NOTE
Lab Results   Component Value Date    EGFR 21 11/30/2022    EGFR 22 11/29/2022    EGFR 22 11/28/2022    CREATININE 2 91 (H) 11/30/2022    CREATININE 2 84 (H) 11/29/2022    CREATININE 2 85 (H) 11/28/2022     · Baseline creatinine approximately 2 1-2 7  Follows with [facility]    · LAZARO POA with creatinine of 3 51 felt to be secondary to volume overload, cardiorenal syndrome  · Nephrology following, appreciate inputs   · Continue to monitor with diuresis, see plan above   · Creatinine stable today at 2 9

## 2022-11-30 NOTE — PLAN OF CARE
Problem: PAIN - ADULT  Goal: Verbalizes/displays adequate comfort level or baseline comfort level  Description: Interventions:  - Encourage patient to monitor pain and request assistance  - Assess pain using appropriate pain scale  - Administer analgesics based on type and severity of pain and evaluate response  - Implement non-pharmacological measures as appropriate and evaluate response  - Consider cultural and social influences on pain and pain management  - Notify physician/advanced practitioner if interventions unsuccessful or patient reports new pain  Outcome: Progressing     Problem: INFECTION - ADULT  Goal: Absence or prevention of progression during hospitalization  Description: INTERVENTIONS:  - Assess and monitor for signs and symptoms of infection  - Monitor lab/diagnostic results  - Monitor all insertion sites, i e  indwelling lines, tubes, and drains  - Monitor endotracheal if appropriate and nasal secretions for changes in amount and color  - Weir appropriate cooling/warming therapies per order  - Administer medications as ordered  - Instruct and encourage patient and family to use good hand hygiene technique  - Identify and instruct in appropriate isolation precautions for identified infection/condition  Outcome: Progressing  Goal: Absence of fever/infection during neutropenic period  Description: INTERVENTIONS:  - Monitor WBC    Outcome: Progressing

## 2022-11-30 NOTE — ASSESSMENT & PLAN NOTE
Wt Readings from Last 3 Encounters:   11/29/22 83 6 kg (184 lb 4 8 oz)   10/26/22 90 4 kg (199 lb 4 8 oz)   10/16/22 84 1 kg (185 lb 6 4 oz)     Has a history of noncompliance with his medications and was recently seen in [facility] 11/13-11/15 for CHF exacerbation as well and at [facility] 10/26  On discharge from [facility] was instructed to take torsemide 40 mg daily  Prior to this had been increased to 20 mg b i d  on 11/9 and prior to this was on 10 mg QOD    · Patient not taking medications as prescribed as an outpatient  · Most recent echocardiogram with LVEF 30%   · Patient has life vest but is non compliant; monitor on telemetry while inpatient   · Cardiology and Nephrology following  · Status post IV diuresis, transitioned to increased dose torsemide 40 mg PO BID on 11/27   · Isordil increased to 20 mg q8h  · Continued on home dose Coreg, hydralazine  · Monitor daily weights, intake and output   · Close outpatient follow-up

## 2022-11-30 NOTE — PROGRESS NOTES
[facility]  Progress Note - Renan Delgado 1955, 74 y o  male MRN: [id number]  Unit/Bed#: CW2 216-02 Encounter: [id number]  Primary Care Provider: Lindon Caller, DO   Date and time admitted to hospital: 11/23/2022 10:54 AM    Cognitive impairment  Assessment & Plan  · Patient at times will not converse however with prompting was noted to sit up, speak to me and eat lunch  Nursing staff report that this has been present during prior admissions as well  · When seen by occupational therapy, patient did poorly on cognitive evaluation  MOCA 6/30 (11/24/22)  · Appreciate Geriatrics consultation  · OT recommending rehab vs home with 24/7 supervision upon discharge  · Given frequent readmissions both here and at [address] [place] [address] [address] secondary to noncompliance, discussed with family (brother Carolann Coffman and sister Inderjit Weldon) regarding placement on 11/27  · Patient is now agreeable to rehab, will forego neuropsych evaluation  · Patient will be discharged to rehab  He can decide to leave rehab once he is ready      CAD (coronary artery disease)  Assessment & Plan  Known multivessel severe coronary artery disease  Patient follows with [facility], felt to not be a CABG candidate as per record review, was to be optimized prior to planned PCI to circumflex and LAD    · Troponin elevation on admission due to acute heart failure and hypertension   · Most recent echocardiogram 11/8/2022 at [facility] with an EF of 30-35%  · Has a life vest but is noncompliant with this  · Continue outpatient follow-up with Cardiology  · Continue Coreg, isosorbide, hydralazine, aspirin and statin    Type 2 diabetes mellitus, with long-term current use of insulin [facility])  Assessment & Plan  Lab Results   Component Value Date    HGBA1C 7 7 (H) 10/03/2022     Recent Labs     11/29/22  1612 11/29/22  2114 11/30/22  0549 11/30/22  1141   POCGLU 163* 208* 104 132       Blood Sugar Average: Last 72 hrs:  · (P) 541 [id number]   · Outpatient on Farxiga and Novolog  · Follows with endocrinology outpatient  · Home regimen of novolog is 30 with breakfast and 10 with dinner but was having glucoses in the 30s with this regimen (giving and not eating)  · Continue current regimen Lantus 10 units QHS and humalog 5 units t i d  With meals  · Diabetic diet    Normocytic anemia  Assessment & Plan  · Baseline hemoglobin appears to be 9-10  · No signs or symptoms of bleeding at this time  · Recommend outpatient follow-up    Essential hypertension  Assessment & Plan  · BP acceptable, monitor routinely   · Continue Coreg, hydralazine and isosorbide  · Diuresis as above     Bipolar affective disorder, mixed, moderate (HCC)  Assessment & Plan  · Continue Seroquel and Prozac  · Patient is competent on my exam, he is willing to attend rehab  Although may not be in his best interests he may leave rehab at any time if he chooses to  LAZARO superimposed on CKD (chronic kidney disease) stage 4, GFR 15-29 ml/min [facility])  Assessment & Plan  Lab Results   Component Value Date    EGFR 21 11/30/2022    EGFR 22 11/29/2022    EGFR 22 11/28/2022    CREATININE 2 91 (H) 11/30/2022    CREATININE 2 84 (H) 11/29/2022    CREATININE 2 85 (H) 11/28/2022     · Baseline creatinine approximately 2 1-2 7  Follows with [facility]  · LAZARO POA with creatinine of 3 51 felt to be secondary to volume overload, cardiorenal syndrome  · Nephrology following, appreciate inputs   · Continue to monitor with diuresis, see plan above   · Creatinine stable today at 2 9    * Acute on chronic systolic congestive heart failure (HCC)  Assessment & Plan  Wt Readings from Last 3 Encounters:   11/29/22 83 6 kg (184 lb 4 8 oz)   10/26/22 90 4 kg (199 lb 4 8 oz)   10/16/22 84 1 kg (185 lb 6 4 oz)     Has a history of noncompliance with his medications and was recently seen in [facility] 11/13-11/15 for CHF exacerbation as well and at [facility] 10/26   [place] [facility] was instructed to take torsemide 40 mg daily  Prior to this had been increased to 20 mg b i d  on  and prior to this was on 10 mg QOD  · Patient not taking medications as prescribed as an outpatient  · Most recent echocardiogram with LVEF 30%   · Patient has life vest but is non compliant; monitor on telemetry while inpatient   · Cardiology and Nephrology following  · Status post IV diuresis, transitioned to increased dose torsemide 40 mg PO BID on    · Isordil increased to 20 mg q8h  · Continued on home dose Coreg, hydralazine  · Monitor daily weights, intake and output   · Close outpatient follow-up           VTE Pharmacologic Prophylaxis: VTE Score: 4 Moderate Risk (Score 3-4) - Pharmacological DVT Prophylaxis Ordered: heparin  Patient Centered Rounds: I performed bedside rounds with nursing staff today  Discussions with Specialists or Other Care Team Provider: n/a    Education and Discussions with Family / Patient: Attempted to update  (brother) via phone  Left voicemail  Time Spent for Care: 30 minutes  More than 50% of total time spent on counseling and coordination of care as described above  Current Length of Stay: 7 day(s)  Current Patient Status: Inpatient   Certification Statement: The patient will continue to require additional inpatient hospital stay due to Pending placement  Discharge Plan: Anticipate discharge in 24-48 hrs to rehab facility  Code Status: Level 1 - Full Code    Subjective:   Patient denies any acute complaints continues to be agreeable to rehab    Objective:     Vitals:   Temp (24hrs), Av 4 °F (36 9 °C), Min:98 3 °F (36 8 °C), Max:98 4 °F (36 9 °C)    Temp:  [98 3 °F (36 8 °C)-98 4 °F (36 9 °C)] 98 4 °F (36 9 °C)  HR:  [68-79] 79  Resp:  [18] 18  BP: (111-148)/(69-85) 111/69  SpO2:  [95 %-98 %] 98 %  Body mass index is 31 64 kg/m²  Input and Output Summary (last 24 hours):      Intake/Output Summary (Last 24 hours) at 2022 1558  Last data filed at 11/30/2022 0630  Gross per 24 hour   Intake 480 ml   Output 600 ml   Net -120 ml       Physical Exam:   Physical Exam  Vitals and nursing note reviewed  Constitutional:       General: He is not in acute distress  Appearance: He is well-developed  He is not ill-appearing, toxic-appearing or diaphoretic  HENT:      Head: Normocephalic and atraumatic  Eyes:      General: No scleral icterus  Conjunctiva/sclera: Conjunctivae normal    Cardiovascular:      Rate and Rhythm: Normal rate and regular rhythm  Heart sounds: No murmur heard  No friction rub  No gallop  Pulmonary:      Effort: Pulmonary effort is normal  No respiratory distress  Breath sounds: Normal breath sounds  No stridor  No wheezing, rhonchi or rales  Chest:      Chest wall: No tenderness  Abdominal:      General: There is no distension  Palpations: Abdomen is soft  There is no mass  Tenderness: There is no abdominal tenderness  There is no guarding or rebound  Hernia: No hernia is present  Musculoskeletal:         General: No swelling, tenderness, deformity or signs of injury  Cervical back: Neck supple  Skin:     General: Skin is warm and dry  Capillary Refill: Capillary refill takes less than 2 seconds  Coloration: Skin is not jaundiced or pale  Neurological:      Mental Status: He is alert and oriented to person, place, and time     Psychiatric:         Mood and Affect: Mood normal           Additional Data:     Labs:  Results from last 7 days   Lab Units 11/30/22  0556   WBC Thousand/uL 7 52   HEMOGLOBIN g/dL 10 1*   HEMATOCRIT % 32 0*   PLATELETS Thousands/uL 248   NEUTROS PCT % 59   LYMPHS PCT % 27   MONOS PCT % 7   EOS PCT % 5     Results from last 7 days   Lab Units 11/30/22  0556   SODIUM mmol/L 139   POTASSIUM mmol/L 4 1   CHLORIDE mmol/L 107   CO2 mmol/L 24   BUN mg/dL 69*   CREATININE mg/dL 2 91*   ANION GAP mmol/L 8   CALCIUM mg/dL 9 1   GLUCOSE RANDOM mg/dL 113 Results from last 7 days   Lab Units 11/30/22  1141 11/30/22  0549 11/29/22  2114 11/29/22  1612 11/29/22  1119 11/29/22  0530 11/28/22  2233 11/28/22  1611 11/28/22  1114 11/28/22  0549 11/27/22  2115 11/27/22  1543   POC GLUCOSE mg/dl 132 104 208* 163* 171* 164* 135 261* 125 98 132 130               Lines/Drains:  Invasive Devices     Peripheral Intravenous Line  Duration           Peripheral IV 11/30/22 Distal;Left;Ventral (anterior) Forearm <1 day                  Telemetry:  Telemetry Orders (From admission, onward)             LifeVest Patient: Continuous Telemetry Monitoring during hospitalization (non-expiring)  Continuous LifeVest Telemetry Monitoring        References:    LifeVest Policy                 Telemetry Reviewed: Normal Sinus Rhythm  Indication for Continued Telemetry Use: No indication for continued use  Will discontinue  Imaging: No pertinent imaging reviewed      Recent Cultures (last 7 days):         Last 24 Hours Medication List:   Current Facility-Administered Medications   Medication Dose Route Frequency Provider Last Rate   • acetaminophen  650 mg Oral Q6H PRN Stefany Shirley PA-C     • aspirin  81 mg Oral Daily EMMA Adkins     • atorvastatin  40 mg Oral HS EMMA Adkins     • carvedilol  12 5 mg Oral Q12H [facility] Andre Roberson PA-C     • FLUoxetine  40 mg Oral Daily EMMA Adkins     • gabapentin  300 mg Oral Daily EMMA Adkins     • heparin (porcine)  5,000 Units Subcutaneous [facility] EMMA Adkins     • hydrALAZINE  50 mg Oral Q8H Felipe Lee PA-C     • insulin glargine  10 Units Subcutaneous HS EMMA Adkins     • insulin lispro  1-6 Units Subcutaneous HS EMMA Slater     • insulin lispro  2-12 Units Subcutaneous TID AC EMMA Adkins     • insulin lispro  5 Units Subcutaneous TID With Meals Stefany Shirley PA-C     • isosorbide dinitrate  20 mg Oral Q8H Francine Schrader PA-C     • melatonin  4 5 mg Oral HS PRN Roddy Antes, CRNP     • QUEtiapine  100 mg Oral HS Roddy Antes, CRNP     • thiamine  100 mg Oral Daily Roddy Antes, CRNP     • torsemide  40 mg Oral BID Carrie De La Cruz PA-C          Today, Patient Was Seen By: Rocco Guerrero DO    **Please Note: This note may have been constructed using a voice recognition system  **

## 2022-11-30 NOTE — ASSESSMENT & PLAN NOTE
· Patient at times will not converse however with prompting was noted to sit up, speak to me and eat lunch  Nursing staff report that this has been present during prior admissions as well  · When seen by occupational therapy, patient did poorly on cognitive evaluation  MOCA 6/30 (11/24/22)  · Appreciate Geriatrics consultation  · OT recommending rehab vs home with 24/7 supervision upon discharge  · Given frequent readmissions both here and at [address] [place] [address] [address] secondary to noncompliance, discussed with family (brother Chao Stanley and sister Erendira Santiago) regarding placement on 11/27  · Patient is now agreeable to rehab, will forego neuropsych evaluation  · Patient will be discharged to rehab    He can decide to leave rehab once he is ready

## 2022-11-30 NOTE — PROGRESS NOTES
NEPHROLOGY PROGRESS NOTE   Yoel Benton 74 y o  male MRN: [id number]  Unit/Bed#: CW2 216-02 Encounter: [id number]  Reason for Consult: LAZARO CKd    ASSESSMENT AND PLAN:  LAZARO on CKD stage 4, baseline creatinine 2 1 to 2 7, follows with [facility]  -LAZARO suspect secondary to cardiorenal, CHF exacerbation  -admission creatinine 3 5 overall improved and now seems to have plateaued around 2 8 to 2 9, creatinine 2 9 today   -now remains on torsemide 40 mg p o  B i d , continue same  -may have to accept higher creatinine to keep patient euvolemic   -has history of prior episode of LAZARO requiring temporary dialysis in [facility]   - this admission shows no significant hematuria, trace proteinuria      Acute on chronic systolic CHF, echo shows EF 30 to 35%, cardiology follow-up  -volume status improving, weight reducing   Now remains on p o  Torsemide as above  Continue same on discharge   -continue to monitor daily weight, accurate intake and output     Anemia in CKD, hemoglobin slightly lower, continue to monitor closely, transfuse p r n  For hemoglobin less than seven      Hypertension, blood pressure overall acceptable   Currently remains on Coreg, hydralazine, Imdur for GDMT     Encephalopathy, cognitive impairment, management as per primary team      Discussed above plan in detail with primary team   Overall stable from renal standpoint for discharge  Will need close outpatient Nephrology follow-up  If gets discharged, would recommend repeat BMP in one week  SUBJECTIVE:  Patient seen and examined at bedside    Denies chest pain, shortness of breath, nausea vomiting    OBJECTIVE:  Current Weight: Weight - Scale: 83 6 kg (184 lb 4 8 oz)  Vitals:    11/30/22 1134   BP: 125/74   Pulse: 69   Resp:    Temp:    SpO2: 95%       Intake/Output Summary (Last 24 hours) at 11/30/2022 1142  Last data filed at 11/30/2022 0630  Gross per 24 hour   Intake 600 ml   Output 950 ml   Net -350 ml     Wt Readings from Last 3 Encounters: 11/29/22 83 6 kg (184 lb 4 8 oz)   10/26/22 90 4 kg (199 lb 4 8 oz)   10/16/22 84 1 kg (185 lb 6 4 oz)     Temp Readings from Last 3 Encounters:   11/30/22 98 4 °F (36 9 °C)   10/26/22 98 1 °F (36 7 °C)   10/20/22 97 8 °F (36 6 °C) (Temporal)     BP Readings from Last 3 Encounters:   11/30/22 125/74   10/26/22 135/76   10/20/22 100/60     Pulse Readings from Last 3 Encounters:   11/30/22 69   10/26/22 71   10/20/22 80        Physical Examination:  General:  Lying in bed, no acute distress   Eyes:  Mild conjunctival pallor present  ENT:  External examination of ears and nose unremarkable  Neck:  No Obvious lymphadenopathy appreciated  Respiratory:  Bilateral air entry present  CVS:  S1, S2 present  GI: , nondistended  CNS:  Active alert oriented x3  Skin:  No new rash  Musculoskeletal:  No obvious new gross deformity noted    Medications:    Current Facility-Administered Medications:   •  acetaminophen (TYLENOL) tablet 650 mg, 650 mg, Oral, Q6H PRN, Stefany Shirley PA-C  •  aspirin (ECOTRIN LOW STRENGTH) EC tablet 81 mg, 81 mg, Oral, Daily, Rosa Ring, CRNP, 81 mg at 11/30/22 0843  •  atorvastatin (LIPITOR) tablet 40 mg, 40 mg, Oral, HS, Rosa Ring, CRNP, 40 mg at 11/29/22 2138  •  carvedilol (COREG) tablet 12 5 mg, 12 5 mg, Oral, Q12H [address], Phyllislolita Guzmán PA-C, 12 5 mg at 11/30/22 8255  •  FLUoxetine (PROzac) capsule 40 mg, 40 mg, Oral, Daily, Rosa Ring, CRNP, 40 mg at 11/30/22 4182  •  gabapentin (NEURONTIN) capsule 300 mg, 300 mg, Oral, Daily, Rosa Ring, CRNP, 300 mg at 11/30/22 1724  •  heparin (porcine) subcutaneous injection 5,000 Units, 5,000 Units, Subcutaneous, Q8H [address], 5,000 Units at 11/30/22 0651 **AND** [CANCELED] Platelet count, , , Once, EMMA Manzanares  •  hydrALAZINE (APRESOLINE) tablet 50 mg, 50 mg, Oral, Q8H, Melvin Dunne PA-C  •  insulin glargine (LANTUS) subcutaneous injection 10 Units 0 1 mL, 10 Units, Subcutaneous, HS, EMMA Manzanares, 10 Units at 11/29/22 2140  •  insulin lispro (HumaLOG) 100 units/mL subcutaneous injection 1-6 Units, 1-6 Units, Subcutaneous, HS, RhodaEMMA Dye, 2 Units at 11/29/22 2141  •  insulin lispro (HumaLOG) 100 units/mL subcutaneous injection 2-12 Units, 2-12 Units, Subcutaneous, TID AC, 2 Units at 11/29/22 1654 **AND** Fingerstick Glucose (POCT), , , TID AC, EMMA Masters  •  insulin lispro (HumaLOG) 100 units/mL subcutaneous injection 5 Units, 5 Units, Subcutaneous, TID With Meals, Kings Fernando PA-C, 5 Units at 11/30/22 0844  •  isosorbide dinitrate (ISORDIL) tablet 20 mg, 20 mg, Oral, Q8H, Francine Schrader PA-C, 20 mg at 11/30/22 7865  •  melatonin tablet 4 5 mg, 4 5 mg, Oral, HS PRN, EMMA Masters, 4 5 mg at 11/28/22 2235  •  QUEtiapine (SEROquel) tablet 100 mg, 100 mg, Oral, HS, EMMA Masters, 100 mg at 11/29/22 2139  •  thiamine tablet 100 mg, 100 mg, Oral, Daily, Kishore Becerra, KYLEIGHNP, 100 mg at 11/30/22 9785  •  torsemide (DEMADEX) tablet 40 mg, 40 mg, Oral, BID, Rodney Weller PA-C, 40 mg at 11/30/22 7646    Laboratory Results:  Results from last 7 days   Lab Units 11/30/22  0556 11/29/22  0531 11/28/22  0550 11/27/22  0649 11/26/22  0444 11/25/22  0433 11/24/22  0453   WBC Thousand/uL 7 52  --   --   --   --  6 05 6 20   HEMOGLOBIN g/dL 10 1*  --   --   --   --  8 9* 8 3*   HEMATOCRIT % 32 0*  --   --   --   --  27 9* 25 7*   PLATELETS Thousands/uL 248  --   --   --   --  211 181   SODIUM mmol/L 139 138 139 138 140 138 137   POTASSIUM mmol/L 4 1 3 9 4 0 4 2 4 1 3 9 3 8   CHLORIDE mmol/L 107 107 109* 109* 108 108 109*   CO2 mmol/L 24 26 24 24 26 24 21   BUN mg/dL 69* 68* 68* 65* 70* 79* 75*   CREATININE mg/dL 2 91* 2 84* 2 85* 2 84* 3 08* 3 72* 3 46*   CALCIUM mg/dL 9 1 8 9 9 1 9 1 8 8 8 8 8 7       XR chest 1 view portable   ED Interpretation by Brooke Domínguez MD (11/23 2111)   Mild pulmonary edema  Final Result by Paige Pedraza MD (11/23 4403)      Mild pulmonary vascular congestion  Workstation performed: [id number]             Portions of the record may have been created with voice recognition software  Occasional wrong word or "sound a like" substitutions may have occurred due to the inherent limitations of voice recognition software  Read the chart carefully and recognize, using context, where substitutions have occurred

## 2022-11-30 NOTE — ASSESSMENT & PLAN NOTE
Known multivessel severe coronary artery disease  Patient follows with [facility], felt to not be a CABG candidate as per record review, was to be optimized prior to planned PCI to circumflex and LAD    · Troponin elevation on admission due to acute heart failure and hypertension   · Most recent echocardiogram 11/8/2022 at [facility] with an EF of 30-35%  · Has a life vest but is noncompliant with this  · Continue outpatient follow-up with Cardiology  · Continue Coreg, isosorbide, hydralazine, aspirin and statin

## 2022-11-30 NOTE — CASE MANAGEMENT
Case Management Discharge Planning Note    Patient name Rabia Sosa  Location [facility] 216/[facility] 822-40 MRN [id number]  : 1955 Date 2022       Current Admission Date: 2022  Current Admission Diagnosis:Acute on chronic systolic congestive heart failure [facility])   Patient Active Problem List    Diagnosis Date Noted   • Cognitive impairment 2022   • CAD (coronary artery disease) 2022   • Weight gain 10/25/2022   • Generalized weakness 10/25/2022   • Acute kidney [facility] superimposed on CKD ([facility] ) 2022   • Orthostatic hypotension 2022   • Acute on chronic combined systolic and diastolic heart failure ([facility] ) 2022   • Persistent proteinuria 2022   • Transaminitis 2022   • Pleural effusion, bacterial 2022   • Hyperglycemia, unspecified    • Metabolic acidosis    • Diarrhea 2022   • Type 2 diabetes mellitus, without long-term current use of insulin ([facility] ) 2022   • High serum thyroid stimulating hormone (TSH) 2022   • Weakness 2022   • Ischemic cardiomyopathy 2022   • Polysubstance abuse ([facility] ) 2022   • Mixed hyperlipidemia 2022   • Stage 3b chronic kidney disease ([facility] ) 2022   • Acute on chronic systolic congestive heart failure ([facility] ) 2022   • Acute on chronic systolic congestive heart failure ([facility] ) 2022   • Acute hepatitis C virus infection 2022   • Abnormal EKG 2022   • LAZARO superimposed on CKD (chronic kidney disease) stage 4, GFR 15-29 ml/min ([facility] ) 2022   • Normocytic anemia 2022   • Acute kidney injury ([facility] ) 2022   • Hyponatremia 2022   • Uncontrolled type 2 diabetes mellitus with hyperglycemia ([facility] ) 2022   • Essential hypertension 2015   • Type 2 diabetes mellitus, with long-term current use of insulin ([facility] ) 2012   • Bipolar affective disorder, mixed, moderate ([facility] ) 2011      LOS (days): 7  Geometric Mean LOS (GMLOS) (days):   Days to GMLOS:     OBJECTIVE:  Risk of Unplanned Readmission Score: 53 98         Current admission status: Inpatient   Preferred Pharmacy:   [facility], [address] [place] [address]  Phone: [phone] Fax: [phone]    Primary Care Provider: Murray Lehman DO    Primary Insurance: [address] [facility]  Secondary Insurance:     DISCHARGE DETAILS:                 Additional Comments: Spoke to brother John Castañeda, [phone]  He indicated that he provides paid caregiving for patient at home, and that he is working with patient's , Therese Garber, [phone], to increase patient's paid hours to 24/7  CM spoke with Joey Paz, he confirmed that patient is currently at 70 hours/week and he is submitting paperwork today to increase  CM to fax additional clinical to him at [phone]  Discussed with Remo Fothergill and sister Coco Baptiste, who also provides care for patient, that they are open to have patient go to [facility] until at-home hours are increased, and they agree with accepting bed at [facility]  Spoke with liaison at [employer] who agreed to start insurance authorization  Received message several hours later via [place] that [facility] can actually not accept patient because they are not in par with patient's insurance  Left voicemail message for Remo Fothergill explaining same, notified provider  Additional referrals placed via [place] with deadline of tomorrow at 10 AM   CM to follow

## 2022-11-30 NOTE — ASSESSMENT & PLAN NOTE
Lab Results   Component Value Date    HGBA1C 7 7 (H) 10/03/2022     Recent Labs     11/29/22  1612 11/29/22  2114 11/30/22  0549 11/30/22  1141   POCGLU 163* 208* 104 132       Blood Sugar Average: Last 72 hrs:  · (P) 132 [id number]   · Outpatient on Farxiga and Novolog  · Follows with endocrinology outpatient  · Home regimen of novolog is 30 with breakfast and 10 with dinner but was having glucoses in the 30s with this regimen (giving and not eating)  · Continue current regimen Lantus 10 units QHS and humalog 5 units t i d   With meals  · Diabetic diet

## 2022-12-01 ENCOUNTER — APPOINTMENT (INPATIENT)
Dept: NON INVASIVE DIAGNOSTICS | Facility: HOSPITAL | Age: 67
End: 2022-12-01

## 2022-12-01 LAB
AORTIC ROOT: 3.3 CM
E WAVE DECELERATION TIME: 237 MS
FRACTIONAL SHORTENING: 11 % (ref 28–44)
GLUCOSE SERPL-MCNC: 138 MG/DL (ref 65–140)
GLUCOSE SERPL-MCNC: 140 MG/DL (ref 65–140)
GLUCOSE SERPL-MCNC: 140 MG/DL (ref 65–140)
GLUCOSE SERPL-MCNC: 143 MG/DL (ref 65–140)
GLUCOSE SERPL-MCNC: 214 MG/DL (ref 65–140)
INTERVENTRICULAR SEPTUM IN DIASTOLE (PARASTERNAL SHORT AXIS VIEW): 1 CM
INTERVENTRICULAR SEPTUM: 1 CM (ref 0.6–1.1)
LEFT ATRIUM SIZE: 3.7 CM
LEFT INTERNAL DIMENSION IN SYSTOLE: 4.2 CM (ref 2.1–4)
LEFT VENTRICULAR INTERNAL DIMENSION IN DIASTOLE: 4.7 CM (ref 3.5–6)
LEFT VENTRICULAR POSTERIOR WALL IN END DIASTOLE: 0.9 CM
LEFT VENTRICULAR STROKE VOLUME: 28 ML
LVSV (TEICH): 28 ML
MV PEAK A VEL: 0.63 M/S
MV PEAK E VEL: 51 CM/S
MV STENOSIS PRESSURE HALF TIME: 69 MS
MV VALVE AREA P 1/2 METHOD: 3.19 CM2
SL CV LV EF: 36
SL CV PED ECHO LEFT VENTRICLE DIASTOLIC VOLUME (MOD BIPLANE) 2D: 104 ML
SL CV PED ECHO LEFT VENTRICLE SYSTOLIC VOLUME (MOD BIPLANE) 2D: 76 ML
TR MAX PG: 22 MMHG
TR PEAK VELOCITY: 2.4 M/S
TRICUSPID VALVE PEAK REGURGITATION VELOCITY: 2.37 M/S

## 2022-12-01 RX ORDER — ONDANSETRON 2 MG/ML
4 INJECTION INTRAMUSCULAR; INTRAVENOUS EVERY 8 HOURS PRN
Status: DISCONTINUED | OUTPATIENT
Start: 2022-12-01 | End: 2022-12-06 | Stop reason: HOSPADM

## 2022-12-01 RX ADMIN — ISOSORBIDE DINITRATE 20 MG: 20 TABLET ORAL at 22:30

## 2022-12-01 RX ADMIN — CARVEDILOL 12.5 MG: 12.5 TABLET, FILM COATED ORAL at 08:41

## 2022-12-01 RX ADMIN — HEPARIN SODIUM 5000 UNITS: 5000 INJECTION INTRAVENOUS; SUBCUTANEOUS at 13:57

## 2022-12-01 RX ADMIN — INSULIN LISPRO 5 UNITS: 100 INJECTION, SOLUTION INTRAVENOUS; SUBCUTANEOUS at 11:06

## 2022-12-01 RX ADMIN — QUETIAPINE FUMARATE 100 MG: 100 TABLET ORAL at 21:15

## 2022-12-01 RX ADMIN — ISOSORBIDE DINITRATE 20 MG: 20 TABLET ORAL at 06:47

## 2022-12-01 RX ADMIN — HYDRALAZINE HYDROCHLORIDE 50 MG: 50 TABLET, FILM COATED ORAL at 22:29

## 2022-12-01 RX ADMIN — INSULIN GLARGINE 10 UNITS: 100 INJECTION, SOLUTION SUBCUTANEOUS at 21:15

## 2022-12-01 RX ADMIN — GABAPENTIN 300 MG: 300 CAPSULE ORAL at 08:41

## 2022-12-01 RX ADMIN — FLUOXETINE HYDROCHLORIDE 40 MG: 20 CAPSULE ORAL at 08:41

## 2022-12-01 RX ADMIN — CARVEDILOL 12.5 MG: 12.5 TABLET, FILM COATED ORAL at 20:49

## 2022-12-01 RX ADMIN — INSULIN LISPRO 5 UNITS: 100 INJECTION, SOLUTION INTRAVENOUS; SUBCUTANEOUS at 08:44

## 2022-12-01 RX ADMIN — HYDRALAZINE HYDROCHLORIDE 50 MG: 50 TABLET, FILM COATED ORAL at 06:47

## 2022-12-01 RX ADMIN — THIAMINE HCL TAB 100 MG 100 MG: 100 TAB at 08:41

## 2022-12-01 RX ADMIN — ACETAMINOPHEN 650 MG: 325 TABLET ORAL at 17:20

## 2022-12-01 RX ADMIN — TORSEMIDE 40 MG: 20 TABLET ORAL at 17:20

## 2022-12-01 RX ADMIN — TORSEMIDE 40 MG: 20 TABLET ORAL at 08:41

## 2022-12-01 RX ADMIN — HEPARIN SODIUM 5000 UNITS: 5000 INJECTION INTRAVENOUS; SUBCUTANEOUS at 06:47

## 2022-12-01 RX ADMIN — HEPARIN SODIUM 5000 UNITS: 5000 INJECTION INTRAVENOUS; SUBCUTANEOUS at 21:15

## 2022-12-01 RX ADMIN — INSULIN LISPRO 4 UNITS: 100 INJECTION, SOLUTION INTRAVENOUS; SUBCUTANEOUS at 11:05

## 2022-12-01 RX ADMIN — HYDRALAZINE HYDROCHLORIDE 50 MG: 50 TABLET, FILM COATED ORAL at 13:57

## 2022-12-01 RX ADMIN — ATORVASTATIN CALCIUM 40 MG: 40 TABLET, FILM COATED ORAL at 21:14

## 2022-12-01 RX ADMIN — ONDANSETRON 4 MG: 2 INJECTION INTRAMUSCULAR; INTRAVENOUS at 20:49

## 2022-12-01 RX ADMIN — ISOSORBIDE DINITRATE 20 MG: 20 TABLET ORAL at 13:57

## 2022-12-01 RX ADMIN — ASPIRIN 81 MG: 81 TABLET, COATED ORAL at 08:41

## 2022-12-01 NOTE — ASSESSMENT & PLAN NOTE
Known multivessel severe coronary artery disease  Patient follows with [facility], felt to not be a CABG candidate as per record review, was to be optimized prior to planned PCI to circumflex and LAD    · Troponin elevation on admission due to acute heart failure and hypertension   · Most recent echocardiogram 11/8/2022 at [facility] with an EF of 30-35%  · Has a life vest but is noncompliant with this  · Continue outpatient follow-up with Cardiology  · Continue Coreg, isosorbide, hydralazine, aspirin and statin

## 2022-12-01 NOTE — CASE MANAGEMENT
Case Management Discharge Planning Note    Patient name Yashira [facility]  Location [facility] 216/[facility] 061-47 MRN [id number]  : 1955 Date 2022       Current Admission Date: 2022  Current Admission Diagnosis:Acute on chronic systolic congestive heart failure [facility])   Patient Active Problem List    Diagnosis Date Noted   • Cognitive impairment 2022   • CAD (coronary artery disease) 2022   • Weight gain 10/25/2022   • Generalized weakness 10/25/2022   • Acute kidney injury superimposed on CKD ([facility] ) 2022   • Orthostatic hypotension 2022   • Acute on chronic combined systolic and diastolic heart failure ([facility] ) 2022   • Persistent proteinuria 2022   • Transaminitis 2022   • Pleural effusion, bacterial 2022   • Hyperglycemia, unspecified    • Metabolic acidosis    • Diarrhea 2022   • Type 2 diabetes mellitus, without long-term current use of insulin ([facility] ) 2022   • High serum thyroid stimulating hormone (TSH) 2022   • Weakness 2022   • Ischemic cardiomyopathy 2022   • Polysubstance abuse ([facility] ) 2022   • Mixed hyperlipidemia 2022   • Stage 3b chronic kidney disease ([facility] ) 2022   • Acute on chronic systolic congestive heart failure ([facility] ) 2022   • Acute on chronic systolic congestive heart failure [facility] 2022   • Acute hepatitis C virus infection 2022   • Abnormal EKG 2022   • LAZARO superimposed on CKD (chronic kidney disease) stage 4, GFR 15-29 ml/min ([facility] ) 2022   • Normocytic anemia 2022   • Acute kidney injury ([facility] ) 2022   • Hyponatremia 2022   • Uncontrolled type 2 diabetes mellitus with hyperglycemia ([facility] ) 2022   • Essential hypertension 2015   • Type 2 diabetes mellitus, with long-term current use of insulin [facility] ) 2012   • Bipolar affective disorder, mixed, moderate ([facility] ) 2011      LOS (days): 8  Geometric Mean LOS (GMLOS) (days):   Days to GMLOS:     OBJECTIVE:  Risk of Unplanned Readmission Score: 54 27         Current admission status: Inpatient   Preferred Pharmacy:   [address] [place] [address], [address] [address]  Phone: [phone] Fax: [phone]    Primary Care Provider: Oanh Yadav DO    Primary Insurance: [address] [place] [address]  Secondary Insurance:     DISCHARGE DETAILS:     IMM Given (Date):: 12/01/22  IMM Given to[de-identified] Patient  Family notified[de-identified] Pt alert at bedside

## 2022-12-01 NOTE — ASSESSMENT & PLAN NOTE
Wt Readings from Last 3 Encounters:   12/01/22 83 1 kg (183 lb 3 2 oz)   10/26/22 90 4 kg (199 lb 4 8 oz)   10/16/22 84 1 kg (185 lb 6 4 oz)     Has a history of noncompliance with his medications and was recently seen in [facility] 11/13-11/15 for CHF exacerbation as well and at [facility] 10/26  On discharge from [facility] was instructed to take torsemide 40 mg daily  Prior to this had been increased to 20 mg b i d  on 11/9 and prior to this was on 10 mg QOD    · Patient not taking medications as prescribed as an outpatient  · Most recent echocardiogram with LVEF 30%   · Patient has life vest but is non compliant; monitor on telemetry while inpatient   · Cardiology and Nephrology following  · Status post IV diuresis, transitioned to increased dose torsemide 40 mg PO BID on 11/27   · Isordil increased to 20 mg q8h  · Continued on home dose Coreg, hydralazine  · Monitor daily weights, intake and output   · Close outpatient follow-up

## 2022-12-01 NOTE — OCCUPATIONAL THERAPY NOTE
Occupational Therapy Progress Note     Patient Name: Leslie Tidwell  Today's Date: 12/1/2022  Problem List  Principal Problem:    Acute on chronic systolic congestive heart failure ([address] )  Active Problems:    LAZARO superimposed on CKD (chronic kidney disease) stage 4, GFR 15-29 ml/min (HCC)    Bipolar affective disorder, mixed, moderate (HCC)    Essential hypertension    Normocytic anemia    Type 2 diabetes mellitus, with long-term current use of insulin ([facility])    CAD (coronary artery disease)    Cognitive impairment          12/01/22 0840   OT Last Visit   OT Visit Date 12/01/22   Note Type   Note Type Treatment   Pain Assessment   Pain Assessment Tool FLACC   Pain Location/Orientation Location: Neck   Effect of Pain on Daily Activities Impacts ability to engage in valued occupations   Hospital Pain Intervention(s) Repositioned; Ambulation/increased activity; Emotional support   Pain Rating: FLACC (Rest) - Face 0   Pain Rating: FLACC (Rest) - Legs 0   Pain Rating: FLACC (Rest) - Activity 0   Pain Rating: FLACC (Rest) - Cry 0   Pain Rating: FLACC (Rest) - Consolability 0   Score: FLACC (Rest) 0   Pain Rating: FLACC (Activity) - Face 1   Pain Rating: FLACC (Activity) - Legs 0   Pain Rating: FLACC (Activity) - Activity 1   Pain Rating: FLACC (Activity) - Cry 1   Pain Rating: FLACC (Activity) - Consolability 0   Score: FLACC (Activity) 3   Restrictions/Precautions   Weight Bearing Precautions Per Order No   Other Precautions Cognitive; Chair Alarm; Bed Alarm;Multiple lines;Telemetry; Fall Risk;Pain   Lifestyle   Autonomy Pt's brother present at end of session and reports that the pt lives with him and he is home with him all the time  States that he typically is I with ADLs however he does assist him sometimes as needed  Pt's brother also states that he requires more assistance whe nhe stops taking medication and eats poorly   Reciprocal Relationships Supportive brother   Service to Others retired    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   ADL   Where Assessed Standing at sink   Eating Assistance 5  Supervision/Setup   Grooming Assistance 5  Supervision/Setup   UB [address] 5  Supervision/Setup   UB Bathing Deficit Setup;Steadying;Verbal cueing;Supervision/safety; Increased time to complete; Chest;Right arm;Left arm; Abdomen   UB Dressing Assistance 4  Minimal Assistance   UB Dressing Deficit Setup;Steadying;Verbal cueing;Supervision/safety; Increased time to complete; Thread RUE; Thread LUE;Pull over head;Pull around back;Pull down in back   LB Dressing Assistance 3  Moderate Assistance   LB Dressing Deficit Setup;Steadying; Requires assistive device for steadying;Verbal cueing;Supervision/safety; Increased time to complete; Thread RLE into underwear; Thread LLE into underwear; Thread RLE into pants; Thread LLE into pants;Pull up over hips; Fasteners   Toileting Assistance  5  Supervision/Setup   Toileting Deficit Setup;Steadying;Verbal cueing;Supervison/safety; Increased time to complete;Grab bar use;Clothing management up;Clothing management down;Perineal hygiene   Bed Mobility   Supine to Sit 5  Supervision   Additional items Impulsive;Verbal cues   Sit to Supine 5  Supervision   Additional items Increased time required; Impulsive;Verbal cues   Additional Comments In beginning of session, pt found sitting EOB, eating breakfast; during session, pt performing seated EOB ADLs in which pt reporting increased neck pain and fatigue, impulsively returning to supine position for rest break; pt performed supine <> sit with supervision after rest break and was left sitting upright in recliner @ end of session with all functional needs in reach with chair alarm activated   Transfers   Sit to Stand 4  Minimal assistance   Additional items Assist x 1; Increased time required;Verbal cues; Impulsive   Stand to Sit 4  Minimal assistance   Additional items Assist x 1; Increased time required; Impulsive;Verbal cues   Toilet transfer 4  Minimal assistance   Additional items Assist x 1; Increased time required; Impulsive;Verbal cues;Standard toilet   Additional Comments w/ RW; pt demonstrating impulsivity @ times, requiring verbal cues for proper hand placement, safety, RW management, and controlled descent/hand placement when returning to seated position   Functional Mobility   Functional Mobility 4  Minimal assistance   Additional Comments Pt completed functional mobility <> bathroom w/ Min A x1 w/ RW for safety and support with verbal cues for hand placement, proper technique, safety, and RW management t/o   Additional items Rolling walker   Toilet Transfers   Toilet Transfer From Rolling walker   Toilet Transfer Type To and from   Toilet Transfer to Standard toilet   Toilet Transfer Technique Ambulating   Toilet Transfers Minimal assistance   Subjective   Subjective ""You guys tired me out, when I heard therapy I am going to run "   Cognition   Overall Cognitive Status Impaired   Arousal/Participation Cooperative   Attention Attends with cues to redirect   Orientation Level Oriented to person;Oriented to time   Memory Decreased recall of precautions;Decreased recall of recent events   Following Commands Follows one step commands with increased time or repetition   Comments Pt pleasant and cooperative t/o session today, however demonstrating decreased safety awareness, insight, and judgement t/o session as pt required verbal cues for safety, proper technique, RW management, and energy conservation/pacing techniques t/o; @ times; @ times, pt presenting w/ impulsivity, forgetting walker in bathroom and impulsively laying back down during seated ADLs   Activity Tolerance   Activity Tolerance Patient limited by fatigue;Patient limited by pain   Medical Staff Made Aware JOAQUIN Barry, SPT H[place]; RN cleared   Assessment   Assessment Pt is a 75 yo male who actively participated in skilled OT session on 12/1/2022   Pt seen for co-treat with PT to increase safety, decrease fall risk, and maximize functional/occupational performance 2* medical complexity which is a regression from pt's functional baseline  Upon arrival, pt found sitting EOB, eating breakfast and was agreeable to OT session  Pt completed functional mobility <> bathroom and completed toilet transfer w/ Min A and toileting hygiene tasks w/ supervision  Pt stood sinkside to complete UB dressing/bathing and grooming tasks in which pt requiring Min A for dressing  During standing, pt reporting increased fatigue, requesting seated rest break in which pt provided w/ education regarding energy conservation techniques and decreasing fall risk however pt demonstrating poor carryover @ times  Pt completed seated LB dressing tasks w/ Mod A 2* decreased functional reach and required verbal cues for sequencing  Pt impulsively returning to supine position 2* neck pain in which pt required 2-3 minute rest break  Pt performing supine <> sit with supervision and completed STS to chair w/ Min A x1 w/ RW  At the end of the session, pt was left sitting upright in recliner with all functional needs in reach  Pt demonstrates gradual functional improvements towards OT goals however continues to be functioning below baseline and is still limited by the following limitations/impairments which were addressed through skilled instruction: generalized weakness, balance, endurance/activity tolerance, postural/trunk control, strength, pain, and safety awareness  At this time, recommend discharge to post-acute rehab when medically stable  The patient's raw score on the AM-PAC Daily Activity inpatient short form is 17, standardized score is 37 26, less than 39 4  Patients at this level are likely to benefit from discharge to post-acute rehabilitation services  Please refer to the recommendation of the Occupational Therapist for safe discharge planning  Please refer to the recommendation of the Occupational Therapist for safe discharge planning  Established OT goals will be continued 2-3x/wk to address immediate acute care needs and underlying performance skills to maximize occupational performance and safety to return to PLOF  Plan   Treatment Interventions ADL retraining;Functional transfer training;UE strengthening/ROM; Endurance training;Cognitive reorientation;Patient/family training;Equipment evaluation/education; Compensatory technique education;Continued evaluation; Energy conservation; Activityengagement   Goal Expiration Date 12/04/22   OT Treatment Day 3   OT Frequency 2-3x/wk   Recommendation   OT Discharge Recommendation Post acute rehabilitation services   AM-PAC Daily Activity Inpatient   Lower Body Dressing 2   Bathing 2   Toileting 3   Upper Body Dressing 3   Grooming 3   Eating 4   Daily Activity Raw Score 17   Daily Activity Standardized Score (Calc for Raw Score >=11) 37 26   AM-PAC Applied Cognition Inpatient   Following a Speech/Presentation 2   Understanding Ordinary Conversation 4   Taking Medications 2   Remembering Where Things Are Placed or Put Away 2   Remembering List of 4-5 Errands 2   Taking Care of Complicated Tasks 1   Applied Cognition Raw Score 13   Applied Cognition Standardized Score 30 46   End of Consult   Education Provided Yes   Patient Position at End of Consult Bedside chair;Bed/Chair alarm activated; All needs within reach   Nurse Communication Nurse aware of consult     Tessa Pacheco MS, OTR/L

## 2022-12-01 NOTE — ASSESSMENT & PLAN NOTE
Lab Results   Component Value Date    HGBA1C 7 7 (H) 10/03/2022     Recent Labs     11/30/22  1656 11/30/22 2050 12/01/22  0646 12/01/22  1105   POCGLU 126 96 143* 214*       Blood Sugar Average: Last 72 hrs:  · (P) 152 [id number]   · Outpatient on Farxiga and Novolog  · Follows with endocrinology outpatient  · Home regimen of novolog is 30 with breakfast and 10 with dinner but was having glucoses in the 30s with this regimen (giving and not eating)  · Continue current regimen Lantus 10 units QHS and humalog 5 units t i d   With meals  · Diabetic diet

## 2022-12-01 NOTE — PLAN OF CARE
Problem: PHYSICAL THERAPY ADULT  Goal: Performs mobility at highest level of function for planned discharge setting  See evaluation for individualized goals  Description: Treatment/Interventions: Functional transfer training, LE strengthening/ROM, Therapeutic exercise, Endurance training, Cognitive reorientation, Patient/family training, Equipment eval/education, Bed mobility, Gait training, Spoke to nursing, OT (Simultaneous filing  User may not have seen previous data )  Equipment Recommended: Susie Cardona (Simultaneous filing  User may not have seen previous data )       See flowsheet documentation for full assessment, interventions and recommendations  Outcome: Progressing  Note: Prognosis: Fair (Simultaneous filing  User may not have seen previous data )  Problem List: Decreased strength, Decreased endurance, Impaired balance, Decreased mobility, Decreased coordination, Decreased cognition, Impaired judgement, Decreased safety awareness, Pain (Simultaneous filing  User may not have seen previous data )  Assessment: Pt seated EOB upon arrival eating breakfast  Pt tx session focused on bed mobility, transfer training, improving activity tolerance, improving endurance, gait training, LE strengthening, and cognitive reorientation  Pt non compliant with RW usage and continues to require VC for proper management  Pt required increased A this session for transfers from S level to MinAx1 with RW  Pt ambulated with RW household distance and demonstrated unsteadiness on feet, poor activity tolerance, and fatigue/weakness requiring need to sit down  Pt continues to present with cognitive deficits and impulsivity  Pt educated on energy conservation and safety in home when symptoms arise  Pt did not demonstrated evidence of learning  Pt ended session seated in chair, chair alarm on, all needs in reach    Performed at least 2 patient identifiers during session: Name and Cindi Saba The patient's [address] [place] [address] Mobility Inpatient Short Form Raw Score is 20  A Raw score of greater than 16 suggests the patient may benefit from discharge to home, however due to pt cognitive status, medical status, and decreased social support, pt would benefit from post acute services  Please also refer to the recommendation of the Physical Therapist for safe discharge planning  Pt would benefit from continued inpatient skilled physical therapy to address said deficits and to maximum patient functional mobility  PT recommendation is for pt to be D/C with post acute rehab services  (Simultaneous filing  User may not have seen previous data )  Barriers to Discharge: Decreased caregiver support, Inaccessible home environment (Simultaneous filing  User may not have seen previous data )     PT Discharge Recommendation: Post acute rehabilitation services (Simultaneous filing  User may not have seen previous data )    See flowsheet documentation for full assessment

## 2022-12-01 NOTE — PLAN OF CARE
Problem: OCCUPATIONAL THERAPY ADULT  Goal: Performs self-care activities at highest level of function for planned discharge setting  See evaluation for individualized goals  Description: Treatment Interventions: ADL retraining, Functional transfer training, UE strengthening/ROM, Endurance training, Cognitive reorientation, Patient/family training, Equipment evaluation/education, Compensatory technique education, Continued evaluation, Energy conservation, Activityengagement  Equipment Recommended: Shower/Tub chair with back ($)       See flowsheet documentation for full assessment, interventions and recommendations  Outcome: Progressing  Note: Limitation: Decreased ADL status, Decreased UE strength, Decreased Safe judgement during ADL, Decreased cognition, Decreased endurance, Decreased self-care trans, Decreased high-level ADLs  Prognosis: Fair  Assessment: Pt is a 73 yo male who actively participated in skilled OT session on 12/1/2022  Pt seen for co-treat with PT to increase safety, decrease fall risk, and maximize functional/occupational performance 2* medical complexity which is a regression from pt's functional baseline  Upon arrival, pt found sitting EOB, eating breakfast and was agreeable to OT session  Pt completed functional mobility <> bathroom and completed toilet transfer w/ Min A and toileting hygiene tasks w/ supervision  Pt stood sinkside to complete UB dressing/bathing and grooming tasks in which pt requiring Min A for dressing  During standing, pt reporting increased fatigue, requesting seated rest break in which pt provided w/ education regarding energy conservation techniques and decreasing fall risk however pt demonstrating poor carryover @ times  Pt completed seated LB dressing tasks w/ Mod A 2* decreased functional reach and required verbal cues for sequencing  Pt impulsively returning to supine position 2* neck pain in which pt required 2-3 minute rest break   Pt performing supine <> sit with supervision and completed STS to chair w/ Min A x1 w/ RW  At the end of the session, pt was left sitting upright in recliner with all functional needs in reach  Pt demonstrates gradual functional improvements towards OT goals however continues to be functioning below baseline and is still limited by the following limitations/impairments which were addressed through skilled instruction: generalized weakness, balance, endurance/activity tolerance, postural/trunk control, strength, pain, and safety awareness  At this time, recommend discharge to post-acute rehab when medically stable  The patient's raw score on the -PAC Daily Activity inpatient short form is 17, standardized score is 37 26, less than 39 4  Patients at this level are likely to benefit from discharge to post-acute rehabilitation services  Please refer to the recommendation of the Occupational Therapist for safe discharge planning  Please refer to the recommendation of the Occupational Therapist for safe discharge planning  Established OT goals will be continued 2-3x/wk to address immediate acute care needs and underlying performance skills to maximize occupational performance and safety to return to PLOF    Recommendation: (S) Geriatric Consult  OT Discharge Recommendation: Post acute rehabilitation services

## 2022-12-01 NOTE — PROGRESS NOTES
[facility]  Progress Note - Irena Eng 1955, 74 y o  male MRN: [id number]  Unit/Bed#: CW2 216-02 Encounter: [id number]  Primary Care Provider: August Bueno DO   Date and time admitted to hospital: 11/23/2022 10:54 AM    Cognitive impairment  Assessment & Plan  · Patient at times will not converse however with prompting was noted to sit up, speak to me and eat lunch  Nursing staff report that this has been present during prior admissions as well  · When seen by occupational therapy, patient did poorly on cognitive evaluation  MOCA 6/30 (11/24/22)  · Appreciate Geriatrics consultation  · OT recommending rehab vs home with 24/7 supervision upon discharge  · Given frequent readmissions both here and at [facility] secondary to noncompliance, discussed with family (brother Suzette Leon and sister Libby Bone) regarding placement on 11/27  · Patient is now agreeable to rehab, will forego neuropsych evaluation  · Patient will be discharged to rehab  He can decide to leave rehab once he is ready      CAD (coronary artery disease)  Assessment & Plan  Known multivessel severe coronary artery disease  Patient follows with [facility], felt to not be a CABG candidate as per record review, was to be optimized prior to planned PCI to circumflex and LAD    · Troponin elevation on admission due to acute heart failure and hypertension   · Most recent echocardiogram 11/8/2022 at [facility] with an EF of 30-35%  · Has a life vest but is noncompliant with this  · Continue outpatient follow-up with Cardiology  · Continue Coreg, isosorbide, hydralazine, aspirin and statin    Type 2 diabetes mellitus, with long-term current use of insulin [facility])  Assessment & Plan  Lab Results   Component Value Date    HGBA1C 7 7 (H) 10/03/2022     Recent Labs     11/30/22  1656 11/30/22  2050 12/01/22  0646 12/01/22  1105   POCGLU 126 96 143* 214*       Blood Sugar Average: Last 72 hrs:  · (P) 732 [id number]   · Outpatient on Farxiga and Novolog  · Follows with endocrinology outpatient  · Home regimen of novolog is 30 with breakfast and 10 with dinner but was having glucoses in the 30s with this regimen (giving and not eating)  · Continue current regimen Lantus 10 units QHS and humalog 5 units t i d  With meals  · Diabetic diet    Normocytic anemia  Assessment & Plan  · Baseline hemoglobin appears to be 9-10  · No signs or symptoms of bleeding at this time  · Recommend outpatient follow-up    Essential hypertension  Assessment & Plan  · BP acceptable, monitor routinely   · Continue Coreg, hydralazine and isosorbide  · Diuresis as above     Bipolar affective disorder, mixed, moderate (HCC)  Assessment & Plan  · Continue Seroquel and Prozac  · Patient is competent on my exam, he is willing to attend rehab  Although may not be in his best interests he may leave rehab at any time if he chooses to  LAZARO superimposed on CKD (chronic kidney disease) stage 4, GFR 15-29 ml/min [facility])  Assessment & Plan  Lab Results   Component Value Date    EGFR 21 11/30/2022    EGFR 22 11/29/2022    EGFR 22 11/28/2022    CREATININE 2 91 (H) 11/30/2022    CREATININE 2 84 (H) 11/29/2022    CREATININE 2 85 (H) 11/28/2022     · Baseline creatinine approximately 2 1-2 7  Follows with [facility]  · LAZARO POA with creatinine of 3 51 felt to be secondary to volume overload, cardiorenal syndrome  · Nephrology following, appreciate inputs   · Continue to monitor with diuresis, see plan above   · Creatinine stable today at 2 9    * Acute on chronic systolic congestive heart failure (HCC)  Assessment & Plan  Wt Readings from Last 3 Encounters:   12/01/22 83 1 kg (183 lb 3 2 oz)   10/26/22 90 4 kg (199 lb 4 8 oz)   10/16/22 84 1 kg (185 lb 6 4 oz)     Has a history of noncompliance with his medications and was recently seen in [facility] 11/13-11/15 for CHF exacerbation as well and at [facility] [place] 10/26   On discharge from [facility] was instructed to take torsemide 40 mg daily  Prior to this had been increased to 20 mg b i d  on  and prior to this was on 10 mg QOD  · Patient not taking medications as prescribed as an outpatient  · Most recent echocardiogram with LVEF 30%   · Patient has life vest but is non compliant; monitor on telemetry while inpatient   · Cardiology and Nephrology following  · Status post IV diuresis, transitioned to increased dose torsemide 40 mg PO BID on    · Isordil increased to 20 mg q8h  · Continued on home dose Coreg, hydralazine  · Monitor daily weights, intake and output   · Close outpatient follow-up         VTE Pharmacologic Prophylaxis: VTE Score: 4 Moderate Risk (Score 3-4) - Pharmacological DVT Prophylaxis Ordered: heparin  Patient Centered Rounds: I performed bedside rounds with nursing staff today  Discussions with Specialists or Other Care Team Provider: nephrology    Education and Discussions with Family / Patient: Attempted to update  (brother) via phone  Left voicemail  Time Spent for Care: 30 minutes  More than 50% of total time spent on counseling and coordination of care as described above  Current Length of Stay: 8 day(s)  Current Patient Status: Inpatient   Certification Statement: The patient will continue to require additional inpatient hospital stay due to Pending placement  Discharge Plan: Anticipate discharge in 24-48 hrs to rehab facility  Code Status: Level 1 - Full Code    Subjective:   Patient denies any acute complaints    Objective:     Vitals:   Temp (24hrs), Av 3 °F (36 8 °C), Min:98 2 °F (36 8 °C), Max:98 4 °F (36 9 °C)    Temp:  [98 2 °F (36 8 °C)-98 4 °F (36 9 °C)] 98 2 °F (36 8 °C)  HR:  [66-79] 71  Resp:  [16] 16  BP: (111-148)/(69-84) 119/72  SpO2:  [94 %-98 %] 96 %  Body mass index is 31 45 kg/m²  Input and Output Summary (last 24 hours):      Intake/Output Summary (Last 24 hours) at 2022 1410  Last data filed at 2022 8639  Gross per 24 hour   Intake 120 ml   Output 400 ml   Net -280 ml       Physical Exam:   Physical Exam  Vitals and nursing note reviewed  Constitutional:       General: He is not in acute distress  Appearance: He is well-developed  He is not ill-appearing, toxic-appearing or diaphoretic  HENT:      Head: Normocephalic and atraumatic  Eyes:      General: No scleral icterus  Conjunctiva/sclera: Conjunctivae normal    Cardiovascular:      Rate and Rhythm: Normal rate and regular rhythm  Heart sounds: No murmur heard  No friction rub  No gallop  Pulmonary:      Effort: Pulmonary effort is normal  No respiratory distress  Breath sounds: Normal breath sounds  No stridor  No wheezing, rhonchi or rales  Chest:      Chest wall: No tenderness  Abdominal:      General: There is no distension  Palpations: Abdomen is soft  There is no mass  Tenderness: There is no abdominal tenderness  There is no guarding or rebound  Hernia: No hernia is present  Musculoskeletal:         General: No swelling, tenderness, deformity or signs of injury  Cervical back: Neck supple  Skin:     General: Skin is warm and dry  Capillary Refill: Capillary refill takes less than 2 seconds  Coloration: Skin is not jaundiced or pale  Neurological:      Mental Status: He is alert and oriented to person, place, and time     Psychiatric:         Mood and Affect: Mood normal           Additional Data:     Labs:  Results from last 7 days   Lab Units 11/30/22  0556   WBC Thousand/uL 7 52   HEMOGLOBIN g/dL 10 1*   HEMATOCRIT % 32 0*   PLATELETS Thousands/uL 248   NEUTROS PCT % 59   LYMPHS PCT % 27   MONOS PCT % 7   EOS PCT % 5     Results from last 7 days   Lab Units 11/30/22  0556   SODIUM mmol/L 139   POTASSIUM mmol/L 4 1   CHLORIDE mmol/L 107   CO2 mmol/L 24   BUN mg/dL 69*   CREATININE mg/dL 2 91*   ANION GAP mmol/L 8   CALCIUM mg/dL 9 1   GLUCOSE RANDOM mg/dL 113         Results from last 7 days Lab Units 12/01/22  1105 12/01/22  0646 11/30/22  2050 11/30/22  1656 11/30/22  1141 11/30/22  0549 11/29/22  2114 11/29/22  1612 11/29/22  1119 11/29/22  0530 11/28/22  2233 11/28/22  1611   POC GLUCOSE mg/dl 214* 143* 96 126 132 104 208* 163* 171* 164* 135 261*               Lines/Drains:  Invasive Devices     Peripheral Intravenous Line  Duration           Peripheral IV 11/30/22 Distal;Left;Ventral (anterior) Forearm 1 day                             Imaging: No pertinent imaging reviewed  Recent Cultures (last 7 days):         Last 24 Hours Medication List:   Current Facility-Administered Medications   Medication Dose Route Frequency Provider Last Rate   • acetaminophen  650 mg Oral Q6H PRN Stefany Shirley PA-C     • aspirin  81 mg Oral Daily Gene Pace, KYLEIGHNP     • atorvastatin  40 mg Oral HS Gene Pace, KYLEIGHNP     • carvedilol  12 5 mg Oral Q12H [facility] Prasanna Mancia PA-C     • FLUoxetine  40 mg Oral Daily Gene Pace, KYLEIGHNP     • gabapentin  300 mg Oral Daily Gene Pace, KYLEIGHNP     • heparin (porcine)  5,000 Units Subcutaneous On license of [facility] EMMA Ac     • hydrALAZINE  50 mg Oral Q8H Flaquita Pereira PA-C     • insulin glargine  10 Units Subcutaneous HS Gene Pace, KYLEIGHNP     • insulin lispro  1-6 Units Subcutaneous HS EMMA Quezada     • insulin lispro  2-12 Units Subcutaneous TID AC EMMA Ac     • insulin lispro  5 Units Subcutaneous TID With Meals Stefany Shirley PA-C     • isosorbide dinitrate  20 mg Oral Q8H Prasanna Mancia PA-C     • melatonin  4 5 mg Oral HS PRN KYLEIGH AcNP     • QUEtiapine  100 mg Oral HS KYLEIGH AcNP     • thiamine  100 mg Oral Daily KYLEIGH AcNP     • torsemide  40 mg Oral BID Prasanna Mancia PA-C          Today, Patient Was Seen By: Luis Fernando Junior DO    **Please Note: This note may have been constructed using a voice recognition system  **

## 2022-12-01 NOTE — ASSESSMENT & PLAN NOTE
· Patient at times will not converse however with prompting was noted to sit up, speak to me and eat lunch  Nursing staff report that this has been present during prior admissions as well  · When seen by occupational therapy, patient did poorly on cognitive evaluation  MOCA 6/30 (11/24/22)  · Appreciate Geriatrics consultation  · OT recommending rehab vs home with 24/7 supervision upon discharge  · Given frequent readmissions both here and at [address] [place] [address] [address] secondary to noncompliance, discussed with family (brother Ambrosio Frank and sister Luigi Jimenez) regarding placement on 11/27  · Patient is now agreeable to rehab, will forego neuropsych evaluation  · Patient will be discharged to rehab    He can decide to leave rehab once he is ready

## 2022-12-01 NOTE — PLAN OF CARE
Problem: PAIN - ADULT  Goal: Verbalizes/displays adequate comfort level or baseline comfort level  Description: Interventions:  - Encourage patient to monitor pain and request assistance  - Assess pain using appropriate pain scale  - Administer analgesics based on type and severity of pain and evaluate response  - Implement non-pharmacological measures as appropriate and evaluate response  - Consider cultural and social influences on pain and pain management  - Notify physician/advanced practitioner if interventions unsuccessful or patient reports new pain  Outcome: Progressing     Problem: INFECTION - ADULT  Goal: Absence or prevention of progression during hospitalization  Description: INTERVENTIONS:  - Assess and monitor for signs and symptoms of infection  - Monitor lab/diagnostic results  - Monitor all insertion sites, i e  indwelling lines, tubes, and drains  - Monitor endotracheal if appropriate and nasal secretions for changes in amount and color  - Moose appropriate cooling/warming therapies per order  - Administer medications as ordered  - Instruct and encourage patient and family to use good hand hygiene technique  - Identify and instruct in appropriate isolation precautions for identified infection/condition  Outcome: Progressing  Goal: Absence of fever/infection during neutropenic period  Description: INTERVENTIONS:  - Monitor WBC    Outcome: Progressing

## 2022-12-01 NOTE — PROGRESS NOTES
NEPHROLOGY PROGRESS NOTE   Mikey Jones 74 y o  male MRN: [id number]  Unit/Bed#: CW2 216-02 Encounter: [id number]  Reason for Consult: LAZARO CKD    ASSESSMENT AND PLAN:  LAZARO on CKD stage 4, baseline creatinine 2 1 to 2 7, follows with [facility]  -LAZARO suspect secondary to cardiorenal, CHF exacerbation  -admission creatinine 3 5 overall improved and now seems to have plateaued around 2 8 to 2 9, creatinine 2 9 today   -now remains on torsemide 40 mg p o  B i d , continue same  -may have to accept higher creatinine to keep patient euvolemic   -has history of prior episode of LAZARO requiring temporary dialysis in [facility] this admission shows no significant hematuria, trace proteinuria      Acute on chronic systolic CHF, echo shows EF 30 to 35%, cardiology follow-up  -volume status improving, weight slowly reducing   Now remains on p o  Torsemide as above   Continue same on discharge   -continue to monitor daily weight, accurate intake and output     Anemia in CKD, hemoglobin slightly lower, continue to monitor closely, transfuse p r n  For hemoglobin less than seven      Hypertension, blood pressure overall acceptable   Currently remains on Coreg, hydralazine, Imdur for GDMT     Encephalopathy, cognitive impairment, management as per primary team      Discussed above plan in detail with primary team   Overall stable from renal standpoint for discharge  Will need close outpatient Nephrology follow-up  Rock Figueroa gets discharged, would recommend repeat BMP in one week  Awaiting placement  Overall stable from renal standpoint  Will sign off  Please call with any questions  SUBJECTIVE:  Patient seen and examined at bedside  Denies chest pain, shortness of breath        OBJECTIVE:  Current Weight: Weight - Scale: 83 1 kg (183 lb 3 2 oz)  Vitals:    12/01/22 0646   BP: 129/75   Pulse: 71   Resp:    Temp:    SpO2: 96%       Intake/Output Summary (Last 24 hours) at 12/1/2022 0831  Last data filed at 12/1/2022 1466  Gross per 24 hour   Intake 120 ml   Output 400 ml   Net -280 ml     Wt Readings from Last 3 Encounters:   12/01/22 83 1 kg (183 lb 3 2 oz)   10/26/22 90 4 kg (199 lb 4 8 oz)   10/16/22 84 1 kg (185 lb 6 4 oz)     Temp Readings from Last 3 Encounters:   12/01/22 98 2 °F (36 8 °C)   10/26/22 98 1 °F (36 7 °C)   10/20/22 97 8 °F (36 6 °C) (Temporal)     BP Readings from Last 3 Encounters:   12/01/22 129/75   10/26/22 135/76   10/20/22 100/60     Pulse Readings from Last 3 Encounters:   12/01/22 71   10/26/22 71   10/20/22 80        Physical Examination:  General:  Lying in bed, no acute distress   Eyes:  Mild conjunctival pallor present  ENT:  External examination of ears and nose unremarkable  Neck:  No obvious lymphadenopathy appreciated  Respiratory:  Bilateral air entry present  CVS:  S1, S2 present  GI:  Soft, nondistended  CNS:  Active alert oriented x3  Skin:  No new rash  Musculoskeletal:  No obvious new gross deformity noted    Medications:    Current Facility-Administered Medications:   •  acetaminophen (TYLENOL) tablet 650 mg, 650 mg, Oral, Q6H PRN, Stefany Shirley PA-C  •  aspirin (ECOTRIN LOW STRENGTH) EC tablet 81 mg, 81 mg, Oral, Daily, Thana Fogo, CRNP, 81 mg at 11/30/22 0843  •  atorvastatin (LIPITOR) tablet 40 mg, 40 mg, Oral, HS, Thana Fogo, CRNP, 40 mg at 11/30/22 2121  •  carvedilol (COREG) tablet 12 5 mg, 12 5 mg, Oral, Q12H [address], Ministeriomary kay Alejo PA-C, 12 5 mg at 11/30/22 2122  •  FLUoxetine (PROzac) capsule 40 mg, 40 mg, Oral, Daily, Thana Fogo, CRNP, 40 mg at 11/30/22 3327  •  gabapentin (NEURONTIN) capsule 300 mg, 300 mg, Oral, Daily, Thana Fogo, CRNP, 300 mg at 11/30/22 5324  •  heparin (porcine) subcutaneous injection 5,000 Units, 5,000 Units, Subcutaneous, Q8H [address], 5,000 Units at 12/01/22 0647 **AND** [CANCELED] Platelet count, , , Once, EMMA Winn  •  hydrALAZINE (APRESOLINE) tablet 50 mg, 50 mg, Oral, Q8H, Michelle Killian PA-C, 50 mg at 12/01/22 0647  •  insulin glargine (LANTUS) subcutaneous injection 10 Units 0 1 mL, 10 Units, Subcutaneous, HS, EMMA Natarajan, 10 Units at 11/30/22 2122  •  insulin lispro (HumaLOG) 100 units/mL subcutaneous injection 1-6 Units, 1-6 Units, Subcutaneous, HS, EMMA Rey, 2 Units at 11/29/22 2141  •  insulin lispro (HumaLOG) 100 units/mL subcutaneous injection 2-12 Units, 2-12 Units, Subcutaneous, TID AC, 2 Units at 11/29/22 1654 **AND** Fingerstick Glucose (POCT), , , TID AC, EMMA Natarajan  •  insulin lispro (HumaLOG) 100 units/mL subcutaneous injection 5 Units, 5 Units, Subcutaneous, TID With Meals, Felisha Phan PA-C, 5 Units at 11/30/22 1659  •  isosorbide dinitrate (ISORDIL) tablet 20 mg, 20 mg, Oral, Q8H, Francine Schrader PA-C, 20 mg at 12/01/22 7539  •  melatonin tablet 4 5 mg, 4 5 mg, Oral, HS PRN, EMMA Natarajan, 4 5 mg at 11/28/22 2235  •  QUEtiapine (SEROquel) tablet 100 mg, 100 mg, Oral, HS, EMMA Natarajan, 100 mg at 11/30/22 2122  •  thiamine tablet 100 mg, 100 mg, Oral, Daily, EMMA Natarajan, 100 mg at 11/30/22 8978  •  torsemide (DEMADEX) tablet 40 mg, 40 mg, Oral, BID, Rui Fallon PA-C, 40 mg at 11/30/22 1700    Laboratory Results:  Results from last 7 days   Lab Units 11/30/22  0556 11/29/22  0531 11/28/22  0550 11/27/22  0649 11/26/22  0444 11/25/22  0433   WBC Thousand/uL 7 52  --   --   --   --  6 05   HEMOGLOBIN g/dL 10 1*  --   --   --   --  8 9*   HEMATOCRIT % 32 0*  --   --   --   --  27 9*   PLATELETS Thousands/uL 248  --   --   --   --  211   SODIUM mmol/L 139 138 139 138 140 138   POTASSIUM mmol/L 4 1 3 9 4 0 4 2 4 1 3 9   CHLORIDE mmol/L 107 107 109* 109* 108 108   CO2 mmol/L 24 26 24 24 26 24   BUN mg/dL 69* 68* 68* 65* 70* 79*   CREATININE mg/dL 2 91* 2 84* 2 85* 2 84* 3 08* 3 72*   CALCIUM mg/dL 9 1 8 9 9 1 9 1 8 8 8 8       XR chest 1 view portable   ED Interpretation by Anjum Fairchild MD (11/23 8902)   Mild pulmonary edema        Final Result by Rocco Henson MD (11/23 1245)      Mild pulmonary vascular congestion  Workstation performed: [id number]             Portions of the record may have been created with voice recognition software  Occasional wrong word or "sound a like" substitutions may have occurred due to the inherent limitations of voice recognition software  Read the chart carefully and recognize, using context, where substitutions have occurred

## 2022-12-01 NOTE — CASE MANAGEMENT
Case Management Discharge Planning Note    Patient name Maria G Ivy  Location [facility] 216/[facility] 387-73 MRN [id number]  : 1955 Date 2022       Current Admission Date: 2022  Current Admission Diagnosis:Acute on chronic systolic congestive heart failure [facility])   Patient Active Problem List    Diagnosis Date Noted   • Cognitive impairment 2022   • CAD (coronary artery disease) 2022   • Weight gain 10/25/2022   • Generalized weakness 10/25/2022   • Acute kidney injury superimposed on CKD ([address] ) 2022   • Orthostatic hypotension 2022   • Acute on chronic combined systolic and diastolic heart failure ([facility] ) 2022   • Persistent proteinuria 2022   • Transaminitis 2022   • Pleural effusion, bacterial 2022   • Hyperglycemia, unspecified    • Metabolic acidosis    • Diarrhea 2022   • Type 2 diabetes mellitus, without long-term current use of insulin ([facility] ) 2022   • High serum thyroid stimulating hormone (TSH) 2022   • Weakness 2022   • Ischemic cardiomyopathy 2022   • Polysubstance abuse ([facility] ) 2022   • Mixed hyperlipidemia 2022   • Stage 3b chronic kidney disease ([facility] ) 2022   • Acute on chronic systolic congestive heart failure ([facility] ) 2022   • Acute on chronic systolic congestive heart failure ([facility] ) 2022   • Acute hepatitis C virus infection 2022   • Abnormal EKG 2022   • LAZARO superimposed on CKD (chronic kidney disease) stage 4, GFR 15-29 ml/min ([address] ) 2022   • Normocytic anemia 2022   • Acute kidney injury ([facility] ) 2022   • Hyponatremia 2022   • Uncontrolled type 2 diabetes mellitus with hyperglycemia ([facility] ) 2022   • Essential hypertension 2015   • Type 2 diabetes mellitus, with long-term current use of insulin ([facility] ) 2012   • Bipolar affective disorder, mixed, moderate ([facility] ) 2011      LOS (days): 8  Geometric Mean LOS (GMLOS) (days):   Days to GMLOS:     OBJECTIVE:  Risk of Unplanned Readmission Score: 54 27         Current admission status: Inpatient   Preferred Pharmacy:   [address] , [address] [address]  Phone: [phone] Fax: [phone]    Primary Care Provider: Claudia Johnson DO    Primary Insurance: [address] [facility]  Secondary Insurance:     DISCHARGE DETAILS:     Additional Comments: Via Aidin and VM, messages were left at [facility] to find out the determination of the referrals  Awaiting determination for DC needs    CM to be available

## 2022-12-01 NOTE — PHYSICAL THERAPY NOTE
PHYSICAL THERAPY NOTE          Patient Name: Geovanny Baum  Today's Date: 12/1/2022 12/01/22 7785   PT Last Visit   PT Visit Date 12/01/22   Note Type   Note Type Treatment   Pain Assessment   Pain Assessment Tool 0-10   Pain Score No Pain   Hospital Pain Intervention(s) Repositioned; Ambulation/increased activity; Emotional support   Restrictions/Precautions   Weight Bearing Precautions Per Order No   Other Precautions (S)  Cognitive; Chair Alarm;Telemetry;Multiple lines; Fall Risk  (pt needs reminders to use RW 2* cognition, LOB, and unsteadiness on feet )   General   Chart Reviewed Yes   Response to Previous Treatment Patient with no complaints from previous session  Family/Caregiver Present No   Cognition   Overall Cognitive Status Impaired   Arousal/Participation Cooperative   Attention Difficulty attending to directions   Orientation Level Oriented to person;Oriented to place; Disoriented to time;Disoriented to situation  (increased effort/time to state month/day; knew year )   Memory Decreased recall of recent events;Decreased short term memory   Following Commands Follows one step commands with increased time or repetition   Subjective   Subjective Pt sitting EOB eating breakfast upon arrival  Pt willing to participate in [address]  Pt said he already washed up but would again  (nsg confirmed he did not wash up)  Bed Mobility   Supine to Sit 5  Supervision   Additional items Impulsive;Verbal cues   Sit to Supine 5  Supervision   Additional items Verbal cues; Impulsive   Additional Comments Pt sat EOB with fair trunk/postural control  Pt complained of increaed neck pain and fatigue and wanted to lay down  Pt impulsivly performed sit to supine under S level, then returned to sitting EOB under S level when symptoms improved  Transfers   Sit to Stand 4  Minimal assistance   Additional items Assist x 1; Impulsive;Verbal cues Stand to Sit 4  Minimal assistance   Additional items Assist x 1; Impulsive;Verbal cues   Toilet transfer 4  Minimal assistance   Additional items Assist x 1; Impulsive;Verbal cues;Standard toilet   Additional Comments used RW, but requried constant VC for proper hand placement, safety, and management 2* impulsivity and impaired cognition  Ambulation/Elevation   Gait pattern Improper Weight shift;Narrow SONG; Forward Flexion;Decreased foot clearance;Shuffling; Short stride   Gait Assistance 4  Minimal assist   Additional items Assist x 1; Tactile cues; Verbal cues   Assistive Device Rolling walker   Distance 25+25 (bathroom and back to bed)+ 60+60 (ambulation in hallway)   Ambulation/Elevation Additional Comments Pt presented with increased fatigue after ambulation and was educated on importance of rest breaks and energy conservation  Balance   Static Sitting Fair +   Dynamic Sitting Fair   Static Standing Poor +   Dynamic Standing Poor +   Ambulatory Poor +   Endurance Deficit   Endurance Deficit Yes   Activity Tolerance   Activity Tolerance Patient tolerated treatment well   Nurse Made Aware yes   Exercises   Neuro re-ed Pt performed 10 sit to stands throughout session to challenge LE strength, dynamic balance, and postural control  Balance training  Pt performed static/dynamic balance throughout session (~15 sitting while OT performed ADL's) (~15 standing while OT performed ADL's at sink)  Assessment   Prognosis Fair   Problem List Decreased strength;Decreased endurance; Impaired balance;Decreased mobility; Decreased coordination;Decreased cognition; Impaired judgement;Decreased safety awareness;Pain   Assessment Pt seated EOB upon arrival eating breakfast  Pt tx session focused on bed mobility, transfer training, improving activity tolerance, improving endurance, gait training, LE strengthening, and cognitive reorientation  Pt non compliant with RW usage and continues to require VC for proper management   Pt required increased A this session for transfers from S level to MinAx1 with RW  Pt ambulated with RW household distance and demonstrated unsteadiness on feet, poor activity tolerance, and fatigue/weakness requiring need to sit down  Pt continues to present with cognitive deficits and impulsivity  Pt educated on energy conservation and safety in home when symptoms arise  Pt did not demonstrated evidence of learning  Pt ended session seated in chair, chair alarm on, all needs in reach  Performed at least 2 patient identifiers during session: Name and Valdez Clive The patient's AM-PAC Basic Mobility Inpatient Short Form Raw Score is 20  A Raw score of greater than 16 suggests the patient may benefit from discharge to home, however due to pt cognitive status, medical status, and decreased social support, pt would benefit from post acute services  Please also refer to the recommendation of the Physical Therapist for safe discharge planning  Pt would benefit from continued inpatient skilled physical therapy to address said deficits and to maximum patient functional mobility  PT recommendation is for pt to be D/C with post acute rehab services  Barriers to Discharge Decreased caregiver support; Inaccessible home environment   Goals   Patient Goals to go home   STG Expiration Date 12/08/22   Short Term Goal #1 STG 1  Pt will be able to perform bed mobility with mod I in order to improve overall functional mobility and assist in safe d/c  STG 2  Pt will be able to perform functional transfer with mod I in order to improve overall functional mobility and assist in safe d/c  STG 3  Pt will be able to ambulate at least 100 feet with least restrictive device with mod I A in order to improve overall functional mobility and assist in safe d/c  STG 4  Pt will improve sitting/standing static/dynamic balance 1 grade in order to improve functional mobility and assist in safe d/c  STG 5   Pt will improve LE strength by one grade in order to improve functional mobility and assist in safe d/c  STG 6  Pt will negotiate at least 1 step at mod I level A in order to improve overall funcitonal mobility and assist in safe d/c   Plan   Treatment/Interventions Functional transfer training;LE strengthening/ROM; Therapeutic exercise; Endurance training;Cognitive reorientation;Patient/family training;Equipment eval/education; Bed mobility;Gait training;Spoke to nursing;OT   Progress Slow progress, cognitive deficits   PT Frequency 2-3x/wk   Recommendation   PT Discharge Recommendation Post acute rehabilitation services   Equipment Recommended [address] Basic Mobility Inpatient   Turning in Bed Without Bedrails 4   Lying on Back to Sitting on Edge of Flat Bed 4   Moving Bed to Chair 3   Standing Up From Chair 3   Walk in Room 3   Climb 3-5 Stairs 3   Basic Mobility Inpatient Raw Score 20   Basic Mobility Standardized Score 43 99   Highest Level Of Mobility   JH-HLM Goal 6: Walk 10 steps or more   JH-HLM Achieved 7: Walk 25 feet or more   Education   Education Provided Mobility training;Assistive device; Other  (Energy conservation and pt safety while in hospital and at home )   Patient Demonstrates acceptance/verbal understanding;Reinforcement needed   End of Consult   Patient Position at End of Consult Bedside chair;Bed/Chair alarm activated; All needs within reach     [facility] , SPT

## 2022-12-02 LAB
GLUCOSE SERPL-MCNC: 108 MG/DL (ref 65–140)
GLUCOSE SERPL-MCNC: 153 MG/DL (ref 65–140)
GLUCOSE SERPL-MCNC: 157 MG/DL (ref 65–140)
GLUCOSE SERPL-MCNC: 251 MG/DL (ref 65–140)

## 2022-12-02 RX ADMIN — INSULIN GLARGINE 10 UNITS: 100 INJECTION, SOLUTION SUBCUTANEOUS at 21:12

## 2022-12-02 RX ADMIN — ASPIRIN 81 MG: 81 TABLET, COATED ORAL at 08:10

## 2022-12-02 RX ADMIN — THIAMINE HCL TAB 100 MG 100 MG: 100 TAB at 08:26

## 2022-12-02 RX ADMIN — ATORVASTATIN CALCIUM 40 MG: 40 TABLET, FILM COATED ORAL at 21:12

## 2022-12-02 RX ADMIN — FLUOXETINE HYDROCHLORIDE 40 MG: 20 CAPSULE ORAL at 08:10

## 2022-12-02 RX ADMIN — TORSEMIDE 40 MG: 20 TABLET ORAL at 17:19

## 2022-12-02 RX ADMIN — INSULIN LISPRO 3 UNITS: 100 INJECTION, SOLUTION INTRAVENOUS; SUBCUTANEOUS at 21:46

## 2022-12-02 RX ADMIN — QUETIAPINE FUMARATE 100 MG: 100 TABLET ORAL at 21:12

## 2022-12-02 RX ADMIN — INSULIN LISPRO 5 UNITS: 100 INJECTION, SOLUTION INTRAVENOUS; SUBCUTANEOUS at 08:11

## 2022-12-02 RX ADMIN — TORSEMIDE 40 MG: 20 TABLET ORAL at 08:10

## 2022-12-02 RX ADMIN — HYDRALAZINE HYDROCHLORIDE 50 MG: 50 TABLET, FILM COATED ORAL at 16:38

## 2022-12-02 RX ADMIN — Medication 4.5 MG: at 21:12

## 2022-12-02 RX ADMIN — INSULIN LISPRO 2 UNITS: 100 INJECTION, SOLUTION INTRAVENOUS; SUBCUTANEOUS at 06:53

## 2022-12-02 RX ADMIN — INSULIN LISPRO 5 UNITS: 100 INJECTION, SOLUTION INTRAVENOUS; SUBCUTANEOUS at 11:47

## 2022-12-02 RX ADMIN — HYDRALAZINE HYDROCHLORIDE 50 MG: 50 TABLET, FILM COATED ORAL at 06:53

## 2022-12-02 RX ADMIN — GABAPENTIN 300 MG: 300 CAPSULE ORAL at 08:10

## 2022-12-02 RX ADMIN — HEPARIN SODIUM 5000 UNITS: 5000 INJECTION INTRAVENOUS; SUBCUTANEOUS at 21:11

## 2022-12-02 RX ADMIN — CARVEDILOL 12.5 MG: 12.5 TABLET, FILM COATED ORAL at 21:12

## 2022-12-02 RX ADMIN — HEPARIN SODIUM 5000 UNITS: 5000 INJECTION INTRAVENOUS; SUBCUTANEOUS at 06:53

## 2022-12-02 RX ADMIN — HEPARIN SODIUM 5000 UNITS: 5000 INJECTION INTRAVENOUS; SUBCUTANEOUS at 16:39

## 2022-12-02 RX ADMIN — INSULIN LISPRO 2 UNITS: 100 INJECTION, SOLUTION INTRAVENOUS; SUBCUTANEOUS at 11:47

## 2022-12-02 RX ADMIN — CARVEDILOL 12.5 MG: 12.5 TABLET, FILM COATED ORAL at 08:10

## 2022-12-02 RX ADMIN — ISOSORBIDE DINITRATE 20 MG: 20 TABLET ORAL at 06:53

## 2022-12-02 NOTE — CASE MANAGEMENT
Case Management Discharge Planning Note    Patient name Jose Juan Wilson  Location [facility]-34 MRN [id number]  : 1955 Date 2022       Current Admission Date: 2022  Current Admission Diagnosis:Acute on chronic systolic congestive heart failure [facility])   Patient Active Problem List    Diagnosis Date Noted   • Cognitive impairment 2022   • CAD (coronary artery disease) 2022   • Weight gain 10/25/2022   • Generalized weakness 10/25/2022   • Acute kidney injury superimposed on CKD ([facility] ) 2022   • Orthostatic hypotension 2022   • Acute on chronic combined systolic and diastolic heart failure ([facility] ) 2022   • Persistent proteinuria 2022   • Transaminitis 2022   • Pleural effusion, bacterial 2022   • Hyperglycemia, unspecified    • Metabolic acidosis    • Diarrhea 2022   • Type 2 diabetes mellitus, without long-term current use of insulin ([facility] ) 2022   • High serum thyroid stimulating hormone (TSH) 2022   • Weakness 2022   • Ischemic cardiomyopathy 2022   • Polysubstance abuse ([facility] ) 2022   • Mixed hyperlipidemia 2022   • Stage 3b chronic kidney disease ([facility] ) 2022   • Acute on chronic systolic congestive heart failure ([facility] ) 2022   • Acute on chronic systolic congestive heart failure ([facility] ) 2022   • Acute hepatitis C virus infection 2022   • Abnormal EKG 2022   • LAZARO superimposed on CKD (chronic kidney disease) stage 4, GFR 15-29 ml/min ([facility] ) 2022   • Normocytic anemia 2022   • Acute kidney injury ([facility] ) 2022   • Hyponatremia 2022   • Uncontrolled type 2 diabetes mellitus with hyperglycemia ([facility] ) 2022   • Essential hypertension 2015   • Type 2 diabetes mellitus, with long-term current use of insulin ([facility] ) 2012   • Bipolar affective disorder, mixed, moderate ([facility] ) 2011      LOS (days): 9  Geometric Mean LOS (GMLOS) (days):   Days to GMLOS:     OBJECTIVE:  Risk of Unplanned Readmission Score: 55 39         Current admission status: Inpatient   Preferred Pharmacy:   [address] , [address]  [address] [place] [address]  Phone: [phone] Fax: [phone]    Primary Care Provider: Rojas Pelaez DO    Primary Insurance: [address] [facility] REP  Secondary Insurance:     DISCHARGE DETAILS:               Additional Comments: Still no accepting facility for rehab  Several facilities following for possible bed availability on Monday, including [facility]  CM will continue to follow

## 2022-12-02 NOTE — ASSESSMENT & PLAN NOTE
Lab Results   Component Value Date    HGBA1C 7 7 (H) 10/03/2022     Recent Labs     12/01/22  1922 12/01/22  2043 12/02/22  0555 12/02/22  1103   POCGLU 140 138 153* 157*       Blood Sugar Average: Last 72 hrs:  · (P) 728 [id number]   · Outpatient on Farxiga and Novolog  · Follows with endocrinology outpatient  · Home regimen of novolog is 30 with breakfast and 10 with dinner but was having glucoses in the 30s with this regimen (giving and not eating)  · Continue current regimen Lantus 10 units QHS and humalog 5 units t i d   With meals  · Diabetic diet

## 2022-12-02 NOTE — OCCUPATIONAL THERAPY NOTE
Occupational Therapy Cancel Note        Patient Name: Marleny Reynoso  Today's Date: 12/2/2022 12/02/22 1355   OT Last Visit   OT Visit Date 12/02/22   Note Type   Note Type Cancelled Session     OT attempted to see pt for OT treatment however upon arrival, pt found soundly sleeping  Will hold at this time and continue to follow on caseload and see when appropriate      Chip Cash MS, OTR/L

## 2022-12-02 NOTE — PROGRESS NOTES
[facility]  Progress Note - Konstantin Feliz 1955, 74 y o  male MRN: [id number]  Unit/Bed#: CW2 216-02 Encounter: [id number]  Primary Care Provider: Malia Lundberg DO   Date and time admitted to hospital: 11/23/2022 10:54 AM    Cognitive impairment  Assessment & Plan  · Patient at times will not converse however with prompting was noted to sit up, speak to me and eat lunch  Nursing staff report that this has been present during prior admissions as well  During the course 2 week patient is consistently alert and oriented, cooperates and answer simple questions  · When seen by occupational therapy, patient did poorly on cognitive evaluation  MOCA 6/30 (11/24/22)  · Appreciate Geriatrics consultation  · OT recommending rehab vs home with 24/7 supervision upon discharge  · Given frequent readmissions both here and at [facility] secondary to noncompliance, discussed with family (brother Dianelys Gomez and sister Kellen Xie) regarding placement on 11/27  · Patient is now agreeable to rehab, will forego neuropsych evaluation  · Patient will be discharged to rehab  He can decide to leave rehab once he is ready      CAD (coronary artery disease)  Assessment & Plan  Known multivessel severe coronary artery disease  Patient follows with [facility]iology, felt to not be a CABG candidate as per record review, was to be optimized prior to planned PCI to circumflex and LAD    · Troponin elevation on admission due to acute heart failure and hypertension   · Most recent echocardiogram 11/8/2022 at [facility] with an EF of 30-35%  · Follow-up echo during this admission yesterday showed improvement in EF to roughly 40%  · With improving creatinine can consider stenting during this admission, will discuss with Nephrology Cardiology  · Has a life vest but is noncompliant with this  · Continue outpatient follow-up with Cardiology  · Continue Coreg, isosorbide, hydralazine, aspirin and statin    Type 2 diabetes mellitus, with long-term current use of insulin [facility])  Assessment & Plan  Lab Results   Component Value Date    HGBA1C 7 7 (H) 10/03/2022     Recent Labs     12/01/22  1922 12/01/22  2043 12/02/22  0555 12/02/22  1103   POCGLU 140 138 153* 157*       Blood Sugar Average: Last 72 hrs:  · (P) 517 [id number]   · Outpatient on Farxiga and Novolog  · Follows with endocrinology outpatient  · Home regimen of novolog is 30 with breakfast and 10 with dinner but was having glucoses in the 30s with this regimen (giving and not eating)  · Continue current regimen Lantus 10 units QHS and humalog 5 units t i d  With meals  · Diabetic diet    Normocytic anemia  Assessment & Plan  · Baseline hemoglobin appears to be 9-10  · No signs or symptoms of bleeding at this time  · Recommend outpatient follow-up    Essential hypertension  Assessment & Plan  · BP acceptable, monitor routinely   · Continue Coreg, hydralazine and isosorbide  · Diuresis as above     Bipolar affective disorder, mixed, moderate (HCC)  Assessment & Plan  · Continue Seroquel and Prozac  · Patient is competent on my exam, he is willing to attend rehab  Although may not be in his best interests he may leave rehab at any time if he chooses to  Patient continues to be alert and oriented x3    LAZARO superimposed on CKD (chronic kidney disease) stage 4, GFR 15-29 ml/min [facility])  Assessment & Plan  Lab Results   Component Value Date    EGFR 21 11/30/2022    EGFR 22 11/29/2022    EGFR 22 11/28/2022    CREATININE 2 91 (H) 11/30/2022    CREATININE 2 84 (H) 11/29/2022    CREATININE 2 85 (H) 11/28/2022     · Baseline creatinine approximately 2 1-2 7  Follows with [facility]    · LAZARO POA with creatinine of 3 51 felt to be secondary to volume overload, cardiorenal syndrome  · Nephrology following, appreciate inputs   · Continue to monitor with diuresis, see plan above   · Creatinine stable over the past several days-will recheck tomorrow    * Acute on chronic systolic congestive heart failure (HCC)  Assessment & Plan  Wt Readings from Last 3 Encounters:   12/02/22 83 9 kg (185 lb)   10/26/22 90 4 kg (199 lb 4 8 oz)   10/16/22 84 1 kg (185 lb 6 4 oz)     Has a history of noncompliance with his medications and was recently seen in [facility] 11/13-11/15 for CHF exacerbation as well and at [facility] 10/26  On discharge from [facility] was instructed to take torsemide 40 mg daily  Prior to this had been increased to 20 mg b i d  on 11/9 and prior to this was on 10 mg QOD  · Patient not taking medications as prescribed as an outpatient  · Most recent echocardiogram with LVEF 30%   · Patient has life vest but is non compliant; monitor on telemetry while inpatient   · Cardiology and Nephrology following  · Status post IV diuresis, transitioned to increased dose torsemide 40 mg PO BID on 11/27   · Isordil increased to 20 mg q8h  · Continued on home dose Coreg, hydralazine  · Monitor daily weights, intake and output   · Close outpatient follow-up           VTE Pharmacologic Prophylaxis: VTE Score: 4 Moderate Risk (Score 3-4) - Pharmacological DVT Prophylaxis Ordered: heparin  Patient Centered Rounds: I performed bedside rounds with nursing staff today  Discussions with Specialists or Other Care Team Provider: nephrology    Education and Discussions with Family / Patient: Updated  (brother) via phone  Time Spent for Care: 30 minutes  More than 50% of total time spent on counseling and coordination of care as described above  Current Length of Stay: 9 day(s)  Current Patient Status: Inpatient   Certification Statement: The patient will continue to require additional inpatient hospital stay due to Pending placement  Discharge Plan: Anticipate discharge in 48-72 hrs to rehab facility      Code Status: Level 1 - Full Code    Subjective:   Patient denies any acute complaints alert and oriented, cooperative in answering questioning appropriately    Objective: Vitals:   Temp (24hrs), Av °F (36 7 °C), Min:97 3 °F (36 3 °C), Max:98 5 °F (36 9 °C)    Temp:  [97 3 °F (36 3 °C)-98 5 °F (36 9 °C)] 98 3 °F (36 8 °C)  HR:  [66-94] 86  Resp:  [16-17] 17  BP: ()/(56-87) 115/68  SpO2:  [94 %-96 %] 94 %  Body mass index is 31 76 kg/m²  Input and Output Summary (last 24 hours): Intake/Output Summary (Last 24 hours) at 2022 1429  Last data filed at 2022 1300  Gross per 24 hour   Intake 240 ml   Output 1450 ml   Net -1210 ml       Physical Exam:   Physical Exam  Vitals and nursing note reviewed  Constitutional:       General: He is not in acute distress  Appearance: He is well-developed  He is not ill-appearing, toxic-appearing or diaphoretic  HENT:      Head: Normocephalic and atraumatic  Eyes:      General: No scleral icterus  Conjunctiva/sclera: Conjunctivae normal    Cardiovascular:      Rate and Rhythm: Normal rate and regular rhythm  Heart sounds: No murmur heard  No friction rub  No gallop  Pulmonary:      Effort: Pulmonary effort is normal  No respiratory distress  Breath sounds: Normal breath sounds  No stridor  No wheezing, rhonchi or rales  Chest:      Chest wall: No tenderness  Abdominal:      General: There is no distension  Palpations: Abdomen is soft  There is no mass  Tenderness: There is no abdominal tenderness  There is no guarding or rebound  Hernia: No hernia is present  Musculoskeletal:         General: No swelling, tenderness, deformity or signs of injury  Cervical back: Neck supple  Skin:     General: Skin is warm and dry  Capillary Refill: Capillary refill takes less than 2 seconds  Coloration: Skin is not jaundiced or pale  Neurological:      Mental Status: He is alert and oriented to person, place, and time     Psychiatric:         Mood and Affect: Mood normal           Additional Data:     Labs:  Results from last 7 days   Lab Units 22  0556   WBC Thousand/uL 7 52   HEMOGLOBIN g/dL 10 1*   HEMATOCRIT % 32 0*   PLATELETS Thousands/uL 248   NEUTROS PCT % 59   LYMPHS PCT % 27   MONOS PCT % 7   EOS PCT % 5     Results from last 7 days   Lab Units 11/30/22  0556   SODIUM mmol/L 139   POTASSIUM mmol/L 4 1   CHLORIDE mmol/L 107   CO2 mmol/L 24   BUN mg/dL 69*   CREATININE mg/dL 2 91*   ANION GAP mmol/L 8   CALCIUM mg/dL 9 1   GLUCOSE RANDOM mg/dL 113         Results from last 7 days   Lab Units 12/02/22  1103 12/02/22  0555 12/01/22  2043 12/01/22  1922 12/01/22  1647 12/01/22  1105 12/01/22  0646 11/30/22  2050 11/30/22  1656 11/30/22  1141 11/30/22  0549 11/29/22  2114   POC GLUCOSE mg/dl 157* 153* 138 140 140 214* 143* 96 126 132 104 208*               Lines/Drains:  Invasive Devices     Peripheral Intravenous Line  Duration           Peripheral IV 11/30/22 Distal;Left;Ventral (anterior) Forearm 2 days                  Telemetry:  Telemetry Orders (From admission, onward)             LifeVest Patient: Continuous Telemetry Monitoring during hospitalization (non-expiring)  Continuous LifeVest Telemetry Monitoring        References:    LifeVest Policy                 Telemetry Reviewed: Normal Sinus Rhythm  Indication for Continued Telemetry Use: Lifevest (remains on tele entire hospital stay)             Imaging: No pertinent imaging reviewed      Recent Cultures (last 7 days):         Last 24 Hours Medication List:   Current Facility-Administered Medications   Medication Dose Route Frequency Provider Last Rate   • acetaminophen  650 mg Oral Q6H PRN Stefany Shirley PA-C     • aspirin  81 mg Oral Daily Rosa Ring, CRNP     • atorvastatin  40 mg Oral HS Rosa Ring, CRNP     • carvedilol  12 5 mg Oral Q12H [address] Phyllis Spine, PA-C     • FLUoxetine  40 mg Oral Daily Rosa Ring, CRNP     • gabapentin  300 mg Oral Daily Rosa Ring, CRNP     • heparin (porcine)  5,000 Units Subcutaneous [facility] Rosa Ring, CRNP     • hydrALAZINE  50 mg Oral Q8H Marissa Solorio PA-C     • insulin glargine  10 Units Subcutaneous HS Kemar Good, CRNP     • insulin lispro  1-6 Units Subcutaneous HS EMMA High     • insulin lispro  2-12 Units Subcutaneous TID AC Kemar Good, CRNP     • insulin lispro  5 Units Subcutaneous TID With Meals Stefany Shirley PA-C     • isosorbide dinitrate  20 mg Oral Q8H Carlitos Gomez PA-C     • melatonin  4 5 mg Oral HS PRN Kemar Good, CRNP     • ondansetron  4 mg Intravenous Q8H PRN Marcial Vicetne DO     • perflutren lipid microsphere  2 mL/min Intravenous Once in imaging Marcial Vicente DO     • QUEtiapine  100 mg Oral HS Kemar Good, CRNP     • thiamine  100 mg Oral Daily Kemar Good, CRNP     • torsemide  40 mg Oral BID Carlitos Gomez PA-C          Today, Patient Was Seen By: Evin Armendariz DO    **Please Note: This note may have been constructed using a voice recognition system  **

## 2022-12-02 NOTE — ASSESSMENT & PLAN NOTE
· Continue Seroquel and Prozac  · Patient is competent on my exam, he is willing to attend rehab  Although may not be in his best interests he may leave rehab at any time if he chooses to    Patient continues to be alert and oriented x3

## 2022-12-02 NOTE — ASSESSMENT & PLAN NOTE
· Patient at times will not converse however with prompting was noted to sit up, speak to me and eat lunch  Nursing staff report that this has been present during prior admissions as well  During the course 2 week patient is consistently alert and oriented, cooperates and answer simple questions  · When seen by occupational therapy, patient did poorly on cognitive evaluation  MOCA 6/30 (11/24/22)  · Appreciate Geriatrics consultation  · OT recommending rehab vs home with 24/7 supervision upon discharge  · Given frequent readmissions both here and at [address] [place] [address] [address] secondary to noncompliance, discussed with family (brother Brook Mcardle and sister Toney Florentino) regarding placement on 11/27  · Patient is now agreeable to rehab, will forego neuropsych evaluation  · Patient will be discharged to rehab    He can decide to leave rehab once he is ready

## 2022-12-02 NOTE — ASSESSMENT & PLAN NOTE
Lab Results   Component Value Date    EGFR 21 11/30/2022    EGFR 22 11/29/2022    EGFR 22 11/28/2022    CREATININE 2 91 (H) 11/30/2022    CREATININE 2 84 (H) 11/29/2022    CREATININE 2 85 (H) 11/28/2022     · Baseline creatinine approximately 2 1-2 7  Follows with [facility]    · LAZARO POA with creatinine of 3 51 felt to be secondary to volume overload, cardiorenal syndrome  · Nephrology following, appreciate inputs   · Continue to monitor with diuresis, see plan above   · Creatinine stable over the past several days-will recheck tomorrow

## 2022-12-02 NOTE — ASSESSMENT & PLAN NOTE
Known multivessel severe coronary artery disease  Patient follows with [facility] Cardiology, felt to not be a CABG candidate as per record review, was to be optimized prior to planned PCI to circumflex and LAD    · Troponin elevation on admission due to acute heart failure and hypertension   · Most recent echocardiogram 11/8/2022 at [facility] with an EF of 30-35%  · Follow-up echo during this admission yesterday showed improvement in EF to roughly 40%  · With improving creatinine can consider stenting during this admission, will discuss with Nephrology Cardiology  · Has a life vest but is noncompliant with this  · Continue outpatient follow-up with Cardiology  · Continue Coreg, isosorbide, hydralazine, aspirin and statin

## 2022-12-02 NOTE — ASSESSMENT & PLAN NOTE
Wt Readings from Last 3 Encounters:   12/02/22 83 9 kg (185 lb)   10/26/22 90 4 kg (199 lb 4 8 oz)   10/16/22 84 1 kg (185 lb 6 4 oz)     Has a history of noncompliance with his medications and was recently seen in [facility] 11/13-11/15 for CHF exacerbation as well and at [facility] 10/26  On discharge from [facility] was instructed to take torsemide 40 mg daily  Prior to this had been increased to 20 mg b i d  on 11/9 and prior to this was on 10 mg QOD    · Patient not taking medications as prescribed as an outpatient  · Most recent echocardiogram with LVEF 30%   · Patient has life vest but is non compliant; monitor on telemetry while inpatient   · Cardiology and Nephrology following  · Status post IV diuresis, transitioned to increased dose torsemide 40 mg PO BID on 11/27   · Isordil increased to 20 mg q8h  · Continued on home dose Coreg, hydralazine  · Monitor daily weights, intake and output   · Close outpatient follow-up

## 2022-12-03 LAB
ANION GAP SERPL CALCULATED.3IONS-SCNC: 4 MMOL/L (ref 4–13)
BASOPHILS # BLD AUTO: 0.06 THOUSANDS/ÂΜL (ref 0–0.1)
BASOPHILS NFR BLD AUTO: 1 % (ref 0–1)
BUN SERPL-MCNC: 75 MG/DL (ref 5–25)
CALCIUM SERPL-MCNC: 9 MG/DL (ref 8.3–10.1)
CHLORIDE SERPL-SCNC: 106 MMOL/L (ref 96–108)
CO2 SERPL-SCNC: 26 MMOL/L (ref 21–32)
CREAT SERPL-MCNC: 3.19 MG/DL (ref 0.6–1.3)
EOSINOPHIL # BLD AUTO: 0.31 THOUSAND/ÂΜL (ref 0–0.61)
EOSINOPHIL NFR BLD AUTO: 4 % (ref 0–6)
ERYTHROCYTE [DISTWIDTH] IN BLOOD BY AUTOMATED COUNT: 13.2 % (ref 11.6–15.1)
GFR SERPL CREATININE-BSD FRML MDRD: 19 ML/MIN/1.73SQ M
GLUCOSE SERPL-MCNC: 147 MG/DL (ref 65–140)
GLUCOSE SERPL-MCNC: 177 MG/DL (ref 65–140)
GLUCOSE SERPL-MCNC: 180 MG/DL (ref 65–140)
GLUCOSE SERPL-MCNC: 185 MG/DL (ref 65–140)
GLUCOSE SERPL-MCNC: 214 MG/DL (ref 65–140)
HCT VFR BLD AUTO: 32.8 % (ref 36.5–49.3)
HGB BLD-MCNC: 10.2 G/DL (ref 12–17)
IMM GRANULOCYTES # BLD AUTO: 0.04 THOUSAND/UL (ref 0–0.2)
IMM GRANULOCYTES NFR BLD AUTO: 1 % (ref 0–2)
LYMPHOCYTES # BLD AUTO: 1.87 THOUSANDS/ÂΜL (ref 0.6–4.47)
LYMPHOCYTES NFR BLD AUTO: 22 % (ref 14–44)
MCH RBC QN AUTO: 29.1 PG (ref 26.8–34.3)
MCHC RBC AUTO-ENTMCNC: 31.1 G/DL (ref 31.4–37.4)
MCV RBC AUTO: 93 FL (ref 82–98)
MONOCYTES # BLD AUTO: 0.79 THOUSAND/ÂΜL (ref 0.17–1.22)
MONOCYTES NFR BLD AUTO: 9 % (ref 4–12)
NEUTROPHILS # BLD AUTO: 5.56 THOUSANDS/ÂΜL (ref 1.85–7.62)
NEUTS SEG NFR BLD AUTO: 63 % (ref 43–75)
NRBC BLD AUTO-RTO: 0 /100 WBCS
PLATELET # BLD AUTO: 184 THOUSANDS/UL (ref 149–390)
PMV BLD AUTO: 10.8 FL (ref 8.9–12.7)
POTASSIUM SERPL-SCNC: 4.1 MMOL/L (ref 3.5–5.3)
RBC # BLD AUTO: 3.51 MILLION/UL (ref 3.88–5.62)
SODIUM SERPL-SCNC: 136 MMOL/L (ref 135–147)
WBC # BLD AUTO: 8.63 THOUSAND/UL (ref 4.31–10.16)

## 2022-12-03 RX ORDER — TORSEMIDE 20 MG/1
40 TABLET ORAL 2 TIMES DAILY
Status: DISCONTINUED | OUTPATIENT
Start: 2022-12-04 | End: 2022-12-04

## 2022-12-03 RX ADMIN — QUETIAPINE FUMARATE 100 MG: 100 TABLET ORAL at 22:02

## 2022-12-03 RX ADMIN — CARVEDILOL 12.5 MG: 12.5 TABLET, FILM COATED ORAL at 08:50

## 2022-12-03 RX ADMIN — ISOSORBIDE DINITRATE 20 MG: 20 TABLET ORAL at 16:25

## 2022-12-03 RX ADMIN — INSULIN LISPRO 5 UNITS: 100 INJECTION, SOLUTION INTRAVENOUS; SUBCUTANEOUS at 11:41

## 2022-12-03 RX ADMIN — INSULIN LISPRO 1 UNITS: 100 INJECTION, SOLUTION INTRAVENOUS; SUBCUTANEOUS at 22:03

## 2022-12-03 RX ADMIN — ISOSORBIDE DINITRATE 20 MG: 20 TABLET ORAL at 05:55

## 2022-12-03 RX ADMIN — GABAPENTIN 300 MG: 300 CAPSULE ORAL at 08:50

## 2022-12-03 RX ADMIN — HEPARIN SODIUM 5000 UNITS: 5000 INJECTION INTRAVENOUS; SUBCUTANEOUS at 05:55

## 2022-12-03 RX ADMIN — HEPARIN SODIUM 5000 UNITS: 5000 INJECTION INTRAVENOUS; SUBCUTANEOUS at 22:02

## 2022-12-03 RX ADMIN — TORSEMIDE 40 MG: 20 TABLET ORAL at 08:50

## 2022-12-03 RX ADMIN — INSULIN GLARGINE 10 UNITS: 100 INJECTION, SOLUTION SUBCUTANEOUS at 22:02

## 2022-12-03 RX ADMIN — FLUOXETINE HYDROCHLORIDE 40 MG: 20 CAPSULE ORAL at 08:50

## 2022-12-03 RX ADMIN — ISOSORBIDE DINITRATE 20 MG: 20 TABLET ORAL at 22:06

## 2022-12-03 RX ADMIN — ATORVASTATIN CALCIUM 40 MG: 40 TABLET, FILM COATED ORAL at 22:02

## 2022-12-03 RX ADMIN — INSULIN LISPRO 2 UNITS: 100 INJECTION, SOLUTION INTRAVENOUS; SUBCUTANEOUS at 11:40

## 2022-12-03 RX ADMIN — ASPIRIN 81 MG: 81 TABLET, COATED ORAL at 08:50

## 2022-12-03 RX ADMIN — HYDRALAZINE HYDROCHLORIDE 50 MG: 50 TABLET, FILM COATED ORAL at 22:02

## 2022-12-03 RX ADMIN — HYDRALAZINE HYDROCHLORIDE 50 MG: 50 TABLET, FILM COATED ORAL at 05:55

## 2022-12-03 RX ADMIN — HEPARIN SODIUM 5000 UNITS: 5000 INJECTION INTRAVENOUS; SUBCUTANEOUS at 14:47

## 2022-12-03 RX ADMIN — INSULIN LISPRO 4 UNITS: 100 INJECTION, SOLUTION INTRAVENOUS; SUBCUTANEOUS at 16:26

## 2022-12-03 RX ADMIN — THIAMINE HCL TAB 100 MG 100 MG: 100 TAB at 08:50

## 2022-12-03 RX ADMIN — HYDRALAZINE HYDROCHLORIDE 50 MG: 50 TABLET, FILM COATED ORAL at 14:47

## 2022-12-03 RX ADMIN — INSULIN LISPRO 5 UNITS: 100 INJECTION, SOLUTION INTRAVENOUS; SUBCUTANEOUS at 16:26

## 2022-12-03 RX ADMIN — CARVEDILOL 12.5 MG: 12.5 TABLET, FILM COATED ORAL at 22:02

## 2022-12-03 RX ADMIN — INSULIN LISPRO 5 UNITS: 100 INJECTION, SOLUTION INTRAVENOUS; SUBCUTANEOUS at 08:52

## 2022-12-03 NOTE — ASSESSMENT & PLAN NOTE
· Patient at times will not converse however with prompting was noted to sit up, speak to me and eat lunch  Nursing staff report that this has been present during prior admissions as well  During the course 2 week patient is consistently alert and oriented, cooperates and answer simple questions  · When seen by occupational therapy, patient did poorly on cognitive evaluation  MOCA 6/30 (11/24/22)  · Appreciate Geriatrics consultation  · OT recommending rehab vs home with 24/7 supervision upon discharge  · Given frequent readmissions both here and at [address] [place] [address] [address] secondary to noncompliance, discussed with family (brother Sukh Gorman and sister Astrid Buenrostro) regarding placement on 11/27  · Patient is now agreeable to rehab, will forego neuropsych evaluation  · Patient will be discharged to rehab    He can decide to leave rehab once he is ready

## 2022-12-03 NOTE — ASSESSMENT & PLAN NOTE
Lab Results   Component Value Date    HGBA1C 7 7 (H) 10/03/2022     Recent Labs     12/02/22  1103 12/02/22  1539 12/02/22  2125 12/03/22  0603   POCGLU 157* 108 251* 147*       Blood Sugar Average: Last 72 hrs:  · (P) 146 [id number]   · Outpatient on Farxiga and Novolog  · Follows with endocrinology outpatient  · Home regimen of novolog is 30 with breakfast and 10 with dinner but was having glucoses in the 30s with this regimen (giving and not eating)  · Continue current regimen Lantus 10 units QHS and humalog 5 units t i d   With meals  · Diabetic diet

## 2022-12-03 NOTE — PROGRESS NOTES
[facility]  Progress Note - Jonathon Fisher 1955, 74 y o  male MRN: [id number]  Unit/Bed#: CW2 216-02 Encounter: [id number]  Primary Care Provider: Americo Silverio DO   Date and time admitted to hospital: 11/23/2022 10:54 AM    Cognitive impairment  Assessment & Plan  · Patient at times will not converse however with prompting was noted to sit up, speak to me and eat lunch  Nursing staff report that this has been present during prior admissions as well  During the course 2 week patient is consistently alert and oriented, cooperates and answer simple questions  · When seen by occupational therapy, patient did poorly on cognitive evaluation  MOCA 6/30 (11/24/22)  · Appreciate Geriatrics consultation  · OT recommending rehab vs home with 24/7 supervision upon discharge  · Given frequent readmissions both here and at [address] [place] [address] [address] secondary to noncompliance, discussed with family (brother Lisset Leahy and sister Krystle Ponce) regarding placement on 11/27  · Patient is now agreeable to rehab, will forego neuropsych evaluation  · Patient will be discharged to rehab  He can decide to leave rehab once he is ready      CAD (coronary artery disease)  Assessment & Plan  Known multivessel severe coronary artery disease  Patient follows with [facility] Cardiology, felt to not be a CABG candidate as per record review, was to be optimized prior to planned PCI to circumflex and LAD    · Troponin elevation on admission due to acute heart failure and hypertension   · Most recent echocardiogram 11/8/2022 at [facility] with an EF of 30-35%  · Follow-up echo during this admission yesterday showed improvement in EF to roughly 40%  · With improving creatinine can consider stenting during this admission, will discuss with Nephrology Cardiology  · Has a life vest but is noncompliant with this  · Continue outpatient follow-up with Cardiology  · Continue Coreg, isosorbide, hydralazine, aspirin and statin    Type 2 diabetes mellitus, with long-term current use of insulin [facility])  Assessment & Plan  Lab Results   Component Value Date    HGBA1C 7 7 (H) 10/03/2022     Recent Labs     12/02/22  1103 12/02/22  1539 12/02/22  2125 12/03/22  0603   POCGLU 157* 108 251* 147*       Blood Sugar Average: Last 72 hrs:  · (P) 146 [id number]   · Outpatient on Farxiga and Novolog  · Follows with endocrinology outpatient  · Home regimen of novolog is 30 with breakfast and 10 with dinner but was having glucoses in the 30s with this regimen (giving and not eating)  · Continue current regimen Lantus 10 units QHS and humalog 5 units t i d  With meals  · Diabetic diet    Normocytic anemia  Assessment & Plan  · Baseline hemoglobin appears to be 9-10  · No signs or symptoms of bleeding at this time  · Recommend outpatient follow-up    Essential hypertension  Assessment & Plan  · BP acceptable, monitor routinely   · Continue Coreg, hydralazine and isosorbide  · Diuresis as above     Bipolar affective disorder, mixed, moderate (HCC)  Assessment & Plan  · Continue Seroquel and Prozac  · Patient is competent on my exam, he is willing to attend rehab  Although may not be in his best interests he may leave rehab at any time if he chooses to  Patient continues to be alert and oriented x3    LAZARO superimposed on CKD (chronic kidney disease) stage 4, GFR 15-29 ml/min [facility])  Assessment & Plan  Lab Results   Component Value Date    EGFR 19 12/03/2022    EGFR 21 11/30/2022    EGFR 22 11/29/2022    CREATININE 3 19 (H) 12/03/2022    CREATININE 2 91 (H) 11/30/2022    CREATININE 2 84 (H) 11/29/2022     · Baseline creatinine approximately 2 1-2 7  Follows with [facility]    · LAZARO POA with creatinine of 3 51 felt to be secondary to volume overload, cardiorenal syndrome  · Nephrology following, appreciate inputs   · Continue to monitor with diuresis, see plan above   · Creatinine slightly worsened to 3 19 today-will recheck tomorrow and adjust diuretics as needed    * Acute on chronic systolic congestive heart failure (HCC)  Assessment & Plan  Wt Readings from Last 3 Encounters:   22 86 1 kg (189 lb 12 8 oz)   10/26/22 90 4 kg (199 lb 4 8 oz)   10/16/22 84 1 kg (185 lb 6 4 oz)     Has a history of noncompliance with his medications and was recently seen in [facility] -11/15 for CHF exacerbation as well and at [facility] 10/26  On discharge from [facility] was instructed to take torsemide 40 mg daily  Prior to this had been increased to 20 mg b i d  on  and prior to this was on 10 mg QOD  · Patient not taking medications as prescribed as an outpatient  · Most recent echocardiogram with LVEF 30%   · Patient has life vest but is non compliant; monitor on telemetry while inpatient   · Cardiology and Nephrology following  · Status post IV diuresis, transitioned to increased dose torsemide 40 mg PO BID on    · Isordil increased to 20 mg q8h  · Continued on home dose Coreg, hydralazine  · Monitor daily weights, intake and output   · Close outpatient follow-up         VTE Pharmacologic Prophylaxis: VTE Score: 4 Moderate Risk (Score 3-4) - Pharmacological DVT Prophylaxis Ordered: heparin  Patient Centered Rounds: I performed bedside rounds with nursing staff today  Discussions with Specialists or Other Care Team Provider: n/a    Education and Discussions with Family / Patient: Updated  (brother) via phone  Time Spent for Care: 30 minutes  More than 50% of total time spent on counseling and coordination of care as described above  Current Length of Stay: 10 day(s)  Current Patient Status: Inpatient   Certification Statement: The patient will continue to require additional inpatient hospital stay due to Pending placement to rehab  Discharge Plan: Anticipate discharge in 48 hrs to rehab facility      Code Status: Level 1 - Full Code    Subjective:   Patient denies any acute complaints today    Objective:     Vitals:   Temp (24hrs), Av 7 °F (36 5 °C), Min:97 3 °F (36 3 °C), Max:98 5 °F (36 9 °C)    Temp:  [97 3 °F (36 3 °C)-98 5 °F (36 9 °C)] 97 6 °F (36 4 °C)  HR:  [65-75] 66  Resp:  [17] 17  BP: (109-119)/(63-68) 110/67  SpO2:  [92 %-97 %] 92 %  Body mass index is 32 58 kg/m²  Input and Output Summary (last 24 hours): Intake/Output Summary (Last 24 hours) at 12/3/2022 0957  Last data filed at 12/3/2022 0001  Gross per 24 hour   Intake 600 ml   Output 700 ml   Net -100 ml       Physical Exam:   Physical Exam  Vitals and nursing note reviewed  Constitutional:       General: He is not in acute distress  Appearance: He is well-developed  He is not ill-appearing, toxic-appearing or diaphoretic  HENT:      Head: Normocephalic and atraumatic  Eyes:      General: No scleral icterus  Conjunctiva/sclera: Conjunctivae normal    Cardiovascular:      Rate and Rhythm: Normal rate and regular rhythm  Heart sounds: No murmur heard  No friction rub  No gallop  Pulmonary:      Effort: Pulmonary effort is normal  No respiratory distress  Breath sounds: Normal breath sounds  No stridor  No wheezing, rhonchi or rales  Chest:      Chest wall: No tenderness  Abdominal:      General: There is no distension  Palpations: Abdomen is soft  There is no mass  Tenderness: There is no abdominal tenderness  There is no guarding or rebound  Hernia: No hernia is present  Musculoskeletal:         General: No swelling, tenderness, deformity or signs of injury  Cervical back: Neck supple  Skin:     General: Skin is warm and dry  Capillary Refill: Capillary refill takes less than 2 seconds  Coloration: Skin is not jaundiced or pale  Neurological:      Mental Status: He is alert and oriented to person, place, and time     Psychiatric:         Mood and Affect: Mood normal           Additional Data:     Labs:  Results from last 7 days   Lab Units 12/03/22  0522   WBC Thousand/uL 8 63   HEMOGLOBIN g/dL 10 2* HEMATOCRIT % 32 8*   PLATELETS Thousands/uL 184   NEUTROS PCT % 63   LYMPHS PCT % 22   MONOS PCT % 9   EOS PCT % 4     Results from last 7 days   Lab Units 12/03/22  0522   SODIUM mmol/L 136   POTASSIUM mmol/L 4 1   CHLORIDE mmol/L 106   CO2 mmol/L 26   BUN mg/dL 75*   CREATININE mg/dL 3 19*   ANION GAP mmol/L 4   CALCIUM mg/dL 9 0   GLUCOSE RANDOM mg/dL 185*         Results from last 7 days   Lab Units 12/03/22  0603 12/02/22  2125 12/02/22  1539 12/02/22  1103 12/02/22  0555 12/01/22  2043 12/01/22  1922 12/01/22  1647 12/01/22  1105 12/01/22  0646 11/30/22 2050 11/30/22  1656   POC GLUCOSE mg/dl 147* 251* 108 157* 153* 138 140 140 214* 143* 96 126               Lines/Drains:  Invasive Devices     Peripheral Intravenous Line  Duration           Peripheral IV 11/30/22 Distal;Left;Ventral (anterior) Forearm 3 days                  Telemetry:  Telemetry Orders (From admission, onward)             LifeVest Patient: Continuous Telemetry Monitoring during hospitalization (non-expiring)  Continuous LifeVest Telemetry Monitoring        References:    LifeVest Policy                 Telemetry Reviewed: Normal Sinus Rhythm  Indication for Continued Telemetry Use: Lifevest (remains on tele entire hospital stay)             Imaging: No pertinent imaging reviewed      Recent Cultures (last 7 days):         Last 24 Hours Medication List:   Current Facility-Administered Medications   Medication Dose Route Frequency Provider Last Rate   • acetaminophen  650 mg Oral Q6H PRN Stefany Shirley PA-C     • aspirin  81 mg Oral Daily EMMA Ayala     • atorvastatin  40 mg Oral HS EMMA Ayala     • carvedilol  12 5 mg Oral Q12H [address] Mickey Mcwilliams PA-C     • FLUoxetine  40 mg Oral Daily EMMA Ayala     • gabapentin  300 mg Oral Daily EMMA Ayala     • heparin (porcine)  5,000 Units Subcutaneous [facility] EMMA Ayala     • hydrALAZINE  50 mg Oral Q8H Leeroy Luke PA-C     • insulin glargine 10 Units Subcutaneous HS EMMA Colon     • insulin lispro  1-6 Units Subcutaneous HS EMMA Davis     • insulin lispro  2-12 Units Subcutaneous TID AC EMMA Colon     • insulin lispro  5 Units Subcutaneous TID With Meals Stefany Shirley PA-C     • isosorbide dinitrate  20 mg Oral Q8H Yeimi Pierre PA-C     • melatonin  4 5 mg Oral HS PRN EMMA Colon     • ondansetron  4 mg Intravenous Q8H PRN Marcial Vicente DO     • perflutren lipid microsphere  2 mL/min Intravenous Once in imaging Marcial Vicente DO     • QUEtiapine  100 mg Oral HS EMMA Colon     • thiamine  100 mg Oral Daily EMMA Colon     • torsemide  40 mg Oral BID Yeimi Pierre PA-C          Today, Patient Was Seen By: Shelby Uriarte DO    **Please Note: This note may have been constructed using a voice recognition system  **

## 2022-12-03 NOTE — ASSESSMENT & PLAN NOTE
Lab Results   Component Value Date    EGFR 19 12/03/2022    EGFR 21 11/30/2022    EGFR 22 11/29/2022    CREATININE 3 19 (H) 12/03/2022    CREATININE 2 91 (H) 11/30/2022    CREATININE 2 84 (H) 11/29/2022     · Baseline creatinine approximately 2 1-2 7  Follows with [facility]    · LAZARO POA with creatinine of 3 51 felt to be secondary to volume overload, cardiorenal syndrome  · Nephrology following, appreciate inputs   · Continue to monitor with diuresis, see plan above   · Creatinine slightly worsened to 3 19 today-will recheck tomorrow and adjust diuretics as needed

## 2022-12-03 NOTE — PLAN OF CARE
Problem: INFECTION - ADULT  Goal: Absence or prevention of progression during hospitalization  Description: INTERVENTIONS:  - Assess and monitor for signs and symptoms of infection  - Monitor lab/diagnostic results  - Monitor all insertion sites, i e  indwelling lines, tubes, and drains  - Monitor endotracheal if appropriate and nasal secretions for changes in amount and color  - [place] appropriate cooling/warming therapies per order  - Administer medications as ordered  - Instruct and encourage patient and family to use good hand hygiene technique  - Identify and instruct in appropriate isolation precautions for identified infection/condition  Outcome: Progressing     Problem: PAIN - ADULT  Goal: Verbalizes/displays adequate comfort level or baseline comfort level  Description: Interventions:  - Encourage patient to monitor pain and request assistance  - Assess pain using appropriate pain scale  - Administer analgesics based on type and severity of pain and evaluate response  - Implement non-pharmacological measures as appropriate and evaluate response  - Consider cultural and social influences on pain and pain management  - Notify physician/advanced practitioner if interventions unsuccessful or patient reports new pain  Outcome: Progressing

## 2022-12-03 NOTE — ASSESSMENT & PLAN NOTE
Known multivessel severe coronary artery disease  Patient follows with [facility] Cardiology, felt to not be a CABG candidate as per record review, was to be optimized prior to planned PCI to circumflex and LAD    · Troponin elevation on admission due to acute heart failure and hypertension   · Most recent echocardiogram 11/8/2022 at [facility] with an EF of 30-35%  · Follow-up echo during this admission yesterday showed improvement in EF to roughly 40%  · With improving creatinine can consider stenting during this admission, will discuss with Nephrology Cardiology  · Has a life vest but is noncompliant with this  · Continue outpatient follow-up with Cardiology  · Continue Coreg, isosorbide, hydralazine, aspirin and statin

## 2022-12-04 LAB
ANION GAP SERPL CALCULATED.3IONS-SCNC: 8 MMOL/L (ref 4–13)
BUN SERPL-MCNC: 72 MG/DL (ref 5–25)
CALCIUM SERPL-MCNC: 8.9 MG/DL (ref 8.3–10.1)
CHLORIDE SERPL-SCNC: 103 MMOL/L (ref 96–108)
CO2 SERPL-SCNC: 24 MMOL/L (ref 21–32)
CREAT SERPL-MCNC: 3.03 MG/DL (ref 0.6–1.3)
GFR SERPL CREATININE-BSD FRML MDRD: 20 ML/MIN/1.73SQ M
GLUCOSE SERPL-MCNC: 133 MG/DL (ref 65–140)
GLUCOSE SERPL-MCNC: 144 MG/DL (ref 65–140)
GLUCOSE SERPL-MCNC: 197 MG/DL (ref 65–140)
GLUCOSE SERPL-MCNC: 231 MG/DL (ref 65–140)
GLUCOSE SERPL-MCNC: 236 MG/DL (ref 65–140)
POTASSIUM SERPL-SCNC: 4.1 MMOL/L (ref 3.5–5.3)
SODIUM SERPL-SCNC: 135 MMOL/L (ref 135–147)

## 2022-12-04 RX ORDER — TORSEMIDE 20 MG/1
20 TABLET ORAL 2 TIMES DAILY
Status: DISCONTINUED | OUTPATIENT
Start: 2022-12-04 | End: 2022-12-05

## 2022-12-04 RX ORDER — INSULIN GLARGINE 100 [IU]/ML
15 INJECTION, SOLUTION SUBCUTANEOUS
Status: DISCONTINUED | OUTPATIENT
Start: 2022-12-04 | End: 2022-12-05

## 2022-12-04 RX ADMIN — INSULIN GLARGINE 15 UNITS: 100 INJECTION, SOLUTION SUBCUTANEOUS at 22:16

## 2022-12-04 RX ADMIN — TORSEMIDE 40 MG: 20 TABLET ORAL at 08:57

## 2022-12-04 RX ADMIN — ISOSORBIDE DINITRATE 20 MG: 20 TABLET ORAL at 14:10

## 2022-12-04 RX ADMIN — INSULIN LISPRO 2 UNITS: 100 INJECTION, SOLUTION INTRAVENOUS; SUBCUTANEOUS at 22:20

## 2022-12-04 RX ADMIN — HEPARIN SODIUM 5000 UNITS: 5000 INJECTION INTRAVENOUS; SUBCUTANEOUS at 14:12

## 2022-12-04 RX ADMIN — GABAPENTIN 300 MG: 300 CAPSULE ORAL at 08:57

## 2022-12-04 RX ADMIN — INSULIN LISPRO 5 UNITS: 100 INJECTION, SOLUTION INTRAVENOUS; SUBCUTANEOUS at 16:43

## 2022-12-04 RX ADMIN — INSULIN LISPRO 5 UNITS: 100 INJECTION, SOLUTION INTRAVENOUS; SUBCUTANEOUS at 09:00

## 2022-12-04 RX ADMIN — TORSEMIDE 20 MG: 20 TABLET ORAL at 16:47

## 2022-12-04 RX ADMIN — HEPARIN SODIUM 5000 UNITS: 5000 INJECTION INTRAVENOUS; SUBCUTANEOUS at 22:16

## 2022-12-04 RX ADMIN — HEPARIN SODIUM 5000 UNITS: 5000 INJECTION INTRAVENOUS; SUBCUTANEOUS at 06:53

## 2022-12-04 RX ADMIN — HYDRALAZINE HYDROCHLORIDE 50 MG: 50 TABLET, FILM COATED ORAL at 14:10

## 2022-12-04 RX ADMIN — FLUOXETINE HYDROCHLORIDE 40 MG: 20 CAPSULE ORAL at 08:57

## 2022-12-04 RX ADMIN — CARVEDILOL 12.5 MG: 12.5 TABLET, FILM COATED ORAL at 08:57

## 2022-12-04 RX ADMIN — CARVEDILOL 12.5 MG: 12.5 TABLET, FILM COATED ORAL at 22:16

## 2022-12-04 RX ADMIN — INSULIN LISPRO 5 UNITS: 100 INJECTION, SOLUTION INTRAVENOUS; SUBCUTANEOUS at 11:43

## 2022-12-04 RX ADMIN — ASPIRIN 81 MG: 81 TABLET, COATED ORAL at 08:57

## 2022-12-04 RX ADMIN — HYDRALAZINE HYDROCHLORIDE 50 MG: 50 TABLET, FILM COATED ORAL at 06:53

## 2022-12-04 RX ADMIN — INSULIN LISPRO 4 UNITS: 100 INJECTION, SOLUTION INTRAVENOUS; SUBCUTANEOUS at 06:54

## 2022-12-04 RX ADMIN — QUETIAPINE FUMARATE 100 MG: 100 TABLET ORAL at 22:17

## 2022-12-04 RX ADMIN — HYDRALAZINE HYDROCHLORIDE 50 MG: 50 TABLET, FILM COATED ORAL at 22:16

## 2022-12-04 RX ADMIN — ISOSORBIDE DINITRATE 20 MG: 20 TABLET ORAL at 22:16

## 2022-12-04 RX ADMIN — ATORVASTATIN CALCIUM 40 MG: 40 TABLET, FILM COATED ORAL at 22:16

## 2022-12-04 RX ADMIN — THIAMINE HCL TAB 100 MG 100 MG: 100 TAB at 08:57

## 2022-12-04 NOTE — ASSESSMENT & PLAN NOTE
Lab Results   Component Value Date    HGBA1C 7 7 (H) 10/03/2022     Recent Labs     12/03/22  1118 12/03/22  1607 12/03/22  2130 12/04/22  0649   POCGLU 180* 214* 177* 231*       Blood Sugar Average: Last 72 hrs:  · (P) 213 [id number]   · Outpatient on Farxiga and Novolog  · Follows with endocrinology outpatient  · Home regimen of novolog is 30 with breakfast and 10 with dinner but was having glucoses in the 30s with this regimen (giving and not eating)  · Continue current regimen Lantus 10 units QHS and humalog 5 units t i d   With meals  · Diabetic diet

## 2022-12-04 NOTE — ASSESSMENT & PLAN NOTE
Known multivessel severe coronary artery disease  Patient follows with [facility] Cardiology, felt to not be a CABG candidate as per record review, was to be optimized prior to planned PCI to circumflex and LAD    · Troponin elevation on admission due to acute heart failure and hypertension   · Most recent echocardiogram 11/8/2022 at [facility] with an EF of 30-35%  · Follow-up echo during this admission yesterday showed improvement in EF to roughly 40%  · With improving creatinine can consider stenting during this admission, will discuss with Nephrology Cardiology  · Has a life vest but is noncompliant with this  · Continue outpatient follow-up with Cardiology  · Continue Coreg, isosorbide, hydralazine, aspirin and statin

## 2022-12-04 NOTE — ASSESSMENT & PLAN NOTE
Lab Results   Component Value Date    EGFR 20 12/04/2022    EGFR 19 12/03/2022    EGFR 21 11/30/2022    CREATININE 3 03 (H) 12/04/2022    CREATININE 3 19 (H) 12/03/2022    CREATININE 2 91 (H) 11/30/2022     · Baseline creatinine approximately 2 1-2 7  Follows with [facility]    · LAZARO POA with creatinine of 3 51 felt to be secondary to volume overload, cardiorenal syndrome  · Nephrology following, appreciate inputs   · Continue to monitor with diuresis, see plan above   · Decreased diuretics to 20 mg b i d   · Creatinine improved to 3 03 from 3 19 yesterday

## 2022-12-04 NOTE — PROGRESS NOTES
[facility]  Progress Note - Jose Juan Wilson 1955, 74 y o  male MRN: [id number]  Unit/Bed#: CW2 216-02 Encounter: [id number]  Primary Care Provider: Vanesa Nuñez DO   Date and time admitted to hospital: 11/23/2022 10:54 AM    Cognitive impairment  Assessment & Plan  · Patient at times will not converse however with prompting was noted to sit up, speak to me and eat lunch  Nursing staff report that this has been present during prior admissions as well  During the course 2 week patient is consistently alert and oriented, cooperates and answer simple questions  · When seen by occupational therapy, patient did poorly on cognitive evaluation  MOCA 6/30 (11/24/22)  · Appreciate Geriatrics consultation  · OT recommending rehab vs home with 24/7 supervision upon discharge  · Given frequent readmissions both here and at [address] [place] [address] [address] secondary to noncompliance, discussed with family (brother Be Philippeer and sister Sobia De La Torre) regarding placement on 11/27  · Patient is now agreeable to rehab, will forego neuropsych evaluation  · Patient will be discharged to rehab  He can decide to leave rehab once he is ready      CAD (coronary artery disease)  Assessment & Plan  Known multivessel severe coronary artery disease  Patient follows with [facility] Cardiology, felt to not be a CABG candidate as per record review, was to be optimized prior to planned PCI to circumflex and LAD    · Troponin elevation on admission due to acute heart failure and hypertension   · Most recent echocardiogram 11/8/2022 at [facility] with an EF of 30-35%  · Follow-up echo during this admission yesterday showed improvement in EF to roughly 40%  · With improving creatinine can consider stenting during this admission, will discuss with Nephrology Cardiology  · Has a life vest but is noncompliant with this  · Continue outpatient follow-up with Cardiology  · Continue Coreg, isosorbide, hydralazine, aspirin and statin    Type 2 diabetes mellitus, with long-term current use of insulin [facility])  Assessment & Plan  Lab Results   Component Value Date    HGBA1C 7 7 (H) 10/03/2022     Recent Labs     12/03/22  1118 12/03/22  1607 12/03/22  2130 12/04/22  0649   POCGLU 180* 214* 177* 231*       Blood Sugar Average: Last 72 hrs:  · (P) 669 [id number]   · Outpatient on Farxiga and Novolog  · Follows with endocrinology outpatient  · Home regimen of novolog is 30 with breakfast and 10 with dinner but was having glucoses in the 30s with this regimen (giving and not eating)  · Continue current regimen Lantus 10 units QHS and humalog 5 units t i d  With meals  · Diabetic diet    Normocytic anemia  Assessment & Plan  · Baseline hemoglobin appears to be 9-10  · No signs or symptoms of bleeding at this time  · Recommend outpatient follow-up    Essential hypertension  Assessment & Plan  · BP acceptable, monitor routinely   · Continue Coreg, hydralazine and isosorbide  · Diuresis as above     Bipolar affective disorder, mixed, moderate (HCC)  Assessment & Plan  · Continue Seroquel and Prozac  · Patient is competent on my exam, he is willing to attend rehab  Although may not be in his best interests he may leave rehab at any time if he chooses to  Patient continues to be alert and oriented x3    LAZARO superimposed on CKD (chronic kidney disease) stage 4, GFR 15-29 ml/min [facility])  Assessment & Plan  Lab Results   Component Value Date    EGFR 20 12/04/2022    EGFR 19 12/03/2022    EGFR 21 11/30/2022    CREATININE 3 03 (H) 12/04/2022    CREATININE 3 19 (H) 12/03/2022    CREATININE 2 91 (H) 11/30/2022     · Baseline creatinine approximately 2 1-2 7  Follows with [facility]    · LAZARO POA with creatinine of 3 51 felt to be secondary to volume overload, cardiorenal syndrome  · Nephrology following, appreciate inputs   · Continue to monitor with diuresis, see plan above   · Decreased diuretics to 20 mg b i d   · Creatinine improved to 3 03 from 3 19 yesterday    * Acute on chronic systolic congestive heart failure (HCC)  Assessment & Plan  Wt Readings from Last 3 Encounters:   12/04/22 87 5 kg (193 lb)   10/26/22 90 4 kg (199 lb 4 8 oz)   10/16/22 84 1 kg (185 lb 6 4 oz)     Has a history of noncompliance with his medications and was recently seen in [facility] 11/13-11/15 for CHF exacerbation as well and at [facility] 10/26  On discharge from [facility] was instructed to take torsemide 40 mg daily  Prior to this had been increased to 20 mg b i d  on 11/9 and prior to this was on 10 mg QOD  · Patient not taking medications as prescribed as an outpatient  · Most recent echocardiogram with LVEF 30%   · Patient has life vest but is non compliant; monitor on telemetry while inpatient   · Cardiology and Nephrology following  · Status post IV diuresis, transitioned to increased dose torsemide 40 mg PO BID on 11/27   Now torsemide decreased to 20 mg b i d  Due to rising creatinine  · Isordil increased to 20 mg q8h  · Continued on home dose Coreg, hydralazine  · Monitor daily weights, intake and output   · Close outpatient follow-up           VTE Pharmacologic Prophylaxis: VTE Score: 4 Moderate Risk (Score 3-4) - Pharmacological DVT Prophylaxis Ordered: heparin  Patient Centered Rounds: I performed bedside rounds with nursing staff today  Discussions with Specialists or Other Care Team Provider: n/a    Education and Discussions with Family / Patient: Updated  (brother) via phone  Time Spent for Care: 30 minutes  More than 50% of total time spent on counseling and coordination of care as described above  Current Length of Stay: 11 day(s)  Current Patient Status: Inpatient   Certification Statement: The patient will continue to require additional inpatient hospital stay due to Pending placement to rehab  Discharge Plan: Anticipate discharge in 24-48 hrs to rehab facility      Code Status: Level 1 - Full Code    Subjective:   Patient denies any acute complaints denies chest pain shortness a breath leg swelling abdominal pain nausea vomiting    Objective:     Vitals:   Temp (24hrs), Av 7 °F (37 1 °C), Min:98 2 °F (36 8 °C), Max:99 2 °F (37 3 °C)    Temp:  [98 2 °F (36 8 °C)-99 2 °F (37 3 °C)] 99 2 °F (37 3 °C)  HR:  [71-86] 73  Resp:  [16-18] 16  BP: ()/(59-84) 121/66  SpO2:  [94 %-98 %] 94 %  Body mass index is 33 13 kg/m²  Input and Output Summary (last 24 hours): Intake/Output Summary (Last 24 hours) at 2022 0934  Last data filed at 12/3/2022 2321  Gross per 24 hour   Intake 120 ml   Output 300 ml   Net -180 ml       Physical Exam:   Physical Exam  Vitals and nursing note reviewed  Constitutional:       General: He is not in acute distress  Appearance: He is well-developed  He is not ill-appearing, toxic-appearing or diaphoretic  HENT:      Head: Normocephalic and atraumatic  Eyes:      General: No scleral icterus  Conjunctiva/sclera: Conjunctivae normal    Cardiovascular:      Rate and Rhythm: Normal rate and regular rhythm  Heart sounds: No murmur heard  No friction rub  No gallop  Pulmonary:      Effort: Pulmonary effort is normal  No respiratory distress  Breath sounds: Normal breath sounds  No stridor  No wheezing, rhonchi or rales  Chest:      Chest wall: No tenderness  Abdominal:      General: There is no distension  Palpations: Abdomen is soft  There is no mass  Tenderness: There is no abdominal tenderness  There is no guarding or rebound  Hernia: No hernia is present  Musculoskeletal:         General: No swelling, tenderness, deformity or signs of injury  Cervical back: Neck supple  Skin:     General: Skin is warm and dry  Capillary Refill: Capillary refill takes less than 2 seconds  Coloration: Skin is not jaundiced or pale  Neurological:      Mental Status: He is alert and oriented to person, place, and time     Psychiatric:         Mood and Affect: Mood normal  Additional Data:     Labs:  Results from last 7 days   Lab Units 12/03/22  0522   WBC Thousand/uL 8 63   HEMOGLOBIN g/dL 10 2*   HEMATOCRIT % 32 8*   PLATELETS Thousands/uL 184   NEUTROS PCT % 63   LYMPHS PCT % 22   MONOS PCT % 9   EOS PCT % 4     Results from last 7 days   Lab Units 12/04/22  0440   SODIUM mmol/L 135   POTASSIUM mmol/L 4 1   CHLORIDE mmol/L 103   CO2 mmol/L 24   BUN mg/dL 72*   CREATININE mg/dL 3 03*   ANION GAP mmol/L 8   CALCIUM mg/dL 8 9   GLUCOSE RANDOM mg/dL 236*         Results from last 7 days   Lab Units 12/04/22  0649 12/03/22  2130 12/03/22  1607 12/03/22  1118 12/03/22  0603 12/02/22  2125 12/02/22  1539 12/02/22  1103 12/02/22  0555 12/01/22  2043 12/01/22  1922 12/01/22  1647   POC GLUCOSE mg/dl 231* 177* 214* 180* 147* 251* 108 157* 153* 138 140 140               Lines/Drains:  Invasive Devices     Peripheral Intravenous Line  Duration           Peripheral IV 11/30/22 Distal;Left;Ventral (anterior) Forearm 3 days                  Telemetry:  Telemetry Orders (From admission, onward)             LifeVest Patient: Continuous Telemetry Monitoring during hospitalization (non-expiring)  Continuous LifeVest Telemetry Monitoring        References:    LifeVest Policy                 Telemetry Reviewed: Normal Sinus Rhythm  Indication for Continued Telemetry Use: Lifevest (remains on tele entire hospital stay)             Imaging: No pertinent imaging reviewed      Recent Cultures (last 7 days):         Last 24 Hours Medication List:   Current Facility-Administered Medications   Medication Dose Route Frequency Provider Last Rate   • acetaminophen  650 mg Oral Q6H PRN Stefany Shirley PA-C     • aspirin  81 mg Oral Daily Oval [place], CRNP     • atorvastatin  40 mg Oral HS Oval [place], CRNP     • carvedilol  12 5 mg Oral Q12H [address] Jessica Hernandez PA-C     • FLUoxetine  40 mg Oral Daily Oval [place], CRNP     • gabapentin  300 mg Oral Daily Oval [place], CRNP     • heparin (porcine) 5,000 Units Subcutaneous [facility] EMMA Connelly     • hydrALAZINE  50 mg Oral Q8H Justa Beltre PA-C     • insulin glargine  10 Units Subcutaneous HS EMMA Connelly     • insulin lispro  1-6 Units Subcutaneous HS EMMA Amaya     • insulin lispro  2-12 Units Subcutaneous TID AC EMMA Connelly     • insulin lispro  5 Units Subcutaneous TID With Meals Lori Shirley PA-C     • isosorbide dinitrate  20 mg Oral Q8H Jose Bhatt PA-C     • melatonin  4 5 mg Oral HS PRN EMMA Connelly     • ondansetron  4 mg Intravenous Q8H PRN Marcial Vicente DO     • perflutren lipid microsphere  2 mL/min Intravenous Once in imaging Marcial Vicente DO     • QUEtiapine  100 mg Oral HS EMMA Connelly     • thiamine  100 mg Oral Daily EMMA Connelly     • torsemide  20 mg Oral BID Bernice Mora DO          Today, Patient Was Seen By: Bernice Mora DO    **Please Note: This note may have been constructed using a voice recognition system  **

## 2022-12-04 NOTE — ASSESSMENT & PLAN NOTE
Wt Readings from Last 3 Encounters:   12/04/22 87 5 kg (193 lb)   10/26/22 90 4 kg (199 lb 4 8 oz)   10/16/22 84 1 kg (185 lb 6 4 oz)     Has a history of noncompliance with his medications and was recently seen in [facility] 11/13-11/15 for CHF exacerbation as well and at [facility] 10/26  On discharge from [facility] was instructed to take torsemide 40 mg daily  Prior to this had been increased to 20 mg b i d  on 11/9 and prior to this was on 10 mg QOD  · Patient not taking medications as prescribed as an outpatient  · Most recent echocardiogram with LVEF 30%   · Patient has life vest but is non compliant; monitor on telemetry while inpatient   · Cardiology and Nephrology following  · Status post IV diuresis, transitioned to increased dose torsemide 40 mg PO BID on 11/27   Now torsemide decreased to 20 mg b i d  Due to rising creatinine    · Isordil increased to 20 mg q8h  · Continued on home dose Coreg, hydralazine  · Monitor daily weights, intake and output   · Close outpatient follow-up

## 2022-12-05 LAB
ANION GAP SERPL CALCULATED.3IONS-SCNC: 6 MMOL/L (ref 4–13)
BUN SERPL-MCNC: 77 MG/DL (ref 5–25)
CALCIUM SERPL-MCNC: 9.1 MG/DL (ref 8.3–10.1)
CHLORIDE SERPL-SCNC: 105 MMOL/L (ref 96–108)
CO2 SERPL-SCNC: 26 MMOL/L (ref 21–32)
CREAT SERPL-MCNC: 3.1 MG/DL (ref 0.6–1.3)
GFR SERPL CREATININE-BSD FRML MDRD: 20 ML/MIN/1.73SQ M
GLUCOSE SERPL-MCNC: 142 MG/DL (ref 65–140)
GLUCOSE SERPL-MCNC: 158 MG/DL (ref 65–140)
GLUCOSE SERPL-MCNC: 179 MG/DL (ref 65–140)
GLUCOSE SERPL-MCNC: 181 MG/DL (ref 65–140)
GLUCOSE SERPL-MCNC: 216 MG/DL (ref 65–140)
POTASSIUM SERPL-SCNC: 4.2 MMOL/L (ref 3.5–5.3)
SODIUM SERPL-SCNC: 137 MMOL/L (ref 135–147)

## 2022-12-05 RX ORDER — TORSEMIDE 20 MG/1
20 TABLET ORAL 2 TIMES DAILY
Status: DISCONTINUED | OUTPATIENT
Start: 2022-12-06 | End: 2022-12-06 | Stop reason: HOSPADM

## 2022-12-05 RX ORDER — INSULIN GLARGINE 100 [IU]/ML
20 INJECTION, SOLUTION SUBCUTANEOUS
Status: DISCONTINUED | OUTPATIENT
Start: 2022-12-05 | End: 2022-12-06 | Stop reason: HOSPADM

## 2022-12-05 RX ORDER — INSULIN LISPRO 100 [IU]/ML
7 INJECTION, SOLUTION INTRAVENOUS; SUBCUTANEOUS
Status: DISCONTINUED | OUTPATIENT
Start: 2022-12-05 | End: 2022-12-06 | Stop reason: HOSPADM

## 2022-12-05 RX ADMIN — HYDRALAZINE HYDROCHLORIDE 50 MG: 50 TABLET, FILM COATED ORAL at 06:29

## 2022-12-05 RX ADMIN — ISOSORBIDE DINITRATE 20 MG: 20 TABLET ORAL at 06:29

## 2022-12-05 RX ADMIN — INSULIN GLARGINE 20 UNITS: 100 INJECTION, SOLUTION SUBCUTANEOUS at 22:33

## 2022-12-05 RX ADMIN — INSULIN LISPRO 2 UNITS: 100 INJECTION, SOLUTION INTRAVENOUS; SUBCUTANEOUS at 17:59

## 2022-12-05 RX ADMIN — HEPARIN SODIUM 5000 UNITS: 5000 INJECTION INTRAVENOUS; SUBCUTANEOUS at 13:46

## 2022-12-05 RX ADMIN — HYDRALAZINE HYDROCHLORIDE 50 MG: 50 TABLET, FILM COATED ORAL at 22:33

## 2022-12-05 RX ADMIN — HEPARIN SODIUM 5000 UNITS: 5000 INJECTION INTRAVENOUS; SUBCUTANEOUS at 06:30

## 2022-12-05 RX ADMIN — FLUOXETINE HYDROCHLORIDE 40 MG: 20 CAPSULE ORAL at 10:49

## 2022-12-05 RX ADMIN — INSULIN LISPRO 5 UNITS: 100 INJECTION, SOLUTION INTRAVENOUS; SUBCUTANEOUS at 13:48

## 2022-12-05 RX ADMIN — QUETIAPINE FUMARATE 100 MG: 100 TABLET ORAL at 22:33

## 2022-12-05 RX ADMIN — HEPARIN SODIUM 5000 UNITS: 5000 INJECTION INTRAVENOUS; SUBCUTANEOUS at 22:33

## 2022-12-05 RX ADMIN — ASPIRIN 81 MG: 81 TABLET, COATED ORAL at 10:49

## 2022-12-05 RX ADMIN — ISOSORBIDE DINITRATE 20 MG: 20 TABLET ORAL at 22:36

## 2022-12-05 RX ADMIN — ATORVASTATIN CALCIUM 40 MG: 40 TABLET, FILM COATED ORAL at 22:33

## 2022-12-05 RX ADMIN — INSULIN LISPRO 5 UNITS: 100 INJECTION, SOLUTION INTRAVENOUS; SUBCUTANEOUS at 10:51

## 2022-12-05 RX ADMIN — INSULIN LISPRO 7 UNITS: 100 INJECTION, SOLUTION INTRAVENOUS; SUBCUTANEOUS at 18:00

## 2022-12-05 RX ADMIN — GABAPENTIN 300 MG: 300 CAPSULE ORAL at 10:50

## 2022-12-05 RX ADMIN — HYDRALAZINE HYDROCHLORIDE 50 MG: 50 TABLET, FILM COATED ORAL at 13:46

## 2022-12-05 RX ADMIN — CARVEDILOL 12.5 MG: 12.5 TABLET, FILM COATED ORAL at 10:49

## 2022-12-05 RX ADMIN — INSULIN LISPRO 2 UNITS: 100 INJECTION, SOLUTION INTRAVENOUS; SUBCUTANEOUS at 13:48

## 2022-12-05 RX ADMIN — ISOSORBIDE DINITRATE 20 MG: 20 TABLET ORAL at 14:37

## 2022-12-05 RX ADMIN — THIAMINE HCL TAB 100 MG 100 MG: 100 TAB at 10:50

## 2022-12-05 RX ADMIN — INSULIN LISPRO 2 UNITS: 100 INJECTION, SOLUTION INTRAVENOUS; SUBCUTANEOUS at 10:52

## 2022-12-05 RX ADMIN — CARVEDILOL 12.5 MG: 12.5 TABLET, FILM COATED ORAL at 22:33

## 2022-12-05 NOTE — ASSESSMENT & PLAN NOTE
Wt Readings from Last 3 Encounters:   12/04/22 87 5 kg (193 lb)   10/26/22 90 4 kg (199 lb 4 8 oz)   10/16/22 84 1 kg (185 lb 6 4 oz)     Has a history of noncompliance with his medications and was recently seen in [facility] 11/13-11/15 for CHF exacerbation as well and at [facility] 10/26  On discharge from [facility] was instructed to take torsemide 40 mg daily  Prior to this had been increased to 20 mg b i d  on 11/9 and prior to this was on 10 mg QOD  · Patient not taking medications as prescribed as an outpatient  · Most recent echocardiogram with LVEF 30%   · Patient has life vest but is non compliant; monitor on telemetry while inpatient   · Cardiology and Nephrology following  · Status post IV diuresis, transitioned to increased dose torsemide 40 mg PO BID on 11/27   Now torsemide decreased to 20 mg b i d  Due to rising creatinine    · Isordil increased to 20 mg q8h  · Continued on home dose Coreg, hydralazine  · Monitor daily weights, intake and output   · Close outpatient follow-up

## 2022-12-05 NOTE — ASSESSMENT & PLAN NOTE
Known multivessel severe coronary artery disease  Patient follows with [facility] Cardiology, felt to not be a CABG candidate as per record review, was to be optimized prior to planned PCI to circumflex and LAD    · Troponin elevation on admission due to acute heart failure and hypertension   · Most recent echocardiogram 11/8/2022 at [facility] with an EF of 30-35%  · Follow-up echo during this admission yesterday showed improvement in EF to roughly 40%  · With improving creatinine can consider stenting during this admission, will discuss with Nephrology Cardiology  · Has a life vest but is noncompliant with this  · Continue outpatient follow-up with Cardiology  · Continue Coreg, isosorbide, hydralazine, aspirin and statin

## 2022-12-05 NOTE — ASSESSMENT & PLAN NOTE
Lab Results   Component Value Date    HGBA1C 7 7 (H) 10/03/2022     Recent Labs     12/04/22  1602 12/04/22  2200 12/05/22  0731 12/05/22  1116   POCGLU 144* 197* 181* 158*       Blood Sugar Average: Last 72 hrs:  · (P) 173 [id number]   · Outpatient on Farxiga and Novolog  · Follows with endocrinology outpatient  · Home regimen of novolog is 30 with breakfast and 10 with dinner but was having glucoses in the 30s with this regimen (giving and not eating)  · Continue current regimen Lantus 20 units at bedtime and 7 units with meals  · Diabetic diet

## 2022-12-05 NOTE — ASSESSMENT & PLAN NOTE
Lab Results   Component Value Date    EGFR 20 12/05/2022    EGFR 20 12/04/2022    EGFR 19 12/03/2022    CREATININE 3 10 (H) 12/05/2022    CREATININE 3 03 (H) 12/04/2022    CREATININE 3 19 (H) 12/03/2022     · Baseline creatinine approximately 2 1-2 7  Follows with [facility]  · LAZARO POA with creatinine of 3 51 felt to be secondary to volume overload, cardiorenal syndrome  · Nephrology following, appreciate inputs   · Continue to monitor with diuresis, see plan above   · Decreased diuretics to 20 mg b i d   · Creatinine stable around 3 1  · Will continue on torsemide 20 mg b i d

## 2022-12-05 NOTE — UTILIZATION REVIEW
Continued Stay Review    Date: 12-3 to 12-4 2022                   Current Patient Class:  Inpatient  Current Level of Care:  Med surg    HPI:67 y o  male initially admitted on 11-23-22     Assessment/Plan:       Creatinine bernadette from 2 91 to 3 19 and bun bernadette 69 to 75  Demadex 40 mg daily discontinued  re evaluate demadex dosage 12-4 based on bmp in am   Patient who previously refused inpatient rehab now agrees  Referrals completed  12 -4-22  Demadex 40 mg given x 1  Demadex 20 mg  Given x1   Continue po hydralazine q8 hr       Vital Signs:     Date/Time Temp Pulse Resp BP MAP (mmHg) SpO2 O2 Device   12/04/22 22:44:19 97 4 °F (36 3 °C) Abnormal  68 -- 135/76 96 95 % --   12/04/22 21:03:13 97 2 °F (36 2 °C) Abnormal  70 -- 134/75 95 98 % --   12/04/22 15:46:37 97 9 °F (36 6 °C) 69 -- 99/65 76 97 % --   12/04/22 14:10:01 -- 70 -- 124/69 87 96 % --   12/04/22 1410 -- -- -- 124/69 -- -- --   12/04/22 08:01:51 99 2 °F (37 3 °C) -- 16 121/66 84 -- --   12/04/22 06:51:37 -- 73 -- 98/59 72 94 % --   12/03/22 23:17:46 99 2 °F (37 3 °C) 86 -- 136/72 93 95 % --   12/03/22 2155 -- 75 18 -- -- -- None (Room air)   12/03/22 21:54:38 -- 75 -- 139/84 102 96 % --   12/03/22 16:08:21 98 2 °F (36 8 °C) 73 -- -- -- 96 % --   12/03/22 16:04:36 98 2 °F (36 8 °C) 72 -- -- -- 98 % --   12/03/22 14:46:08 -- 71 -- 119/71 87 97 % --   12/03/22 06:13:14 97 6 °F (36 4 °C) 66 17 110/67 81 92 % None (Room air)   12/03/22 05:57:18 -- 65 -- 114/68 83 97 % --   12/03/22 0555 -- -- -- 114/68 -- -- --             Pertinent Labs/Diagnostic Results:       Results from last 7 days   Lab Units 12/03/22  0522 11/30/22  0556   WBC Thousand/uL 8 63 7 52   HEMOGLOBIN g/dL 10 2* 10 1*   HEMATOCRIT % 32 8* 32 0*   PLATELETS Thousands/uL 184 248   NEUTROS ABS Thousands/µL 5 56 4 45         Results from last 7 days   Lab Units 12/05/22  0634 12/04/22  0440 12/03/22  0522 11/30/22  0556 11/29/22  0531   SODIUM mmol/L 137 135 136 139 138   POTASSIUM mmol/L 4 2 4 1 4 1 4 1 3 9   CHLORIDE mmol/L 105 103 106 107 107   CO2 mmol/L 26 24 26 24 26   ANION GAP mmol/L 6 8 4 8 5   BUN mg/dL 77* 72* 75* 69* 68*   CREATININE mg/dL 3 10* 3 03* 3 19* 2 91* 2 84*   EGFR ml/min/1 73sq m 20 20 19 21 22   CALCIUM mg/dL 9 1 8 9 9 0 9 1 8 9         Results from last 7 days   Lab Units 12/05/22  1116 12/05/22  0731 12/04/22  2200 12/04/22  1602 12/04/22  1142 12/04/22  0649 12/03/22  2130 12/03/22  1607 12/03/22  1118 12/03/22  0603 12/02/22  2125 12/02/22  1539   POC GLUCOSE mg/dl 158* 181* 197* 144* 133 231* 177* 214* 180* 147* 251* 108     Results from last 7 days   Lab Units 12/05/22  0634 12/04/22  0440 12/03/22  0522 11/30/22  0556 11/29/22  0531   GLUCOSE RANDOM mg/dL 216* 236* 185* 113 175*             BETA-HYDROXYBUTYRATE   Date Value Ref Range Status   06/16/2022 0 0 <0 6 mmol/L Final        Current Facility-Administered Medications  12-3    Medication Dose Route Frequency   • acetaminophen  650 mg Oral Q6H PRN   • aspirin  81 mg Oral Daily   • atorvastatin  40 mg Oral HS   • carvedilol  12 5 mg Oral Q12H [address]   • FLUoxetine  40 mg Oral Daily   • gabapentin  300 mg Oral Daily   • heparin (porcine)  5,000 Units Subcutaneous Q8H [address]   • hydrALAZINE  50 mg Oral Q8H   • insulin glargine  10 Units Subcutaneous HS   • insulin lispro  1-6 Units Subcutaneous HS   • insulin lispro  2-12 Units Subcutaneous TID AC   • insulin lispro  5 Units Subcutaneous TID With Meals   • isosorbide dinitrate  20 mg Oral Q8H   • melatonin  4 5 mg Oral HS PRN   • ondansetron  4 mg Intravenous Q8H PRN   • perflutren lipid microsphere  2 mL/min Intravenous Once in imaging   • QUEtiapine  100 mg Oral HS   • thiamine  100 mg Oral Daily   • torsemide  40 mg Oral BID         Discharge Plan: to be determined     Network Utilization Review Department  ATTENTION: Please call with any questions or concerns to [phone] and carefully listen to the prompts so that you are directed to the right person   All voicemails are confidential   Bernabe Mcgill all requests for admission clinical reviews, approved or denied determinations and any other requests to dedicated fax number below belonging to the campus where the patient is receiving treatment   List of dedicated fax numbers for the Facilities:  [address] DENIALS (Administrative/Medical Necessity) [phone]   [address] (Maternity/NICU/Pediatrics) [phone]   912 Kasey Brown [phone]   [facility] [address] [phone]   [address] 28 [phone]   1555 [facility] 134 815 [facility] [phone]

## 2022-12-05 NOTE — PLAN OF CARE
Problem: PHYSICAL THERAPY ADULT  Goal: Performs mobility at highest level of function for planned discharge setting  See evaluation for individualized goals  Description: Treatment/Interventions: Functional transfer training, LE strengthening/ROM, Therapeutic exercise, Endurance training, Cognitive reorientation, Patient/family training, Equipment eval/education, Bed mobility, Gait training, Spoke to nursing, OT  Equipment Recommended: Thaddeus Olvera       See flowsheet documentation for full assessment, interventions and recommendations  Outcome: Adequate for Discharge  Note: Prognosis: Fair  Problem List: Impaired balance, Decreased mobility, Decreased coordination, Decreased cognition, Impaired judgement, Decreased safety awareness  Assessment: Pt tx session focused on mobility, balance, stair training, strengthening, and improving activity tolerance  Pt was assessed for D/C planning  Pt performed bed mobility with Marisela, transfers with S level, and ambulated with MinAx1 using RW for support  Pt cognitively impaired and continues to require VC for AD usage 2* pt continuing to demonstrate non compliance  Please refer to OT for cognitive recommendation  Pt navigated community distance and is able to navigate enough stairs to enter home safely with increased social support at home  Pt ended session supine in bed, all needs in reach  Pt at baseline and has met stated PT goals  Pt no longer requires inpatient physical therapy at this time  PT signing off  Performed at least 2 patient identifiers during session: Name and Shabbir Cobb The patient's AM-PAC Basic Mobility Inpatient Short Form Raw Score is 21  A Raw score of greater than 16 suggests the patient may benefit from discharge to home  PT recommendation is for pt to be D/C to home with no rehab needs, but increased social support    Barriers to Discharge: Decreased caregiver support, Inaccessible home environment     PT Discharge Recommendation: No rehabilitation needs    See flowsheet documentation for full assessment

## 2022-12-05 NOTE — PROGRESS NOTES
[facility]  Progress Note - Sandeep Deter 1955, 74 y o  male MRN: [id number]  Unit/Bed#: CW2 216-02 Encounter: [id number]  Primary Care Provider: August Whitten DO   Date and time admitted to hospital: 11/23/2022 10:54 AM    Cognitive impairment  Assessment & Plan  · Patient at times will not converse however with prompting was noted to sit up, speak to me and eat lunch  Nursing staff report that this has been present during prior admissions as well  During the course 2 week patient is consistently alert and oriented, cooperates and answer simple questions  · When seen by occupational therapy, patient did poorly on cognitive evaluation  MOCA 6/30 (11/24/22)  · Appreciate Geriatrics consultation  · OT recommending rehab vs home with 24/7 supervision upon discharge  · Given frequent readmissions both here and at [facility] secondary to noncompliance, discussed with family (brother Quique Aguilera and sister Amauri Pierson) regarding placement on 11/27  · Patient is now agreeable to rehab, will forego neuropsych evaluation  · Patient will be discharged to rehab  He can decide to leave rehab once he is ready      CAD (coronary artery disease)  Assessment & Plan  Known multivessel severe coronary artery disease  Patient follows with [facility] Cardiology, felt to not be a CABG candidate as per record review, was to be optimized prior to planned PCI to circumflex and LAD    · Troponin elevation on admission due to acute heart failure and hypertension   · Most recent echocardiogram 11/8/2022 at [facility] with an EF of 30-35%  · Follow-up echo during this admission yesterday showed improvement in EF to roughly 40%  · With improving creatinine can consider stenting during this admission, will discuss with Nephrology Cardiology  · Has a life vest but is noncompliant with this  · Continue outpatient follow-up with Cardiology  · Continue Coreg, isosorbide, hydralazine, aspirin and statin    Type 2 diabetes mellitus, with long-term current use of insulin [facility])  Assessment & Plan  Lab Results   Component Value Date    HGBA1C 7 7 (H) 10/03/2022     Recent Labs     12/04/22  1602 12/04/22  2200 12/05/22  0731 12/05/22  1116   POCGLU 144* 197* 181* 158*       Blood Sugar Average: Last 72 hrs:  · (P) 173 [id number]   · Outpatient on Farxiga and Novolog  · Follows with endocrinology outpatient  · Home regimen of novolog is 30 with breakfast and 10 with dinner but was having glucoses in the 30s with this regimen (giving and not eating)  · Continue current regimen Lantus 20 units at bedtime and 7 units with meals  · Diabetic diet    Normocytic anemia  Assessment & Plan  · Baseline hemoglobin appears to be 9-10  · No signs or symptoms of bleeding at this time  · Recommend outpatient follow-up    Essential hypertension  Assessment & Plan  · BP acceptable, monitor routinely   · Continue Coreg, hydralazine and isosorbide  · Diuresis as above     Bipolar affective disorder, mixed, moderate (HCC)  Assessment & Plan  · Continue Seroquel and Prozac  · Patient is competent on my exam, he is willing to attend rehab  Although may not be in his best interests he may leave rehab at any time if he chooses to  Patient continues to be alert and oriented x3    LAZARO superimposed on CKD (chronic kidney disease) stage 4, GFR 15-29 ml/min [facility])  Assessment & Plan  Lab Results   Component Value Date    EGFR 20 12/05/2022    EGFR 20 12/04/2022    EGFR 19 12/03/2022    CREATININE 3 10 (H) 12/05/2022    CREATININE 3 03 (H) 12/04/2022    CREATININE 3 19 (H) 12/03/2022     · Baseline creatinine approximately 2 1-2 7  Follows with [facility]    · LAZARO POA with creatinine of 3 51 felt to be secondary to volume overload, cardiorenal syndrome  · Nephrology following, appreciate inputs   · Continue to monitor with diuresis, see plan above   · Decreased diuretics to 20 mg b i d   · Creatinine stable around 3 1  · Will continue on torsemide 20 mg b i d  * Acute on chronic systolic congestive heart failure (HCC)  Assessment & Plan  Wt Readings from Last 3 Encounters:   12/04/22 87 5 kg (193 lb)   10/26/22 90 4 kg (199 lb 4 8 oz)   10/16/22 84 1 kg (185 lb 6 4 oz)     Has a history of noncompliance with his medications and was recently seen in [facility] 11/13-11/15 for CHF exacerbation as well and at [facility] 10/26  On discharge from [facility] was instructed to take torsemide 40 mg daily  Prior to this had been increased to 20 mg b i d  on 11/9 and prior to this was on 10 mg QOD  · Patient not taking medications as prescribed as an outpatient  · Most recent echocardiogram with LVEF 30%   · Patient has life vest but is non compliant; monitor on telemetry while inpatient   · Cardiology and Nephrology following  · Status post IV diuresis, transitioned to increased dose torsemide 40 mg PO BID on 11/27   Now torsemide decreased to 20 mg b i d  Due to rising creatinine  · Isordil increased to 20 mg q8h  · Continued on home dose Coreg, hydralazine  · Monitor daily weights, intake and output   · Close outpatient follow-up           VTE Pharmacologic Prophylaxis: VTE Score: 4 Moderate Risk (Score 3-4) - Pharmacological DVT Prophylaxis Ordered: heparin  Patient Centered Rounds: I performed bedside rounds with nursing staff today  Discussions with Specialists or Other Care Team Provider: n/a    Education and Discussions with Family / Patient: Updated  (brother) via phone  Time Spent for Care: 30 minutes  More than 50% of total time spent on counseling and coordination of care as described above  Current Length of Stay: 12 day(s)  Current Patient Status: Inpatient   Certification Statement: The patient will continue to require additional inpatient hospital stay due to Pending placement to rehab versus possible discharge home tomorrow  Discharge Plan: Anticipate discharge tomorrow to home with home services      Code Status: Level 1 - Full Code    Subjective:   Patient denies any acute complaints resting comfortably alert oriented    Objective:     Vitals:   Temp (24hrs), Av 7 °F (36 5 °C), Min:97 2 °F (36 2 °C), Max:98 2 °F (36 8 °C)    Temp:  [97 2 °F (36 2 °C)-98 2 °F (36 8 °C)] 98 2 °F (36 8 °C)  HR:  [68-76] 76  Resp:  [19] 19  BP: ()/(65-76) 129/74  SpO2:  [93 %-98 %] 93 %  Body mass index is 33 13 kg/m²  Input and Output Summary (last 24 hours): Intake/Output Summary (Last 24 hours) at 2022 1428  Last data filed at 2022 1255  Gross per 24 hour   Intake 420 ml   Output --   Net 420 ml       Physical Exam:   Physical Exam  Vitals and nursing note reviewed  Constitutional:       General: He is not in acute distress  Appearance: He is well-developed  He is not ill-appearing, toxic-appearing or diaphoretic  HENT:      Head: Normocephalic and atraumatic  Eyes:      General: No scleral icterus  Conjunctiva/sclera: Conjunctivae normal    Cardiovascular:      Rate and Rhythm: Normal rate and regular rhythm  Heart sounds: No murmur heard  No friction rub  No gallop  Pulmonary:      Effort: Pulmonary effort is normal  No respiratory distress  Breath sounds: Normal breath sounds  No stridor  No wheezing, rhonchi or rales  Chest:      Chest wall: No tenderness  Abdominal:      General: There is no distension  Palpations: Abdomen is soft  There is no mass  Tenderness: There is no abdominal tenderness  There is no guarding or rebound  Hernia: No hernia is present  Musculoskeletal:         General: No swelling, tenderness, deformity or signs of injury  Cervical back: Neck supple  Skin:     General: Skin is warm and dry  Capillary Refill: Capillary refill takes less than 2 seconds  Coloration: Skin is not jaundiced or pale  Neurological:      Mental Status: He is alert and oriented to person, place, and time     Psychiatric:         Mood and Affect: Mood normal           Additional Data:     Labs:  Results from last 7 days   Lab Units 12/03/22  0522   WBC Thousand/uL 8 63   HEMOGLOBIN g/dL 10 2*   HEMATOCRIT % 32 8*   PLATELETS Thousands/uL 184   NEUTROS PCT % 63   LYMPHS PCT % 22   MONOS PCT % 9   EOS PCT % 4     Results from last 7 days   Lab Units 12/05/22  0634   SODIUM mmol/L 137   POTASSIUM mmol/L 4 2   CHLORIDE mmol/L 105   CO2 mmol/L 26   BUN mg/dL 77*   CREATININE mg/dL 3 10*   ANION GAP mmol/L 6   CALCIUM mg/dL 9 1   GLUCOSE RANDOM mg/dL 216*         Results from last 7 days   Lab Units 12/05/22  1116 12/05/22  0731 12/04/22  2200 12/04/22  1602 12/04/22  1142 12/04/22  0649 12/03/22  2130 12/03/22  1607 12/03/22  1118 12/03/22  0603 12/02/22  2125 12/02/22  1539   POC GLUCOSE mg/dl 158* 181* 197* 144* 133 231* 177* 214* 180* 147* 251* 108               Lines/Drains:  Invasive Devices     Peripheral Intravenous Line  Duration           Peripheral IV 11/30/22 Distal;Left;Ventral (anterior) Forearm 5 days                  Telemetry:  Telemetry Orders (From admission, onward)             LifeVest Patient: Continuous Telemetry Monitoring during hospitalization (non-expiring)  Continuous LifeVest Telemetry Monitoring        References:    LifeVest Policy                 Telemetry Reviewed: Normal Sinus Rhythm  Indication for Continued Telemetry Use: Lifevest (remains on tele entire hospital stay)             Imaging: No pertinent imaging reviewed      Recent Cultures (last 7 days):         Last 24 Hours Medication List:   Current Facility-Administered Medications   Medication Dose Route Frequency Provider Last Rate   • acetaminophen  650 mg Oral Q6H PRN Stefany Shirley PA-C     • aspirin  81 mg Oral Daily Bernardo Lamp, CRNP     • atorvastatin  40 mg Oral HS Bernardo Lamp, CRNP     • carvedilol  12 5 mg Oral Q12H [facility] Colleen Tamez PA-C     • FLUoxetine  40 mg Oral Daily Bernardo Lamp, CRNP     • gabapentin  300 mg Oral Daily Bernardo Lamp, CRNP     • heparin (porcine)  5,000 Units Subcutaneous [facility] EMMA Mendiola     • hydrALAZINE  50 mg Oral Q8H Phillip Fisher PA-C     • insulin glargine  20 Units Subcutaneous HS Marcial Vicente DO     • insulin lispro  1-6 Units Subcutaneous HS EMMA Warren     • insulin lispro  2-12 Units Subcutaneous TID AC EMMA Mendiola     • insulin lispro  7 Units Subcutaneous TID With Meals Marcial Vicente DO     • isosorbide dinitrate  20 mg Oral Q8H Ivone Quinones PA-C     • melatonin  4 5 mg Oral HS PRN EMMA Mendiola     • ondansetron  4 mg Intravenous Q8H PRN Marcial Vicente DO     • perflutren lipid microsphere  2 mL/min Intravenous Once in imaging Marcial Vicente DO     • QUEtiapine  100 mg Oral HS EMMA Mendiola     • thiamine  100 mg Oral Daily EMMA Mendiola     • [START ON 12/6/2022] torsemide  20 mg Oral BID Dia Hallman DO          Today, Patient Was Seen By: Dia Hallman DO    **Please Note: This note may have been constructed using a voice recognition system  **

## 2022-12-05 NOTE — ASSESSMENT & PLAN NOTE
· Patient at times will not converse however with prompting was noted to sit up, speak to me and eat lunch  Nursing staff report that this has been present during prior admissions as well  During the course 2 week patient is consistently alert and oriented, cooperates and answer simple questions  · When seen by occupational therapy, patient did poorly on cognitive evaluation  MOCA 6/30 (11/24/22)  · Appreciate Geriatrics consultation  · OT recommending rehab vs home with 24/7 supervision upon discharge  · Given frequent readmissions both here and at [address] [place] [address] [address] secondary to noncompliance, discussed with family (brother Quique Aguilera and sister Amauri Pierson) regarding placement on 11/27  · Patient is now agreeable to rehab, will forego neuropsych evaluation  · Patient will be discharged to rehab    He can decide to leave rehab once he is ready

## 2022-12-05 NOTE — OCCUPATIONAL THERAPY NOTE
Occupational Therapy Re-Evaluation     Patient Name: Konstantin Feliz  Today's Date: 12/5/2022  Problem List  Principal Problem:    Acute on chronic systolic congestive heart failure (HCC)  Active Problems:    LAZARO superimposed on CKD (chronic kidney disease) stage 4, GFR 15-29 ml/min (HCC)    Bipolar affective disorder, mixed, moderate (HCC)    Essential hypertension    Normocytic anemia    Type 2 diabetes mellitus, with long-term current use of insulin (HCC)    CAD (coronary artery disease)    Cognitive impairment    Past Medical History  Past Medical History:   Diagnosis Date    Anxiety     Arthritis     CHF (congestive heart failure) ([facility])     Coronary artery disease     Dementia (HCC)     Depression     Diabetes mellitus ([facility] [address] )     Infectious viral hepatitis     Memory loss     Visual impairment         12/05/22 1530   OT Last Visit   OT Visit Date 12/05/22   Note Type   Note type Re-Evaluation  (2* expiration of goals)   Pain Assessment   Pain Assessment Tool 0-10   Pain Score No Pain   Restrictions/Precautions   Weight Bearing Precautions Per Order No   Home Living   Type of [address] [place] [address] Two level; Able to live on main level with bedroom/bathroom;Stairs to enter with rails   Home Equipment Walker;Cane   Additional Comments reports he lives with his brother in 2 story home with a few ISABEL and FFSU - does not go upstairs   Prior Function   Level of [place] Independent with ADLs; Independent with functional mobility; Needs assistance with IADLS   Lives With [facility] Help From Family   IADLs Family/Friend/Other provides transportation; Family/Friend/Other provides meals; Family/Friend/Other provides medication management   Falls in the last 6 months 1 to 4   Vocational Unemployed   Lifestyle   Autonomy I adls and mobility - needs assist with iadls   Reciprocal Relationships supportive family   Service to Others not working - 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 [employer]   Intrinsic Gratification sedentary   Subjective   Subjective "I'm tired today for some reason"   ADL   Eating Assistance 7  Independent   Grooming Assistance 5  Supervision/Setup   UB Bathing Assistance 5  Supervision/Setup   LB Bathing Assistance 5  Supervision/Setup   UB Dressing Assistance 5  Supervision/Setup   LB Dressing Assistance 5  Supervision/Setup   Toileting Assistance  5  Supervision/Setup   Bed Mobility   Supine to Sit 6  Modified independent   Sit to Supine 6  Modified independent   Transfers   Sit to Stand 5  Supervision   Stand to Sit 5  Supervision   Stand pivot 5  Supervision   Toilet transfer 5  Supervision   Functional Mobility   Functional Mobility 5  Supervision   Additional items Rolling walker   Balance   Static Sitting Fair +   Dynamic Sitting Fair   Static Standing Fair   Dynamic Standing Fair -   Ambulatory Fair -   Activity Tolerance   Activity Tolerance Patient tolerated treatment well   RUE Assessment   RUE Assessment WFL   LUE Assessment   LUE Assessment WFL   Cognition   Arousal/Participation Alert; Cooperative   Attention Attends with cues to redirect   Orientation Level Oriented to person;Oriented to place;Oriented to situation;Oriented to time   Memory Decreased recall of precautions   Following Commands Follows one step commands without difficulty   Comments moves quickly/impusively however able to redirect - follows all simple commands   Assessment   Limitation Decreased Safe judgement during ADL;Decreased cognition;Decreased high-level ADLs   Prognosis Fair   Assessment Pt seen for OT re-eval 2* expiration of goals - pt currently functioning at SBA level for all adls and mobility - continues to demonstrate limited insight/safety awareness and moves quickly/impulsively at times but is able to be redirected   Continue to recommend supervision post d/c and assist with iadls 2* cognitive limitations - Pt ok for d/c to home with family support - no further acute OT needs indicated at this time as pt is functioning at baseline level - d/c from caseload   Goals   Patient Goals get a diet ginger ale   Plan   OT Treatment Day 4   OT Frequency Other (comment)  (d/c OT)   Recommendation   OT Discharge Recommendation No rehabilitation needs   AM-PAC Daily Activity Inpatient   Lower Body Dressing 3   Bathing 3   Toileting 3   Upper Body Dressing 4   Grooming 4   Eating 4   Daily Activity Raw Score 21   Daily Activity Standardized Score (Calc for Raw Score >=11) 44 27   AM-PAC Applied Cognition Inpatient   Following a Speech/Presentation 2   Understanding Ordinary Conversation 4   Taking Medications 3   Remembering Where Things Are Placed or Put Away 3   Remembering List of 4-5 Errands 2   Taking Care of Complicated Tasks 2   Applied Cognition Raw Score 16   Applied Cognition Standardized Score 35 03   End of Consult   Education Provided Yes   Patient Position at End of Consult Supine; All needs within reach     The patient's raw score on the AM-PAC Daily Activity inpatient short form is 21, standardized score is 44 27, greater than 39 4  Patients at this level are likely to benefit from discharge to home  Please refer to the recommendation of the Occupational Therapist for safe discharge planning      [facility]

## 2022-12-05 NOTE — PHYSICAL THERAPY NOTE
PHYSICAL THERAPY NOTE          Patient Name: Tree Mortensen  Today's Date: 12/5/2022 12/05/22 1529   PT Last Visit   PT Visit Date 12/05/22   Pain Assessment   Pain Assessment Tool 0-10   Pain Score No Pain   Hospital Pain Intervention(s) Repositioned; Ambulation/increased activity; Emotional support   Restrictions/Precautions   Weight Bearing Precautions Per Order No   Other Precautions (S)  Cognitive; Impulsive; Fall Risk   General   Family/Caregiver Present No   Cognition   Overall Cognitive Status Impaired   Attention Attends with cues to redirect   Orientation Level Oriented to person;Oriented to place; Disoriented to situation   Memory Decreased recall of recent events;Decreased short term memory;Decreased recall of biographical information   Following Commands Follows one step commands without difficulty   Subjective   Subjective Pt supine in bed and willing to participate in therapy  Bed Mobility   Supine to Sit 6  Modified independent   Sit to Supine 6  Modified independent   Additional Comments pt sat EOB with fair trunk/postural control  Transfers   Sit to Stand 5  Supervision   Additional items Impulsive;Verbal cues   Stand to Sit 5  Supervision   Additional items Impulsive;Verbal cues   Toilet transfer 4  Minimal assistance   Additional items Assist x 1; Increased time required; Impulsive;Verbal cues;Standard toilet   Additional Comments used RW and required frequent VC/TC for proper usage  Ambulation/Elevation   Gait pattern Narrow SONG; Decreased foot clearance;Retropulsion; Shuffling   Gait Assistance 4  Minimal assist   Additional items Assist x 1;Verbal cues; Tactile cues   Assistive Device Rolling walker   Distance 200ft   Stair Management Assistance 4  Minimal assist   Additional items Assist x 1;Verbal cues; Tactile cues   Stair Management Technique One rail R;One rail L;Step to pattern;Reciprocal  (pt performed stair training with step to vs  reciprocal pattern for stair ascention  Pt maintained reciprocal pattern for stair descention )   Number of Stairs 4  (4 up/ 4 down)   Balance   Static Sitting Fair +   Dynamic Sitting Fair   Static Standing Poor +   Dynamic Standing Poor +   Ambulatory Poor +   Endurance Deficit   Endurance Deficit No   Activity Tolerance   Activity Tolerance Patient tolerated treatment well   Nurse Made Aware yes   Assessment   Prognosis Fair   Problem List Impaired balance;Decreased mobility; Decreased coordination;Decreased cognition; Impaired judgement;Decreased safety awareness   Assessment Pt tx session focused on mobility, balance, stair training, strengthening, and improving activity tolerance  Pt was assessed for D/C planning  Pt performed bed mobility with Marisela, transfers with S level, and ambulated with MinAx1 using RW for support  Pt cognitively impaired and continues to require VC for AD usage 2* pt continuing to demonstrate non compliance  Please refer to OT for cognitive recommendation  Pt navigated community distance and is able to navigate enough stairs to enter home safely with increased social support at home  Pt ended session supine in bed, all needs in reach  Pt at baseline and has met stated PT goals  Pt no longer requires inpatient physical therapy at this time  PT signing off  Performed at least 2 patient identifiers during session: Name and Orren Deed The patient's AM-PAC Basic Mobility Inpatient Short Form Raw Score is 21  A Raw score of greater than 16 suggests the patient may benefit from discharge to home  PT recommendation is for pt to be D/C to home with no rehab needs, but increased social support  Barriers to Discharge Decreased caregiver support; Inaccessible home environment   Goals   Patient Goals to go home   Short Term Goal #1 Pt reached stated goals and is D/C to home with increased family support     Plan   Treatment/Interventions Functional transfer training;LE strengthening/ROM; Therapeutic exercise; Endurance training;Cognitive reorientation;Patient/family training;Equipment eval/education; Bed mobility;Gait training;Spoke to nursing;OT   Recommendation   PT Discharge Recommendation No rehabilitation needs   Equipment Recommended [address] Basic Mobility Inpatient   Turning in Bed Without Bedrails 4   Lying on Back to Sitting on Edge of Flat Bed 4   Moving Bed to Chair 3   Standing Up From Chair 4   Walk in Room 3   Climb 3-5 Stairs 3   Basic Mobility Inpatient Raw Score 21   Basic Mobility Standardized Score 45 55   Highest Level Of Mobility   JH-HLM Goal 6: Walk 10 steps or more   JH-HLM Achieved 7: Walk 25 feet or more   Exercises   Balance training  pt performed static seated balance on toilet for ~10 minutes   End of Consult   Patient Position at End of Consult Supine; All needs within [facility]

## 2022-12-05 NOTE — CASE MANAGEMENT
Case Management Discharge Planning Note    Patient name Lobito Beatty  Location [facility] 216/[facility] 758-81 MRN [id number]  : 1955 Date 2022       Current Admission Date: 2022  Current Admission Diagnosis:Acute on chronic systolic congestive heart failure [facility])   Patient Active Problem List    Diagnosis Date Noted   • Cognitive impairment 2022   • CAD (coronary artery disease) 2022   • Weight gain 10/25/2022   • Generalized weakness 10/25/2022   • Acute kidney injury superimposed on CKD ([facility] ) 2022   • Orthostatic hypotension 2022   • Acute on chronic combined systolic and diastolic heart failure ([facility] ) 2022   • Persistent proteinuria 2022   • Transaminitis 2022   • Pleural effusion, bacterial 2022   • Hyperglycemia, unspecified    • Metabolic acidosis    • Diarrhea 2022   • Type 2 diabetes mellitus, without long-term current use of insulin ([facility] ) 2022   • High serum thyroid stimulating hormone (TSH) 2022   • Weakness 2022   • Ischemic cardiomyopathy 2022   • Polysubstance abuse ([facility] ) 2022   • Mixed hyperlipidemia 2022   • Stage 3b chronic kidney disease ([facility] ) 2022   • Acute on chronic systolic congestive heart failure ([facility] ) 2022   • Acute on chronic systolic congestive heart failure ([facility] 75 ) 2022   • Acute hepatitis C virus infection 2022   • Abnormal EKG 2022   • LAZARO superimposed on CKD (chronic kidney disease) stage 4, GFR 15-29 ml/min ([facility] ) 2022   • Normocytic anemia 2022   • Acute kidney injury ([facility] ) 2022   • Hyponatremia 2022   • Uncontrolled type 2 diabetes mellitus with hyperglycemia ([facility] ) 2022   • Essential hypertension 2015   • Type 2 diabetes mellitus, with long-term current use of insulin ([facility] 75 ) 2012   • Bipolar affective disorder, mixed, moderate ([facility] ) 2011      LOS (days): 12  Geometric Mean LOS (GMLOS) (days):   Days to GMLOS:     OBJECTIVE:  Risk of Unplanned Readmission Score: 56 69         Current admission status: Inpatient   Preferred Pharmacy:   [address] [place], [place] [address] , [address] [facility]  [address] [address]  Phone: [phone] Fax: [phone]    Primary Care Provider: Jian Roger DO    Primary Insurance: [facility] [address] REP  Secondary Insurance:     DISCHARGE DETAILS:    Per brother Flory Basurto pt will be going home instead of STR  He will be providing transport  Possible DC tomorrow

## 2022-12-06 VITALS
DIASTOLIC BLOOD PRESSURE: 66 MMHG | WEIGHT: 193 LBS | OXYGEN SATURATION: 94 % | TEMPERATURE: 98.1 F | SYSTOLIC BLOOD PRESSURE: 118 MMHG | HEIGHT: 64 IN | BODY MASS INDEX: 32.95 KG/M2 | RESPIRATION RATE: 17 BRPM | HEART RATE: 68 BPM

## 2022-12-06 LAB
ANION GAP SERPL CALCULATED.3IONS-SCNC: 3 MMOL/L (ref 4–13)
BASOPHILS # BLD AUTO: 0.07 THOUSANDS/ÂΜL (ref 0–0.1)
BASOPHILS NFR BLD AUTO: 1 % (ref 0–1)
BUN SERPL-MCNC: 71 MG/DL (ref 5–25)
CALCIUM SERPL-MCNC: 9 MG/DL (ref 8.3–10.1)
CHLORIDE SERPL-SCNC: 110 MMOL/L (ref 96–108)
CO2 SERPL-SCNC: 26 MMOL/L (ref 21–32)
CREAT SERPL-MCNC: 2.85 MG/DL (ref 0.6–1.3)
EOSINOPHIL # BLD AUTO: 0.44 THOUSAND/ÂΜL (ref 0–0.61)
EOSINOPHIL NFR BLD AUTO: 6 % (ref 0–6)
ERYTHROCYTE [DISTWIDTH] IN BLOOD BY AUTOMATED COUNT: 13 % (ref 11.6–15.1)
GFR SERPL CREATININE-BSD FRML MDRD: 22 ML/MIN/1.73SQ M
GLUCOSE SERPL-MCNC: 82 MG/DL (ref 65–140)
GLUCOSE SERPL-MCNC: 88 MG/DL (ref 65–140)
GLUCOSE SERPL-MCNC: 95 MG/DL (ref 65–140)
HCT VFR BLD AUTO: 31.4 % (ref 36.5–49.3)
HGB BLD-MCNC: 9.8 G/DL (ref 12–17)
IMM GRANULOCYTES # BLD AUTO: 0.02 THOUSAND/UL (ref 0–0.2)
IMM GRANULOCYTES NFR BLD AUTO: 0 % (ref 0–2)
LYMPHOCYTES # BLD AUTO: 2.22 THOUSANDS/ÂΜL (ref 0.6–4.47)
LYMPHOCYTES NFR BLD AUTO: 31 % (ref 14–44)
MCH RBC QN AUTO: 29.4 PG (ref 26.8–34.3)
MCHC RBC AUTO-ENTMCNC: 31.2 G/DL (ref 31.4–37.4)
MCV RBC AUTO: 94 FL (ref 82–98)
MONOCYTES # BLD AUTO: 0.59 THOUSAND/ÂΜL (ref 0.17–1.22)
MONOCYTES NFR BLD AUTO: 8 % (ref 4–12)
NEUTROPHILS # BLD AUTO: 3.9 THOUSANDS/ÂΜL (ref 1.85–7.62)
NEUTS SEG NFR BLD AUTO: 54 % (ref 43–75)
NRBC BLD AUTO-RTO: 0 /100 WBCS
PLATELET # BLD AUTO: 211 THOUSANDS/UL (ref 149–390)
PMV BLD AUTO: 11.3 FL (ref 8.9–12.7)
POTASSIUM SERPL-SCNC: 4.1 MMOL/L (ref 3.5–5.3)
RBC # BLD AUTO: 3.33 MILLION/UL (ref 3.88–5.62)
SODIUM SERPL-SCNC: 139 MMOL/L (ref 135–147)
WBC # BLD AUTO: 7.24 THOUSAND/UL (ref 4.31–10.16)

## 2022-12-06 RX ORDER — INSULIN GLARGINE 100 [IU]/ML
20 INJECTION, SOLUTION SUBCUTANEOUS
Qty: 6 ML | Refills: 0 | Status: SHIPPED | OUTPATIENT
Start: 2022-12-06 | End: 2023-01-05

## 2022-12-06 RX ORDER — ISOSORBIDE DINITRATE 20 MG/1
10 TABLET ORAL EVERY 8 HOURS
Qty: 45 TABLET | Refills: 0 | Status: SHIPPED | OUTPATIENT
Start: 2022-12-06 | End: 2023-01-05

## 2022-12-06 RX ORDER — PEN NEEDLE, DIABETIC 32GX 5/32"
NEEDLE, DISPOSABLE MISCELLANEOUS
Qty: 100 EACH | Refills: 0 | Status: SHIPPED | OUTPATIENT
Start: 2022-12-06

## 2022-12-06 RX ORDER — INSULIN ASPART 100 [IU]/ML
7 INJECTION, SOLUTION INTRAVENOUS; SUBCUTANEOUS
Qty: 15 ML | Refills: 0 | Status: SHIPPED | OUTPATIENT
Start: 2022-12-06

## 2022-12-06 RX ORDER — FLUOXETINE HYDROCHLORIDE 20 MG/1
40 CAPSULE ORAL DAILY
Start: 2022-12-06

## 2022-12-06 RX ORDER — LANOLIN ALCOHOL/MO/W.PET/CERES
400 CREAM (GRAM) TOPICAL DAILY
Start: 2022-12-06

## 2022-12-06 RX ORDER — TORSEMIDE 20 MG/1
20 TABLET ORAL 2 TIMES DAILY
Qty: 60 TABLET | Refills: 0 | Status: SHIPPED | OUTPATIENT
Start: 2022-12-06

## 2022-12-06 RX ORDER — HYDRALAZINE HYDROCHLORIDE 50 MG/1
50 TABLET, FILM COATED ORAL EVERY 8 HOURS
Qty: 90 TABLET | Refills: 0 | Status: SHIPPED | OUTPATIENT
Start: 2022-12-06 | End: 2023-01-05

## 2022-12-06 RX ORDER — GABAPENTIN 300 MG/1
300 CAPSULE ORAL DAILY
Start: 2022-12-06

## 2022-12-06 RX ADMIN — HEPARIN SODIUM 5000 UNITS: 5000 INJECTION INTRAVENOUS; SUBCUTANEOUS at 06:20

## 2022-12-06 RX ADMIN — CARVEDILOL 12.5 MG: 12.5 TABLET, FILM COATED ORAL at 10:38

## 2022-12-06 RX ADMIN — HYDRALAZINE HYDROCHLORIDE 50 MG: 50 TABLET, FILM COATED ORAL at 06:20

## 2022-12-06 RX ADMIN — ISOSORBIDE DINITRATE 20 MG: 20 TABLET ORAL at 06:20

## 2022-12-06 RX ADMIN — INSULIN LISPRO 7 UNITS: 100 INJECTION, SOLUTION INTRAVENOUS; SUBCUTANEOUS at 10:38

## 2022-12-06 RX ADMIN — TORSEMIDE 20 MG: 20 TABLET ORAL at 10:37

## 2022-12-06 RX ADMIN — FLUOXETINE HYDROCHLORIDE 40 MG: 20 CAPSULE ORAL at 10:37

## 2022-12-06 RX ADMIN — GABAPENTIN 300 MG: 300 CAPSULE ORAL at 10:38

## 2022-12-06 RX ADMIN — ASPIRIN 81 MG: 81 TABLET, COATED ORAL at 10:38

## 2022-12-06 RX ADMIN — THIAMINE HCL TAB 100 MG 100 MG: 100 TAB at 10:37

## 2022-12-06 NOTE — ASSESSMENT & PLAN NOTE
Baseline hemoglobin appears to be 9-10  · No signs or symptoms of bleeding at this time  · Recommend outpatient follow-up

## 2022-12-06 NOTE — ASSESSMENT & PLAN NOTE
Known multivessel severe coronary artery disease  Patient follows with [facility], felt to not be a CABG candidate as per record review, was to be optimized prior to planned PCI to circumflex and LAD    · Troponin elevation on admission due to acute heart failure and hypertension   · Most recent echocardiogram 11/8/2022 at [facility] with an EF of 30-35%  · Follow-up echo during this admission yesterday showed improvement in EF to roughly 40%  · Has a life vest but is noncompliant with this  · Continue outpatient follow-up with Cardiology  · Continue Coreg, isosorbide, hydralazine, aspirin and statin

## 2022-12-06 NOTE — ASSESSMENT & PLAN NOTE
Wt Readings from Last 3 Encounters:   12/04/22 87 5 kg (193 lb)   10/26/22 90 4 kg (199 lb 4 8 oz)   10/16/22 84 1 kg (185 lb 6 4 oz)     Has a history of noncompliance with his medications and was recently seen in [facility] 11/13-11/15 for CHF exacerbation as well and at [facility] 10/26  On discharge from [facility] was instructed to take torsemide 40 mg daily  Prior to this had been increased to 20 mg b i d  on 11/9 and prior to this was on 10 mg QOD  · Patient not taking medications as prescribed as an outpatient  · Most recent echocardiogram with LVEF 30%   · Patient has life vest but is non compliant; monitor on telemetry while inpatient   · Cardiology and Nephrology following  · Status post IV diuresis, transitioned to increased dose torsemide 40 mg PO BID on 11/27  · Now torsemide decreased to 20 mg b i d  Due to rising creatinine    · Isordil increased to 20 mg q8h  · Continued on home dose Coreg, hydralazine  · Monitor daily weights, intake and output   · Close outpatient follow-up   · PT/OT now recommending home without needs

## 2022-12-06 NOTE — ASSESSMENT & PLAN NOTE
Lab Results   Component Value Date    HGBA1C 7 7 (H) 10/03/2022     Recent Labs     12/05/22  1116 12/05/22  1613 12/05/22  2118 12/06/22  0557   POCGLU 158* 179* 142* 88       Blood Sugar Average: Last 72 hrs:  · (P) 167   · Outpatient is maintained on Farxiga and Novolog  · Follows with endocrinology outpatient  · Home regimen of novolog is 30 with breakfast and 10 with dinner but was having glucoses in the 30s with this regimen   · Continue current regimen Lantus 20 units at bedtime and 7 units with meals  · Diabetic diet

## 2022-12-06 NOTE — ASSESSMENT & PLAN NOTE
Lab Results   Component Value Date    EGFR 22 12/06/2022    EGFR 20 12/05/2022    EGFR 20 12/04/2022    CREATININE 2 85 (H) 12/06/2022    CREATININE 3 10 (H) 12/05/2022    CREATININE 3 03 (H) 12/04/2022     Baseline creatinine approximately 2 1-2 7  Follows with [facility]    · LAZARO POA with creatinine of 3 51 felt to be secondary to volume overload, cardiorenal syndrome  · Nephrology following, appreciate inputs   · Continue to monitor with diuresis, see plan above   · Decreased diuretics to 20 mg b i d   · Creatinine stable

## 2022-12-06 NOTE — CASE MANAGEMENT
Case Management Discharge Planning Note    Patient name Katja Valle  Location [facility] 216/2 682-21 MRN [id number]  : 1955 Date 2022       Current Admission Date: 2022  Current Admission Diagnosis:Acute on chronic systolic congestive heart failure [facility])   Patient Active Problem List    Diagnosis Date Noted   • Cognitive impairment 2022   • CAD (coronary artery disease) 2022   • Acute kidney injury superimposed on CKD ([facility] 75 ) 2022   • Acute on chronic combined systolic and diastolic heart failure ([facility] ) 2022   • Persistent proteinuria 2022   • Transaminitis 2022   • Pleural effusion, bacterial 2022   • Hyperglycemia, unspecified    • Metabolic acidosis    • Diarrhea 2022   • Type 2 diabetes mellitus, without long-term current use of insulin ([facility] ) 2022   • High serum thyroid stimulating hormone (TSH) 2022   • Weakness 2022   • Ischemic cardiomyopathy 2022   • Polysubstance abuse ([facility] ) 2022   • Stage 3b chronic kidney disease ([facility] ) 2022   • Acute on chronic systolic congestive heart failure ([facility] ) 2022   • Acute hepatitis C virus infection 2022   • LAZARO superimposed on CKD (chronic kidney disease) stage 4, GFR 15-29 ml/min ([facility] ) 2022   • Normocytic anemia 2022   • Uncontrolled type 2 diabetes mellitus with hyperglycemia ([facility] ) 2022   • Essential hypertension 2015   • Type 2 diabetes mellitus, with long-term current use of insulin ([facility] ) 2012   • Bipolar affective disorder, mixed, moderate ([facility] ) 2011      LOS (days): 13  Geometric Mean LOS (GMLOS) (days):   Days to GMLOS:     OBJECTIVE:  Risk of Unplanned Readmission Score: 57 04         Current admission status: Inpatient   Preferred Pharmacy:   [facility] , [address]  [address] [place], [place] [address]  Phone: [phone] Fax: [phone]    Primary Care Provider: Puja Obando DO    Primary Insurance: 254 [facility] REP  Secondary Insurance:     DISCHARGE DETAILS:    Discharge planning discussed with[de-identified] Pt  Freedom of Choice: Yes     CM contacted family/caregiver?: Yes  Were Treatment Team discharge recommendations reviewed with patient/caregiver?: Yes  Did patient/caregiver verbalize understanding of patient care needs?: Yes  Were patient/caregiver advised of the risks associated with not following Treatment Team discharge recommendations?: Yes    Contacts  Patient Contacts: Elvia Robbins  Relationship to Patient[de-identified] Family  Contact Method: Phone  Phone Number: [phone]  Reason/Outcome: Continuity of Care, Emergency Contact, Discharge [address]         Is the patient interested in [address] at discharge?: No    DME Referral Provided  Referral made for DME?: No       Treatment Team Recommendation: Home  Discharge Destination Plan[de-identified] Home  Transport at Discharge : Family   IMM Given (Date):: 12/06/22  IMM Given to[de-identified] Patient  Family notified[de-identified] Elvia Robbins  Additional Comments: CM called and left a message for Patience [place] to inform him that his brother was ready for d/c today  CM spoke to PT at bedside, pt was alert and oriented  Cm reviewed the IMM with pt  PT had no question

## 2022-12-06 NOTE — DISCHARGE SUMMARY
[address] [facility]  Discharge- German Clark 1955, 74 y o  male MRN: [id number]  Unit/Bed#: CW2 216-02 Encounter: [id number]  Primary Care Provider: Ammon Downey DO   Date and time admitted to hospital: 11/23/2022 10:54 AM    * Acute on chronic systolic congestive heart failure (HCC)  Assessment & Plan  Wt Readings from Last 3 Encounters:   12/04/22 87 5 kg (193 lb)   10/26/22 90 4 kg (199 lb 4 8 oz)   10/16/22 84 1 kg (185 lb 6 4 oz)     Has a history of noncompliance with his medications and was recently seen in [facility] 11/13-11/15 for CHF exacerbation as well and at [facility] 10/26  On discharge from [facility] was instructed to take torsemide 40 mg daily  Prior to this had been increased to 20 mg b i d  on 11/9 and prior to this was on 10 mg QOD  · Patient not taking medications as prescribed as an outpatient  · Most recent echocardiogram with LVEF 30%   · Patient has life vest but is non compliant; monitor on telemetry while inpatient   · Cardiology and Nephrology following  · Status post IV diuresis, transitioned to increased dose torsemide 40 mg PO BID on 11/27  · Now torsemide decreased to 20 mg b i d  Due to rising creatinine  · Isordil increased to 20 mg q8h  · Continued on home dose Coreg, hydralazine  · Monitor daily weights, intake and output   · Close outpatient follow-up   · PT/OT now recommending home without needs    LAZARO superimposed on CKD (chronic kidney disease) stage 4, GFR 15-29 ml/min [facility])  Assessment & Plan  Lab Results   Component Value Date    EGFR 22 12/06/2022    EGFR 20 12/05/2022    EGFR 20 12/04/2022    CREATININE 2 85 (H) 12/06/2022    CREATININE 3 10 (H) 12/05/2022    CREATININE 3 03 (H) 12/04/2022     Baseline creatinine approximately 2 1-2 7  Follows with [facility]    · LAZARO POA with creatinine of 3 51 felt to be secondary to volume overload, cardiorenal syndrome  · Nephrology following, appreciate inputs   · Continue to monitor with diuresis, see plan above   · Decreased diuretics to 20 mg b i d   · Creatinine stable     Cognitive impairment  Assessment & Plan  Patient at times was not willing to converse however with prompting was improved  Nursing staff report that this has been present during prior admissions as well  · When seen by occupational therapy, patient did poorly on cognitive evaluation  [facility] 6/30 (11/24/22)  · Appreciate Geriatrics consultation  · OT initially recommended rehab vs home with 24/7 supervision upon discharge however most recent recommendations cleared him for home without needs      CAD (coronary artery disease)  Assessment & Plan  Known multivessel severe coronary artery disease  Patient follows with [facility], felt to not be a CABG candidate as per record review, was to be optimized prior to planned PCI to circumflex and LAD    · Troponin elevation on admission due to acute heart failure and hypertension   · Most recent echocardiogram 11/8/2022 at [facility] with an EF of 30-35%  · Follow-up echo during this admission yesterday showed improvement in EF to roughly 40%  · Has a life vest but is noncompliant with this  · Continue outpatient follow-up with Cardiology  · Continue Coreg, isosorbide, hydralazine, aspirin and statin    Type 2 diabetes mellitus, with long-term current use of insulin [facility])  Assessment & Plan  Lab Results   Component Value Date    HGBA1C 7 7 (H) 10/03/2022     Recent Labs     12/05/22  1116 12/05/22  1613 12/05/22  2118 12/06/22  0557   POCGLU 158* 179* 142* 88       Blood Sugar Average: Last 72 hrs:  · (P) 167   · Outpatient is maintained on Farxiga and Novolog  · Follows with endocrinology outpatient  · Home regimen of novolog is 30 with breakfast and 10 with dinner but was having glucoses in the 30s with this regimen   · Continue current regimen Lantus 20 units at bedtime and 7 units with meals  · Diabetic diet    Normocytic anemia  Assessment & Plan  Baseline hemoglobin appears to be 9-10  · No signs or symptoms of bleeding at this time  · Recommend outpatient follow-up    Essential hypertension  Assessment & Plan  BP acceptable, monitor routinely   · Continue Coreg, hydralazine, demadex and isosorbide    Bipolar affective disorder, mixed, moderate (HCC)  Assessment & Plan  Continue Seroquel and Prozac  · Patient was felt to have capacity during admission  · No longer needs rehab       Medical Problems     Resolved Problems  Date Reviewed: 12/6/2022   None       Discharging Physician / Practitioner: Madonna Harrington PA-C  PCP: Rojas Pelaez DO  Admission Date:   Admission Orders (From admission, onward)     Ordered        11/23/22 [facility]  Once                      Discharge Date: 12/06/22    Consultations During Hospital Stay:  · Geriatrics  · Heart failure  · Nephrology    Procedures Performed:   · Chest x-ray 11/23: Mild pulmonary vascular congestion  · COVID/flu/RSV: Negative x4    Significant Findings / Test Results:   · See above    Incidental Findings:   None    Test Results Pending at Discharge (will require follow up): · None     Outpatient Tests Requested:  · Follow-up with PCP  · Follow-up with cardiologist at [facility]  · Follow-up with nephrologist at [facility]    Complications:  none    Reason for Admission: Confusion, weakness, dyspnea, chest pain    Hospital Course:   Yashira Mason is a 74 y o  male patient with PMHx of HTN, DM2, CKD, HFrEF with LVEF 20-25%, bipolar disorder, remote history polysubstance abuse, hepatitis C who originally presented to the hospital on 11/23/2022 due to confusion, weakness, dyspnea and chest pain  Was noncompliant with diuretics prior to admission  He was diuresed as above with cardiology and nephrology following  In addition, with some cognitive concerns and geriatrics had seen him  Ultimately, his diuretic and medical regimen was titrated by specialist recommendations    His mental status improved  PT/OT saw him most recently and determined that he did not require any rehab  He will be discharged home with family  He should follow-up with his specialists at [facility]  Please see above list of diagnoses and related plan for additional information  Condition at Discharge: stable    Discharge Day Visit / Exam:   Subjective: She reports feeling well today  Denies any dizziness, lightheadedness, chest pain, shortness of breath, palpitations, leg edema  Excited about discharge home  Vitals: Blood Pressure: 118/66 (12/06/22 0712)  Pulse: 72 (12/06/22 0712)  Temperature: 98 1 °F (36 7 °C) (12/06/22 0712)  Temp Source: Oral (12/06/22 1513)  Respirations: 17 (12/06/22 0712)  Height: 5' 4" (162 6 cm) (12/01/22 1530)  Weight - Scale: 87 5 kg (193 lb) (12/04/22 0600)  SpO2: 94 % (12/06/22 5060)  Exam:   Physical Exam  Vitals and nursing note reviewed  Constitutional:       General: He is not in acute distress  Comments: On RA    Cardiovascular:      Rate and Rhythm: Normal rate and regular rhythm  Pulmonary:      Effort: Pulmonary effort is normal  No respiratory distress  Breath sounds: Normal breath sounds  Abdominal:      General: There is no distension  Musculoskeletal:      Right lower leg: No edema  Left lower leg: No edema  Neurological:      Mental Status: He is oriented to person, place, and time  Psychiatric:         Mood and Affect: Mood normal          Discussion with Family: Patient declined call to   CM spoke with brother regarding DCP    Discharge instructions/Information to patient and family:   See after visit summary for information provided to patient and family  Provisions for Follow-Up Care:  See after visit summary for information related to follow-up care and any pertinent home health orders  Disposition:   Home    Planned Readmission: no     Discharge Statement:  I spent 34 minutes discharging the patient  This time was spent on the day of discharge  I had direct contact with the patient on the day of discharge  Greater than 50% of the total time was spent examining patient, answering all patient questions, arranging and discussing plan of care with patient as well as directly providing post-discharge instructions  Additional time then spent on discharge activities  Discharge Medications:  See after visit summary for reconciled discharge medications provided to patient and/or family        **Please Note: This note may have been constructed using a voice recognition system**

## 2022-12-06 NOTE — ASSESSMENT & PLAN NOTE
Continue Seroquel and Prozac  · Patient was felt to have capacity during admission  · No longer needs rehab

## 2022-12-06 NOTE — ASSESSMENT & PLAN NOTE
Patient at times was not willing to converse however with prompting was improved  Nursing staff report that this has been present during prior admissions as well  · When seen by occupational therapy, patient did poorly on cognitive evaluation   [address] [place] [address] 6/30 (11/24/22)  · Appreciate Geriatrics consultation  · OT initially recommended rehab vs home with 24/7 supervision upon discharge however most recent recommendations cleared him for home without needs

## 2022-12-07 NOTE — UTILIZATION REVIEW
NOTIFICATION OF ADMISSION DISCHARGE   This is a Notification of Discharge from [facility]  Please be advised that this patient has been discharge from our facility  Below you will find the admission and discharge date and time including the patient’s disposition  UTILIZATION REVIEW CONTACT:  Joanna Lundberg  Utilization   Network Utilization Review Department  Phone: [phone] x carefully listen to the prompts  All voicemails are confidential   Email: [email or web address] [email or web address]“ [email or web address]     ADMISSION INFORMATION  PRESENTATION DATE: 11/23/2022 10:54 AM  OBERVATION ADMISSION DATE:   INPATIENT ADMISSION DATE: 11/23/22  1:03 PM   DISCHARGE DATE: 12/6/2022  2:17 PM   DISPOSITION:Home/Self Care    IMPORTANT INFORMATION:  Send all requests for admission clinical reviews, approved or denied determinations and any other requests to dedicated fax number below belonging to the campus where the patient is receiving treatment   List of dedicated fax numbers:  [address] DENIALS (Administrative/Medical Necessity) [phone]   [address] (Maternity/NICU/Pediatrics) [phone]   [facility] [phone]   [address] [phone]   [address] [phone]   [facility] [phone]   90 [facility] [phone]   [address] [place] [address] 119 [phone]   [facility]  [phone]   [address] [facility] [phone]   [address] [place] [address] [address] [facility] [phone]

## 2023-11-02 NOTE — ASSESSMENT & PLAN NOTE
Addended by: JAMAICA ROSENBERG on: 11/2/2023 04:23 PM     Modules accepted: Orders     Lab Results   Component Value Date    HGBA1C 7 6 (H) 06/16/2022       Recent Labs     06/16/22  2046 06/17/22  0517 06/17/22  1127   POCGLU 205* 121 252*       Blood Sugar Average: Last 72 hrs:  (P) 840 [id number]     · C/w lantus 10 units at HS and sliding scale insulin  · hold oral hypoglycemics- farxiga and metformin- consider d/c metformin and farxiga at discharge given underlying renal disease     · Monitor accuchecks   · Hypoglycemia protocol

## 2024-01-01 NOTE — CASE COMMUNICATION
Continue to breastfeed Audrey every 2-3 hours.  FOLLOW UP for Audrey's next well check up at 1 month of age.   [facility] has resumed home health services for  your patient  with the following disciplines:      SN only  pt declined Physical therapy as he can not do much right now due to his weak heart  As  resumption of care visit falls into recertification window this visit is also a recert visit  This report is informational only, no responses is needed  Primary focus of home health care CP assessment re to acute on chronic heart failu re  Patient stated goals of care get ready for surgery  Anticipated visit pattern 3 w 1 then 2 w  6 and next visit date  Tuesday 10/18  See medication list - all med's in home   Significant clinical findings pt  presented with pronounced tardive dyskinesia and cg will be calling his PCP on Monday as suspecting it is due to [place] which he was restarted on  Potential barriers to goal achievement none    Thank you for allowing us to part icipate in the care of your patient